# Patient Record
Sex: FEMALE | Race: WHITE | Employment: UNEMPLOYED | URBAN - METROPOLITAN AREA
[De-identification: names, ages, dates, MRNs, and addresses within clinical notes are randomized per-mention and may not be internally consistent; named-entity substitution may affect disease eponyms.]

---

## 2021-01-29 ENCOUNTER — APPOINTMENT (OUTPATIENT)
Dept: CT IMAGING | Age: 46
DRG: 241 | End: 2021-01-29
Payer: MEDICAID

## 2021-01-29 ENCOUNTER — HOSPITAL ENCOUNTER (INPATIENT)
Age: 46
LOS: 3 days | Discharge: HOME | DRG: 241 | End: 2021-02-01
Attending: EMERGENCY MEDICINE | Admitting: HOSPITALIST
Payer: MEDICAID

## 2021-01-29 DIAGNOSIS — K57.30 DIVERTICULOSIS OF COLON WITHOUT DIVERTICULITIS: ICD-10-CM

## 2021-01-29 DIAGNOSIS — D62 ACUTE BLOOD LOSS ANEMIA: ICD-10-CM

## 2021-01-29 DIAGNOSIS — K76.9 CHRONIC LIVER DISEASE: ICD-10-CM

## 2021-01-29 DIAGNOSIS — A41.9 SEPSIS, DUE TO UNSPECIFIED ORGANISM, UNSPECIFIED WHETHER ACUTE ORGAN DYSFUNCTION PRESENT (HCC): ICD-10-CM

## 2021-01-29 DIAGNOSIS — I86.4 GASTRIC VARICES: ICD-10-CM

## 2021-01-29 DIAGNOSIS — K92.0 HEMATEMESIS WITH NAUSEA: Primary | ICD-10-CM

## 2021-01-29 PROBLEM — K92.2 GI BLEED: Status: ACTIVE | Noted: 2021-01-29

## 2021-01-29 LAB
A/G RATIO: 0.9 (ref 1.1–2.2)
ABO/RH: NORMAL
ALBUMIN SERPL-MCNC: 3.5 G/DL (ref 3.4–5)
ALP BLD-CCNC: 152 U/L (ref 40–129)
ALT SERPL-CCNC: 29 U/L (ref 10–40)
ANION GAP SERPL CALCULATED.3IONS-SCNC: 18 MMOL/L (ref 3–16)
ANISOCYTOSIS: ABNORMAL
ANTIBODY SCREEN: NORMAL
APTT: 34.9 SEC (ref 24.2–36.2)
AST SERPL-CCNC: 100 U/L (ref 15–37)
BASE EXCESS VENOUS: 9 MMOL/L (ref -3–3)
BASOPHILS ABSOLUTE: 0.1 K/UL (ref 0–0.2)
BASOPHILS RELATIVE PERCENT: 0.5 %
BILIRUB SERPL-MCNC: 4.8 MG/DL (ref 0–1)
BUN BLDV-MCNC: 32 MG/DL (ref 7–20)
CALCIUM SERPL-MCNC: 9 MG/DL (ref 8.3–10.6)
CARBOXYHEMOGLOBIN: 4.7 % (ref 0–1.5)
CHLORIDE BLD-SCNC: 92 MMOL/L (ref 99–110)
CO2: 27 MMOL/L (ref 21–32)
CREAT SERPL-MCNC: 1 MG/DL (ref 0.6–1.1)
EOSINOPHILS ABSOLUTE: 0 K/UL (ref 0–0.6)
EOSINOPHILS RELATIVE PERCENT: 0 %
ETHANOL: NORMAL MG/DL (ref 0–0.08)
GFR AFRICAN AMERICAN: >60
GFR NON-AFRICAN AMERICAN: >60
GLOBULIN: 3.7 G/DL
GLUCOSE BLD-MCNC: 184 MG/DL (ref 70–99)
HCG QUALITATIVE: NEGATIVE
HCO3 VENOUS: 32.1 MMOL/L (ref 23–29)
HCT VFR BLD CALC: 18.8 % (ref 36–48)
HEMOGLOBIN, VEN, REDUCED: 5 %
HEMOGLOBIN: 5.9 G/DL (ref 12–16)
INR BLD: 2.28 (ref 0.86–1.14)
LACTIC ACID, SEPSIS: 2.8 MMOL/L (ref 0.4–1.9)
LIPASE: 11 U/L (ref 13–60)
LYMPHOCYTES ABSOLUTE: 1.5 K/UL (ref 1–5.1)
LYMPHOCYTES RELATIVE PERCENT: 8.9 %
MCH RBC QN AUTO: 32.1 PG (ref 26–34)
MCHC RBC AUTO-ENTMCNC: 31.5 G/DL (ref 31–36)
MCV RBC AUTO: 101.9 FL (ref 80–100)
METHEMOGLOBIN VENOUS: <0 %
MONOCYTES ABSOLUTE: 1.4 K/UL (ref 0–1.3)
MONOCYTES RELATIVE PERCENT: 8.5 %
NEUTROPHILS ABSOLUTE: 13.6 K/UL (ref 1.7–7.7)
NEUTROPHILS RELATIVE PERCENT: 82.1 %
O2 CONTENT, VEN: 7 VOL %
O2 SAT, VEN: 94 %
O2 THERAPY: ABNORMAL
PCO2, VEN: 35.9 MMHG (ref 40–50)
PDW BLD-RTO: 18 % (ref 12.4–15.4)
PH VENOUS: 7.56 (ref 7.35–7.45)
PLATELET # BLD: 225 K/UL (ref 135–450)
PMV BLD AUTO: 9 FL (ref 5–10.5)
PO2, VEN: 62.4 MMHG (ref 25–40)
POTASSIUM REFLEX MAGNESIUM: 3.9 MMOL/L (ref 3.5–5.1)
PROTHROMBIN TIME: 26.7 SEC (ref 10–13.2)
RBC # BLD: 1.85 M/UL (ref 4–5.2)
ROULEAUX: ABNORMAL
SODIUM BLD-SCNC: 137 MMOL/L (ref 136–145)
TCO2 CALC VENOUS: 74 MMOL/L
TOTAL PROTEIN: 7.2 G/DL (ref 6.4–8.2)
WBC # BLD: 16.6 K/UL (ref 4–11)

## 2021-01-29 PROCEDURE — 93005 ELECTROCARDIOGRAM TRACING: CPT | Performed by: PHYSICIAN ASSISTANT

## 2021-01-29 PROCEDURE — 82077 ASSAY SPEC XCP UR&BREATH IA: CPT

## 2021-01-29 PROCEDURE — 85610 PROTHROMBIN TIME: CPT

## 2021-01-29 PROCEDURE — 84703 CHORIONIC GONADOTROPIN ASSAY: CPT

## 2021-01-29 PROCEDURE — 80074 ACUTE HEPATITIS PANEL: CPT

## 2021-01-29 PROCEDURE — 96372 THER/PROPH/DIAG INJ SC/IM: CPT

## 2021-01-29 PROCEDURE — 87040 BLOOD CULTURE FOR BACTERIA: CPT

## 2021-01-29 PROCEDURE — 80053 COMPREHEN METABOLIC PANEL: CPT

## 2021-01-29 PROCEDURE — 85730 THROMBOPLASTIN TIME PARTIAL: CPT

## 2021-01-29 PROCEDURE — 86923 COMPATIBILITY TEST ELECTRIC: CPT

## 2021-01-29 PROCEDURE — 99284 EMERGENCY DEPT VISIT MOD MDM: CPT

## 2021-01-29 PROCEDURE — 85018 HEMOGLOBIN: CPT

## 2021-01-29 PROCEDURE — 96374 THER/PROPH/DIAG INJ IV PUSH: CPT

## 2021-01-29 PROCEDURE — 6370000000 HC RX 637 (ALT 250 FOR IP): Performed by: PHYSICIAN ASSISTANT

## 2021-01-29 PROCEDURE — 6360000004 HC RX CONTRAST MEDICATION: Performed by: PHYSICIAN ASSISTANT

## 2021-01-29 PROCEDURE — 2000000000 HC ICU R&B

## 2021-01-29 PROCEDURE — 36415 COLL VENOUS BLD VENIPUNCTURE: CPT

## 2021-01-29 PROCEDURE — 86850 RBC ANTIBODY SCREEN: CPT

## 2021-01-29 PROCEDURE — 96361 HYDRATE IV INFUSION ADD-ON: CPT

## 2021-01-29 PROCEDURE — 85014 HEMATOCRIT: CPT

## 2021-01-29 PROCEDURE — 86901 BLOOD TYPING SEROLOGIC RH(D): CPT

## 2021-01-29 PROCEDURE — 83605 ASSAY OF LACTIC ACID: CPT

## 2021-01-29 PROCEDURE — 74177 CT ABD & PELVIS W/CONTRAST: CPT

## 2021-01-29 PROCEDURE — P9016 RBC LEUKOCYTES REDUCED: HCPCS

## 2021-01-29 PROCEDURE — 6360000002 HC RX W HCPCS: Performed by: PHYSICIAN ASSISTANT

## 2021-01-29 PROCEDURE — 85025 COMPLETE CBC W/AUTO DIFF WBC: CPT

## 2021-01-29 PROCEDURE — 83690 ASSAY OF LIPASE: CPT

## 2021-01-29 PROCEDURE — 82803 BLOOD GASES ANY COMBINATION: CPT

## 2021-01-29 PROCEDURE — C9113 INJ PANTOPRAZOLE SODIUM, VIA: HCPCS | Performed by: PHYSICIAN ASSISTANT

## 2021-01-29 PROCEDURE — 2580000003 HC RX 258: Performed by: PHYSICIAN ASSISTANT

## 2021-01-29 PROCEDURE — 86900 BLOOD TYPING SEROLOGIC ABO: CPT

## 2021-01-29 PROCEDURE — 94761 N-INVAS EAR/PLS OXIMETRY MLT: CPT

## 2021-01-29 RX ORDER — LORAZEPAM 2 MG/ML
4 INJECTION INTRAMUSCULAR
Status: DISCONTINUED | OUTPATIENT
Start: 2021-01-29 | End: 2021-02-01 | Stop reason: HOSPADM

## 2021-01-29 RX ORDER — OCTREOTIDE ACETATE 100 UG/ML
50 INJECTION, SOLUTION INTRAVENOUS; SUBCUTANEOUS ONCE
Status: COMPLETED | OUTPATIENT
Start: 2021-01-29 | End: 2021-01-29

## 2021-01-29 RX ORDER — ACETAMINOPHEN 650 MG/1
650 SUPPOSITORY RECTAL EVERY 6 HOURS PRN
Status: DISCONTINUED | OUTPATIENT
Start: 2021-01-29 | End: 2021-02-01 | Stop reason: HOSPADM

## 2021-01-29 RX ORDER — LORAZEPAM 1 MG/1
4 TABLET ORAL
Status: DISCONTINUED | OUTPATIENT
Start: 2021-01-29 | End: 2021-02-01 | Stop reason: HOSPADM

## 2021-01-29 RX ORDER — ACETAMINOPHEN 325 MG/1
650 TABLET ORAL EVERY 6 HOURS PRN
Status: DISCONTINUED | OUTPATIENT
Start: 2021-01-29 | End: 2021-02-01 | Stop reason: HOSPADM

## 2021-01-29 RX ORDER — THIAMINE HYDROCHLORIDE 100 MG/ML
100 INJECTION, SOLUTION INTRAMUSCULAR; INTRAVENOUS DAILY
Status: DISCONTINUED | OUTPATIENT
Start: 2021-01-30 | End: 2021-02-01 | Stop reason: HOSPADM

## 2021-01-29 RX ORDER — LORAZEPAM 1 MG/1
3 TABLET ORAL
Status: DISCONTINUED | OUTPATIENT
Start: 2021-01-29 | End: 2021-02-01 | Stop reason: HOSPADM

## 2021-01-29 RX ORDER — SODIUM CHLORIDE 0.9 % (FLUSH) 0.9 %
10 SYRINGE (ML) INJECTION EVERY 12 HOURS SCHEDULED
Status: DISCONTINUED | OUTPATIENT
Start: 2021-01-30 | End: 2021-01-29 | Stop reason: SDUPTHER

## 2021-01-29 RX ORDER — 0.9 % SODIUM CHLORIDE 0.9 %
1000 INTRAVENOUS SOLUTION INTRAVENOUS ONCE
Status: COMPLETED | OUTPATIENT
Start: 2021-01-29 | End: 2021-01-29

## 2021-01-29 RX ORDER — PANTOPRAZOLE SODIUM 40 MG/10ML
40 INJECTION, POWDER, LYOPHILIZED, FOR SOLUTION INTRAVENOUS ONCE
Status: COMPLETED | OUTPATIENT
Start: 2021-01-29 | End: 2021-01-29

## 2021-01-29 RX ORDER — ONDANSETRON 2 MG/ML
4 INJECTION INTRAMUSCULAR; INTRAVENOUS ONCE
Status: COMPLETED | OUTPATIENT
Start: 2021-01-29 | End: 2021-01-29

## 2021-01-29 RX ORDER — SODIUM CHLORIDE 0.9 % (FLUSH) 0.9 %
10 SYRINGE (ML) INJECTION EVERY 12 HOURS SCHEDULED
Status: DISCONTINUED | OUTPATIENT
Start: 2021-01-30 | End: 2021-02-01 | Stop reason: HOSPADM

## 2021-01-29 RX ORDER — LORAZEPAM 2 MG/ML
1 INJECTION INTRAMUSCULAR
Status: DISCONTINUED | OUTPATIENT
Start: 2021-01-29 | End: 2021-02-01 | Stop reason: HOSPADM

## 2021-01-29 RX ORDER — LORAZEPAM 1 MG/1
2 TABLET ORAL
Status: DISCONTINUED | OUTPATIENT
Start: 2021-01-29 | End: 2021-02-01 | Stop reason: HOSPADM

## 2021-01-29 RX ORDER — ACETAMINOPHEN 325 MG/1
650 TABLET ORAL ONCE
Status: COMPLETED | OUTPATIENT
Start: 2021-01-29 | End: 2021-01-29

## 2021-01-29 RX ORDER — SODIUM CHLORIDE 0.9 % (FLUSH) 0.9 %
10 SYRINGE (ML) INJECTION PRN
Status: DISCONTINUED | OUTPATIENT
Start: 2021-01-29 | End: 2021-01-29 | Stop reason: SDUPTHER

## 2021-01-29 RX ORDER — PROMETHAZINE HYDROCHLORIDE 25 MG/1
12.5 TABLET ORAL EVERY 6 HOURS PRN
Status: DISCONTINUED | OUTPATIENT
Start: 2021-01-29 | End: 2021-02-01 | Stop reason: HOSPADM

## 2021-01-29 RX ORDER — SODIUM CHLORIDE 0.9 % (FLUSH) 0.9 %
10 SYRINGE (ML) INJECTION PRN
Status: DISCONTINUED | OUTPATIENT
Start: 2021-01-29 | End: 2021-02-01 | Stop reason: HOSPADM

## 2021-01-29 RX ORDER — SODIUM CHLORIDE 9 MG/ML
INJECTION, SOLUTION INTRAVENOUS PRN
Status: DISCONTINUED | OUTPATIENT
Start: 2021-01-29 | End: 2021-02-01 | Stop reason: HOSPADM

## 2021-01-29 RX ORDER — SODIUM CHLORIDE 9 MG/ML
INJECTION, SOLUTION INTRAVENOUS CONTINUOUS
Status: DISCONTINUED | OUTPATIENT
Start: 2021-01-30 | End: 2021-02-01 | Stop reason: HOSPADM

## 2021-01-29 RX ORDER — MULTIVITAMIN WITH IRON
1 TABLET ORAL DAILY
Status: DISCONTINUED | OUTPATIENT
Start: 2021-01-30 | End: 2021-02-01 | Stop reason: HOSPADM

## 2021-01-29 RX ORDER — ONDANSETRON 2 MG/ML
4 INJECTION INTRAMUSCULAR; INTRAVENOUS EVERY 6 HOURS PRN
Status: DISCONTINUED | OUTPATIENT
Start: 2021-01-29 | End: 2021-02-01 | Stop reason: HOSPADM

## 2021-01-29 RX ORDER — LORAZEPAM 2 MG/ML
2 INJECTION INTRAMUSCULAR
Status: DISCONTINUED | OUTPATIENT
Start: 2021-01-29 | End: 2021-02-01 | Stop reason: HOSPADM

## 2021-01-29 RX ORDER — LORAZEPAM 2 MG/ML
3 INJECTION INTRAMUSCULAR
Status: DISCONTINUED | OUTPATIENT
Start: 2021-01-29 | End: 2021-02-01 | Stop reason: HOSPADM

## 2021-01-29 RX ORDER — LORAZEPAM 1 MG/1
1 TABLET ORAL
Status: DISCONTINUED | OUTPATIENT
Start: 2021-01-29 | End: 2021-02-01 | Stop reason: HOSPADM

## 2021-01-29 RX ADMIN — PANTOPRAZOLE SODIUM 40 MG: 40 INJECTION, POWDER, FOR SOLUTION INTRAVENOUS at 18:23

## 2021-01-29 RX ADMIN — SODIUM CHLORIDE 8 MG/HR: 9 INJECTION, SOLUTION INTRAVENOUS at 18:37

## 2021-01-29 RX ADMIN — SODIUM CHLORIDE 1000 ML: 9 INJECTION, SOLUTION INTRAVENOUS at 18:22

## 2021-01-29 RX ADMIN — OCTREOTIDE ACETATE 50 MCG/HR: 500 INJECTION, SOLUTION INTRAVENOUS; SUBCUTANEOUS at 21:47

## 2021-01-29 RX ADMIN — ACETAMINOPHEN 650 MG: 325 TABLET ORAL at 18:23

## 2021-01-29 RX ADMIN — SODIUM CHLORIDE 1000 ML: 9 INJECTION, SOLUTION INTRAVENOUS at 18:44

## 2021-01-29 RX ADMIN — IOPAMIDOL 75 ML: 755 INJECTION, SOLUTION INTRAVENOUS at 18:17

## 2021-01-29 RX ADMIN — ONDANSETRON 4 MG: 2 INJECTION INTRAMUSCULAR; INTRAVENOUS at 18:25

## 2021-01-29 RX ADMIN — OCTREOTIDE ACETATE 50 MCG: 100 INJECTION, SOLUTION INTRAVENOUS; SUBCUTANEOUS at 21:44

## 2021-01-29 ASSESSMENT — PAIN SCALES - GENERAL: PAINLEVEL_OUTOF10: 2

## 2021-01-29 ASSESSMENT — ENCOUNTER SYMPTOMS
NAUSEA: 1
DIARRHEA: 0
ABDOMINAL PAIN: 1
VOMITING: 1
SHORTNESS OF BREATH: 0
BACK PAIN: 0
CHEST TIGHTNESS: 0

## 2021-01-29 NOTE — ED PROVIDER NOTES
[facility]        Pt Name: Arnaldo Downs  MRN: [id number]  [place] 1975  Date of evaluation: 1/29/2021  Provider: Moises Torres PA-C  PCP: No primary care provider on file. I have seen and evaluated this patient with my supervising physician Ernestine Minor MD.    [facility]       Chief Complaint   Patient presents with    Emesis     FF ems, pt started feeling nauseous last night around 2200. Pt states she saw blood in emesis. Pt feeling weak and light headed. HISTORY OF PRESENT ILLNESS   (Location, Timing/Onset, Context/Setting, Quality, Duration, Modifying Factors, Severity, Associated Signs and Symptoms)  Note limiting factors. Arnaldo Downs is a 41 y.o. female presents the emergency department with difficulty as a pertains to coffee-ground hematemesis. Patient states she has had difficulties dating back to 2016 with a documented gastric ulcer. She states she is had an endoscopy and has been able to do extremely well following difficulties that she had back then only relying on Pepcid to control intermittent symptoms. Patient states she had dinner last evening which consisted of a hamburger. She initially thought that some of the symptoms could be related to food poisoning. She states she is the only one that had this food in her home. She states she became significantly nauseated and was somewhat \"lethargic\". She states she was almost too tired she could not keep her eyes open. She goes on to report that later that evening she started having difficulties as a pertains to emesis. She thinks that it was around 11:00 that this started. Patient tells me that she did not want to wake her daughter so the first time that she actually vomited she did not see what the contents was but she says it tasted like blood.   Patient states shortly thereafter she started having more violent vomiting and states that she did notice that this was coffee-ground in appearance. She states it look like brown blood with the contents of food in the toilet. She states in the overnight hours she has had approximately 25 bouts of emesis. She states approximately half of those did demonstrate some blood. She states near the end where she had the last bout of hematemesis approximately 230 this afternoon the blood was definitely more red in color. Patient states unfortunate she is also experiencing significant symptoms of orthostasis. She states she is markedly lightheaded with position changes. She states she is not having difficulties as a pertains to diarrhea. She does have diffuse upper abdominal pain. Patient denies fevers and or chills. She denies sick contacts. She denies that she is experiencing chest pain or shortness of breath. She denies unilateral leg pain or swelling. Denies any history of DVT and or PE and has no additional risk factors for thromboembolic events. She is done nothing yet for the above-mentioned. She states she continues to feel markedly nauseated but has not had any additional vomiting because she states that she has nothing left to vomit up. Patient tells me she has not had any change in her stool. She is also tell me that she has had difficulties with reported menorrhagia. She states that she has had difficulties related to that for the last couple of months. Patient states that she has associated dysmenorrhea with it as well. Upon further questioning she denies alcohol use and approaching 2 years and reports that she did used to have drink excessively. .  She states she is not taking any significant NSAIDs. She states she normally just takes Tylenol for her dysmenorrhea. Patient reports that she did patient has no additional complaints and presents to the ED for evaluation and treatment by emergency medical services.     Nursing Notes were all reviewed and agreed with or any disagreements were addressed in the HPI. REVIEW OF SYSTEMS    (2-9 systems for level 4, 10 or more for level 5)     Review of Systems   Constitutional: Positive for fatigue. Negative for activity change, chills and fever. Respiratory: Negative for chest tightness and shortness of breath. Cardiovascular: Negative for chest pain. Gastrointestinal: Positive for abdominal pain, nausea and vomiting. Negative for diarrhea. Genitourinary: Negative for dysuria and flank pain. Musculoskeletal: Negative for back pain and myalgias. Skin: Negative for rash and wound. Neurological: Positive for weakness and light-headedness. Positives and Pertinent negatives as per HPI. Except as noted above in the ROS, all other systems were reviewed and negative. PAST MEDICAL HISTORY   History reviewed. No pertinent past medical history. SURGICAL HISTORY   History reviewed. No pertinent surgical history. CURRENTMEDICATIONS       Previous Medications    No medications on file         ALLERGIES     Patient has no known allergies. FAMILYHISTORY     History reviewed. No pertinent family history. SOCIAL HISTORY       Social History     Tobacco Use    Smoking status: Former Smoker    Smokeless tobacco: Never Used   Substance Use Topics    Alcohol use: Not Currently    Drug use: Never       SCREENINGS             PHYSICAL EXAM    (up to 7 for level 4, 8 or more for level 5)     ED Triage Vitals [01/29/21 1656]   BP Temp Temp Source Pulse Resp SpO2 Height Weight   (!) 121/41 100.2 °F (37.9 °C) Oral 134 20 99 % 5' 4\" (1.626 m) 125 lb (56.7 kg)       Physical Exam  Vitals signs and nursing note reviewed. Constitutional:       General: She is awake. She is not in acute distress. Appearance: Normal appearance. She is well-developed. She is not ill-appearing or diaphoretic. HENT:      Head: Normocephalic and atraumatic. No raccoon eyes, Marsh's sign, contusion or laceration.       Right Ear: Hearing and external ear normal.      Left Ear: Hearing and external ear normal.      Nose: Nose normal.      Mouth/Throat:      Lips: Pink. Mouth: Mucous membranes are moist.   Eyes:      General: Lids are normal. No scleral icterus. Right eye: No discharge. Left eye: No discharge. Conjunctiva/sclera: Conjunctivae normal.      Pupils: Pupils are equal, round, and reactive to light. Neck:      Musculoskeletal: Normal range of motion. Vascular: No JVD. Cardiovascular:      Rate and Rhythm: Regular rhythm. Tachycardia present. Heart sounds: No murmur. No friction rub. No gallop. Comments: Bilateral calves supple. Negative Homans bilaterally. Pulmonary:      Effort: Pulmonary effort is normal. No accessory muscle usage or respiratory distress. Breath sounds: Normal breath sounds. No wheezing, rhonchi or rales. Abdominal:      General: Abdomen is flat. Bowel sounds are normal. There is no distension. Palpations: Abdomen is soft. Abdomen is not rigid. There is no mass. Tenderness: There is generalized abdominal tenderness and tenderness in the right upper quadrant, epigastric area and left upper quadrant. There is no right CVA tenderness, left CVA tenderness, guarding or rebound. Musculoskeletal:      Right lower leg: No edema. Left lower leg: No edema. Skin:     General: Skin is warm and dry. Neurological:      Mental Status: She is alert and oriented to person, place, and time. GCS: GCS eye subscore is 4. GCS verbal subscore is 5. GCS motor subscore is 6. Cranial Nerves: No cranial nerve deficit. Sensory: No sensory deficit. Coordination: Coordination normal.   Psychiatric:         Behavior: Behavior normal. Behavior is cooperative.          DIAGNOSTIC RESULTS   LABS:    Labs Reviewed   CBC WITH AUTO DIFFERENTIAL - Abnormal; Notable for the following components:       Result Value    WBC 16.6 (*)     RBC 1.85 (*) Hemoglobin 5.9 (*)     Hematocrit 18.8 (*)     .9 (*)     RDW 18.0 (*)     Neutrophils Absolute 13.6 (*)     Monocytes Absolute 1.4 (*)     Anisocytosis 1+ (*)     Rouleaux Occasional (*)     All other components within normal limits    Narrative:     Lisa Dlilard  [facility] tel. [phone],  Hematology results called to and read back by RN,Ivy Marino, 01/29/2021  18:02, by [facility]  Performed at:  [facility] [address],  [place], [address]   Phone [phone]   COMPREHENSIVE METABOLIC PANEL W/ REFLEX TO MG FOR LOW K - Abnormal; Notable for the following components:    Chloride 92 (*)     Anion Gap 18 (*)     Glucose 184 (*)     BUN 32 (*)     Albumin/Globulin Ratio 0.9 (*)     Total Bilirubin 4.8 (*)     Alkaline Phosphatase 152 (*)      (*)     All other components within normal limits    Narrative:     Performed at:  [facility] [address], [place], [address]   Phone [phone]   LIPASE - Abnormal; Notable for the following components:    Lipase 11.0 (*)     All other components within normal limits    Narrative:     Performed at:  [facility] [address], [place], [address]   Phone [phone]   BLOOD GAS, VENOUS - Abnormal; Notable for the following components:    pH, Joe 7.559 (*)     pCO2, Joe 35.9 (*)     pO2, Joe 62.4 (*)     HCO3, Venous 32.1 (*)     Base Excess, Joe 9.0 (*)     Carboxyhemoglobin 4.7 (*)     All other components within normal limits    Narrative:     Performed at:  [facility] [address]   Phone [phone]   LACTATE, SEPSIS - Abnormal; Notable for the following components:    Lactic Acid, Sepsis 2.8 (*)     All other components within normal limits    Narrative:     Performed at:  [facility] [address]   Phone [phone] CULTURE, BLOOD 1   CULTURE, BLOOD 2   HCG, SERUM, QUALITATIVE    Narrative:     Performed at:  [facility]  [address],  [place], [place] [place]   Phone [phone]   ETHANOL    Narrative:     Performed at:  [facility]  [address],  [place], [address]   Phone [phone]   URINE RT REFLEX TO CULTURE   PROTIME-INR   APTT   TYPE AND SCREEN    Narrative:     Performed at:  [facility]  [address]  [place], [address]   Phone [phone]   PREPARE RBC (CROSSMATCH)       All other labs were within normal range or not returned as of this dictation. EKG: All EKG's are interpreted by the Emergency Department Physician in the absence of a cardiologist.  Please see their note for interpretation of EKG. RADIOLOGY:   Non-plain film images such as CT, Ultrasound and MRI are read by the radiologist. Plain radiographic images are visualized and preliminarily interpreted by the ED Provider with the below findings:        Interpretation per the Radiologist below, if available at the time of this note:    CT ABDOMEN PELVIS W IV CONTRAST Additional Contrast? None   Preliminary Result   1. Morphologic findings consistent with chronic liver disease with underlying   steatosis and portal hypertension, as described. Perigastric and gastric   varices near the GE junction are noted. 2.  Right-sided colonic diverticula. PROCEDURES   Unless otherwise noted below, none     Procedures    CRITICAL CARE TIME   Because of the high probability of sudden clinical deterioration of the patients condition and to prevent further deterioration, my critical care time involved my full attention to the patients condition, and included chart data review, documentation, medication ordering, viewing the patients old records, reevaluation of the patient's cardiac, pulmonary, and neurological status.   Reassessing vital signs. Consutlations with off service physician. Ordering, interpreting reviewing diagnostic testing. Therefore my critical care time was 49 minutes of direct attention to the patients condition and did not include time spent on procedures. I have discussed with the patient the rationale for blood transfusion, its benefits in treating or preventing acute blood loss and dangerously low hemoglobin levels which could lead to fatigue, organ damage or death, and its risk which include: mild transfusion reactions, rare risk of blood borne infection, or more serious but rare allergic reactions. I have discussed the alternatives to transfusion, including the risk and consequences of not receiving transfusion. The patient had an opportunity to ask questions and had agreed to proceed with transfusion of packed red blood cells.     SEP-1 CORE MEASURE DATA    Classification: sepsis    Amount of fluids ordered: at least 30mL/kg based on ideal body weight due to obesity defined as BMI >30 (patient's BMI is Body mass index is 21.46 kg/m².)    Time at which sepsis was identified: 2100    Broad-spectrum antibiotics chosen: based on sepsis order-set for a suspected source of: Abdominal    Repeat lactate level: pending    On reassessment after fluid resuscitation:   Vitals update: BP (!) 104/43   Pulse 110   Temp 99.8 °F (37.7 °C) (Oral)   Resp 18   Ht 5' 4\" (1.626 m)   Wt 125 lb (56.7 kg)   LMP 01/24/2021 (Exact Date)   SpO2 100%   BMI 21.46 kg/m² , cardiopulmonary exam: Improving tachycardia, capillary refill: Risk, peripheral pulses: Unchanged, skin examination: Unchanged      CONSULTS:  IP CONSULT TO GI      EMERGENCY DEPARTMENT COURSE and DIFFERENTIAL DIAGNOSIS/MDM:   Vitals:    Vitals:    01/29/21 2100 01/29/21 2115 01/29/21 2130 01/29/21 2145   BP: (!) 110/49 (!) 105/42 (!) 104/48 (!) 104/43   Pulse: 129 120 110 110   Resp:       Temp:       TempSrc:       SpO2: 99% 99% 100% 100%   Weight:       Height: thrombocytosis. This was discussed with the patient in detail. She was consented to blood transfusion because of active upper GI bleeding as well as low hemoglobin and hematocrit. BUN is 32 creatinine is 1.0 nonfasting glucose 184 electrolytes are as documented. CO2 at 27 with a gap of 18. She does have an elevation in her total bili at 4.8 alkaline phosphatase at 152 ALT is normal at 29 with an AST of 100. Lipase is 11. Pregnancy is negative. Lactic acid is elevated at 2.8. Venous blood gas demonstrates a pH of 7.56 she is not hypercapnic or hypoxic. CT abdomen and pelvis with IV contrast demonstrates morphologic findings consistent with chronic liver disease. There is underlying steatosis associated portal hypertension. Perigastric and gastric varices near the GE junction are noted. When these were noted and octreotide bolus as well as octreotide drip were initiated and gastroenterology was consulted. There is evidence of right-sided colonic diverticula. I did speak directly with Dr. Abdon Palm in regards the above-mentioned. He agrees with everything is been collated as above and he did request ceftriaxone as antibiotic of choice. He has of the patient be made n.p.o. after midnight he will gladly see and assume the care of this patient. He asked that the patient be admitted to the hospitalist service. Case was discussed directly with the hospitalist who accepts the patient for admission for ongoing care management the diagnoses below. FINAL IMPRESSION      1. Hematemesis with nausea    2. Chronic liver disease    3. Gastric varices    4. Acute blood loss anemia    5. Diverticulosis of colon without diverticulitis    6.  Sepsis, due to unspecified organism, unspecified whether acute organ dysfunction present [facility])          DISPOSITION/PLAN   DISPOSITION: Admit medical stable condition         (Please note that portions of this note were completed with a voice recognition program.  Efforts were made to edit the dictations but occasionally words are mis-transcribed.)    Archie Horan PA-C (electronically signed)           Gabriela Hannah PA-C  01/29/21 [id number]

## 2021-01-30 ENCOUNTER — ANESTHESIA EVENT (OUTPATIENT)
Dept: ENDOSCOPY | Age: 46
DRG: 241 | End: 2021-01-30
Payer: MEDICAID

## 2021-01-30 ENCOUNTER — ANESTHESIA (OUTPATIENT)
Dept: ENDOSCOPY | Age: 46
DRG: 241 | End: 2021-01-30
Payer: MEDICAID

## 2021-01-30 LAB
A/G RATIO: 1 (ref 1.1–2.2)
ALBUMIN SERPL-MCNC: 2.6 G/DL (ref 3.4–5)
ALP BLD-CCNC: 92 U/L (ref 40–129)
ALT SERPL-CCNC: 19 U/L (ref 10–40)
AMPHETAMINE SCREEN, URINE: NORMAL
ANION GAP SERPL CALCULATED.3IONS-SCNC: 10 MMOL/L (ref 3–16)
ANISOCYTOSIS: ABNORMAL
APTT: 34.4 SEC (ref 24.2–36.2)
AST SERPL-CCNC: 72 U/L (ref 15–37)
BARBITURATE SCREEN URINE: NORMAL
BASOPHILS ABSOLUTE: 0 K/UL (ref 0–0.2)
BASOPHILS RELATIVE PERCENT: 0.4 %
BENZODIAZEPINE SCREEN, URINE: NORMAL
BILIRUB SERPL-MCNC: 4.7 MG/DL (ref 0–1)
BLOOD BANK DISPENSE STATUS: NORMAL
BLOOD BANK DISPENSE STATUS: NORMAL
BLOOD BANK PRODUCT CODE: NORMAL
BLOOD BANK PRODUCT CODE: NORMAL
BPU ID: NORMAL
BPU ID: NORMAL
BUN BLDV-MCNC: 35 MG/DL (ref 7–20)
CALCIUM SERPL-MCNC: 7.1 MG/DL (ref 8.3–10.6)
CANNABINOID SCREEN URINE: NORMAL
CHLORIDE BLD-SCNC: 105 MMOL/L (ref 99–110)
CO2: 24 MMOL/L (ref 21–32)
COCAINE METABOLITE SCREEN URINE: NORMAL
CREAT SERPL-MCNC: 1.1 MG/DL (ref 0.6–1.1)
DESCRIPTION BLOOD BANK: NORMAL
DESCRIPTION BLOOD BANK: NORMAL
EKG ATRIAL RATE: 136 BPM
EKG DIAGNOSIS: NORMAL
EKG P AXIS: 64 DEGREES
EKG P-R INTERVAL: 114 MS
EKG Q-T INTERVAL: 320 MS
EKG QRS DURATION: 70 MS
EKG QTC CALCULATION (BAZETT): 481 MS
EKG R AXIS: 51 DEGREES
EKG T AXIS: -47 DEGREES
EKG VENTRICULAR RATE: 136 BPM
EOSINOPHILS ABSOLUTE: 0.2 K/UL (ref 0–0.6)
EOSINOPHILS RELATIVE PERCENT: 1.6 %
GFR AFRICAN AMERICAN: >60
GFR NON-AFRICAN AMERICAN: 54
GLOBULIN: 2.6 G/DL
GLUCOSE BLD-MCNC: 119 MG/DL (ref 70–99)
HAV IGM SER IA-ACNC: NORMAL
HCT VFR BLD CALC: 19.3 % (ref 36–48)
HCT VFR BLD CALC: 22 % (ref 36–48)
HCT VFR BLD CALC: 22.4 % (ref 36–48)
HCT VFR BLD CALC: 23.7 % (ref 36–48)
HEMOGLOBIN: 6.3 G/DL (ref 12–16)
HEMOGLOBIN: 7.3 G/DL (ref 12–16)
HEMOGLOBIN: 7.5 G/DL (ref 12–16)
HEMOGLOBIN: 8 G/DL (ref 12–16)
HEPATITIS B CORE IGM ANTIBODY: NORMAL
HEPATITIS B SURFACE ANTIGEN INTERPRETATION: NORMAL
HEPATITIS C ANTIBODY INTERPRETATION: NORMAL
INR BLD: 1.87 (ref 0.86–1.14)
IRON SATURATION: ABNORMAL % (ref 15–50)
IRON: 193 UG/DL (ref 37–145)
LYMPHOCYTES ABSOLUTE: 1.4 K/UL (ref 1–5.1)
LYMPHOCYTES RELATIVE PERCENT: 14.4 %
Lab: NORMAL
MCH RBC QN AUTO: 31.6 PG (ref 26–34)
MCHC RBC AUTO-ENTMCNC: 33.2 G/DL (ref 31–36)
MCV RBC AUTO: 95 FL (ref 80–100)
METHADONE SCREEN, URINE: NORMAL
MONOCYTES ABSOLUTE: 0.7 K/UL (ref 0–1.3)
MONOCYTES RELATIVE PERCENT: 7.8 %
NEUTROPHILS ABSOLUTE: 7.2 K/UL (ref 1.7–7.7)
NEUTROPHILS RELATIVE PERCENT: 75.8 %
OPIATE SCREEN URINE: NORMAL
OXYCODONE URINE: NORMAL
PDW BLD-RTO: 17.7 % (ref 12.4–15.4)
PH UA: 6
PHENCYCLIDINE SCREEN URINE: NORMAL
PLATELET # BLD: 94 K/UL (ref 135–450)
PLATELET SLIDE REVIEW: ABNORMAL
PMV BLD AUTO: 9.3 FL (ref 5–10.5)
POTASSIUM REFLEX MAGNESIUM: 4 MMOL/L (ref 3.5–5.1)
PROPOXYPHENE SCREEN: NORMAL
PROTHROMBIN TIME: 21.8 SEC (ref 10–13.2)
RBC # BLD: 2.36 M/UL (ref 4–5.2)
SLIDE REVIEW: ABNORMAL
SODIUM BLD-SCNC: 139 MMOL/L (ref 136–145)
TOTAL IRON BINDING CAPACITY: ABNORMAL UG/DL (ref 260–445)
TOTAL PROTEIN: 5.2 G/DL (ref 6.4–8.2)
WBC # BLD: 9.5 K/UL (ref 4–11)

## 2021-01-30 PROCEDURE — 2580000003 HC RX 258: Performed by: HOSPITALIST

## 2021-01-30 PROCEDURE — 80053 COMPREHEN METABOLIC PANEL: CPT

## 2021-01-30 PROCEDURE — C9113 INJ PANTOPRAZOLE SODIUM, VIA: HCPCS | Performed by: PHYSICIAN ASSISTANT

## 2021-01-30 PROCEDURE — 83550 IRON BINDING TEST: CPT

## 2021-01-30 PROCEDURE — 2500000003 HC RX 250 WO HCPCS: Performed by: HOSPITALIST

## 2021-01-30 PROCEDURE — 6360000002 HC RX W HCPCS: Performed by: INTERNAL MEDICINE

## 2021-01-30 PROCEDURE — 2709999900 HC NON-CHARGEABLE SUPPLY: Performed by: INTERNAL MEDICINE

## 2021-01-30 PROCEDURE — 85730 THROMBOPLASTIN TIME PARTIAL: CPT

## 2021-01-30 PROCEDURE — 2580000003 HC RX 258: Performed by: INTERNAL MEDICINE

## 2021-01-30 PROCEDURE — 85610 PROTHROMBIN TIME: CPT

## 2021-01-30 PROCEDURE — 2000000000 HC ICU R&B

## 2021-01-30 PROCEDURE — 80307 DRUG TEST PRSMV CHEM ANLYZR: CPT

## 2021-01-30 PROCEDURE — 3609017100 HC EGD: Performed by: INTERNAL MEDICINE

## 2021-01-30 PROCEDURE — 85025 COMPLETE CBC W/AUTO DIFF WBC: CPT

## 2021-01-30 PROCEDURE — 6360000002 HC RX W HCPCS: Performed by: FAMILY MEDICINE

## 2021-01-30 PROCEDURE — 85018 HEMOGLOBIN: CPT

## 2021-01-30 PROCEDURE — 2580000003 HC RX 258

## 2021-01-30 PROCEDURE — 36430 TRANSFUSION BLD/BLD COMPNT: CPT

## 2021-01-30 PROCEDURE — 0DJ08ZZ INSPECTION OF UPPER INTESTINAL TRACT, VIA NATURAL OR ARTIFICIAL OPENING ENDOSCOPIC: ICD-10-PCS | Performed by: INTERNAL MEDICINE

## 2021-01-30 PROCEDURE — 36415 COLL VENOUS BLD VENIPUNCTURE: CPT

## 2021-01-30 PROCEDURE — 93010 ELECTROCARDIOGRAM REPORT: CPT | Performed by: INTERNAL MEDICINE

## 2021-01-30 PROCEDURE — 6360000002 HC RX W HCPCS: Performed by: PHYSICIAN ASSISTANT

## 2021-01-30 PROCEDURE — 85014 HEMATOCRIT: CPT

## 2021-01-30 PROCEDURE — 94761 N-INVAS EAR/PLS OXIMETRY MLT: CPT

## 2021-01-30 PROCEDURE — 83540 ASSAY OF IRON: CPT

## 2021-01-30 PROCEDURE — 6370000000 HC RX 637 (ALT 250 FOR IP): Performed by: HOSPITALIST

## 2021-01-30 PROCEDURE — 2500000003 HC RX 250 WO HCPCS: Performed by: FAMILY MEDICINE

## 2021-01-30 PROCEDURE — 3700000001 HC ADD 15 MINUTES (ANESTHESIA): Performed by: INTERNAL MEDICINE

## 2021-01-30 PROCEDURE — 3700000000 HC ANESTHESIA ATTENDED CARE: Performed by: INTERNAL MEDICINE

## 2021-01-30 PROCEDURE — 2580000003 HC RX 258: Performed by: PHYSICIAN ASSISTANT

## 2021-01-30 PROCEDURE — 6360000002 HC RX W HCPCS: Performed by: HOSPITALIST

## 2021-01-30 RX ORDER — SODIUM CHLORIDE 9 MG/ML
INJECTION, SOLUTION INTRAVENOUS
Status: COMPLETED
Start: 2021-01-30 | End: 2021-01-30

## 2021-01-30 RX ORDER — SODIUM CHLORIDE 9 MG/ML
INJECTION, SOLUTION INTRAVENOUS PRN
Status: DISCONTINUED | OUTPATIENT
Start: 2021-01-30 | End: 2021-02-01 | Stop reason: HOSPADM

## 2021-01-30 RX ORDER — LIDOCAINE HYDROCHLORIDE 20 MG/ML
INJECTION, SOLUTION INFILTRATION; PERINEURAL PRN
Status: DISCONTINUED | OUTPATIENT
Start: 2021-01-30 | End: 2021-01-30 | Stop reason: SDUPTHER

## 2021-01-30 RX ORDER — SODIUM CHLORIDE 9 MG/ML
INJECTION, SOLUTION INTRAVENOUS
Status: DISPENSED
Start: 2021-01-30 | End: 2021-01-30

## 2021-01-30 RX ORDER — PROPOFOL 10 MG/ML
INJECTION, EMULSION INTRAVENOUS PRN
Status: DISCONTINUED | OUTPATIENT
Start: 2021-01-30 | End: 2021-01-30 | Stop reason: SDUPTHER

## 2021-01-30 RX ADMIN — OCTREOTIDE ACETATE 50 MCG/HR: 500 INJECTION, SOLUTION INTRAVENOUS; SUBCUTANEOUS at 21:52

## 2021-01-30 RX ADMIN — SODIUM CHLORIDE: 9 INJECTION, SOLUTION INTRAVENOUS at 23:54

## 2021-01-30 RX ADMIN — PROPOFOL 50 MG: 10 INJECTION, EMULSION INTRAVENOUS at 10:43

## 2021-01-30 RX ADMIN — PROMETHAZINE HYDROCHLORIDE 12.5 MG: 25 TABLET ORAL at 15:50

## 2021-01-30 RX ADMIN — PROMETHAZINE HYDROCHLORIDE 12.5 MG: 25 TABLET ORAL at 22:09

## 2021-01-30 RX ADMIN — THIAMINE HYDROCHLORIDE 100 MG: 100 INJECTION, SOLUTION INTRAMUSCULAR; INTRAVENOUS at 00:38

## 2021-01-30 RX ADMIN — PROPOFOL 40 MG: 10 INJECTION, EMULSION INTRAVENOUS at 10:47

## 2021-01-30 RX ADMIN — PROMETHAZINE HYDROCHLORIDE 12.5 MG: 25 TABLET ORAL at 00:08

## 2021-01-30 RX ADMIN — PHYTONADIONE 5 MG: 10 INJECTION, EMULSION INTRAMUSCULAR; INTRAVENOUS; SUBCUTANEOUS at 00:43

## 2021-01-30 RX ADMIN — SODIUM CHLORIDE: 9 INJECTION, SOLUTION INTRAVENOUS at 05:12

## 2021-01-30 RX ADMIN — Medication 10 ML: at 09:31

## 2021-01-30 RX ADMIN — SODIUM CHLORIDE 8 MG/HR: 9 INJECTION, SOLUTION INTRAVENOUS at 00:20

## 2021-01-30 RX ADMIN — Medication 10 ML: at 22:10

## 2021-01-30 RX ADMIN — THERA TABS 1 TABLET: TAB at 09:27

## 2021-01-30 RX ADMIN — SODIUM CHLORIDE 8 MG/HR: 9 INJECTION, SOLUTION INTRAVENOUS at 09:47

## 2021-01-30 RX ADMIN — PROPOFOL 50 MG: 10 INJECTION, EMULSION INTRAVENOUS at 10:45

## 2021-01-30 RX ADMIN — ONDANSETRON 4 MG: 2 INJECTION INTRAMUSCULAR; INTRAVENOUS at 01:50

## 2021-01-30 RX ADMIN — SODIUM CHLORIDE 8 MG/HR: 9 INJECTION, SOLUTION INTRAVENOUS at 21:52

## 2021-01-30 RX ADMIN — SODIUM CHLORIDE: 9 INJECTION, SOLUTION INTRAVENOUS at 11:18

## 2021-01-30 RX ADMIN — FOLIC ACID: 5 INJECTION, SOLUTION INTRAMUSCULAR; INTRAVENOUS; SUBCUTANEOUS at 00:07

## 2021-01-30 RX ADMIN — PROPOFOL 100 MG: 10 INJECTION, EMULSION INTRAVENOUS at 10:40

## 2021-01-30 RX ADMIN — SODIUM CHLORIDE 250 ML: 9 INJECTION, SOLUTION INTRAVENOUS at 00:44

## 2021-01-30 RX ADMIN — CEFTRIAXONE 2000 MG: 2 INJECTION, POWDER, FOR SOLUTION INTRAMUSCULAR; INTRAVENOUS at 22:09

## 2021-01-30 RX ADMIN — Medication 1000 MG: at 00:27

## 2021-01-30 RX ADMIN — LIDOCAINE HYDROCHLORIDE 100 MG: 20 INJECTION, SOLUTION INFILTRATION; PERINEURAL at 10:40

## 2021-01-30 RX ADMIN — PROMETHAZINE HYDROCHLORIDE 12.5 MG: 25 TABLET ORAL at 06:52

## 2021-01-30 RX ADMIN — OCTREOTIDE ACETATE 50 MCG/HR: 500 INJECTION, SOLUTION INTRAVENOUS; SUBCUTANEOUS at 09:47

## 2021-01-30 ASSESSMENT — PAIN SCALES - GENERAL
PAINLEVEL_OUTOF10: 0

## 2021-01-30 NOTE — ANESTHESIA PRE PROCEDURE
Department of Anesthesiology  Preprocedure Note       Name:  Artis Zamora   Age:  41 y.o.  :  1975                                          MRN:  [id number]         Date:  2021      Surgeon: * No surgeons listed *    Procedure: * No procedures listed *    Medications prior to admission:   Prior to Admission medications    Not on File       Current medications:    Current Facility-Administered Medications   Medication Dose Route Frequency Provider Last Rate Last Admin    0.9 % sodium chloride infusion   Intravenous PRN Amy Marie MD        cefTRIAXone (ROCEPHIN) 2000 mg IVPB in D5W 50ml minibag  2,000 mg Intravenous Q24H Lamine Troncoso MD        pantoprazole (PROTONIX) 80 mg in sodium chloride 0.9 % 100 mL infusion  8 mg/hr Intravenous Continuous Ralf Saldaña PA-C 10 mL/hr at 21 0947 8 mg/hr at 21 0947    0.9 % sodium chloride infusion   Intravenous PRN Ralf Saldaña PA-C        octreotide (SANDOSTATIN) 500 mcg in sodium chloride 0.9 % 100 mL infusion  50 mcg/hr Intravenous Continuous Ralf Saldaña PA-C 10 mL/hr at 21 0947 50 mcg/hr at 21 0947    sodium chloride flush 0.9 % injection 10 mL  10 mL Intravenous 2 times per day Amy Marie MD   10 mL at 21 0931    sodium chloride flush 0.9 % injection 10 mL  10 mL Intravenous PRN Amy Marie MD        promethazine (PHENERGAN) tablet 12.5 mg  12.5 mg Oral Q6H PRN Amy Marie MD   12.5 mg at 21 1397    Or    ondansetron (ZOFRAN) injection 4 mg  4 mg Intravenous Q6H PRN Usman Castro MD   4 mg at 21 0150    acetaminophen (TYLENOL) tablet 650 mg  650 mg Oral Q6H PRN Amy Marie MD        Or    acetaminophen (TYLENOL) suppository 650 mg  650 mg Rectal Q6H PRN Usman Castro MD        0.9 % sodium chloride infusion   Intravenous Continuous Amy Marie MD   Stopped at 21 0444  thiamine (B-1) injection 100 mg  100 mg Intravenous Daily Usman Castro MD   100 mg at 01/30/21 0038    multivitamin 1 tablet  1 tablet Oral Daily Kennedi Harris MD   1 tablet at 01/30/21 0927    LORazepam (ATIVAN) tablet 1 mg  1 mg Oral Q1H PRN Kennedi Harris MD        Or    LORazepam (ATIVAN) injection 1 mg  1 mg Intravenous Q1H PRN Usman Castro MD        Or    LORazepam (ATIVAN) tablet 2 mg  2 mg Oral Q1H PRN Kennedi Harris MD        Or    LORazepam (ATIVAN) injection 2 mg  2 mg Intravenous Q1H PRN Usman Castro MD        Or    LORazepam (ATIVAN) tablet 3 mg  3 mg Oral Q1H PRN Usman Castro MD        Or    LORazepam (ATIVAN) injection 3 mg  3 mg Intravenous Q1H PRN Usman Castro MD        Or    LORazepam (ATIVAN) tablet 4 mg  4 mg Oral Q1H PRN Usman Castro MD        Or    LORazepam (ATIVAN) injection 4 mg  4 mg Intravenous Q1H PRN Kennedi Harris MD           Allergies:  No Known Allergies    Problem List:    Patient Active Problem List   Diagnosis Code    GI bleed K92.2       Past Medical History:  History reviewed. No pertinent past medical history. Past Surgical History:  History reviewed. No pertinent surgical history.     Social History:    Social History     Tobacco Use    Smoking status: Former Smoker    Smokeless tobacco: Never Used   Substance Use Topics    Alcohol use: Not Currently                                Counseling given: Not Answered      Vital Signs (Current):   Vitals:    01/30/21 0600 01/30/21 0700 01/30/21 0800 01/30/21 0900   BP: (!) 95/51 (!) 100/52 (!) 96/51 (!) 96/34   Pulse: 95 93 94 97   Resp: 17 9 17 10   Temp:   97.6 °F (36.4 °C)    TempSrc:   Temporal    SpO2: 97% 100% 96% 99%   Weight:       Height:                                                  BP Readings from Last 3 Encounters:   01/30/21 (!) 96/34       NPO Status:                                                                                 BMI: Wt Readings from Last 3 Encounters:   01/30/21 134 lb 4.2 oz (60.9 kg)     Body mass index is 23.05 kg/m². CBC:   Lab Results   Component Value Date    WBC 16.6 01/29/2021    RBC 1.85 01/29/2021    HGB 6.3 01/29/2021    HCT 19.3 01/29/2021    .9 01/29/2021    RDW 18.0 01/29/2021     01/29/2021       CMP:   Lab Results   Component Value Date     01/29/2021    K 3.9 01/29/2021    CL 92 01/29/2021    CO2 27 01/29/2021    BUN 32 01/29/2021    CREATININE 1.0 01/29/2021    GFRAA >60 01/29/2021    AGRATIO 0.9 01/29/2021    LABGLOM >60 01/29/2021    GLUCOSE 184 01/29/2021    PROT 7.2 01/29/2021    CALCIUM 9.0 01/29/2021    BILITOT 4.8 01/29/2021    ALKPHOS 152 01/29/2021     01/29/2021    ALT 29 01/29/2021       POC Tests: No results for input(s): POCGLU, POCNA, POCK, POCCL, POCBUN, POCHEMO, POCHCT in the last 72 hours.     Coags:   Lab Results   Component Value Date    PROTIME 21.8 01/30/2021    INR 1.87 01/30/2021    APTT 34.4 01/30/2021       HCG (If Applicable): No results found for: PREGTESTUR, PREGSERUM, HCG, HCGQUANT     ABGs: No results found for: PHART, PO2ART, BMJ6SXY, BDY2IUA, BEART, X0IHDTIV     Type & Screen (If Applicable):  No results found for: LABABO, LABRH    Drug/Infectious Status (If Applicable):  No results found for: HIV, HEPCAB    COVID-19 Screening (If Applicable): No results found for: COVID19      Anesthesia Evaluation    Airway: Mallampati: II  TM distance: >3 FB   Neck ROM: full  Mouth opening: > = 3 FB Dental:          Pulmonary:                              Cardiovascular:            Rhythm: regular  Rate: normal                    Neuro/Psych:               GI/Hepatic/Renal:             Endo/Other:                     Abdominal:           Vascular:                                        Anesthesia Plan      MAC     ASA 2 - emergent

## 2021-01-30 NOTE — PROGRESS NOTES
Pt has received 2 units of Blood in the ICU that was given during downtime and was charted on paper sent from blood bank. Documentation for transfusion placed on the Pts hard chart. Blood given per policy with No complications.

## 2021-01-30 NOTE — ED NOTES
Nursing report called to Danny Vieyra RN on receiving unit. RN accepts patient and has no further questions.      Romeo Li RN  01/29/21 [id number]

## 2021-01-30 NOTE — OP NOTE
having difficulty retained insufflated air and retained clot and debris       Recommendations:  Continue octreotide and PPI  clears    MD Juan Pablo Kumari and [address]

## 2021-01-30 NOTE — PROGRESS NOTES
Pt A&Ox4. Consent signed and placed on chart after MD has been to bedside and explained plan and procedure. Anesthesia at bedside with questions. Pt denies further questions.

## 2021-01-30 NOTE — ANESTHESIA POSTPROCEDURE EVALUATION
Department of Anesthesiology  Postprocedure Note    Patient: Chris Schofield  MRN: [id number]  YOB: 1975  Date of evaluation: 1/30/2021  Time:  10:53 AM     Procedure Summary     Date: 01/30/21 Room / Location: [address]    Anesthesia Start: 1032 Anesthesia Stop:     Procedures:       EGD DIAGNOSTIC ONLY (N/A Abdomen)      Procedure Not Yet Scheduled Diagnosis: (GI Bleed)    Surgeons: Ralf Long MD Responsible Provider: Roldan Young MD    Anesthesia Type: MAC ASA Status: 2 - Emergent          Anesthesia Type: MAC    Estiven Phase I:      Estiven Phase II:      Last vitals: Reviewed and per EMR flowsheets.        Anesthesia Post Evaluation    Level of consciousness: awake  Complications: no

## 2021-01-30 NOTE — CONSULTS
Gastroenterology Consult Note      Patient: Buzz Madsen  : 1975  CSN#:      Date:  2021    Subjective:       History of Present Illness  Patient is a 41 y.o.  female admitted with GI bleed [K92.2] who is seen in consult for hematemesis. Pt developed N/V thurs evening. Pt had several episodes of non-bloody emesis including what she ate for dinner. No abd pain. No red blood in emesis. Sounded more like coffee grounds. No melena until 1 black stool last PM. No further N/V through night. Pt with a h/o ETOH abuse - cut back a lot about 2 years ago but still drinking a couple of nights a week. Pt has no knowledge of prior liver disease. CT on admission c/w a cirrhotic morphology and gastric varices. Hg 5.9 on admission with macrocytic indices. Pt also reports a h/o PUD. She takes pepcid prn. Denies NSAID use. History reviewed. No pertinent past medical history. History reviewed. No pertinent surgical history. Past Endoscopic History: prior EGD    Admission Meds  No current facility-administered medications on file prior to encounter. No current outpatient medications on file prior to encounter. Patient denies ASA, NSAID use. Allergies  No Known Allergies   Social   Social History     Tobacco Use    Smoking status: Former Smoker    Smokeless tobacco: Never Used   Substance Use Topics    Alcohol use: Not Currently        History reviewed. No pertinent family history. No family history of colon cancer, Crohn's disease, or ulcerative colitis. Review of Systems  Pertinent items are noted in HPI. Physical Exam  Blood pressure (!) 95/51, pulse 95, temperature 98.8 °F (37.1 °C), temperature source Temporal, resp. rate 17, height 5' 4\" (1.626 m), weight 134 lb 4.2 oz (60.9 kg), last menstrual period 2021, SpO2 97 %.     General appearance: alert, cooperative, no distress, appears stated age  Eyes: Anicteric  Head: Normocephalic, without obvious abnormality  Lungs: clear to auscultation bilaterally, Normal Effort  Heart: regular rate and rhythm, normal S1 and S2, no murmurs or rubs  Abdomen: soft, non-tender. Bowel sounds normal. No masses,  no organomegaly. Extremities: atraumatic, no cyanosis or edema  Skin: warm and dry, no jaundice  Neuro: Grossly intact, A&OX3      Data Review:    Recent Labs     01/29/21  1713 01/29/21  2340   WBC 16.6*  --    HGB 5.9* 6.3*   HCT 18.8* 19.3*   .9*  --      --      Recent Labs     01/29/21  1713      K 3.9   CL 92*   CO2 27   BUN 32*   CREATININE 1.0     Recent Labs     01/29/21  1713   *   ALT 29   BILITOT 4.8*   ALKPHOS 152*     Recent Labs     01/29/21  1713   LIPASE 11.0*     Recent Labs     01/29/21  2200   PROTIME 26.7*   INR 2.28*     No results for input(s): PTT in the last 72 hours. No results for input(s): OCCULTBLD in the last 72 hours. Imaging Studies:                     CT-scan of abdomen and pelvis:   Impression   1. Morphologic findings consistent with chronic liver disease with underlying   steatosis and portal hypertension, as described.  Perigastric and gastric   varices near the GE junction are noted.       2.  Right-sided colonic diverticula.                          Assessment:     Active Problems:    GI bleed  Resolved Problems:    * No resolved hospital problems. *    Upper GI bleed with Hematemesis and marked anemia in the setting of ETOH liver disease. Several non-bloody episodes of emesis first would suggest a source other than varices such as MW tear or PUD (which she has a h/o) but N/V could have also precipitated bleeding from the gastric varices near the GEJ. ETOH liver disease: bili 4.8. INR 2.28.  Pt A&O x 3     Recommendations:   Repeat stat H&H s/p 2U  Urgent EGD today  Octreotide  Panto drip   Ceftriaxone   Complete ETOH abstinance     Nano Palm MD

## 2021-01-30 NOTE — H&P
_    [facility] HOSPITALIST HISTORY AND PHYSICAL    1/29/2021 10:03 PM    Patient Information:  Chantal Person is a 41 y.o. female [id number]    PCP:  No primary care provider on file. (Tel: None )    Date of Service:   Pt seen/examined on 1/29/2021   Admitted to Inpatient with expected LOS greater than two midnights due to medical therapy. Chief complaint:    Chief Complaint   Patient presents with    Emesis     FF ems, pt started feeling nauseous last night around 2200. Pt states she saw blood in emesis. Pt feeling weak and light headed. History of Present Illness:  Fany Bob is a 41 y.o. female who presented with   · Comes w/ c/o multiple Vomiting episodes since last night   · Turning bloody at times and sometimes coffee ground emesis lately   · Almost 30 episodes since last night   · Has a h/o gastric Ulcer few years ago   · Drinks ETOH . Heavy drinker uptil few years ago and now reports drinking only 1-2/week . But per Daughter who is an RN , she has been drinking much more than that . · Also she reports heavy Menstrual periods and  She has been tylenol upto 4 g/day on the days that she has her periods for the last 3-4 months   · Denies any NSAIDS intake . · No F/C   · No BM Since yesterday       History and pertinent information obtained from   · ED provider   · Prior Chart    · Patient          Notable/Significant ED Findings/VItals :   · Fever +   · Tachy HR   · Orthostasis + in ED        While in ED  · Patient care Given. · Appropriate Monitoring done. · Pertinent Labs done. See [facility] for details   · Pertinent Acute issues identified and managed . See Epic for details  · Pertinent consults called and case d/w them by ED Provider . See Epic for details. · Imaging  CT done in ED . While in ED, Indicated/Pertinent Medications/Care given as below      · ED Chart reviewed. Medications reviewed w/c were give in ED . Imaging done in ED reviewed and results noted.  See Epic for details on medications and orders . ·       · Imaging done in ED as below. And is/are  s/o Varices       · Pertinent Abnormal Labs and their interpretation/active and acute issues, as mentioned below in Assessment and Plan. Currently, on my evaluation, patient is :   · Since arrival, post above Rx, patient is AO X 3   · Received 1 U PRBC in ED   · Is Breathing comfortably on current O2 needs and no distress noted . · No chest pain   · No SOB reported       REVIEW OF SYSTEMS:        10 complete review of symptoms performed. Pertinent positives and negatives listed above in HPI. Past Medical History:         has no past medical history on file. Past Surgical History:         has no past surgical history on file. Medications:        No current outpatient medications on file prior to encounter. Allergies:  No Known Allergies       Social History:   reports that she has quit smoking. She has never used smokeless tobacco. She reports previous alcohol use. She reports that she does not use drugs. Family History:          History reviewed. No pertinent family history. Physical Exam:  BP (!) 104/43   Pulse 110   Temp 99.8 °F (37.7 °C) (Oral)   Resp 18   Ht 5' 4\" (1.626 m)   Wt 125 lb (56.7 kg)   LMP 01/24/2021 (Exact Date)   SpO2 100%   BMI 21.46 kg/m²     General appearance:  Appears weak and anxious    Eyes:  Sclera clear, pupils equal  ENT:  Dry  mucus membranes, Trachea midline. Cardiovascular: * Regular rhythm, normal S1, S2. No edema in lower extremities   Respiratory:   Noted Clear to auscultation bilaterally,  No wheeze, good inspiratory effort   Gastrointestinal:  Abdomen soft,  non-tender,  not distended,  normal bowel sounds   Musculoskeletal:  No cyanosis in digits, neck supple  Neurology:  Cranial nerves grossly intact. Alert and oriented in time, place and person. No speech or motor deficits   Psychiatry:  Appropriate affect.  Not agitated  Skin: Warm, dry, normal turgor, no rash       Labs:     Recent Labs     01/29/21  1713   WBC 16.6*   HGB 5.9*   HCT 18.8*        Recent Labs     01/29/21  1713      K 3.9   CL 92*   CO2 27   BUN 32*   CREATININE 1.0   CALCIUM 9.0     Recent Labs     01/29/21  1713   *   ALT 29   BILITOT 4.8*   ALKPHOS 152*     No results for input(s): INR in the last 72 hours. No results for input(s): Samuel Alice in the last 72 hours. Urinalysis:    No results found for: Leighton Don, BACTERIA, RBCUA, BLOODU, [address]      Radiology:       EKG/Telemonitor :    I have reviewed the EKG  ST   No acute ST T changes s/o AMI noted. Non sp ST T changes +     IMAGING :     CT ABDOMEN PELVIS W IV CONTRAST Additional Contrast? None   Preliminary Result   1. Morphologic findings consistent with chronic liver disease with underlying   steatosis and portal hypertension, as described. Perigastric and gastric   varices near the GE junction are noted. 2.  Right-sided colonic diverticula. PROBLEM LIST      Active Hospital Problems    Diagnosis Date Noted    GI bleed [K92.2] 01/29/2021       PLAN/ORDERS FOR THIS ADMISSION/HOSPITALIZATION       · GI Bleed and Coffee ground emesis POA . ? 2/2 Esophageal vs Gastric Variceal Bleeding vs PUD . Keep NPO . GI called by ED Provider . Started on Octreotide and PPI Drips after Bolus in ED => Further recommendations/Evaluation/Mgt per GI   ·   · Acute blood loss anemia 2/2 above . Hb 5s @ admission . Will Check iron studies . =>  Planned PRBC Transfusion. Monitor H/H post Transfusion => further on, will follow a restrictive transfusion protocol with PRBC for H/H < 7 or symptomatic anemia or acute Blood loss => Will f/u trend during inpatient stay and monitor closely. ·   · ETOH abuse POA . ETOH levels noted as NEG @ admission . Counseled. Cardiac monitor. Monitor CIWAs. BDZ Prn. Will start , Thiamine / FA / MVI, per orders. Monitor lytes while IP. Seizure precautions prn. ·   · Fever POA . Send [address] X 2 . Started Empiric Broad spectrum Abx , as per orders . Rocephin IV QD . ? Concerns for SBP   ·   · Liver enzymes elevated/Abnormal 2/2 ABove and ETOH abuse ?? . => Will f/u trend during inpatient stay and monitor closely. Check hepatitis Panel       ·  Home medications for chronic medical problems  reviewed and Held / Resumed / Pertinent changes made [as mentioned above] in home medications, as clinically warranted/Indicated . See EPIC orders for details         · DVT Prophylaxis .  + SCDs   · Nutrition/Diet. Diet NPO Effective Now Exceptions are: Other (See Comments)  · Diet NPO Time Specified Exceptions are: Other (See Comments)  · Code Status . Full Code  · PT/OT and ambulatory Eval Status. Ambulate with Assist    · Probable LOS & future Disposition planned post discharge . Home in 1-2 days       Please see EPIC orders for detailed orders/recommendations of plan and medications. CONSULTS ORDERED @ ADMISSION   IP CONSULT TO GI      Medical Decision Making : HIGH     Total patient  Critical [ d/t GIB  ]   Care [ Direct and Indirect ] time spent in evaluating the patient an discussing plan with appropriate staff/patient/family members is 46 min       Adalgisa Marino MD    Hospitalist, [facility].    1/29/2021 11:44 PM

## 2021-01-30 NOTE — PROGRESS NOTES
Hospitalist Progress Note      PCP: No primary care provider on file. Date of Admission: 1/29/2021    Chief Complaint:  Nausea vomiting and vomited blood  Hospital Course:   Charlie Brush is a 41 y.o. female who presented with   Comes w/ c/o multiple Vomiting episodes since last night   Turning bloody at times and sometimes coffee ground emesis lately   Almost 30 episodes since last night   Has a h/o gastric Ulcer few years ago   Drinks ETOH . Heavy drinker uptil few years ago and now reports drinking only 1-2/week . But per Daughter who is an RN , she has been drinking much more than that . Also she reports heavy Menstrual periods and  She has been tylenol upto 4 g/day on the days that she has her periods for the last 3-4 months   Denies any NSAIDS intake . No F/C   No BM Since yesterday        Subjective:   Feels good  Today.       Medications:  Reviewed    Infusion Medications    sodium chloride      pantoprozole (PROTONIX) infusion 8 mg/hr (01/30/21 0947)    sodium chloride      octreotide (SANDOSTATIN) infusion 50 mcg/hr (01/30/21 0947)    sodium chloride 150 mL/hr at 01/30/21 1118     Scheduled Medications    cefTRIAXone (ROCEPHIN) IV  2,000 mg Intravenous Q24H    sodium chloride flush  10 mL Intravenous 2 times per day    thiamine  100 mg Intravenous Daily    multivitamin  1 tablet Oral Daily     PRN Meds: sodium chloride, sodium chloride, sodium chloride flush, promethazine **OR** ondansetron, acetaminophen **OR** acetaminophen, LORazepam **OR** LORazepam **OR** LORazepam **OR** LORazepam **OR** LORazepam **OR** LORazepam **OR** LORazepam **OR** LORazepam      Intake/Output Summary (Last 24 hours) at 1/30/2021 1750  Last data filed at 1/30/2021 1648  Gross per 24 hour   Intake 4505 ml   Output 700 ml   Net 3805 ml       Physical Exam Performed:    BP (!) 115/56   Pulse 100   Temp 97.4 °F (36.3 °C) (Temporal)   Resp 24   Ht 5' 4\" (1.626 m)   Wt 134 lb 4.2 oz (60.9 kg)   LMP 01/24/2021 (Exact Date)   SpO2 96%   BMI 23.05 kg/m²     General appearance: No apparent distress, appears stated age and cooperative. HEENT: Pupils equal, round, and reactive to light. Conjunctivae/corneas clear. Neck: Supple, with full range of motion. No jugular venous distention. Trachea midline. Respiratory:  Normal respiratory effort. Clear to auscultation, bilaterally without Rales/Wheezes/Rhonchi. Cardiovascular: Regular rate and rhythm with normal S1/S2 without murmurs, rubs or gallops. Abdomen: Soft, non-tender, non-distended with normal bowel sounds. Musculoskeletal: No clubbing, cyanosis or edema bilaterally. Full range of motion without deformity. Skin: Skin color, texture, turgor normal.  No rashes or lesions. Neurologic:  Neurovascularly intact without any focal sensory/motor deficits. Cranial nerves: II-XII intact, grossly non-focal.  Psychiatric: Alert and oriented, thought content appropriate, normal insight  Capillary Refill: Brisk,< 3 seconds   Peripheral Pulses: +2 palpable, equal bilaterally       Labs:   Recent Labs     01/29/21  1713 01/29/21  2340 01/30/21  0947 01/30/21  1609   WBC 16.6*  --  9.5  --    HGB 5.9* 6.3* 7.5* 8.0*   HCT 18.8* 19.3* 22.4* 23.7*     --  94*  --      Recent Labs     01/29/21  1713 01/30/21  0947    139   K 3.9 4.0   CL 92* 105   CO2 27 24   BUN 32* 35*   CREATININE 1.0 1.1   CALCIUM 9.0 7.1*     Recent Labs     01/29/21  1713 01/30/21  0947   * 72*   ALT 29 19   BILITOT 4.8* 4.7*   ALKPHOS 152* 92     Recent Labs     01/29/21  2200 01/30/21  0947   INR 2.28* 1.87*     No results for input(s): CKTOTAL, TROPONINI in the last 72 hours. Urinalysis:    No results found for: Scottie Angel, BACTERIA, RBCUA, BLOODU, SPECGRAV, GLUCOSEU    Radiology:  CT ABDOMEN PELVIS W IV CONTRAST Additional Contrast? None   Final Result   1.  Morphologic findings consistent with chronic liver disease with underlying   steatosis and portal hypertension, as described. Perigastric and gastric   varices near the GE junction are noted. 2.  Right-sided colonic diverticula. Assessment/Plan:    Active Hospital Problems    Diagnosis    GI bleed [K92.2]     · GI Bleed and Coffee ground emesis POA   1. Linear ulcer at the GEJ. ? MW tear. Clean based. 2. Portal hypertensive gastropathy  3. No esophageal varices       · Acute blood loss anemia 2/2 above . Hb 5s @ admission  - she was transfused and HG is up to 8.0   · ETOH abuse POA . ETOH levels noted as NEG @ admission . Counseled. Cardiac monitor. Monitor CIWAs. BDZ Prn. Will start , Thiamine / FA / MVI, per orders. Monitor lytes while IP. Seizure precautions prn. · Fever POA . Send [address] X 2 . Started Empiric Broad spectrum Abx , as per orders . Rocephin IV QD . ?  Concerns for SBP     · Liver enzymes   -mild elevation   -monitor    DVT Prophylaxis: SCDs  Diet: DIET CLEAR LIQUID;  Code Status: Full Code    PT/OT Eval Status: eval and treat    Dispo - Blaze Hinson MD

## 2021-01-30 NOTE — ED NOTES
Daughter [facility] called for info. Pt gave consent to speak with her. Ronda stated she does still drink a lot.      Kaelyn Glover RN  01/29/21 2009

## 2021-01-31 LAB
ALBUMIN SERPL-MCNC: 2.6 G/DL (ref 3.4–5)
ALP BLD-CCNC: 88 U/L (ref 40–129)
ALT SERPL-CCNC: 21 U/L (ref 10–40)
ANION GAP SERPL CALCULATED.3IONS-SCNC: 12 MMOL/L (ref 3–16)
AST SERPL-CCNC: 86 U/L (ref 15–37)
BILIRUB SERPL-MCNC: 3.1 MG/DL (ref 0–1)
BILIRUBIN DIRECT: 1.6 MG/DL (ref 0–0.3)
BILIRUBIN, INDIRECT: 1.5 MG/DL (ref 0–1)
BLOOD BANK DISPENSE STATUS: NORMAL
BLOOD BANK DISPENSE STATUS: NORMAL
BLOOD BANK PRODUCT CODE: NORMAL
BLOOD BANK PRODUCT CODE: NORMAL
BPU ID: NORMAL
BPU ID: NORMAL
BUN BLDV-MCNC: 23 MG/DL (ref 7–20)
CALCIUM SERPL-MCNC: 7.1 MG/DL (ref 8.3–10.6)
CHLORIDE BLD-SCNC: 108 MMOL/L (ref 99–110)
CO2: 22 MMOL/L (ref 21–32)
CREAT SERPL-MCNC: 0.9 MG/DL (ref 0.6–1.1)
DESCRIPTION BLOOD BANK: NORMAL
DESCRIPTION BLOOD BANK: NORMAL
GFR AFRICAN AMERICAN: >60
GFR NON-AFRICAN AMERICAN: >60
GLUCOSE BLD-MCNC: 115 MG/DL (ref 70–99)
HCT VFR BLD CALC: 20.8 % (ref 36–48)
HCT VFR BLD CALC: 20.8 % (ref 36–48)
HCT VFR BLD CALC: 24 % (ref 36–48)
HCT VFR BLD CALC: 25 % (ref 36–48)
HEMOGLOBIN: 6.9 G/DL (ref 12–16)
HEMOGLOBIN: 6.9 G/DL (ref 12–16)
HEMOGLOBIN: 8 G/DL (ref 12–16)
HEMOGLOBIN: 8.3 G/DL (ref 12–16)
INR BLD: 1.55 (ref 0.86–1.14)
MCH RBC QN AUTO: 31.6 PG (ref 26–34)
MCHC RBC AUTO-ENTMCNC: 33 G/DL (ref 31–36)
MCV RBC AUTO: 95.5 FL (ref 80–100)
PDW BLD-RTO: 17.9 % (ref 12.4–15.4)
PLATELET # BLD: 89 K/UL (ref 135–450)
PMV BLD AUTO: 9.5 FL (ref 5–10.5)
POTASSIUM SERPL-SCNC: 3.7 MMOL/L (ref 3.5–5.1)
PROTHROMBIN TIME: 18.1 SEC (ref 10–13.2)
RBC # BLD: 2.18 M/UL (ref 4–5.2)
SODIUM BLD-SCNC: 142 MMOL/L (ref 136–145)
TOTAL PROTEIN: 5.4 G/DL (ref 6.4–8.2)
WBC # BLD: 6.6 K/UL (ref 4–11)

## 2021-01-31 PROCEDURE — 6370000000 HC RX 637 (ALT 250 FOR IP): Performed by: HOSPITALIST

## 2021-01-31 PROCEDURE — 85027 COMPLETE CBC AUTOMATED: CPT

## 2021-01-31 PROCEDURE — 80076 HEPATIC FUNCTION PANEL: CPT

## 2021-01-31 PROCEDURE — 6360000002 HC RX W HCPCS: Performed by: HOSPITALIST

## 2021-01-31 PROCEDURE — 36415 COLL VENOUS BLD VENIPUNCTURE: CPT

## 2021-01-31 PROCEDURE — 2580000003 HC RX 258

## 2021-01-31 PROCEDURE — 85018 HEMOGLOBIN: CPT

## 2021-01-31 PROCEDURE — 85014 HEMATOCRIT: CPT

## 2021-01-31 PROCEDURE — 6360000002 HC RX W HCPCS: Performed by: PHYSICIAN ASSISTANT

## 2021-01-31 PROCEDURE — C9113 INJ PANTOPRAZOLE SODIUM, VIA: HCPCS | Performed by: PHYSICIAN ASSISTANT

## 2021-01-31 PROCEDURE — 94760 N-INVAS EAR/PLS OXIMETRY 1: CPT

## 2021-01-31 PROCEDURE — 2580000003 HC RX 258: Performed by: INTERNAL MEDICINE

## 2021-01-31 PROCEDURE — 80048 BASIC METABOLIC PNL TOTAL CA: CPT

## 2021-01-31 PROCEDURE — 2580000003 HC RX 258: Performed by: HOSPITALIST

## 2021-01-31 PROCEDURE — 2580000003 HC RX 258: Performed by: PHYSICIAN ASSISTANT

## 2021-01-31 PROCEDURE — 2000000000 HC ICU R&B

## 2021-01-31 PROCEDURE — 6360000002 HC RX W HCPCS: Performed by: INTERNAL MEDICINE

## 2021-01-31 PROCEDURE — 85610 PROTHROMBIN TIME: CPT

## 2021-01-31 RX ORDER — SODIUM CHLORIDE 9 MG/ML
INJECTION, SOLUTION INTRAVENOUS PRN
Status: COMPLETED | OUTPATIENT
Start: 2021-01-31 | End: 2021-01-31

## 2021-01-31 RX ORDER — SODIUM CHLORIDE 9 MG/ML
INJECTION, SOLUTION INTRAVENOUS
Status: COMPLETED
Start: 2021-01-31 | End: 2021-01-31

## 2021-01-31 RX ORDER — SODIUM CHLORIDE 9 MG/ML
INJECTION, SOLUTION INTRAVENOUS PRN
Status: DISCONTINUED | OUTPATIENT
Start: 2021-01-31 | End: 2021-01-31 | Stop reason: SDUPTHER

## 2021-01-31 RX ADMIN — PROMETHAZINE HYDROCHLORIDE 12.5 MG: 25 TABLET ORAL at 12:56

## 2021-01-31 RX ADMIN — CEFTRIAXONE 2000 MG: 2 INJECTION, POWDER, FOR SOLUTION INTRAMUSCULAR; INTRAVENOUS at 22:59

## 2021-01-31 RX ADMIN — Medication 10 ML: at 22:59

## 2021-01-31 RX ADMIN — SODIUM CHLORIDE: 9 INJECTION, SOLUTION INTRAVENOUS at 05:46

## 2021-01-31 RX ADMIN — SODIUM CHLORIDE 8 MG/HR: 9 INJECTION, SOLUTION INTRAVENOUS at 07:53

## 2021-01-31 RX ADMIN — PROMETHAZINE HYDROCHLORIDE 12.5 MG: 25 TABLET ORAL at 23:08

## 2021-01-31 RX ADMIN — SODIUM CHLORIDE 8 MG/HR: 9 INJECTION, SOLUTION INTRAVENOUS at 19:14

## 2021-01-31 RX ADMIN — Medication 10 ML: at 05:44

## 2021-01-31 RX ADMIN — THIAMINE HYDROCHLORIDE 100 MG: 100 INJECTION, SOLUTION INTRAMUSCULAR; INTRAVENOUS at 07:57

## 2021-01-31 RX ADMIN — OCTREOTIDE ACETATE 50 MCG/HR: 500 INJECTION, SOLUTION INTRAVENOUS; SUBCUTANEOUS at 07:53

## 2021-01-31 RX ADMIN — SODIUM CHLORIDE 1000 ML: 9 INJECTION, SOLUTION INTRAVENOUS at 05:46

## 2021-01-31 RX ADMIN — THERA TABS 1 TABLET: TAB at 07:57

## 2021-01-31 RX ADMIN — PROMETHAZINE HYDROCHLORIDE 12.5 MG: 25 TABLET ORAL at 05:42

## 2021-01-31 RX ADMIN — Medication 10 ML: at 07:57

## 2021-01-31 RX ADMIN — SODIUM CHLORIDE: 9 INJECTION, SOLUTION INTRAVENOUS at 23:13

## 2021-01-31 ASSESSMENT — PAIN SCALES - GENERAL
PAINLEVEL_OUTOF10: 0

## 2021-01-31 NOTE — FLOWSHEET NOTE
01/31/21 0501   Provider Notification   Reason for Communication Evaluate;Critical Value (comment); Patient request  (H/H 6.9/20. 8)   Provider Name Dr. Bijan Jefferson   Provider Notification Physician   Method of Communication Secure Message  (perfect serve message)   Response Waiting for response   Notification Time [phone]     Perfect serve message sent to Dr. Bijan Jefferson: \"Pt's H/H came back panic low, currently 6.9/20.8. Pt admitted for GI bleed, had EGD, no active bleed, on protonix drip and octreotide drip. Pt has had total of 3 units so far. Do you want to order any more blood? \"  Will continue to monitor.   Erum Cortez

## 2021-01-31 NOTE — PROGRESS NOTES
[facility] GI  Gastroenterology Progress Note    Edy Kumar is a 41 y.o. female patient. 1. Hematemesis with nausea    2. Chronic liver disease    3. Gastric varices    4. Acute blood loss anemia    5. Diverticulosis of colon without diverticulitis    6. Sepsis, due to unspecified organism, unspecified whether acute organ dysfunction present ([address]        SUBJECTIVE:  Pt without complaints. Denies N/V, abd pain or any melena/BMs.     ROS:  Cardiovascular ROS: no chest pain or dyspnea on exertion  Gastrointestinal ROS: no abdominal pain, change in bowel habits, or black or bloody stools  Respiratory ROS: no cough, shortness of breath, or wheezing    Physical    VITALS:  BP (!) 109/59   Pulse 96   Temp 97.8 °F (36.6 °C) (Temporal)   Resp (!) 32   Ht 5' 4\" (1.626 m)   Wt 143 lb 4.8 oz (65 kg)   LMP 2021 (Exact Date)   SpO2 96%   BMI 24.60 kg/m²   TEMPERATURE:  Current - Temp: 97.8 °F (36.6 °C); Max - Temp  Av.4 °F (36.3 °C)  Min: 97 °F (36.1 °C)  Max: 97.8 °F (36.6 °C)    NAD  RRR, Nl s1s2  Lungs CTA Bilaterally, normal effort  Abdomen soft, ND, NT, no HSM, Bowel sounds normal  AAOx3, No asterixis     Data    CBC:   Lab Results   Component Value Date    WBC 6.6 2021    RBC 2.18 2021    HGB 6.9 2021    HCT 20.8 2021    MCV 95.5 2021    MCH 31.6 2021    MCHC 33.0 2021    RDW 17.9 2021    PLT 89 2021    MPV 9.5 2021     Hepatic Function Panel:    Lab Results   Component Value Date    ALKPHOS 88 2021    ALT 21 2021    AST 86 2021    PROT 5.4 2021    BILITOT 3.1 2021    BILIDIR 1.6 2021    IBILI 1.5 2021         Imaging Studies:                                           CT-scan of abdomen and pelvis:   Impression   1. Morphologic findings consistent with chronic liver disease with underlying   steatosis and portal hypertension, as described.  Perigastric and gastric   varices near the GE junction are noted.     2.  Right-sided colonic diverticula.                              Assessment:     Upper GI bleed with Hematemesis and marked anemia in the setting of ETOH liver disease. Several non-bloody episodes of emesis. EGD showed Linear ulcer at the GEJ (? MW tear), Portal hypertensive gastropathy and no esophageal varices. Cardia and a portion of fundus not ideally seen due to pt having difficulty retaining insufflated air and retained clot and debris. Hg down this am and just received 1U. No melena or hematemesis.       ETOH liver disease: bili 4.8. INR 2.28 on admissoin. Pt A&O x 3.  INR and bili improved     Recommendations:   Octreotide - continue 1 more day  Panto drip   Ceftriaxone   Complete ETOH abstinance  fulls     Dottie Peck MD

## 2021-01-31 NOTE — PROGRESS NOTES
Shift assessment completed, see doc flowsheets. Pt currently c/o some nausea, currently asking for phenergan. Will medicate per pt's request.  Call light within reach, will continue to monitor.   Bre Rai

## 2021-01-31 NOTE — PROGRESS NOTES
Hospitalist Progress Note      PCP: No primary care provider on file. Date of Admission: 1/29/2021    Chief Complaint:  Nausea vomiting and vomited blood  Hospital Course:   Dedra Cornell is a 41 y.o. female who presented with   Comes w/ c/o multiple Vomiting episodes since last night   Turning bloody at times and sometimes coffee ground emesis lately   Almost 30 episodes since last night   Has a h/o gastric Ulcer few years ago   Drinks ETOH . Heavy drinker uptil few years ago and now reports drinking only 1-2/week . But per Daughter who is an RN , she has been drinking much more than that . Also she reports heavy Menstrual periods and  She has been tylenol upto 4 g/day on the days that she has her periods for the last 3-4 months   Denies any NSAIDS intake . No F/C   No BM Since yesterday        Subjective:   Feels good  Today.       Medications:  Reviewed    Infusion Medications    sodium chloride      pantoprozole (PROTONIX) infusion 8 mg/hr (01/31/21 0753)    sodium chloride      octreotide (SANDOSTATIN) infusion 50 mcg/hr (01/31/21 0753)    sodium chloride 150 mL/hr at 01/30/21 9230     Scheduled Medications    cefTRIAXone (ROCEPHIN) IV  2,000 mg Intravenous Q24H    sodium chloride flush  10 mL Intravenous 2 times per day    thiamine  100 mg Intravenous Daily    multivitamin  1 tablet Oral Daily     PRN Meds: sodium chloride, sodium chloride, sodium chloride flush, promethazine **OR** ondansetron, acetaminophen **OR** acetaminophen, LORazepam **OR** LORazepam **OR** LORazepam **OR** LORazepam **OR** LORazepam **OR** LORazepam **OR** LORazepam **OR** LORazepam      Intake/Output Summary (Last 24 hours) at 1/31/2021 1711  Last data filed at 1/31/2021 1301  Gross per 24 hour   Intake 3233 ml   Output 1500 ml   Net 1733 ml       Physical Exam Performed:    /64   Pulse 91   Temp 97.7 °F (36.5 °C) (Temporal)   Resp 15   Ht 5' 4\" (1.626 m)   Wt 143 lb 4.8 oz (65 kg)   LMP 01/24/2021 (Exact Date)   SpO2 100%   BMI 24.60 kg/m²     General appearance: No apparent distress, appears stated age and cooperative. HEENT: Pupils equal, round, and reactive to light. Conjunctivae/corneas clear. Neck: Supple, with full range of motion. No jugular venous distention. Trachea midline. Respiratory:  Normal respiratory effort. Clear to auscultation, bilaterally without Rales/Wheezes/Rhonchi. Cardiovascular: Regular rate and rhythm with normal S1/S2 without murmurs, rubs or gallops. Abdomen: Soft, non-tender, non-distended with normal bowel sounds. Musculoskeletal: No clubbing, cyanosis or edema bilaterally. Full range of motion without deformity. Skin: Skin color, texture, turgor normal.  No rashes or lesions. Neurologic:  Neurovascularly intact without any focal sensory/motor deficits. Cranial nerves: II-XII intact, grossly non-focal.  Psychiatric: Alert and oriented, thought content appropriate, normal insight  Capillary Refill: Brisk,< 3 seconds   Peripheral Pulses: +2 palpable, equal bilaterally       Labs:   Recent Labs     01/29/21 1713 01/29/21 1713 01/30/21 0947 01/30/21  0947 01/31/21  0424 01/31/21  0425 01/31/21  1155   WBC 16.6*  --  9.5  --  6.6  --   --    HGB 5.9*   < > 7.5*   < > 6.9* 6.9* 8.0*   HCT 18.8*   < > 22.4*   < > 20.8* 20.8* 24.0*     --  94*  --  89*  --   --     < > = values in this interval not displayed. Recent Labs     01/29/21 1713 01/30/21  0947 01/31/21  0424    139 142   K 3.9 4.0 3.7   CL 92* 105 108   CO2 27 24 22   BUN 32* 35* 23*   CREATININE 1.0 1.1 0.9   CALCIUM 9.0 7.1* 7.1*     Recent Labs     01/29/21 1713 01/30/21  0947 01/31/21  0424   * 72* 86*   ALT 29 19 21   BILIDIR  --   --  1.6*   BILITOT 4.8* 4.7* 3.1*   ALKPHOS 152* 92 88     Recent Labs     01/29/21  2200 01/30/21  0947 01/31/21  0424   INR 2.28* 1.87* 1.55*     No results for input(s): Kong Garibay in the last 72 hours.     Urinalysis:    No results found for: Dang Geovanna, BACTERIA, RBCUA, BLOODU, SPECGRAV, [address]    Radiology:  CT ABDOMEN PELVIS W IV CONTRAST Additional Contrast? None   Final Result   1. Morphologic findings consistent with chronic liver disease with underlying   steatosis and portal hypertension, as described. Perigastric and gastric   varices near the GE junction are noted. 2.  Right-sided colonic diverticula. Assessment/Plan:    Active Hospital Problems    Diagnosis    GI bleed [K92.2]     · GI Bleed and Coffee ground emesis POA   1. Linear ulcer at the GEJ. ? MW tear. Clean based. 2. Portal hypertensive gastropathy  3. No esophageal varices       Acute blood loss anemia 2/2 above . Hb 5s @ admission  - she was transfused and HG is up to 8.0, dropped to 6.9 and got another unit of blood  Continue to monitor. Continue on PPI gtt     ETOH abuse POA . ETOH levels noted as NEG @ admission . Counseled. Cardiac monitor. Monitor CIWAs. BDZ Prn. Will start , Thiamine / FA / MVI, per orders. Monitor lytes while IP. Seizure precautions prn  Alcohol abstinence . Fever POA . Send [address] X 2 . Started Empiric [employer] spectrum Abx , as per orders . Rocephin IV QD . ? Concerns for SBP    I don't suspect SBP    Liver enzymes   -mild elevation   -monitor    DVT Prophylaxis: SCDs  Diet: DIET FULL LIQUID; No Added Salt (3-4 GM)  Code Status: Full Code    PT/OT Eval Status: eval and treat    Dispo - if her hg remains stable she can be transitioned to oral PPI, BID for 14 days, then daily and possible go home as early as Monday or Tuesday.     Lidia Darden MD

## 2021-02-01 VITALS
TEMPERATURE: 98.2 F | WEIGHT: 134.48 LBS | RESPIRATION RATE: 16 BRPM | HEIGHT: 64 IN | DIASTOLIC BLOOD PRESSURE: 70 MMHG | OXYGEN SATURATION: 100 % | BODY MASS INDEX: 22.96 KG/M2 | SYSTOLIC BLOOD PRESSURE: 115 MMHG | HEART RATE: 78 BPM

## 2021-02-01 LAB
ALBUMIN SERPL-MCNC: 2.9 G/DL (ref 3.4–5)
ALP BLD-CCNC: 104 U/L (ref 40–129)
ALT SERPL-CCNC: 34 U/L (ref 10–40)
ANION GAP SERPL CALCULATED.3IONS-SCNC: 12 MMOL/L (ref 3–16)
AST SERPL-CCNC: 149 U/L (ref 15–37)
BILIRUB SERPL-MCNC: 3.3 MG/DL (ref 0–1)
BILIRUBIN DIRECT: 1.7 MG/DL (ref 0–0.3)
BILIRUBIN, INDIRECT: 1.6 MG/DL (ref 0–1)
BUN BLDV-MCNC: 11 MG/DL (ref 7–20)
CALCIUM SERPL-MCNC: 7.9 MG/DL (ref 8.3–10.6)
CHLORIDE BLD-SCNC: 107 MMOL/L (ref 99–110)
CO2: 22 MMOL/L (ref 21–32)
CREAT SERPL-MCNC: 0.7 MG/DL (ref 0.6–1.1)
GFR AFRICAN AMERICAN: >60
GFR NON-AFRICAN AMERICAN: >60
GLUCOSE BLD-MCNC: 100 MG/DL (ref 70–99)
HCT VFR BLD CALC: 24.9 % (ref 36–48)
HCT VFR BLD CALC: 27.1 % (ref 36–48)
HEMOGLOBIN: 8.3 G/DL (ref 12–16)
HEMOGLOBIN: 8.8 G/DL (ref 12–16)
INR BLD: 1.43 (ref 0.86–1.14)
POTASSIUM SERPL-SCNC: 3.6 MMOL/L (ref 3.5–5.1)
PROTHROMBIN TIME: 16.7 SEC (ref 10–13.2)
SODIUM BLD-SCNC: 141 MMOL/L (ref 136–145)
TOTAL PROTEIN: 5.8 G/DL (ref 6.4–8.2)

## 2021-02-01 PROCEDURE — 80048 BASIC METABOLIC PNL TOTAL CA: CPT

## 2021-02-01 PROCEDURE — 85014 HEMATOCRIT: CPT

## 2021-02-01 PROCEDURE — C9113 INJ PANTOPRAZOLE SODIUM, VIA: HCPCS | Performed by: PHYSICIAN ASSISTANT

## 2021-02-01 PROCEDURE — 2580000003 HC RX 258: Performed by: PHYSICIAN ASSISTANT

## 2021-02-01 PROCEDURE — 85018 HEMOGLOBIN: CPT

## 2021-02-01 PROCEDURE — 80076 HEPATIC FUNCTION PANEL: CPT

## 2021-02-01 PROCEDURE — 6360000002 HC RX W HCPCS: Performed by: PHYSICIAN ASSISTANT

## 2021-02-01 PROCEDURE — 6370000000 HC RX 637 (ALT 250 FOR IP): Performed by: HOSPITALIST

## 2021-02-01 PROCEDURE — 2580000003 HC RX 258: Performed by: HOSPITALIST

## 2021-02-01 PROCEDURE — 85610 PROTHROMBIN TIME: CPT

## 2021-02-01 PROCEDURE — 6360000002 HC RX W HCPCS: Performed by: HOSPITALIST

## 2021-02-01 RX ORDER — MULTIVITAMIN WITH IRON
1 TABLET ORAL DAILY
Qty: 30 TABLET | Refills: 0 | Status: ON HOLD | OUTPATIENT
Start: 2021-02-02 | End: 2022-05-03 | Stop reason: HOSPADM

## 2021-02-01 RX ORDER — PANTOPRAZOLE SODIUM 40 MG/1
40 TABLET, DELAYED RELEASE ORAL
Qty: 30 TABLET | Refills: 3 | Status: ON HOLD | OUTPATIENT
Start: 2021-02-02 | End: 2021-06-30 | Stop reason: SDUPTHER

## 2021-02-01 RX ORDER — PANTOPRAZOLE SODIUM 40 MG/1
40 TABLET, DELAYED RELEASE ORAL
Status: DISCONTINUED | OUTPATIENT
Start: 2021-02-02 | End: 2021-02-01 | Stop reason: HOSPADM

## 2021-02-01 RX ADMIN — THERA TABS 1 TABLET: TAB at 08:21

## 2021-02-01 RX ADMIN — Medication 10 ML: at 08:22

## 2021-02-01 RX ADMIN — PROMETHAZINE HYDROCHLORIDE 12.5 MG: 25 TABLET ORAL at 08:21

## 2021-02-01 RX ADMIN — SODIUM CHLORIDE 8 MG/HR: 9 INJECTION, SOLUTION INTRAVENOUS at 04:45

## 2021-02-01 RX ADMIN — THIAMINE HYDROCHLORIDE 100 MG: 100 INJECTION, SOLUTION INTRAMUSCULAR; INTRAVENOUS at 08:22

## 2021-02-01 ASSESSMENT — PAIN SCALES - GENERAL: PAINLEVEL_OUTOF10: 0

## 2021-02-01 NOTE — DISCHARGE SUMMARY
LFTs  Seen by GI  Continue monitor,  Repeat basic metabolic panel outpatient  Follow-up with PCP      Physical Exam Performed:     /76   Pulse 85   Temp 97.8 °F (36.6 °C) (Temporal)   Resp 13   Ht 5' 4\" (1.626 m)   Wt 134 lb 7.7 oz (61 kg)   LMP 01/24/2021 (Exact Date)   SpO2 98%   BMI 23.08 kg/m²       General appearance:  No apparent distress, appears stated age and cooperative. HEENT:  Normal cephalic, atraumatic without obvious deformity. Pupils equal, round, and reactive to light. Extra ocular muscles intact. Conjunctivae/corneas clear. Neck: Supple, with full range of motion. No jugular venous distention. Trachea midline. Respiratory:  Normal respiratory effort. Clear to auscultation, bilaterally without Rales/Wheezes/Rhonchi. Cardiovascular:  Regular rate and rhythm with normal S1/S2 without murmurs, rubs or gallops. Abdomen: Soft, non-tender, non-distended with normal bowel sounds. Musculoskeletal:  No clubbing, cyanosis or edema bilaterally. Full range of motion without deformity. Skin: Skin color, texture, turgor normal.  No rashes or lesions. Neurologic:  Neurovascularly intact without any focal sensory/motor deficits. Cranial nerves: II-XII intact, grossly non-focal.  Psychiatric:  Alert and oriented, thought content appropriate, normal insight  Capillary Refill: Brisk,< 3 seconds   Peripheral Pulses: +2 palpable, equal bilaterally       Labs:  For convenience and continuity at follow-up the following most recent labs are provided:      CBC:    Lab Results   Component Value Date    WBC 6.6 01/31/2021    HGB 8.3 02/01/2021    HCT 24.9 02/01/2021    PLT 89 01/31/2021       Renal:    Lab Results   Component Value Date     02/01/2021    K 3.6 02/01/2021    K 4.0 01/30/2021     02/01/2021    CO2 22 02/01/2021    BUN 11 02/01/2021    CREATININE 0.7 02/01/2021    CALCIUM 7.9 02/01/2021         Significant Diagnostic Studies    Radiology:   CT ABDOMEN PELVIS W IV CONTRAST Additional Contrast? None   Final Result   1. Morphologic findings consistent with chronic liver disease with underlying   steatosis and portal hypertension, as described. Perigastric and gastric   varices near the GE junction are noted. 2.  Right-sided colonic diverticula. Consults:     IP CONSULT TO GI  IP CONSULT TO SOCIAL WORK    Disposition: home      Condition at Discharge: Stable    Discharge Instructions/Follow-up: Follow-up with PCP, follow-up with GI within 1 week, repeat CBC and basic metabolic panel within 1 week, follow-up with rehab outpatient when she is ready. Code Status:  Full Code     Activity: activity as tolerated    Diet: regular diet      Discharge Medications:     Current Discharge Medication List           Details   Multiple Vitamin (MULTIVITAMIN) TABS tablet Take 1 tablet by mouth daily  Qty: 30 tablet, Refills: 0      pantoprazole (PROTONIX) 40 MG tablet Take 1 tablet by mouth every morning (before breakfast)  Qty: 30 tablet, Refills: 3             Time Spent on discharge is more than 30 minutes in the examination, evaluation, counseling and review of medications and discharge plan. Signed:    Brady Soliz MD   2/1/2021      Thank you No primary care provider on file. for the opportunity to be involved in this patient's care. If you have any questions or concerns please feel free to contact me at [phone].

## 2021-02-01 NOTE — PROGRESS NOTES
CLINICAL PHARMACY NOTE: MEDS TO [address] Select Patient?: No  Total # of Prescriptions Filled: 2   The following medications were delivered to the patient:  · Multivitamin adult tablets  · Pantoprazole 40mg  Total # of Interventions Completed: 0  Time Spent (min): 30    Additional Documentation:  Medications delivered- patient signed  Felipa Wahl

## 2021-02-01 NOTE — PROGRESS NOTES
[facility] GI  Gastroenterology Progress Note    Fany Bob is a 41 y.o. female patient. 1. Hematemesis with nausea    2. Chronic liver disease    3. Gastric varices    4. Acute blood loss anemia    5. Diverticulosis of colon without diverticulitis    6. Sepsis, due to unspecified organism, unspecified whether acute organ dysfunction present ([facility] [address]        SUBJECTIVE:  Pt without complaints. Denies N/V, abd pain or any melena/BMs. Tolerating solid food. ROS:  Cardiovascular ROS: no chest pain or dyspnea on exertion  Gastrointestinal ROS: no abdominal pain, change in bowel habits, or black or bloody stools  Respiratory ROS: no cough, shortness of breath, or wheezing    Physical    VITALS:  /76   Pulse 85   Temp 97.8 °F (36.6 °C) (Temporal)   Resp 13   Ht 5' 4\" (1.626 m)   Wt 134 lb 7.7 oz (61 kg)   LMP 2021 (Exact Date)   SpO2 98%   BMI 23.08 kg/m²   TEMPERATURE:  Current - Temp: 97.8 °F (36.6 °C); Max - Temp  Av.7 °F (36.5 °C)  Min: 97.6 °F (36.4 °C)  Max: 97.8 °F (36.6 °C)    NAD  RRR, Nl s1s2  Lungs CTA Bilaterally, normal effort  Abdomen soft, ND, NT, no HSM, Bowel sounds normal  AAOx3, No asterixis     Data    CBC:   Lab Results   Component Value Date    WBC 6.6 2021    RBC 2.18 2021    HGB 8.3 2021    HCT 24.9 2021    MCV 95.5 2021    MCH 31.6 2021    MCHC 33.0 2021    RDW 17.9 2021    PLT 89 2021    MPV 9.5 2021     Hepatic Function Panel:    Lab Results   Component Value Date    ALKPHOS 104 2021    ALT 34 2021     2021    PROT 5.8 2021    BILITOT 3.3 2021    BILIDIR 1.7 2021    IBILI 1.6 2021         Imaging Studies:                                           CT-scan of abdomen and pelvis:   Impression   1.  Morphologic findings consistent with chronic liver disease with underlying   steatosis and portal hypertension, as described.  Perigastric and gastric   varices near the GE junction are noted.       2.  Right-sided colonic diverticula.                              Assessment:     Upper GI bleed with Hematemesis and marked anemia in the setting of ETOH liver disease. Several non-bloody episodes of emesis. EGD showed Linear ulcer at the GEJ (? MW tear), Portal hypertensive gastropathy and no esophageal varices. Cardia and a portion of fundus not ideally seen due to pt having difficulty retaining insufflated air and retained clot and debris. No melena or hematemesis. hgb stable today.      ETOH liver disease: bili 4.8. INR 2.28 on admissoin. Pt A&O x 3. INR and bili improved. Hepatitis panel negative.      Recommendations:   D/c Octreotide drip  D/c Panto drip and start daily prototonix  Complete ETOH abstinance  Ok for d/c from GI standpoint. F/u with GI outpatient.       Discussed with Dr. Cristobal Tristan, [address]

## 2021-02-01 NOTE — CARE COORDINATION
Discharge Planning Assessment  Readmission risk score 15%  RN discharge planner met with patient/ (and family member) to discuss reason for admission, current living situation, and potential needs at the time of discharge    Demographics/Insurance verified Yes     Current type of dwelling: apartment     Patient from ECF/SW confirmed with: n/a    Living arrangements: with daughter, mother is next door    Level of function/Support:independent, drives    PCP:  none    Last Visit to PCP:    DME: none    Active with any community resources/agencies/skilled home care:none    Medication compliance issues: reports using the [facility] at [facility] issues that could impact healthcare:has not worked much since the pandemic started        Tentative discharge plan: home. CM discussed substance abuse treatment. Patient stated interest in outpatient programs. CM provided patient with resources for patient to follow up. Contact numbers for programs included with resources. *Discussed and provided facilities of choice if transition to a skilled nursing facility is required at the time of discharge      *Discussed with patient and/or family that on the day of discharge home tentative time of discharge will be between 10 AM and noon.     Transportation at the time of discharge: JEB Cesar, CCM, RN  [facility]  [phone]

## 2021-02-01 NOTE — PROGRESS NOTES
1550- patient being discharged home per order. Patient provided discharged instructions; reviewed with patient. Patient verbalized understanding. PIV's removed, site unremarkable. Patient ambulated out of ICU in stable condition.

## 2021-02-01 NOTE — PROGRESS NOTES
Shift assessment completed, see doc flowsheets. Pt currently asking for nausea medication. Will medicate per pt's request and per [facility]. Will continue to monitor.   Satnam Mae

## 2021-02-01 NOTE — DISCHARGE INSTR - COC
Continuity of Care Form    Patient Name: Danielle Larson   :  1975  MRN:  [id number]    Admit date:  2021  Discharge date:  ***    Code Status Order: Full Code   Advance Directives:      Admitting Physician:  Laya Shea MD  PCP: No primary care provider on file. Discharging Nurse: [facility] Unit/Room#: HNW-4786/2270-92  Discharging Unit Phone Number: ***    Emergency Contact:   Extended Emergency Contact Information  Primary Emergency Contact: Heath Wood  Home Phone: [phone]  Relation: Parent    Past Surgical History:  Past Surgical History:   Procedure Laterality Date    UPPER GASTROINTESTINAL ENDOSCOPY N/A 2021    EGD DIAGNOSTIC ONLY performed by Milton Smart MD at [address] [facility] ENDOSCOPY       Immunization History: There is no immunization history on file for this patient.     Active Problems:  Patient Active Problem List   Diagnosis Code    GI bleed K92.2       Isolation/Infection:   Isolation            No Isolation          Patient Infection Status       None to display            Nurse Assessment:  Last Vital Signs: /76   Pulse 85   Temp 97.8 °F (36.6 °C) (Temporal)   Resp 13   Ht 5' 4\" (1.626 m)   Wt 134 lb 7.7 oz (61 kg)   LMP 2021 (Exact Date)   SpO2 98%   BMI 23.08 kg/m²     Last documented pain score (0-10 scale): Pain Level: 0  Last Weight:   Wt Readings from Last 1 Encounters:   21 134 lb 7.7 oz (61 kg)     Mental Status:  {IP PT MENTAL STATUS::::0}    IV Access:  { TAZ IV ACCESS:418338380:::0}    Nursing Mobility/ADLs:  Walking   {CHP DME ADLs:021350568:::0}  Transfer  {CHP DME ADLs:694598042:::0}  Bathing  {CHP DME ADLs:319671601:::0}  Dressing  {CHP DME ADLs:183844002:::0}  Toileting  {CHP DME ADLs:554040070:::0}  Feeding  {CHP DME ADLs:208658431:::0}  Med Admin  {CHP DME ADLs:010287287:::0}  Med Delivery   { TAZ MED Delivery:357273681:::0}    Wound Care Documentation and Therapy: Elimination:  Continence:   · Bowel: {YES / OI:25165}  · Bladder: {YES / RV:59305}  Urinary Catheter: {Urinary Catheter:682299605:::0}   Colostomy/Ileostomy/Ileal Conduit: {YES / QL:16358}       Date of Last BM: ***    Intake/Output Summary (Last 24 hours) at 2021 1443  Last data filed at 2021 0448  Gross per 24 hour   Intake 1130 ml   Output --   Net 1130 ml     I/O last 3 completed shifts:   In:  [P.O.:480; I.V.:1193; Blood:311]  Out: 400 [Urine:400]    Safety Concerns:     [address] Safety Concerns:800654990:::0}    Impairments/Disabilities:      [address] Impairments/Disabilities:929741851:::0}    Nutrition Therapy:  Current Nutrition Therapy:   [address] Diet List:379362032:::0}    Routes of Feeding: {CHP DME Other Feedings:268624108:::0}  Liquids: {Slp liquid thickness:55398}  Daily Fluid Restriction: {CHP DME Yes amt example:221676549:::0}  Last Modified Barium Swallow with Video (Video Swallowing Test): {Done Not Done PUVI:783037382:::3}    Treatments at the Time of Hospital Discharge:   Respiratory Treatments: ***  Oxygen Therapy:  {Therapy; copd oxygen:06831:::0}  Ventilator:    {[facility] Vent List:874390603:::0}    Rehab Therapies: {THERAPEUTIC INTERVENTION:9615850537}  Weight Bearing Status/Restrictions: [address] Weight Bearin:::0}  Other Medical Equipment (for information only, NOT a DME order):  {EQUIPMENT:965886632}  Other Treatments: ***    Patient's personal belongings (please select all that are sent with patient):  {CHP DME Belongings:777230345:::0}    RN SIGNATURE:  {Esignature:895010939:::0}    CASE MANAGEMENT/SOCIAL WORK SECTION    Inpatient Status Date:2021    Readmission Risk Assessment Score:  Readmission Risk              Risk of Unplanned Readmission:        13           Discharging to Facility/ Agency   · Name:   · Address:  · Phone:  · Fax:    Dialysis Facility (if applicable)   · Name:  · Address:  · Dialysis Schedule:  · Phone:  · Fax:    /

## 2021-02-02 LAB
AMPHETAMINES SCREEN BLOOD: NEGATIVE NG/ML
BARBITURATES SCREEN BLOOD: NEGATIVE NG/ML
BENZODIAZEPINES SCREEN BLOOD: NEGATIVE NG/ML
BLOOD CULTURE, ROUTINE: NORMAL
BUPRENORPHINE: NEGATIVE NG/ML
CANNABINOID SCREEN BLOOD: NEGATIVE NG/ML
COCAINE SCREEN BLOOD: NEGATIVE NG/ML
CULTURE, BLOOD 2: NORMAL
Lab: NORMAL
METHADONE SCREEN BLOOD: NEGATIVE NG/ML
METHAMPHETAMINES SERUM/ PLASMA: NEGATIVE NG/ML
OPIATES SCREEN BLOOD: NEGATIVE NG/ML
OXYCODONE: NEGATIVE NG/ML
PHENCYCLIDINE SCREEN BLOOD: NEGATIVE NG/ML

## 2021-03-09 ENCOUNTER — HOSPITAL ENCOUNTER (INPATIENT)
Age: 46
LOS: 3 days | Discharge: HOME | DRG: 253 | End: 2021-03-12
Attending: HOSPITALIST | Admitting: HOSPITALIST
Payer: MEDICAID

## 2021-03-09 ENCOUNTER — APPOINTMENT (OUTPATIENT)
Dept: GENERAL RADIOLOGY | Age: 46
DRG: 253 | End: 2021-03-09
Payer: MEDICAID

## 2021-03-09 DIAGNOSIS — K92.2 GASTROINTESTINAL HEMORRHAGE, UNSPECIFIED GASTROINTESTINAL HEMORRHAGE TYPE: Primary | ICD-10-CM

## 2021-03-09 DIAGNOSIS — F10.11 HISTORY OF ALCOHOL ABUSE: ICD-10-CM

## 2021-03-09 DIAGNOSIS — D68.9 COAGULOPATHY (HCC): ICD-10-CM

## 2021-03-09 DIAGNOSIS — D62 ACUTE BLOOD LOSS ANEMIA: ICD-10-CM

## 2021-03-09 LAB
A/G RATIO: 0.9 (ref 1.1–2.2)
ABO/RH: NORMAL
ALBUMIN SERPL-MCNC: 3.6 G/DL (ref 3.4–5)
ALP BLD-CCNC: 154 U/L (ref 40–129)
ALT SERPL-CCNC: 22 U/L (ref 10–40)
ANION GAP SERPL CALCULATED.3IONS-SCNC: 23 MMOL/L (ref 3–16)
ANTIBODY SCREEN: NORMAL
AST SERPL-CCNC: 79 U/L (ref 15–37)
BASOPHILS ABSOLUTE: 0.1 K/UL (ref 0–0.2)
BASOPHILS RELATIVE PERCENT: 0.4 %
BILIRUB SERPL-MCNC: 4.1 MG/DL (ref 0–1)
BUN BLDV-MCNC: 18 MG/DL (ref 7–20)
CALCIUM SERPL-MCNC: 8.9 MG/DL (ref 8.3–10.6)
CHLORIDE BLD-SCNC: 100 MMOL/L (ref 99–110)
CO2: 20 MMOL/L (ref 21–32)
CREAT SERPL-MCNC: 0.8 MG/DL (ref 0.6–1.1)
EKG ATRIAL RATE: 132 BPM
EKG DIAGNOSIS: NORMAL
EKG P AXIS: 72 DEGREES
EKG P-R INTERVAL: 114 MS
EKG Q-T INTERVAL: 336 MS
EKG QRS DURATION: 72 MS
EKG QTC CALCULATION (BAZETT): 497 MS
EKG R AXIS: 58 DEGREES
EKG T AXIS: -21 DEGREES
EKG VENTRICULAR RATE: 132 BPM
EOSINOPHILS ABSOLUTE: 0 K/UL (ref 0–0.6)
EOSINOPHILS RELATIVE PERCENT: 0.1 %
GFR AFRICAN AMERICAN: >60
GFR NON-AFRICAN AMERICAN: >60
GLOBULIN: 3.8 G/DL
GLUCOSE BLD-MCNC: 160 MG/DL (ref 70–99)
HCT VFR BLD CALC: 20.7 % (ref 36–48)
HCT VFR BLD CALC: 22.2 % (ref 36–48)
HEMOGLOBIN: 6.7 G/DL (ref 12–16)
HEMOGLOBIN: 7.3 G/DL (ref 12–16)
INR BLD: 1.86 (ref 0.86–1.14)
LYMPHOCYTES ABSOLUTE: 1.8 K/UL (ref 1–5.1)
LYMPHOCYTES RELATIVE PERCENT: 12.5 %
MCH RBC QN AUTO: 32.1 PG (ref 26–34)
MCHC RBC AUTO-ENTMCNC: 32.2 G/DL (ref 31–36)
MCV RBC AUTO: 99.7 FL (ref 80–100)
MONOCYTES ABSOLUTE: 1 K/UL (ref 0–1.3)
MONOCYTES RELATIVE PERCENT: 7.1 %
NEUTROPHILS ABSOLUTE: 11.6 K/UL (ref 1.7–7.7)
NEUTROPHILS RELATIVE PERCENT: 79.9 %
OCCULT BLOOD DIAGNOSTIC: ABNORMAL
OCCULT BLOOD, OTHER: ABNORMAL
PDW BLD-RTO: 21.4 % (ref 12.4–15.4)
PH, GASTRIC: ABNORMAL
PLATELET # BLD: 244 K/UL (ref 135–450)
PMV BLD AUTO: 8.8 FL (ref 5–10.5)
POTASSIUM REFLEX MAGNESIUM: 4 MMOL/L (ref 3.5–5.1)
PRO-BNP: 132 PG/ML (ref 0–124)
PROTHROMBIN TIME: 21.7 SEC (ref 10–13.2)
RBC # BLD: 2.07 M/UL (ref 4–5.2)
SODIUM BLD-SCNC: 143 MMOL/L (ref 136–145)
TOTAL PROTEIN: 7.4 G/DL (ref 6.4–8.2)
TROPONIN: <0.01 NG/ML
WBC # BLD: 14.6 K/UL (ref 4–11)

## 2021-03-09 PROCEDURE — 93010 ELECTROCARDIOGRAM REPORT: CPT | Performed by: INTERNAL MEDICINE

## 2021-03-09 PROCEDURE — 6360000002 HC RX W HCPCS: Performed by: PHYSICIAN ASSISTANT

## 2021-03-09 PROCEDURE — 96361 HYDRATE IV INFUSION ADD-ON: CPT

## 2021-03-09 PROCEDURE — 86900 BLOOD TYPING SEROLOGIC ABO: CPT

## 2021-03-09 PROCEDURE — 36415 COLL VENOUS BLD VENIPUNCTURE: CPT

## 2021-03-09 PROCEDURE — 85610 PROTHROMBIN TIME: CPT

## 2021-03-09 PROCEDURE — 2060000000 HC ICU INTERMEDIATE R&B

## 2021-03-09 PROCEDURE — 2580000003 HC RX 258: Performed by: PHYSICIAN ASSISTANT

## 2021-03-09 PROCEDURE — 82271 OCCULT BLOOD OTHER SOURCES: CPT

## 2021-03-09 PROCEDURE — 85018 HEMOGLOBIN: CPT

## 2021-03-09 PROCEDURE — 86901 BLOOD TYPING SEROLOGIC RH(D): CPT

## 2021-03-09 PROCEDURE — 86923 COMPATIBILITY TEST ELECTRIC: CPT

## 2021-03-09 PROCEDURE — C9113 INJ PANTOPRAZOLE SODIUM, VIA: HCPCS | Performed by: HOSPITALIST

## 2021-03-09 PROCEDURE — C9113 INJ PANTOPRAZOLE SODIUM, VIA: HCPCS | Performed by: PHYSICIAN ASSISTANT

## 2021-03-09 PROCEDURE — 6370000000 HC RX 637 (ALT 250 FOR IP): Performed by: HOSPITALIST

## 2021-03-09 PROCEDURE — 71045 X-RAY EXAM CHEST 1 VIEW: CPT

## 2021-03-09 PROCEDURE — 83880 ASSAY OF NATRIURETIC PEPTIDE: CPT

## 2021-03-09 PROCEDURE — 2580000003 HC RX 258

## 2021-03-09 PROCEDURE — 2580000003 HC RX 258: Performed by: HOSPITALIST

## 2021-03-09 PROCEDURE — 86850 RBC ANTIBODY SCREEN: CPT

## 2021-03-09 PROCEDURE — 84484 ASSAY OF TROPONIN QUANT: CPT

## 2021-03-09 PROCEDURE — 93005 ELECTROCARDIOGRAM TRACING: CPT | Performed by: EMERGENCY MEDICINE

## 2021-03-09 PROCEDURE — 99284 EMERGENCY DEPT VISIT MOD MDM: CPT

## 2021-03-09 PROCEDURE — 80053 COMPREHEN METABOLIC PANEL: CPT

## 2021-03-09 PROCEDURE — 85014 HEMATOCRIT: CPT

## 2021-03-09 PROCEDURE — 6370000000 HC RX 637 (ALT 250 FOR IP): Performed by: PHYSICIAN ASSISTANT

## 2021-03-09 PROCEDURE — 6360000002 HC RX W HCPCS: Performed by: HOSPITALIST

## 2021-03-09 PROCEDURE — G0328 FECAL BLOOD SCRN IMMUNOASSAY: HCPCS

## 2021-03-09 PROCEDURE — 85025 COMPLETE CBC W/AUTO DIFF WBC: CPT

## 2021-03-09 PROCEDURE — 96365 THER/PROPH/DIAG IV INF INIT: CPT

## 2021-03-09 PROCEDURE — P9016 RBC LEUKOCYTES REDUCED: HCPCS

## 2021-03-09 RX ORDER — ACETAMINOPHEN 325 MG/1
650 TABLET ORAL EVERY 6 HOURS PRN
Status: DISCONTINUED | OUTPATIENT
Start: 2021-03-09 | End: 2021-03-12 | Stop reason: HOSPADM

## 2021-03-09 RX ORDER — ONDANSETRON 2 MG/ML
4 INJECTION INTRAMUSCULAR; INTRAVENOUS ONCE
Status: COMPLETED | OUTPATIENT
Start: 2021-03-09 | End: 2021-03-09

## 2021-03-09 RX ORDER — PHYTONADIONE 10 MG/ML
10 INJECTION, EMULSION INTRAMUSCULAR; INTRAVENOUS; SUBCUTANEOUS ONCE
Status: COMPLETED | OUTPATIENT
Start: 2021-03-09 | End: 2021-03-09

## 2021-03-09 RX ORDER — SODIUM CHLORIDE 9 MG/ML
INJECTION, SOLUTION INTRAVENOUS
Status: COMPLETED
Start: 2021-03-09 | End: 2021-03-09

## 2021-03-09 RX ORDER — LORAZEPAM 0.5 MG/1
0.5 TABLET ORAL ONCE
Status: COMPLETED | OUTPATIENT
Start: 2021-03-09 | End: 2021-03-09

## 2021-03-09 RX ORDER — ACETAMINOPHEN 650 MG/1
650 SUPPOSITORY RECTAL EVERY 6 HOURS PRN
Status: DISCONTINUED | OUTPATIENT
Start: 2021-03-09 | End: 2021-03-12 | Stop reason: HOSPADM

## 2021-03-09 RX ORDER — SODIUM CHLORIDE 0.9 % (FLUSH) 0.9 %
10 SYRINGE (ML) INJECTION EVERY 12 HOURS SCHEDULED
Status: DISCONTINUED | OUTPATIENT
Start: 2021-03-09 | End: 2021-03-12 | Stop reason: HOSPADM

## 2021-03-09 RX ORDER — SODIUM CHLORIDE 9 MG/ML
INJECTION, SOLUTION INTRAVENOUS PRN
Status: DISCONTINUED | OUTPATIENT
Start: 2021-03-09 | End: 2021-03-12 | Stop reason: HOSPADM

## 2021-03-09 RX ORDER — ONDANSETRON 2 MG/ML
4 INJECTION INTRAMUSCULAR; INTRAVENOUS EVERY 6 HOURS PRN
Status: DISCONTINUED | OUTPATIENT
Start: 2021-03-09 | End: 2021-03-12 | Stop reason: HOSPADM

## 2021-03-09 RX ORDER — SODIUM CHLORIDE 9 MG/ML
INJECTION, SOLUTION INTRAVENOUS CONTINUOUS
Status: DISCONTINUED | OUTPATIENT
Start: 2021-03-09 | End: 2021-03-12 | Stop reason: HOSPADM

## 2021-03-09 RX ORDER — SODIUM CHLORIDE 0.9 % (FLUSH) 0.9 %
10 SYRINGE (ML) INJECTION PRN
Status: DISCONTINUED | OUTPATIENT
Start: 2021-03-09 | End: 2021-03-12 | Stop reason: HOSPADM

## 2021-03-09 RX ORDER — SODIUM CHLORIDE, SODIUM LACTATE, POTASSIUM CHLORIDE, CALCIUM CHLORIDE 600; 310; 30; 20 MG/100ML; MG/100ML; MG/100ML; MG/100ML
1000 INJECTION, SOLUTION INTRAVENOUS ONCE
Status: COMPLETED | OUTPATIENT
Start: 2021-03-09 | End: 2021-03-09

## 2021-03-09 RX ORDER — ONDANSETRON 2 MG/ML
INJECTION INTRAMUSCULAR; INTRAVENOUS
Status: DISPENSED
Start: 2021-03-09 | End: 2021-03-10

## 2021-03-09 RX ORDER — OCTREOTIDE ACETATE 100 UG/ML
50 INJECTION, SOLUTION INTRAVENOUS; SUBCUTANEOUS ONCE
Status: COMPLETED | OUTPATIENT
Start: 2021-03-09 | End: 2021-03-09

## 2021-03-09 RX ORDER — PROMETHAZINE HYDROCHLORIDE 25 MG/1
12.5 TABLET ORAL EVERY 6 HOURS PRN
Status: DISCONTINUED | OUTPATIENT
Start: 2021-03-09 | End: 2021-03-12 | Stop reason: HOSPADM

## 2021-03-09 RX ADMIN — SODIUM CHLORIDE, POTASSIUM CHLORIDE, SODIUM LACTATE AND CALCIUM CHLORIDE 1000 ML: 600; 310; 30; 20 INJECTION, SOLUTION INTRAVENOUS at 11:38

## 2021-03-09 RX ADMIN — LORAZEPAM 0.5 MG: 0.5 TABLET ORAL at 23:07

## 2021-03-09 RX ADMIN — SODIUM CHLORIDE 8 MG/HR: 9 INJECTION, SOLUTION INTRAVENOUS at 14:03

## 2021-03-09 RX ADMIN — PROMETHAZINE HYDROCHLORIDE 12.5 MG: 25 TABLET ORAL at 17:52

## 2021-03-09 RX ADMIN — SODIUM CHLORIDE 8 MG/HR: 9 INJECTION, SOLUTION INTRAVENOUS at 23:07

## 2021-03-09 RX ADMIN — OCTREOTIDE ACETATE 50 MCG/HR: 500 INJECTION, SOLUTION INTRAVENOUS; SUBCUTANEOUS at 18:53

## 2021-03-09 RX ADMIN — SODIUM CHLORIDE: 9 INJECTION, SOLUTION INTRAVENOUS at 18:00

## 2021-03-09 RX ADMIN — SODIUM CHLORIDE 80 MG: 9 INJECTION, SOLUTION INTRAVENOUS at 12:24

## 2021-03-09 RX ADMIN — PHYTONADIONE 10 MG: 10 INJECTION, EMULSION INTRAMUSCULAR; INTRAVENOUS; SUBCUTANEOUS at 14:10

## 2021-03-09 RX ADMIN — ONDANSETRON 4 MG: 2 INJECTION INTRAMUSCULAR; INTRAVENOUS at 14:26

## 2021-03-09 RX ADMIN — SODIUM CHLORIDE: 9 INJECTION, SOLUTION INTRAVENOUS at 16:45

## 2021-03-09 RX ADMIN — OCTREOTIDE ACETATE 50 MCG: 100 INJECTION, SOLUTION INTRAVENOUS; SUBCUTANEOUS at 14:14

## 2021-03-09 ASSESSMENT — ENCOUNTER SYMPTOMS
ANAL BLEEDING: 0
CHEST TIGHTNESS: 0
COUGH: 0
BLOOD IN STOOL: 0
VOMITING: 0
ABDOMINAL PAIN: 0
DIARRHEA: 0
SHORTNESS OF BREATH: 0
NAUSEA: 0

## 2021-03-09 ASSESSMENT — PAIN SCALES - GENERAL: PAINLEVEL_OUTOF10: 0

## 2021-03-09 NOTE — CONSULTS
Gastroenterology Consult Note        Patient: Álvaro Zaman  : 1975  Acct#:      Date:  3/9/2021    Subjective:       History of Present Illness  Patient is a 41 y.o.  female admitted with hematemesis who is seen in consult for the same. H/o alcoholic liver disease. She was seen late 2021 for hematemesis after nonbloody emesis. EGD then with Linear ulcer at the GEJ (? MW tear), Portal hypertensive gastropathy and no esophageal varices. Cardia and a portion of fundus not ideally seen due to pt having difficulty retaining insufflated air and retained clot and debris. She improved and was d/c home. She has not drank alcohol since. Has had heavy periods and was saturating tampons every 2 hours. Felt very weak today and near syncopal so came to the ED. Here she vomited once and had dark red emesis. Was not retching prior to this. No melena or hematochezia. No abdominal pain. No nsaids. History reviewed. No pertinent past medical history. Past Surgical History:   Procedure Laterality Date    UPPER GASTROINTESTINAL ENDOSCOPY N/A 2021    EGD DIAGNOSTIC ONLY performed by Alexandra Mojica MD at [address] [place] [address]      Past Endoscopic History  EGD 21 with Dr Nikky No for hematemesis  Impression:    1. Linear ulcer at the GEJ. ? MW tear. Clean based. 2. Portal hypertensive gastropathy  3. No esophageal varices  View was somewhat limited due to pt having difficulty retained insufflated air and retained clot and debris     Admission Meds  No current facility-administered medications on file prior to encounter.       Current Outpatient Medications on File Prior to Encounter   Medication Sig Dispense Refill    Multiple Vitamin (MULTIVITAMIN) TABS tablet Take 1 tablet by mouth daily 30 tablet 0    pantoprazole (PROTONIX) 40 MG tablet Take 1 tablet by mouth every morning (before breakfast) 30 tablet 3          Allergies  No Known Allergies   Social   Social History Tobacco Use    Smoking status: Former Smoker    Smokeless tobacco: Never Used   Substance Use Topics    Alcohol use: Not Currently     Comment: Former alcohol issues        History reviewed. No pertinent family history. Review of Systems  Constitutional: negative for fevers, chills, sweats    Ears, nose, mouth, throat, and face: negative for nasal congestion and sore throat   Respiratory: negative for cough and shortness of breath   Cardiovascular: negative for chest pain and dyspnea   Gastrointestinal: see hpi   Genitourinary:negative for dysuria and frequency   Integument/breast: negative for pruritus and rash   Hematologic/lymphatic: negative for lymphadenopathy and easy bruising   Musculoskeletal:negative for arthralgias and myalgias   Neurological: negative for dizziness and weakness          Physical Exam  Blood pressure (!) 117/95, pulse 135, temperature 97.8 °F (36.6 °C), temperature source Oral, resp. rate 11, height 5' 4\" (1.626 m), weight 125 lb (56.7 kg), last menstrual period 03/02/2021, SpO2 100 %. General appearance: pale, alert, cooperative, no distress, appears stated age  Eyes: Anicteric  Head: Normocephalic, without obvious abnormality  Lungs: clear to auscultation bilaterally, Normal Effort  Heart: tachycardia, regular rhythm, normal S1 and S2, no murmurs or rubs  Abdomen: soft, non-tender. Bowel sounds normal. No masses,  no organomegaly. Extremities: atraumatic, no cyanosis or edema  Skin: warm and dry, no jaundice  Neuro: Grossly intact, A&OX3  Musculoskeletal: 5/5  strength BUE      Data Review:    Recent Labs     03/09/21  1128   WBC 14.6*   HGB 6.7*   HCT 20.7*   MCV 99.7        Recent Labs     03/09/21  1128      K 4.0      CO2 20*   BUN 18   CREATININE 0.8     Recent Labs     03/09/21  1128   AST 79*   ALT 22   BILITOT 4.1*   ALKPHOS 154*     No results for input(s): LIPASE, AMYLASE in the last 72 hours.   Recent Labs     03/09/21  1128   PROTIME 21.7*   INR 1.86*     No results for input(s): PTT in the last 72 hours. No results for input(s): OCCULTBLD in the last 72 hours. Imaging Studies:                  Assessment:     Active Problems:    * No active hospital problems. *  Resolved Problems:    * No resolved hospital problems. *    Hematemesis - sudden onset of dark red hematemesis in the ED. H/o Ashley Molina tear on EGD 21. No esophageal varices then but not able to adequately visualize the cardia then. She is tachycardic but BP normal.   Acute blood loss anemia - hgb was 8.3 at recent d/c a month ago. hgb now 6.7. Alcoholic liver disease - recent CT with nodular appearing liver concerning for cirrhosis. Not currently drinking alcohol. Elevated INR - 1.86 likely d/t liver disease. Recommendations:   - NPO  - PPI bolus and drip  - octreotide bolus and drip  - vitamin K 10 mg  - insert and maintain 3 IVs  - to get 1 u PRBC  - serial h/h  - EGD once stabilized    Discussed with Dr. Cathleen Boswell, [facility]  [facility]    I have personally performed a face to face diagnostic evaluation on this patient. I have interviewed and examined the patient and I agree with the findings and recommended plan of care. In summary, my findings and plan are the followin-year-old  female with history of alcoholic liver disease. She presented to the ER with progressive fatigue. She has heavy menses that lasted 12 days, and been changing her pads almost every 2 hours. While in the ER, she had a witness episodes of hematemesis. Hemoglobin was 6.7. It was 8.3 in 2021. Patient had an EGD in January that only showed Ashley-Molina tear with no esophageal varices. There was blood in the fundus and was not adequately visualized. She received 1 unit of packed red cells in the ER. IV PPI and octreotide started. Vital signs are stable. Abdomen is soft nontender. Impressions/Plan: Symptomatic anemia with hematemesis.   Alcoholic liver disease, stopped drinking a few months ago. Monitor H&H, continue IV PPI and octreotide, EGD tomorrow. Keep NPO for now. Needs outpatient follow-up with OB/GYN for dysfunctional uterine bleeding.     Ayanna Alvarado MD, MSc  [facility]  03/09/21

## 2021-03-09 NOTE — ED PROVIDER NOTES
[address] [facility]        Pt Name: Dedra Cornell  MRN: [id number]  [place] 1975  Date of evaluation: 3/9/2021  Provider: Wandy Zheng PA-C  PCP: No primary care provider on file. BRIAN. I have evaluated this patient. My supervising physician was available for consultation. CHIEF COMPLAINT       Chief Complaint   Patient presents with    Dizziness     From home via [place]. Dizziness since midnight last night. Heavy menstrual cycle recently. Seen about 5 weeks ago for bleeding gastric ulcers, was given transfusion. States symptoms feel very similar. States she has Hx of liver issues from alcohol but hasn't been drinking since last visit. HISTORY OF PRESENT ILLNESS   (Location, Timing/Onset, Context/Setting, Quality, Duration, Modifying Factors, Severity, Associated Signs and Symptoms)  Note limiting factors. Dedra Cornell is a 41 y.o. female presents the emergency department with difficulties as it pertains to lightheadedness and heavy menstrual bleeding. This is a patient well-known to me who had difficulties with a GI bleed and was admitted to the hospital for this. Unfortunately she did require blood transfusion and EDG. She states she has been home and has been doing well. She states that over the last couple of days she has had heavy menses. She states that she has feeling somewhat light in the head with symptoms of orthostasis despite the nursing note stating that this is dizziness. She states changing positions makes her heart race and feels as if she is going to pass out. Patient reports that she is currently on Protonix. She has not had any ongoing difficulties with hematemesis and when asked she does not believe that she is experiencing any significant difficulties with melena or dark tarry stool.   Patient states the heavy menses is concerning for worsening symptoms related to the anemia which is why she states that she contacted emergency medical services to bring her to the ED for evaluation and treatment. At the present time the patient tells me she is not having any kind of pain. She denies abdominal pain. No significant dysmenorrhea is noted either. She denies chest pain palpitations or significant shortness of breath. She denies pleuritic discomfort. She denies fevers and or chills. She has no symptoms of cough and no concerns for Covid at present. No additional GI or  complaints and with the above-mentioned she presents to the ED for evaluation and treatment. Nursing Notes were all reviewed and agreed with or any disagreements were addressed in the HPI. REVIEW OF SYSTEMS    (2-9 systems for level 4, 10 or more for level 5)     Review of Systems   Constitutional: Positive for fatigue. Negative for activity change, chills and fever. Respiratory: Negative for cough, chest tightness and shortness of breath. Cardiovascular: Negative for chest pain. Gastrointestinal: Negative for abdominal pain, anal bleeding, blood in stool, diarrhea, nausea and vomiting. Genitourinary: Negative for dysuria and flank pain. Skin: Negative for rash and wound. Neurological: Positive for light-headedness. Positives and Pertinent negatives as per HPI. Except as noted above in the ROS, all other systems were reviewed and negative. PAST MEDICAL HISTORY   History reviewed. No pertinent past medical history.       SURGICAL HISTORY     Past Surgical History:   Procedure Laterality Date    UPPER GASTROINTESTINAL ENDOSCOPY N/A 1/30/2021    EGD DIAGNOSTIC ONLY performed by Phillip Reynoso MD at [address]       Previous Medications    MULTIPLE VITAMIN (MULTIVITAMIN) TABS TABLET    Take 1 tablet by mouth daily    PANTOPRAZOLE (PROTONIX) 40 MG TABLET    Take 1 tablet by mouth every morning (before breakfast)         ALLERGIES     Patient has no known [id number]                          ORDERED BY: [employer]  SOURCE: Stool Semi-formed Without PreservatCOLLECTED:  03/09/21 11:28  ANTIBIOTICS AT SANDOR.:                      RECEIVED :  03/09/21 11:48  Performed at:  [facility] [address], [facility], [address]   Phone [phone]   PROTIME-INR - Abnormal; Notable for the following components:    Protime 21.7 (*)     INR 1.86 (*)     All other components within normal limits    Narrative:     Performed at:  [facility] [address], [facility], [facility][note 3] [address]   Phone [phone]   OCCULT BLOOD GASTRIC / DUODENUM - Abnormal; Notable for the following components:    Occult Blood, Other   (*)     Value: Result: POSITIVE  Normal range: Negative      All other components within normal limits    Narrative:     ORDER#: [id number]                          ORDERED BY: [facility] [employer]  SOURCE: Gastric                            COLLECTED:  03/09/21 11:44  ANTIBIOTICS AT SANDOR.:                      RECEIVED :  03/09/21 11:58  Performed at:  [facility] [address], [facility], [facility][note 3] [address]   Phone [phone]   TROPONIN    Narrative:     Performed at:  [facility] [address], [facility], [facility][note 3] [address]   Phone [phone]   TYPE AND SCREEN    Narrative:     Performed at:  [facility] [address], [facility], [facility][note 3] [address]   Phone [phone]   TYPE AND SCREEN   PREPARE RBC (CROSSMATCH)       All other labs were within normal range or not returned as of this dictation. EKG: All EKG's are interpreted by the Emergency Department Physician in the absence of a cardiologist.  Please see their note for interpretation of EKG.       RADIOLOGY:   Non-plain film images such as CT, Ultrasound and MRI are read by the radiologist. Plain radiographic images are visualized and preliminarily interpreted by the ED Provider with the below findings:        Interpretation per the Radiologist below, if available at the time of this note:    XR CHEST PORTABLE   Final Result   No acute findings             PROCEDURES   Unless otherwise noted below, none     Procedures    CRITICAL CARE TIME   Because of the high probability of sudden clinical deterioration of the patients condition and to prevent further deterioration, my critical care time involved my full attention to the patients condition, and included chart data review, documentation, medication ordering, viewing the patients old records, reevaluation of the patient's cardiac, pulmonary, and neurological status. Reassessing vital signs. Consutlations with off service physician. Ordering, interpreting reviewing diagnostic testing. Therefore my critical care time was 39 minutes of direct attention to the patients condition and did not include time spent on procedures. I have discussed with the patient the rationale for blood transfusion, its benefits in treating or preventing symptomatic anemia, acute blood loss and dangerously low hemoglobin levels which could lead to fatigue, organ damage or death, and its risk which include: mild transfusion reactions, rare risk of blood borne infection, or more serious but rare allergic reactions. I have discussed the alternatives to transfusion, including the risk and consequences of not receiving transfusion. The patient had an opportunity to ask questions and had agreed to proceed with transfusion of packed red blood cells.     CONSULTS:  IP CONSULT TO GI      EMERGENCY DEPARTMENT COURSE and DIFFERENTIAL DIAGNOSIS/MDM:   Vitals:    Vitals:    03/09/21 1145 03/09/21 1200 03/09/21 1230 03/09/21 1300   BP:  (!) 119/49 (!) 137/56 (!) 117/95   Pulse: 132 130 132 135   Resp: 9 25 13 11   Temp:       TempSrc:       SpO2: 100% 100% 100% 100%   Weight:       Height:           Patient was given the following medications:  Medications pantoprazole (PROTONIX) 80 mg in sodium chloride 0.9 % 100 mL infusion (has no administration in time range)   0.9 % sodium chloride infusion (has no administration in time range)   lactated ringers infusion 1,000 mL (1,000 mLs Intravenous New Bag 3/9/21 1138)   pantoprazole (PROTONIX) 80 mg in sodium chloride 0.9 % 50 mL bolus (80 mg Intravenous New Bag 3/9/21 1224)           The patient's detailed history of present illness is documented as above. Upon arrival to the emergency department the patient's vital signs are as documented. The patient is noted to be hemodynamically stable and afebrile. Physical examination findings are as above. IV access was obtained. Laboratory testing and work-up was initiated. Initially the patient was given a liter of IV fluid for her tachycardia which is confirmed on EKG at a rate of 132. No evidence of acute ST elevation. Nonspecific ST changes with just minimal upsloping and less than a millimeter depression noted laterally. Please see attending physician details for further EKG interpretation as well as comparison. When asked to the possibility of if there was a change in the color of the stool the patient was unaware therefore Hemoccult is completed. This is noted to be positive. Protonix was initiated as the cause of the patient's GI bleed is thought likely to be upper in light of her history. At approximately [phone] the nursing staff comes to me with a specimen cup that demonstrates recurrent hematemesis. That was sent for Gastroccult although it is grossly positive. Will initiate Protonix bolus and drip. I did review the patient's previous EGD and she does not have difficulties with esophageal varices although she had not been forthcoming with her extent of her alcohol use. It is reported she does continue to intermittently use alcohol since her last admission despite difficulties with this in the past.CBC demonstrates mild leukocytosis at 14.6.   She again is anemic today at 6.7 and 20.7 respectively with no evidence of thrombocytopenia or thrombocytosis. BUN is 18 creatinine is 0.8 nonfasting glucose 160. CO2 low at 20 with a gap of 23. Electrolytes and LFTs unremarkable. Type and screen is a positive. Troponin is less than 0.01 proBNP is 132. INR is 1.86 in a patient who has had difficulties in the past with alcohol abuse. Urinalysis is currently outstanding at the time of this dictation. Will not change the treatment strategy requiring her to have inpatient management. Call was placed to gastroenterology. Then following with the hospitalist.  Case was discussed with Dr. Nadja Rodriguez from gastroenterology. Patient be made n.p.o. He does not believe antibiotics are indicated as the patient does not have a history of cirrhosis. Patient will be admitted to medical surgical services for ongoing care management the diagnoses below. FINAL IMPRESSION      1. Gastrointestinal hemorrhage, unspecified gastrointestinal hemorrhage type    2. Acute blood loss anemia    3. History of alcohol abuse    4.  Coagulopathy [facility])          DISPOSITION/PLAN   DISPOSITION Decision To Admit 03/09/2021 01:07:03 PM         (Please note that portions of this note were completed with a voice recognition program.  Efforts were made to edit the dictations but occasionally words are mis-transcribed.)    Yojana Gerardo PA-C (electronically signed)           Cheli Martínez PA-C  03/09/21 3743

## 2021-03-09 NOTE — CARE COORDINATION
Discharge Planning Assessment    SW discharge planner met with patient to discuss reason for admission, current living situation, and potential needs at the time of discharge    Pt in ED d/t gastro hemorrhage     Demographics/Insurance verified:  Yes - stated our financial department is helping with her Medicaid application. Current type of dwelling:  House:    Living arrangements:  W/16year old dtr    Level of function/Support:  Pt reports she has \"Been fine\" since the last admission. Stated she was \"doing well\" but then became weak after her last mentral cycle. Reported dtr and mom are supportive. PCP:  None. CLAYTON provided pt with a brochure from [employer]. Last Visit to PCP:  n/a    DME:  None. No needs identified. Active with any community resources/agencies/skilled home care:  Pt stated she has been in contact with [address] [place] [address] for her alcohol addicition. Stated she hasn't had a drink since last admission but reported once her Medicaid insurance kicks in, she will sign up with their outpatient rehab services. Medication compliance issues:  No    Financial issues that could impact healthcare:  No    Tentative discharge plan:  Home most likely. No needs identified. Discussed with patient and/or family that on the day of discharge home tentative time of discharge will be between 10 AM and noon. Transportation at the time of discharge:  Pt drives. Mom or dtr can assist home.     Electronically signed by ABY Baer LSW on 3/9/2021 at 3:42 PM

## 2021-03-09 NOTE — ED PROVIDER NOTES
EKG is reviewed by myself. Dated today at 18. Rate 132. Sinus tachycardia with some left atrial margins and diffuse ST wave depressions. This is grossly unchanged from 29 January 2021.      Pio Briseno MD  03/09/21 1059

## 2021-03-09 NOTE — ED NOTES
Pt report given to [facility], RN, states no questions or concerns at this time. Pt transported to floor via bed by ED tech and floor RN. Pt transported with blood and Protonix still infusing and on portable telemetry.       [facility], RN  03/09/21 1777 [address]  03/09/21 3884

## 2021-03-09 NOTE — H&P
HOSPITALISTS HISTORY AND PHYSICAL    3/9/2021 1:17 PM    Patient Information:  Rafael Duong is a 41 y.o. female [id number]  PCP:  No primary care provider on file. (Tel: None )    Chief complaint:    Chief Complaint   Patient presents with    Dizziness     From home via [place]. Dizziness since midnight last night. Heavy menstrual cycle recently. Seen about 5 weeks ago for bleeding gastric ulcers, was given transfusion. States symptoms feel very similar. States she has Hx of liver issues from alcohol but hasn't been drinking since last visit. History of Present Illness:  Vinicio Sahu is a 41 y.o. female who presented with complaints of dizziness. Started last night. Patient also having menstrual cycle recently. Her 5 weeks ago was admitted for GI bleed and ulcer. First admitted in ICU. Patient with history of alcohol but has not had a drink since last visit. Patient denies any fevers chills nausea vomiting or diarrhea. No blood thinners. REVIEW OF SYSTEMS:   Constitutional: Negative for fever,chills or night sweats  ENT: Negative for rhinorrhea, epistaxis, hoarseness, sore throat. Respiratory: Negative for shortness of breath,wheezing  Cardiovascular: Negative for chest pain, palpitations   Gastrointestinal: Negative for nausea, vomiting, diarrhea  Genitourinary: Negative for polyuria, dysuria   Hematologic/Lymphatic: Negative for bleeding tendency, easy bruising  Musculoskeletal: Negative for myalgias and arthralgias  Neurologic: Negative for confusion,dysarthria. Skin: Negative for itching,rash, good capillary refill. Psychiatric: Negative for depression,anxiety, agitation. Endocrine: Negative for polydipsia,polyuria,heat /cold intolerance. Past Medical History:   has no past medical history on file. Past Surgical History:   has a past surgical history that includes Upper gastrointestinal endoscopy (N/A, 1/30/2021). Medications:  No current facility-administered medications on file prior to encounter. Current Outpatient Medications on File Prior to Encounter   Medication Sig Dispense Refill    Multiple Vitamin (MULTIVITAMIN) TABS tablet Take 1 tablet by mouth daily 30 tablet 0    pantoprazole (PROTONIX) 40 MG tablet Take 1 tablet by mouth every morning (before breakfast) 30 tablet 3       Allergies:  No Known Allergies     Social History:   reports that she has quit smoking. She has never used smokeless tobacco. She reports previous alcohol use. She reports that she does not use drugs. Family History:  Sister hypertension    Physical Exam:  BP (!) 117/95   Pulse 135   Temp 97.8 °F (36.6 °C) (Oral)   Resp 11   Ht 5' 4\" (1.626 m)   Wt 125 lb (56.7 kg)   LMP 03/02/2021   SpO2 100%   BMI 21.46 kg/m²     General appearance:  Appears comfortable. Well nourished  Eyes: Sclera clear, pupils equal  ENT: Moist mucus membranes, no thrush. Trachea midline. Cardiovascular: Regular rhythm, normal S1, S2. No murmur, gallop, rub. No edema in lower extremities  Respiratory: Clear to auscultation bilaterally, no wheeze, good inspiratory effort  Gastrointestinal: Abdomen soft, non-tender, not distended, normal bowel sounds  Musculoskeletal: No cyanosis in digits, neck supple  Neurology: Cranial nerves grossly intact. Alert and oriented in time, place and person. No speech or motor deficits  Psychiatry: Appropriate affect.  Not agitated  Skin: Warm, dry, normal turgor, no rash    Labs:  CBC:   Lab Results   Component Value Date    WBC 14.6 03/09/2021    RBC 2.07 03/09/2021    HGB 6.7 03/09/2021    HCT 20.7 03/09/2021    MCV 99.7 03/09/2021    MCH 32.1 03/09/2021    MCHC 32.2 03/09/2021    RDW 21.4 03/09/2021     03/09/2021    MPV 8.8 03/09/2021     BMP:    Lab Results   Component Value Date     03/09/2021    K 4.0 03/09/2021     03/09/2021    CO2 20 03/09/2021    BUN 18 03/09/2021    CREATININE 0.8 03/09/2021    CALCIUM 8.9 03/09/2021    GFRAA >60 03/09/2021    LABGLOM >60 03/09/2021    GLUCOSE 160 03/09/2021       Chest Xray:   EKG:    I visualized CXR images and EKG strips        Problem List  Active Problems:    Acute GI bleeding  Resolved Problems:    * No resolved hospital problems. *        Assessment/Plan:   Acute GI bleed  -Positive Hemoccult.   -Hemoglobin noted to be 6.7  -Symptomatic with tachycardia  -We will transfuse  -H&H every 6 hours transfuse if drops below 7  -Has been consulted continue Protonix drip for now  -N.p.o. for now    Acute blood loss anemia  -Check iron studies THS B12 as well    Dizziness secondary to above  History of ulcer  History of alcohol abuse has not had any drink since last admission which was last month      Anup Arzola MD    3/9/2021 1:17 PM

## 2021-03-09 NOTE — ED NOTES
Per Lauro Lawrence NP, insert a total of 3 PIV if possible     Verito Nicolas RN  03/09/21 Malika. Hubert Eddy RN  03/09/21 6777

## 2021-03-10 ENCOUNTER — ANESTHESIA EVENT (OUTPATIENT)
Dept: ENDOSCOPY | Age: 46
DRG: 253 | End: 2021-03-10
Payer: MEDICAID

## 2021-03-10 ENCOUNTER — ANESTHESIA (OUTPATIENT)
Dept: ENDOSCOPY | Age: 46
DRG: 253 | End: 2021-03-10
Payer: MEDICAID

## 2021-03-10 VITALS
RESPIRATION RATE: 2 BRPM | OXYGEN SATURATION: 98 % | SYSTOLIC BLOOD PRESSURE: 114 MMHG | DIASTOLIC BLOOD PRESSURE: 59 MMHG

## 2021-03-10 LAB
BLOOD BANK DISPENSE STATUS: NORMAL
BLOOD BANK PRODUCT CODE: NORMAL
BPU ID: NORMAL
DESCRIPTION BLOOD BANK: NORMAL
HCG QUALITATIVE: NEGATIVE
HCT VFR BLD CALC: 18.6 % (ref 36–48)
HCT VFR BLD CALC: 20.4 % (ref 36–48)
HCT VFR BLD CALC: 25 % (ref 36–48)
HCT VFR BLD CALC: 28.1 % (ref 36–48)
HEMOGLOBIN: 6.1 G/DL (ref 12–16)
HEMOGLOBIN: 6.9 G/DL (ref 12–16)
HEMOGLOBIN: 8.3 G/DL (ref 12–16)
HEMOGLOBIN: 9.3 G/DL (ref 12–16)
IRON SATURATION: 38 % (ref 15–50)
IRON: 76 UG/DL (ref 37–145)
TOTAL IRON BINDING CAPACITY: 198 UG/DL (ref 260–445)

## 2021-03-10 PROCEDURE — 2580000003 HC RX 258: Performed by: HOSPITALIST

## 2021-03-10 PROCEDURE — 0DB68ZX EXCISION OF STOMACH, VIA NATURAL OR ARTIFICIAL OPENING ENDOSCOPIC, DIAGNOSTIC: ICD-10-PCS | Performed by: INTERNAL MEDICINE

## 2021-03-10 PROCEDURE — 2500000003 HC RX 250 WO HCPCS: Performed by: INTERNAL MEDICINE

## 2021-03-10 PROCEDURE — 2709999900 HC NON-CHARGEABLE SUPPLY: Performed by: INTERNAL MEDICINE

## 2021-03-10 PROCEDURE — 85018 HEMOGLOBIN: CPT

## 2021-03-10 PROCEDURE — 84703 CHORIONIC GONADOTROPIN ASSAY: CPT

## 2021-03-10 PROCEDURE — P9016 RBC LEUKOCYTES REDUCED: HCPCS

## 2021-03-10 PROCEDURE — 85014 HEMATOCRIT: CPT

## 2021-03-10 PROCEDURE — 2580000003 HC RX 258: Performed by: PHYSICIAN ASSISTANT

## 2021-03-10 PROCEDURE — 6370000000 HC RX 637 (ALT 250 FOR IP): Performed by: HOSPITALIST

## 2021-03-10 PROCEDURE — 7100000001 HC PACU RECOVERY - ADDTL 15 MIN: Performed by: INTERNAL MEDICINE

## 2021-03-10 PROCEDURE — 83550 IRON BINDING TEST: CPT

## 2021-03-10 PROCEDURE — 36430 TRANSFUSION BLD/BLD COMPNT: CPT

## 2021-03-10 PROCEDURE — 6360000002 HC RX W HCPCS: Performed by: NURSE ANESTHETIST, CERTIFIED REGISTERED

## 2021-03-10 PROCEDURE — 83540 ASSAY OF IRON: CPT

## 2021-03-10 PROCEDURE — 3700000000 HC ANESTHESIA ATTENDED CARE: Performed by: INTERNAL MEDICINE

## 2021-03-10 PROCEDURE — 6370000000 HC RX 637 (ALT 250 FOR IP): Performed by: PHYSICIAN ASSISTANT

## 2021-03-10 PROCEDURE — 6360000002 HC RX W HCPCS: Performed by: HOSPITALIST

## 2021-03-10 PROCEDURE — 7100000000 HC PACU RECOVERY - FIRST 15 MIN: Performed by: INTERNAL MEDICINE

## 2021-03-10 PROCEDURE — 3700000001 HC ADD 15 MINUTES (ANESTHESIA): Performed by: INTERNAL MEDICINE

## 2021-03-10 PROCEDURE — 6370000000 HC RX 637 (ALT 250 FOR IP): Performed by: INTERNAL MEDICINE

## 2021-03-10 PROCEDURE — 2060000000 HC ICU INTERMEDIATE R&B

## 2021-03-10 PROCEDURE — 36415 COLL VENOUS BLD VENIPUNCTURE: CPT

## 2021-03-10 PROCEDURE — 2500000003 HC RX 250 WO HCPCS: Performed by: NURSE ANESTHETIST, CERTIFIED REGISTERED

## 2021-03-10 PROCEDURE — 88305 TISSUE EXAM BY PATHOLOGIST: CPT

## 2021-03-10 PROCEDURE — 3609012400 HC EGD TRANSORAL BIOPSY SINGLE/MULTIPLE: Performed by: INTERNAL MEDICINE

## 2021-03-10 RX ORDER — SODIUM CHLORIDE 9 MG/ML
INJECTION, SOLUTION INTRAVENOUS PRN
Status: DISCONTINUED | OUTPATIENT
Start: 2021-03-10 | End: 2021-03-12 | Stop reason: HOSPADM

## 2021-03-10 RX ORDER — PEG-3350, SODIUM SULFATE, SODIUM CHLORIDE, POTASSIUM CHLORIDE, SODIUM ASCORBATE AND ASCORBIC ACID 7.5-2.691G
100 KIT ORAL ONCE
Status: COMPLETED | OUTPATIENT
Start: 2021-03-10 | End: 2021-03-10

## 2021-03-10 RX ORDER — PROPOFOL 10 MG/ML
INJECTION, EMULSION INTRAVENOUS PRN
Status: DISCONTINUED | OUTPATIENT
Start: 2021-03-10 | End: 2021-03-10 | Stop reason: SDUPTHER

## 2021-03-10 RX ORDER — PANTOPRAZOLE SODIUM 40 MG/1
40 TABLET, DELAYED RELEASE ORAL
Status: DISCONTINUED | OUTPATIENT
Start: 2021-03-11 | End: 2021-03-12 | Stop reason: HOSPADM

## 2021-03-10 RX ORDER — LIDOCAINE HYDROCHLORIDE 20 MG/ML
INJECTION, SOLUTION EPIDURAL; INFILTRATION; INTRACAUDAL; PERINEURAL PRN
Status: DISCONTINUED | OUTPATIENT
Start: 2021-03-10 | End: 2021-03-10 | Stop reason: SDUPTHER

## 2021-03-10 RX ORDER — SODIUM CHLORIDE 9 MG/ML
INJECTION, SOLUTION INTRAVENOUS PRN
Status: COMPLETED | OUTPATIENT
Start: 2021-03-10 | End: 2021-03-10

## 2021-03-10 RX ORDER — GLYCOPYRROLATE 0.2 MG/ML
INJECTION INTRAMUSCULAR; INTRAVENOUS PRN
Status: DISCONTINUED | OUTPATIENT
Start: 2021-03-10 | End: 2021-03-10 | Stop reason: SDUPTHER

## 2021-03-10 RX ADMIN — POLYETHYLENE GLYCOL 3350, SODIUM SULFATE, SODIUM CHLORIDE, POTASSIUM CHLORIDE, ASCORBIC ACID, SODIUM ASCORBATE 100 G: KIT at 06:35

## 2021-03-10 RX ADMIN — Medication 10 ML: at 09:00

## 2021-03-10 RX ADMIN — PROPOFOL 50 MG: 10 INJECTION, EMULSION INTRAVENOUS at 09:35

## 2021-03-10 RX ADMIN — SODIUM CHLORIDE: 9 INJECTION, SOLUTION INTRAVENOUS at 09:26

## 2021-03-10 RX ADMIN — Medication 10 ML: at 22:12

## 2021-03-10 RX ADMIN — POLYETHYLENE GLYCOL 3350, SODIUM SULFATE, SODIUM CHLORIDE, POTASSIUM CHLORIDE, ASCORBIC ACID, SODIUM ASCORBATE 100 G: KIT at 22:07

## 2021-03-10 RX ADMIN — PROMETHAZINE HYDROCHLORIDE 12.5 MG: 25 TABLET ORAL at 20:38

## 2021-03-10 RX ADMIN — SODIUM CHLORIDE: 9 INJECTION, SOLUTION INTRAVENOUS at 22:12

## 2021-03-10 RX ADMIN — LIDOCAINE HYDROCHLORIDE 100 MG: 20 INJECTION, SOLUTION EPIDURAL; INFILTRATION; INTRACAUDAL; PERINEURAL at 09:31

## 2021-03-10 RX ADMIN — PROPOFOL 50 MG: 10 INJECTION, EMULSION INTRAVENOUS at 09:32

## 2021-03-10 RX ADMIN — GLYCOPYRROLATE 0.1 MG: 0.2 INJECTION, SOLUTION INTRAMUSCULAR; INTRAVENOUS at 09:26

## 2021-03-10 RX ADMIN — PROMETHAZINE HYDROCHLORIDE 12.5 MG: 25 TABLET ORAL at 00:49

## 2021-03-10 RX ADMIN — ONDANSETRON 4 MG: 2 INJECTION INTRAMUSCULAR; INTRAVENOUS at 06:58

## 2021-03-10 RX ADMIN — PROPOFOL 50 MG: 10 INJECTION, EMULSION INTRAVENOUS at 09:40

## 2021-03-10 RX ADMIN — ONDANSETRON 4 MG: 2 INJECTION INTRAMUSCULAR; INTRAVENOUS at 18:00

## 2021-03-10 ASSESSMENT — PULMONARY FUNCTION TESTS
PIF_VALUE: 0
PIF_VALUE: 1
PIF_VALUE: 1
PIF_VALUE: 0
PIF_VALUE: 0
PIF_VALUE: 1

## 2021-03-10 ASSESSMENT — PAIN SCALES - GENERAL
PAINLEVEL_OUTOF10: 0

## 2021-03-10 NOTE — ANESTHESIA PRE PROCEDURE
mg Intravenous Q6H PRN Freya Caldwell MD   4 mg at 03/10/21 1792    acetaminophen (TYLENOL) tablet 650 mg  650 mg Oral Q6H PRN Freya Caldwell MD        Or   Lucille Lopez acetaminophen (TYLENOL) suppository 650 mg  650 mg Rectal Q6H PRN Freya Caldwell MD        0.9 % sodium chloride infusion   Intravenous Continuous Freya Caldwell  mL/hr at 03/09/21 1800 New Bag at 03/09/21 1800       Allergies:  No Known Allergies    Problem List:    Patient Active Problem List   Diagnosis Code    GI bleed K92.2    Acute GI bleeding K92.2       Past Medical History:  No past medical history on file. Past Surgical History:        Procedure Laterality Date    UPPER GASTROINTESTINAL ENDOSCOPY N/A 1/30/2021    EGD DIAGNOSTIC ONLY performed by Yolande Nguyen MD at [facility] N/A 3/10/2021    EGD BIOPSY performed by Cherrie Dey MD at [address] ENDOSCOPY       Social History:    Social History     Tobacco Use    Smoking status: Former Smoker    Smokeless tobacco: Never Used   Substance Use Topics    Alcohol use: Not Currently     Comment: Former alcohol issues                                Counseling given: Not Answered      Vital Signs (Current): There were no vitals filed for this visit.                                            BP Readings from Last 3 Encounters:   03/10/21 110/66   03/10/21 (!) 114/59   02/01/21 115/70       NPO Status:                                                                                 BMI:   Wt Readings from Last 3 Encounters:   03/10/21 126 lb 14.4 oz (57.6 kg)   02/01/21 134 lb 7.7 oz (61 kg)     There is no height or weight on file to calculate BMI.    CBC:   Lab Results   Component Value Date    WBC 14.6 03/09/2021    RBC 2.07 03/09/2021    HGB 6.9 03/10/2021    HCT 20.4 03/10/2021    MCV 99.7 03/09/2021    RDW 21.4 03/09/2021     03/09/2021       CMP:   Lab Results   Component Value Date     03/09/2021    K 4.0 03/09/2021     03/09/2021 CO2 20 03/09/2021    BUN 18 03/09/2021    CREATININE 0.8 03/09/2021    GFRAA >60 03/09/2021    AGRATIO 0.9 03/09/2021    LABGLOM >60 03/09/2021    GLUCOSE 160 03/09/2021    PROT 7.4 03/09/2021    CALCIUM 8.9 03/09/2021    BILITOT 4.1 03/09/2021    ALKPHOS 154 03/09/2021    AST 79 03/09/2021    ALT 22 03/09/2021       POC Tests: No results for input(s): POCGLU, POCNA, POCK, POCCL, POCBUN, POCHEMO, POCHCT in the last 72 hours. Coags:   Lab Results   Component Value Date    PROTIME 21.7 03/09/2021    INR 1.86 03/09/2021    APTT 34.4 01/30/2021       HCG (If Applicable): No results found for: PREGTESTUR, PREGSERUM, HCG, HCGQUANT     ABGs: No results found for: PHART, PO2ART, EXR4QZF, SCP3VGE, BEART, U2ABNYVO     Type & Screen (If Applicable):  No results found for: LABABO, LABRH    Drug/Infectious Status (If Applicable):  No results found for: HIV, HEPCAB    COVID-19 Screening (If Applicable): No results found for: COVID19      Anesthesia Evaluation  Nursing notes reviewed  Airway: Mallampati: II  TM distance: >3 FB   Neck ROM: full  Mouth opening: > = 3 FB Dental:          Pulmonary:                              Cardiovascular:            Rhythm: regular  Rate: normal                    Neuro/Psych:               GI/Hepatic/Renal:             Endo/Other:                     Abdominal:           Vascular:                                          Anesthesia Plan      MAC     ASA 2       Induction: intravenous. Anesthetic plan and risks discussed with patient. Plan discussed with attending.                   DARCY Dominique - CRNA   3/10/2021

## 2021-03-10 NOTE — ANESTHESIA POSTPROCEDURE EVALUATION
Department of Anesthesiology  Postprocedure Note    Patient: Edy Kumar  MRN: [id number]  YOB: 1975  Date of evaluation: 3/10/2021  Time:  10:46 AM     Procedure Summary     Date: 03/10/21 Room / Location: [address] [place] [address]    Anesthesia Start: 8514 Anesthesia Stop: 4884    Procedure: EGD BIOPSY (N/A Abdomen) Diagnosis: (Hematemesis)    Surgeons: Saint Miyamoto, MD Responsible Provider: Celia Abdi MD    Anesthesia Type: MAC ASA Status: 2          Anesthesia Type: MAC    Estiven Phase I: Estiven Score: 10    Estiven Phase II:      Last vitals: Reviewed and per EMR flowsheets.        Anesthesia Post Evaluation    Level of consciousness: awake  Complications: no

## 2021-03-10 NOTE — PROGRESS NOTES
Pt vomited green bile this am x 2, now in afib rvr. ekg ordered and md notified.   Awaiting instruction

## 2021-03-10 NOTE — PLAN OF CARE
Problem: Falls - Risk of:  Goal: Absence of physical injury  Description: Absence of physical injury  3/10/2021 1317 by Esther Campoverde RN  Outcome: Ongoing  3/10/2021 1316 by Esther Campoverde RN  Outcome: Ongoing  [id number] [id number] by Myriam Joseph RN  Outcome: Ongoing

## 2021-03-10 NOTE — PROGRESS NOTES
[facility] PROGRESS NOTE    3/10/2021 8:49 AM        Name: Charlie Brush . Admitted: 3/9/2021  Primary Care Provider: No primary care provider on file. (Tel: None)                        Subjective:  . No acute events overnight. Resting well. Pain control. Diet ok. Labs reviewed  Denies any chest pain sob. Reviewed interval ancillary notes    Current Medications  0.9 % sodium chloride infusion, PRN  0.9 % sodium chloride infusion, PRN  0.9 % sodium chloride infusion, PRN  cefTRIAXone (ROCEPHIN) 1000 mg in sterile water 10 mL IV syringe, Q24H  pantoprazole (PROTONIX) 80 mg in sodium chloride 0.9 % 100 mL infusion, Continuous  0.9 % sodium chloride infusion, PRN  sodium chloride flush 0.9 % injection 10 mL, 2 times per day  sodium chloride flush 0.9 % injection 10 mL, PRN  promethazine (PHENERGAN) tablet 12.5 mg, Q6H PRN    Or  ondansetron (ZOFRAN) injection 4 mg, Q6H PRN  acetaminophen (TYLENOL) tablet 650 mg, Q6H PRN    Or  acetaminophen (TYLENOL) suppository 650 mg, Q6H PRN  0.9 % sodium chloride infusion, Continuous  octreotide (SANDOSTATIN) 500 mcg in sodium chloride 0.9 % 100 mL infusion, Continuous        Objective:  BP (!) 108/57   Pulse 89   Temp 98.7 °F (37.1 °C) (Oral)   Resp 16   Ht 5' 4\" (1.626 m)   Wt 126 lb 14.4 oz (57.6 kg)   LMP 03/02/2021   SpO2 99%   BMI 21.78 kg/m²     Intake/Output Summary (Last 24 hours) at 3/10/2021 0849  Last data filed at 3/10/2021 0221  Gross per 24 hour   Intake 1006.25 ml   Output --   Net 1006.25 ml      Wt Readings from Last 3 Encounters:   03/10/21 126 lb 14.4 oz (57.6 kg)   02/01/21 134 lb 7.7 oz (61 kg)       General appearance:  Appears comfortable  Eyes: Sclera clear. Pupils equal.  ENT: Moist oral mucosa. Trachea midline, no adenopathy. Cardiovascular: Regular rhythm, normal S1, S2. No murmur.  No edema in

## 2021-03-10 NOTE — ANESTHESIA PRE PROCEDURE
Department of Anesthesiology  Preprocedure Note       Name:  Vinicio Sahu   Age:  41 y.o.  :  1975                                          MRN:  [id number]         Date:  3/10/2021      Surgeon: Gm Burns):  Lyubov Aruajo MD    Procedure: Procedure(s):  EGD DIAGNOSTIC ONLY    Medications prior to admission:   Prior to Admission medications    Medication Sig Start Date End Date Taking?  Authorizing Provider   Multiple Vitamin (MULTIVITAMIN) TABS tablet Take 1 tablet by mouth daily 2/2/21 3/9/21 Yes Rajinder Gramajo MD   pantoprazole (PROTONIX) 40 MG tablet Take 1 tablet by mouth every morning (before breakfast) 21  Yes Rajinder Gramajo MD       Current medications:    Current Facility-Administered Medications   Medication Dose Route Frequency Provider Last Rate Last Admin    0.9 % sodium chloride infusion   Intravenous PRN Angelito Ferrari PA-C        0.9 % sodium chloride infusion   Intravenous PRN Juan José Breen MD        0.9 % sodium chloride infusion   Intravenous PRN Pierre Patel PA-C        cefTRIAXone (ROCEPHIN) 1000 mg in sterile water 10 mL IV syringe  1,000 mg Intravenous [id number] Carrillo Jordan PA-C        pantoprazole (PROTONIX) 80 mg in sodium chloride 0.9 % 100 mL infusion  8 mg/hr Intravenous Continuous Freya Caldwell MD 10 mL/hr at 21 2307 8 mg/hr at 21 2307    0.9 % sodium chloride infusion   Intravenous PRN Roly Truong PA-C        sodium chloride flush 0.9 % injection 10 mL  10 mL Intravenous 2 times per day Juan José Breen MD        sodium chloride flush 0.9 % injection 10 mL  10 mL Intravenous PRN Freya Caldwell MD        promethazine (PHENERGAN) tablet 12.5 mg  12.5 mg Oral Q6H PRN Juan José Breen MD   12.5 mg at 03/10/21 0049    Or    ondansetron (ZOFRAN) injection 4 mg  4 mg Intravenous Q6H PRN Freya Caldwell MD   4 mg at 03/10/21 0658    acetaminophen (TYLENOL) tablet 650 mg  650 mg Oral Q6H PRN Freya Caldwell MD        Or    acetaminophen (TYLENOL) suppository 650 mg  650 mg Rectal Q6H PRN Freya Caldwell MD        0.9 % sodium chloride infusion   Intravenous Continuous Freya Caldwell  mL/hr at 03/09/21 1800 New Bag at 03/09/21 1800    octreotide (SANDOSTATIN) 500 mcg in sodium chloride 0.9 % 100 mL infusion  50 mcg/hr Intravenous Continuous Dawit Marquis PA-C 10 mL/hr at 03/09/21 1853 50 mcg/hr at 03/09/21 1853       Allergies:  No Known Allergies    Problem List:    Patient Active Problem List   Diagnosis Code    GI bleed K92.2    Acute GI bleeding K92.2       Past Medical History:  History reviewed. No pertinent past medical history. Past Surgical History:        Procedure Laterality Date    UPPER GASTROINTESTINAL ENDOSCOPY N/A 1/30/2021    EGD DIAGNOSTIC ONLY performed by Uma Calderon MD at [address]  [facility] History:    Social History     Tobacco Use    Smoking status: Former Smoker    Smokeless tobacco: Never Used   Substance Use Topics    Alcohol use: Not Currently     Comment: Former alcohol issues                                Counseling given: Not Answered      Vital Signs (Current):   Vitals:    03/10/21 0806 03/10/21 0820 03/10/21 0839 03/10/21 0900   BP: 118/65 112/66 (!) 108/57 111/64   Pulse: 92 85 89 95   Resp: 18 16 16 19   Temp: 98.3 °F (36.8 °C) 98.6 °F (37 °C) 98.7 °F (37.1 °C) 98.1 °F (36.7 °C)   TempSrc: Oral Oral Oral Temporal   SpO2: 98% 98% 99% 100%   Weight:       Height:                                                  BP Readings from Last 3 Encounters:   03/10/21 111/64   02/01/21 115/70       NPO Status:                                                                                 BMI:   Wt Readings from Last 3 Encounters:   03/10/21 126 lb 14.4 oz (57.6 kg)   02/01/21 134 lb 7.7 oz (61 kg)     Body mass index is 21.78 kg/m².     CBC:   Lab Results   Component Value Date    WBC 14.6 03/09/2021    RBC 2.07 03/09/2021    HGB 6.9 03/10/2021    HCT 20.4 03/10/2021    MCV 99.7 03/09/2021    RDW 21.4 03/09/2021  03/09/2021       CMP:   Lab Results   Component Value Date     03/09/2021    K 4.0 03/09/2021     03/09/2021    CO2 20 03/09/2021    BUN 18 03/09/2021    CREATININE 0.8 03/09/2021    GFRAA >60 03/09/2021    AGRATIO 0.9 03/09/2021    LABGLOM >60 03/09/2021    GLUCOSE 160 03/09/2021    PROT 7.4 03/09/2021    CALCIUM 8.9 03/09/2021    BILITOT 4.1 03/09/2021    ALKPHOS 154 03/09/2021    AST 79 03/09/2021    ALT 22 03/09/2021       POC Tests: No results for input(s): POCGLU, POCNA, POCK, POCCL, POCBUN, POCHEMO, POCHCT in the last 72 hours. Coags:   Lab Results   Component Value Date    PROTIME 21.7 03/09/2021    INR 1.86 03/09/2021    APTT 34.4 01/30/2021       HCG (If Applicable): No results found for: PREGTESTUR, PREGSERUM, HCG, HCGQUANT     ABGs: No results found for: PHART, PO2ART, FTM7BEK, OZU9EZU, BEART, F8PJXTPI     Type & Screen (If Applicable):  No results found for: LABABO, LABRH    Drug/Infectious Status (If Applicable):  No results found for: HIV, HEPCAB    COVID-19 Screening (If Applicable): No results found for: COVID19      Anesthesia Evaluation    Airway: Mallampati: II  TM distance: >3 FB   Neck ROM: full  Mouth opening: > = 3 FB Dental:          Pulmonary:                              Cardiovascular:            Rhythm: regular  Rate: normal                    Neuro/Psych:               GI/Hepatic/Renal:             Endo/Other:                     Abdominal:           Vascular:                                        Anesthesia Plan      MAC     ASA 2       Induction: intravenous. Anesthetic plan and risks discussed with patient. Plan discussed with CRNA.                   Cheo Dickey MD   3/10/2021

## 2021-03-10 NOTE — BRIEF OP NOTE
Brief Postoperative Note      Patient: Cristine Mcdaniels  YOB: 1975  MRN: [id number]    Date of Procedure: 3/10/2021    Pre-Op Diagnosis: Hematemesis        Procedure(s):  EGD BIOPSY    Surgeon(s):  Symone Lantigua MD      Anesthesia: Monitor Anesthesia Care    Estimated Blood Loss (mL): Minimal    Complications: None    Specimens:   ID Type Source Tests Collected by Time Destination   A : Bx. Antrum r/o H-Pylori Tissue Tissue SURGICAL PATHOLOGY Symone Lantigua MD 3/10/2021 0800        Findings:   Mild esophagitis and gastritis. s/p gastric biopsies. Portal hypertensive gastropathy. No ulcer, esophageal or gastric varices. Plans:  Await biopsies. Oral PPI daily. D/C octreotide. Colonoscopy tomorrow.    She also has h/o heavy menstruation, may need OB work up     Electronically signed by Symone Lantigua MD on 3/10/2021 at 9:48 AM

## 2021-03-10 NOTE — PROGRESS NOTES
Pt arrived from endo, report from crna/ rn. Pt had EGD with biopsies. Pt responsive to pain upon arrival to pacu, respirations even unlabored, nasal cannula 2 liters in place. Abdomen soft flat and non distended.

## 2021-03-10 NOTE — PROGRESS NOTES
Dr Shelley Rhoades at bedside to speak with pt regarding findings. Octreotide gtt stopped per verbal order dr Shelley Rhoades.

## 2021-03-10 NOTE — PLAN OF CARE
Problem: Falls - Risk of:  Goal: Will remain free from falls  Description: Will remain free from falls  3/10/2021 1316 by Sujey Padron RN  Outcome: Ongoing  6/07/2294 7134 by Codie Santamaria RN  Outcome: Ongoing  Note: All fall precautions in place, bed in lowest position, frequent rounding to assist with toileting. Pt absent of fall this shift.

## 2021-03-10 NOTE — PROGRESS NOTES
RN referral for Advance Directives. Visited w/pt who spoke of family and health concerns. Advance Directive documents given and reviewed with patient. Patient wishes to review documents and discuss with her family. States she will contact [facility] if further follow up needed-contact information provided. Prayer and support offered.

## 2021-03-10 NOTE — PLAN OF CARE
Problem: Falls - Risk of:  Goal: Will remain free from falls  Description: Will remain free from falls  Outcome: Ongoing  Note: All fall precautions in place, bed in lowest position, frequent rounding to assist with toileting. Pt absent of fall this shift.    Goal: Absence of physical injury  Description: Absence of physical injury  Outcome: Ongoing

## 2021-03-11 ENCOUNTER — APPOINTMENT (OUTPATIENT)
Dept: ULTRASOUND IMAGING | Age: 46
DRG: 253 | End: 2021-03-11
Payer: MEDICAID

## 2021-03-11 ENCOUNTER — ANESTHESIA (OUTPATIENT)
Dept: ENDOSCOPY | Age: 46
DRG: 253 | End: 2021-03-11
Payer: MEDICAID

## 2021-03-11 VITALS — OXYGEN SATURATION: 100 % | DIASTOLIC BLOOD PRESSURE: 72 MMHG | SYSTOLIC BLOOD PRESSURE: 129 MMHG

## 2021-03-11 PROBLEM — N92.0 MENORRHAGIA WITH REGULAR CYCLE: Status: ACTIVE | Noted: 2021-03-11

## 2021-03-11 LAB
HCT VFR BLD CALC: 28 % (ref 36–48)
HCT VFR BLD CALC: 28 % (ref 36–48)
HCT VFR BLD CALC: 30 % (ref 36–48)
HEMOGLOBIN: 9.1 G/DL (ref 12–16)
HEMOGLOBIN: 9.4 G/DL (ref 12–16)
HEMOGLOBIN: 9.8 G/DL (ref 12–16)

## 2021-03-11 PROCEDURE — 6360000002 HC RX W HCPCS: Performed by: NURSE ANESTHETIST, CERTIFIED REGISTERED

## 2021-03-11 PROCEDURE — 85014 HEMATOCRIT: CPT

## 2021-03-11 PROCEDURE — 0DBK8ZX EXCISION OF ASCENDING COLON, VIA NATURAL OR ARTIFICIAL OPENING ENDOSCOPIC, DIAGNOSTIC: ICD-10-PCS | Performed by: INTERNAL MEDICINE

## 2021-03-11 PROCEDURE — 3700000001 HC ADD 15 MINUTES (ANESTHESIA): Performed by: INTERNAL MEDICINE

## 2021-03-11 PROCEDURE — 0DBN8ZX EXCISION OF SIGMOID COLON, VIA NATURAL OR ARTIFICIAL OPENING ENDOSCOPIC, DIAGNOSTIC: ICD-10-PCS | Performed by: INTERNAL MEDICINE

## 2021-03-11 PROCEDURE — 6370000000 HC RX 637 (ALT 250 FOR IP): Performed by: HOSPITALIST

## 2021-03-11 PROCEDURE — 3609010600 HC COLONOSCOPY POLYPECTOMY SNARE/COLD BIOPSY: Performed by: INTERNAL MEDICINE

## 2021-03-11 PROCEDURE — 2720000010 HC SURG SUPPLY STERILE: Performed by: INTERNAL MEDICINE

## 2021-03-11 PROCEDURE — 6370000000 HC RX 637 (ALT 250 FOR IP): Performed by: INTERNAL MEDICINE

## 2021-03-11 PROCEDURE — 87491 CHLMYD TRACH DNA AMP PROBE: CPT

## 2021-03-11 PROCEDURE — 3700000000 HC ANESTHESIA ATTENDED CARE: Performed by: INTERNAL MEDICINE

## 2021-03-11 PROCEDURE — 87591 N.GONORRHOEAE DNA AMP PROB: CPT

## 2021-03-11 PROCEDURE — 2709999900 HC NON-CHARGEABLE SUPPLY: Performed by: INTERNAL MEDICINE

## 2021-03-11 PROCEDURE — 2500000003 HC RX 250 WO HCPCS: Performed by: NURSE ANESTHETIST, CERTIFIED REGISTERED

## 2021-03-11 PROCEDURE — 88175 CYTOPATH C/V AUTO FLUID REDO: CPT

## 2021-03-11 PROCEDURE — 2580000003 HC RX 258: Performed by: HOSPITALIST

## 2021-03-11 PROCEDURE — 2060000000 HC ICU INTERMEDIATE R&B

## 2021-03-11 PROCEDURE — 6360000002 HC RX W HCPCS: Performed by: INTERNAL MEDICINE

## 2021-03-11 PROCEDURE — 36415 COLL VENOUS BLD VENIPUNCTURE: CPT

## 2021-03-11 PROCEDURE — 6360000002 HC RX W HCPCS: Performed by: HOSPITALIST

## 2021-03-11 PROCEDURE — 88305 TISSUE EXAM BY PATHOLOGIST: CPT

## 2021-03-11 PROCEDURE — 2580000003 HC RX 258: Performed by: INTERNAL MEDICINE

## 2021-03-11 PROCEDURE — 85018 HEMOGLOBIN: CPT

## 2021-03-11 PROCEDURE — 7100000001 HC PACU RECOVERY - ADDTL 15 MIN: Performed by: INTERNAL MEDICINE

## 2021-03-11 PROCEDURE — 76830 TRANSVAGINAL US NON-OB: CPT

## 2021-03-11 PROCEDURE — 7100000000 HC PACU RECOVERY - FIRST 15 MIN: Performed by: INTERNAL MEDICINE

## 2021-03-11 RX ORDER — PROPOFOL 10 MG/ML
INJECTION, EMULSION INTRAVENOUS CONTINUOUS PRN
Status: DISCONTINUED | OUTPATIENT
Start: 2021-03-11 | End: 2021-03-11 | Stop reason: SDUPTHER

## 2021-03-11 RX ORDER — PROPOFOL 10 MG/ML
INJECTION, EMULSION INTRAVENOUS PRN
Status: DISCONTINUED | OUTPATIENT
Start: 2021-03-11 | End: 2021-03-11 | Stop reason: SDUPTHER

## 2021-03-11 RX ORDER — MORPHINE SULFATE 2 MG/ML
2 INJECTION, SOLUTION INTRAMUSCULAR; INTRAVENOUS ONCE
Status: COMPLETED | OUTPATIENT
Start: 2021-03-11 | End: 2021-03-11

## 2021-03-11 RX ORDER — LIDOCAINE HYDROCHLORIDE 20 MG/ML
INJECTION, SOLUTION EPIDURAL; INFILTRATION; INTRACAUDAL; PERINEURAL PRN
Status: DISCONTINUED | OUTPATIENT
Start: 2021-03-11 | End: 2021-03-11 | Stop reason: SDUPTHER

## 2021-03-11 RX ADMIN — PROPOFOL 50 MG: 10 INJECTION, EMULSION INTRAVENOUS at 07:47

## 2021-03-11 RX ADMIN — PANTOPRAZOLE SODIUM 40 MG: 40 TABLET, DELAYED RELEASE ORAL at 08:46

## 2021-03-11 RX ADMIN — Medication 10 ML: at 20:43

## 2021-03-11 RX ADMIN — PROPOFOL 50 MG: 10 INJECTION, EMULSION INTRAVENOUS at 07:36

## 2021-03-11 RX ADMIN — Medication 10 ML: at 08:46

## 2021-03-11 RX ADMIN — PROPOFOL 50 MG: 10 INJECTION, EMULSION INTRAVENOUS at 07:30

## 2021-03-11 RX ADMIN — LIDOCAINE HYDROCHLORIDE 50 MG: 20 INJECTION, SOLUTION EPIDURAL; INFILTRATION; INTRACAUDAL; PERINEURAL at 07:29

## 2021-03-11 RX ADMIN — PROPOFOL 50 MG: 10 INJECTION, EMULSION INTRAVENOUS at 07:31

## 2021-03-11 RX ADMIN — MORPHINE SULFATE 2 MG: 2 INJECTION, SOLUTION INTRAMUSCULAR; INTRAVENOUS at 14:20

## 2021-03-11 RX ADMIN — PROPOFOL 50 MG: 10 INJECTION, EMULSION INTRAVENOUS at 07:41

## 2021-03-11 RX ADMIN — ACETAMINOPHEN 650 MG: 325 TABLET ORAL at 18:46

## 2021-03-11 RX ADMIN — PROMETHAZINE HYDROCHLORIDE 12.5 MG: 25 TABLET ORAL at 02:30

## 2021-03-11 RX ADMIN — PROPOFOL 50 MG: 10 INJECTION, EMULSION INTRAVENOUS at 07:29

## 2021-03-11 RX ADMIN — SODIUM CHLORIDE: 9 INJECTION, SOLUTION INTRAVENOUS at 04:39

## 2021-03-11 RX ADMIN — Medication 1000 MG: at 08:51

## 2021-03-11 RX ADMIN — PROPOFOL 140 MCG/KG/MIN: 10 INJECTION, EMULSION INTRAVENOUS at 07:29

## 2021-03-11 RX ADMIN — PROPOFOL 50 MG: 10 INJECTION, EMULSION INTRAVENOUS at 07:44

## 2021-03-11 RX ADMIN — PROMETHAZINE HYDROCHLORIDE 12.5 MG: 25 TABLET ORAL at 08:46

## 2021-03-11 RX ADMIN — PROMETHAZINE HYDROCHLORIDE 12.5 MG: 25 TABLET ORAL at 20:43

## 2021-03-11 RX ADMIN — PROPOFOL 50 MG: 10 INJECTION, EMULSION INTRAVENOUS at 07:32

## 2021-03-11 SDOH — HEALTH STABILITY: MENTAL HEALTH: HOW OFTEN DO YOU HAVE A DRINK CONTAINING ALCOHOL?: 4 OR MORE TIMES A WEEK

## 2021-03-11 SDOH — HEALTH STABILITY: MENTAL HEALTH: HOW MANY STANDARD DRINKS CONTAINING ALCOHOL DO YOU HAVE ON A TYPICAL DAY?: 3 OR 4

## 2021-03-11 ASSESSMENT — PULMONARY FUNCTION TESTS
PIF_VALUE: 1
PIF_VALUE: 0

## 2021-03-11 ASSESSMENT — PAIN SCALES - GENERAL
PAINLEVEL_OUTOF10: 5
PAINLEVEL_OUTOF10: 0
PAINLEVEL_OUTOF10: 5

## 2021-03-11 ASSESSMENT — PAIN DESCRIPTION - PAIN TYPE: TYPE: ACUTE PAIN

## 2021-03-11 NOTE — PROGRESS NOTES
1 metal hemoclip placed at acending colon polypectomy site. 2 metal hemoclips placed at sigmoid colon polypectomy site.

## 2021-03-11 NOTE — PROGRESS NOTES
[facility] PROGRESS NOTE    3/11/2021 7:23 AM        Name: Andree Espana . Admitted: 3/9/2021  Primary Care Provider: No primary care provider on file. (Tel: None)                        Subjective:  . No acute events overnight. Resting well. Pain control. Diet ok. Labs reviewed  Denies any chest pain sob. Reviewed interval ancillary notes    Current Medications  0.9 % sodium chloride infusion, PRN  0.9 % sodium chloride infusion, PRN  cefTRIAXone (ROCEPHIN) 1000 mg in sterile water 10 mL IV syringe, Q24H  pantoprazole (PROTONIX) tablet 40 mg, QAM AC  0.9 % sodium chloride infusion, PRN  sodium chloride flush 0.9 % injection 10 mL, 2 times per day  sodium chloride flush 0.9 % injection 10 mL, PRN  promethazine (PHENERGAN) tablet 12.5 mg, Q6H PRN    Or  ondansetron (ZOFRAN) injection 4 mg, Q6H PRN  acetaminophen (TYLENOL) tablet 650 mg, Q6H PRN    Or  acetaminophen (TYLENOL) suppository 650 mg, Q6H PRN  0.9 % sodium chloride infusion, Continuous        Objective:  /65   Pulse 80   Temp 97.9 °F (36.6 °C) (Temporal)   Resp 15   Ht 5' 4\" (1.626 m)   Wt 126 lb 14.4 oz (57.6 kg)   LMP 03/02/2021   SpO2 100%   BMI 21.78 kg/m²     Intake/Output Summary (Last 24 hours) at 3/11/2021 0723  Last data filed at 3/10/2021 1000  Gross per 24 hour   Intake 582.5 ml   Output --   Net 582.5 ml      Wt Readings from Last 3 Encounters:   03/10/21 126 lb 14.4 oz (57.6 kg)   02/01/21 134 lb 7.7 oz (61 kg)       General appearance:  Appears comfortable  Eyes: Sclera clear. Pupils equal.  ENT: Moist oral mucosa. Trachea midline, no adenopathy. Cardiovascular: Regular rhythm, normal S1, S2. No murmur. No edema in lower extremities  Respiratory: Not using accessory muscles. Good inspiratory effort. Clear to auscultation bilaterally, no wheeze or crackles.    GI: Abdomen soft, no tenderness, not distended, normal bowel sounds  Musculoskeletal: No cyanosis in digits, neck supple  Neurology: CN 2-12 grossly intact. No speech or motor deficits  Psych: Normal affect. Alert and oriented in time, place and person  Skin: Warm, dry, normal turgor    Labs and Tests:  CBC:   Recent Labs     03/09/21  1128 03/09/21  1128 03/10/21  1324 03/10/21  1953 03/11/21  0128   WBC 14.6*  --   --   --   --    HGB 6.7*   < > 8.3* 9.3* 9.4*     --   --   --   --     < > = values in this interval not displayed. BMP:    Recent Labs     03/09/21  1128      K 4.0      CO2 20*   BUN 18   CREATININE 0.8   GLUCOSE 160*     Hepatic:   Recent Labs     03/09/21  1128   AST 79*   ALT 22   BILITOT 4.1*   ALKPHOS 154*       Discussed care with family and patient             Spent 30  minutes with patient and family at bedside and on unit reviewing medical records and labs, spent greater than 50% time counseling patient and family on diagnosis and plan   Problem List  Active Problems:    GI bleed    Acute GI bleeding  Resolved Problems:    * No resolved hospital problems. *       Assessment & Plan:   1. Acute blood loss anemia  -Suspected source GI also with heavy menstrual cycle having 10 days of heavy menstrual cycle  -Underwent EGD no acute finding plan for colonoscopy today  -On PPI and nitro drip.  -Other recommendations  -Known history of ulcers about 5 weeks ago. -Continue Protonix and octreotide drip.     Symptomatic anemia  Uterine bleeding  -With known heavy menstrual cycle outpatient follow-up with OB/GYN    Diet: Diet NPO, After Midnight  Code:Full Code  DVT PPX lovenox       Addison Chowdary MD   3/11/2021 7:23 AM

## 2021-03-11 NOTE — ANESTHESIA PRE PROCEDURE
Nilo Melendez MD        0.9 % sodium chloride infusion   Intravenous Continuous Jorge Goff  mL/hr at 03/11/21 0439 New Bag at 03/11/21 0439       Allergies:  No Known Allergies    Problem List:    Patient Active Problem List   Diagnosis Code    GI bleed K92.2    Acute GI bleeding K92.2       Past Medical History:  No past medical history on file. Past Surgical History:        Procedure Laterality Date    UPPER GASTROINTESTINAL ENDOSCOPY N/A 1/30/2021    EGD DIAGNOSTIC ONLY performed by Jolie Ness MD at [place] [address] N/A 3/10/2021    EGD BIOPSY performed by Symone Lantigua MD at [address] [facility] ENDOSCOPY       Social History:    Social History     Tobacco Use    Smoking status: Former Smoker    Smokeless tobacco: Never Used   Substance Use Topics    Alcohol use: Not Currently     Comment: Former alcohol issues                                Counseling given: Not Answered      Vital Signs (Current): There were no vitals filed for this visit.                                            BP Readings from Last 3 Encounters:   03/11/21 122/65   03/10/21 (!) 114/59   02/01/21 115/70       NPO Status:                                                                                 BMI:   Wt Readings from Last 3 Encounters:   03/10/21 126 lb 14.4 oz (57.6 kg)   02/01/21 134 lb 7.7 oz (61 kg)     There is no height or weight on file to calculate BMI.    CBC:   Lab Results   Component Value Date    WBC 14.6 03/09/2021    RBC 2.07 03/09/2021    HGB 9.4 03/11/2021    HCT 28.0 03/11/2021    MCV 99.7 03/09/2021    RDW 21.4 03/09/2021     03/09/2021       CMP:   Lab Results   Component Value Date     03/09/2021    K 4.0 03/09/2021     03/09/2021    CO2 20 03/09/2021    BUN 18 03/09/2021    CREATININE 0.8 03/09/2021    GFRAA >60 03/09/2021    AGRATIO 0.9 03/09/2021    LABGLOM >60 03/09/2021    GLUCOSE 160 03/09/2021    PROT 7.4 03/09/2021    CALCIUM 8.9 03/09/2021 BILITOT 4.1 03/09/2021    ALKPHOS 154 03/09/2021    AST 79 03/09/2021    ALT 22 03/09/2021       POC Tests: No results for input(s): POCGLU, POCNA, POCK, POCCL, POCBUN, POCHEMO, POCHCT in the last 72 hours. Coags:   Lab Results   Component Value Date    PROTIME 21.7 03/09/2021    INR 1.86 03/09/2021    APTT 34.4 01/30/2021       HCG (If Applicable): No results found for: PREGTESTUR, PREGSERUM, HCG, HCGQUANT     ABGs: No results found for: PHART, PO2ART, QGP1MDV, IOR3UJI, BEART, B4MOGCRE     Type & Screen (If Applicable):  No results found for: LABABO, LABRH    Drug/Infectious Status (If Applicable):  No results found for: HIV, HEPCAB    COVID-19 Screening (If Applicable): No results found for: COVID19      Anesthesia Evaluation  Patient summary reviewed and Nursing notes reviewed  Airway: Mallampati: II  TM distance: >3 FB   Neck ROM: full  Mouth opening: > = 3 FB Dental:          Pulmonary:                              Cardiovascular:  Exercise tolerance: good (>4 METS),           Rhythm: regular  Rate: normal                    Neuro/Psych:               GI/Hepatic/Renal:             Endo/Other:                     Abdominal:           Vascular:                                          Anesthesia Plan      MAC     ASA 2       Induction: intravenous. MIPS: Prophylactic antiemetics administered. Anesthetic plan and risks discussed with patient. Plan discussed with attending and CRNA.                   Hellen Shannon MD   3/11/2021

## 2021-03-11 NOTE — PROGRESS NOTES
Received from endo - Drowsy,room air,left side semi fowlers,abdomen soft non tender,warm blanket placed,vss.

## 2021-03-11 NOTE — BRIEF OP NOTE
Brief Postoperative Note      Patient: Kelly Doran  YOB: 1975  MRN: [id number]    Date of Procedure: 3/11/2021    Pre-Op Diagnosis: Anemia      Procedure(s):  COLONOSCOPY POLYPECTOMY SNARE/COLD BIOPSY    Surgeon(s):  Carmen Deluca MD    Anesthesia: Monitor Anesthesia Care    Estimated Blood Loss (mL): Minimal    Complications: None    Specimens:   ID Type Source Tests Collected by Time Destination   A : Ascending colon polyp x1 Tissue Colon SURGICAL PATHOLOGY Carmen Deluca MD 3/11/2021 0739    B : Sigmoid colon polyp x1 Tissue Colon SURGICAL PATHOLOGY Carmen Deluca MD 3/11/2021 0745      Findings:   Two small polyps, removed with cold snare. s/p 3 clips total. Patchy diverticulosis (right and left colon). Hemorrhoids. Plans:  Await pathology. Recall colonoscopy in 5 years. OTC Metamucil daily. If Hb stable overnight, may discharge tomorrow. GI will sign off. Please call if you have questions. Consider OB consult for h/o heavy menses.      Electronically signed by Carmen Deluca MD on 3/11/2021 at 7:57 AM

## 2021-03-11 NOTE — PROGRESS NOTES
Tolerating the bowel prep fair. Zofran and Phenergan have been given. Liquid stools were maroon in color at the beginning of this shift. The last stool at this time is rust color with few flecks of stool. Will continue to monitor. Vitals are stable.

## 2021-03-11 NOTE — CONSULTS
Department of Gynecology  Consult Note      Reason for Consult:  Heavy menses, anemia  Requesting Physician:  Dr Demetrius Lee:  Heavy periods    History obtained from patient    HISTORY OF PRESENT ILLNESS:     The patient is a 41 y.o. female  LMP 21  with significant past medical history of anemia and liver problems who presents with dizziness, received blood transfusion. Underwent EGD and colonoscopy and no obvious etiology of anemia found. Menses q 3-4 weeks lasting 8-10 d with 6-7 heavy days passing clots and flooding. Denies intermenstrual bleeding. Has not been sexually active for 1 year. Had Essure for contraception. She was admitted in  for anemia and also received blood transfusion. PMH significant for liver problems secondary to alcohol abuse in the past. States has not had anything to drink for several months. Her last pap was 1.5 y ago and WNL per patient. Denies history of STD and would like STD testing today. Past Medical History:    History reviewed. No pertinent past medical history.   Past Surgical History:        Procedure Laterality Date    UPPER GASTROINTESTINAL ENDOSCOPY N/A 2021    EGD DIAGNOSTIC ONLY performed by Queta Noel MD at [address] [place] [address] N/A 3/10/2021    EGD BIOPSY performed by Jerome Whitehead MD at [address] [place] [address]:  Current Facility-Administered Medications:     0.9 % sodium chloride infusion, , Intravenous, PRN, Jerome Whitehead MD    0.9 % sodium chloride infusion, , Intravenous, PRN, Jerome Whitehead MD    cefTRIAXone (ROCEPHIN) 1000 mg in sterile water 10 mL IV syringe, 1,000 mg, Intravenous, Q24H, Jerome Whitehead MD, 1,000 mg at 21 0851    pantoprazole (PROTONIX) tablet 40 mg, 40 mg, Oral, QAM AC, Jerome Whitehead MD, 40 mg at 21 0846    0.9 % sodium chloride infusion, , Intravenous, PRN, Jerome Whitehead MD    sodium chloride flush 0.9 % injection 10 mL, 10 mL, Intravenous, 2 times per day, Sarah August MD, 10 mL at 03/11/21 0846    sodium chloride flush 0.9 % injection 10 mL, 10 mL, Intravenous, PRN, Sarah August MD    promethazine (PHENERGAN) tablet 12.5 mg, 12.5 mg, Oral, Q6H PRN, 12.5 mg at 03/11/21 0846 **OR** ondansetron (ZOFRAN) injection 4 mg, 4 mg, Intravenous, Q6H PRN, Sarah August MD, 4 mg at 03/10/21 1800    acetaminophen (TYLENOL) tablet 650 mg, 650 mg, Oral, Q6H PRN **OR** acetaminophen (TYLENOL) suppository 650 mg, 650 mg, Rectal, Q6H PRN, Sarah August MD    0.9 % sodium chloride infusion, , Intravenous, Continuous, Sarah August MD, Stopped at 03/11/21 1778       Allergies:  Patient has no known allergies. Social History:  TOBACCO:   reports that she has quit smoking. She has never used smokeless tobacco.  ETOH:   reports previous alcohol use. DRUGS:   reports no history of drug use. PHYSICAL EXAM:    Vitals:  /70   Pulse 76   Temp 97.6 °F (36.4 °C) (Temporal)   Resp 18   Ht 5' 4\" (1.626 m)   Wt 125 lb 12.8 oz (57.1 kg)   LMP 03/02/2021   SpO2 100%   BMI 21.59 kg/m²     CONSTITUTIONAL:  awake, alert, cooperative, no apparent distress, and appears stated age  ABDOMEN:  soft, non-distended and non-tender  SKIN:  no bruising or bleeding  External Genitalia: General appearance; normal, Hair distribution; normal, Lesions absent  Urethral Meatus: Size normal, Location normal, Lesions absent, Prolapse absent  Urethra: Fullness absent, Masses absent  Bladder:  Fullness absent, Masses absent, Tenderness absent, Cystocele absent  Vagina: General appearance normal, Estrogen effect normal, Discharge absent, Lesions absent, Pelvic support normal  Cervix: General appearance normal, Lesions absent, Discharge absent, Tenderness absent, Enlargement absent, Nodularity absent  Uterus:  Size normal, Contour normal, Masses absent, Consistency; normal, Tenderness absent  Adenexa:  Masses absent, Tenderness absent  Pap Smear Obtained: yes - thinprep    DATA:    Results for Tanner WELLER (MRN 1.1 - 2.2    0.9 (L)   Alk Phos Latest Ref Range: 40 - 129 U/L 88 104 154 (H)   ALT Latest Ref Range: 10 - 40 U/L 21 34 22   AST Latest Ref Range: 15 - 37 U/L 86 (H) 149 (H) 79 (H)   Bilirubin Latest Ref Range: 0.0 - 1.0 mg/dL 3.1 (H) 3.3 (H) 4.1 (H)   Bilirubin, Direct Latest Ref Range: 0.0 - 0.3 mg/dL 1.6 (H) 1.7 (H)    Bilirubin, Indirect Latest Ref Range: 0.0 - 1.0 mg/dL 1.5 (H) 1.6 (H)        Labs:      CT OF THE ABDOMEN AND PELVIS WITH CONTRAST 1/29/2021 6:13 pm       TECHNIQUE:   CT of the abdomen and pelvis was performed with the administration of   intravenous contrast. Multiplanar reformatted images are provided for review. Dose modulation, iterative reconstruction, and/or weight based adjustment of   the mA/kV was utilized to reduce the radiation dose to as low as reasonably   achievable.       COMPARISON:   None.       HISTORY:   ORDERING SYSTEM PROVIDED HISTORY: hematemesis   TECHNOLOGIST PROVIDED HISTORY:   Reason for exam:->hematemesis   Additional Contrast?->None   Reason for Exam: hematemesis   Acuity: Acute   Type of Exam: Initial       FINDINGS:   Lower Chest: No acute findings.       Organs: The liver is enlarged measuring 23 cm with findings of underlying   steatosis.  The left hepatic lobe is small and there is a nodular contour   suggestive of underlying chronic liver disease.  No focal liver lesion   identified.  Small perigastric and gastric varices are noted.  Small stones   in a contracted gallbladder.  No biliary dilatation.  The pancreas, spleen,   adrenals and kidneys reveal no acute findings.  4 mm stone in the lower pole   of the right kidney with parenchymal scarring.       GI/Bowel: There is no bowel dilatation or wall thickening identified.    Right-sided colonic diverticula noted.  Normal appendix.       Pelvis: No acute findings.  Bilateral tubal occlusion devices in place.       Peritoneum/Retroperitoneum: No free air or free fluid.  The aorta is normal   in caliber.  The visceral branches are patent. Calcified atheromatous plaque   is present.  No lymphadenopathy.       Bones/Soft Tissues: No acute abnormality identified.  Degenerative disc   disease in the lower lumbar spine.           Impression   1. Morphologic findings consistent with chronic liver disease with underlying   steatosis and portal hypertension, as described.  Perigastric and gastric   varices near the GE junction are noted.       2.  Right-sided colonic diverticula. Gastric antrum biopsy:   - Mild reactive gastropathy.   - No Helicobacter-like microorganisms identified by H&E sections. - Negative for intestinal metaplasia, dysplasia or malignancy    Results for Claudia Schumacher (MRN [id number]) as of 3/11/2021 12:16   Ref. Range 1/29/2021 23:39   Hep A IgM Latest Ref Range: Non-reactive  Non-reactive   Hep B S Ag Interp Latest Ref Range: Non-reactive  Non-reactive   Hep C Ab Interp Latest Ref Range: Non-reactive  Non-reactive   Hep B Core Ab, IgM Latest Ref Range: Non-reactive  Non-reactive       IMPRESSION/RECOMMENDATIONS:      1. Menorrhagia: will have her follow up as outpt in our office -number given to schedule. In light of liver disease she is not a candidate for OCPs. Declined depo provera. Would consider Mirena IUD or ablation and briefly discussed with her. Both are invasive and carry risk of bleeding/uterine perforation. Concerns remain with elevated PT. Thinprep, GC/chlamydia and endometrial biopsy collected today. Pelvic ultrasound. 2. Anemia: PO iron as outpt    3. Chronic liver disease secondary to alcohol abuse  with elevated PT: suspect this is a contributing factor. Defer management to medicine/ GI. Thank you for this consult. Will sign off and see her as outpt. Please recall if needed.

## 2021-03-11 NOTE — ANESTHESIA POSTPROCEDURE EVALUATION
Department of Anesthesiology  Postprocedure Note    Patient: Buzz Madsen  MRN: [id number]  YOB: 1975  Date of evaluation: 3/11/2021  Time:  9:43 AM     Procedure Summary     Date: 03/11/21 Room / Location: [address] [place] [address]    Anesthesia Start: 1078 Anesthesia Stop: 1265    Procedure: COLONOSCOPY POLYPECTOMY SNARE/COLD BIOPSY (N/A ) Diagnosis: (Anemia)    Surgeons: Cherrie Dey MD Responsible Provider: Kishor Dent MD    Anesthesia Type: MAC ASA Status: 2          Anesthesia Type: MAC    Estiven Phase I: Estiven Score: 10    Estiven Phase II:      Last vitals: Reviewed and per EMR flowsheets.        Anesthesia Post Evaluation    Patient location during evaluation: PACU  Patient participation: complete - patient participated  Level of consciousness: awake and awake and alert  Pain score: 2  Airway patency: patent  Nausea & Vomiting: no vomiting  Complications: no  Cardiovascular status: blood pressure returned to baseline  Respiratory status: acceptable  Hydration status: euvolemic

## 2021-03-12 VITALS
TEMPERATURE: 98.6 F | OXYGEN SATURATION: 98 % | HEART RATE: 84 BPM | DIASTOLIC BLOOD PRESSURE: 61 MMHG | HEIGHT: 64 IN | WEIGHT: 127.2 LBS | BODY MASS INDEX: 21.72 KG/M2 | SYSTOLIC BLOOD PRESSURE: 107 MMHG | RESPIRATION RATE: 16 BRPM

## 2021-03-12 LAB
C TRACH DNA GENITAL QL NAA+PROBE: NEGATIVE
HCT VFR BLD CALC: 24.2 % (ref 36–48)
HCT VFR BLD CALC: 24.5 % (ref 36–48)
HEMOGLOBIN: 8 G/DL (ref 12–16)
HEMOGLOBIN: 8.2 G/DL (ref 12–16)
N. GONORRHOEAE DNA: NEGATIVE

## 2021-03-12 PROCEDURE — 85014 HEMATOCRIT: CPT

## 2021-03-12 PROCEDURE — 2580000003 HC RX 258: Performed by: INTERNAL MEDICINE

## 2021-03-12 PROCEDURE — 6370000000 HC RX 637 (ALT 250 FOR IP): Performed by: INTERNAL MEDICINE

## 2021-03-12 PROCEDURE — 36415 COLL VENOUS BLD VENIPUNCTURE: CPT

## 2021-03-12 PROCEDURE — 6360000002 HC RX W HCPCS: Performed by: INTERNAL MEDICINE

## 2021-03-12 PROCEDURE — 85018 HEMOGLOBIN: CPT

## 2021-03-12 RX ADMIN — Medication 10 ML: at 08:10

## 2021-03-12 RX ADMIN — PANTOPRAZOLE SODIUM 40 MG: 40 TABLET, DELAYED RELEASE ORAL at 06:45

## 2021-03-12 RX ADMIN — ACETAMINOPHEN 650 MG: 325 TABLET ORAL at 08:10

## 2021-03-12 RX ADMIN — Medication 1000 MG: at 08:10

## 2021-03-12 RX ADMIN — PROMETHAZINE HYDROCHLORIDE 12.5 MG: 25 TABLET ORAL at 08:08

## 2021-03-12 ASSESSMENT — PAIN SCALES - GENERAL
PAINLEVEL_OUTOF10: 5
PAINLEVEL_OUTOF10: 0

## 2021-03-12 NOTE — PLAN OF CARE
Problem: Falls - Risk of:  Goal: Will remain free from falls  Description: Will remain free from falls  3/12/2021 0817 by Leonard Hubbard RN  Outcome: Ongoing  Note: Patient remains absent from falls at this time. Remains alert and oriented, in bed with call light and belongings in reach. Bed remains in lowest/locked position at all times. Fall precautions in place. Patient encouraged to use call light to request assistance, v/u.  Will continue to monitor. Problem: Pain:  Goal: Pain level will decrease  Description: Pain level will decrease  3/12/2021 0817 by Leonard Hubbard RN  Outcome: Ongoing  Note: Patient with c/o pain 5/10. PRN Tylenol administered per orders - see MAR. Will continue to monitor.

## 2021-03-12 NOTE — PROGRESS NOTES
Data- discharge order received, pt verbalized agreement to discharge, disposition to previous residence, no needs for HHC/DME. Action- discharge instructions prepared and given to patient, pt verbalized understanding. Medication information packet given r/t NEW and/or CHANGED prescriptions emphasizing name/purpose/side effects, pt verbalized understanding. Discharge instruction summary: Diet- General, Activity- Resume as tolerated, Primary Care Physician as follows: f/u appointment 1-2 weeks with someone from the list provided, immunizations reviewed and up-to-date, prescription medications filled at pharmacy of choice. Inpatient surgical procedure precautions reviewed: N/A     Response- Pt belongings gathered, IV removed. Disposition is home (no HHC/DME needs), transported with patient, taken to lobby via w/c w/ family, no complications.

## 2021-03-12 NOTE — PLAN OF CARE
Resting well, no complaints  Problem: Falls - Risk of:  Goal: Will remain free from falls  Description: Will remain free from falls  Outcome: Ongoing     Problem: Pain:  Goal: Pain level will decrease  Description: Pain level will decrease  Outcome: Ongoing  Goal: Control of acute pain  Description: Control of acute pain  Outcome: Ongoing

## 2021-03-15 NOTE — DISCHARGE SUMMARY
[facility] DISCHARGE SUMMARY    Patient Demographics    Patient. Magy Page  Date of Birth. 1975  MRN. [id number]     Primary care provider. No primary care provider on file. (Tel: None)    Admit date: 3/9/2021    Discharge date (blank if same as Note Date): 3/12/2021  Note Date: 3/15/2021     Reason for Hospitalization. Chief Complaint   Patient presents with    Dizziness     From home via [place]. Dizziness since midnight last night. Heavy menstrual cycle recently. Seen about 5 weeks ago for bleeding gastric ulcers, was given transfusion. States symptoms feel very similar. States she has Hx of liver issues from alcohol but hasn't been drinking since last visit. [facility]. Acute blood loss anemia  -With symptoms. Hemoccult was positive underwent GI EGD and colonoscopy with no acute finding with history of ulcer. Patient also has had menorrhagia OB/GYN was consulted. Patient underwent exam.  Outpatient follow-up with OB/GYN and GI. Patient did receive blood because her hemoglobin is very low. Discharge hemoglobin is stable at 8. Consults. IP CONSULT TO GI  IP CONSULT TO GI  IP CONSULT TO SPIRITUAL SERVICES  IP CONSULT TO OB GYN    Physical examination on discharge day. /61   Pulse 84   Temp 98.6 °F (37 °C) (Oral)   Resp 16   Ht 5' 4\" (1.626 m)   Wt 127 lb 3.2 oz (57.7 kg)   LMP 03/02/2021   SpO2 98%   BMI 21.83 kg/m²   General appearance. Alert. Looks comfortable. HEENT. Sclera clear. Moist mucus membranes. Cardiovascular. Regular rate and rhythm, normal S1, S2. No murmur. Respiratory. Not using accessory muscles. Clear to auscultation bilaterally, no wheeze. Gastrointestinal. Abdomen soft, non-tender, not distended, normal bowel sounds  Neurology. Facial symmetry. No speech deficits. Moving all extremities equally. Extremities.  No edema in lower extremities. Skin. Warm, dry, normal turgor    Condition at time of discharge stable     Medication instructions provided to patient at discharge. Medication List      CONTINUE taking these medications    multivitamin Tabs tablet  Take 1 tablet by mouth daily  Notes to patient: Use: multivitamin  Side effects: nausea     pantoprazole 40 MG tablet  Commonly known as: PROTONIX  Take 1 tablet by mouth every morning (before breakfast)  Notes to patient: Use: Prevention and treatment of gastric ulcers  Side Effects: Headache, fatigue, constipation, dry  mouth            Discharge recommendations given to patient. Follow Up. pcp in 1 week   Disposition. home  Activity. activity as tolerated  Diet: No diet orders on file      Spent 45  minutes in discharge process.     Signed:  Addison Chowdary MD     3/15/2021 5:21 PM

## 2021-04-01 ENCOUNTER — IMMUNIZATION (OUTPATIENT)
Dept: FAMILY MEDICINE CLINIC | Age: 46
End: 2021-04-01
Payer: COMMERCIAL

## 2021-04-01 PROCEDURE — 91300 COVID-19, PFIZER VACCINE 30MCG/0.3ML DOSE: CPT | Performed by: FAMILY MEDICINE

## 2021-04-01 PROCEDURE — 0001A COVID-19, PFIZER VACCINE 30MCG/0.3ML DOSE: CPT | Performed by: FAMILY MEDICINE

## 2021-04-22 ENCOUNTER — IMMUNIZATION (OUTPATIENT)
Dept: FAMILY MEDICINE CLINIC | Age: 46
End: 2021-04-22
Payer: COMMERCIAL

## 2021-04-22 PROCEDURE — 91300 COVID-19, PFIZER VACCINE 30MCG/0.3ML DOSE: CPT | Performed by: FAMILY MEDICINE

## 2021-04-22 PROCEDURE — 0002A COVID-19, PFIZER VACCINE 30MCG/0.3ML DOSE: CPT | Performed by: FAMILY MEDICINE

## 2021-05-16 ENCOUNTER — HOSPITAL ENCOUNTER (INPATIENT)
Age: 46
LOS: 2 days | Discharge: HOME | DRG: 284 | End: 2021-05-18
Attending: EMERGENCY MEDICINE | Admitting: EMERGENCY MEDICINE
Payer: COMMERCIAL

## 2021-05-16 ENCOUNTER — APPOINTMENT (OUTPATIENT)
Dept: CT IMAGING | Age: 46
DRG: 284 | End: 2021-05-16
Payer: COMMERCIAL

## 2021-05-16 ENCOUNTER — APPOINTMENT (OUTPATIENT)
Dept: GENERAL RADIOLOGY | Age: 46
DRG: 284 | End: 2021-05-16
Payer: COMMERCIAL

## 2021-05-16 DIAGNOSIS — R11.2 NAUSEA AND VOMITING, INTRACTABILITY OF VOMITING NOT SPECIFIED, UNSPECIFIED VOMITING TYPE: Primary | ICD-10-CM

## 2021-05-16 DIAGNOSIS — R17 ELEVATED BILIRUBIN: ICD-10-CM

## 2021-05-16 DIAGNOSIS — R00.0 TACHYCARDIA: ICD-10-CM

## 2021-05-16 DIAGNOSIS — K80.11 CALCULUS OF GALLBLADDER WITH CHOLECYSTITIS WITH BILIARY OBSTRUCTION, UNSPECIFIED CHOLECYSTITIS ACUITY: ICD-10-CM

## 2021-05-16 PROBLEM — K81.0 ACUTE CHOLECYSTITIS: Status: ACTIVE | Noted: 2021-05-16

## 2021-05-16 LAB
A/G RATIO: 0.8 (ref 1.1–2.2)
ABO/RH: NORMAL
ABO/RH: NORMAL
ALBUMIN SERPL-MCNC: 3.5 G/DL (ref 3.4–5)
ALP BLD-CCNC: 206 U/L (ref 40–129)
ALT SERPL-CCNC: 32 U/L (ref 10–40)
ANION GAP SERPL CALCULATED.3IONS-SCNC: 16 MMOL/L (ref 3–16)
ANTIBODY SCREEN: NORMAL
APTT: 38 SEC (ref 24.2–36.2)
AST SERPL-CCNC: 124 U/L (ref 15–37)
BACTERIA: ABNORMAL /HPF
BASOPHILS ABSOLUTE: 0 K/UL (ref 0–0.2)
BASOPHILS RELATIVE PERCENT: 0.3 %
BILIRUB SERPL-MCNC: 7.1 MG/DL (ref 0–1)
BILIRUBIN URINE: NEGATIVE
BLOOD, URINE: ABNORMAL
BUN BLDV-MCNC: 12 MG/DL (ref 7–20)
CALCIUM SERPL-MCNC: 9.1 MG/DL (ref 8.3–10.6)
CHLORIDE BLD-SCNC: 94 MMOL/L (ref 99–110)
CLARITY: ABNORMAL
CO2: 31 MMOL/L (ref 21–32)
COLOR: ABNORMAL
CREAT SERPL-MCNC: 0.5 MG/DL (ref 0.6–1.1)
D DIMER: 864 NG/ML DDU (ref 0–229)
EOSINOPHILS ABSOLUTE: 0 K/UL (ref 0–0.6)
EOSINOPHILS RELATIVE PERCENT: 0.2 %
EPITHELIAL CELLS, UA: 3 /HPF (ref 0–5)
ETHANOL: NORMAL MG/DL (ref 0–0.08)
GFR AFRICAN AMERICAN: >60
GFR NON-AFRICAN AMERICAN: >60
GLOBULIN: 4.6 G/DL
GLUCOSE BLD-MCNC: 168 MG/DL (ref 70–99)
GLUCOSE URINE: NEGATIVE MG/DL
HCG QUALITATIVE: NEGATIVE
HCT VFR BLD CALC: 25.2 % (ref 36–48)
HEMOGLOBIN: 8.2 G/DL (ref 12–16)
HYALINE CASTS: 2 /LPF (ref 0–8)
INR BLD: 2.06 (ref 0.86–1.14)
KETONES, URINE: NEGATIVE MG/DL
LACTIC ACID, SEPSIS: 2.7 MMOL/L (ref 0.4–1.9)
LACTIC ACID, SEPSIS: 3.7 MMOL/L (ref 0.4–1.9)
LEUKOCYTE ESTERASE, URINE: ABNORMAL
LIPASE: 15 U/L (ref 13–60)
LYMPHOCYTES ABSOLUTE: 0.8 K/UL (ref 1–5.1)
LYMPHOCYTES RELATIVE PERCENT: 12.1 %
MCH RBC QN AUTO: 32.9 PG (ref 26–34)
MCHC RBC AUTO-ENTMCNC: 32.8 G/DL (ref 31–36)
MCV RBC AUTO: 100.5 FL (ref 80–100)
MICROSCOPIC EXAMINATION: YES
MONOCYTES ABSOLUTE: 0.6 K/UL (ref 0–1.3)
MONOCYTES RELATIVE PERCENT: 9 %
NEUTROPHILS ABSOLUTE: 5.3 K/UL (ref 1.7–7.7)
NEUTROPHILS RELATIVE PERCENT: 78.4 %
NITRITE, URINE: NEGATIVE
PDW BLD-RTO: 20.8 % (ref 12.4–15.4)
PH UA: 8 (ref 5–8)
PLATELET # BLD: 119 K/UL (ref 135–450)
PMV BLD AUTO: 8.8 FL (ref 5–10.5)
POTASSIUM REFLEX MAGNESIUM: 3.9 MMOL/L (ref 3.5–5.1)
PROTEIN UA: NEGATIVE MG/DL
PROTHROMBIN TIME: 24.1 SEC (ref 10–13.2)
RBC # BLD: 2.5 M/UL (ref 4–5.2)
RBC UA: 3 /HPF (ref 0–4)
SODIUM BLD-SCNC: 141 MMOL/L (ref 136–145)
SPECIFIC GRAVITY UA: >1.03 (ref 1–1.03)
TOTAL PROTEIN: 8.1 G/DL (ref 6.4–8.2)
TROPONIN: <0.01 NG/ML
TSH REFLEX: 1.65 UIU/ML (ref 0.27–4.2)
URINE REFLEX TO CULTURE: ABNORMAL
URINE TYPE: ABNORMAL
UROBILINOGEN, URINE: 1 E.U./DL
WBC # BLD: 6.8 K/UL (ref 4–11)
WBC UA: 8 /HPF (ref 0–5)

## 2021-05-16 PROCEDURE — 6360000002 HC RX W HCPCS: Performed by: INTERNAL MEDICINE

## 2021-05-16 PROCEDURE — 84703 CHORIONIC GONADOTROPIN ASSAY: CPT

## 2021-05-16 PROCEDURE — 80053 COMPREHEN METABOLIC PANEL: CPT

## 2021-05-16 PROCEDURE — 2580000003 HC RX 258: Performed by: INTERNAL MEDICINE

## 2021-05-16 PROCEDURE — 81001 URINALYSIS AUTO W/SCOPE: CPT

## 2021-05-16 PROCEDURE — 86900 BLOOD TYPING SEROLOGIC ABO: CPT

## 2021-05-16 PROCEDURE — 2580000003 HC RX 258: Performed by: PHYSICIAN ASSISTANT

## 2021-05-16 PROCEDURE — 6360000004 HC RX CONTRAST MEDICATION: Performed by: PHYSICIAN ASSISTANT

## 2021-05-16 PROCEDURE — 86901 BLOOD TYPING SEROLOGIC RH(D): CPT

## 2021-05-16 PROCEDURE — 96361 HYDRATE IV INFUSION ADD-ON: CPT

## 2021-05-16 PROCEDURE — 84443 ASSAY THYROID STIM HORMONE: CPT

## 2021-05-16 PROCEDURE — 84484 ASSAY OF TROPONIN QUANT: CPT

## 2021-05-16 PROCEDURE — 85610 PROTHROMBIN TIME: CPT

## 2021-05-16 PROCEDURE — 2500000003 HC RX 250 WO HCPCS: Performed by: INTERNAL MEDICINE

## 2021-05-16 PROCEDURE — 83605 ASSAY OF LACTIC ACID: CPT

## 2021-05-16 PROCEDURE — C9113 INJ PANTOPRAZOLE SODIUM, VIA: HCPCS | Performed by: PHYSICIAN ASSISTANT

## 2021-05-16 PROCEDURE — 2060000000 HC ICU INTERMEDIATE R&B

## 2021-05-16 PROCEDURE — 74177 CT ABD & PELVIS W/CONTRAST: CPT

## 2021-05-16 PROCEDURE — P9016 RBC LEUKOCYTES REDUCED: HCPCS

## 2021-05-16 PROCEDURE — 6360000002 HC RX W HCPCS: Performed by: EMERGENCY MEDICINE

## 2021-05-16 PROCEDURE — 85025 COMPLETE CBC W/AUTO DIFF WBC: CPT

## 2021-05-16 PROCEDURE — 96375 TX/PRO/DX INJ NEW DRUG ADDON: CPT

## 2021-05-16 PROCEDURE — 83690 ASSAY OF LIPASE: CPT

## 2021-05-16 PROCEDURE — 96374 THER/PROPH/DIAG INJ IV PUSH: CPT

## 2021-05-16 PROCEDURE — 71045 X-RAY EXAM CHEST 1 VIEW: CPT

## 2021-05-16 PROCEDURE — 6360000002 HC RX W HCPCS: Performed by: PHYSICIAN ASSISTANT

## 2021-05-16 PROCEDURE — 85730 THROMBOPLASTIN TIME PARTIAL: CPT

## 2021-05-16 PROCEDURE — 86850 RBC ANTIBODY SCREEN: CPT

## 2021-05-16 PROCEDURE — 87040 BLOOD CULTURE FOR BACTERIA: CPT

## 2021-05-16 PROCEDURE — 99283 EMERGENCY DEPT VISIT LOW MDM: CPT

## 2021-05-16 PROCEDURE — 85379 FIBRIN DEGRADATION QUANT: CPT

## 2021-05-16 PROCEDURE — 36415 COLL VENOUS BLD VENIPUNCTURE: CPT

## 2021-05-16 PROCEDURE — 93005 ELECTROCARDIOGRAM TRACING: CPT | Performed by: EMERGENCY MEDICINE

## 2021-05-16 PROCEDURE — 86923 COMPATIBILITY TEST ELECTRIC: CPT

## 2021-05-16 PROCEDURE — 82077 ASSAY SPEC XCP UR&BREATH IA: CPT

## 2021-05-16 PROCEDURE — 2580000003 HC RX 258: Performed by: EMERGENCY MEDICINE

## 2021-05-16 PROCEDURE — 6370000000 HC RX 637 (ALT 250 FOR IP): Performed by: INTERNAL MEDICINE

## 2021-05-16 RX ORDER — PANTOPRAZOLE SODIUM 40 MG/1
40 TABLET, DELAYED RELEASE ORAL
Status: DISCONTINUED | OUTPATIENT
Start: 2021-05-17 | End: 2021-05-18 | Stop reason: HOSPADM

## 2021-05-16 RX ORDER — 0.9 % SODIUM CHLORIDE 0.9 %
1000 INTRAVENOUS SOLUTION INTRAVENOUS ONCE
Status: COMPLETED | OUTPATIENT
Start: 2021-05-16 | End: 2021-05-16

## 2021-05-16 RX ORDER — ONDANSETRON 2 MG/ML
4 INJECTION INTRAMUSCULAR; INTRAVENOUS ONCE
Status: COMPLETED | OUTPATIENT
Start: 2021-05-16 | End: 2021-05-16

## 2021-05-16 RX ORDER — PANTOPRAZOLE SODIUM 40 MG/10ML
40 INJECTION, POWDER, LYOPHILIZED, FOR SOLUTION INTRAVENOUS ONCE
Status: COMPLETED | OUTPATIENT
Start: 2021-05-16 | End: 2021-05-16

## 2021-05-16 RX ORDER — ONDANSETRON 2 MG/ML
4 INJECTION INTRAMUSCULAR; INTRAVENOUS EVERY 6 HOURS PRN
Status: DISCONTINUED | OUTPATIENT
Start: 2021-05-16 | End: 2021-05-18 | Stop reason: HOSPADM

## 2021-05-16 RX ORDER — PROMETHAZINE HYDROCHLORIDE 25 MG/1
12.5 TABLET ORAL EVERY 6 HOURS PRN
Status: DISCONTINUED | OUTPATIENT
Start: 2021-05-16 | End: 2021-05-18 | Stop reason: HOSPADM

## 2021-05-16 RX ORDER — POLYETHYLENE GLYCOL 3350 17 G/17G
17 POWDER, FOR SOLUTION ORAL DAILY PRN
Status: DISCONTINUED | OUTPATIENT
Start: 2021-05-16 | End: 2021-05-18 | Stop reason: HOSPADM

## 2021-05-16 RX ORDER — MORPHINE SULFATE 2 MG/ML
2 INJECTION, SOLUTION INTRAMUSCULAR; INTRAVENOUS EVERY 4 HOURS PRN
Status: DISCONTINUED | OUTPATIENT
Start: 2021-05-16 | End: 2021-05-18 | Stop reason: HOSPADM

## 2021-05-16 RX ORDER — ACETAMINOPHEN 650 MG/1
650 SUPPOSITORY RECTAL EVERY 6 HOURS PRN
Status: DISCONTINUED | OUTPATIENT
Start: 2021-05-16 | End: 2021-05-18 | Stop reason: HOSPADM

## 2021-05-16 RX ORDER — SODIUM CHLORIDE 9 MG/ML
25 INJECTION, SOLUTION INTRAVENOUS PRN
Status: DISCONTINUED | OUTPATIENT
Start: 2021-05-16 | End: 2021-05-18 | Stop reason: HOSPADM

## 2021-05-16 RX ORDER — METOPROLOL TARTRATE 5 MG/5ML
2.5 INJECTION INTRAVENOUS EVERY 6 HOURS PRN
Status: DISCONTINUED | OUTPATIENT
Start: 2021-05-16 | End: 2021-05-18 | Stop reason: HOSPADM

## 2021-05-16 RX ORDER — SODIUM CHLORIDE 0.9 % (FLUSH) 0.9 %
5-40 SYRINGE (ML) INJECTION PRN
Status: DISCONTINUED | OUTPATIENT
Start: 2021-05-16 | End: 2021-05-18 | Stop reason: HOSPADM

## 2021-05-16 RX ORDER — SODIUM CHLORIDE 0.9 % (FLUSH) 0.9 %
5-40 SYRINGE (ML) INJECTION EVERY 12 HOURS SCHEDULED
Status: DISCONTINUED | OUTPATIENT
Start: 2021-05-16 | End: 2021-05-18 | Stop reason: HOSPADM

## 2021-05-16 RX ORDER — SODIUM CHLORIDE, SODIUM LACTATE, POTASSIUM CHLORIDE, CALCIUM CHLORIDE 600; 310; 30; 20 MG/100ML; MG/100ML; MG/100ML; MG/100ML
INJECTION, SOLUTION INTRAVENOUS CONTINUOUS
Status: DISCONTINUED | OUTPATIENT
Start: 2021-05-16 | End: 2021-05-18 | Stop reason: HOSPADM

## 2021-05-16 RX ORDER — ACETAMINOPHEN 325 MG/1
650 TABLET ORAL EVERY 6 HOURS PRN
Status: DISCONTINUED | OUTPATIENT
Start: 2021-05-16 | End: 2021-05-18 | Stop reason: HOSPADM

## 2021-05-16 RX ADMIN — ONDANSETRON 4 MG: 2 INJECTION INTRAMUSCULAR; INTRAVENOUS at 14:40

## 2021-05-16 RX ADMIN — SODIUM CHLORIDE 25 ML: 9 INJECTION, SOLUTION INTRAVENOUS at 21:44

## 2021-05-16 RX ADMIN — PIPERACILLIN SODIUM,TAZOBACTAM SODIUM 3375 MG: 3; .375 INJECTION, POWDER, FOR SOLUTION INTRAVENOUS at 21:46

## 2021-05-16 RX ADMIN — SODIUM CHLORIDE 1000 ML: 9 INJECTION, SOLUTION INTRAVENOUS at 13:22

## 2021-05-16 RX ADMIN — IOPAMIDOL 75 ML: 755 INJECTION, SOLUTION INTRAVENOUS at 10:28

## 2021-05-16 RX ADMIN — SODIUM CHLORIDE 1000 ML: 9 INJECTION, SOLUTION INTRAVENOUS at 09:57

## 2021-05-16 RX ADMIN — ONDANSETRON 4 MG: 2 INJECTION INTRAMUSCULAR; INTRAVENOUS at 09:57

## 2021-05-16 RX ADMIN — PROMETHAZINE HYDROCHLORIDE 12.5 MG: 25 TABLET ORAL at 16:30

## 2021-05-16 RX ADMIN — METOPROLOL TARTRATE 2.5 MG: 5 INJECTION INTRAVENOUS at 14:40

## 2021-05-16 RX ADMIN — PANTOPRAZOLE SODIUM 40 MG: 40 INJECTION, POWDER, FOR SOLUTION INTRAVENOUS at 09:57

## 2021-05-16 RX ADMIN — ACETAMINOPHEN 650 MG: 325 TABLET ORAL at 21:36

## 2021-05-16 RX ADMIN — SODIUM CHLORIDE 1000 ML: 9 INJECTION, SOLUTION INTRAVENOUS at 11:36

## 2021-05-16 RX ADMIN — PIPERACILLIN AND TAZOBACTAM 3375 MG: 3; .375 INJECTION, POWDER, LYOPHILIZED, FOR SOLUTION INTRAVENOUS at 13:25

## 2021-05-16 RX ADMIN — Medication 10 ML: at 21:37

## 2021-05-16 RX ADMIN — SODIUM CHLORIDE, POTASSIUM CHLORIDE, SODIUM LACTATE AND CALCIUM CHLORIDE: 600; 310; 30; 20 INJECTION, SOLUTION INTRAVENOUS at 14:12

## 2021-05-16 RX ADMIN — ONDANSETRON 4 MG: 2 INJECTION INTRAMUSCULAR; INTRAVENOUS at 21:36

## 2021-05-16 ASSESSMENT — PAIN SCALES - GENERAL
PAINLEVEL_OUTOF10: 0
PAINLEVEL_OUTOF10: 7

## 2021-05-16 ASSESSMENT — ENCOUNTER SYMPTOMS
NAUSEA: 1
CONSTIPATION: 0
BACK PAIN: 0
DIARRHEA: 0
VOMITING: 1
CHEST TIGHTNESS: 0
COUGH: 0
PHOTOPHOBIA: 0
RESPIRATORY NEGATIVE: 1
ABDOMINAL PAIN: 0
SHORTNESS OF BREATH: 0
COLOR CHANGE: 0

## 2021-05-16 ASSESSMENT — PAIN DESCRIPTION - DESCRIPTORS
DESCRIPTORS: CRAMPING
DESCRIPTORS: ACHING
DESCRIPTORS: CRAMPING

## 2021-05-16 ASSESSMENT — PAIN DESCRIPTION - PAIN TYPE
TYPE: ACUTE PAIN
TYPE: ACUTE PAIN

## 2021-05-16 ASSESSMENT — PAIN DESCRIPTION - ORIENTATION: ORIENTATION: ANTERIOR;MID

## 2021-05-16 NOTE — PLAN OF CARE
Problem: SAFETY  Goal: Free from accidental physical injury  Outcome: Ongoing  Indep in room, no physical limitations     Problem: PAIN  Goal: Patient's pain/discomfort is manageable  Outcome: Ongoing  Intermittent, denies need for pain meds. Problem: SKIN INTEGRITY  Goal: Skin integrity is maintained or improved  Outcome: Ongoing  Skin is intact,     Problem: KNOWLEDGE DEFICIT  Goal: Patient/S.O. demonstrates understanding of disease process, treatment plan, medications, and discharge instructions. Outcome: Ongoing  Pt knowledgeable of current tx plan.

## 2021-05-16 NOTE — H&P
history of Alcoholic liver disease (HCC) and Anemia. Past Surgical History:   has a past surgical history that includes Upper gastrointestinal endoscopy (N/A, 01/30/2021); Upper gastrointestinal endoscopy (N/A, 03/10/2021); Tubal ligation; and Colonoscopy (N/A, 3/11/2021). Medications:  No current facility-administered medications on file prior to encounter. Current Outpatient Medications on File Prior to Encounter   Medication Sig Dispense Refill    Multiple Vitamin (MULTIVITAMIN) TABS tablet Take 1 tablet by mouth daily 30 tablet 0    pantoprazole (PROTONIX) 40 MG tablet Take 1 tablet by mouth every morning (before breakfast) 30 tablet 3       Allergies:  No Known Allergies     Social History:  Patient Lives at home   reports that she has quit smoking. She has never used smokeless tobacco. She reports previous alcohol use. She reports that she does not use drugs. Family History:  Heart disease    Physical Exam:  /64   Pulse 141   Temp 98.3 °F (36.8 °C) (Oral)   Resp 22   Ht 5' 4\" (1.626 m)   Wt 125 lb (56.7 kg)   LMP 05/02/2021   SpO2 97%   BMI 21.46 kg/m²     General appearance:  Appears uncomfortable. AAOx3  HEENT: atraumatic, Pupils equal, muscous membranes moist, no masses appreciated  Cardiovascular:trachycardia regular rhyhtm  Respiratory: CTAB no wheezing  Gastrointestinal: Abdomen soft, non-tender, BS+  EXT: no edema  Neurology: no gross focal deficts  Psychiatry: Appropriate affect.  Not agitated  Skin: + jaundice    Labs:  CBC:   Lab Results   Component Value Date    WBC 6.8 05/16/2021    RBC 2.50 05/16/2021    HGB 8.2 05/16/2021    HCT 25.2 05/16/2021    .5 05/16/2021    MCH 32.9 05/16/2021    MCHC 32.8 05/16/2021    RDW 20.8 05/16/2021     05/16/2021    MPV 8.8 05/16/2021     BMP:    Lab Results   Component Value Date     05/16/2021    K 3.9 05/16/2021    CL 94 05/16/2021    CO2 31 05/16/2021    BUN 12 05/16/2021    CREATININE 0.5 05/16/2021    CALCIUM

## 2021-05-16 NOTE — PROGRESS NOTES
4 Eyes Skin Assessment     NAME:  Wilfred Luna  YOB: 1975  MEDICAL RECORD NUMBER:  [id number]    The patient is being assess for  Admission    I agree that 2 RN's have performed a thorough Head to Toe Skin Assessment on the patient. ALL assessment sites listed below have been assessed. Areas assessed by both nurses:    Head, Face, Ears, Shoulders, Back, Chest, Arms, Elbows, Hands, Sacrum. Buttock, Coccyx, Ischium and Legs. Feet and Heels        Does the Patient have a Wound?  No noted wound(s)       Maurisio Prevention initiated:  NA   Wound Care Orders initiated:  NA    Pressure Injury (Stage 3,4, Unstageable, DTI, NWPT, and Complex wounds) if present place consult order under [de-identified] No    New and Established Ostomies if present place consult order under : No      Nurse 1 eSignature: Electronically signed by Kemar Byrne RN on 5/16/21 at 4:26 PM EDT    **SHARE this note so that the co-signing nurse is able to place an eSignature**    Nurse 2 eSignature: Electronically signed by Abiola Kaiser RN on 5/16/21 at 4:34 PM EDT

## 2021-05-16 NOTE — CONSULTS
[facility] Laparoscopic [facility]     Consult Note      Reason for Consult: possible cholecystitis    History of Present Illness:   Laurie Dickson is a 41 y.o. female with Hx of EtOH abuse, PUD, portal HTN who presents with x1 day history of nausea + emesis. Pt states she does not currently have abdominal pain. She does get intermittent epigastric pain that lasts for a short period of time. No postprandial pain. Pt was recently admitted for GI bleed and found to have portal gastropathy on EGD. Past Medical History:        Diagnosis Date    Alcoholic liver disease ([address]     Anemia        Past Surgical History:        Procedure Laterality Date    COLONOSCOPY N/A 3/11/2021    COLONOSCOPY POLYPECTOMY SNARE/COLD BIOPSY performed by Simon Manriquez MD at [address]    UPPER GASTROINTESTINAL ENDOSCOPY N/A 01/30/2021    EGD DIAGNOSTIC ONLY performed by Luanne Kelly MD at [facility] N/A 03/10/2021    EGD BIOPSY performed by Simon Manriquez MD at [address] [facility] ENDOSCOPY       Allergies:  Patient has no known allergies. Medications:   Home Meds  No current facility-administered medications on file prior to encounter. Current Outpatient Medications on File Prior to Encounter   Medication Sig Dispense Refill    Multiple Vitamin (MULTIVITAMIN) TABS tablet Take 1 tablet by mouth daily 30 tablet 0    pantoprazole (PROTONIX) 40 MG tablet Take 1 tablet by mouth every morning (before breakfast) 30 tablet 3       Current Meds  piperacillin-tazobactam (ZOSYN) 3,375 mg in dextrose 5 % 50 mL IVPB (mini-bag), Once  0.9 % sodium chloride bolus, Once  lactated ringers infusion, Continuous  metoprolol (LOPRESSOR) injection 2.5 mg, Q6H PRN        Family History:   History reviewed. No pertinent family history. Social History:   TOBACCO:   reports that she has quit smoking.  She has never used smokeless tobacco.  ETOH:   reports previous alcohol LEUKOCYTESUR SMALL 05/16/2021    UROBILINOGEN 1.0 05/16/2021    BILIRUBINUR Negative 05/16/2021    BLOODU LARGE 05/16/2021    GLUCOSEU Negative 05/16/2021       Imaging:   CT ABDOMEN PELVIS W IV CONTRAST Additional Contrast? None   Final Result   Mild chronic hepatosplenomegaly. Evidence of portal hypertension with   perigastric and perisplenic varices. Cholelithiasis and pericholecystic   fluid. Findings may indicate mild acute cholecystitis. Small nonobstructing   calculus inferiorly in right kidney. Additional tiny nonobstructing calculi   may be present. Prominent cecal diverticulosis. RECOMMENDATIONS:   If a strong clinical suspicion of gallbladder disease exists, a gallbladder   ultrasound would be recommended. XR CHEST PORTABLE   Final Result   No acute process. US GALLBLADDER RUQ    (Results Pending)         Assessment/Plan: This is a 41 y.o. female with cholelithiasis    - clinical picture does not seem to fit with acute cholecystitis due to lack of abd tenderness and constellation of symptoms. Possibly secondary to liver disease.  Will check with further imaging  - follow up [address] [place] [address]  - follow up GI consult  - repeat LFTs    Radha Melvin MD  05/16/21  1:49 PM

## 2021-05-16 NOTE — ED PROVIDER NOTES
[address] [facility]        Pt Name: Anthony Aragon  MRN: [id number]  [place] 1975  Date of evaluation: 5/16/2021  Provider: SCOTTIE Landeros  PCP: No primary care provider on file. Note Started: 9:26 AM EDT        I have seen and evaluated this patient with my supervising physician Ashley Baird MD.    [address] [place] [address]       Chief Complaint   Patient presents with    Emesis     pt c/o nausea  vomiting and heavy menstral cycle for the last couple of weeks       HISTORY OF PRESENT ILLNESS   (Location, Timing/Onset, Context/Setting, Quality, Duration, Modifying Factors, Severity, Associated Signs and Symptoms)  Note limiting factors. Anthony Aragon is a 41 y.o. female with past medical she of alcoholic liver disease who presents to the ED with complaint of nausea and vomiting. Patient dates she is are developing nausea and vomiting last night with decreased oral intake. States she feels dehydrated. Came to the ED for further evaluation and treatment. Patient states she does have a history of alcoholic liver disease but states she has not had any alcoholic beverage and a few months. Patient denies any drug use and specifically marijuana. Patient states she was just recently admitted for hematemesis and states she had EGD and colonoscopy by gastroenterologist, Dr. Silvia Arzola, in the past couple months. Patient has history of previous gastric ulcers. Patient denies any history of esophageal varices. Denies any blood thinners. Denies any hematemesis or coffee-ground emesis at this time. Denies any diarrhea. Denies any blood or dark black tarry stool. Denies constipation, urinary symptoms or vaginal discharge. States she does have some vaginal bleeding. Patient states she has been having heavy vaginal bleeding for the past 14 days. Patient states this is not abnormal for her. States is been going on for numerous months.   States on previous admission she was evaluated by OB/GYN, Dr. Elias Wang, and was told if this continues she may need ablation. Patient states she does have history of previous ESSURE. Denies any injury or trauma. States she has a cramping abdominal pain that she rates as a 1/10. Denies any significant abdominal pain. No chest pain or shortness of breath. Denies any lightheadedness or dizziness. Denies any weakness or fatigue. Nursing Notes were all reviewed and agreed with or any disagreements were addressed in the HPI. REVIEW OF SYSTEMS    (2-9 systems for level 4, 10 or more for level 5)     Review of Systems   Constitutional: Positive for activity change. Negative for appetite change, chills, diaphoresis, fatigue, fever and unexpected weight change. Eyes: Negative for photophobia and visual disturbance. Respiratory: Negative. Negative for cough, chest tightness and shortness of breath. Cardiovascular: Negative. Negative for chest pain, palpitations and leg swelling. Gastrointestinal: Positive for nausea and vomiting. Negative for abdominal pain, constipation and diarrhea. Genitourinary: Positive for menstrual problem and vaginal bleeding. Negative for decreased urine volume, difficulty urinating, dysuria, flank pain, frequency, genital sores, hematuria, pelvic pain, urgency, vaginal discharge and vaginal pain. Musculoskeletal: Negative for arthralgias, back pain, myalgias, neck pain and neck stiffness. Skin: Negative for color change, pallor, rash and wound. Neurological: Negative for dizziness, tremors, seizures, syncope, facial asymmetry, speech difficulty, weakness, light-headedness, numbness and headaches. Positives and Pertinent negatives as per HPI. Except as noted above in the ROS, all other systems were reviewed and negative.        PAST MEDICAL HISTORY     Past Medical History:   Diagnosis Date    Alcoholic liver disease ([facility] [address]     Anemia          SURGICAL HISTORY     Past Surgical History:   Procedure Laterality Date    COLONOSCOPY N/A 3/11/2021    COLONOSCOPY POLYPECTOMY SNARE/COLD BIOPSY performed by Erika Carmona MD at [facility] GASTROINTESTINAL ENDOSCOPY N/A 01/30/2021    EGD DIAGNOSTIC ONLY performed by Tianna Wylie MD at [address] 03/10/2021    EGD BIOPSY performed by Erika Carmona MD at [facility]       Previous Medications    MULTIPLE VITAMIN (MULTIVITAMIN) TABS TABLET    Take 1 tablet by mouth daily    PANTOPRAZOLE (PROTONIX) 40 MG TABLET    Take 1 tablet by mouth every morning (before breakfast)         ALLERGIES     Patient has no known allergies. FAMILYHISTORY     History reviewed. No pertinent family history. SOCIAL HISTORY       Social History     Tobacco Use    Smoking status: Former Smoker    Smokeless tobacco: Never Used   Vaping Use    Vaping Use: Never used   Substance Use Topics    Alcohol use: Not Currently     Comment: Former alcohol issues    Drug use: Never       SCREENINGS             PHYSICAL EXAM    (up to 7 for level 4, 8 or more for level 5)     ED Triage Vitals [05/16/21 0915]   BP Temp Temp Source Pulse Resp SpO2 Height Weight   133/71 98.3 °F (36.8 °C) Oral 145 18 99 % 5' 4\" (1.626 m) 125 lb (56.7 kg)       Physical Exam  Constitutional:       General: She is not in acute distress. Appearance: Normal appearance. She is well-developed. She is not ill-appearing, toxic-appearing or diaphoretic. HENT:      Head: Normocephalic and atraumatic. Right Ear: External ear normal.      Left Ear: External ear normal.   Eyes:      General:         Right eye: No discharge. Left eye: No discharge. Conjunctiva/sclera: Conjunctivae normal.   Cardiovascular:      Rate and Rhythm: Regular rhythm. Tachycardia present. Pulses: Normal pulses. Heart sounds: Normal heart sounds. No murmur heard.    No friction rub. No gallop. Comments: Tachycardia noted. 2+ radial pulses bilaterally. No pedal edema. No calf tenderness. No JVD. Pulmonary:      Effort: Pulmonary effort is normal. No respiratory distress. Breath sounds: Normal breath sounds. No stridor. No wheezing, rhonchi or rales. Chest:      Chest wall: No tenderness. Abdominal:      General: Abdomen is flat. Bowel sounds are normal. There is no distension. Palpations: Abdomen is soft. There is no mass. Tenderness: There is no abdominal tenderness. There is no right CVA tenderness, left CVA tenderness, guarding or rebound. Negative signs include Armenta's sign, Rovsing's sign and McBurney's sign. Hernia: No hernia is present. Musculoskeletal:         General: Normal range of motion. Cervical back: Normal range of motion and neck supple. Skin:     General: Skin is warm and dry. Coloration: Skin is not pale. Findings: No erythema or rash. Neurological:      Mental Status: She is alert and oriented to person, place, and time.    Psychiatric:         Behavior: Behavior normal.         DIAGNOSTIC RESULTS   LABS:    Labs Reviewed   CBC WITH AUTO DIFFERENTIAL - Abnormal; Notable for the following components:       Result Value    RBC 2.50 (*)     Hemoglobin 8.2 (*)     Hematocrit 25.2 (*)     .5 (*)     RDW 20.8 (*)     Platelets 046 (*)     Lymphocytes Absolute 0.8 (*)     All other components within normal limits    Narrative:     Performed at:  [facility] [address], [facility], [address]   Phone [phone]   COMPREHENSIVE METABOLIC PANEL W/ REFLEX TO MG FOR LOW K - Abnormal; Notable for the following components:    Chloride 94 (*)     Glucose 168 (*)     CREATININE 0.5 (*)     Albumin/Globulin Ratio 0.8 (*)     Total Bilirubin 7.1 (*)     Alkaline Phosphatase 206 (*)      (*)     All other components within normal limits    Narrative:     Performed at:  [facility] [address], [facility], [facility] [employer]   Phone [phone]   URINE RT REFLEX TO CULTURE - Abnormal; Notable for the following components:    Clarity, UA CLOUDY (*)     Blood, Urine LARGE (*)     Leukocyte Esterase, Urine SMALL (*)     All other components within normal limits    Narrative:     Performed at:  [facility] [address], [facility], [address] [place] [address] [employer]   Phone [phone]   PROTIME-INR - Abnormal; Notable for the following components:    Protime 24.1 (*)     INR 2.06 (*)     All other components within normal limits    Narrative:     Performed at:  [facility] [address], [facility], [address] [place] [address]   Phone [phone]   APTT - Abnormal; Notable for the following components:    aPTT 38.0 (*)     All other components within normal limits    Narrative:     Performed at:  [facility] [address], [facility], [address] [place] [address]   Phone [phone]   LACTATE, SEPSIS - Abnormal; Notable for the following components:    Lactic Acid, Sepsis 3.7 (*)     All other components within normal limits    Narrative:     Performed at:  [facility] [address], [facility], [facility] [employer]   Phone [phone]   LACTATE, SEPSIS - Abnormal; Notable for the following components:    Lactic Acid, Sepsis 2.7 (*)     All other components within normal limits    Narrative:     Performed at:  [facility] [address], [facility], [facility] [employer]   Phone [phone]   MICROSCOPIC URINALYSIS - Abnormal; Notable for the following components:    Bacteria, UA 4+ (*)     WBC, UA 8 (*)     All other components within normal limits    Narrative:     Performed at:  [facility] [address], [facility], [facility] [place] [employer]   Phone [phone]   CULTURE, BLOOD 1   CULTURE, BLOOD 2   TROPONIN    Narrative: exists, a gallbladder   ultrasound would be recommended. XR CHEST PORTABLE   Final Result   No acute process. No results found. PROCEDURES   Unless otherwise noted below, none     Procedures    CRITICAL CARE TIME   N/A    CONSULTS:  IP CONSULT TO GENERAL SURGERY      EMERGENCY DEPARTMENT COURSE and DIFFERENTIAL DIAGNOSIS/MDM:   Vitals:    Vitals:    05/16/21 1015 05/16/21 1045 05/16/21 1100 05/16/21 1200   BP: 131/61 128/66 132/64 (!) 140/62   Pulse: 129 136 130 133   Resp: 9 10 25 11   Temp:       TempSrc:       SpO2: 98% 91% 95% 99%   Weight:       Height:           Patient was given the following medications:  Medications   piperacillin-tazobactam (ZOSYN) 3,375 mg in dextrose 5 % 50 mL IVPB (mini-bag) (has no administration in time range)   pantoprazole (PROTONIX) injection 40 mg (40 mg Intravenous Given 5/16/21 0957)   ondansetron (ZOFRAN) injection 4 mg (4 mg Intravenous Given 5/16/21 0957)   0.9 % sodium chloride bolus (0 mLs Intravenous Stopped 5/16/21 1136)   iopamidol (ISOVUE-370) 76 % injection 75 mL (75 mLs Intravenous Given 5/16/21 1028)   0.9 % sodium chloride IV bolus 1,000 mL (1,000 mLs Intravenous New Bag 5/16/21 1136)           Patient is a 78-year-old female who presents to the ED with complaint of nausea and vomiting. Patient presents to the ED with complaint of nausea and vomiting and decreased oral intake. States developed symptoms last night. Upon arrival patient tachycardic with heart rate in the 140s. Remaining vital signs unremarkable. Patient denies any chest pain or shortness of breath. Denies any abdominal pain. Patient upon exam does have some appears to be scleral icterus. Patient does admit to history of alcohol abuse. States he has not had alcoholic drink in 3 months. Patient that he does have a history of alcoholic liver disease. Urinalysis showed 4+ bacteria with 8 white blood cells.   No urinary symptoms and will send for culture prior to diverticulitis, colitis, mesenteric ischemia, AAA, dissection, PE, ACS, pneumonia, pneumothorax, respiratory distress or other emergent etiology at this time. FINAL IMPRESSION      1. Nausea and vomiting, intractability of vomiting not specified, unspecified vomiting type    2. Tachycardia    3. Elevated bilirubin    4. Calculus of gallbladder with cholecystitis with biliary obstruction, unspecified cholecystitis acuity          DISPOSITION/PLAN   DISPOSITION Decision To Admit 05/16/2021 12:38:25 PM      PATIENT REFERREDTO:  No follow-up provider specified.     DISCHARGE MEDICATIONS:  New Prescriptions    No medications on file       DISCONTINUED MEDICATIONS:  Discontinued Medications    No medications on file              (Please note that portions of this note were completed with a voice recognition program.  Efforts were made to edit the dictations but occasionally words are mis-transcribed.)    SCOTTIE Ghosh (electronically signed)          SCOTTIE Pugh  05/16/21 2682

## 2021-05-16 NOTE — ACP (ADVANCE CARE PLANNING)
Advanced Care Planning Note.     Purpose of Encounter: Advanced care planning in light of hospitalization  Parties In Attendance: Patient,    Decisional Capacity: Yes  Subjective: Patient  understand that this conversation is to address long term care goal  Objective: Mid to hospital with possible cholecystitis history of alcoholic liver disease  Goals of Care Determination: Patient would pursue all aggressive measures including CPR intubation PEG tube dialysis tracheostomy long-term ventilation  Code Status: full code  Time spent on Advanced care Plannin minutes  Advanced Care Planning Documents: documented patient's wishes, would like Mother Natalia Morejon to make medical decisions if unable to make decisions    Ghislaine Tai MD  2021 1:34 PM

## 2021-05-16 NOTE — PROGRESS NOTES
Pt arrived to unit at 1320. VSS, assessment complete. A/O x4. Pt oriented to room and call light. Pt indep in the room, demonstrated indep with walking with IV pole without difficulty. No s/s of distress noted at this time. Call light within reach.

## 2021-05-16 NOTE — ED PROVIDER NOTES
I independently performed a history and physical on Abraham Baker. All diagnostic, treatment, and disposition decisions were made by myself in conjunction with the advanced practice provider. I have participated in the medical decision making and directed the treatment plan and disposition of the patient. For further details of [facility] emergency department encounter, please see the advanced practice provider's documentation. CHIEF COMPLAINT  Chief Complaint   Patient presents with    Emesis     pt c/o nausea  vomiting and heavy menstral cycle for the last couple of weeks     Briefly, Abraham Baker is a 41 y.o. female  who presents to the ED complaining of nausea vomiting and some pelvic cramping type of discomfort. Of note she has not drank for several months but has a history of liver disease as a result of alcohol in the past.  No fevers. FOCUSED PHYSICAL EXAMINATION  /64   Pulse 141   Temp 98.3 °F (36.8 °C) (Oral)   Resp 22   Ht 5' 4\" (1.626 m)   Wt 125 lb (56.7 kg)   LMP 05/02/2021   SpO2 97%   BMI 21.46 kg/m²    Focused physical examination notable for no acute distress, well-appearing, well-nourished, normal speech and mentation without obvious facial droop, no obvious rash. No obvious cranial nerve deficits on my initial exam.  Tachycardic, regular rhythm, clear to auscultation bilateral, abdomen is soft, suprapubic mild tenderness, no general abdominal tenderness and specifically no right upper quadrant abdominal tenderness with mild distention and normal bowel sounds. No fluid wave. Skin jaundice and scleral icterus noted.     The 12 lead EKG was interpreted by me as follows:  Rate: tachycardia with a rate of 134  Rhythm: sinus  Axis: normal  Intervals: normal NV, narrow QRS, normal QTc  ST segments: no ST elevations or depressions   Repol abnormality / LVH  T waves: no abnormal inversions  Non-specific T wave changes: present  Prior EKG comparison: EKG dated 3/9/21 is not significantly different    MDM:  ED course was notable for jaundice with hyperbilirubinemia and tachycardia. Lactate is elevated as well. There is no definitive source of infection noted but we will cover empirically with Zosyn to cover intra-abdominal potential pathogens. However she has no significant right upper quadrant abdominal tenderness. Gallstones are noted with pericholecystic fluid however again she has no focal tenderness to this area. Surgery consulted and no emergent cholecystectomy currently indicated. This may be secondary to general liver inflammation even though she is no longer a drinker. She was given Protonix and fluid resuscitation as well. SEP-1 CORE MEASURE DATA    Classification: exclude from core measure    Exclusion criteria: the patient is NOT to be included for sepsis due to: Alternative explanation for abnormal labs, specifically lactate appears to be related to dehydration/liver failure and not sepsis. No definitive infection found or suspected, abx are empiric pending blood cultures and VS reassessments and not based on high clinical suspicion. During the patient's ED course, the patient was given:  Medications   piperacillin-tazobactam (ZOSYN) 3,375 mg in dextrose 5 % 50 mL IVPB (mini-bag) (has no administration in time range)   0.9 % sodium chloride bolus (has no administration in time range)   lactated ringers infusion (has no administration in time range)   pantoprazole (PROTONIX) injection 40 mg (40 mg Intravenous Given 5/16/21 0957)   ondansetron (ZOFRAN) injection 4 mg (4 mg Intravenous Given 5/16/21 0957)   0.9 % sodium chloride bolus (0 mLs Intravenous Stopped 5/16/21 1136)   iopamidol (ISOVUE-370) 76 % injection 75 mL (75 mLs Intravenous Given 5/16/21 1028)   0.9 % sodium chloride IV bolus 1,000 mL (1,000 mLs Intravenous New Bag 5/16/21 1136)        CLINICAL IMPRESSION  1.  Nausea and vomiting, intractability of vomiting not specified, unspecified vomiting type    2. Tachycardia    3. Elevated bilirubin    4. Calculus of gallbladder with cholecystitis with biliary obstruction, unspecified cholecystitis acuity        [address] [facility] was admitted in fair condition. The plan is to admit to the hospital at this time under the hospitalist service. Dr. Archibald Schlatter accepted the patient and will take over the patient's care. The total critical care time spent while evaluating and treating this patient was 45 minutes. This excludes time spent doing separately billable procedures. This includes time at the bedside, data interpretation, medication management, obtaining critical history from collateral sources if the patient is unable to provide it directly, and physician consultation. Specifics of interventions taken and potentially life-threatening diagnostic considerations are listed above in the medical decision making. This chart was created using Dragon dictation software. Efforts were made by me to ensure accuracy, however some errors may be present due to limitations of this technology.             Kylee Gonzalez MD  05/16/21 9533

## 2021-05-16 NOTE — PROGRESS NOTES
Pt c/o nausea, denies vomit since arrival to unit. PRN Phenergan given with sip of water. Bisi well.

## 2021-05-17 ENCOUNTER — APPOINTMENT (OUTPATIENT)
Dept: CT IMAGING | Age: 46
DRG: 284 | End: 2021-05-17
Payer: COMMERCIAL

## 2021-05-17 ENCOUNTER — APPOINTMENT (OUTPATIENT)
Dept: ULTRASOUND IMAGING | Age: 46
DRG: 284 | End: 2021-05-17
Payer: COMMERCIAL

## 2021-05-17 LAB
A/G RATIO: 0.9 (ref 1.1–2.2)
ALBUMIN SERPL-MCNC: 2.8 G/DL (ref 3.4–5)
ALBUMIN SERPL-MCNC: 3.4 G/DL (ref 3.4–5)
ALP BLD-CCNC: 134 U/L (ref 40–129)
ALT SERPL-CCNC: 21 U/L (ref 10–40)
ANION GAP SERPL CALCULATED.3IONS-SCNC: 8 MMOL/L (ref 3–16)
ANION GAP SERPL CALCULATED.3IONS-SCNC: 9 MMOL/L (ref 3–16)
ANISOCYTOSIS: ABNORMAL
AST SERPL-CCNC: 82 U/L (ref 15–37)
BASOPHILIC STIPPLING: ABNORMAL
BASOPHILS ABSOLUTE: 0.1 K/UL (ref 0–0.2)
BASOPHILS RELATIVE PERCENT: 1.7 %
BILIRUB SERPL-MCNC: 5 MG/DL (ref 0–1)
BUN BLDV-MCNC: 14 MG/DL (ref 7–20)
BUN BLDV-MCNC: 15 MG/DL (ref 7–20)
CALCIUM SERPL-MCNC: 7.5 MG/DL (ref 8.3–10.6)
CALCIUM SERPL-MCNC: 7.9 MG/DL (ref 8.3–10.6)
CHLORIDE BLD-SCNC: 106 MMOL/L (ref 99–110)
CHLORIDE BLD-SCNC: 99 MMOL/L (ref 99–110)
CO2: 26 MMOL/L (ref 21–32)
CO2: 26 MMOL/L (ref 21–32)
CREAT SERPL-MCNC: 0.6 MG/DL (ref 0.6–1.1)
CREAT SERPL-MCNC: 0.6 MG/DL (ref 0.6–1.1)
EKG ATRIAL RATE: 134 BPM
EKG DIAGNOSIS: NORMAL
EKG P AXIS: 67 DEGREES
EKG P-R INTERVAL: 116 MS
EKG Q-T INTERVAL: 326 MS
EKG QRS DURATION: 74 MS
EKG QTC CALCULATION (BAZETT): 486 MS
EKG R AXIS: 51 DEGREES
EKG T AXIS: 43 DEGREES
EKG VENTRICULAR RATE: 134 BPM
EOSINOPHILS ABSOLUTE: 0.1 K/UL (ref 0–0.6)
EOSINOPHILS RELATIVE PERCENT: 1.3 %
FOLATE: 11.8 NG/ML (ref 4.78–24.2)
GFR AFRICAN AMERICAN: >60
GFR AFRICAN AMERICAN: >60
GFR NON-AFRICAN AMERICAN: >60
GFR NON-AFRICAN AMERICAN: >60
GLOBULIN: 3.2 G/DL
GLUCOSE BLD-MCNC: 78 MG/DL (ref 70–99)
GLUCOSE BLD-MCNC: 97 MG/DL (ref 70–99)
HBV SURFACE AB TITR SER: 4.96 MIU/ML
HCT VFR BLD CALC: 18.3 % (ref 36–48)
HCT VFR BLD CALC: 23.3 % (ref 36–48)
HCT VFR BLD CALC: 23.7 % (ref 36–48)
HCT VFR BLD CALC: 24.2 % (ref 36–48)
HEMOGLOBIN: 6 G/DL (ref 12–16)
HEMOGLOBIN: 7.5 G/DL (ref 12–16)
HEMOGLOBIN: 7.7 G/DL (ref 12–16)
HYPOCHROMIA: ABNORMAL
IMMATURE RETIC FRACT: 0.56 (ref 0.21–0.37)
INR BLD: 1.95 (ref 0.86–1.14)
IRON SATURATION: ABNORMAL % (ref 15–50)
IRON: 177 UG/DL (ref 37–145)
LYMPHOCYTES ABSOLUTE: 1.9 K/UL (ref 1–5.1)
LYMPHOCYTES RELATIVE PERCENT: 28 %
MAGNESIUM: 1.1 MG/DL (ref 1.8–2.4)
MAGNESIUM: 2.1 MG/DL (ref 1.8–2.4)
MCH RBC QN AUTO: 33.2 PG (ref 26–34)
MCHC RBC AUTO-ENTMCNC: 32.8 G/DL (ref 31–36)
MCV RBC AUTO: 101.4 FL (ref 80–100)
MONOCYTES ABSOLUTE: 0.6 K/UL (ref 0–1.3)
MONOCYTES RELATIVE PERCENT: 8.7 %
NEUTROPHILS ABSOLUTE: 4.1 K/UL (ref 1.7–7.7)
NEUTROPHILS RELATIVE PERCENT: 60.3 %
OVALOCYTES: ABNORMAL
PDW BLD-RTO: 21.2 % (ref 12.4–15.4)
PHOSPHORUS: 2.5 MG/DL (ref 2.5–4.9)
PLATELET # BLD: 85 K/UL (ref 135–450)
PLATELET SLIDE REVIEW: ABNORMAL
PMV BLD AUTO: 8.6 FL (ref 5–10.5)
POLYCHROMASIA: ABNORMAL
POTASSIUM REFLEX MAGNESIUM: 3 MMOL/L (ref 3.5–5.1)
POTASSIUM SERPL-SCNC: 3.9 MMOL/L (ref 3.5–5.1)
PROTHROMBIN TIME: 22.8 SEC (ref 10–13.2)
RBC # BLD: 1.81 M/UL (ref 4–5.2)
RETICULOCYTE ABSOLUTE COUNT: 0.07 M/UL (ref 0.02–0.1)
RETICULOCYTE COUNT PCT: 2.9 % (ref 0.5–2.18)
SLIDE REVIEW: ABNORMAL
SODIUM BLD-SCNC: 134 MMOL/L (ref 136–145)
SODIUM BLD-SCNC: 140 MMOL/L (ref 136–145)
TOTAL IRON BINDING CAPACITY: ABNORMAL UG/DL (ref 260–445)
TOTAL PROTEIN: 6 G/DL (ref 6.4–8.2)
VITAMIN B-12: >2000 PG/ML (ref 211–911)
WBC # BLD: 6.8 K/UL (ref 4–11)

## 2021-05-17 PROCEDURE — 36430 TRANSFUSION BLD/BLD COMPNT: CPT

## 2021-05-17 PROCEDURE — 83516 IMMUNOASSAY NONANTIBODY: CPT

## 2021-05-17 PROCEDURE — 85025 COMPLETE CBC W/AUTO DIFF WBC: CPT

## 2021-05-17 PROCEDURE — 82746 ASSAY OF FOLIC ACID SERUM: CPT

## 2021-05-17 PROCEDURE — 86706 HEP B SURFACE ANTIBODY: CPT

## 2021-05-17 PROCEDURE — 86038 ANTINUCLEAR ANTIBODIES: CPT

## 2021-05-17 PROCEDURE — 2580000003 HC RX 258: Performed by: INTERNAL MEDICINE

## 2021-05-17 PROCEDURE — 82103 ALPHA-1-ANTITRYPSIN TOTAL: CPT

## 2021-05-17 PROCEDURE — 2580000003 HC RX 258: Performed by: PHYSICIAN ASSISTANT

## 2021-05-17 PROCEDURE — 93010 ELECTROCARDIOGRAM REPORT: CPT | Performed by: INTERNAL MEDICINE

## 2021-05-17 PROCEDURE — 71260 CT THORAX DX C+: CPT

## 2021-05-17 PROCEDURE — 82104 ALPHA-1-ANTITRYPSIN PHENO: CPT

## 2021-05-17 PROCEDURE — 6360000004 HC RX CONTRAST MEDICATION: Performed by: INTERNAL MEDICINE

## 2021-05-17 PROCEDURE — APPNB30 APP NON BILLABLE TIME 0-30 MINS: Performed by: NURSE PRACTITIONER

## 2021-05-17 PROCEDURE — 82607 VITAMIN B-12: CPT

## 2021-05-17 PROCEDURE — 93970 EXTREMITY STUDY: CPT

## 2021-05-17 PROCEDURE — 85610 PROTHROMBIN TIME: CPT

## 2021-05-17 PROCEDURE — 83550 IRON BINDING TEST: CPT

## 2021-05-17 PROCEDURE — 82390 ASSAY OF CERULOPLASMIN: CPT

## 2021-05-17 PROCEDURE — 83540 ASSAY OF IRON: CPT

## 2021-05-17 PROCEDURE — 6360000002 HC RX W HCPCS: Performed by: INTERNAL MEDICINE

## 2021-05-17 PROCEDURE — P9016 RBC LEUKOCYTES REDUCED: HCPCS

## 2021-05-17 PROCEDURE — 36415 COLL VENOUS BLD VENIPUNCTURE: CPT

## 2021-05-17 PROCEDURE — 85045 AUTOMATED RETICULOCYTE COUNT: CPT

## 2021-05-17 PROCEDURE — 99232 SBSQ HOSP IP/OBS MODERATE 35: CPT | Performed by: SURGERY

## 2021-05-17 PROCEDURE — 6370000000 HC RX 637 (ALT 250 FOR IP): Performed by: INTERNAL MEDICINE

## 2021-05-17 PROCEDURE — 83735 ASSAY OF MAGNESIUM: CPT

## 2021-05-17 PROCEDURE — 80053 COMPREHEN METABOLIC PANEL: CPT

## 2021-05-17 PROCEDURE — 2060000000 HC ICU INTERMEDIATE R&B

## 2021-05-17 PROCEDURE — 85014 HEMATOCRIT: CPT

## 2021-05-17 PROCEDURE — APPSS15 APP SPLIT SHARED TIME 0-15 MINUTES: Performed by: NURSE PRACTITIONER

## 2021-05-17 PROCEDURE — 76705 ECHO EXAM OF ABDOMEN: CPT

## 2021-05-17 PROCEDURE — 6360000002 HC RX W HCPCS: Performed by: PHYSICIAN ASSISTANT

## 2021-05-17 PROCEDURE — 86039 ANTINUCLEAR ANTIBODIES (ANA): CPT

## 2021-05-17 PROCEDURE — 86708 HEPATITIS A ANTIBODY: CPT

## 2021-05-17 PROCEDURE — 85018 HEMOGLOBIN: CPT

## 2021-05-17 RX ORDER — SODIUM CHLORIDE 9 MG/ML
INJECTION, SOLUTION INTRAVENOUS PRN
Status: COMPLETED | OUTPATIENT
Start: 2021-05-17 | End: 2021-05-17

## 2021-05-17 RX ORDER — POTASSIUM CHLORIDE 7.45 MG/ML
10 INJECTION INTRAVENOUS
Status: DISPENSED | OUTPATIENT
Start: 2021-05-17 | End: 2021-05-17

## 2021-05-17 RX ORDER — MAGNESIUM SULFATE IN WATER 40 MG/ML
4000 INJECTION, SOLUTION INTRAVENOUS ONCE
Status: COMPLETED | OUTPATIENT
Start: 2021-05-17 | End: 2021-05-17

## 2021-05-17 RX ORDER — POTASSIUM CHLORIDE 7.45 MG/ML
10 INJECTION INTRAVENOUS ONCE
Status: COMPLETED | OUTPATIENT
Start: 2021-05-17 | End: 2021-05-17

## 2021-05-17 RX ORDER — POTASSIUM CHLORIDE 7.45 MG/ML
20 INJECTION INTRAVENOUS
Status: DISCONTINUED | OUTPATIENT
Start: 2021-05-17 | End: 2021-05-17 | Stop reason: SDUPTHER

## 2021-05-17 RX ADMIN — PIPERACILLIN SODIUM,TAZOBACTAM SODIUM 3375 MG: 3; .375 INJECTION, POWDER, FOR SOLUTION INTRAVENOUS at 23:16

## 2021-05-17 RX ADMIN — PIPERACILLIN SODIUM,TAZOBACTAM SODIUM 3375 MG: 3; .375 INJECTION, POWDER, FOR SOLUTION INTRAVENOUS at 14:26

## 2021-05-17 RX ADMIN — Medication 10 ML: at 05:18

## 2021-05-17 RX ADMIN — SODIUM CHLORIDE 25 ML: 9 INJECTION, SOLUTION INTRAVENOUS at 06:02

## 2021-05-17 RX ADMIN — PANTOPRAZOLE SODIUM 40 MG: 40 TABLET, DELAYED RELEASE ORAL at 05:18

## 2021-05-17 RX ADMIN — PROMETHAZINE HYDROCHLORIDE 12.5 MG: 25 TABLET ORAL at 14:28

## 2021-05-17 RX ADMIN — POTASSIUM CHLORIDE 10 MEQ: 7.46 INJECTION, SOLUTION INTRAVENOUS at 11:30

## 2021-05-17 RX ADMIN — POTASSIUM CHLORIDE 10 MEQ: 7.46 INJECTION, SOLUTION INTRAVENOUS at 08:33

## 2021-05-17 RX ADMIN — ONDANSETRON 4 MG: 2 INJECTION INTRAMUSCULAR; INTRAVENOUS at 07:49

## 2021-05-17 RX ADMIN — SODIUM CHLORIDE, POTASSIUM CHLORIDE, SODIUM LACTATE AND CALCIUM CHLORIDE: 600; 310; 30; 20 INJECTION, SOLUTION INTRAVENOUS at 05:19

## 2021-05-17 RX ADMIN — SODIUM CHLORIDE 25 ML: 9 INJECTION, SOLUTION INTRAVENOUS at 23:13

## 2021-05-17 RX ADMIN — MORPHINE SULFATE 2 MG: 2 INJECTION, SOLUTION INTRAMUSCULAR; INTRAVENOUS at 20:46

## 2021-05-17 RX ADMIN — MORPHINE SULFATE 2 MG: 2 INJECTION, SOLUTION INTRAMUSCULAR; INTRAVENOUS at 01:34

## 2021-05-17 RX ADMIN — SODIUM CHLORIDE 25 ML: 9 INJECTION, SOLUTION INTRAVENOUS at 20:50

## 2021-05-17 RX ADMIN — PHYTONADIONE 10 MG: 10 INJECTION, EMULSION INTRAMUSCULAR; INTRAVENOUS; SUBCUTANEOUS at 16:44

## 2021-05-17 RX ADMIN — Medication 10 ML: at 20:46

## 2021-05-17 RX ADMIN — PIPERACILLIN SODIUM,TAZOBACTAM SODIUM 3375 MG: 3; .375 INJECTION, POWDER, FOR SOLUTION INTRAVENOUS at 06:03

## 2021-05-17 RX ADMIN — MORPHINE SULFATE 2 MG: 2 INJECTION, SOLUTION INTRAMUSCULAR; INTRAVENOUS at 05:18

## 2021-05-17 RX ADMIN — POTASSIUM CHLORIDE 10 MEQ: 7.46 INJECTION, SOLUTION INTRAVENOUS at 20:54

## 2021-05-17 RX ADMIN — SODIUM CHLORIDE: 9 INJECTION, SOLUTION INTRAVENOUS at 06:31

## 2021-05-17 RX ADMIN — POTASSIUM CHLORIDE 10 MEQ: 7.46 INJECTION, SOLUTION INTRAVENOUS at 06:49

## 2021-05-17 RX ADMIN — MORPHINE SULFATE 2 MG: 2 INJECTION, SOLUTION INTRAMUSCULAR; INTRAVENOUS at 11:37

## 2021-05-17 RX ADMIN — Medication 10 ML: at 01:38

## 2021-05-17 RX ADMIN — POTASSIUM CHLORIDE 10 MEQ: 7.46 INJECTION, SOLUTION INTRAVENOUS at 14:03

## 2021-05-17 RX ADMIN — SODIUM CHLORIDE, POTASSIUM CHLORIDE, SODIUM LACTATE AND CALCIUM CHLORIDE: 600; 310; 30; 20 INJECTION, SOLUTION INTRAVENOUS at 20:51

## 2021-05-17 RX ADMIN — POTASSIUM CHLORIDE 10 MEQ: 7.46 INJECTION, SOLUTION INTRAVENOUS at 14:23

## 2021-05-17 RX ADMIN — Medication 10 ML: at 07:49

## 2021-05-17 RX ADMIN — IOPAMIDOL 75 ML: 755 INJECTION, SOLUTION INTRAVENOUS at 13:03

## 2021-05-17 RX ADMIN — MAGNESIUM SULFATE HEPTAHYDRATE 4000 MG: 40 INJECTION, SOLUTION INTRAVENOUS at 06:51

## 2021-05-17 ASSESSMENT — PAIN DESCRIPTION - ORIENTATION
ORIENTATION: ANTERIOR;MID;UPPER

## 2021-05-17 ASSESSMENT — PAIN DESCRIPTION - FREQUENCY
FREQUENCY: INTERMITTENT

## 2021-05-17 ASSESSMENT — PAIN SCALES - GENERAL
PAINLEVEL_OUTOF10: 3
PAINLEVEL_OUTOF10: 5
PAINLEVEL_OUTOF10: 8

## 2021-05-17 ASSESSMENT — PAIN DESCRIPTION - LOCATION
LOCATION: ABDOMEN

## 2021-05-17 ASSESSMENT — PAIN DESCRIPTION - PAIN TYPE
TYPE: ACUTE PAIN
TYPE: ACUTE PAIN

## 2021-05-17 ASSESSMENT — PAIN DESCRIPTION - DESCRIPTORS
DESCRIPTORS: ACHING

## 2021-05-17 NOTE — PROGRESS NOTES
Hemoglobin 6.0, potassium 3.0, magnesium 1.1. Ordered transfusion of 1 unit of PRBC, followed by repeat H&H. Also ordered serial H&H. Ordered 60 mEq of IV potassium, 3 g of IV magnesium sulfate. Maintain on telemetry.   Recheck renal function panel and magnesium at 2 PM.

## 2021-05-17 NOTE — CARE COORDINATION
Discharge planning note:    Chart reviewed and it appears that patient has minimal needs for discharge at this time. Currently being treated for acute cholecystitis. Met with patient at bedside. Patient recently got her insurance in place - [facility] and plans to make an appt with Dr. Júnior Roberson to establish primary care. Patient denies any dc needs at this time. Case management will continue to follow progress and update discharge plan as needed.     Juvenal Warner RN BSN  Case Management  [phone]

## 2021-05-17 NOTE — PROGRESS NOTES
Call to pharmacy, spoke with Roge Thompson, who confirmed that it  Potassium, magnesium & zosyn are compatible to run together

## 2021-05-17 NOTE — PLAN OF CARE
Pt remains independent in room and demonstrates no gait impairment with IV pole; pt uses call light appropriately & remains free from falls/injury/impaired skin integrity. Pt is aware of treatment plan. Pt has intermittent abdominal pain rated 7 on scale of 0-10, after PRN morphine pain rated 3 on scale 0-10. Pt denies smoking & alcohol use. Pt stated she has support system in place. VSS; A&O x4; Call light within reach; will continue to assess. Problem: Pain:  Goal: Pain level will decrease  Description: Pain level will decrease  Outcome: Ongoing  Goal: Control of acute pain  Description: Control of acute pain  Outcome: Ongoing  Goal: Control of chronic pain  Description: Control of chronic pain  Outcome: Ongoing     Problem: SAFETY  Goal: Free from accidental physical injury  5/17/2021 0253 by Penelope Nj RN  Outcome: Ongoing  Goal: Free from intentional harm  Outcome: Ongoing     Problem: DAILY CARE  Goal: Daily care needs are met  Outcome: Ongoing     Problem: PAIN  Goal: Patient's pain/discomfort is manageable  5/17/2021 0253 by Penelope Nj RN  Outcome: Ongoing     Problem: SKIN INTEGRITY  Goal: Skin integrity is maintained or improved  5/17/2021 0253 by Penelope Nj RN  Outcome: Ongoing     Problem: KNOWLEDGE DEFICIT  Goal: Patient/S.O. demonstrates understanding of disease process, treatment plan, medications, and discharge instructions.   5/17/2021 0253 by Penelope Nj RN  Outcome: Ongoing     Problem: DISCHARGE BARRIERS  Goal: Patient's continuum of care needs are met  Outcome: Ongoing     Problem: Falls - Risk of:  Goal: Will remain free from falls  Description: Will remain free from falls  Outcome: Ongoing  Goal: Absence of physical injury  Description: Absence of physical injury  Outcome: Ongoing     Problem: Pain Control  Goal: Maintain pain level at or below patient's acceptable level (or 5 if patient is unable to determine acceptable level)  Outcome: Ongoing  Goal: Improvement in pain related behaviors BP/HR WNL  Outcome: Ongoing     Problem: Neurological  Goal: Maximum potential motor/sensory/cognitive function  Outcome: Ongoing     Problem: Cardiovascular  Goal: No DVT, peripheral vascular complications  Outcome: Ongoing  Goal: Hemodynamic stability  Outcome: Ongoing  Goal: Anticoagulate/Hct stable  Outcome: Ongoing  Goal: Agreement to quit smoking  Outcome: Ongoing  Goal: Weight maintained or lost  Outcome: Ongoing  Goal: Understanding of dietary restrictions  Outcome: Ongoing     Problem: Respiratory  Goal: No pulmonary complications  Outcome: Ongoing  Goal: O2 Sat > 90%  Outcome: Ongoing  Goal: Supplemental O2 requirements decreased  Outcome: Ongoing  Goal: Agreement to quit smoking  Outcome: Ongoing  Goal: Pneumonia vaccine at discharge as needed  Outcome: Ongoing     Problem: GI  Goal: No bowel complications  Outcome: Ongoing  Goal: Bowel movement at least every other day  Outcome: Ongoing     Problem:   Goal: Adequate urinary output  Outcome: Ongoing  Goal: No urinary complication  Outcome: Ongoing     Problem: Nutrition  Goal: Optimal nutrition therapy  Outcome: Ongoing  Goal: Understanding of nutritional guidelines  Outcome: Ongoing     Problem: Skin Integrity/Risk  Goal: No skin breakdown during hospitalization  Outcome: Ongoing  Goal: Wound healing  Outcome: Ongoing     Problem: Musculor/Skeletal Functional Status  Goal: Highest potential functional level  Outcome: Ongoing  Goal: Absence of falls  Outcome: Ongoing     Problem: Intellectual/Education/Knowledge Deficit  Goal: Teaching initiated upon admission  Outcome: Ongoing  Goal: Written Disposition Instruction form completed  Outcome: Ongoing     Problem: Emotional/Psychosocial  Goal: Understanding of community resources  Outcome: Ongoing     Problem: Spiritual  Goal: Coping methods/spiritual issues explored  Outcome: Ongoing  Goal: Acknowledge understanding of advance directives  Outcome:

## 2021-05-17 NOTE — H&P
PA-C    pantoprazole (PROTONIX) tablet 40 mg, 40 mg, Oral, QAM AC, Bernard De La Cruz MD, 40 mg at 05/17/21 0518    sodium chloride flush 0.9 % injection 5-40 mL, 5-40 mL, Intravenous, 2 times per day, Bernard De La Cruz MD    sodium chloride flush 0.9 % injection 5-40 mL, 5-40 mL, Intravenous, PRN, Bernard De La Cruz MD, 10 mL at 05/17/21 0749    0.9 % sodium chloride infusion, 25 mL, Intravenous, PRN, Bernard De La Cruz MD, Stopped at 05/17/21 1024    promethazine (PHENERGAN) tablet 12.5 mg, 12.5 mg, Oral, Q6H PRN, 12.5 mg at 05/17/21 1428 **OR** ondansetron (ZOFRAN) injection 4 mg, 4 mg, Intravenous, Q6H PRN, Bernard De La Cruz MD, 4 mg at 05/17/21 0749    polyethylene glycol (GLYCOLAX) packet 17 g, 17 g, Oral, Daily PRN, Bernard De La Cruz MD    acetaminophen (TYLENOL) tablet 650 mg, 650 mg, Oral, Q6H PRN, 650 mg at 05/16/21 2136 **OR** acetaminophen (TYLENOL) suppository 650 mg, 650 mg, Rectal, Q6H PRN, Bernard De La Cruz MD    piperacillin-tazobactam (ZOSYN) 3,375 mg in dextrose 5 % 50 mL IVPB extended infusion (mini-bag), 3,375 mg, Intravenous, Q8H, Bernard De La Cruz MD, Last Rate: 12.5 mL/hr at 05/17/21 1427, Rate Verify at 05/17/21 1427    morphine (PF) injection 2 mg, 2 mg, Intravenous, Q4H PRN, Bernard De La Cruz MD, 2 mg at 05/17/21 1137    lactated ringers infusion, , Intravenous, Continuous, Bernard De La Cruz MD, Stopped at 05/17/21 7636    metoprolol (LOPRESSOR) injection 2.5 mg, 2.5 mg, Intravenous, Q6H PRN, Bernard De La Cruz MD, 2.5 mg at 05/16/21 1440       Allergies:  Patient has no known allergies. Social History:  No tobacco or drug use, no ETOH for months    Family History:   History reviewed. No pertinent family history.      PHYSICAL EXAM:    Vitals:  /73   Pulse 96   Temp 96.8 °F (36 °C) (Temporal)   Resp 18   Ht 5' 4\" (1.626 m)   Wt 128 lb 14.4 oz (58.5 kg)   LMP 05/02/2021   SpO2 100%   BMI 22.13 kg/m²     CONSTITUTIONAL:  awake, alert, cooperative, no apparent distress, and appears stated age  [de-identified] - soft, non tender  Ext - no edema, calves non tender    DATA:  Labs:    CBC:   Lab Results   Component Value Date    WBC 6.8 05/17/2021    RBC 1.81 05/17/2021    HGB 7.5 05/17/2021    HCT 24.2 05/17/2021    .4 05/17/2021    RDW 21.2 05/17/2021    PLT 85 05/17/2021     CMP:    Lab Results   Component Value Date     05/17/2021    K 3.9 05/17/2021    K 3.0 05/17/2021    CL 99 05/17/2021    CO2 26 05/17/2021    BUN 14 05/17/2021    PROT 6.0 05/17/2021       IMPRESSION/RECOMMENDATIONS:      Active Problems:  1. Acute cholecystitis  Plan: plan per general surgery  2. Nausea and vomiting  Plan: likely due to cholecystitis  3. Menorrhagia  Plan: Previous w/u with pelvic sonogram, endometrial biopsy, pap smear all negative. Pt declines progesterone. States will f/u with Dr. Mignon Hutchins as an outpatient for an endometrial ablation.

## 2021-05-17 NOTE — CONSULTS
Gastroenterology Consult Note        Patient: Maria Wood  : 1975  Acct#:      Date:  2021    Subjective:       History of Present Illness  Patient is a 41 y.o.  female admitted with Acute cholecystitis [K81.0] who is seen in consult for cirrhosis, N/V.  H/o alcoholic liver disease. She was seen late 2021 for hematemesis after nonbloody emesis. EGD then with Linear ulcer at the GEJ (? MW tear), Portal hypertensive gastropathy and no esophageal varices. She improved and was d/c home. She was seen again 2021 for hematemesis and anemia. EGD showed portal hypertensive gastropathy and mild esophagitis and gastritis. Colonoscopy with 2 polyps. Source of anemia was felt to be due to heavy menses. Patient began having nausea and vomiting Saturday night. So has been having her period for over a week so has been having cramping pain in the lower abdomen with this. Did have some mild discomfort in the periumbilical region which she thinks may be from all of the vomiting. Came to the ER. Noted to be anemic again. Still some nausea today. As far as alcohol history goes, she reports a 5 or 6-year history of drinking more than 2 drinks every other day. Prior to that she drink alcohol but not as much and is unable to say how much.   She has not drank alcohol since prior to her 2021 admission      Past Medical History:   Diagnosis Date    Alcoholic liver disease ([facility] [address]     Anemia       Past Surgical History:   Procedure Laterality Date    COLONOSCOPY N/A 3/11/2021    COLONOSCOPY POLYPECTOMY SNARE/COLD BIOPSY performed by Aylin Rico MD at [facility]    UPPER GASTROINTESTINAL ENDOSCOPY N/A 2021    EGD DIAGNOSTIC ONLY performed by Nael Duckworth MD at [place] [address] N/A 03/10/2021    EGD BIOPSY performed by Aylin Rico MD at [address] [place] [address]      Past Endoscopic History  EGD and to auscultation bilaterally, Normal Effort  Heart: regular rate and rhythm, normal S1 and S2, no murmurs or rubs  Abdomen: soft, non-tender. Bowel sounds normal. No masses,  no organomegaly. Extremities: atraumatic, no cyanosis or edema  Skin: warm and dry, no jaundice  Neuro: Grossly intact, A&OX3, no asterixis   Musculoskeletal: 5/5  strength BUE      Data Review:    Recent Labs     05/16/21 0944 05/17/21  0450   WBC 6.8 6.8   HGB 8.2* 6.0*   HCT 25.2* 18.3*   .5* 101.4*   * 85*     Recent Labs     05/16/21 0944 05/17/21  0450    140   K 3.9 3.0*   CL 94* 106   CO2 31 26   BUN 12 15   CREATININE 0.5* 0.6     Recent Labs     05/16/21 0944 05/17/21  0450   * 82*   ALT 32 21   BILITOT 7.1* 5.0*   ALKPHOS 206* 134*     Recent Labs     05/16/21  0944   LIPASE 15.0     Recent Labs     05/16/21 0944   PROTIME 24.1*   INR 2.06*     No results for input(s): PTT in the last 72 hours. No results for input(s): OCCULTBLD in the last 72 hours. Imaging Studies:                            Ultrasound:  5/17/21  Impression:     Cholelithiasis/gallbladder sludge.  Mild pericholecystic fluid is seen which   can reflect developing cholecystitis in the appropriate clinical setting. This could also be secondary to ascites, as ascites is also seen within the   right upper quadrant.  Further evaluation could include HIDA scan as   clinically warranted. Fatty liver. CT-scan of abdomen and pelvis w iv contrast 5/16/21  Impression   Mild chronic hepatosplenomegaly.  Evidence of portal hypertension with   perigastric and perisplenic varices.  Cholelithiasis and pericholecystic   fluid.  Findings may indicate mild acute cholecystitis.  Small nonobstructing   calculus inferiorly in right kidney.  Additional tiny nonobstructing calculi   may be present.  Prominent cecal diverticulosis.                         Assessment:     Active Problems:    Acute cholecystitis  Resolved Problems:    * No resolved hospital problems. *    Cirrhosis -CT in January 2021 with a nodular contour of the liver consistent with chronic liver disease. Ultrasound this admission with fatty liver. MELD 20. Prior labs negative for hepatitis B and C. Anemia -no GI bleeding. Negative EGD and colonoscopy March 2021. Anemia likely due to heavy menstrual cycles. MCV is 101 so we will check B12 and folate. Although this can be elevated in chronic liver disease. Recurrent nausea and vomiting -per patient, seems to occur during her periods. Also may occur in the evening CT and ultrasound do show gallstones. There was some gallbladder wall thickening on ultrasound but she also had ascites. Surgery following. Recommendations:    -Check B12 and folate  -Liver labs: POLO, f-actin, AMA, ceruloplasmin, A1AT  - check Hepatitis A total AB and Hep B surface AB for vaccination purposes  - Vitamin K for elevated INR    Discussed with Dr. Bronson Calvert, PA-C  [facility]    I have personally performed a face to face diagnostic evaluation on this patient. I have interviewed and examined the patient and I agree with the findings and recommended plan of care. In summary, my findings and plan are the following: admit n/v/anemia in setting etoh cirrhosis and known heavy menses, pt told me last etoh was march (not January), abd soft/no signif tenderness on exam, ast and bili remain elevated similar past few months/ would expect some improvement if off etoh, ? Continued etoh vs other etiologies. rec stop all etoh, will check other liver eval labs as above, ct no mass but check afp with cirrhosis, check vaccine status, no ascities, no encephalopathy, no varices on egd x2. Anemia/heavy menses per gyn, and agree vit k for elevated inr. Per pt has cirrhosis f/u already scheduled as outpt with dr walsh.        Júnior Lane MD  [address]

## 2021-05-17 NOTE — PROGRESS NOTES
[facility]    5/17/2021 10:31 AM        Name: Kelli Melendez . Admitted: 5/16/2021  Primary Care Provider: No primary care provider on file. (Tel: None)        Subjective:    Feeling slightly better still having significant vaginal bleeding denies any chest pain or shortness of breath no fevers or chills    Reviewed interval ancillary notes    Current Medications  magnesium sulfate 4000 mg in 100 mL IVPB premix, Once  potassium chloride 10 mEq/100 mL IVPB (Peripheral Line), Q1H  pantoprazole (PROTONIX) tablet 40 mg, QAM AC  sodium chloride flush 0.9 % injection 5-40 mL, 2 times per day  sodium chloride flush 0.9 % injection 5-40 mL, PRN  0.9 % sodium chloride infusion, PRN  promethazine (PHENERGAN) tablet 12.5 mg, Q6H PRN   Or  ondansetron (ZOFRAN) injection 4 mg, Q6H PRN  polyethylene glycol (GLYCOLAX) packet 17 g, Daily PRN  acetaminophen (TYLENOL) tablet 650 mg, Q6H PRN   Or  acetaminophen (TYLENOL) suppository 650 mg, Q6H PRN  piperacillin-tazobactam (ZOSYN) 3,375 mg in dextrose 5 % 50 mL IVPB extended infusion (mini-bag), Q8H  morphine (PF) injection 2 mg, Q4H PRN  lactated ringers infusion, Continuous  metoprolol (LOPRESSOR) injection 2.5 mg, Q6H PRN        Objective:  /70   Pulse 89   Temp 96.8 °F (36 °C)   Resp 18   Ht 5' 4\" (1.626 m)   Wt 128 lb 14.4 oz (58.5 kg)   LMP 05/02/2021   SpO2 96%   BMI 22.13 kg/m²     Intake/Output Summary (Last 24 hours) at 5/17/2021 1031  Last data filed at 5/17/2021 0752  Gross per 24 hour   Intake 2381.48 ml   Output --   Net 2381.48 ml      Wt Readings from Last 3 Encounters:   05/17/21 128 lb 14.4 oz (58.5 kg)   03/12/21 127 lb 3.2 oz (57.7 kg)   02/01/21 134 lb 7.7 oz (61 kg)       General appearance:  Appears uncomfortable.  AAOx3  HEENT: atraumatic, Pupils equal, muscous membranes moist, no masses appreciated  Cardiovascular:trachycardia regular rhyhtm  Respiratory: CTAB no wheezing  Gastrointestinal: Abdomen soft, non-tender, BS+  EXT: no edema  Neurology: no gross focal deficts  Psychiatry: Appropriate affect. Not agitated  Skin: + jaundice    Labs and Tests:  CBC:   Recent Labs     05/16/21  0944 05/17/21  0450   WBC 6.8 6.8   HGB 8.2* 6.0*   * 85*     BMP:    Recent Labs     05/16/21 0944 05/17/21  0450    140   K 3.9 3.0*   CL 94* 106   CO2 31 26   BUN 12 15   CREATININE 0.5* 0.6   GLUCOSE 168* 78     Hepatic:   Recent Labs     05/16/21  0944 05/17/21  0450   * 82*   ALT 32 21   BILITOT 7.1* 5.0*   ALKPHOS 206* 134*     US GALLBLADDER RUQ   Final Result   Cholelithiasis/gallbladder sludge. Mild pericholecystic fluid is seen which   can reflect developing cholecystitis in the appropriate clinical setting. This could also be secondary to ascites, as ascites is also seen within the   right upper quadrant. Further evaluation could include HIDA scan as   clinically warranted. Fatty liver. CT ABDOMEN PELVIS W IV CONTRAST Additional Contrast? None   Final Result   Mild chronic hepatosplenomegaly. Evidence of portal hypertension with   perigastric and perisplenic varices. Cholelithiasis and pericholecystic   fluid. Findings may indicate mild acute cholecystitis. Small nonobstructing   calculus inferiorly in right kidney. Additional tiny nonobstructing calculi   may be present. Prominent cecal diverticulosis. RECOMMENDATIONS:   If a strong clinical suspicion of gallbladder disease exists, a gallbladder   ultrasound would be recommended. XR CHEST PORTABLE   Final Result   No acute process. Recent imaging reviewed    Problem List  Active Problems:    Acute cholecystitis  Resolved Problems:    * No resolved hospital problems.  *     Assessment/Plan:   ?cholecystitis: surgery consult, RUQ us pending  - ivf  - zosyn     Alcoholic cirrhosis: elevated lfts from baseline bili 7.1 could be from above, INR now 2.0 MELD: 22  - gi consult  - lfts improving check inr     Sinus tachycardia ?secondary to above: no chest pain sob or leg swelling  - tsh normal elevated d dimer will get CT PE and us dvt      Acute On chronic blood loss anemia secondary to menorrhagia  - given 1 unnit prbcs, monitor q6hrs transfuse to keep >7, gyn consulted     Thrombocytopenia: secondary to above likely, monitor tending down    Hypokalemia hypomagnesemia replace and recheck     DVT prophylaxis scds  Code status full code      Keyona Sheehan MD   5/17/2021 10:31 AM

## 2021-05-17 NOTE — PROGRESS NOTES
RBC one unit running at 125 mL/hr; pt tolerating well. Potassium, Magnesium & Zosyn running; Potassium rate reduced to 50 mL/hr due to patient's complaint of burning; pt tolerating well.

## 2021-05-17 NOTE — PROGRESS NOTES
[facility]        Progress Note    Patient Name: Anthony Aragon  MRN: [id number]  YOB: 1975  Date of Evaluation: 2021    Chief Complaint: Abdominal pain    Subjective:  No acute events overnight  Pain improving--difficult to distinguish from menstrual cramping  No further nausea or vomiting  Passing flatus, last BM yesterday  Resting in bed at this time      Vital Signs:  Patient Vitals for the past 24 hrs:   BP Temp Temp src Pulse Resp SpO2 Height Weight   21 1134 130/73 96.8 °F (36 °C) Temporal 96 18 100 % -- --   21 0907 -- -- -- -- -- -- -- 128 lb 14.4 oz (58.5 kg)   21 0846 118/70 96.8 °F (36 °C) -- 89 18 -- -- --   21 0826 112/66 96.4 °F (35.8 °C) Temporal 87 18 96 % -- --   21 0650 119/66 96.7 °F (35.9 °C) Temporal 90 16 97 % -- --   21 0637 111/62 97 °F (36.1 °C) Temporal 93 16 97 % -- --   21 0615 124/71 96.8 °F (36 °C) -- 93 16 97 % -- --   21 0415 120/65 97 °F (36.1 °C) Temporal 89 14 95 % -- --   21 0038 -- -- -- 112 -- -- -- --   21 0015 110/60 97 °F (36.1 °C) Temporal 100 14 97 % -- --   21 -- -- -- 112 -- -- -- --   21 -- -- -- -- -- 98 % -- --   21 116/60 97.5 °F (36.4 °C) Temporal 118 14 -- -- --   21 1530 115/72 97.6 °F (36.4 °C) Temporal 120 14 98 % 5' 4\" (1.626 m) 127 lb 11.2 oz (57.9 kg)   21 1500 131/64 98.9 °F (37.2 °C) Infrared 116 16 98 % -- --   21 1447 -- -- -- 118 12 97 % -- --   21 1430 130/64 -- -- 137 9 96 % -- --      TEMPERATURE HISTORY 24H: Temp (24hrs), Av.1 °F (36.2 °C), Min:96.4 °F (35.8 °C), Max:98.9 °F (37.2 °C)    BLOOD PRESSURE HISTORY: Systolic (45HWQ), CAR:544 , Min:110 , NQT:787    Diastolic (98CJO), IHW:39, Min:57, Max:82      Intake/Output:  I/O last 3 completed shifts: In: 1874.5 [I.V.:775.7; IV Piggyback:1098.8]  Out: -   I/O this shift:   In: 771.1 [I.V.:442.6; IV Piggyback:328.6]  Out: -   Drain/tube Output:       Physical Exam:  General: awake, alert, oriented to  person, place, time  Cardiovascular:  regular rate and rhythm and no murmur noted  Lungs: clear to auscultation  Abdomen: soft, nontender, nondistended, bowel sounds normal   Skin: jaundice    Labs:  CBC:    Recent Labs     05/16/21  0944 05/17/21  0450 05/17/21  1103 05/17/21  1409   WBC 6.8 6.8  --   --    HGB 8.2* 6.0* 7.5*  --    HCT 25.2* 18.3* 23.3* 24.2*   * 85*  --   --      BMP:    Recent Labs     05/16/21  0944 05/17/21  0450    140   K 3.9 3.0*   CL 94* 106   CO2 31 26   BUN 12 15   CREATININE 0.5* 0.6   GLUCOSE 168* 78     Hepatic:    Recent Labs     05/16/21  0944 05/17/21  0450   * 82*   ALT 32 21   BILITOT 7.1* 5.0*   ALKPHOS 206* 134*     Amylase:  No results found for: AMYLASE  Lipase:    Lab Results   Component Value Date    LIPASE 15.0 05/16/2021    LIPASE 11.0 01/29/2021      Mag:    Lab Results   Component Value Date    MG 1.10 05/17/2021     Phos:   No results found for: PHOS   Coags:   Lab Results   Component Value Date    PROTIME 22.8 05/17/2021    INR 1.95 05/17/2021    APTT 38.0 05/16/2021       Cultures:  Anaerobic culture  No results found for: NICOLASAANAE  Fungus stain  No results found for requested labs within last 30 days. Gram stain  No results found for requested labs within last 30 days. Organism  No results found for: [facility]  Surgical culture  No results found for: CXSURG  Blood culture 1  Results in Past 30 Days  Result Component Current Result Ref Range Previous Result Ref Range   Blood Culture, Routine No Growth to date. Any change in status will be called. (5/16/2021)  Not in Time Range      Blood culture 2  Results in Past 30 Days  Result Component Current Result Ref Range Previous Result Ref Range   Culture, Blood 2 No Growth to date. Any change in status will be called.  (5/16/2021)  Not in Time Range      Fecal occult  No results found for requested labs within last 30 days.     GI bacterial pathogens by PCR  No results found for requested labs within last 30 days. C. difficile  No results found for requested labs within last 30 days. Urine culture  No results found for: LABURIN    Pathology:  No relevant pathology     Imaging:  I have personally reviewed the following films:    CT ABDOMEN PELVIS W IV CONTRAST Additional Contrast? None    Result Date: 5/16/2021  EXAMINATION: CT OF THE ABDOMEN AND PELVIS WITH CONTRAST 5/16/2021 10:27 am TECHNIQUE: CT of the abdomen and pelvis was performed with the administration of intravenous contrast. Multiplanar reformatted images are provided for review. Dose modulation, iterative reconstruction, and/or weight based adjustment of the mA/kV was utilized to reduce the radiation dose to as low as reasonably achievable. COMPARISON: 01/29/2021 HISTORY: ORDERING SYSTEM PROVIDED HISTORY: n/v - tachy TECHNOLOGIST PROVIDED HISTORY: Reason for exam:->n/v - tachy Additional Contrast?->None Decision Support Exception - unselect if not a suspected or confirmed emergency medical condition->Emergency Medical Condition (MA) Reason for Exam: n/v - tachy Acuity: Unknown Type of Exam: Unknown FINDINGS: Lower Chest: Inferior lung bases are clear. Heart size is normal.  Pulmonary vessels appear mildly prominent. Organs: Mild diffuse low-density of the liver without worrisome focal lesion. Mild hepato splenomegaly present similar to the prior study. Some cortical scarring present anteriorly in the mid right kidney. Small cyst laterally in the lower right kidney. Small renal calculus appears present inferiorly in the right kidney. Additional tiny nonobstructing calculi could be present. Gallbladder is well distended and some pericholecystic fluid appears present. Tiny calcified gallstones appear present. Other abdominal organs appear normal. GI/Bowel: Normal appendix and terminal ileum.   Intraluminal metallic clip in the cecum presumably at the site of prior GI bleeding. Several cecal diverticula. Moderate rectal stool. No other diverticula. Pelvis: Uterus appears normal.  Bilateral Essure wires present. Prominent follicles in both ovaries. Urinary bladder appears normal.  No mass, adenopathy, or free fluid. Peritoneum/Retroperitoneum: Several small retroperitoneal lymph nodes unchanged. No evident ascites. No mass. Prominent perigastric and perisplenic varices again noted. Bones/Soft Tissues: No acute abnormality. Mild chronic hepatosplenomegaly. Evidence of portal hypertension with perigastric and perisplenic varices. Cholelithiasis and pericholecystic fluid. Findings may indicate mild acute cholecystitis. Small nonobstructing calculus inferiorly in right kidney. Additional tiny nonobstructing calculi may be present. Prominent cecal diverticulosis. RECOMMENDATIONS: If a strong clinical suspicion of gallbladder disease exists, a gallbladder ultrasound would be recommended. US GALLBLADDER RUQ    Result Date: 5/17/2021  EXAMINATION: RIGHT UPPER QUADRANT ULTRASOUND 5/17/2021 9:21 am COMPARISON: None. HISTORY: ORDERING SYSTEM PROVIDED HISTORY: cholecystitis TECHNOLOGIST PROVIDED HISTORY: Reason for exam:->cholecystitis FINDINGS: LIVER:  Liver demonstrates diffuse increased echogenicity. Liver is approximately 16.1 cm in length. BILIARY SYSTEM:  Cholelithiasis as well as gallbladder sludge. Small amount of pericholecystic fluid. Gallbladder wall is approximately 3 mm in thickness. No sonographic Armenta sign. Common bile duct is within normal limits measuring 6 mm. RIGHT KIDNEY: Right kidney is approximately 10.1 cm in length, without hydronephrosis. PANCREAS:  Not well visualized in its entirety. OTHER: Small volume right upper quadrant ascites. Cholelithiasis/gallbladder sludge. Mild pericholecystic fluid is seen which can reflect developing cholecystitis in the appropriate clinical setting.  This could also be secondary to ascites, as ascites is also seen within the right upper quadrant. Further evaluation could include HIDA scan as clinically warranted. Fatty liver. XR CHEST PORTABLE    Result Date: 5/16/2021  EXAMINATION: ONE XRAY VIEW OF THE CHEST 5/16/2021 9:31 am COMPARISON: 03/09/2021 HISTORY: ORDERING SYSTEM PROVIDED HISTORY: tachy TECHNOLOGIST PROVIDED HISTORY: Reason for exam:->tachy Acuity: Unknown FINDINGS: The lungs are without acute focal process. There is no effusion or pneumothorax. The cardiomediastinal silhouette is stable. The osseous structures are stable. No acute process. CT CHEST PULMONARY EMBOLISM W CONTRAST    Result Date: 5/17/2021  EXAMINATION: CTA OF THE CHEST 5/17/2021 1:03 pm TECHNIQUE: CTA of the chest was performed after the administration of intravenous contrast.  Multiplanar reformatted images are provided for review. MIP images are provided for review. Dose modulation, iterative reconstruction, and/or weight based adjustment of the mA/kV was utilized to reduce the radiation dose to as low as reasonably achievable. COMPARISON: None. HISTORY: ORDERING SYSTEM PROVIDED HISTORY: r/o pe TECHNOLOGIST PROVIDED HISTORY: Reason for exam:->r/o pe Reason for Exam: R/O PE Acuity: Unknown Type of Exam: Unknown Chest pain. Shortness of breath. FINDINGS: Pulmonary Arteries: Pulmonary arteries are adequately opacified for evaluation. No evidence of intraluminal filling defect to suggest pulmonary embolism. Main pulmonary artery is normal in caliber. Mediastinum: No evidence of mediastinal lymphadenopathy. The heart and pericardium demonstrate no acute abnormality. There is no acute abnormality of the thoracic aorta. Lungs/pleura: The lungs are without acute process. No focal consolidation or pulmonary edema. No evidence of pleural effusion or pneumothorax. Upper Abdomen: The liver exhibits a micronodular surface contour consistent with cirrhosis. Recanalization of the umbilical vein is noted.   A trace amount of ascites overlies the liver and spleen. Soft Tissues/Bones: No acute bone or soft tissue abnormality. No evidence of pulmonary embolism or acute pulmonary abnormality. Cirrhosis with mild ascites. VL Extremity Venous Bilateral    Result Date: 5/17/2021  Vascular Lower Extremities DVT Study Procedure -- PRELIMINARY SONOGRAPHER REPORT --   Demographics   Patient Name       Erinn Good   Date of Study      05/17/2021         Gender              Female   Patient Number     [id number]         Date of Birth       1975   Visit Number       [id number]          Age                 41 year(s)   Accession Number   [id number]         Room Number         4483   Corporate ID       [id number]           Sonographer         Rosalia Torres                                                            [facility]   Ordering Physician Cleve Salas, Interpreting        I Vascular                     MD                 Physician  Procedure Type of Study:   Veins:Lower Extremities DVT Study, VASC EXTREMITY VENOUS DUPLEX BILATERAL. Tech Comments Right There is no evidence of deep or superficial venous thrombosis involving the right lower extremity. Left There is no evidence of deep or superficial venous thrombosis involving the left lower extremity. Scheduled Meds:   potassium chloride  10 mEq Intravenous Q1H    phytonadione (VITAMIN K)  IVPB  10 mg Intravenous Once    pantoprazole  40 mg Oral QAM AC    sodium chloride flush  5-40 mL Intravenous 2 times per day    piperacillin-tazobactam  3,375 mg Intravenous Q8H     Continuous Infusions:   sodium chloride Stopped (05/17/21 1024)    lactated ringers Stopped (05/17/21 0627)     PRN Meds:.sodium chloride flush, sodium chloride, promethazine **OR** ondansetron, polyethylene glycol, acetaminophen **OR** acetaminophen, morphine, metoprolol      Assessment:  41 y.o. female admitted with   1.  Nausea and vomiting, intractability of vomiting not specified, unspecified vomiting type    2. Tachycardia    3. Elevated bilirubin    4. Calculus of gallbladder with cholecystitis with biliary obstruction, unspecified cholecystitis acuity        Abdominal pain  Cholelithiasis  Cirrhosis  Acute blood loss anemia, secondary to menorrhagia      Plan:  1. Pain improving--difficult to distinguish from menstrual cramping, LFT trending down, no plans for surgical intervention at this time, continue with supportive care  2. Diet as tolerated  3. Antibiotics--on Zosyn  4. Activity as tolerated  5. Monitor hemoglobin, transfusion per primary team  6. PRN analgesics and antiemetics--minimizing narcotics as tolerated  7. DVT prophylaxis with SCD's  8. Management of medical comorbid etiologies per primary team and consulting services    EDUCATION:  Educated patient on plan of care and disease process--all questions answered. Plans discussed with patient and nursing. Reviewed and discussed with Dr. Isaac Rhodes.       Signed:  DARCY Matthew - CNP  5/17/2021 2:27 PM     Surg Staff:   Pt seen and examined with NP  See full note above  Pt without sig tenderness on exam, has gallstones, but benign exam overall  LFT down a little  Follow up liver work up - hold off on GB surgery for now    MD Xochilt

## 2021-05-18 VITALS
RESPIRATION RATE: 18 BRPM | SYSTOLIC BLOOD PRESSURE: 120 MMHG | HEIGHT: 64 IN | DIASTOLIC BLOOD PRESSURE: 69 MMHG | WEIGHT: 128.9 LBS | OXYGEN SATURATION: 97 % | HEART RATE: 95 BPM | BODY MASS INDEX: 22.01 KG/M2 | TEMPERATURE: 96.8 F

## 2021-05-18 LAB
A/G RATIO: 0.9 (ref 1.1–2.2)
ALBUMIN SERPL-MCNC: 2.9 G/DL (ref 3.4–5)
ALP BLD-CCNC: 145 U/L (ref 40–129)
ALT SERPL-CCNC: 25 U/L (ref 10–40)
ANION GAP SERPL CALCULATED.3IONS-SCNC: 8 MMOL/L (ref 3–16)
ANTI-NUCLEAR ANTIBODY (ANA): POSITIVE
AST SERPL-CCNC: 100 U/L (ref 15–37)
BASOPHILS ABSOLUTE: 0.1 K/UL (ref 0–0.2)
BASOPHILS RELATIVE PERCENT: 1.1 %
BILIRUB SERPL-MCNC: 4.8 MG/DL (ref 0–1)
BLOOD BANK DISPENSE STATUS: NORMAL
BLOOD BANK DISPENSE STATUS: NORMAL
BLOOD BANK PRODUCT CODE: NORMAL
BLOOD BANK PRODUCT CODE: NORMAL
BPU ID: NORMAL
BPU ID: NORMAL
BUN BLDV-MCNC: 10 MG/DL (ref 7–20)
CALCIUM SERPL-MCNC: 8.4 MG/DL (ref 8.3–10.6)
CHLORIDE BLD-SCNC: 104 MMOL/L (ref 99–110)
CO2: 25 MMOL/L (ref 21–32)
CREAT SERPL-MCNC: 0.7 MG/DL (ref 0.6–1.1)
DESCRIPTION BLOOD BANK: NORMAL
DESCRIPTION BLOOD BANK: NORMAL
EOSINOPHILS ABSOLUTE: 0.1 K/UL (ref 0–0.6)
EOSINOPHILS RELATIVE PERCENT: 2.2 %
GFR AFRICAN AMERICAN: >60
GFR NON-AFRICAN AMERICAN: >60
GLOBULIN: 3.4 G/DL
GLUCOSE BLD-MCNC: 86 MG/DL (ref 70–99)
HCT VFR BLD CALC: 20.3 % (ref 36–48)
HCT VFR BLD CALC: 22.5 % (ref 36–48)
HCT VFR BLD CALC: 25.5 % (ref 36–48)
HEMOGLOBIN: 6.8 G/DL (ref 12–16)
HEMOGLOBIN: 7.4 G/DL (ref 12–16)
HEMOGLOBIN: 8.5 G/DL (ref 12–16)
INR BLD: 1.82 (ref 0.86–1.14)
LYMPHOCYTES ABSOLUTE: 1.7 K/UL (ref 1–5.1)
LYMPHOCYTES RELATIVE PERCENT: 25.5 %
MAGNESIUM: 1.6 MG/DL (ref 1.8–2.4)
MCH RBC QN AUTO: 31.5 PG (ref 26–34)
MCH RBC QN AUTO: 32.2 PG (ref 26–34)
MCHC RBC AUTO-ENTMCNC: 33.2 G/DL (ref 31–36)
MCHC RBC AUTO-ENTMCNC: 33.3 G/DL (ref 31–36)
MCV RBC AUTO: 94.7 FL (ref 80–100)
MCV RBC AUTO: 96.7 FL (ref 80–100)
MONOCYTES ABSOLUTE: 0.5 K/UL (ref 0–1.3)
MONOCYTES RELATIVE PERCENT: 8.4 %
NEUTROPHILS ABSOLUTE: 4.1 K/UL (ref 1.7–7.7)
NEUTROPHILS RELATIVE PERCENT: 62.8 %
PDW BLD-RTO: 24 % (ref 12.4–15.4)
PDW BLD-RTO: 24.8 % (ref 12.4–15.4)
PLATELET # BLD: 88 K/UL (ref 135–450)
PLATELET # BLD: 89 K/UL (ref 135–450)
PMV BLD AUTO: 8.5 FL (ref 5–10.5)
PMV BLD AUTO: 9 FL (ref 5–10.5)
POTASSIUM REFLEX MAGNESIUM: 3.9 MMOL/L (ref 3.5–5.1)
PROTHROMBIN TIME: 21.2 SEC (ref 10–13.2)
RBC # BLD: 2.1 M/UL (ref 4–5.2)
RBC # BLD: 2.69 M/UL (ref 4–5.2)
SODIUM BLD-SCNC: 137 MMOL/L (ref 136–145)
TOTAL PROTEIN: 6.3 G/DL (ref 6.4–8.2)
WBC # BLD: 6.5 K/UL (ref 4–11)
WBC # BLD: 6.9 K/UL (ref 4–11)

## 2021-05-18 PROCEDURE — 6360000002 HC RX W HCPCS: Performed by: NURSE PRACTITIONER

## 2021-05-18 PROCEDURE — 99232 SBSQ HOSP IP/OBS MODERATE 35: CPT | Performed by: SURGERY

## 2021-05-18 PROCEDURE — APPNB30 APP NON BILLABLE TIME 0-30 MINS: Performed by: NURSE PRACTITIONER

## 2021-05-18 PROCEDURE — 85027 COMPLETE CBC AUTOMATED: CPT

## 2021-05-18 PROCEDURE — 36415 COLL VENOUS BLD VENIPUNCTURE: CPT

## 2021-05-18 PROCEDURE — 2580000003 HC RX 258: Performed by: INTERNAL MEDICINE

## 2021-05-18 PROCEDURE — 6370000000 HC RX 637 (ALT 250 FOR IP): Performed by: INTERNAL MEDICINE

## 2021-05-18 PROCEDURE — 80053 COMPREHEN METABOLIC PANEL: CPT

## 2021-05-18 PROCEDURE — 83735 ASSAY OF MAGNESIUM: CPT

## 2021-05-18 PROCEDURE — 85025 COMPLETE CBC W/AUTO DIFF WBC: CPT

## 2021-05-18 PROCEDURE — 82105 ALPHA-FETOPROTEIN SERUM: CPT

## 2021-05-18 PROCEDURE — P9016 RBC LEUKOCYTES REDUCED: HCPCS

## 2021-05-18 PROCEDURE — APPSS15 APP SPLIT SHARED TIME 0-15 MINUTES: Performed by: NURSE PRACTITIONER

## 2021-05-18 PROCEDURE — 36430 TRANSFUSION BLD/BLD COMPNT: CPT

## 2021-05-18 PROCEDURE — 6360000002 HC RX W HCPCS: Performed by: INTERNAL MEDICINE

## 2021-05-18 PROCEDURE — 85610 PROTHROMBIN TIME: CPT

## 2021-05-18 RX ORDER — SODIUM CHLORIDE 9 MG/ML
INJECTION, SOLUTION INTRAVENOUS PRN
Status: COMPLETED | OUTPATIENT
Start: 2021-05-18 | End: 2021-05-18

## 2021-05-18 RX ORDER — MAGNESIUM SULFATE 1 G/100ML
1000 INJECTION INTRAVENOUS PRN
Status: DISCONTINUED | OUTPATIENT
Start: 2021-05-18 | End: 2021-05-18 | Stop reason: HOSPADM

## 2021-05-18 RX ORDER — MAGNESIUM SULFATE IN WATER 40 MG/ML
2000 INJECTION, SOLUTION INTRAVENOUS ONCE
Status: COMPLETED | OUTPATIENT
Start: 2021-05-18 | End: 2021-05-18

## 2021-05-18 RX ORDER — MAGNESIUM SULFATE IN WATER 40 MG/ML
2000 INJECTION, SOLUTION INTRAVENOUS ONCE
Status: DISCONTINUED | OUTPATIENT
Start: 2021-05-18 | End: 2021-05-18

## 2021-05-18 RX ADMIN — Medication 10 ML: at 10:04

## 2021-05-18 RX ADMIN — SODIUM CHLORIDE: 9 INJECTION, SOLUTION INTRAVENOUS at 06:45

## 2021-05-18 RX ADMIN — PROMETHAZINE HYDROCHLORIDE 12.5 MG: 25 TABLET ORAL at 07:19

## 2021-05-18 RX ADMIN — PIPERACILLIN SODIUM,TAZOBACTAM SODIUM 3375 MG: 3; .375 INJECTION, POWDER, FOR SOLUTION INTRAVENOUS at 06:24

## 2021-05-18 RX ADMIN — PROMETHAZINE HYDROCHLORIDE 12.5 MG: 25 TABLET ORAL at 00:38

## 2021-05-18 RX ADMIN — Medication 10 ML: at 00:38

## 2021-05-18 RX ADMIN — MAGNESIUM SULFATE HEPTAHYDRATE 2000 MG: 40 INJECTION, SOLUTION INTRAVENOUS at 10:40

## 2021-05-18 RX ADMIN — MORPHINE SULFATE 2 MG: 2 INJECTION, SOLUTION INTRAMUSCULAR; INTRAVENOUS at 07:19

## 2021-05-18 RX ADMIN — MORPHINE SULFATE 2 MG: 2 INJECTION, SOLUTION INTRAMUSCULAR; INTRAVENOUS at 00:37

## 2021-05-18 RX ADMIN — SODIUM CHLORIDE 1 ML: 9 INJECTION, SOLUTION INTRAVENOUS at 06:22

## 2021-05-18 RX ADMIN — PANTOPRAZOLE SODIUM 40 MG: 40 TABLET, DELAYED RELEASE ORAL at 06:20

## 2021-05-18 ASSESSMENT — PAIN SCALES - GENERAL
PAINLEVEL_OUTOF10: 7
PAINLEVEL_OUTOF10: 8
PAINLEVEL_OUTOF10: 8

## 2021-05-18 ASSESSMENT — PAIN DESCRIPTION - ORIENTATION: ORIENTATION: ANTERIOR;MID;UPPER

## 2021-05-18 NOTE — PLAN OF CARE
Pt's pain & nausea is managed with PRN medications; pt remains independent in room & uses call light appropriately; VSS; pt morning Hgb 6.8; one unit RBC ordered; pt is able to express needs; will continue to assess. Pt's mother was admitted to ICU here at [facility]; pt is anxious to go home to take care of her child in her mother's absence; pt is aware that she must be medical stable prior to discharge. Problem: Pain:  Goal: Pain level will decrease  Description: Pain level will decrease  Outcome: Ongoing  Goal: Control of acute pain  Description: Control of acute pain  Outcome: Ongoing  Goal: Control of chronic pain  Description: Control of chronic pain  Outcome: Ongoing     Problem: SAFETY  Goal: Free from accidental physical injury  Outcome: Ongoing  Goal: Free from intentional harm  Outcome: Ongoing     Problem: DAILY CARE  Goal: Daily care needs are met  Outcome: Ongoing     Problem: PAIN  Goal: Patient's pain/discomfort is manageable  Outcome: Ongoing     Problem: SKIN INTEGRITY  Goal: Skin integrity is maintained or improved  Outcome: Ongoing     Problem: KNOWLEDGE DEFICIT  Goal: Patient/S.O. demonstrates understanding of disease process, treatment plan, medications, and discharge instructions.   Outcome: Ongoing     Problem: DISCHARGE BARRIERS  Goal: Patient's continuum of care needs are met  Outcome: Ongoing     Problem: Falls - Risk of:  Goal: Will remain free from falls  Description: Will remain free from falls  Outcome: Ongoing  Goal: Absence of physical injury  Description: Absence of physical injury  Outcome: Ongoing     Problem: Pain Control  Goal: Maintain pain level at or below patient's acceptable level (or 5 if patient is unable to determine acceptable level)  Outcome: Ongoing  Goal: Improvement in pain related behaviors BP/HR WNL  Outcome: Ongoing     Problem: Neurological  Goal: Maximum potential motor/sensory/cognitive function  Outcome: Ongoing     Problem: Cardiovascular  Goal: No DVT, peripheral vascular complications  Outcome: Ongoing  Goal: Hemodynamic stability  Outcome: Ongoing  Goal: Anticoagulate/Hct stable  Outcome: Ongoing  Goal: Agreement to quit smoking  Outcome: Ongoing  Goal: Weight maintained or lost  Outcome: Ongoing  Goal: Understanding of dietary restrictions  Outcome: Ongoing     Problem: Respiratory  Goal: No pulmonary complications  Outcome: Ongoing  Goal: O2 Sat > 90%  Outcome: Ongoing  Goal: Supplemental O2 requirements decreased  Outcome: Ongoing  Goal: Agreement to quit smoking  Outcome: Ongoing  Goal: Pneumonia vaccine at discharge as needed  Outcome: Ongoing     Problem: GI  Goal: No bowel complications  Outcome: Ongoing  Goal: Bowel movement at least every other day  Outcome: Ongoing     Problem:   Goal: Adequate urinary output  Outcome: Ongoing  Goal: No urinary complication  Outcome: Ongoing     Problem: Nutrition  Goal: Optimal nutrition therapy  Outcome: Ongoing  Goal: Understanding of nutritional guidelines  Outcome: Ongoing     Problem: Skin Integrity/Risk  Goal: No skin breakdown during hospitalization  Outcome: Ongoing  Goal: Wound healing  Outcome: Ongoing     Problem: Musculor/Skeletal Functional Status  Goal: Highest potential functional level  Outcome: Ongoing  Goal: Absence of falls  Outcome: Ongoing     Problem: Intellectual/Education/Knowledge Deficit  Goal: Teaching initiated upon admission  Outcome: Ongoing  Goal: Written Disposition Instruction form completed  Outcome: Ongoing     Problem: Emotional/Psychosocial  Goal: Understanding of community resources  Outcome: Ongoing     Problem: Spiritual  Goal: Coping methods/spiritual issues explored  Outcome: Ongoing  Goal: Acknowledge understanding of advance directives  Outcome: Ongoing  Goal: Supportive care provided  Outcome: Ongoing

## 2021-05-18 NOTE — PROGRESS NOTES
[facility] GI  Gastroenterology Progress Note    Octaviano Perez is a 41 y.o. female patient. Active Problems:    Acute cholecystitis    Nausea and vomiting  Resolved Problems:    * No resolved hospital problems. *      SUBJECTIVE:  I saw pt prior to dc home today. She denied menstraul blood but hbg slight decrease overnight, was being transfused when I saw pt this am. Her mother admit hostpiatl so pt wants go home care for her daughter. ROS:    Gastrointestinal ROS: see above    Physical    VITALS:  /69   Pulse 95   Temp 96.8 °F (36 °C) (Temporal)   Resp 18   Ht 5' 4\" (1.626 m)   Wt 128 lb 14.4 oz (58.5 kg)   LMP 2021   SpO2 97%   BMI 22.13 kg/m²   TEMPERATURE:  Current - Temp: 96.8 °F (36 °C); Max - Temp  Av.3 °F (36.3 °C)  Min: 96.8 °F (36 °C)  Max: 98.4 °F (36.9 °C)    NAD  Regular rate   Lungs CTA Bilaterally  Abdomen soft, ND, NT,  Bowel sounds normal.  Skin jaundiced    Data    Data Review:    Recent Labs     21  0450 21  2359 21  0531 21  1051   WBC 6.8  --  6.5 6.9   HGB 6.0* 7.4* 6.8* 8.5*   HCT 18.3* 22.5* 20.3* 25.5*   .4*  --  96.7 94.7   PLT 85*  --  88* 89*     Recent Labs     21  0450 21  1409 21  0531    134* 137   K 3.0* 3.9 3.9    99 104   CO2 26 26 25   PHOS  --  2.5  --    BUN 15 14 10   CREATININE 0.6 0.6 0.7     Recent Labs     21  0944 21  0450 21  0531   * 82* 100*   ALT 32 21 25   BILITOT 7.1* 5.0* 4.8*   ALKPHOS 206* 134* 145*     Recent Labs     21  0944   LIPASE 15.0     Recent Labs     21  0944 21  1103 21  0531   PROTIME 24.1* 22.8* 21.2*   INR 2.06* 1.95* 1.82*     No results for input(s): PTT in the last 72 hours. ASSESSMENT / PLAN :    Cirrhosis -CT in 2021 with a nodular contour of the liver consistent with chronic liver disease. Ultrasound this admission with fatty liver. MELD 20.   Prior labs negative for hepatitis B and C. concern that lft remain elevated prior to past levels, ? Continued etoh although pt denies, vs other etiology-- liver eval labs sent. No ascities, no enceph, no gi bleeding. Anemia -no GI bleeding. Negative EGD and colonoscopy March 2021. Anemia likely due to heavy menstrual cycles. MCV is 101 so we will check B12 and folate. Although this can be elevated in chronic liver disease. Received 1 u prbc today then hbg incremented more than suspected so this am hbg likely false low as pt states no menstrual bleeding overnight. inr improved s/p vit k. b12 and folate nl. Recurrent nausea and vomiting -per patient, seems to occur during her periods. Also may occur in the evening CT and ultrasound do show gallstones. There was some gallbladder wall thickening on ultrasound but she also had ascites. Surgery evaluated, hold off surgery acutely. Sx resolved, pt tolerated po with no n/v/no abd pain.      Recommendations:   - follow up Liver labs: POLO, f-actin, AMA, ceruloplasmin, A1AT  - check Hepatitis A total AB and Hep B surface AB for vaccination purposes  - f/u afp, no mass on ct scan. - complete etoh abstinence emphasized, pt understands  - since am hbg was likely falsely low and incremented more than expected s/p 1 u prbc and no menstrual bleeding, pt dc home to care for daughter.  - f/u dr walsh as already scheduled to f/u above labs.        Kendell Rivera MD , MD  [facility] Gastroenterology and [address] [facility] [address]

## 2021-05-18 NOTE — PROGRESS NOTES
Patient is very anxious about getting home today. Her mother was admitted to the hospital yesterday and she has a young daughter who is with her neighbors for the time being, but she thinks they have plans to go out of town and she just wants home with her daughter. Patient has gotten 1 unit of blood this morning and is getting magnesium replacement as we speak.

## 2021-05-18 NOTE — PROGRESS NOTES
WBC 6.8 6.8  --   --  6.5   HGB 8.2* 6.0* 7.7* 7.4* 6.8*   HCT 25.2* 18.3* 23.7* 22.5* 20.3*   * 85*  --   --  88*     BMP:    Recent Labs     05/17/21  0450 05/17/21  1409 05/18/21  0531    134* 137   K 3.0* 3.9 3.9    99 104   CO2 26 26 25   BUN 15 14 10   CREATININE 0.6 0.6 0.7   GLUCOSE 78 97 86     Hepatic:    Recent Labs     05/16/21  0944 05/17/21  0450 05/18/21  0531   * 82* 100*   ALT 32 21 25   BILITOT 7.1* 5.0* 4.8*   ALKPHOS 206* 134* 145*     Amylase:  No results found for: AMYLASE  Lipase:    Lab Results   Component Value Date    LIPASE 15.0 05/16/2021    LIPASE 11.0 01/29/2021      Mag:    Lab Results   Component Value Date    MG 1.60 05/18/2021    MG 2.10 05/17/2021     Phos:     Lab Results   Component Value Date    PHOS 2.5 05/17/2021      Coags:   Lab Results   Component Value Date    PROTIME 21.2 05/18/2021    INR 1.82 05/18/2021    APTT 38.0 05/16/2021       Cultures:  Anaerobic culture  No results found for: NICOLASAANAE  Fungus stain  No results found for requested labs within last 30 days. Gram stain  No results found for requested labs within last 30 days. Organism  No results found for: [facility]  Surgical culture  No results found for: CXSURG  Blood culture 1  Results in Past 30 Days  Result Component Current Result Ref Range Previous Result Ref Range   Blood Culture, Routine No Growth to date. Any change in status will be called. (5/16/2021)  Not in Time Range      Blood culture 2  Results in Past 30 Days  Result Component Current Result Ref Range Previous Result Ref Range   Culture, Blood 2 No Growth to date. Any change in status will be called. (5/16/2021)  Not in Time Range      Fecal occult  No results found for requested labs within last 30 days. GI bacterial pathogens by PCR  No results found for requested labs within last 30 days. C. difficile  No results found for requested labs within last 30 days.      Urine culture  No results found for: [facility]    Pathology:  No relevant pathology     Imaging:  I have personally reviewed the following films:    CT ABDOMEN PELVIS W IV CONTRAST Additional Contrast? None    Result Date: 5/16/2021  EXAMINATION: CT OF THE ABDOMEN AND PELVIS WITH CONTRAST 5/16/2021 10:27 am TECHNIQUE: CT of the abdomen and pelvis was performed with the administration of intravenous contrast. Multiplanar reformatted images are provided for review. Dose modulation, iterative reconstruction, and/or weight based adjustment of the mA/kV was utilized to reduce the radiation dose to as low as reasonably achievable. COMPARISON: 01/29/2021 HISTORY: ORDERING SYSTEM PROVIDED HISTORY: n/v - tachy TECHNOLOGIST PROVIDED HISTORY: Reason for exam:->n/v - tachy Additional Contrast?->None Decision Support Exception - unselect if not a suspected or confirmed emergency medical condition->Emergency Medical Condition (MA) Reason for Exam: n/v - tachy Acuity: Unknown Type of Exam: Unknown FINDINGS: Lower Chest: Inferior lung bases are clear. Heart size is normal.  Pulmonary vessels appear mildly prominent. Organs: Mild diffuse low-density of the liver without worrisome focal lesion. Mild hepato splenomegaly present similar to the prior study. Some cortical scarring present anteriorly in the mid right kidney. Small cyst laterally in the lower right kidney. Small renal calculus appears present inferiorly in the right kidney. Additional tiny nonobstructing calculi could be present. Gallbladder is well distended and some pericholecystic fluid appears present. Tiny calcified gallstones appear present. Other abdominal organs appear normal. GI/Bowel: Normal appendix and terminal ileum. Intraluminal metallic clip in the cecum presumably at the site of prior GI bleeding. Several cecal diverticula. Moderate rectal stool. No other diverticula. Pelvis: Uterus appears normal.  Bilateral Essure wires present. Prominent follicles in both ovaries.   Urinary bladder 5/17/2021  EXAMINATION: CTA OF THE CHEST 5/17/2021 1:03 pm TECHNIQUE: CTA of the chest was performed after the administration of intravenous contrast.  Multiplanar reformatted images are provided for review. MIP images are provided for review. Dose modulation, iterative reconstruction, and/or weight based adjustment of the mA/kV was utilized to reduce the radiation dose to as low as reasonably achievable. COMPARISON: None. HISTORY: ORDERING SYSTEM PROVIDED HISTORY: r/o pe TECHNOLOGIST PROVIDED HISTORY: Reason for exam:->r/o pe Reason for Exam: R/O PE Acuity: Unknown Type of Exam: Unknown Chest pain. Shortness of breath. FINDINGS: Pulmonary Arteries: Pulmonary arteries are adequately opacified for evaluation. No evidence of intraluminal filling defect to suggest pulmonary embolism. Main pulmonary artery is normal in caliber. Mediastinum: No evidence of mediastinal lymphadenopathy. The heart and pericardium demonstrate no acute abnormality. There is no acute abnormality of the thoracic aorta. Lungs/pleura: The lungs are without acute process. No focal consolidation or pulmonary edema. No evidence of pleural effusion or pneumothorax. Upper Abdomen: The liver exhibits a micronodular surface contour consistent with cirrhosis. Recanalization of the umbilical vein is noted. A trace amount of ascites overlies the liver and spleen. Soft Tissues/Bones: No acute bone or soft tissue abnormality. No evidence of pulmonary embolism or acute pulmonary abnormality. Cirrhosis with mild ascites.      VL Extremity Venous Bilateral    Result Date: 5/17/2021  Vascular Lower Extremities DVT Study Procedure -- PRELIMINARY SONOGRAPHER REPORT --   Demographics   Patient Name       Delories Situ   Date of Study      05/17/2021         Gender              Female   Patient Number     [id number]         Date of Birth       1975   Visit Number       [id number]          Age                 41 year(s)   Accession Number [id number]         Room Number         5494   Corporate ID       [id number]           Sonographer         Nagi Bryant                                                            T   Ordering Physician Leeanna Domínguez, Interpreting        [facility]                     MD                 Physician  Procedure Type of Study:   Veins:Lower Extremities DVT Study, VASC EXTREMITY VENOUS DUPLEX BILATERAL. Tech Comments Right There is no evidence of deep or superficial venous thrombosis involving the right lower extremity. Left There is no evidence of deep or superficial venous thrombosis involving the left lower extremity. Scheduled Meds:   magnesium sulfate  2,000 mg Intravenous Once    magnesium sulfate  2,000 mg Intravenous Once    pantoprazole  40 mg Oral QAM AC    sodium chloride flush  5-40 mL Intravenous 2 times per day    piperacillin-tazobactam  3,375 mg Intravenous Q8H     Continuous Infusions:   sodium chloride 1 mL (05/18/21 0622)    lactated ringers Stopped (05/18/21 0642)     PRN Meds:.magnesium sulfate, sodium chloride flush, sodium chloride, promethazine **OR** ondansetron, polyethylene glycol, acetaminophen **OR** acetaminophen, morphine, metoprolol      Assessment:  41 y.o. female admitted with   1. Nausea and vomiting, intractability of vomiting not specified, unspecified vomiting type    2. Tachycardia    3. Elevated bilirubin    4. Calculus of gallbladder with cholecystitis with biliary obstruction, unspecified cholecystitis acuity        Abdominal pain  Cholelithiasis  Cirrhosis  Acute blood loss anemia, secondary to menorrhagia      Plan:  1. Pain resolved, LFT trending down, no plans for surgical intervention at this time, continue with supportive care  2. Diet as tolerated  3. Antibiotics--on Zosyn  4. Activity as tolerated  5.  Monitor hemoglobin, transfusion per primary team--no evidence of GI blood loss, hemoglobin dropped again despite no further menstrual bleeding per patient

## 2021-05-18 NOTE — PROGRESS NOTES
Patient received a discharge order, her two IV's were removed from her right antecubital and right hand without a problem. Patient went over her paper work with the nurse and verbalized understanding. Patient called her brother and discharge lounge came up to get her for discharge. Patient thanked me for her care.

## 2021-05-19 LAB
CERULOPLASMIN: 19 MG/DL (ref 16–45)
F-ACTIN AB IGG: 14 UNITS (ref 0–19)
HAV AB SERPL IA-ACNC: POSITIVE

## 2021-05-20 LAB
AFP: 4.4 UG/L
ALPHA-1 ANTITRYPSIN PHENOTYPE: NORMAL
ALPHA-1 ANTITRYPSIN: 191 MG/DL (ref 90–200)
ANA PATTERN: ABNORMAL
ANA TITER: ABNORMAL
ANTINUCLEAR AB INTERPRETIVE COMMENT: ABNORMAL
ANTINUCLEAR ANTIBODY, HEP-2, IGG: DETECTED
BLOOD CULTURE, ROUTINE: NORMAL
CULTURE, BLOOD 2: NORMAL
MITOCHONDRIAL M2 AB, IGG: 1 U/ML (ref 0–4)

## 2021-05-22 NOTE — DISCHARGE SUMMARY
[facility] DISCHARGE SUMMARY    Patient Demographics    Patient. Wilfred Luna  Date of Birth. 1975  MRN. [id number]     Primary care provider. [facility],   (Tel: [phone])    Admit date: 5/16/2021    Discharge date (blank if same as Note Date): 5/18/2021  Note Date: 5/22/2021     Reason for Hospitalization. Chief Complaint   Patient presents with    Emesis     pt c/o nausea  vomiting and heavy menstral cycle for the last couple of weeks           [address] [place] [address] Dr Duarte. Alcoholic cirrhosis  -With elevated total bilirubin. Work-up was in process.  -Patient with acute hepatitis which was negative. Patient likely related to alcohol. Patient also claimed did not drink anything  -Patient needed some blood for anemia.  -Patient was eval by GI. Need to continue further work-up outpatient. Anemia  -Likely secondary to menorrhagia.  -Patient was given unit of blood hemoglobin was 7  -Was evaluated by OB/GYN offered some treatment but patient refused at the time  Patient was discharged home with outpatient follow-up hemodynamically stable at the time of discharge    Consults. IP CONSULT TO GENERAL SURGERY  IP CONSULT TO GI  IP CONSULT TO OB GYN    Physical examination on discharge day. /69   Pulse 95   Temp 96.8 °F (36 °C) (Temporal)   Resp 18   Ht 5' 4\" (1.626 m)   Wt 128 lb 14.4 oz (58.5 kg)   LMP 05/02/2021   SpO2 97%   BMI 22.13 kg/m²   General appearance. Alert. Looks comfortable. HEENT. Sclera clear. Moist mucus membranes. Cardiovascular. Regular rate and rhythm, normal S1, S2. No murmur. Respiratory. Not using accessory muscles. Clear to auscultation bilaterally, no wheeze. Gastrointestinal. Abdomen soft, non-tender, not distended, normal bowel sounds  Neurology. Facial symmetry. No speech deficits. Moving all extremities equally. Extremities.  No edema in lower extremities. Skin. Warm, dry, normal turgor    Condition at time of discharge stable     Medication instructions provided to patient at discharge. Medication List      CONTINUE taking these medications    multivitamin Tabs tablet  Take 1 tablet by mouth daily  Notes to patient: Use: multivitamin  Side effects: nausea     pantoprazole 40 MG tablet  Commonly known as: PROTONIX  Take 1 tablet by mouth every morning (before breakfast)  Notes to patient: Use: Prevention and treatment of gastric ulcers  Side Effects: Headache, fatigue, constipation, dry  mouth            Discharge recommendations given to patient. Follow Up. pcp in 1 week   Disposition. home  Activity. activity as tolerated  Diet: No diet orders on file      Spent 45  minutes in discharge process.     Signed:  Amberly Burns MD     5/22/2021 1:27 PM

## 2021-06-26 ENCOUNTER — HOSPITAL ENCOUNTER (INPATIENT)
Age: 46
LOS: 3 days | Discharge: HOME | DRG: 280 | End: 2021-06-30
Attending: EMERGENCY MEDICINE | Admitting: INTERNAL MEDICINE
Payer: COMMERCIAL

## 2021-06-26 DIAGNOSIS — K92.0 GASTROINTESTINAL HEMORRHAGE WITH HEMATEMESIS: ICD-10-CM

## 2021-06-26 DIAGNOSIS — D50.0 POSTHEMORRHAGIC ANEMIA: Primary | ICD-10-CM

## 2021-06-26 LAB
A/G RATIO: 0.8 (ref 1.1–2.2)
ABO/RH: NORMAL
ALBUMIN SERPL-MCNC: 2.8 G/DL (ref 3.4–5)
ALP BLD-CCNC: 127 U/L (ref 40–129)
ALT SERPL-CCNC: 23 U/L (ref 10–40)
ANION GAP SERPL CALCULATED.3IONS-SCNC: 13 MMOL/L (ref 3–16)
ANTIBODY SCREEN: NORMAL
APTT: 37.5 SEC (ref 24.2–36.2)
AST SERPL-CCNC: 67 U/L (ref 15–37)
BASOPHILS ABSOLUTE: 0.1 K/UL (ref 0–0.2)
BASOPHILS RELATIVE PERCENT: 0.5 %
BILIRUB SERPL-MCNC: 3.8 MG/DL (ref 0–1)
BUN BLDV-MCNC: 32 MG/DL (ref 7–20)
CALCIUM SERPL-MCNC: 9 MG/DL (ref 8.3–10.6)
CHLORIDE BLD-SCNC: 96 MMOL/L (ref 99–110)
CO2: 25 MMOL/L (ref 21–32)
CREAT SERPL-MCNC: 1.6 MG/DL (ref 0.6–1.1)
EOSINOPHILS ABSOLUTE: 0.1 K/UL (ref 0–0.6)
EOSINOPHILS RELATIVE PERCENT: 1.1 %
GFR AFRICAN AMERICAN: 42
GFR NON-AFRICAN AMERICAN: 35
GLOBULIN: 3.5 G/DL
GLUCOSE BLD-MCNC: 148 MG/DL (ref 70–99)
HCT VFR BLD CALC: 13.4 % (ref 36–48)
HEMATOLOGY PATH CONSULT: YES
HEMOGLOBIN: 4.1 G/DL (ref 12–16)
HYPOCHROMIA: ABNORMAL
INR BLD: 2.13 (ref 0.86–1.14)
LACTIC ACID, SEPSIS: 2.5 MMOL/L (ref 0.4–1.9)
LIPASE: 15 U/L (ref 13–60)
LYMPHOCYTES ABSOLUTE: 2.7 K/UL (ref 1–5.1)
LYMPHOCYTES RELATIVE PERCENT: 22.8 %
MAGNESIUM: 1.2 MG/DL (ref 1.8–2.4)
MCH RBC QN AUTO: 29.9 PG (ref 26–34)
MCHC RBC AUTO-ENTMCNC: 30.7 G/DL (ref 31–36)
MCV RBC AUTO: 97.6 FL (ref 80–100)
MONOCYTES ABSOLUTE: 1 K/UL (ref 0–1.3)
MONOCYTES RELATIVE PERCENT: 8.4 %
NEUTROPHILS ABSOLUTE: 7.9 K/UL (ref 1.7–7.7)
NEUTROPHILS RELATIVE PERCENT: 67.2 %
PDW BLD-RTO: 24.1 % (ref 12.4–15.4)
PLATELET # BLD: 127 K/UL (ref 135–450)
PLATELET SLIDE REVIEW: ADEQUATE
PMV BLD AUTO: 9.4 FL (ref 5–10.5)
POLYCHROMASIA: ABNORMAL
POTASSIUM REFLEX MAGNESIUM: 3.4 MMOL/L (ref 3.5–5.1)
PROTHROMBIN TIME: 24.9 SEC (ref 10–13.2)
RBC # BLD: 1.37 M/UL (ref 4–5.2)
REASON FOR REJECTION: NORMAL
REJECTED TEST: NORMAL
SLIDE REVIEW: ABNORMAL
SODIUM BLD-SCNC: 134 MMOL/L (ref 136–145)
TOTAL PROTEIN: 6.3 G/DL (ref 6.4–8.2)
WBC # BLD: 11.8 K/UL (ref 4–11)

## 2021-06-26 PROCEDURE — 36415 COLL VENOUS BLD VENIPUNCTURE: CPT

## 2021-06-26 PROCEDURE — 83690 ASSAY OF LIPASE: CPT

## 2021-06-26 PROCEDURE — 83605 ASSAY OF LACTIC ACID: CPT

## 2021-06-26 PROCEDURE — P9016 RBC LEUKOCYTES REDUCED: HCPCS

## 2021-06-26 PROCEDURE — 85730 THROMBOPLASTIN TIME PARTIAL: CPT

## 2021-06-26 PROCEDURE — 86900 BLOOD TYPING SEROLOGIC ABO: CPT

## 2021-06-26 PROCEDURE — P9012 CRYOPRECIPITATE EACH UNIT: HCPCS

## 2021-06-26 PROCEDURE — 86901 BLOOD TYPING SEROLOGIC RH(D): CPT

## 2021-06-26 PROCEDURE — 96374 THER/PROPH/DIAG INJ IV PUSH: CPT

## 2021-06-26 PROCEDURE — 83735 ASSAY OF MAGNESIUM: CPT

## 2021-06-26 PROCEDURE — 36430 TRANSFUSION BLD/BLD COMPNT: CPT

## 2021-06-26 PROCEDURE — 96361 HYDRATE IV INFUSION ADD-ON: CPT

## 2021-06-26 PROCEDURE — 86850 RBC ANTIBODY SCREEN: CPT

## 2021-06-26 PROCEDURE — 99284 EMERGENCY DEPT VISIT MOD MDM: CPT

## 2021-06-26 PROCEDURE — 85025 COMPLETE CBC W/AUTO DIFF WBC: CPT

## 2021-06-26 PROCEDURE — 2580000003 HC RX 258: Performed by: PHYSICIAN ASSISTANT

## 2021-06-26 PROCEDURE — 80053 COMPREHEN METABOLIC PANEL: CPT

## 2021-06-26 PROCEDURE — 86923 COMPATIBILITY TEST ELECTRIC: CPT

## 2021-06-26 PROCEDURE — 6360000002 HC RX W HCPCS: Performed by: PHYSICIAN ASSISTANT

## 2021-06-26 PROCEDURE — 85610 PROTHROMBIN TIME: CPT

## 2021-06-26 PROCEDURE — 96375 TX/PRO/DX INJ NEW DRUG ADDON: CPT

## 2021-06-26 RX ORDER — SODIUM CHLORIDE 9 MG/ML
INJECTION, SOLUTION INTRAVENOUS PRN
Status: DISCONTINUED | OUTPATIENT
Start: 2021-06-26 | End: 2021-06-30 | Stop reason: HOSPADM

## 2021-06-26 RX ORDER — ONDANSETRON 2 MG/ML
4 INJECTION INTRAMUSCULAR; INTRAVENOUS ONCE
Status: COMPLETED | OUTPATIENT
Start: 2021-06-26 | End: 2021-06-26

## 2021-06-26 RX ORDER — 0.9 % SODIUM CHLORIDE 0.9 %
30 INTRAVENOUS SOLUTION INTRAVENOUS ONCE
Status: COMPLETED | OUTPATIENT
Start: 2021-06-26 | End: 2021-06-26

## 2021-06-26 RX ORDER — FENTANYL CITRATE 50 UG/ML
25 INJECTION, SOLUTION INTRAMUSCULAR; INTRAVENOUS ONCE
Status: COMPLETED | OUTPATIENT
Start: 2021-06-26 | End: 2021-06-26

## 2021-06-26 RX ORDER — OCTREOTIDE ACETATE 100 UG/ML
50 INJECTION, SOLUTION INTRAVENOUS; SUBCUTANEOUS ONCE
Status: COMPLETED | OUTPATIENT
Start: 2021-06-27 | End: 2021-06-27

## 2021-06-26 RX ADMIN — ONDANSETRON 4 MG: 2 INJECTION INTRAMUSCULAR; INTRAVENOUS at 23:50

## 2021-06-26 RX ADMIN — ONDANSETRON 4 MG: 2 INJECTION INTRAMUSCULAR; INTRAVENOUS at 21:53

## 2021-06-26 RX ADMIN — FENTANYL CITRATE 25 MCG: 50 INJECTION, SOLUTION INTRAMUSCULAR; INTRAVENOUS at 23:50

## 2021-06-26 RX ADMIN — SODIUM CHLORIDE 1701 ML: 9 INJECTION, SOLUTION INTRAVENOUS at 21:53

## 2021-06-26 ASSESSMENT — ENCOUNTER SYMPTOMS
SHORTNESS OF BREATH: 0
VOMITING: 1
NAUSEA: 1
ABDOMINAL PAIN: 1

## 2021-06-26 ASSESSMENT — PAIN SCALES - GENERAL
PAINLEVEL_OUTOF10: 8
PAINLEVEL_OUTOF10: 8

## 2021-06-27 ENCOUNTER — ANESTHESIA EVENT (OUTPATIENT)
Dept: ENDOSCOPY | Age: 46
DRG: 280 | End: 2021-06-27
Payer: COMMERCIAL

## 2021-06-27 ENCOUNTER — ANESTHESIA (OUTPATIENT)
Dept: ENDOSCOPY | Age: 46
DRG: 280 | End: 2021-06-27
Payer: COMMERCIAL

## 2021-06-27 VITALS — DIASTOLIC BLOOD PRESSURE: 56 MMHG | OXYGEN SATURATION: 100 % | SYSTOLIC BLOOD PRESSURE: 105 MMHG

## 2021-06-27 PROBLEM — D62 ACUTE POST-HEMORRHAGIC ANEMIA: Status: ACTIVE | Noted: 2021-06-27

## 2021-06-27 LAB
AMMONIA: 27 UMOL/L (ref 11–51)
BLOOD BANK DISPENSE STATUS: NORMAL
BLOOD BANK PRODUCT CODE: NORMAL
BPU ID: NORMAL
DESCRIPTION BLOOD BANK: NORMAL
HCT VFR BLD CALC: 18.1 % (ref 36–48)
HCT VFR BLD CALC: 24.6 % (ref 36–48)
HCT VFR BLD CALC: 26 % (ref 36–48)
HEMOGLOBIN: 5.9 G/DL (ref 12–16)
HEMOGLOBIN: 8.1 G/DL (ref 12–16)
HEMOGLOBIN: 8.6 G/DL (ref 12–16)

## 2021-06-27 PROCEDURE — 6360000002 HC RX W HCPCS: Performed by: FAMILY MEDICINE

## 2021-06-27 PROCEDURE — 06L38CZ OCCLUSION OF ESOPHAGEAL VEIN WITH EXTRALUMINAL DEVICE, VIA NATURAL OR ARTIFICIAL OPENING ENDOSCOPIC: ICD-10-PCS | Performed by: INTERNAL MEDICINE

## 2021-06-27 PROCEDURE — 6360000002 HC RX W HCPCS: Performed by: PHYSICIAN ASSISTANT

## 2021-06-27 PROCEDURE — 2580000003 HC RX 258: Performed by: PHYSICIAN ASSISTANT

## 2021-06-27 PROCEDURE — 6360000002 HC RX W HCPCS: Performed by: INTERNAL MEDICINE

## 2021-06-27 PROCEDURE — 2709999900 HC NON-CHARGEABLE SUPPLY: Performed by: INTERNAL MEDICINE

## 2021-06-27 PROCEDURE — 7100000000 HC PACU RECOVERY - FIRST 15 MIN: Performed by: INTERNAL MEDICINE

## 2021-06-27 PROCEDURE — 82140 ASSAY OF AMMONIA: CPT

## 2021-06-27 PROCEDURE — C9113 INJ PANTOPRAZOLE SODIUM, VIA: HCPCS | Performed by: INTERNAL MEDICINE

## 2021-06-27 PROCEDURE — 36430 TRANSFUSION BLD/BLD COMPNT: CPT

## 2021-06-27 PROCEDURE — 3700000000 HC ANESTHESIA ATTENDED CARE: Performed by: INTERNAL MEDICINE

## 2021-06-27 PROCEDURE — 7100000001 HC PACU RECOVERY - ADDTL 15 MIN: Performed by: INTERNAL MEDICINE

## 2021-06-27 PROCEDURE — 85014 HEMATOCRIT: CPT

## 2021-06-27 PROCEDURE — P9016 RBC LEUKOCYTES REDUCED: HCPCS

## 2021-06-27 PROCEDURE — 2580000003 HC RX 258: Performed by: INTERNAL MEDICINE

## 2021-06-27 PROCEDURE — 85018 HEMOGLOBIN: CPT

## 2021-06-27 PROCEDURE — 6370000000 HC RX 637 (ALT 250 FOR IP): Performed by: INTERNAL MEDICINE

## 2021-06-27 PROCEDURE — 3700000001 HC ADD 15 MINUTES (ANESTHESIA): Performed by: INTERNAL MEDICINE

## 2021-06-27 PROCEDURE — 2000000000 HC ICU R&B

## 2021-06-27 PROCEDURE — C9113 INJ PANTOPRAZOLE SODIUM, VIA: HCPCS | Performed by: PHYSICIAN ASSISTANT

## 2021-06-27 PROCEDURE — 3609012300 HC EGD BAND LIGATION ESOPHGEAL/GASTRIC VARICES: Performed by: INTERNAL MEDICINE

## 2021-06-27 PROCEDURE — P9012 CRYOPRECIPITATE EACH UNIT: HCPCS

## 2021-06-27 PROCEDURE — 2720000010 HC SURG SUPPLY STERILE: Performed by: INTERNAL MEDICINE

## 2021-06-27 PROCEDURE — 99291 CRITICAL CARE FIRST HOUR: CPT | Performed by: INTERNAL MEDICINE

## 2021-06-27 PROCEDURE — 2500000003 HC RX 250 WO HCPCS: Performed by: FAMILY MEDICINE

## 2021-06-27 PROCEDURE — 36415 COLL VENOUS BLD VENIPUNCTURE: CPT

## 2021-06-27 RX ORDER — ACETAMINOPHEN 650 MG/1
650 SUPPOSITORY RECTAL EVERY 6 HOURS PRN
Status: CANCELLED | OUTPATIENT
Start: 2021-06-27

## 2021-06-27 RX ORDER — HYDROMORPHONE HCL 110MG/55ML
0.5 PATIENT CONTROLLED ANALGESIA SYRINGE INTRAVENOUS EVERY 5 MIN PRN
Status: DISCONTINUED | OUTPATIENT
Start: 2021-06-27 | End: 2021-06-27 | Stop reason: HOSPADM

## 2021-06-27 RX ORDER — MEPERIDINE HYDROCHLORIDE 25 MG/ML
12.5 INJECTION INTRAMUSCULAR; INTRAVENOUS; SUBCUTANEOUS EVERY 5 MIN PRN
Status: DISCONTINUED | OUTPATIENT
Start: 2021-06-27 | End: 2021-06-27 | Stop reason: HOSPADM

## 2021-06-27 RX ORDER — ETOMIDATE 2 MG/ML
INJECTION INTRAVENOUS PRN
Status: DISCONTINUED | OUTPATIENT
Start: 2021-06-27 | End: 2021-06-27 | Stop reason: SDUPTHER

## 2021-06-27 RX ORDER — DIPHENHYDRAMINE HYDROCHLORIDE 50 MG/ML
12.5 INJECTION INTRAMUSCULAR; INTRAVENOUS
Status: DISCONTINUED | OUTPATIENT
Start: 2021-06-27 | End: 2021-06-27 | Stop reason: HOSPADM

## 2021-06-27 RX ORDER — MORPHINE SULFATE 2 MG/ML
1 INJECTION, SOLUTION INTRAMUSCULAR; INTRAVENOUS EVERY 4 HOURS PRN
Status: DISCONTINUED | OUTPATIENT
Start: 2021-06-27 | End: 2021-06-30 | Stop reason: HOSPADM

## 2021-06-27 RX ORDER — SODIUM CHLORIDE 9 MG/ML
INJECTION, SOLUTION INTRAVENOUS PRN
Status: DISCONTINUED | OUTPATIENT
Start: 2021-06-27 | End: 2021-06-30 | Stop reason: HOSPADM

## 2021-06-27 RX ORDER — ONDANSETRON 2 MG/ML
4 INJECTION INTRAMUSCULAR; INTRAVENOUS EVERY 6 HOURS PRN
Status: DISCONTINUED | OUTPATIENT
Start: 2021-06-27 | End: 2021-06-30 | Stop reason: HOSPADM

## 2021-06-27 RX ORDER — DEXAMETHASONE SODIUM PHOSPHATE 4 MG/ML
INJECTION, SOLUTION INTRA-ARTICULAR; INTRALESIONAL; INTRAMUSCULAR; INTRAVENOUS; SOFT TISSUE PRN
Status: DISCONTINUED | OUTPATIENT
Start: 2021-06-27 | End: 2021-06-27 | Stop reason: SDUPTHER

## 2021-06-27 RX ORDER — MIDAZOLAM HYDROCHLORIDE 1 MG/ML
INJECTION INTRAMUSCULAR; INTRAVENOUS PRN
Status: DISCONTINUED | OUTPATIENT
Start: 2021-06-27 | End: 2021-06-27 | Stop reason: SDUPTHER

## 2021-06-27 RX ORDER — PANTOPRAZOLE SODIUM 40 MG/10ML
40 INJECTION, POWDER, LYOPHILIZED, FOR SOLUTION INTRAVENOUS 2 TIMES DAILY
Status: DISCONTINUED | OUTPATIENT
Start: 2021-06-27 | End: 2021-06-30 | Stop reason: HOSPADM

## 2021-06-27 RX ORDER — ACETAMINOPHEN 325 MG/1
650 TABLET ORAL EVERY 6 HOURS PRN
Status: CANCELLED | OUTPATIENT
Start: 2021-06-27

## 2021-06-27 RX ORDER — PROMETHAZINE HYDROCHLORIDE 25 MG/ML
6.25 INJECTION, SOLUTION INTRAMUSCULAR; INTRAVENOUS PRN
Status: DISCONTINUED | OUTPATIENT
Start: 2021-06-27 | End: 2021-06-27 | Stop reason: HOSPADM

## 2021-06-27 RX ORDER — LIDOCAINE HYDROCHLORIDE 20 MG/ML
INJECTION, SOLUTION INFILTRATION; PERINEURAL PRN
Status: DISCONTINUED | OUTPATIENT
Start: 2021-06-27 | End: 2021-06-27 | Stop reason: SDUPTHER

## 2021-06-27 RX ORDER — LABETALOL HYDROCHLORIDE 5 MG/ML
5 INJECTION, SOLUTION INTRAVENOUS EVERY 10 MIN PRN
Status: DISCONTINUED | OUTPATIENT
Start: 2021-06-27 | End: 2021-06-27 | Stop reason: HOSPADM

## 2021-06-27 RX ORDER — FENTANYL CITRATE 50 UG/ML
50 INJECTION, SOLUTION INTRAMUSCULAR; INTRAVENOUS EVERY 5 MIN PRN
Status: DISCONTINUED | OUTPATIENT
Start: 2021-06-27 | End: 2021-06-27 | Stop reason: HOSPADM

## 2021-06-27 RX ORDER — SODIUM CHLORIDE 0.9 % (FLUSH) 0.9 %
5-40 SYRINGE (ML) INJECTION EVERY 12 HOURS SCHEDULED
Status: DISCONTINUED | OUTPATIENT
Start: 2021-06-27 | End: 2021-06-30 | Stop reason: HOSPADM

## 2021-06-27 RX ORDER — HYDROMORPHONE HCL 110MG/55ML
0.25 PATIENT CONTROLLED ANALGESIA SYRINGE INTRAVENOUS EVERY 5 MIN PRN
Status: DISCONTINUED | OUTPATIENT
Start: 2021-06-27 | End: 2021-06-27 | Stop reason: HOSPADM

## 2021-06-27 RX ORDER — SODIUM CHLORIDE 9 MG/ML
25 INJECTION, SOLUTION INTRAVENOUS PRN
Status: DISCONTINUED | OUTPATIENT
Start: 2021-06-27 | End: 2021-06-30 | Stop reason: HOSPADM

## 2021-06-27 RX ORDER — OXYCODONE HYDROCHLORIDE 5 MG/1
5 TABLET ORAL PRN
Status: DISCONTINUED | OUTPATIENT
Start: 2021-06-27 | End: 2021-06-27 | Stop reason: HOSPADM

## 2021-06-27 RX ORDER — ONDANSETRON 4 MG/1
4 TABLET, ORALLY DISINTEGRATING ORAL EVERY 8 HOURS PRN
Status: DISCONTINUED | OUTPATIENT
Start: 2021-06-27 | End: 2021-06-30 | Stop reason: HOSPADM

## 2021-06-27 RX ORDER — PREDNISOLONE SODIUM PHOSPHATE 15 MG/5ML
40 SOLUTION ORAL DAILY
Status: DISCONTINUED | OUTPATIENT
Start: 2021-06-27 | End: 2021-06-28

## 2021-06-27 RX ORDER — OXYCODONE HYDROCHLORIDE 5 MG/1
10 TABLET ORAL PRN
Status: DISCONTINUED | OUTPATIENT
Start: 2021-06-27 | End: 2021-06-27 | Stop reason: HOSPADM

## 2021-06-27 RX ORDER — FENTANYL CITRATE 50 UG/ML
INJECTION, SOLUTION INTRAMUSCULAR; INTRAVENOUS PRN
Status: DISCONTINUED | OUTPATIENT
Start: 2021-06-27 | End: 2021-06-27 | Stop reason: SDUPTHER

## 2021-06-27 RX ORDER — ONDANSETRON 2 MG/ML
INJECTION INTRAMUSCULAR; INTRAVENOUS PRN
Status: DISCONTINUED | OUTPATIENT
Start: 2021-06-27 | End: 2021-06-27 | Stop reason: SDUPTHER

## 2021-06-27 RX ORDER — CEFTRIAXONE 1 G/1
INJECTION, POWDER, FOR SOLUTION INTRAMUSCULAR; INTRAVENOUS
Status: DISPENSED
Start: 2021-06-27 | End: 2021-06-27

## 2021-06-27 RX ORDER — SODIUM CHLORIDE 0.9 % (FLUSH) 0.9 %
5-40 SYRINGE (ML) INJECTION PRN
Status: DISCONTINUED | OUTPATIENT
Start: 2021-06-27 | End: 2021-06-30 | Stop reason: HOSPADM

## 2021-06-27 RX ORDER — SUCCINYLCHOLINE/SOD CL,ISO/PF 100 MG/5ML
SYRINGE (ML) INTRAVENOUS PRN
Status: DISCONTINUED | OUTPATIENT
Start: 2021-06-27 | End: 2021-06-27 | Stop reason: SDUPTHER

## 2021-06-27 RX ADMIN — OCTREOTIDE ACETATE 50 MCG/HR: 500 INJECTION, SOLUTION INTRAVENOUS; SUBCUTANEOUS at 11:50

## 2021-06-27 RX ADMIN — MORPHINE SULFATE 1 MG: 2 INJECTION, SOLUTION INTRAMUSCULAR; INTRAVENOUS at 19:47

## 2021-06-27 RX ADMIN — MORPHINE SULFATE 1 MG: 2 INJECTION, SOLUTION INTRAMUSCULAR; INTRAVENOUS at 12:18

## 2021-06-27 RX ADMIN — Medication 1000 MG: at 00:51

## 2021-06-27 RX ADMIN — PHENYLEPHRINE HYDROCHLORIDE 50 MCG: 10 INJECTION INTRAVENOUS at 01:06

## 2021-06-27 RX ADMIN — FENTANYL CITRATE 50 MCG: 50 INJECTION, SOLUTION INTRAMUSCULAR; INTRAVENOUS at 00:59

## 2021-06-27 RX ADMIN — PREDNISOLONE SODIUM PHOSPHATE 40 MG: 15 SOLUTION ORAL at 15:18

## 2021-06-27 RX ADMIN — OCTREOTIDE ACETATE 50 MCG: 100 INJECTION, SOLUTION INTRAVENOUS; SUBCUTANEOUS at 00:25

## 2021-06-27 RX ADMIN — OCTREOTIDE ACETATE 50 MCG/HR: 500 INJECTION, SOLUTION INTRAVENOUS; SUBCUTANEOUS at 21:53

## 2021-06-27 RX ADMIN — Medication 20 ML: at 21:53

## 2021-06-27 RX ADMIN — PHENYLEPHRINE HYDROCHLORIDE 50 MCG: 10 INJECTION INTRAVENOUS at 01:08

## 2021-06-27 RX ADMIN — Medication 5 ML: at 12:19

## 2021-06-27 RX ADMIN — PANTOPRAZOLE SODIUM 40 MG: 40 INJECTION, POWDER, FOR SOLUTION INTRAVENOUS at 19:47

## 2021-06-27 RX ADMIN — Medication 120 MG: at 00:56

## 2021-06-27 RX ADMIN — SODIUM CHLORIDE 80 MG: 9 INJECTION, SOLUTION INTRAVENOUS at 00:29

## 2021-06-27 RX ADMIN — OCTREOTIDE ACETATE 50 MCG/HR: 500 INJECTION, SOLUTION INTRAVENOUS; SUBCUTANEOUS at 01:49

## 2021-06-27 RX ADMIN — PANTOPRAZOLE SODIUM 40 MG: 40 INJECTION, POWDER, FOR SOLUTION INTRAVENOUS at 12:17

## 2021-06-27 RX ADMIN — ONDANSETRON 4 MG: 2 INJECTION INTRAMUSCULAR; INTRAVENOUS at 01:03

## 2021-06-27 RX ADMIN — LIDOCAINE HYDROCHLORIDE 100 MG: 20 INJECTION, SOLUTION INFILTRATION; PERINEURAL at 00:56

## 2021-06-27 RX ADMIN — PROMETHAZINE HYDROCHLORIDE 6.25 MG: 25 INJECTION INTRAMUSCULAR; INTRAVENOUS at 01:48

## 2021-06-27 RX ADMIN — ETOMIDATE 14 MG: 20 INJECTION, SOLUTION INTRAVENOUS at 00:56

## 2021-06-27 RX ADMIN — PANTOPRAZOLE SODIUM 40 MG: 40 INJECTION, POWDER, FOR SOLUTION INTRAVENOUS at 03:01

## 2021-06-27 RX ADMIN — DEXAMETHASONE SODIUM PHOSPHATE 4 MG: 4 INJECTION, SOLUTION INTRAMUSCULAR; INTRAVENOUS at 00:56

## 2021-06-27 RX ADMIN — MIDAZOLAM 2 MG: 1 INJECTION INTRAMUSCULAR; INTRAVENOUS at 00:59

## 2021-06-27 RX ADMIN — ONDANSETRON 4 MG: 2 INJECTION INTRAMUSCULAR; INTRAVENOUS at 22:40

## 2021-06-27 RX ADMIN — PHYTONADIONE 10 MG: 10 INJECTION, EMULSION INTRAMUSCULAR; INTRAVENOUS; SUBCUTANEOUS at 15:17

## 2021-06-27 RX ADMIN — FENTANYL CITRATE 50 MCG: 50 INJECTION, SOLUTION INTRAMUSCULAR; INTRAVENOUS at 00:56

## 2021-06-27 ASSESSMENT — PAIN DESCRIPTION - DIRECTION: RADIATING_TOWARDS: LOWER BACK

## 2021-06-27 ASSESSMENT — PAIN - FUNCTIONAL ASSESSMENT
PAIN_FUNCTIONAL_ASSESSMENT: ACTIVITIES ARE NOT PREVENTED

## 2021-06-27 ASSESSMENT — PAIN SCALES - GENERAL
PAINLEVEL_OUTOF10: 0
PAINLEVEL_OUTOF10: 8
PAINLEVEL_OUTOF10: 8
PAINLEVEL_OUTOF10: 0
PAINLEVEL_OUTOF10: 0
PAINLEVEL_OUTOF10: 5
PAINLEVEL_OUTOF10: 0
PAINLEVEL_OUTOF10: 0
PAINLEVEL_OUTOF10: 8
PAINLEVEL_OUTOF10: 0

## 2021-06-27 ASSESSMENT — PAIN DESCRIPTION - ONSET
ONSET: OTHER (COMMENT)
ONSET: OTHER (COMMENT)
ONSET: ON-GOING

## 2021-06-27 ASSESSMENT — PAIN DESCRIPTION - DESCRIPTORS
DESCRIPTORS: ACHING
DESCRIPTORS: CONSTANT
DESCRIPTORS: CONSTANT

## 2021-06-27 ASSESSMENT — PAIN DESCRIPTION - PROGRESSION
CLINICAL_PROGRESSION: NOT CHANGED

## 2021-06-27 ASSESSMENT — PAIN DESCRIPTION - FREQUENCY
FREQUENCY: CONTINUOUS
FREQUENCY: INTERMITTENT
FREQUENCY: INTERMITTENT

## 2021-06-27 ASSESSMENT — PAIN DESCRIPTION - ORIENTATION
ORIENTATION: INNER

## 2021-06-27 ASSESSMENT — PAIN DESCRIPTION - PAIN TYPE
TYPE: CHRONIC PAIN

## 2021-06-27 ASSESSMENT — PAIN DESCRIPTION - LOCATION
LOCATION: ABDOMEN

## 2021-06-27 NOTE — CONSULTS
PULMONARY AND CRITICAL CARE MEDICINE CONSULTATION NOTE    CONSULTING PHYSICIAN:  Jon Victoria MD    ADMISSION DATE: 6/26/2021  ADMISSION DIAGNOSIS: Acute post-hemorrhagic anemia [D62]    REASON FOR CONSULT:   Chief Complaint   Patient presents with    Emesis     Pt reports that she has been throwing up blood since last night. Pt also with c/o LLQ abdominal pain. DATE OF CONSULT: 6/26/2021    HISTORY OF PRESENT ILLNESS: 41y.o. year old female with a past medical history significant for chronic alcohol abuse, alcoholic liver disease who presented to the hospital with nausea and hematemesis. As per the patient this has been going on for the last 2 days. Along with this she has been feeling generalized weakness and fatigue. Patient underwent an urgent EGD which showed multiple esophageal varices requiring banding. Also received blood transfusion and has been on octreotide and IV Protonix. At the time of interview patient lying in bed reporting feeling slightly better. Still having lower abdominal pain. Feels nauseated. Denies any shortness of breath, cough, discolored expectoration, fevers. REVIEW OF SYSTEMS:     CONSTITUTIONAL SYMPTOMS: The patient denies fever, fatigue, night sweats, weight loss or weight gain. HEENT: No vision changes. No tinnitus, Denies sinus pain. No hoarseness, or dysphagia. NECK: Patient denies swelling in the neck. CARDIOVASCULAR: Denies chest pain, palpitation, syncope. RESPIRATORY: As per HPI. GASTROINTESTINAL: Denies nausea, abdominal pain or change in bowel function. GENITOURINARY: Denies obstructive symptoms. No history of incontinence. BREASTS: No masses or lumps in the breasts. SKIN: No rashes or itching. MUSCULOSKELETAL: Denies weakness or bone pain. NEUROLOGICAL: No headaches or seizures. PSYCHIATRIC: Denies mood swings or depression.    ENDOCRINE: Denies heat or cold intolerance or excessive thirst.  HEMATOLOGIC/LYMPHATIC: Denies easy bruising or lymph node swelling. ALLERGIC/IMMUNOLOGIC: No environmental allergies. PAST MEDICAL HISTORY:   Past Medical History:   Diagnosis Date    Alcoholic liver disease ([address]     Anemia        PAST SURGICAL HISTORY:   Past Surgical History:   Procedure Laterality Date    COLONOSCOPY N/A 3/11/2021    COLONOSCOPY POLYPECTOMY SNARE/COLD BIOPSY performed [facility]man [facility] at [facility]    UPPER GASTROINTESTINAL ENDOSCOPY N/A 01/30/2021    EGD DIAGNOSTIC ONLY performed by Dangelo Ricks MD at [address] [place] [address] N/A 03/10/2021    EGD BIOPSY [facility] at [facility]:   Social History     Tobacco Use    Smoking status: Former Smoker    Smokeless tobacco: Never Used   Vaping Use    Vaping Use: Never used   Substance Use Topics    Alcohol use: Yes    Drug use: Never       FAMILY HISTORY: History reviewed. No pertinent family history. MEDICATIONS:     No current facility-administered medications on file prior to encounter.      Current Outpatient Medications on File Prior to Encounter   Medication Sig Dispense Refill    Multiple Vitamin (MULTIVITAMIN) TABS tablet Take 1 tablet by mouth daily 30 tablet 0    pantoprazole (PROTONIX) 40 MG tablet Take 1 tablet by mouth every morning (before breakfast) 30 tablet 3        sodium chloride flush  5-40 mL Intravenous 2 times per day    pantoprazole  40 mg Intravenous BID    cefTRIAXone        dextrose        prednisoLONE  40 mg Oral Daily    phytonadione (VITAMIN K)  IVPB  10 mg Intravenous Once    [START ON 6/28/2021] cefTRIAXone (ROCEPHIN) IV  2,000 mg Intravenous Q24H      sodium chloride      sodium chloride      sodium chloride      sodium chloride      sodium chloride      octreotide (SANDOSTATIN) infusion 50 mcg/hr (06/27/21 1150)     sodium chloride flush, sodium chloride, ondansetron **OR** ondansetron, sodium chloride, sodium chloride, morphine, sodium chloride, sodium chloride      ALLERGIES:   Allergies as of 06/26/2021    (No Known Allergies)      OBJECTIVE:   height is 5' 4\" (1.626 m) and weight is 123 lb 3.8 oz (55.9 kg). Her temporal temperature is 98 °F (36.7 °C). Her blood pressure is 109/56 (abnormal) and her pulse is 86. Her respiration is 13 and oxygen saturation is 98%. I/O this shift: In: 513.3 [Blood:513.3]  Out: -      PHYSICAL EXAM:    CONSTITUTIONAL: She is a 41y.o.-year-old who appears her stated age. She is alert and oriented x 3 and in mild distress. HEENT: PERRLA, EOMI. No scleral icterus. No thrush, atraumatic, normocephalic. NECK: Supple, without cervical or supraclavicular lymphadenopathy:  CARDIOVASCULAR: S1 S2 RRR. Without murmer  RESPIRATORY & CHEST: Lungs are clear to auscultation and percussion. No wheezing, no crackles. Good air movement  GASTROINTESTINAL & ABDOMEN: Soft, nontender, positive bowel sounds in all quadrants, non-distended, without hepatosplenomegaly. GENITOURINARY: Deferred. MUSCULOSKELETAL: No tenderness to palpation of the axial skeleton. There is no clubbing. No cyanosis. No edema of the lower extremities. SKIN OF BODY: No rash or jaundice. PSYCHIATRIC EVALUATION: Normal affect. Patient answers questions appropriately. HEMATOLOGIC/LYMPHATIC/ IMMUNOLOGIC: No palpable lymphadenopathy. NEUROLOGIC: Alert and oriented x 3. Groslly non-focal. Motor strength is 5+/5 in all muscle groups. The patient has a normal sensorium globally.       LABS:  Recent Labs     06/26/21 2135 06/26/21 2203 06/27/21  0637   WBC  --  11.8*  --    HGB  --  4.1* 5.9*   HCT  --  13.4* 18.1*   PLT  --  127*  --    ALT 23  --   --    AST 67*  --   --    *  --   --    K 3.4*  --   --    CL 96*  --   --    CREATININE 1.6*  --   --    BUN 32*  --   --    CO2 25  --   --    INR 2.13*  --   --        Recent Labs     06/26/21 2135   GLUCOSE 148*   CALCIUM 9.0   *   K 3.4*   CO2 25   CL 96* BUN 32*   CREATININE 1.6*       No results for input(s): PHART, DDD1COE, PO2ART, FGR8RXY, G3CUGGLT, BEART, M6EDFLJI in the last 72 hours. Lab Results   Component Value Date    INR 2.13 (H) 06/26/2021    INR 1.82 (H) 05/18/2021    INR 1.95 (H) 05/17/2021    PROTIME 24.9 (H) 06/26/2021    PROTIME 21.2 (H) 05/18/2021    PROTIME 22.8 (H) 05/17/2021     No results found for: AMYLASE   No results found for: LABA1C  No results found for: EAG  No results found for: TSH, E5SDCEZ, K3KJMCJ, THYROIDAB, FT3, T4FREE  Lab Results   Component Value Date    TROPONINI <0.01 05/16/2021      No results found for: CRP   No results found for: BNP   Lab Results   Component Value Date    DDIMER 864 (H) 05/16/2021      No results found for: FERRITIN   No results found for: LACTA        IMAGING:        IMPRESSION:     1. Acute variceal bleed  2. Alcoholic liver disease  3. Hypovolemia  4. Symptomatic anemia  5. Coagulopathy    RECOMMENDATION:     1. Patient has a longstanding history of alcoholic liver disease which is led to esophageal varices. 2. Patient ended up getting variceal bleed and required urgent EGD and variceal banding. 3. We will continue with octreotide gtt. for a total of 72 hours. 4. Also continue on IV Protonix twice a day. 5. Empirically started on IV Zosyn due to variceal bleed. 6. Receiving 2 unit of PRBC. 7. Can provide her with as needed morphine 1 mg every 4 hours as needed for pain control. 8. Monitor for any alcohol withdrawal.  Can provide Ativan as needed. Total critical care time caring for this patient with life threatening illness, including direct patient contact, management of life support systems, review of data including imaging and labs, discussions with other team members and physicians is at least 32 minutes so far today, excluding procedures.         Amina Ferris MD   Pulmonary Critical Care and Sleep Medicine  [facility]   [address], Nicho Goodman, [address]  6/26/2021, 1:01 PM      This note was completed using dragon medical speech recognition software. Grammatical errors, random word insertions, pronoun errors and incomplete sentences are occasional consequences of this technology due to software limitations. If there are questions or concerns about the content of this note of information contained within the body of this dictation they should be addressed with the provider for clarification.

## 2021-06-27 NOTE — PROGRESS NOTES
Patient admitted into ICU 3917. Placed on monitor. Head to Toe nursing assessment completed. PACU nurses are at present recovering patient. She is vomiting thick mucoid red tinged. PACU Nurse will check re: nausea medication. Neuro A&Ox4. Patient reports her mother passed away yesterday. See nursing flowsheet for specific data. Afebrile. VSS. Receiving 2nd unit of PRBC. Octreotide drip 50 mcg/hr or 10 cc/hr infusing through #22 ga right hand. Blood infusing through #22 ga right ac. Left hand #20 PIV flushes well and unremarkable/capped. ABD soft slight distended +flatus. SR up x3. Call light in reach.

## 2021-06-27 NOTE — ANESTHESIA PRE PROCEDURE
History:        Diagnosis Date    Alcoholic [address] disease ([facility]     Anemia        Past Surgical History:        Procedure Laterality Date    COLONOSCOPY N/A 3/11/2021    COLONOSCOPY POLYPECTOMY SNARE/COLD BIOPSY performed by Daja Johnston MD at [address] [facility] [place]    UPPER GASTROINTESTINAL ENDOSCOPY N/A 01/30/2021    EGD DIAGNOSTIC ONLY performed by Iván Nugent MD at [place] [address] N/A 03/10/2021    EGD BIOPSY performed by Daja Johnston MD at [facility] History:    Social History     Tobacco Use    Smoking status: Former Smoker    Smokeless tobacco: Never Used   Substance Use Topics    Alcohol use: Yes                                Counseling given: Not Answered      Vital Signs (Current): There were no vitals filed for this visit.                                            BP Readings from Last 3 Encounters:   06/26/21 (!) 102/55   05/18/21 120/69   03/12/21 107/61       NPO Status:                                                                                 BMI:   Wt Readings from Last 3 Encounters:   06/26/21 125 lb (56.7 kg)   05/17/21 128 lb 14.4 oz (58.5 kg)   03/12/21 127 lb 3.2 oz (57.7 kg)     There is no height or weight on file to calculate BMI.    CBC:   Lab Results   Component Value Date    WBC 11.8 06/26/2021    RBC 1.37 06/26/2021    HGB 4.1 06/26/2021    HCT 13.4 06/26/2021    MCV 97.6 06/26/2021    RDW 24.1 06/26/2021     06/26/2021       CMP:   Lab Results   Component Value Date     06/26/2021    K 3.4 06/26/2021    CL 96 06/26/2021    CO2 25 06/26/2021    BUN 32 06/26/2021    CREATININE 1.6 06/26/2021    GFRAA 42 06/26/2021    AGRATIO 0.8 06/26/2021    LABGLOM 35 06/26/2021    GLUCOSE 148 06/26/2021    PROT 6.3 06/26/2021    CALCIUM 9.0 06/26/2021    BILITOT 3.8 06/26/2021    ALKPHOS 127 06/26/2021    AST 67 06/26/2021    ALT 23 06/26/2021       POC Tests: No results for input(s): POCGLU, Shreyas Shone, POCCL, POCBUN, POCHEMO, POCHCT in the last 72 hours. Coags:   Lab Results   Component Value Date    PROTIME 24.9 06/26/2021    INR 2.13 06/26/2021    APTT 37.5 06/26/2021       HCG (If Applicable): No results found for: PREGTESTUR, PREGSERUM, HCG, HCGQUANT     ABGs: No results found for: PHART, PO2ART, CKL8TSY, VKJ7SBM, BEART, E9SENWUE     Type & Screen (If Applicable):  No results found for: LABABO, LABRH    Drug/Infectious Status (If Applicable):  No results found for: HIV, HEPCAB    COVID-19 Screening (If Applicable): No results found for: COVID19      Anesthesia Evaluation  Patient summary reviewed and Nursing notes reviewed  Airway: Mallampati: II  TM distance: >3 FB   Neck ROM: full  Mouth opening: > = 3 FB Dental:          Pulmonary:                              Cardiovascular:  Exercise tolerance: good (>4 METS),           Rhythm: regular  Rate: normal                    Neuro/Psych:               GI/Hepatic/Renal:   (+) liver disease:,           Endo/Other:                     Abdominal:             Vascular: Other Findings:               Anesthesia Plan      MAC     ASA 4 - emergent       Induction: intravenous. MIPS: Prophylactic antiemetics administered. Anesthetic plan and risks discussed with patient. Plan discussed with attending and CRNA.                   Katie Baker MD   6/27/2021

## 2021-06-27 NOTE — ED PROVIDER NOTES
I personally evaluated and examined the patient in conjunction with the APC and agree with the assessment, treatment plan and disposition of the patient has recorded by the APC. I reviewed pertinent nurse's notes, triage notes, vital signs, past medical history, family and social history, medications, and allergies. Complete review of systems was conducted by the mid-level provider and/or myself. Review of systems is negative except as documented in the history of present illness. This patient came in with hematemesis and history of varices. She complains of abdominal pain. On exam, abdomen is soft and nontender. She is ill-appearing. She is remarkably anemic and we started blood here in the department. I consulted gastroenterology and the hospitalist is in the department to see the patient. Patient is admitted. There was significant chance for life-threatening deterioration in this patient condition if not for our urgent intervention. Total critical care time is 30 minutes    FINAL IMPRESSION     1. Posthemorrhagic anemia    2. Gastrointestinal hemorrhage with hematemesis            Electronically signed by: NISHA Emerson MD  06/27/21 7056

## 2021-06-27 NOTE — CONSULTS
Gastroenterology Consult Note                          Patient: Ghislaine Sarkar  : 1975  CSN#:      Date:  2021    Subjective:       History of Present Illness  Patient is a 41 y.o.  female admitted with Acute post-hemorrhagic anemia [D62] who is seen in consult for hematemesis. H/o alcoholic liver disease. She was seen late 2021 for hematemesis after nonbloody emesis. EGD then with Linear ulcer at the GEJ (? MW tear), Portal hypertensive gastropathy and no esophageal varices. She improved and was d/c home. She was seen again 2021 for hematemesis and anemia. EGD showed portal hypertensive gastropathy and mild esophagitis and gastritis. Colonoscopy with 2 polyps. Source of anemia was felt to be due to heavy menses. She presents to ED tonight with h/o hematemesis earlier today. Labs significant for Hgb 4.1. PLTs 127K.  TB3.8 AST 67 ALT 23  INR 2.1    She has prior U/S May which showed some ascites. Octreotide has been started. BPs initially 86/49, recently 102/55 after fluids    Pt does report drinking alcohol again, last a few days ago          Past Medical History:   Diagnosis Date    Alcoholic liver disease ([facility] [address]     Anemia       Past Surgical History:   Procedure Laterality Date    COLONOSCOPY N/A 3/11/2021    COLONOSCOPY POLYPECTOMY SNARE/COLD BIOPSY performed by Yaima Harding MD at [address] [facility] ENDOSCOPY N/A 2021    EGD DIAGNOSTIC ONLY performed by Ivan Galeas MD at [facility] N/A 03/10/2021    EGD BIOPSY performed by Yaima Harding MD at [address]      Past Endoscopic History above    Admission Meds  No current facility-administered medications on file prior to encounter.      Current Outpatient Medications on File Prior to Encounter   Medication Sig Dispense Refill    Multiple Vitamin (MULTIVITAMIN) TABS tablet Take 1 tablet by mouth daily 30 tablet 0    pantoprazole (PROTONIX) 40 MG tablet Take 1 tablet by mouth every morning (before breakfast) 30 tablet 3         Allergies  No Known Allergies   Social   Social History     Tobacco Use    Smoking status: Former Smoker    Smokeless tobacco: Never Used   Substance Use Topics    Alcohol use: Yes        History reviewed. No pertinent family history. Review of Systems  Pertinent items are noted in HPI. Physical Exam  Blood pressure (!) 102/55, pulse 98, temperature 98.2 °F (36.8 °C), temperature source Oral, resp. rate 16, height 5' 4\" (1.626 m), weight 125 lb (56.7 kg), last menstrual period 05/17/2021, SpO2 97 %. General appearance: alert, cooperative, no distress, appears stated age, jaundiced  Eyes: icteric  Head: Normocephalic, without obvious abnormality  Lungs: clear to auscultation bilaterally, Normal Effort  Heart: regular rate and rhythm, normal S1 and S2, no murmurs or rubs  Abdomen: soft, non-tender. Bowel sounds normal. No masses,  no organomegaly. Extremities: atraumatic, no cyanosis or edema  Skin: warm and dry, no jaundice  Neuro: Grossly intact, A&OX3      Data Review:    Recent Labs     06/26/21 2203   WBC 11.8*   HGB 4.1*   HCT 13.4*   MCV 97.6   *     Recent Labs     06/26/21 2135   *   K 3.4*   CL 96*   CO2 25   BUN 32*   CREATININE 1.6*     Recent Labs     06/26/21 2135   AST 67*   ALT 23   BILITOT 3.8*   ALKPHOS 127     Recent Labs     06/26/21 2135   LIPASE 15.0     Recent Labs     06/26/21 2135   PROTIME 24.9*   INR 2.13*     No results for input(s): PTT in the last 72 hours. No results for input(s): OCCULTBLD in the last 72 hours. Imaging Studies:                           None this admit               Assessment:     Active Problems:    Acute post-hemorrhagic anemia  Resolved Problems:    * No resolved hospital problems.  *    H/o ETOH liver dz/cirrhosis  Presentation suggestive of variceal bleed even though past endo has not shown

## 2021-06-27 NOTE — PLAN OF CARE
4 Eyes Skin Assessment     NAME:  Homero Freeman  YOB: 1975  MEDICAL RECORD NUMBER:  [id number]    The patient is being assess for  Admission    I agree that 2 RN's have performed a thorough Head to Toe Skin Assessment on the patient. ALL assessment sites listed below have been assessed. Areas assessed by both nurses:    Head, Face, Ears, Shoulders, Back, Chest, Arms, Elbows, Hands, Sacrum. Buttock, Coccyx, Ischium and Legs. Feet and Heels        Does the Patient have a Wound? Yes wound(s) were present on assessment.  LDA wound assessment was Initiated and completed        Maurisio Prevention initiated:  No   Wound Care Orders initiated:  No    Pressure Injury (Stage 3,4, Unstageable, DTI, NWPT, and Complex wounds) if present place consult order under [de-identified] No    New and Established Ostomies if present place consult order under : No      Nurse 1 eSignature: Electronically signed by Ron Carlson RN on 6/27/21 at 5:31 AM EDT    **SHARE this note so that the co-signing nurse is able to place an eSignature**    Nurse 2 eSignature: Electronically signed by Deb Lopez RN on 6/27/21 at 5:56 AM EDT

## 2021-06-27 NOTE — PROGRESS NOTES
Reviewed patient's medical and surgical history in electronic record and with patient at the bedside. All questions regarding procedure answered. Scope number and equipment verified using a two person system. Patient refused pregnancy test prior to procedure, waiver filled out and signed.      Electronically signed by Myra Lynch RN on 6/27/2021 at 1:04 AM

## 2021-06-27 NOTE — PROGRESS NOTES
H/p dictation id [id number]. Date of service 06/26/2021. Acute post hemorrhagic anemia. Hemorrhagic shock. Ugib. Alcoholic liver disease. Keyur.

## 2021-06-27 NOTE — ANESTHESIA POSTPROCEDURE EVALUATION
Department of Anesthesiology  Postprocedure Note    Patient: Octaviano Perez  MRN: [id number]  YOB: 1975  Date of evaluation: 6/27/2021  Time:  1:40 AM     Procedure Summary     Date: 06/27/21 Room / Location: [address] [place] [address]    Anesthesia Start: 3591 Anesthesia Stop: 0140    Procedure: EGD BAND LIGATION (N/A Abdomen) Diagnosis: (GI bleed)    Surgeons: Hansel Galindo MD Responsible Provider: Cyndi Barry MD    Anesthesia Type: MAC ASA Status: 4 - Emergent          Anesthesia Type: MAC    Estiven Phase I:      Estiven Phase II:      Last vitals: Reviewed and per EMR flowsheets.        Anesthesia Post Evaluation    Level of consciousness: awake  Complications: no

## 2021-06-27 NOTE — ED PROVIDER NOTES
[facility]        Pt Name: Yanick Mccoy  MRN: [id number]  [place] 1975  Date of evaluation: 2021  Provider: Titus Martinez PA-C  PCP: Sandeep Lee DO  Note Started: 9:54 PM EDT        I have seen and evaluated this patient with my supervising physician Rafal Conde MD.    [address] [place] [address]       Chief Complaint   Patient presents with    Emesis     Pt reports that she has been throwing up blood since last night. Pt also with c/o LLQ abdominal pain. HISTORY OF PRESENT ILLNESS   (Location, Timing/Onset, Context/Setting, Quality, Duration, Modifying Factors, Severity, Associated Signs and Symptoms)  Note limiting factors. Chief Complaint: vomiting     Yanick Mccoy is a 41 y.o. female who presents patient presents emergency department for evaluation of vomiting since yesterday evening. Patient states her mom  yesterday of a chronic illness. Patient states that she has a history of alcoholism and known liver disease. Patient has thrown up blood in the past.  She has required blood transfusions in the past.  Patient states she has not had any alcohol today. Patient states she has left-sided abdominal pain as well as nausea. Patient states she has been drinking heavily recently due to her mom's illness. Patient states she did have a syncopal episode last night but did not have any syncopal episodes today. Nursing Notes were all reviewed and agreed with or any disagreements were addressed in the HPI. REVIEW OF SYSTEMS    (2-9 systems for level 4, 10 or more for level 5)     Review of Systems   Constitutional: Negative for fatigue and fever. HENT: Negative. Eyes: Negative for visual disturbance. Respiratory: Negative for shortness of breath. Cardiovascular: Negative for chest pain. Gastrointestinal: Positive for abdominal pain, nausea and vomiting. Genitourinary: Negative. Musculoskeletal: Negative.     Skin: Negative. Neurological: Negative. Positives and Pertinent negatives as per HPI. Except as noted above in the ROS, all other systems were reviewed and negative. PAST MEDICAL HISTORY     Past Medical History:   Diagnosis Date    Alcoholic liver disease ([address]     Anemia          SURGICAL HISTORY     Past Surgical History:   Procedure Laterality Date    COLONOSCOPY N/A 3/11/2021    COLONOSCOPY POLYPECTOMY SNARE/COLD BIOPSY performed by Phillip Osman MD at [address] [facility] GASTROINTESTINAL ENDOSCOPY N/A 01/30/2021    EGD DIAGNOSTIC ONLY performed by Elie Livingston MD at [address] [facility] 03/10/2021    EGD BIOPSY performed by Phillip Osman MD at [address]       Current Discharge Medication List      CONTINUE these medications which have NOT CHANGED    Details   Multiple Vitamin (MULTIVITAMIN) TABS tablet Take 1 tablet by mouth daily  Qty: 30 tablet, Refills: 0      pantoprazole (PROTONIX) 40 MG tablet Take 1 tablet by mouth every morning (before breakfast)  Qty: 30 tablet, Refills: 3               ALLERGIES     Patient has no known allergies. FAMILYHISTORY     History reviewed. No pertinent family history. SOCIAL HISTORY       Social History     Tobacco Use    Smoking status: Former Smoker    Smokeless tobacco: Never Used   Vaping Use    Vaping Use: Never used   Substance Use Topics    Alcohol use: Yes    Drug use: Never       SCREENINGS    Terry Coma Scale  Eye Opening: Spontaneous  Best Verbal Response: Oriented  Best Motor Response: Obeys commands  Terry Coma Scale Score: 15        PHYSICAL EXAM    (up to 7 for level 4, 8 or more for level 5)     ED Triage Vitals [06/26/21 2106]   BP Temp Temp Source Pulse Resp SpO2 Height Weight   (!) 86/49 98.4 °F (36.9 °C) Oral 125 16 100 % 5' 4\" (1.626 m) 125 lb (56.7 kg)       Physical Exam  Vitals and nursing note reviewed. Constitutional:       General: She is not in acute distress. Appearance: Normal appearance. She is well-developed. She is ill-appearing. She is not toxic-appearing or diaphoretic. HENT:      Head: Normocephalic and atraumatic. Nose: Nose normal.      Mouth/Throat:      Mouth: Mucous membranes are dry. Eyes:      General:         Right eye: No discharge. Left eye: No discharge. Conjunctiva/sclera: Conjunctivae normal.      Pupils: Pupils are equal, round, and reactive to light. Cardiovascular:      Rate and Rhythm: Normal rate and regular rhythm. Heart sounds: Normal heart sounds. No murmur heard. No gallop. Pulmonary:      Effort: Pulmonary effort is normal. No respiratory distress. Breath sounds: Normal breath sounds. No wheezing, rhonchi or rales. Abdominal:      General: Abdomen is flat. Bowel sounds are normal.      Palpations: Abdomen is soft. Tenderness: There is abdominal tenderness in the epigastric area, left upper quadrant and left lower quadrant. There is no right CVA tenderness, left CVA tenderness, guarding or rebound. Musculoskeletal:         General: Normal range of motion. Cervical back: Normal range of motion and neck supple. Skin:     General: Skin is warm and dry. Capillary Refill: Capillary refill takes less than 2 seconds. Coloration: Skin is jaundiced and pale. Neurological:      General: No focal deficit present. Mental Status: She is alert and oriented to person, place, and time.    Psychiatric:         Behavior: Behavior normal.         DIAGNOSTIC RESULTS   LABS:    Labs Reviewed   COMPREHENSIVE METABOLIC PANEL W/ REFLEX TO MG FOR LOW K - Abnormal; Notable for the following components:       Result Value    Sodium 134 (*)     Potassium reflex Magnesium 3.4 (*)     Chloride 96 (*)     Glucose 148 (*)     BUN 32 (*)     CREATININE 1.6 (*)     GFR Non- 35 (*)     GFR  42 (*)     Total Protein 6.3 (*)     Albumin 2.8 (*)     Albumin/Globulin Ratio 0.8 (*)     Total Bilirubin 3.8 (*)     AST 67 (*)     All other components within normal limits    Narrative:     CALL  [facility] tel. [phone],  Rejected Test Name/Called to:CBC/ GARETH Carballo, 06/26/2021 21:51, by [facility]  Performed at:  [facility] [address] [employer]   Phone [phone]   PROTIME-INR - Abnormal; Notable for the following components:    Protime 24.9 (*)     INR 2.13 (*)     All other components within normal limits    Narrative:     [facility] [phone],  Rejected Test Name/Called to:CBC/ GARETH Carballo, 06/26/2021 21:51, by [facility]  Performed at:  [facility] [address], [place], [facility] [employer]   Phone [phone]   APTT - Abnormal; Notable for the following components:    aPTT 37.5 (*)     All other components within normal limits    Narrative:     [facility] [phone],  Rejected Test Name/Called to:CBC/ GARETH Carballo, 06/26/2021 21:51, by [facility]  Performed at:  [facility] [address], [place], [facility] [employer]   Phone [phone]   LACTATE, SEPSIS - Abnormal; Notable for the following components:    Lactic Acid, Sepsis 2.5 (*)     All other components within normal limits    Narrative:     [facility] tel. [phone],  Rejected Test Name/Called to:CBC/ GARETH Carballo, 06/26/2021 21:51, by [facility]  Performed at:  [facility] [address] [facility], [facility] [employer]   Phone [phone]   CBC WITH AUTO DIFFERENTIAL - Abnormal; Notable for the following components:    WBC 11.8 (*)     RBC 1.37 (*)     Hemoglobin 4.1 (*)     Hematocrit 13.4 (*)     MCHC 30.7 (*)     RDW 24.1 (*)     Platelets 464 (*)     Neutrophils Absolute 7.9 (*)     Polychromasia 1+ (*)     Hypochromia 3+ (*)     All other components within normal limits    Narrative:     CALL [place]hpande  [facility] Alida Honor [phone],  Hematology results called to and read back by GARETH Bravo, 06/26/2021  22:29, by [facility]  Performed at:  [facility] [address], [place], [facility] [employer]   Phone [phone]   MAGNESIUM - Abnormal; Notable for the following components:    Magnesium 1.20 (*)     All other components within normal limits    Narrative:     Melissa Mckeon  [facility] tel. [phone],  Rejected Test Name/Called to:CBC/ GARETH Garcia, 06/26/2021 21:51, by [facility]  Performed at:  [facility] [address], [place], [address] [employer]   Phone [phone] lipase    Narrative:     Melissa Collazo [phone],  Rejected Test Name/Called to:CBC/ GARETH Garcia, 06/26/2021 21:51, by [facility]  Performed at:  [facility] [address], [place], [address] [employer]   Phone [phone]    Narrative:     Melissa Collazo [phone],  Rejected Test Name/Called to:CBC/ GARETH Garcia, 06/26/2021 21:51, by [facility]  Performed at:  [facility] [address], [place], [address] [employer]   Phone [phone]   LACTATE, SEPSIS   AMMONIA   HEMOGLOBIN AND HEMATOCRIT, BLOOD   HEMOGLOBIN AND HEMATOCRIT, BLOOD   TYPE AND SCREEN    Narrative:     Performed at:  [facility] [address], [place], [address] [employer]   Phone [phone]   PREPARE RBC (CROSSMATCH)   PREPARE RBC (CROSSMATCH)       All other labs were within normal range or not returned as of this dictation. EKG: All EKG's are interpreted by the Emergency Department Physician in the absence of a cardiologist.  Please see their note for interpretation of EKG.     RADIOLOGY:   Non-plain film images such as CT, Ultrasound and MRI are read by the radiologist. Plain radiographic images are visualized and preliminarily interpreted by the ED Provider with the below findings:        Interpretation per the Radiologist below, if available at the time of this note:    No orders to display     No results found. PROCEDURES   Unless otherwise noted below, none     Procedures    CRITICAL CARE TIME   There was a high probability of life-threatening clinical deterioration in the patient's condition requiring my urgent intervention. The total critical care time spent while evaluating and treating this patient was at least 40 minutes. This excludes time spent doing separately billable procedures. This includes time at the bedside, data interpretation, medication management, obtaining critical history from collateral sources if the patient is unable to provide it directly, and physician consultation. Specifics of interventions taken and potentially life-threatening diagnostic considerations are listed in the medical decision making.      CONSULTS:  IP CONSULT TO GI      EMERGENCY DEPARTMENT COURSE and DIFFERENTIAL DIAGNOSIS/MDM:   Vitals:    Vitals:    06/27/21 0145 06/27/21 0150 06/27/21 0153 06/27/21 0200   BP: (!) 113/46 (!) 112/45 (!) 115/59 112/62   Pulse: 114 107 107 105   Resp: 20 20 20 16   Temp:   97.7 °F (36.5 °C)    TempSrc:   Temporal    SpO2: 100% 100% 100% 96%   Weight:   123 lb 3.8 oz (55.9 kg)    Height:           Patient was given the following medications:  Medications   0.9 % sodium chloride infusion (has no administration in time range)   0.9 % sodium chloride infusion (has no administration in time range)   octreotide (SANDOSTATIN) 500 mcg in sodium chloride 0.9 % 100 mL infusion (50 mcg/hr Intravenous New Bag 6/27/21 0149)   sodium chloride flush 0.9 % injection 5-40 mL (has no administration in time range)   sodium chloride flush 0.9 % injection 5-40 mL (has no administration in time range)   0.9 % sodium chloride infusion (has no administration in time range)   ondansetron (ZOFRAN-ODT) disintegrating tablet 4 mg (has no administration in time range)     Or   ondansetron (ZOFRAN) injection 4 mg (has no administration in time range)   pantoprazole (PROTONIX) injection 40 mg (has no administration in time range)   cefTRIAXone (ROCEPHIN) 1 g injection (has no administration in time range)   dextrose 5 % infusion (has no administration in time range)   prednisoLONE (ORAPRED) 15 MG/5ML solution 40 mg (has no administration in time range)   0.9 % sodium chloride IV bolus 1,701 mL (0 mL/kg × 56.7 kg Intravenous Stopped 6/26/21 2323)   ondansetron (ZOFRAN) injection 4 mg (4 mg Intravenous Given 6/26/21 2153)   ondansetron (ZOFRAN) injection 4 mg (4 mg Intravenous Given 6/26/21 2350)   fentaNYL (SUBLIMAZE) injection 25 mcg (25 mcg Intravenous Given 6/26/21 2350)   pantoprazole (PROTONIX) 80 mg in sodium chloride 0.9 % 50 mL bolus (80 mg Intravenous New Bag 6/27/21 0029)   octreotide (SANDOSTATIN) injection 50 mcg (50 mcg Intravenous Given 6/27/21 0025)   cefTRIAXone (ROCEPHIN) 1000 mg in sterile water 10 mL IV syringe (1,000 mg Intravenous Given 6/27/21 0051)           Patient presents emergency department for evaluation of vomiting blood and lightheadedness with syncope yesterday. Patient appears pale and jaundiced. Patient's lips are dry and cracked. Mucous membranes are dry. Patient is tachycardic. Blood pressure is soft and is initially 80s over 60s. Patient is urgently started on IV fluids. Patient is given Zofran for nausea. Patient heart rate is tachycardic without murmurs rubs or gallops. Lungs clear auscultation bilaterally. Patient has some discomfort to palpation of left-sided abdomen. No rebound or guarding. Laboratory evaluation is significant for hemoglobin of 4.1. This was rechecked on a second sample and does match. Patient has an elevated bilirubin and elevated PT/INR. Lactic acid is 2.5.  2 units of packed red blood cells are transfused here in the emergency department. Patient tolerates this well. As the first unit is infusing patient states she is already feeling improved. Patient's blood pressure also rebounded positively and is currently 112/62. She is less tachycardic. Patient is started on octreotide bolus and drip as well as Protonix bolus and drip. Patient has had varices noted on previous endoscopies. GI was consulted and states the patient will be taken for urgent endoscopy this evening. A third unit of packed cells is available for transfusion as needed. Patient will be admitted to the hospitalist for further management after endoscopy via GI physician. Patient did not have any active hematemesis here in the emergency department. I have discussed with the patient the rationale for blood transfusion, its benefits in treating or preventing acute blood loss which could lead to fatigue, organ damage or death, and its risk which include: mild transfusion reactions, rare risk of blood borne infection, or more serious but rare allergic reactions. I have discussed the alternatives to transfusion, including the risk and consequences of not receiving transfusion. The patient had an opportunity to ask questions and had agreed to proceed with transfusion of packed red blood cells. FINAL IMPRESSION      1. Posthemorrhagic anemia    2. Gastrointestinal hemorrhage with hematemesis          DISPOSITION/PLAN   DISPOSITION Admitted 06/27/2021 12:23:36 AM      PATIENT REFERRED TO:  No follow-up provider specified.     DISCHARGE MEDICATIONS:  Current Discharge Medication List          DISCONTINUED MEDICATIONS:  Current Discharge Medication List                 (Please note that portions of this note were completed with a voice recognition program.  Efforts were made to edit the dictations but occasionally words are mis-transcribed.)    Sky Delvalle PA-C (electronically signed)            Sky Delvalle PA-C  06/27/21 4699 [facility]  06/27/21 2689

## 2021-06-27 NOTE — PROGRESS NOTES
[facility]    Noted Hgb up to 5.9 after transfusion  Ammonia NL  On octreotide drip  Intermittent IV PPI ok    Continue octreotide drip today  Recheck INR -- trial of vitamin K to see if reverses at all; if rebleeds, needs FFP  Clears only today

## 2021-06-27 NOTE — OP NOTE
Operative Note      Patient: James Lebron  YOB: 1975  MRN: [id number]    Date of Procedure: 6/27/2021    Pre-Op Diagnosis: GI bleed    Post-Op Diagnosis: Same       Procedure(s):  EGD BAND LIGATION    Surgeon(s):  Dario Solis MD    Assistant:   * No surgical staff found *    Anesthesia: General    Estimated Blood Loss (mL): Minimal    Complications: None    Specimens:   * No specimens in log *    Implants:  * No implants in log *      Drains: * No LDAs found *    Findings:   Pt received Rocephin 1g IV preprocedure for prophylaxis. She has h/o ascites seen on U/S May 2021. Pt was placed in the left lateral position and sedated by virtue of General Anesthesia/anethesia service. Upper endoscope was introduced via oropharynx and traversed to second portion duodenum. Esophagus showed normal mucosa proximally. In the distal esophagus at the ge jxn, there was a red spot overlying a varix at the 7 o'clock position. There were 2 additional trunks noted, all about 2-3+. In the stomach, there was mosiac texture to mucosa suggestive of portal gastropathy. A few coffee grounds were seen. Retroflexed view of the cardia, fundus, ge jxn was negative for varices. Duodenum was normal, without bleeding source. Scope was withdrawn and band ligator placed and scope reintroduced with total of 3 bands successfully placed distal esophagus with good hemostasis. Pt was sent to recovery.     Post Op Dx:  1)Esophageal varices with red spot suggestive of bleeding source, banded X3  2)Portal gastropathy mild  3) No gastric varices, no ulcers    Plan  Continue octreotide drip 48-72hrs  PPI drip  Follow H/H, transfuse  Check ammonia  Start prednisolone for ETOH hepatitis    Electronically signed by Gianna Boyer MD on 6/27/2021 at 1:25 AM

## 2021-06-27 NOTE — H&P
tenderness to  palpation. MUSCULOSKELETAL:  No acute deformities. SKIN:  Appears very pale without obvious rashes or lesions. DIAGNOSTIC DATA:  INR is 2.13. Lactic acid 2.5. BUN 32, creatinine  1.6, sodium 134, potassium 3.4. Hemoglobin 4.1, hematocrit 13.4, white  count of 11.8. ASSESSMENT:  1. Acute posthemorrhagic anemia with hemorrhagic shock secondary to  large volume upper gastrointestinal hemorrhage likely from esophageal  varices in a patient with alcoholic liver disease with cirrhosis. 2.  Acute kidney injury. PLAN OF CARE:  The patient is currently critically ill and hence  admitted to the Intensive Care Unit. Octreotide and Protonix initiated. GI consult requested. Cryoprecipitate will be administered given the  likelihood of hypofibrinogenemia and coagulopathy to decrease the  further risk of rebleeding. TOTAL CRITICAL CARE TIME:  35 minutes. EXPECTED LENGTH OF STAY:  More than two midnights based on plan of care  above. DISPOSITION:  Admitted to Intensive Care Unit.         Kendell Alfonso MD    D: 06/27/2021 7:03:03       T: 06/27/2021 7:07:09     [email or web address]  Job#: [id number]     Doc#: [id number]    CC:

## 2021-06-27 NOTE — ED NOTES
1G Rocephin administered per verbal order of Endo. Report given to endoscopy. Pt taken to procedure in stable condition by endoscopy.       Marquis Heller RN  06/27/21 4465

## 2021-06-27 NOTE — ED NOTES
Pt hemoglobin 4.1 Dr. Brea Marrufo made aware of critical result.       Brody Gallagher, RN  06/26/21 3912

## 2021-06-28 PROBLEM — K70.10 ALCOHOLIC HEPATITIS: Status: ACTIVE | Noted: 2021-06-28

## 2021-06-28 PROBLEM — N17.9 AKI (ACUTE KIDNEY INJURY) (HCC): Status: ACTIVE | Noted: 2021-06-28

## 2021-06-28 PROBLEM — I85.00 ESOPHAGEAL VARICES (HCC): Status: ACTIVE | Noted: 2021-06-28

## 2021-06-28 LAB
ANION GAP SERPL CALCULATED.3IONS-SCNC: 12 MMOL/L (ref 3–16)
ANISOCYTOSIS: ABNORMAL
BASOPHILS ABSOLUTE: 0 K/UL (ref 0–0.2)
BASOPHILS RELATIVE PERCENT: 0 %
BUN BLDV-MCNC: 31 MG/DL (ref 7–20)
CALCIUM SERPL-MCNC: 8.2 MG/DL (ref 8.3–10.6)
CHLORIDE BLD-SCNC: 101 MMOL/L (ref 99–110)
CO2: 22 MMOL/L (ref 21–32)
CREAT SERPL-MCNC: 0.9 MG/DL (ref 0.6–1.1)
EOSINOPHILS ABSOLUTE: 0 K/UL (ref 0–0.6)
EOSINOPHILS RELATIVE PERCENT: 0 %
GFR AFRICAN AMERICAN: >60
GFR NON-AFRICAN AMERICAN: >60
GLUCOSE BLD-MCNC: 145 MG/DL (ref 70–99)
HCT VFR BLD CALC: 24.6 % (ref 36–48)
HCT VFR BLD CALC: 25.1 % (ref 36–48)
HCT VFR BLD CALC: 25.8 % (ref 36–48)
HEMATOLOGY PATH CONSULT: NORMAL
HEMOGLOBIN: 8.2 G/DL (ref 12–16)
HEMOGLOBIN: 8.2 G/DL (ref 12–16)
HEMOGLOBIN: 8.5 G/DL (ref 12–16)
HYPOCHROMIA: ABNORMAL
INR BLD: 1.75 (ref 0.86–1.14)
LYMPHOCYTES ABSOLUTE: 0.6 K/UL (ref 1–5.1)
LYMPHOCYTES RELATIVE PERCENT: 11 %
MAGNESIUM: 1.5 MG/DL (ref 1.8–2.4)
MAGNESIUM: 1.7 MG/DL (ref 1.8–2.4)
MCH RBC QN AUTO: 28.9 PG (ref 26–34)
MCHC RBC AUTO-ENTMCNC: 32.9 G/DL (ref 31–36)
MCV RBC AUTO: 87.8 FL (ref 80–100)
MONOCYTES ABSOLUTE: 0.4 K/UL (ref 0–1.3)
MONOCYTES RELATIVE PERCENT: 7 %
NEUTROPHILS ABSOLUTE: 4.2 K/UL (ref 1.7–7.7)
NEUTROPHILS RELATIVE PERCENT: 82 %
PDW BLD-RTO: 19.7 % (ref 12.4–15.4)
PHOSPHORUS: 4.5 MG/DL (ref 2.5–4.9)
PLATELET # BLD: 74 K/UL (ref 135–450)
PLATELET SLIDE REVIEW: ABNORMAL
PMV BLD AUTO: 8.8 FL (ref 5–10.5)
POLYCHROMASIA: ABNORMAL
POTASSIUM SERPL-SCNC: 4 MMOL/L (ref 3.5–5.1)
PROTHROMBIN TIME: 20.4 SEC (ref 10–13.2)
RBC # BLD: 2.85 M/UL (ref 4–5.2)
SLIDE REVIEW: ABNORMAL
SODIUM BLD-SCNC: 135 MMOL/L (ref 136–145)
WBC # BLD: 5.1 K/UL (ref 4–11)

## 2021-06-28 PROCEDURE — C9113 INJ PANTOPRAZOLE SODIUM, VIA: HCPCS | Performed by: INTERNAL MEDICINE

## 2021-06-28 PROCEDURE — 6370000000 HC RX 637 (ALT 250 FOR IP): Performed by: INTERNAL MEDICINE

## 2021-06-28 PROCEDURE — 85014 HEMATOCRIT: CPT

## 2021-06-28 PROCEDURE — 6360000002 HC RX W HCPCS: Performed by: INTERNAL MEDICINE

## 2021-06-28 PROCEDURE — 36415 COLL VENOUS BLD VENIPUNCTURE: CPT

## 2021-06-28 PROCEDURE — 2580000003 HC RX 258: Performed by: INTERNAL MEDICINE

## 2021-06-28 PROCEDURE — 85018 HEMOGLOBIN: CPT

## 2021-06-28 PROCEDURE — 99233 SBSQ HOSP IP/OBS HIGH 50: CPT | Performed by: INTERNAL MEDICINE

## 2021-06-28 PROCEDURE — 2580000003 HC RX 258: Performed by: PHYSICIAN ASSISTANT

## 2021-06-28 PROCEDURE — 84100 ASSAY OF PHOSPHORUS: CPT

## 2021-06-28 PROCEDURE — 1200000000 HC SEMI PRIVATE

## 2021-06-28 PROCEDURE — 83735 ASSAY OF MAGNESIUM: CPT

## 2021-06-28 PROCEDURE — 85610 PROTHROMBIN TIME: CPT

## 2021-06-28 PROCEDURE — 80048 BASIC METABOLIC PNL TOTAL CA: CPT

## 2021-06-28 PROCEDURE — 6360000002 HC RX W HCPCS: Performed by: PHYSICIAN ASSISTANT

## 2021-06-28 PROCEDURE — 85025 COMPLETE CBC W/AUTO DIFF WBC: CPT

## 2021-06-28 RX ORDER — HALOPERIDOL 5 MG/ML
5 INJECTION INTRAMUSCULAR EVERY 4 HOURS PRN
Status: DISCONTINUED | OUTPATIENT
Start: 2021-06-28 | End: 2021-06-30 | Stop reason: HOSPADM

## 2021-06-28 RX ORDER — LORAZEPAM 2 MG/ML
0.5 INJECTION INTRAMUSCULAR EVERY 6 HOURS PRN
Status: DISCONTINUED | OUTPATIENT
Start: 2021-06-28 | End: 2021-06-30 | Stop reason: HOSPADM

## 2021-06-28 RX ORDER — MAGNESIUM SULFATE IN WATER 40 MG/ML
2000 INJECTION, SOLUTION INTRAVENOUS PRN
Status: DISCONTINUED | OUTPATIENT
Start: 2021-06-28 | End: 2021-06-30 | Stop reason: HOSPADM

## 2021-06-28 RX ADMIN — MORPHINE SULFATE 1 MG: 2 INJECTION, SOLUTION INTRAMUSCULAR; INTRAVENOUS at 04:23

## 2021-06-28 RX ADMIN — Medication 10 ML: at 08:03

## 2021-06-28 RX ADMIN — LORAZEPAM 0.5 MG: 2 INJECTION INTRAMUSCULAR; INTRAVENOUS at 20:52

## 2021-06-28 RX ADMIN — ONDANSETRON 4 MG: 2 INJECTION INTRAMUSCULAR; INTRAVENOUS at 08:03

## 2021-06-28 RX ADMIN — PANTOPRAZOLE SODIUM 40 MG: 40 INJECTION, POWDER, FOR SOLUTION INTRAVENOUS at 08:03

## 2021-06-28 RX ADMIN — PREDNISOLONE SODIUM PHOSPHATE 40 MG: 15 SOLUTION ORAL at 08:14

## 2021-06-28 RX ADMIN — MORPHINE SULFATE 1 MG: 2 INJECTION, SOLUTION INTRAMUSCULAR; INTRAVENOUS at 16:58

## 2021-06-28 RX ADMIN — PANTOPRAZOLE SODIUM 40 MG: 40 INJECTION, POWDER, FOR SOLUTION INTRAVENOUS at 20:52

## 2021-06-28 RX ADMIN — CEFTRIAXONE 2000 MG: 2 INJECTION, POWDER, FOR SOLUTION INTRAMUSCULAR; INTRAVENOUS at 23:40

## 2021-06-28 RX ADMIN — MORPHINE SULFATE 1 MG: 2 INJECTION, SOLUTION INTRAMUSCULAR; INTRAVENOUS at 21:00

## 2021-06-28 RX ADMIN — Medication 10 ML: at 20:52

## 2021-06-28 RX ADMIN — OCTREOTIDE ACETATE 50 MCG/HR: 500 INJECTION, SOLUTION INTRAVENOUS; SUBCUTANEOUS at 06:43

## 2021-06-28 RX ADMIN — ONDANSETRON 4 MG: 2 INJECTION INTRAMUSCULAR; INTRAVENOUS at 16:58

## 2021-06-28 RX ADMIN — MORPHINE SULFATE 1 MG: 2 INJECTION, SOLUTION INTRAMUSCULAR; INTRAVENOUS at 10:10

## 2021-06-28 RX ADMIN — CEFTRIAXONE 2000 MG: 2 INJECTION, POWDER, FOR SOLUTION INTRAMUSCULAR; INTRAVENOUS at 00:24

## 2021-06-28 RX ADMIN — OCTREOTIDE ACETATE 50 MCG/HR: 500 INJECTION, SOLUTION INTRAVENOUS; SUBCUTANEOUS at 16:58

## 2021-06-28 ASSESSMENT — PAIN SCALES - GENERAL
PAINLEVEL_OUTOF10: 0
PAINLEVEL_OUTOF10: 6
PAINLEVEL_OUTOF10: 8
PAINLEVEL_OUTOF10: 0
PAINLEVEL_OUTOF10: 8
PAINLEVEL_OUTOF10: 8
PAINLEVEL_OUTOF10: 0
PAINLEVEL_OUTOF10: 6
PAINLEVEL_OUTOF10: 9
PAINLEVEL_OUTOF10: 7
PAINLEVEL_OUTOF10: 0
PAINLEVEL_OUTOF10: 6
PAINLEVEL_OUTOF10: 0

## 2021-06-28 ASSESSMENT — PAIN DESCRIPTION - PROGRESSION
CLINICAL_PROGRESSION: GRADUALLY WORSENING
CLINICAL_PROGRESSION: NOT CHANGED
CLINICAL_PROGRESSION: GRADUALLY WORSENING

## 2021-06-28 ASSESSMENT — PAIN DESCRIPTION - DESCRIPTORS
DESCRIPTORS: CONSTANT
DESCRIPTORS: OTHER (COMMENT)

## 2021-06-28 ASSESSMENT — PAIN DESCRIPTION - ORIENTATION
ORIENTATION: INNER
ORIENTATION: LOWER;MID

## 2021-06-28 ASSESSMENT — PAIN DESCRIPTION - FREQUENCY
FREQUENCY: INTERMITTENT
FREQUENCY: INTERMITTENT

## 2021-06-28 ASSESSMENT — PAIN DESCRIPTION - LOCATION
LOCATION: ABDOMEN
LOCATION: ABDOMEN

## 2021-06-28 ASSESSMENT — PAIN DESCRIPTION - PAIN TYPE
TYPE: CHRONIC PAIN
TYPE: CHRONIC PAIN

## 2021-06-28 ASSESSMENT — PAIN DESCRIPTION - ONSET
ONSET: GRADUAL
ONSET: OTHER (COMMENT)

## 2021-06-28 ASSESSMENT — PAIN - FUNCTIONAL ASSESSMENT
PAIN_FUNCTIONAL_ASSESSMENT: ACTIVITIES ARE NOT PREVENTED
PAIN_FUNCTIONAL_ASSESSMENT: ACTIVITIES ARE NOT PREVENTED

## 2021-06-28 NOTE — PROGRESS NOTES
Hospitalist Progress Note      PCP: Vernon Galaviz DO    Date of Admission: 6/26/2021    Chief Complaint:   Hematemesis    Hospital Course: 42 yo F with continuous alcohol abuse, alcoholic cirrhosis came to ER with complaints of hematemesis with acute blood loss anemia. Admitted as inpatient for acute blood loss anemia and hematemesis. Transfused 3 U PRBC and 2 U FFP. Seen by GI. Underwent emergent EGD on  6/27/21:  Post Op Dx:  1)Esophageal varices with red spot suggestive of bleeding source, banded X3  2)Portal gastropathy mild  3) No gastric varices, no ulcers     Plan  Continue octreotide drip 48-72hrs  PPI drip  Follow H/H, transfuse  Check ammonia  Start prednisolone for ETOH hepatitis     Started on PPI IV and Octreotide gtt. Steroids apparently not tolerated and stopped per GI. Started on Rocephin IV per GI X 5 days. Subjective:  Patient denies further hematemesis or melena. No CP, SOB, HA or abdominal pain.        Medications:  Reviewed    Infusion Medications    sodium chloride      sodium chloride      sodium chloride      sodium chloride      sodium chloride      octreotide (SANDOSTATIN) infusion 50 mcg/hr (06/28/21 1658)     Scheduled Medications    sodium chloride flush  5-40 mL Intravenous 2 times per day    pantoprazole  40 mg Intravenous BID    cefTRIAXone (ROCEPHIN) IV  2,000 mg Intravenous Q24H     PRN Meds: magnesium sulfate, LORazepam, haloperidol lactate, sodium chloride flush, sodium chloride, ondansetron **OR** ondansetron, sodium chloride, sodium chloride, morphine, sodium chloride, sodium chloride      Intake/Output Summary (Last 24 hours) at 6/28/2021 1830  Last data filed at 6/28/2021 0400  Gross per 24 hour   Intake 509 ml   Output 400 ml   Net 109 ml       Physical Exam Performed:    /60   Pulse 70   Temp 98.2 °F (36.8 °C) (Temporal)   Resp 11   Ht 5' 4\" (1.626 m)   Wt 121 lb 11.1 oz (55.2 kg)   LMP 05/17/2021   SpO2 99%   BMI 20.89 kg/m² General appearance: No apparent distress, appears stated age and cooperative. HEENT: Pupils equal, round, and reactive to light. Conjunctivae/corneas clear. Neck: Supple, with full range of motion. No jugular venous distention. Trachea midline. Respiratory:  Normal respiratory effort. Clear to auscultation, bilaterally without Rales/Wheezes/Rhonchi. Cardiovascular: Regular rate and rhythm with normal S1/S2 without murmurs, rubs or gallops. Abdomen: Soft, non-tender, non-distended with normal bowel sounds. Musculoskeletal: No clubbing, cyanosis or edema bilaterally. Full range of motion without deformity. Skin: Skin color, texture, turgor normal.  No rashes or lesions. Neurologic:  Neurovascularly intact without any focal sensory/motor deficits. Cranial nerves: II-XII intact, grossly non-focal.  Psychiatric: Alert and oriented, thought content appropriate, normal insight  Capillary Refill: Brisk,3 seconds, normal   Peripheral Pulses: +2 palpable, equal bilaterally       Labs:   Recent Labs     06/26/21 2203 06/27/21 0637 06/27/21 2047 06/28/21  0325 06/28/21  0832   WBC 11.8*  --   --  5.1  --    HGB 4.1*   < > 8.6* 8.2*  8.2* 8.5*   HCT 13.4*   < > 26.0* 25.1*  24.6* 25.8*   *  --   --  74*  --     < > = values in this interval not displayed. Recent Labs     06/26/21 2135 06/28/21 0325   * 135*   K 3.4* 4.0   CL 96* 101   CO2 25 22   BUN 32* 31*   CREATININE 1.6* 0.9   CALCIUM 9.0 8.2*   PHOS  --  4.5     Recent Labs     06/26/21 2135   AST 67*   ALT 23   BILITOT 3.8*   ALKPHOS 127     Recent Labs     06/26/21 2135 06/28/21  0344   INR 2.13* 1.75*     No results for input(s): Char Rosario in the last 72 hours.     Urinalysis:      Lab Results   Component Value Date    NITRU Negative 05/16/2021    WBCUA 8 05/16/2021    BACTERIA 4+ 05/16/2021    RBCUA 3 05/16/2021    BLOODU LARGE 05/16/2021    SPECGRAV >1.030 05/16/2021    GLUCOSEU Negative 05/16/2021       Radiology:  No orders to display           Assessment/Plan:    Active Hospital Problems    Diagnosis     Esophageal varices ([facility] [H56.62]     Alcoholic hepatitis [J95.97]     EZIO (acute kidney injury) ([facility] [N17.9]     Acute post-hemorrhagic anemia [E20]     Alcoholic liver disease ([facility] [K70.9]      1. Cont Octreotide Day 2/3  2. Protonix IV bid  3. Diet per GI  4. Off steroids per GI  5. Stop serial H/H  6. Rocephin IV Day 1/5  7. Ativan IV PRN anxiety/insomnia/WD  8. Counseled to stop drinking    DVT Prophylaxis: SCD  Diet: ADULT DIET; Dysphagia - Soft and Bite Sized  Code Status: Full Code    PT/OT Eval Status: Requested    Dispo - Home    Discussed with patient, nursing and CM. She is an alcoholic. She will die an early death if she maintains her patterns of behavior. She has been warned about consequences of ongoing alcohol use.     Aleks Childress MD

## 2021-06-28 NOTE — PROGRESS NOTES
Nursing reassessment. Remains on Sandostatin IVF. Afebrile. VSS.  NSR. Only c/o a little ache in her ABD. Warm blanket applied. Gait steady. Up to bathroom. Voided soraya clear urine 400 cc. Taking ice chips only. Scheduled Abx Rocephin to be started. Turns ad jessica. Warm blankets. SR up x3. Call light in reach. No acute distress at this time. Bed in lowest position and wheels locked. Bed alarm engaged.

## 2021-06-28 NOTE — PLAN OF CARE
Problem: Falls - Risk of:  Goal: Will remain free from falls  Description: Will remain free from falls  Outcome: Ongoing     Problem: Discharge Planning:  Goal: Discharged to appropriate level of care  Description: Discharged to appropriate level of care  Outcome: Ongoing     Problem:  Bowel Function - Altered:  Goal: Bowel elimination is within specified parameters  Description: Bowel elimination is within specified parameters  Outcome: Ongoing     Problem: Fluid Volume - Imbalance:  Goal: Absence of imbalanced fluid volume signs and symptoms  Description: Absence of imbalanced fluid volume signs and symptoms  Outcome: Ongoing

## 2021-06-28 NOTE — PLAN OF CARE
Problem: Falls - Risk of:  Goal: Will remain free from falls  Description: Will remain free from falls  6/28/2021 0825 by Umang Pardo RN  Outcome: Ongoing  6/27/2021 2329 by Carolina Nicole RN  Outcome: Ongoing     Problem: Nausea/Vomiting:  Goal: Absence of nausea/vomiting  Description: Absence of nausea/vomiting  Outcome: Ongoing     Problem: Nausea/Vomiting:  Goal: Able to eat  Description: Able to eat  Outcome: Ongoing     Problem: Physical Regulation:  Goal: Will show no signs and symptoms of excessive bleeding  Description: Will show no signs and symptoms of excessive bleeding  Outcome: Ongoing

## 2021-06-28 NOTE — PROGRESS NOTES
P Pulmonary and Critical Care    Follow Up Note    Subjective:   CHIEF COMPLAINT / HPI:   Chief Complaint   Patient presents with    Emesis     Pt reports that she has been throwing up blood since last night. Pt also with c/o LLQ abdominal pain. Interval history: Patient is alert. Generally feeling pretty well but complains of some vague periumbilical pain. Past Medical History:    Reviewed; no changes    Social History:    Reviewed; no changes    REVIEW OF SYSTEMS:    CONSTITUTIONAL:  negative for fevers and chills  RESPIRATORY:  See HPI  CARDIOVASCULAR:  negative for chest pain, palpitations, edema  GASTROINTESTINAL:  negative for nausea, vomiting, diarrhea, constipation and abdominal pain    Objective:   PHYSICAL EXAM:        VITALS:  /60   Pulse 70   Temp 98.2 °F (36.8 °C) (Temporal)   Resp 11   Ht 5' 4\" (1.626 m)   Wt 121 lb 11.1 oz (55.2 kg)   LMP 05/17/2021   SpO2 99%   BMI 20.89 kg/m²  on room air     24HR INTAKE/OUTPUT:      Intake/Output Summary (Last 24 hours) at 6/28/2021 1318  Last data filed at 6/28/2021 0400  Gross per 24 hour   Intake 792.33 ml   Output 400 ml   Net 392.33 ml       CONSTITUTIONAL:  awake, alert,  no apparent distress, and appears stated age  LUNGS:  No increased work of breathing and clear to auscultation, no crackles or wheezes  CARDIOVASCULAR: S1 and S2 and no JVD  ABDOMEN:  normal bowel sounds, non-distended and non-tender to palpation  EXT: No edema, no calf tenderness. Pulses are present bilaterally. NEUROLOGIC:  Mental Status Exam:  Level of Alertness:   awake  Orientation:   person, place, time.  Non focal  SKIN:  normal skin color, texture, turgor, no redness, warmth, or swelling at IV sites    DATA:    CBC:  Recent Labs     06/26/21  2203 06/27/21  0637 06/27/21  2047 06/28/21  0325 06/28/21  0832   WBC 11.8*  --   --  5.1  --    RBC 1.37*  --   --  2.85*  --    HGB 4.1*   < > 8.6* 8.2*  8.2* 8.5*   HCT 13.4*   < > 26.0* 25.1*  24.6* 25.8*   PLT 127*  --   --  74*  --    MCV 97.6  --   --  87.8  --    MCH 29.9  --   --  28.9  --    MCHC 30.7*  --   --  32.9  --    RDW 24.1*  --   --  19.7*  --     < > = values in this interval not displayed. BMP:  Recent Labs     06/26/21  2135 06/28/21  0325   * 135*   K 3.4* 4.0   CL 96* 101   CO2 25 22   BUN 32* 31*   CREATININE 1.6* 0.9   CALCIUM 9.0 8.2*   GLUCOSE 148* 145*      ABG:  No results for input(s): PHART, LBW3EQJ, PO2ART, QTM9UTR, L7JKMYDH, BEART in the last 72 hours. Procalcitonin  No results for input(s): PROCAL in the last 72 hours. Radiology Review:  Pertinent images / reports were reviewed as a part of this visit. Assessment:     1. Acute variceal bleed  2. Alcoholic erosive/hepatitis    Plan:     1. I have reviewed laboratories, medical records and images for this visit  2. Potassium and sodium are improving  3. Renal function has improved  4. Did not tolerate steroids well. These been stopped by GI  5. Starting on a soft diet  6. No obvious etiology for vague abdominal pain  7.  Continues on prophylactic Rocephin

## 2021-06-28 NOTE — PROGRESS NOTES
Gastroenterology Progress Note    Liam Ludwig is a 41 y.o. female patient. 1. Posthemorrhagic anemia    2. Gastrointestinal hemorrhage with hematemesis        Admission Date: 2021  Hospital Day: Hospital Day: 3  Attending: Loretta Zarco MD  Date of service: 21    SUBJECTIVE:   She cannot tolerate prednisolone, makes it heart beat faster  No overt GI bleed  Vague periumbilical pain   Patient denies alcohol use since 2021      ROS:  Cardiovascular ROS: no chest pain or dyspnea on exertion  Gastrointestinal ROS: see above  Respiratory ROS: no cough, shortness of breath, or wheezing    Physical    VITALS:  /65   Pulse 71   Temp 98.2 °F (36.8 °C) (Temporal)   Resp 12   Ht 5' 4\" (1.626 m)   Wt 121 lb 11.1 oz (55.2 kg)   LMP 2021   SpO2 99%   BMI 20.89 kg/m²   TEMPERATURE:  Current - Temp: 98.2 °F (36.8 °C); Max - Temp  Av.4 °F (36.9 °C)  Min: 98 °F (36.7 °C)  Max: 98.7 °F (37.1 °C)    NAD  RRR, Nl s1s2  Lungs CTA Bilaterally, normal effort  Abdomen soft, ND, NT, no HSM, Bowel sounds normal  AAOx3, No asterixis     Data      CBC:   Recent Labs     21  0325 21  0832   WBC 11.8*  --   --  5.1  --    RBC 1.37*  --   --  2.85*  --    HGB 4.1*   < > 8.6* 8.2*  8.2* 8.5*   HCT 13.4*   < > 26.0* 25.1*  24.6* 25.8*   *  --   --  74*  --    MCV 97.6  --   --  87.8  --    MCH 29.9  --   --  28.9  --    MCHC 30.7*  --   --  32.9  --    RDW 24.1*  --   --  19.7*  --     < > = values in this interval not displayed.         BMP:  Recent Labs     06/26/21  2135 06/28/21  0325   * 135*   K 3.4* 4.0   CL 96* 101   CO2 25 22   BUN 32* 31*   CREATININE 1.6* 0.9   CALCIUM 9.0 8.2*   GLUCOSE 148* 145*        Hepatic Function Panel:   Recent Labs     21   AST 67*   ALT 23   BILITOT 3.8*   ALKPHOS 127       Recent Labs     21   LIPASE 15.0     Recent Labs     21  0344   PROTIME 24.9* 20.4*   INR 2.13* 1.75*     No results for input(s): PTT in the last 72 hours. No results for input(s): OCCULTBLD in the last 72 hours. Radiology Review:    No orders to display         ASSESSMENT   Acute blood loss anemia from EV. S/p EGD 6/27 s/p banding X 3. S/p 3 U pRBC. Hb now stable at 8.5  Alcoholic cirrhosis with alcoholic hepatitis. DF>32  Alcohol use.  Patient reportedly stopped using alcohol in 3/2021    PLAN    Absolutely no alcohol  Advance diet to soft   Monitor H/H, transfuse to keep Hb>7  IV PPI and octreotide gtt X 72 hours and then switched to oral PPI daily   OP EGD in 3-6 weeks for EV banding   D/C prednisolone  IV Rocephin X 5 days     Nyra Saint, MD, MSc  [facility]  06/28/21

## 2021-06-29 LAB
ANION GAP SERPL CALCULATED.3IONS-SCNC: 10 MMOL/L (ref 3–16)
BASOPHILS ABSOLUTE: 0 K/UL (ref 0–0.2)
BASOPHILS RELATIVE PERCENT: 0.6 %
BUN BLDV-MCNC: 28 MG/DL (ref 7–20)
CALCIUM SERPL-MCNC: 8.4 MG/DL (ref 8.3–10.6)
CHLORIDE BLD-SCNC: 105 MMOL/L (ref 99–110)
CO2: 23 MMOL/L (ref 21–32)
CREAT SERPL-MCNC: 1 MG/DL (ref 0.6–1.1)
EOSINOPHILS ABSOLUTE: 0 K/UL (ref 0–0.6)
EOSINOPHILS RELATIVE PERCENT: 0.1 %
GFR AFRICAN AMERICAN: >60
GFR NON-AFRICAN AMERICAN: >60
GLUCOSE BLD-MCNC: 143 MG/DL (ref 70–99)
HCT VFR BLD CALC: 24.6 % (ref 36–48)
HEMOGLOBIN: 8.1 G/DL (ref 12–16)
INR BLD: 1.59 (ref 0.86–1.14)
LYMPHOCYTES ABSOLUTE: 1.1 K/UL (ref 1–5.1)
LYMPHOCYTES RELATIVE PERCENT: 15.2 %
MAGNESIUM: 1.7 MG/DL (ref 1.8–2.4)
MCH RBC QN AUTO: 29 PG (ref 26–34)
MCHC RBC AUTO-ENTMCNC: 33.1 G/DL (ref 31–36)
MCV RBC AUTO: 87.7 FL (ref 80–100)
MONOCYTES ABSOLUTE: 0.7 K/UL (ref 0–1.3)
MONOCYTES RELATIVE PERCENT: 9.5 %
NEUTROPHILS ABSOLUTE: 5.3 K/UL (ref 1.7–7.7)
NEUTROPHILS RELATIVE PERCENT: 74.6 %
PDW BLD-RTO: 19.9 % (ref 12.4–15.4)
PHOSPHORUS: 2.9 MG/DL (ref 2.5–4.9)
PLATELET # BLD: 84 K/UL (ref 135–450)
PMV BLD AUTO: 8.3 FL (ref 5–10.5)
POTASSIUM SERPL-SCNC: 3.7 MMOL/L (ref 3.5–5.1)
PROTHROMBIN TIME: 18.5 SEC (ref 10–13.2)
RBC # BLD: 2.81 M/UL (ref 4–5.2)
SODIUM BLD-SCNC: 138 MMOL/L (ref 136–145)
WBC # BLD: 7.1 K/UL (ref 4–11)

## 2021-06-29 PROCEDURE — 6360000002 HC RX W HCPCS: Performed by: INTERNAL MEDICINE

## 2021-06-29 PROCEDURE — 85610 PROTHROMBIN TIME: CPT

## 2021-06-29 PROCEDURE — 85025 COMPLETE CBC W/AUTO DIFF WBC: CPT

## 2021-06-29 PROCEDURE — 2580000003 HC RX 258: Performed by: PHYSICIAN ASSISTANT

## 2021-06-29 PROCEDURE — 6360000002 HC RX W HCPCS: Performed by: PHYSICIAN ASSISTANT

## 2021-06-29 PROCEDURE — 6370000000 HC RX 637 (ALT 250 FOR IP): Performed by: INTERNAL MEDICINE

## 2021-06-29 PROCEDURE — 80048 BASIC METABOLIC PNL TOTAL CA: CPT

## 2021-06-29 PROCEDURE — C9113 INJ PANTOPRAZOLE SODIUM, VIA: HCPCS | Performed by: INTERNAL MEDICINE

## 2021-06-29 PROCEDURE — 2580000003 HC RX 258: Performed by: INTERNAL MEDICINE

## 2021-06-29 PROCEDURE — 1200000000 HC SEMI PRIVATE

## 2021-06-29 PROCEDURE — 84100 ASSAY OF PHOSPHORUS: CPT

## 2021-06-29 PROCEDURE — 83735 ASSAY OF MAGNESIUM: CPT

## 2021-06-29 RX ORDER — NADOLOL 40 MG/1
40 TABLET ORAL DAILY
Status: DISCONTINUED | OUTPATIENT
Start: 2021-06-29 | End: 2021-06-30 | Stop reason: HOSPADM

## 2021-06-29 RX ADMIN — LORAZEPAM 0.5 MG: 2 INJECTION INTRAMUSCULAR; INTRAVENOUS at 22:49

## 2021-06-29 RX ADMIN — ONDANSETRON 4 MG: 2 INJECTION INTRAMUSCULAR; INTRAVENOUS at 22:49

## 2021-06-29 RX ADMIN — PANTOPRAZOLE SODIUM 40 MG: 40 INJECTION, POWDER, FOR SOLUTION INTRAVENOUS at 21:44

## 2021-06-29 RX ADMIN — Medication 10 ML: at 08:50

## 2021-06-29 RX ADMIN — Medication 10 ML: at 21:44

## 2021-06-29 RX ADMIN — ONDANSETRON 4 MG: 2 INJECTION INTRAMUSCULAR; INTRAVENOUS at 03:42

## 2021-06-29 RX ADMIN — NADOLOL 40 MG: 40 TABLET ORAL at 13:00

## 2021-06-29 RX ADMIN — OCTREOTIDE ACETATE 50 MCG/HR: 500 INJECTION, SOLUTION INTRAVENOUS; SUBCUTANEOUS at 04:04

## 2021-06-29 RX ADMIN — MORPHINE SULFATE 1 MG: 2 INJECTION, SOLUTION INTRAMUSCULAR; INTRAVENOUS at 03:42

## 2021-06-29 RX ADMIN — ONDANSETRON 4 MG: 2 INJECTION INTRAMUSCULAR; INTRAVENOUS at 08:49

## 2021-06-29 RX ADMIN — PANTOPRAZOLE SODIUM 40 MG: 40 INJECTION, POWDER, FOR SOLUTION INTRAVENOUS at 08:49

## 2021-06-29 RX ADMIN — LORAZEPAM 0.5 MG: 2 INJECTION INTRAMUSCULAR; INTRAVENOUS at 17:00

## 2021-06-29 RX ADMIN — LORAZEPAM 0.5 MG: 2 INJECTION INTRAMUSCULAR; INTRAVENOUS at 09:41

## 2021-06-29 ASSESSMENT — PAIN DESCRIPTION - LOCATION: LOCATION: FLANK

## 2021-06-29 ASSESSMENT — PAIN DESCRIPTION - ORIENTATION: ORIENTATION: LEFT

## 2021-06-29 ASSESSMENT — PAIN DESCRIPTION - PROGRESSION
CLINICAL_PROGRESSION: GRADUALLY WORSENING

## 2021-06-29 ASSESSMENT — PAIN SCALES - GENERAL
PAINLEVEL_OUTOF10: 0
PAINLEVEL_OUTOF10: 5
PAINLEVEL_OUTOF10: 0
PAINLEVEL_OUTOF10: 0
PAINLEVEL_OUTOF10: 10
PAINLEVEL_OUTOF10: 0

## 2021-06-29 NOTE — PROGRESS NOTES
Patient AM Mag 1.5. Patient not given 2gm of Mag on dayshift. Patient recheck this evening regarding need for replacement resulted at 1.7. Will monitor again in morning with AM labs. Will continue to monitor.

## 2021-06-29 NOTE — PROGRESS NOTES
Patient alert and oriented x4 and follows commands appropriately. Patient NSR on telemetry. Patient assessment completed, see flowsheets. Medications given without difficulty, see MAR. Plan of care reviewed. Patient complaining of pain 6/10 but wishes to wait \"a little while longer so that I can take it and try to sleep. \" Patient denies any other needs at this time. Will continue to monitor.

## 2021-06-29 NOTE — PROGRESS NOTES
[facility] for transport to [facility]. Patient with all personal belongings.   Norma Monique RN

## 2021-06-29 NOTE — FLOWSHEET NOTE
06/29/21 1600   Vitals   Temp 97.7 °F (36.5 °C)   Temp Source Temporal   Pulse 73   Heart Rate Source Monitor   Resp 16   /66   MAP (mmHg) 82   BP Location Left upper arm   BP Upper/Lower Upper   BP Method Automatic   Patient Position Semi fowlers   Level of Consciousness Alert (0)   MEWS Score 1   Patient Currently in Pain Denies   Cardiac Rhythm NSR   Ectopy PVC   Ectopy Frequency Occasional   Pain Assessment   Pain Assessment 0-10   Pain Level 0   Oxygen Therapy   SpO2 99 %   Pulse Oximeter Device Mode Intermittent   Pulse Oximeter Device Location Finger   O2 Device None (Room air)   Reassessment completed and charted, see flow. No acute changes noted.

## 2021-06-29 NOTE — PROGRESS NOTES
Gastroenterology Progress Note    Dee Groves is a 41 y.o. female patient. 1. Posthemorrhagic anemia    2. Gastrointestinal hemorrhage with hematemesis        Admission Date: 2021  Hospital Day: Hospital Day: 4  Attending: Zain Willett MD  Date of service: 21    SUBJECTIVE:    Patient is doing well   Left sided abdominal pain is better   No recurrent GI bleed    ROS:  Cardiovascular ROS: no chest pain or dyspnea on exertion  Gastrointestinal ROS: see above  Respiratory ROS: no cough, shortness of breath, or wheezing    Physical    VITALS:  /70   Pulse 70   Temp 98 °F (36.7 °C) (Temporal)   Resp 14   Ht 5' 4\" (1.626 m)   Wt 119 lb 14.9 oz (54.4 kg)   LMP 2021   SpO2 97%   BMI 20.59 kg/m²   TEMPERATURE:  Current - Temp: 98 °F (36.7 °C); Max - Temp  Av.1 °F (36.7 °C)  Min: 98 °F (36.7 °C)  Max: 98.1 °F (36.7 °C)    NAD  RRR, Nl s1s2  Lungs CTA Bilaterally, normal effort  Abdomen soft, ND, NT, no HSM, Bowel sounds normal  AAOx3, No asterixis     Data      CBC:   Recent Labs     2137 21  0325 21  0832 21  0507   WBC 11.8*  --  5.1  --  7.1   RBC 1.37*  --  2.85*  --  2.81*   HGB 4.1*   < > 8.2*  8.2* 8.5* 8.1*   HCT 13.4*   < > 25.1*  24.6* 25.8* 24.6*   *  --  74*  --  84*   MCV 97.6  --  87.8  --  87.7   MCH 29.9  --  28.9  --  29.0   MCHC 30.7*  --  32.9  --  33.1   RDW 24.1*  --  19.7*  --  19.9*    < > = values in this interval not displayed.         BMP:  Recent Labs     21  0325 21  0507   * 135* 138   K 3.4* 4.0 3.7   CL 96* 101 105   CO2 25 22 23   BUN 32* 31* 28*   CREATININE 1.6* 0.9 1.0   CALCIUM 9.0 8.2* 8.4   GLUCOSE 148* 145* 143*        Hepatic Function Panel:   Recent Labs     21   AST 67*   ALT 23   BILITOT 3.8*   ALKPHOS 127       Recent Labs     21   LIPASE 15.0     Recent Labs     21  2135 21  0344 21  0507   PROTIME 24.9* 20.4* 18.5* INR 2.13* 1.75* 1.59*     No results for input(s): PTT in the last 72 hours. No results for input(s): OCCULTBLD in the last 72 hours. Radiology Review:    No orders to display          ASSESSMENT   Acute blood loss anemia from EV. S/p EGD 6/27 s/p banding X 3. S/p 3 U pRBC. Hb now stable (8+ range)  Alcoholic cirrhosis with alcoholic hepatitis. DF>32. She did not tolerate prednisolone. This medication was discontinued 6/28  Alcohol use.  Patient reportedly stopped using alcohol in 3/2021     PLAN    Absolutely no alcohol  Advance diet to regular  Monitor H/H, transfuse to keep Hb>7  IV PPI and octreotide gtt X 72 hours (will end 6/30 AM) and then switched to oral PPI daily   OP EGD in 3-6 weeks for EV banding - we will schedule  IV Rocephin for now, may switch to oral levofloxacin 500 mg daily to complete 5 days of antibiotics  Iron sulfate 325 mg PO BID  Nadolol 40 mg daily   Anticipate discharge tomorrow     Marely Dong MD, MSc  [facility]  06/29/21

## 2021-06-29 NOTE — PLAN OF CARE
Problem: Falls - Risk of:  Goal: Will remain free from falls  Description: Will remain free from falls  Outcome: Ongoing  Goal: Absence of physical injury  Description: Absence of physical injury  Outcome: Ongoing     Problem: Discharge Planning:  Goal: Discharged to appropriate level of care  Description: Discharged to appropriate level of care  Outcome: Ongoing     Problem: Bowel Function - Altered:  Goal: Bowel elimination is within specified parameters  Description: Bowel elimination is within specified parameters  Outcome: Ongoing     Problem: Fluid Volume - Imbalance:  Goal: Absence of imbalanced fluid volume signs and symptoms  Description: Absence of imbalanced fluid volume signs and symptoms  Outcome: Ongoing  Goal: Will show no signs and symptoms of excessive bleeding  Description: Will show no signs and symptoms of excessive bleeding  Outcome: Ongoing     Problem: Nausea/Vomiting:  Goal: Absence of nausea/vomiting  Description: Absence of nausea/vomiting  Outcome: Ongoing  Goal: Able to drink  Description: Able to drink  Outcome: Ongoing  Goal: Able to eat  Description: Able to eat  Outcome: Ongoing  Goal: Ability to achieve adequate nutritional intake will improve  Description: Ability to achieve adequate nutritional intake will improve  Outcome: Ongoing     Problem: Activity:  Goal: Fatigue will decrease  Description: Fatigue will decrease  Outcome: Ongoing  Goal: Ability to tolerate increased activity will improve  Description: Ability to tolerate increased activity will improve  Outcome: Ongoing  Goal: Ability to maintain optimal joint mobility will improve  Description: Ability to maintain optimal joint mobility will improve  Outcome: Ongoing     Problem:  Bowel/Gastric:  Goal: Ability to achieve a regular elimination pattern will improve  Description: Ability to achieve a regular elimination pattern will improve  Outcome: Ongoing     Problem: Cardiac:  Goal: Ability to maintain an adequate cardiac output will improve  Description: Ability to maintain an adequate cardiac output will improve  Outcome: Ongoing  Goal: Ability to achieve and maintain adequate cardiopulmonary perfusion will improve  Description: Ability to achieve and maintain adequate cardiopulmonary perfusion will improve  Outcome: Ongoing     Problem: Coping:  Goal: Ability to adjust to condition or change in health will improve  Description: Ability to adjust to condition or change in health will improve  Outcome: Ongoing  Goal: Communication of feelings regarding changes in body function or appearance will improve  Description: Communication of feelings regarding changes in body function or appearance will improve  Outcome: Ongoing     Problem: Health Behavior:  Goal: Identification of resources available to assist in meeting health care needs will improve  Description: Identification of resources available to assist in meeting health care needs will improve  Outcome: Ongoing     Problem: Nutritional:  Goal: Maintenance of adequate nutrition will improve  Description: Maintenance of adequate nutrition will improve  Outcome: Ongoing     Problem: Physical Regulation:  Goal: Will show no signs and symptoms of excessive bleeding  Description: Will show no signs and symptoms of excessive bleeding  Outcome: Ongoing  Goal: Signs and symptoms of infection will decrease  Description: Signs and symptoms of infection will decrease  Outcome: Ongoing  Goal: Complications related to the disease process, condition or treatment will be avoided or minimized  Description: Complications related to the disease process, condition or treatment will be avoided or minimized  Outcome: Ongoing     Problem: Safety:  Goal: Ability to remain free from injury will improve  Description: Ability to remain free from injury will improve  Outcome: Ongoing     Problem: Sensory:  Goal: Pain level will decrease  Description: Pain level will decrease  Outcome: Ongoing  Goal: General experience of comfort will improve  Description: General experience of comfort will improve  Outcome: Ongoing     Problem: Tissue Perfusion:  Goal: Ability to maintain adequate tissue perfusion will improve  Description: Ability to maintain adequate tissue perfusion will improve  Outcome: Ongoing     Problem: Pain:  Goal: Pain level will decrease  Description: Pain level will decrease  Outcome: Ongoing  Goal: Control of chronic pain  Description: Control of chronic pain  Outcome: Ongoing

## 2021-06-29 NOTE — PROGRESS NOTES
Hospitalist Progress Note      PCP: Dorian Gerber DO    Date of Admission: 6/26/2021    Chief Complaint:   Hematemesis    Hospital Course: 40 yo F with continuous alcohol abuse, alcoholic cirrhosis came to ER with complaints of hematemesis with acute blood loss anemia. Admitted as inpatient for acute blood loss anemia and hematemesis. Transfused 3 U PRBC and 2 U FFP. Seen by GI. Underwent emergent EGD on  6/27/21:  Post Op Dx:  1)Esophageal varices with red spot suggestive of bleeding source, banded X3  2)Portal gastropathy mild  3) No gastric varices, no ulcers     Plan  Continue octreotide drip 48-72hrs  PPI drip  Follow H/H, transfuse  Check ammonia  Start prednisolone for ETOH hepatitis     Started on PPI IV and Octreotide gtt. Steroids apparently not tolerated and stopped per GI. Started on Rocephin IV per GI X 5 days. Subjective:  Patient denies hematemesis or melena. No CP, SOB, HA or abdominal pain. Very anxious.       Medications:  Reviewed    Infusion Medications    sodium chloride      sodium chloride      sodium chloride      sodium chloride      sodium chloride      octreotide (SANDOSTATIN) infusion 50 mcg/hr (06/29/21 0404)     Scheduled Medications    nadolol  40 mg Oral Daily    sodium chloride flush  5-40 mL Intravenous 2 times per day    pantoprazole  40 mg Intravenous BID    cefTRIAXone (ROCEPHIN) IV  2,000 mg Intravenous Q24H     PRN Meds: magnesium sulfate, LORazepam, haloperidol lactate, sodium chloride flush, sodium chloride, ondansetron **OR** ondansetron, sodium chloride, sodium chloride, morphine, sodium chloride, sodium chloride      Intake/Output Summary (Last 24 hours) at 6/29/2021 1151  Last data filed at 6/28/2021 2241  Gross per 24 hour   Intake 181.7 ml   Output --   Net 181.7 ml       Physical Exam Performed:    /70   Pulse 70   Temp 98 °F (36.7 °C) (Temporal)   Resp 14   Ht 5' 4\" (1.626 m)   Wt 119 lb 14.9 oz (54.4 kg)   LMP 05/17/2021 SpO2 97%   BMI 20.59 kg/m²     General appearance: No apparent distress, appears stated age and cooperative. HEENT: Pupils equal, round, and reactive to light. Conjunctivae/corneas clear. Neck: Supple, with full range of motion. No jugular venous distention. Trachea midline. Respiratory:  Normal respiratory effort. Clear to auscultation, bilaterally without Rales/Wheezes/Rhonchi. Cardiovascular: Regular rate and rhythm with normal S1/S2 without murmurs, rubs or gallops. Abdomen: Soft, non-tender, non-distended with normal bowel sounds. Musculoskeletal: No clubbing, cyanosis or edema bilaterally. Full range of motion without deformity. Skin: Skin color, texture, turgor normal.  No rashes or lesions. Neurologic:  Neurovascularly intact without any focal sensory/motor deficits. Cranial nerves: II-XII intact, grossly non-focal.  Psychiatric: Alert and oriented, thought content appropriate, normal insight  Capillary Refill: Brisk,3 seconds, normal   Peripheral Pulses: +2 palpable, equal bilaterally       Labs:   Recent Labs     06/26/21 2203 06/27/21 0637 06/28/21 0325 06/28/21  0832 06/29/21  0507   WBC 11.8*  --  5.1  --  7.1   HGB 4.1*   < > 8.2*  8.2* 8.5* 8.1*   HCT 13.4*   < > 25.1*  24.6* 25.8* 24.6*   *  --  74*  --  84*    < > = values in this interval not displayed. Recent Labs     06/26/21 2135 06/28/21 0325 06/29/21  0507   * 135* 138   K 3.4* 4.0 3.7   CL 96* 101 105   CO2 25 22 23   BUN 32* 31* 28*   CREATININE 1.6* 0.9 1.0   CALCIUM 9.0 8.2* 8.4   PHOS  --  4.5 2.9     Recent Labs     06/26/21 2135   AST 67*   ALT 23   BILITOT 3.8*   ALKPHOS 127     Recent Labs     06/26/21 2135 06/28/21  0344 06/29/21  0507   INR 2.13* 1.75* 1.59*     No results for input(s): Juanita Faith in the last 72 hours.     Urinalysis:      Lab Results   Component Value Date    NITRU Negative 05/16/2021    WBCUA 8 05/16/2021    BACTERIA 4+ 05/16/2021    RBCUA 3 05/16/2021    BLOODU LARGE 05/16/2021    SPECGRAV >1.030 05/16/2021    GLUCOSEU Negative 05/16/2021       Radiology:  No orders to display           Assessment/Plan:    Active Hospital Problems    Diagnosis     Esophageal varices (HCC) [P11.06]     Alcoholic hepatitis [W17.39]     EZIO (acute kidney injury) ([facility] [address] [N17.9]     Acute post-hemorrhagic anemia [D95]     Alcoholic liver disease ([facility] [address] [K70.9]      1. Cont Octreotide gtt until 6/30 AM  2. Protonix IV bid, then PO  3. Diet per GI  4. Off steroids per GI  5. Ativan IV PRN anxiety/insomnia/WD  6. Rocephin IV Day 2/5; PO Levaquin upon DC  7. Counseled to stop drinking     DVT Prophylaxis: SCD  Diet: ADULT DIET; Dysphagia - Soft and Bite Sized  Code Status: Full Code    PT/OT Eval Status: Requested    Dispo - Home    Discussed with patient, nursing and CM. PT/OT eval today. Home tomorrow.     Adrienne Dong MD

## 2021-06-29 NOTE — PLAN OF CARE
Problem: Falls - Risk of:  Goal: Will remain free from falls  Description: Will remain free from falls  6/29/2021 1215 by Rodri Persaud RN  Outcome: Ongoing  6/28/2021 2302 by Shelly Heredia RN  Outcome: Ongoing  Goal: Absence of physical injury  Description: Absence of physical injury  6/29/2021 1215 by Rodri Persaud RN  Outcome: Ongoing  6/28/2021 2302 by Shelly Heredia RN  Outcome: Ongoing     Problem: Discharge Planning:  Goal: Discharged to appropriate level of care  Description: Discharged to appropriate level of care  6/29/2021 1215 by Rodri Persaud RN  Outcome: Ongoing  6/28/2021 2302 by Shelly Heredia RN  Outcome: Ongoing     Problem: Bowel Function - Altered:  Goal: Bowel elimination is within specified parameters  Description: Bowel elimination is within specified parameters  6/29/2021 1215 by Rodri Persaud RN  Outcome: Ongoing  6/28/2021 2302 by Shelly Heredia RN  Outcome: Ongoing     Problem: Fluid Volume - Imbalance:  Description: Avoid the routine use of orogastric or nasogastric lavage.   Goal: Absence of imbalanced fluid volume signs and symptoms  Description: Absence of imbalanced fluid volume signs and symptoms  6/29/2021 1215 by Rodri Persaud RN  Outcome: Ongoing  6/28/2021 2302 by Shelly Heredia RN  Outcome: Ongoing  Goal: Will show no signs and symptoms of excessive bleeding  Description: Will show no signs and symptoms of excessive bleeding  6/29/2021 1215 by Rodri Persaud RN  Outcome: Ongoing  6/28/2021 2302 by Shelly Heredia RN  Outcome: Ongoing     Problem: Nausea/Vomiting:  Goal: Absence of nausea/vomiting  Description: Absence of nausea/vomiting  6/29/2021 1215 by Rodri Persaud RN  Outcome: Ongoing  6/28/2021 2302 by Shelly Heredia RN  Outcome: Ongoing  Goal: Able to drink  Description: Able to drink  6/29/2021 1215 by Rodri Persaud RN  Outcome: Ongoing  6/28/2021 2302 by Shelly Heredia RN  Outcome: Ongoing  Goal: Able to eat  Description: Able to eat  6/29/2021 1215 by Marjorie Robbins RN  Outcome: Ongoing  6/28/2021 2302 by Theron Rasmussen RN  Outcome: Ongoing  Goal: Ability to achieve adequate nutritional intake will improve  Description: Ability to achieve adequate nutritional intake will improve  6/29/2021 1215 by Marjorie Robbins RN  Outcome: Ongoing  6/28/2021 2302 by Theron Rasmussen RN  Outcome: Ongoing     Problem: Activity:  Goal: Fatigue will decrease  Description: Fatigue will decrease  6/29/2021 1215 by Marjorie Robbins RN  Outcome: Ongoing  6/28/2021 2302 by Theron Rasmussen RN  Outcome: Ongoing  Goal: Ability to tolerate increased activity will improve  Description: Ability to tolerate increased activity will improve  6/29/2021 1215 by Marjorie Robbins RN  Outcome: Ongoing  6/28/2021 2302 by Theron Rasmussen RN  Outcome: Ongoing  Goal: Ability to maintain optimal joint mobility will improve  Description: Ability to maintain optimal joint mobility will improve  6/29/2021 1215 by Marjorie Robbins RN  Outcome: Ongoing  6/28/2021 2302 by Theron Rasmussen RN  Outcome: Ongoing     Problem:  Bowel/Gastric:  Goal: Ability to achieve a regular elimination pattern will improve  Description: Ability to achieve a regular elimination pattern will improve  6/29/2021 1215 by Marjorie Robbins RN  Outcome: Ongoing  6/28/2021 2302 by Theron Rasmussen RN  Outcome: Ongoing     Problem: Cardiac:  Goal: Ability to maintain an adequate cardiac output will improve  Description: Ability to maintain an adequate cardiac output will improve  6/29/2021 1215 by Marjorie Robbins RN  Outcome: Ongoing  6/28/2021 2302 by Theron Rasmussen RN  Outcome: Ongoing  Goal: Ability to achieve and maintain adequate cardiopulmonary perfusion will improve  Description: Ability to achieve and maintain adequate cardiopulmonary perfusion will improve  6/29/2021 1215 by Marjorie Robbins RN  Outcome: Ongoing  6/28/2021 2302 by Theron Rasmussen RN  Outcome: Ongoing     Problem: Coping:  Goal: Ability to adjust to condition or change in health will improve  Description: Ability to adjust to condition or change in health will improve  6/29/2021 1215 by Esther Simpson RN  Outcome: Ongoing  6/28/2021 2302 by Johnny Mccauley RN  Outcome: Ongoing  Goal: Communication of feelings regarding changes in body function or appearance will improve  Description: Communication of feelings regarding changes in body function or appearance will improve  6/29/2021 1215 by Esther Simpson RN  Outcome: Ongoing  6/28/2021 2302 by Johnny Mccauley RN  Outcome: Ongoing     Problem: Health Behavior:  Goal: Identification of resources available to assist in meeting health care needs will improve  Description: Identification of resources available to assist in meeting health care needs will improve  6/29/2021 1215 by Esther Simpson RN  Outcome: Ongoing  6/28/2021 2302 by Johnny Mccauley RN  Outcome: Ongoing     Problem: Nutritional:  Goal: Maintenance of adequate nutrition will improve  Description: Maintenance of adequate nutrition will improve  6/29/2021 1215 by Esther Simpson RN  Outcome: Ongoing  6/28/2021 2302 by Johnny Mccauley RN  Outcome: Ongoing     Problem: Physical Regulation:  Goal: Will show no signs and symptoms of excessive bleeding  Description: Will show no signs and symptoms of excessive bleeding  6/29/2021 1215 by Esther Simpson RN  Outcome: Ongoing  6/28/2021 2302 by Johnny Mccauley RN  Outcome: Ongoing  Goal: Signs and symptoms of infection will decrease  Description: Signs and symptoms of infection will decrease  6/29/2021 1215 by Esther Simpson RN  Outcome: Ongoing  6/28/2021 2302 by Johnny Mccauley RN  Outcome: Ongoing  Goal: Complications related to the disease process, condition or treatment will be avoided or minimized  Description: Complications related to the disease process, condition or treatment will be avoided or minimized  6/29/2021 1215 by Bindu Mortensen RN  Outcome: Ongoing  6/28/2021 2302 by Liliana Stahl RN  Outcome: Ongoing     Problem: Safety:  Goal: Ability to remain free from injury will improve  Description: Ability to remain free from injury will improve  6/29/2021 1215 by Bindu Mortensen RN  Outcome: Ongoing  6/28/2021 2302 by Liliana Stahl RN  Outcome: Ongoing     Problem: Sensory:  Goal: Pain level will decrease  Description: Pain level will decrease  6/29/2021 1215 by Bindu Mortensen RN  Outcome: Ongoing  6/28/2021 2302 by Liliana Stahl RN  Outcome: Ongoing  Goal: General experience of comfort will improve  Description: General experience of comfort will improve  6/29/2021 1215 by Bindu Mortensen RN  Outcome: Ongoing  6/28/2021 2302 by Liliana Stahl RN  Outcome: Ongoing     Problem: Tissue Perfusion:  Goal: Ability to maintain adequate tissue perfusion will improve  Description: Ability to maintain adequate tissue perfusion will improve  6/29/2021 1215 by Bindu Mortensen RN  Outcome: Ongoing  6/28/2021 2302 by Liliana Stahl RN  Outcome: Ongoing     Problem: Pain:  Description: Pain management should include both nonpharmacologic and pharmacologic interventions.   Goal: Pain level will decrease  Description: Pain level will decrease  6/29/2021 1215 by Bindu Mortensen RN  Outcome: Ongoing  6/28/2021 2302 by Liliana Stahl RN  Outcome: Ongoing  Goal: Control of chronic pain  Description: Control of chronic pain  6/29/2021 1215 by Bindu Mortensen RN  Outcome: Ongoing  6/28/2021 2302 by Liliana Stahl RN  Outcome: Ongoing

## 2021-06-30 VITALS
WEIGHT: 114.86 LBS | TEMPERATURE: 97.3 F | DIASTOLIC BLOOD PRESSURE: 56 MMHG | OXYGEN SATURATION: 97 % | SYSTOLIC BLOOD PRESSURE: 115 MMHG | HEART RATE: 101 BPM | HEIGHT: 64 IN | BODY MASS INDEX: 19.61 KG/M2 | RESPIRATION RATE: 16 BRPM

## 2021-06-30 LAB
ANION GAP SERPL CALCULATED.3IONS-SCNC: 11 MMOL/L (ref 3–16)
BASOPHILS ABSOLUTE: 0.1 K/UL (ref 0–0.2)
BASOPHILS RELATIVE PERCENT: 1.2 %
BUN BLDV-MCNC: 17 MG/DL (ref 7–20)
CALCIUM SERPL-MCNC: 8.3 MG/DL (ref 8.3–10.6)
CHLORIDE BLD-SCNC: 101 MMOL/L (ref 99–110)
CO2: 24 MMOL/L (ref 21–32)
CREAT SERPL-MCNC: 0.9 MG/DL (ref 0.6–1.1)
EOSINOPHILS ABSOLUTE: 0.1 K/UL (ref 0–0.6)
EOSINOPHILS RELATIVE PERCENT: 1.2 %
GFR AFRICAN AMERICAN: >60
GFR NON-AFRICAN AMERICAN: >60
GLUCOSE BLD-MCNC: 131 MG/DL (ref 70–99)
HCT VFR BLD CALC: 25.9 % (ref 36–48)
HEMOGLOBIN: 8.6 G/DL (ref 12–16)
INR BLD: 1.69 (ref 0.86–1.14)
LYMPHOCYTES ABSOLUTE: 1.1 K/UL (ref 1–5.1)
LYMPHOCYTES RELATIVE PERCENT: 20.6 %
MAGNESIUM: 1.3 MG/DL (ref 1.8–2.4)
MCH RBC QN AUTO: 29.2 PG (ref 26–34)
MCHC RBC AUTO-ENTMCNC: 33.2 G/DL (ref 31–36)
MCV RBC AUTO: 87.9 FL (ref 80–100)
MONOCYTES ABSOLUTE: 0.6 K/UL (ref 0–1.3)
MONOCYTES RELATIVE PERCENT: 11.6 %
NEUTROPHILS ABSOLUTE: 3.6 K/UL (ref 1.7–7.7)
NEUTROPHILS RELATIVE PERCENT: 65.4 %
PDW BLD-RTO: 20.1 % (ref 12.4–15.4)
PHOSPHORUS: 2.4 MG/DL (ref 2.5–4.9)
PLATELET # BLD: 83 K/UL (ref 135–450)
PMV BLD AUTO: 8.5 FL (ref 5–10.5)
POTASSIUM SERPL-SCNC: 3.3 MMOL/L (ref 3.5–5.1)
PROTHROMBIN TIME: 19.7 SEC (ref 10–13.2)
RBC # BLD: 2.95 M/UL (ref 4–5.2)
SODIUM BLD-SCNC: 136 MMOL/L (ref 136–145)
WBC # BLD: 5.5 K/UL (ref 4–11)

## 2021-06-30 PROCEDURE — 36415 COLL VENOUS BLD VENIPUNCTURE: CPT

## 2021-06-30 PROCEDURE — 80048 BASIC METABOLIC PNL TOTAL CA: CPT

## 2021-06-30 PROCEDURE — 6360000002 HC RX W HCPCS: Performed by: INTERNAL MEDICINE

## 2021-06-30 PROCEDURE — 97116 GAIT TRAINING THERAPY: CPT

## 2021-06-30 PROCEDURE — 2580000003 HC RX 258: Performed by: INTERNAL MEDICINE

## 2021-06-30 PROCEDURE — 85610 PROTHROMBIN TIME: CPT

## 2021-06-30 PROCEDURE — 83735 ASSAY OF MAGNESIUM: CPT

## 2021-06-30 PROCEDURE — C9113 INJ PANTOPRAZOLE SODIUM, VIA: HCPCS | Performed by: INTERNAL MEDICINE

## 2021-06-30 PROCEDURE — 6370000000 HC RX 637 (ALT 250 FOR IP): Performed by: INTERNAL MEDICINE

## 2021-06-30 PROCEDURE — 85025 COMPLETE CBC W/AUTO DIFF WBC: CPT

## 2021-06-30 PROCEDURE — 97535 SELF CARE MNGMENT TRAINING: CPT

## 2021-06-30 PROCEDURE — 97165 OT EVAL LOW COMPLEX 30 MIN: CPT

## 2021-06-30 PROCEDURE — 97161 PT EVAL LOW COMPLEX 20 MIN: CPT

## 2021-06-30 PROCEDURE — 84100 ASSAY OF PHOSPHORUS: CPT

## 2021-06-30 RX ORDER — POTASSIUM BICARBONATE 25 MEQ/1
50 TABLET, EFFERVESCENT ORAL ONCE
Status: COMPLETED | OUTPATIENT
Start: 2021-06-30 | End: 2021-06-30

## 2021-06-30 RX ORDER — ESCITALOPRAM OXALATE 5 MG/1
5 TABLET ORAL DAILY
Qty: 30 TABLET | Refills: 0 | Status: ON HOLD | OUTPATIENT
Start: 2021-06-30 | End: 2022-04-26

## 2021-06-30 RX ORDER — PANTOPRAZOLE SODIUM 40 MG/1
40 TABLET, DELAYED RELEASE ORAL 2 TIMES DAILY
Qty: 60 TABLET | Refills: 0 | Status: ON HOLD | OUTPATIENT
Start: 2021-06-30 | End: 2022-04-26

## 2021-06-30 RX ORDER — FERROUS SULFATE 325(65) MG
325 TABLET ORAL 2 TIMES DAILY
Qty: 60 TABLET | Refills: 0 | Status: ON HOLD | OUTPATIENT
Start: 2021-06-30 | End: 2022-04-26

## 2021-06-30 RX ORDER — POTASSIUM CHLORIDE 20 MEQ/1
40 TABLET, EXTENDED RELEASE ORAL ONCE
Status: DISCONTINUED | OUTPATIENT
Start: 2021-06-30 | End: 2021-06-30

## 2021-06-30 RX ORDER — LEVOFLOXACIN 250 MG/1
500 TABLET ORAL DAILY
Qty: 6 TABLET | Refills: 0 | Status: SHIPPED | OUTPATIENT
Start: 2021-06-30 | End: 2021-07-03

## 2021-06-30 RX ORDER — NADOLOL 40 MG/1
40 TABLET ORAL DAILY
Qty: 30 TABLET | Refills: 0 | Status: ON HOLD | OUTPATIENT
Start: 2021-06-30 | End: 2022-05-03 | Stop reason: SDUPTHER

## 2021-06-30 RX ADMIN — MORPHINE SULFATE 1 MG: 2 INJECTION, SOLUTION INTRAMUSCULAR; INTRAVENOUS at 05:12

## 2021-06-30 RX ADMIN — PANTOPRAZOLE SODIUM 40 MG: 40 INJECTION, POWDER, FOR SOLUTION INTRAVENOUS at 09:23

## 2021-06-30 RX ADMIN — CEFTRIAXONE 2000 MG: 2 INJECTION, POWDER, FOR SOLUTION INTRAMUSCULAR; INTRAVENOUS at 00:24

## 2021-06-30 RX ADMIN — NADOLOL 40 MG: 40 TABLET ORAL at 09:22

## 2021-06-30 RX ADMIN — LORAZEPAM 0.5 MG: 2 INJECTION INTRAMUSCULAR; INTRAVENOUS at 05:12

## 2021-06-30 RX ADMIN — LORAZEPAM 0.5 MG: 2 INJECTION INTRAMUSCULAR; INTRAVENOUS at 10:49

## 2021-06-30 RX ADMIN — MAGNESIUM SULFATE HEPTAHYDRATE 2000 MG: 40 INJECTION, SOLUTION INTRAVENOUS at 06:52

## 2021-06-30 RX ADMIN — MAGNESIUM SULFATE HEPTAHYDRATE 2000 MG: 40 INJECTION, SOLUTION INTRAVENOUS at 07:52

## 2021-06-30 RX ADMIN — POTASSIUM BICARBONATE 50 MEQ: 978 TABLET, EFFERVESCENT ORAL at 10:49

## 2021-06-30 RX ADMIN — Medication 10 ML: at 09:23

## 2021-06-30 ASSESSMENT — PAIN DESCRIPTION - PROGRESSION
CLINICAL_PROGRESSION: GRADUALLY WORSENING

## 2021-06-30 ASSESSMENT — PAIN SCALES - GENERAL
PAINLEVEL_OUTOF10: 6
PAINLEVEL_OUTOF10: 0

## 2021-06-30 NOTE — PROGRESS NOTES
CLINICAL PHARMACY NOTE: MEDS TO BEDS    Total # of Prescriptions Filled: 5   The following medications were delivered to the patient:  · Ferrous sulfate 325mg  · nadalol 20mg  · escitalopram 5mg  · Pantoprazole 40mg  · Levofloxacin 500mg    Additional Documentation:  Informed patient we did not have Nadalol 40mg- to take two 20mg tablets. Instructed patient to not start Escitalopram until finished with 3 day course of Levaquin- can cause heart rhythm problems. No strenuous exercise while taking levofloxacin ie running, weight lifting.    Medications picked up by patient in outpatient pharmacy  [address]

## 2021-06-30 NOTE — PROGRESS NOTES
Pt. Up to chair with PT/OT. VSS, afebrile. Plan today to go home. Potassium and Magnesium replacement this am. Excellent PO breakfast. Up to bathroom per self. Denies pain at this time. Denies further needs.

## 2021-06-30 NOTE — PROGRESS NOTES
Pt. Discharged to home. Pt. Kristi Esperanza to get an earlier ride home. Meds being filled per pharmacy. Ativan prn dose given. Pt. instructed not to drive today. She verbalized understanding. PIV d/c'd. Pt. Up around room without problems. Potassium replacement at bedside. Reviewed discharge instructions. Pt. Refuses that I make her Follow-Up appointments for her. Pt. Getting dressed now. Belongings gathered.

## 2021-06-30 NOTE — PROGRESS NOTES
Occupational Therapy   Occupational Therapy Initial Assessment/Discharge  Date: 2021   Patient Name: Cathy Rousseau  MRN: [id number]     : 1975    Date of Service: 2021    Discharge Recommendations: Cathy Rousseau scored a 24/24 on the AM-PAC ADL Inpatient form. At this time, no further OT is recommended upon discharge due to patient is at baseline level of functioning. Recommend patient returns to prior setting with prior services. OT Equipment Recommendations  Equipment Needed: No      Assessment   Assessment: At this time pt is functioning at baseline level and skilled OT services are not recommended. Treatment Diagnosis: Pt does not have any performance deficits because admittance with esophageal varices has been resolved  Prognosis: Good  Decision Making: Low Complexity  OT Education: OT Role;Plan of Care  Patient Education: Educated pt on OT role, plan of care and discharge. Pt is an independent learner. REQUIRES OT FOLLOW UP: No  Activity Tolerance  Activity Tolerance: Patient Tolerated treatment well  Safety Devices  Safety Devices in place: Yes  Type of devices: All fall risk precautions in place;Gait belt;Nurse notified;Call light within reach; Left in chair (pt is a low fall risk)           Patient Diagnosis(es): The primary encounter diagnosis was Posthemorrhagic anemia. A diagnosis of Gastrointestinal hemorrhage with hematemesis was also pertinent to this visit. has a past medical history of Alcoholic liver disease ([address] Anemia, and History of blood transfusion. has a past surgical history that includes Upper gastrointestinal endoscopy (N/A, 2021); Upper gastrointestinal endoscopy (N/A, 03/10/2021); Tubal ligation; Colonoscopy (N/A, 3/11/2021); and Upper gastrointestinal endoscopy (N/A, 2021).     Treatment Diagnosis: Pt does not have any performance deficits because admittance with esophageal varices has been resolved      Restrictions  Restrictions/Precautions  Restrictions/Precautions: Fall Risk (low)  Required Braces or Orthoses?: No  Position Activity Restriction  Other position/activity restrictions: 42 yo F with continuous alcohol abuse, alcoholic cirrhosis came to ER with complaints of hematemesis with acute blood loss anemia. Admitted as inpatient for acute blood loss anemia and hematemesis. Transfused 3 U PRBC and 2 U FFP. Seen by GI. Underwent emergent EGD on  6/27/21:Post Op Dx:1)Esophageal varices with red spot suggestive of bleeding source, banded X3 2)Portal gastropathy mild 3) No gastric varices, no ulcers    Subjective   General  Chart Reviewed: Yes  Patient assessed for rehabilitation services?: Yes  Additional Pertinent Hx: alcoholic liver disease, anemia, alcoholism  Family / Caregiver Present: No  Diagnosis: esophageal varices  Subjective  Subjective: Pt lying in bed agreeable to therapy and denies pain.   Social/Functional History  Social/Functional History  Lives With: Daughter  Type of Home: House  Home Layout: One level, Performs ADL's on one level, Able to Live on Main level with bedroom/bathroom  Home Access: Level entry  Bathroom Shower/Tub: Tub/Shower unit  Bathroom Toilet: Standard  ADL Assistance: Independent  Homemaking Assistance: Independent  Homemaking Responsibilities: Yes  Ambulation Assistance: Independent  Transfer Assistance: Independent  Active : Yes  Mode of Transportation: Car  Occupation: Self employed  Leisure & Hobbies: Caring for mother, children and grandchildren  Additional Comments: Pt reports 1 fall, dog running quickly inside and tripping pt       Objective   Vision: Impaired  Vision Exceptions:  (contacts)  Hearing: Within functional limits    Orientation  Overall Orientation Status: Within Functional Limits  Observation/Palpation  Posture: Good  Balance  Sitting Balance: Independent  Standing Balance: Independent  Standing Balance  Time: ~10 min  Activity: pt sit<>stand transfers from EOB, toilet and chair; pt ambulated 300 ft in hallway  Functional Mobility  Functional - Mobility Device: No device  Activity: Other  Assist Level: Independent  Functional Mobility Comments: pt was steady in gait and balance and did not need assistance to stand or ambulate  Toilet Transfers  Toilet - Technique: Stand step  Equipment Used: Standard toilet  Toilet Transfer: Independent  ADL  Grooming: Independent (pt brushed teeth in stance)  Toileting: Independent (pt donned briefs and performed domingo care by self)  Additional Comments: pt denied need to perform UB and LB dressing and bathing  Tone RUE  RUE Tone: Normotonic  Tone LUE  LUE Tone: Normotonic  Coordination  Movements Are Fluid And Coordinated: Yes     Bed mobility  Supine to Sit: Independent  Sit to Supine: Independent  Transfers  Stand Step Transfers: Independent  Sit to stand: Independent  Stand to sit:  Independent     Cognition  Overall Cognitive Status: WNL  Perception  Overall Perceptual Status: WFL     Sensation  Overall Sensation Status: WNL        LUE AROM (degrees)  LUE AROM : WNL  Left Hand AROM (degrees)  Left Hand AROM: WNL  RUE AROM (degrees)  RUE AROM : WNL  Right Hand AROM (degrees)  Right Hand AROM: WNL  LUE Strength  Gross LUE Strength: WFL  RUE Strength  Gross RUE Strength: WFL                   Plan   Plan  Times per week: eval and discharge             AM-PAC Score        AM-Located within [facility] Inpatient Daily Activity Raw Score: 24 (06/30/21 0951)  AM-PAC Inpatient ADL T-Scale Score : 57.54 (06/30/21 0951)  ADL Inpatient CMS 0-100% Score: 0 (06/30/21 0951)  ADL Inpatient CMS G-Code Modifier : [facility] (06/30/21 5960)    Goals  Short term goals  Time Frame for Short term goals: discharge       Therapy Time   Individual Concurrent Group Co-treatment   Time In 8555         Time Out 0858         Minutes 26         Timed Code Treatment Minutes: 11 Minutes        Total Treatment Minutes:  Richard [address] [place], [place] Therapist directly observed and directed this treatment.   Herrera Baca, OTR/L [id number]

## 2021-06-30 NOTE — DISCHARGE SUMMARY
Pt. Discharged at [address] [place] [address]. Meds picked up from pharmacy. Pharmacist reviewed meds with pt. Wait two hours after taking iron to take antibiotic and don't start Lexapro until after she has completed her antibiotic. Pt. Denies needs. Pt. Joyce James her own belongings and dressed herself. Discharge instructions with pt. Pt. Wheeled out through main entrance an placed in a waiting vehicle safely.

## 2021-06-30 NOTE — DISCHARGE SUMMARY
Hospital Medicine Discharge Summary    Patient: Williams Desir     Gender: female  : 1975   Age: 41 y.o. MRN: [id number]    Admitting Physician: Lara August MD  Discharge Physician: Urmila Licea MD     Code Status: Full Code     Admit Date: 2021   Discharge Date: 2021      Disposition:  Home    Discharge Diagnoses: Active Hospital Problems    Diagnosis Date Noted    Esophageal varices ([facility] [address] [I85.00]     Alcoholic hepatitis [M31.57] 2021    EZIO (acute kidney injury) ([facility] [address] [N17.9] 2021    Acute post-hemorrhagic anemia [D62]     Alcoholic liver disease ([facility] [address] [K70.9]        Follow-up appointments:  one week    Outpatient to do list: F/U with PCP and GI.  STOP DRINKING    Condition at Discharge:  Stable    Hospital Course:   42 yo F with continuous alcohol abuse, alcoholic cirrhosis came to ER with complaints of hematemesis with acute blood loss anemia. Admitted as inpatient for acute blood loss anemia and hematemesis. Transfused 3 U PRBC and 2 U FFP. Seen by GI. Underwent emergent EGD on  21:  Post Op Dx:  1)Esophageal varices with red spot suggestive of bleeding source, banded X3  2)Portal gastropathy mild  3) No gastric varices, no ulcers     Plan  Continue octreotide drip 48-72hrs  PPI drip  Follow H/H, transfuse  Check ammonia  Start prednisolone for ETOH hepatitis     Started on PPI IV and Octreotide gtt. Steroids apparently not tolerated and stopped per GI. Started on Rocephin IV per GI. Will finish 3 more days of PO Levaquin at home for UGI bleeding. Discharged on Nadolol, Fe SO4 and Protonix bid. Started on Lexapro for anxiety/depression. F/U with PCP and GI. Counseled to stop drinking.       Discharge Medications:   Discharge Medication List as of 2021 10:55 AM      START taking these medications    Details   nadolol (CORGARD) 40 MG tablet Take 1 tablet by mouth daily, Disp-30 tablet, R-0Normal escitalopram (LEXAPRO) 5 MG tablet Take 1 tablet by mouth daily, Disp-30 tablet, R-0Normal      ferrous sulfate (IRON 325) 325 (65 Fe) MG tablet Take 1 tablet by mouth 2 times daily, Disp-60 tablet, R-0Normal      levoFLOXacin (LEVAQUIN) 250 MG tablet Take 2 tablets by mouth daily for 3 days, Disp-6 tablet, R-0Normal           Discharge Medication List as of 6/30/2021 10:55 AM      CONTINUE these medications which have CHANGED    Details   pantoprazole (PROTONIX) 40 MG tablet Take 1 tablet by mouth 2 times daily, Disp-60 tablet, R-0Normal           Discharge Medication List as of 6/30/2021 10:55 AM      CONTINUE these medications which have NOT CHANGED    Details   Multiple Vitamin (MULTIVITAMIN) TABS tablet Take 1 tablet by mouth daily, Disp-30 tablet, R-0Normal           Discharge Medication List as of 6/30/2021 10:55 AM          Discharge Exam:    BP (!) 115/56   Pulse 101   Temp 97.3 °F (36.3 °C) (Temporal)   Resp 16   Ht 5' 4\" (1.626 m)   Wt 114 lb 13.8 oz (52.1 kg)   LMP 05/17/2021   SpO2 97%   BMI 19.72 kg/m²   General appearance: No apparent distress, appears stated age and cooperative. HEENT: Pupils equal, round, and reactive to light. Conjunctivae/corneas clear. Neck: Supple, with full range of motion. No jugular venous distention. Trachea midline. Respiratory:  Normal respiratory effort. Clear to auscultation, bilaterally without Rales/Wheezes/Rhonchi. Cardiovascular: Regular rate and rhythm with normal S1/S2 without murmurs, rubs or gallops. Abdomen: Soft, non-tender, non-distended with normal bowel sounds. Musculoskeletal: No clubbing, cyanosis or edema bilaterally. Full range of motion without deformity. Skin: Skin color, texture, turgor normal.  No rashes or lesions. Neurologic:  Neurovascularly intact without any focal sensory/motor deficits.  Cranial nerves: II-XII intact, grossly non-focal.  Psychiatric: Alert and oriented, thought content appropriate, normal insight  Capillary Refill: Brisk,3 seconds, normal   Peripheral Pulses: +2 palpable, equal bilaterally     Labs: For convenience and continuity at follow-up the following most recent labs are provided:    Lab Results   Component Value Date    WBC 5.5 06/30/2021    HGB 8.6 06/30/2021    HCT 25.9 06/30/2021    MCV 87.9 06/30/2021    PLT 83 06/30/2021     06/30/2021    K 3.3 06/30/2021    K 3.4 06/26/2021     06/30/2021    CO2 24 06/30/2021    BUN 17 06/30/2021    CREATININE 0.9 06/30/2021    CALCIUM 8.3 06/30/2021    PHOS 2.4 06/30/2021    ALKPHOS 127 06/26/2021    ALT 23 06/26/2021    AST 67 06/26/2021    BILITOT 3.8 06/26/2021    BILIDIR 1.7 02/01/2021    LABALBU 2.8 06/26/2021     Lab Results   Component Value Date    INR 1.69 (H) 06/30/2021    INR 1.59 (H) 06/29/2021    INR 1.75 (H) 06/28/2021       Radiology:  EGD    Result Date: 6/27/2021  No dictation       The patient was seen and examined on day of discharge and this discharge summary is in conjunction with any daily progress note from day of discharge. Time Spent on discharge is 45 minutes  in the examination, evaluation, counseling and review of medications and discharge plan. Note that more than 30 minutes was spent in preparing discharge papers, discussing discharge with patient, medication review, etc.       Signed:    Nilton Woodward MD   6/30/2021      Thank you [facility] for the opportunity to be involved in this patient's care.  If you have any questions or concerns please feel free to contact me at [address] [place] [address]

## 2021-06-30 NOTE — PROGRESS NOTES
Physical Therapy    Facility/Department: [facility] ICU  Initial Assessment    NAME: Mary Ba  : 1975  MRN: [id number]    Date of Service: 2021    Discharge Recommendations:Marce QUESADA [facility] scored a 24/24 on the AM-PAC short mobility form. At this time, no further PT is recommended upon discharge due to independence with functional mobility. Recommend patient returns to prior setting with prior services. PT Equipment Recommendations  Equipment Needed: No    Assessment   Assessment: Pt presents as at or near her baseline function for mobility tasks. Pt does not require skilled PT services at this time and is safe to discharge from acute setting to previous environment once deemed medically appropriate. Prognosis: Excellent  Decision Making: Low Complexity  PT Education: Goals;PT Role;Plan of Care  Patient Education: D/C recommendations--pt verbalized understanding  No Skilled PT: Safe to return home  REQUIRES PT FOLLOW UP: No  Activity Tolerance  Activity Tolerance: Patient Tolerated treatment well       Patient Diagnosis(es): The primary encounter diagnosis was Posthemorrhagic anemia. A diagnosis of Gastrointestinal hemorrhage with hematemesis was also pertinent to this visit. has a past medical history of Alcoholic liver disease ([facility] [address] Anemia, and History of blood transfusion. has a past surgical history that includes Upper gastrointestinal endoscopy (N/A, 2021); Upper gastrointestinal endoscopy (N/A, 03/10/2021); Tubal ligation; Colonoscopy (N/A, 3/11/2021); and Upper gastrointestinal endoscopy (N/A, 2021). Restrictions  Restrictions/Precautions  Restrictions/Precautions: Fall Risk (low)  Required Braces or Orthoses?: No  Position Activity Restriction  Other position/activity restrictions: 42 yo F with continuous alcohol abuse, alcoholic cirrhosis came to ER with complaints of hematemesis with acute blood loss anemia.   Admitted as inpatient for acute blood loss anemia and hematemesis. Transfused 3 U PRBC and 2 U FFP. Seen by GI. Underwent emergent EGD on  6/27/21:Post Op Dx:1)Esophageal varices with red spot suggestive of bleeding source, banded X3 2)Portal gastropathy mild 3) No gastric varices, no ulcers  Vision/Hearing  Vision: Impaired  Vision Exceptions:  (contacts)  Hearing: Within functional limits     Subjective  General  Chart Reviewed: Yes  Patient assessed for rehabilitation services?: Yes  Family / Caregiver Present: No  Diagnosis: Esophagela varices  Follows Commands: Within Functional Limits  General Comment  Comments: Pt supine in bed upon arrival, sleeping.   Subjective  Subjective: Pt wakes with verbal introductions, agreeable to PT/OT eval.  Pain Screening  Patient Currently in Pain: Denies  Vital Signs  Patient Currently in Pain: Denies       Orientation  Orientation  Overall Orientation Status: Within Functional Limits  Social/Functional History  Social/Functional History  Lives With: Daughter  Type of Home: House  Home Layout: One level, Performs ADL's on one level, Able to Live on Main level with bedroom/bathroom  Home Access: Level entry  Bathroom Shower/Tub: Tub/Shower unit  Bathroom Toilet: Standard  ADL Assistance: Independent  Homemaking Assistance: Independent  Homemaking Responsibilities: Yes  Ambulation Assistance: Independent  Transfer Assistance: Independent  Active : Yes  Mode of Transportation: Car  Occupation: Self employed  Leisure & Hobbies: Caring for mother, children and grandchildren  Additional Comments: Pt reports 1 fall, dog running quickly inside and tripping pt  Cognition        Objective     Observation/Palpation  Posture: Good    AROM RLE (degrees)  RLE AROM: WNL  AROM LLE (degrees)  LLE AROM : WNL  Strength RLE  Strength RLE: WNL  Strength LLE  Strength LLE: WNL  Tone RLE  RLE Tone: Normotonic  Tone LLE  LLE Tone: Normotonic  Motor Control  Gross Motor?: WFL  Sensation  Overall Sensation Status: WNL  Bed mobility  Supine to

## 2021-06-30 NOTE — PROGRESS NOTES
Gastroenterology Progress Note    Mary Ba is a 41 y.o. female patient. 1. Posthemorrhagic anemia    2. Gastrointestinal hemorrhage with hematemesis        Admission Date: 2021  Hospital Day: Hospital Day: 5  Attending: Sandra Esquivel MD  Date of service: 21    SUBJECTIVE:    Feels well   Completed 72 hours of IV PPI and octreotide   No recurrent GI bleed    ROS:  Cardiovascular ROS: no chest pain or dyspnea on exertion  Gastrointestinal ROS: see above  Respiratory ROS: no cough, shortness of breath, or wheezing    Physical    VITALS:  BP (!) 115/56   Pulse 101   Temp 97.3 °F (36.3 °C) (Temporal)   Resp 16   Ht 5' 4\" (1.626 m)   Wt 114 lb 13.8 oz (52.1 kg)   LMP 2021   SpO2 97%   BMI 19.72 kg/m²   TEMPERATURE:  Current - Temp: 97.3 °F (36.3 °C); Max - Temp  Av.9 °F (36.6 °C)  Min: 97.3 °F (36.3 °C)  Max: 98.3 °F (36.8 °C)    NAD  RRR, Nl s1s2  Lungs CTA Bilaterally, normal effort  Abdomen soft, ND, NT, no HSM, Bowel sounds normal  AAOx3, No asterixis     Data      CBC:   Recent Labs     21  0832 21  0507 21  0511   WBC 5.1  --   --  7.1 5.5   RBC 2.85*  --   --  2.81* 2.95*   HGB 8.2*  8.2*   < > 8.5* 8.1* 8.6*   HCT 25.1*  24.6*   < > 25.8* 24.6* 25.9*   PLT 74*  --   --  84* 83*   MCV 87.8  --   --  87.7 87.9   MCH 28.9  --   --  29.0 29.2   MCHC 32.9  --   --  33.1 33.2   RDW 19.7*  --   --  19.9* 20.1*    < > = values in this interval not displayed. BMP:  Recent Labs     21  0507 21  0511   * 138 136   K 4.0 3.7 3.3*    105 101   CO2 22 23 24   BUN 31* 28* 17   CREATININE 0.9 1.0 0.9   CALCIUM 8.2* 8.4 8.3   GLUCOSE 145* 143* 131*        Hepatic Function Panel:   No results for input(s): AST, ALT, BILIDIR, BILITOT, ALKPHOS in the last 72 hours. No results for input(s): LIPASE, AMYLASE in the last 72 hours.   Recent Labs     21  0344 21  0507 21  0511   PROTIME 20.4* 18.5* 19.7*   INR 1.75* 1.59* 1.69*     No results for input(s): PTT in the last 72 hours. No results for input(s): OCCULTBLD in the last 72 hours. Radiology Review:    No orders to display        ASSESSMENT   Acute blood loss anemia from EV. S/p EGD 6/27 s/p banding X 3. S/p 3 U pRBC. Hb now stable (8+ range)  Alcoholic cirrhosis with alcoholic hepatitis. DF>32. She did not tolerate prednisolone. This medication was discontinued 6/28  Alcohol use.  Patient reportedly stopped using alcohol in 3/2021     PLAN    Absolutely no alcohol  D/C octreotide, may switch IV to oral PPI daily   OP EGD in 3-6 weeks for EV banding - we will schedule  IV Rocephin in the hospital but may switch to oral levofloxacin 500 mg daily to complete 5 days of antibiotics  Iron sulfate 325 mg PO BID  Nadolol 40 mg daily   May discharge home today      Yaima Harding MD, MSc  [facility]  06/30/21

## 2021-06-30 NOTE — PLAN OF CARE
Problem: Falls - Risk of:  Goal: Will remain free from falls  Description: Will remain free from falls  6/29/2021 2357 by Sindy Montaño RN  Outcome: Ongoing  6/29/2021 1215 by Amina Avilez RN  Outcome: Ongoing  Goal: Absence of physical injury  Description: Absence of physical injury  6/29/2021 2357 by Sindy Montaño RN  Outcome: Ongoing  6/29/2021 1215 by Amina Avilez RN  Outcome: Ongoing     Problem: Discharge Planning:  Goal: Discharged to appropriate level of care  Description: Discharged to appropriate level of care  6/29/2021 2357 by Sindy Montaño RN  Outcome: Ongoing  6/29/2021 1215 by Amina Avilez RN  Outcome: Ongoing     Problem:  Bowel Function - Altered:  Goal: Bowel elimination is within specified parameters  Description: Bowel elimination is within specified parameters  6/29/2021 2357 by Sindy Montaño RN  Outcome: Ongoing  6/29/2021 1215 by Amina Avilez RN  Outcome: Ongoing     Problem: Fluid Volume - Imbalance:  Goal: Absence of imbalanced fluid volume signs and symptoms  Description: Absence of imbalanced fluid volume signs and symptoms  6/29/2021 2357 by Sindy Montaño RN  Outcome: Ongoing  6/29/2021 1215 by Amina Avilez RN  Outcome: Ongoing  Goal: Will show no signs and symptoms of excessive bleeding  Description: Will show no signs and symptoms of excessive bleeding  6/29/2021 2357 by Sindy Montaño RN  Outcome: Ongoing  6/29/2021 1215 by Amina Avilez RN  Outcome: Ongoing     Problem: Nausea/Vomiting:  Goal: Absence of nausea/vomiting  Description: Absence of nausea/vomiting  6/29/2021 2357 by Sindy Montaño RN  Outcome: Ongoing  6/29/2021 1215 by Amina Avilez RN  Outcome: Ongoing  Goal: Able to drink  Description: Able to drink  6/29/2021 2357 by Sindy Montaño RN  Outcome: Ongoing  6/29/2021 1215 by Amina Avilez RN  Outcome: Ongoing  Goal: Able to eat  Description: Able to eat  6/29/2021 2357 by Sindy Montaño RN  Outcome: Ongoing  6/29/2021 1215 by Luis Joseph RN  Outcome: Ongoing  Goal: Ability to achieve adequate nutritional intake will improve  Description: Ability to achieve adequate nutritional intake will improve  6/29/2021 2357 by David Yoder RN  Outcome: Ongoing  6/29/2021 1215 by Luis Joseph RN  Outcome: Ongoing     Problem: Activity:  Goal: Fatigue will decrease  Description: Fatigue will decrease  6/29/2021 2357 by David Yoder RN  Outcome: Ongoing  6/29/2021 1215 by Luis Joseph RN  Outcome: Ongoing  Goal: Ability to tolerate increased activity will improve  Description: Ability to tolerate increased activity will improve  6/29/2021 2357 by David Yoder RN  Outcome: Ongoing  6/29/2021 1215 by Luis Joseph RN  Outcome: Ongoing  Goal: Ability to maintain optimal joint mobility will improve  Description: Ability to maintain optimal joint mobility will improve  6/29/2021 2357 by David Yoder RN  Outcome: Ongoing  6/29/2021 1215 by Luis Joseph RN  Outcome: Ongoing     Problem:  Bowel/Gastric:  Goal: Ability to achieve a regular elimination pattern will improve  Description: Ability to achieve a regular elimination pattern will improve  6/29/2021 2357 by David Yoder RN  Outcome: Ongoing  6/29/2021 1215 by Luis Joseph RN  Outcome: Ongoing     Problem: Cardiac:  Goal: Ability to maintain an adequate cardiac output will improve  Description: Ability to maintain an adequate cardiac output will improve  6/29/2021 2357 by David Yoder RN  Outcome: Ongoing  6/29/2021 1215 by Luis Joseph RN  Outcome: Ongoing  Goal: Ability to achieve and maintain adequate cardiopulmonary perfusion will improve  Description: Ability to achieve and maintain adequate cardiopulmonary perfusion will improve  6/29/2021 2357 by David Yoder RN  Outcome: Ongoing  6/29/2021 1215 by Luis Joseph RN  Outcome: Ongoing     Problem: Coping:  Goal: Ability to adjust to condition or

## 2021-07-01 ENCOUNTER — TELEPHONE (OUTPATIENT)
Dept: INTERNAL MEDICINE CLINIC | Age: 46
End: 2021-07-01

## 2021-07-01 NOTE — TELEPHONE ENCOUNTER
[facility] Trans[facility] Initial Follow Up Call    Outreach made within 2 business days of discharge: Yes    Patient: Abram Carias Patient : 1975   MRN: [id number]  Reason for Admission: There are no discharge diagnoses documented for the most recent discharge. Discharge Date: 21       Spoke with: Ashu Butler    Discharge department/facility: [facility] Interactive Patient Contact:  Was patient able to fill all prescriptions:   Was patient instructed to bring all medications to the follow-up visit:   Is patient taking all medications as directed in the discharge summary? Yes  Does patient understand their discharge instructions: Yes  Does patient have questions or concerns that need addressed prior to 7-14 day follow up office visit: no    Scheduled appointment with PCP within 7-14 days    Follow Up  No future appointments.     [place], [place]

## 2022-04-25 ENCOUNTER — APPOINTMENT (OUTPATIENT)
Dept: GENERAL RADIOLOGY | Age: 47
DRG: 280 | End: 2022-04-25
Payer: COMMERCIAL

## 2022-04-25 ENCOUNTER — HOSPITAL ENCOUNTER (INPATIENT)
Age: 47
LOS: 7 days | Discharge: NURSING, REHAB OR LONG-TERM CARE | DRG: 280 | End: 2022-05-03
Attending: EMERGENCY MEDICINE | Admitting: INTERNAL MEDICINE
Payer: COMMERCIAL

## 2022-04-25 DIAGNOSIS — I50.21 ACUTE SYSTOLIC CONGESTIVE HEART FAILURE (HCC): ICD-10-CM

## 2022-04-25 DIAGNOSIS — K70.31 ALCOHOLIC CIRRHOSIS OF LIVER WITH ASCITES (HCC): ICD-10-CM

## 2022-04-25 DIAGNOSIS — J96.01 ACUTE RESPIRATORY FAILURE WITH HYPOXEMIA (HCC): Primary | ICD-10-CM

## 2022-04-25 DIAGNOSIS — K92.2 UPPER GI BLEED: ICD-10-CM

## 2022-04-25 DIAGNOSIS — D64.9 ANEMIA, UNSPECIFIED TYPE: ICD-10-CM

## 2022-04-25 LAB
APTT: 27.6 SEC (ref 26.2–38.6)
BASE EXCESS VENOUS: -7.8 MMOL/L (ref -3–3)
CARBOXYHEMOGLOBIN: 9.9 % (ref 0–1.5)
HCO3 VENOUS: 15.4 MMOL/L (ref 23–29)
INR BLD: 2.25 (ref 0.88–1.12)
LACTIC ACID, SEPSIS: 3.5 MMOL/L (ref 0.4–1.9)
METHEMOGLOBIN VENOUS: 0 %
O2 CONTENT, VEN: 5 VOL %
O2 SAT, VEN: 100 %
O2 THERAPY: ABNORMAL
PCO2, VEN: 20.1 MMHG (ref 40–50)
PH VENOUS: 7.49 (ref 7.35–7.45)
PO2, VEN: 114 MMHG (ref 25–40)
PROTHROMBIN TIME: 26.3 SEC (ref 9.9–12.7)
TCO2 CALC VENOUS: 36 MMOL/L

## 2022-04-25 PROCEDURE — 96374 THER/PROPH/DIAG INJ IV PUSH: CPT

## 2022-04-25 PROCEDURE — 83690 ASSAY OF LIPASE: CPT

## 2022-04-25 PROCEDURE — 71045 X-RAY EXAM CHEST 1 VIEW: CPT

## 2022-04-25 PROCEDURE — 83605 ASSAY OF LACTIC ACID: CPT

## 2022-04-25 PROCEDURE — 93005 ELECTROCARDIOGRAM TRACING: CPT | Performed by: EMERGENCY MEDICINE

## 2022-04-25 PROCEDURE — 82803 BLOOD GASES ANY COMBINATION: CPT

## 2022-04-25 PROCEDURE — 85730 THROMBOPLASTIN TIME PARTIAL: CPT

## 2022-04-25 PROCEDURE — 85610 PROTHROMBIN TIME: CPT

## 2022-04-25 PROCEDURE — 84145 PROCALCITONIN (PCT): CPT

## 2022-04-25 PROCEDURE — 36415 COLL VENOUS BLD VENIPUNCTURE: CPT

## 2022-04-25 PROCEDURE — 80076 HEPATIC FUNCTION PANEL: CPT

## 2022-04-25 PROCEDURE — 96375 TX/PRO/DX INJ NEW DRUG ADDON: CPT

## 2022-04-25 PROCEDURE — 84484 ASSAY OF TROPONIN QUANT: CPT

## 2022-04-25 PROCEDURE — 80048 BASIC METABOLIC PNL TOTAL CA: CPT

## 2022-04-25 PROCEDURE — 94660 CPAP INITIATION&MGMT: CPT

## 2022-04-25 PROCEDURE — 99285 EMERGENCY DEPT VISIT HI MDM: CPT

## 2022-04-25 PROCEDURE — 83880 ASSAY OF NATRIURETIC PEPTIDE: CPT

## 2022-04-25 NOTE — LETTER
[facility] [address] [place] [address]  [address] [address] [address]  Phone: [phone]             May 3, 2022    Patient: Jayna Regalado   YOB: 1975   Date of Visit: 4/25/2022       To Whom It May Concern:    Celsa Ghosh was seen and treated in our facility  beginning 4/25/2022 until 5/3/2022. She may return to work on Monday, May 9, 2022 without restrictions per Dr. Ad Mancilla .       Sincerely,       Miles Richter, RN, MSN, CNL    Clinical Coordinator, [address]: [facility]         Signature:__________________________________

## 2022-04-26 ENCOUNTER — APPOINTMENT (OUTPATIENT)
Dept: CT IMAGING | Age: 47
DRG: 280 | End: 2022-04-26
Payer: COMMERCIAL

## 2022-04-26 ENCOUNTER — APPOINTMENT (OUTPATIENT)
Dept: INTERVENTIONAL RADIOLOGY/VASCULAR | Age: 47
DRG: 280 | End: 2022-04-26
Payer: COMMERCIAL

## 2022-04-26 ENCOUNTER — APPOINTMENT (OUTPATIENT)
Dept: GENERAL RADIOLOGY | Age: 47
DRG: 280 | End: 2022-04-26
Payer: COMMERCIAL

## 2022-04-26 PROBLEM — E87.29 HIGH ANION GAP METABOLIC ACIDOSIS: Status: ACTIVE | Noted: 2022-04-26

## 2022-04-26 PROBLEM — J96.01 ACUTE RESPIRATORY FAILURE WITH HYPOXIA (HCC): Status: ACTIVE | Noted: 2022-04-26

## 2022-04-26 PROBLEM — R06.02 SOB (SHORTNESS OF BREATH): Status: ACTIVE | Noted: 2022-04-26

## 2022-04-26 PROBLEM — K70.31 ASCITES DUE TO ALCOHOLIC CIRRHOSIS (HCC): Status: ACTIVE | Noted: 2022-04-26

## 2022-04-26 PROBLEM — R00.0 SINUS TACHYCARDIA: Status: ACTIVE | Noted: 2022-04-26

## 2022-04-26 PROBLEM — R79.89 ELEVATED LFTS: Status: ACTIVE | Noted: 2022-04-26

## 2022-04-26 LAB
ABO/RH: NORMAL
ACANTHOCYTES: ABNORMAL
ALBUMIN SERPL-MCNC: 2.9 G/DL (ref 3.4–5)
ALBUMIN SERPL-MCNC: 3.2 G/DL (ref 3.4–5)
ALP BLD-CCNC: 74 U/L (ref 40–129)
ALP BLD-CCNC: 85 U/L (ref 40–129)
ALT SERPL-CCNC: 24 U/L (ref 10–40)
ALT SERPL-CCNC: 27 U/L (ref 10–40)
AMMONIA: 64 UMOL/L (ref 11–51)
ANION GAP SERPL CALCULATED.3IONS-SCNC: 14 MMOL/L (ref 3–16)
ANION GAP SERPL CALCULATED.3IONS-SCNC: 15 MMOL/L (ref 3–16)
ANION GAP SERPL CALCULATED.3IONS-SCNC: 18 MMOL/L (ref 3–16)
ANISOCYTOSIS: ABNORMAL
ANTIBODY SCREEN: NORMAL
AST SERPL-CCNC: 63 U/L (ref 15–37)
AST SERPL-CCNC: 69 U/L (ref 15–37)
BACTERIA: ABNORMAL /HPF
BANDED NEUTROPHILS RELATIVE PERCENT: 1 % (ref 0–7)
BANDED NEUTROPHILS RELATIVE PERCENT: 1 % (ref 0–7)
BASE EXCESS ARTERIAL: -3.7 MMOL/L (ref -3–3)
BASOPHILS ABSOLUTE: 0 K/UL (ref 0–0.2)
BASOPHILS ABSOLUTE: 0 K/UL (ref 0–0.2)
BASOPHILS ABSOLUTE: 0.1 K/UL (ref 0–0.2)
BASOPHILS RELATIVE PERCENT: 0 %
BASOPHILS RELATIVE PERCENT: 0 %
BASOPHILS RELATIVE PERCENT: 1 %
BILIRUB SERPL-MCNC: 10.3 MG/DL (ref 0–1)
BILIRUB SERPL-MCNC: 12.4 MG/DL (ref 0–1)
BILIRUBIN DIRECT: 3.3 MG/DL (ref 0–0.3)
BILIRUBIN DIRECT: 3.5 MG/DL (ref 0–0.3)
BILIRUBIN URINE: NEGATIVE
BILIRUBIN, INDIRECT: 7 MG/DL (ref 0–1)
BILIRUBIN, INDIRECT: 8.9 MG/DL (ref 0–1)
BLOOD BANK DISPENSE STATUS: NORMAL
BLOOD BANK PRODUCT CODE: NORMAL
BLOOD, URINE: ABNORMAL
BPU ID: NORMAL
BUN BLDV-MCNC: 34 MG/DL (ref 7–20)
BUN BLDV-MCNC: 35 MG/DL (ref 7–20)
BUN BLDV-MCNC: 35 MG/DL (ref 7–20)
CALCIUM SERPL-MCNC: 8.6 MG/DL (ref 8.3–10.6)
CALCIUM SERPL-MCNC: 8.8 MG/DL (ref 8.3–10.6)
CALCIUM SERPL-MCNC: 9 MG/DL (ref 8.3–10.6)
CARBOXYHEMOGLOBIN ARTERIAL: 2.3 % (ref 0–1.5)
CHLORIDE BLD-SCNC: 103 MMOL/L (ref 99–110)
CHLORIDE BLD-SCNC: 107 MMOL/L (ref 99–110)
CHLORIDE BLD-SCNC: 109 MMOL/L (ref 99–110)
CLARITY: CLEAR
CO2: 14 MMOL/L (ref 21–32)
CO2: 15 MMOL/L (ref 21–32)
CO2: 16 MMOL/L (ref 21–32)
COLOR: YELLOW
CREAT SERPL-MCNC: 0.9 MG/DL (ref 0.6–1.1)
CREAT SERPL-MCNC: 1 MG/DL (ref 0.6–1.1)
CREAT SERPL-MCNC: 1.2 MG/DL (ref 0.6–1.1)
DESCRIPTION BLOOD BANK: NORMAL
EOSINOPHILS ABSOLUTE: 0 K/UL (ref 0–0.6)
EOSINOPHILS ABSOLUTE: 0 K/UL (ref 0–0.6)
EOSINOPHILS ABSOLUTE: 0.1 K/UL (ref 0–0.6)
EOSINOPHILS RELATIVE PERCENT: 0 %
EOSINOPHILS RELATIVE PERCENT: 0 %
EOSINOPHILS RELATIVE PERCENT: 1 %
ETHANOL: NORMAL MG/DL (ref 0–0.08)
FERRITIN: 334 NG/ML (ref 15–150)
FOLATE: 10.58 NG/ML (ref 4.78–24.2)
GFR AFRICAN AMERICAN: 59
GFR AFRICAN AMERICAN: >60
GFR AFRICAN AMERICAN: >60
GFR NON-AFRICAN AMERICAN: 49
GFR NON-AFRICAN AMERICAN: 60
GFR NON-AFRICAN AMERICAN: >60
GLUCOSE BLD-MCNC: 153 MG/DL (ref 70–99)
GLUCOSE BLD-MCNC: 163 MG/DL (ref 70–99)
GLUCOSE BLD-MCNC: 163 MG/DL (ref 70–99)
GLUCOSE URINE: NEGATIVE MG/DL
HCG QUALITATIVE: NEGATIVE
HCO3 ARTERIAL: 19.7 MMOL/L (ref 21–29)
HCT VFR BLD CALC: 12.7 % (ref 36–48)
HCT VFR BLD CALC: 12.8 % (ref 36–48)
HCT VFR BLD CALC: 17.2 % (ref 36–48)
HCT VFR BLD CALC: 25.3 % (ref 36–48)
HEMATOLOGY PATH CONSULT: NO
HEMATOLOGY PATH CONSULT: YES
HEMOGLOBIN, ART, EXTENDED: 5.3 G/DL (ref 12–16)
HEMOGLOBIN: 3.6 G/DL (ref 12–16)
HEMOGLOBIN: 3.8 G/DL (ref 12–16)
HEMOGLOBIN: 5.4 G/DL (ref 12–16)
HEMOGLOBIN: 8.3 G/DL (ref 12–16)
HYPOCHROMIA: ABNORMAL
HYPOCHROMIA: ABNORMAL
IRON SATURATION: 43 % (ref 15–50)
IRON: 102 UG/DL (ref 37–145)
KETONES, URINE: NEGATIVE MG/DL
LACTIC ACID, SEPSIS: 2.2 MMOL/L (ref 0.4–1.9)
LEUKOCYTE ESTERASE, URINE: ABNORMAL
LIPASE: 36 U/L (ref 13–60)
LYMPHOCYTES ABSOLUTE: 0.7 K/UL (ref 1–5.1)
LYMPHOCYTES ABSOLUTE: 0.9 K/UL (ref 1–5.1)
LYMPHOCYTES ABSOLUTE: 1 K/UL (ref 1–5.1)
LYMPHOCYTES RELATIVE PERCENT: 7 %
LYMPHOCYTES RELATIVE PERCENT: 7 %
LYMPHOCYTES RELATIVE PERCENT: 9 %
MACROCYTES: ABNORMAL
MAGNESIUM: 1.9 MG/DL (ref 1.8–2.4)
MCH RBC QN AUTO: 37 PG (ref 26–34)
MCH RBC QN AUTO: 43.3 PG (ref 26–34)
MCH RBC QN AUTO: 43.5 PG (ref 26–34)
MCHC RBC AUTO-ENTMCNC: 28.5 G/DL (ref 31–36)
MCHC RBC AUTO-ENTMCNC: 29.7 G/DL (ref 31–36)
MCHC RBC AUTO-ENTMCNC: 31.3 G/DL (ref 31–36)
MCV RBC AUTO: 118.3 FL (ref 80–100)
MCV RBC AUTO: 146.4 FL (ref 80–100)
MCV RBC AUTO: 152.2 FL (ref 80–100)
METHEMOGLOBIN ARTERIAL: 0 %
MICROCYTES: ABNORMAL
MICROCYTES: ABNORMAL
MICROSCOPIC EXAMINATION: YES
MONOCYTES ABSOLUTE: 0.4 K/UL (ref 0–1.3)
MONOCYTES ABSOLUTE: 0.5 K/UL (ref 0–1.3)
MONOCYTES ABSOLUTE: 0.9 K/UL (ref 0–1.3)
MONOCYTES RELATIVE PERCENT: 4 %
MONOCYTES RELATIVE PERCENT: 4 %
MONOCYTES RELATIVE PERCENT: 7 %
MYELOCYTE PERCENT: 2 %
NEUTROPHILS ABSOLUTE: 11.2 K/UL (ref 1.7–7.7)
NEUTROPHILS ABSOLUTE: 8.9 K/UL (ref 1.7–7.7)
NEUTROPHILS ABSOLUTE: 9.7 K/UL (ref 1.7–7.7)
NEUTROPHILS RELATIVE PERCENT: 85 %
NEUTROPHILS RELATIVE PERCENT: 85 %
NEUTROPHILS RELATIVE PERCENT: 86 %
NITRITE, URINE: POSITIVE
NUCLEATED RED BLOOD CELLS: 1 /100 WBC
NUCLEATED RED BLOOD CELLS: 3 /100 WBC
NUCLEATED RED BLOOD CELLS: 4 /100 WBC
O2 SAT, ARTERIAL: >100 %
O2 THERAPY: ABNORMAL
OCCULT BLOOD DIAGNOSTIC: ABNORMAL
PCO2 ARTERIAL: 26.9 MMHG (ref 35–45)
PDW BLD-RTO: 24.6 % (ref 12.4–15.4)
PDW BLD-RTO: 36.7 % (ref 12.4–15.4)
PDW BLD-RTO: 37.2 % (ref 12.4–15.4)
PH ARTERIAL: 7.47 (ref 7.35–7.45)
PH UA: 5.5 (ref 5–8)
PHOSPHORUS: 5.2 MG/DL (ref 2.5–4.9)
PLATELET # BLD: 102 K/UL (ref 135–450)
PLATELET # BLD: 55 K/UL (ref 135–450)
PLATELET # BLD: 93 K/UL (ref 135–450)
PLATELET SLIDE REVIEW: ABNORMAL
PMV BLD AUTO: 8.7 FL (ref 5–10.5)
PMV BLD AUTO: 8.9 FL (ref 5–10.5)
PMV BLD AUTO: 9.1 FL (ref 5–10.5)
PO2 ARTERIAL: 273 MMHG (ref 75–108)
POLYCHROMASIA: ABNORMAL
POTASSIUM SERPL-SCNC: 4.4 MMOL/L (ref 3.5–5.1)
POTASSIUM SERPL-SCNC: 4.5 MMOL/L (ref 3.5–5.1)
POTASSIUM SERPL-SCNC: 4.6 MMOL/L (ref 3.5–5.1)
PRO-BNP: 9523 PG/ML (ref 0–124)
PROCALCITONIN: 1.98 NG/ML (ref 0–0.15)
PROTEIN UA: NEGATIVE MG/DL
RBC # BLD: 0.83 M/UL (ref 4–5.2)
RBC # BLD: 0.88 M/UL (ref 4–5.2)
RBC # BLD: 1.45 M/UL (ref 4–5.2)
RBC UA: ABNORMAL /HPF (ref 0–4)
REASON FOR REJECTION: NORMAL
REJECTED TEST: NORMAL
SCHISTOCYTES: ABNORMAL
SCHISTOCYTES: ABNORMAL
SLIDE REVIEW: ABNORMAL
SMUDGE CELLS: PRESENT
SMUDGE CELLS: PRESENT
SODIUM BLD-SCNC: 135 MMOL/L (ref 136–145)
SODIUM BLD-SCNC: 138 MMOL/L (ref 136–145)
SODIUM BLD-SCNC: 138 MMOL/L (ref 136–145)
SPECIFIC GRAVITY UA: 1.02 (ref 1–1.03)
TARGET CELLS: ABNORMAL
TARGET CELLS: ABNORMAL
TCO2 ARTERIAL: 46 MMOL/L
TOTAL IRON BINDING CAPACITY: 239 UG/DL (ref 260–445)
TOTAL PROTEIN: 5.7 G/DL (ref 6.4–8.2)
TOTAL PROTEIN: 6.2 G/DL (ref 6.4–8.2)
TROPONIN: 0.01 NG/ML
URINE REFLEX TO CULTURE: YES
URINE TYPE: ABNORMAL
UROBILINOGEN, URINE: 0.2 E.U./DL
VITAMIN B-12: >2000 PG/ML (ref 211–911)
VITAMIN D 25-HYDROXY: 8.5 NG/ML
WBC # BLD: 10.1 K/UL (ref 4–11)
WBC # BLD: 11.3 K/UL (ref 4–11)
WBC # BLD: 13 K/UL (ref 4–11)
WBC UA: ABNORMAL /HPF (ref 0–5)

## 2022-04-26 PROCEDURE — 82607 VITAMIN B-12: CPT

## 2022-04-26 PROCEDURE — 6360000002 HC RX W HCPCS: Performed by: PHYSICIAN ASSISTANT

## 2022-04-26 PROCEDURE — 82746 ASSAY OF FOLIC ACID SERUM: CPT

## 2022-04-26 PROCEDURE — 82140 ASSAY OF AMMONIA: CPT

## 2022-04-26 PROCEDURE — 2580000003 HC RX 258: Performed by: PHYSICIAN ASSISTANT

## 2022-04-26 PROCEDURE — 71045 X-RAY EXAM CHEST 1 VIEW: CPT

## 2022-04-26 PROCEDURE — 89051 BODY FLUID CELL COUNT: CPT

## 2022-04-26 PROCEDURE — 83605 ASSAY OF LACTIC ACID: CPT

## 2022-04-26 PROCEDURE — 36415 COLL VENOUS BLD VENIPUNCTURE: CPT

## 2022-04-26 PROCEDURE — 82728 ASSAY OF FERRITIN: CPT

## 2022-04-26 PROCEDURE — 81001 URINALYSIS AUTO W/SCOPE: CPT

## 2022-04-26 PROCEDURE — 6360000002 HC RX W HCPCS: Performed by: INTERNAL MEDICINE

## 2022-04-26 PROCEDURE — 80076 HEPATIC FUNCTION PANEL: CPT

## 2022-04-26 PROCEDURE — 2580000003 HC RX 258: Performed by: INTERNAL MEDICINE

## 2022-04-26 PROCEDURE — 82803 BLOOD GASES ANY COMBINATION: CPT

## 2022-04-26 PROCEDURE — 94660 CPAP INITIATION&MGMT: CPT

## 2022-04-26 PROCEDURE — 86901 BLOOD TYPING SEROLOGIC RH(D): CPT

## 2022-04-26 PROCEDURE — 83550 IRON BINDING TEST: CPT

## 2022-04-26 PROCEDURE — 85014 HEMATOCRIT: CPT

## 2022-04-26 PROCEDURE — 86923 COMPATIBILITY TEST ELECTRIC: CPT

## 2022-04-26 PROCEDURE — 83540 ASSAY OF IRON: CPT

## 2022-04-26 PROCEDURE — 36430 TRANSFUSION BLD/BLD COMPNT: CPT

## 2022-04-26 PROCEDURE — 80048 BASIC METABOLIC PNL TOTAL CA: CPT

## 2022-04-26 PROCEDURE — G0328 FECAL BLOOD SCRN IMMUNOASSAY: HCPCS

## 2022-04-26 PROCEDURE — 6360000002 HC RX W HCPCS: Performed by: EMERGENCY MEDICINE

## 2022-04-26 PROCEDURE — 84703 CHORIONIC GONADOTROPIN ASSAY: CPT

## 2022-04-26 PROCEDURE — C9113 INJ PANTOPRAZOLE SODIUM, VIA: HCPCS | Performed by: PHYSICIAN ASSISTANT

## 2022-04-26 PROCEDURE — 71250 CT THORAX DX C-: CPT

## 2022-04-26 PROCEDURE — 2700000000 HC OXYGEN THERAPY PER DAY

## 2022-04-26 PROCEDURE — 94761 N-INVAS EAR/PLS OXIMETRY MLT: CPT

## 2022-04-26 PROCEDURE — 84100 ASSAY OF PHOSPHORUS: CPT

## 2022-04-26 PROCEDURE — 6370000000 HC RX 637 (ALT 250 FOR IP): Performed by: INTERNAL MEDICINE

## 2022-04-26 PROCEDURE — 86850 RBC ANTIBODY SCREEN: CPT

## 2022-04-26 PROCEDURE — P9016 RBC LEUKOCYTES REDUCED: HCPCS

## 2022-04-26 PROCEDURE — 87086 URINE CULTURE/COLONY COUNT: CPT

## 2022-04-26 PROCEDURE — 6360000002 HC RX W HCPCS: Performed by: STUDENT IN AN ORGANIZED HEALTH CARE EDUCATION/TRAINING PROGRAM

## 2022-04-26 PROCEDURE — 85025 COMPLETE CBC W/AUTO DIFF WBC: CPT

## 2022-04-26 PROCEDURE — 2000000000 HC ICU R&B

## 2022-04-26 PROCEDURE — 86900 BLOOD TYPING SEROLOGIC ABO: CPT

## 2022-04-26 PROCEDURE — 83735 ASSAY OF MAGNESIUM: CPT

## 2022-04-26 PROCEDURE — 82077 ASSAY SPEC XCP UR&BREATH IA: CPT

## 2022-04-26 PROCEDURE — 51702 INSERT TEMP BLADDER CATH: CPT

## 2022-04-26 PROCEDURE — 85018 HEMOGLOBIN: CPT

## 2022-04-26 PROCEDURE — 82306 VITAMIN D 25 HYDROXY: CPT

## 2022-04-26 RX ORDER — FUROSEMIDE 10 MG/ML
20 INJECTION INTRAMUSCULAR; INTRAVENOUS ONCE
Status: DISCONTINUED | OUTPATIENT
Start: 2022-04-26 | End: 2022-04-26

## 2022-04-26 RX ORDER — FUROSEMIDE 10 MG/ML
40 INJECTION INTRAMUSCULAR; INTRAVENOUS ONCE
Status: COMPLETED | OUTPATIENT
Start: 2022-04-26 | End: 2022-04-26

## 2022-04-26 RX ORDER — SODIUM CHLORIDE 0.9 % (FLUSH) 0.9 %
5-40 SYRINGE (ML) INJECTION EVERY 12 HOURS SCHEDULED
Status: DISCONTINUED | OUTPATIENT
Start: 2022-04-26 | End: 2022-05-02

## 2022-04-26 RX ORDER — SODIUM CHLORIDE 0.9 % (FLUSH) 0.9 %
5-40 SYRINGE (ML) INJECTION PRN
Status: DISCONTINUED | OUTPATIENT
Start: 2022-04-26 | End: 2022-05-03 | Stop reason: HOSPADM

## 2022-04-26 RX ORDER — ONDANSETRON 2 MG/ML
4 INJECTION INTRAMUSCULAR; INTRAVENOUS EVERY 6 HOURS PRN
Status: DISCONTINUED | OUTPATIENT
Start: 2022-04-26 | End: 2022-04-26 | Stop reason: SDUPTHER

## 2022-04-26 RX ORDER — FUROSEMIDE 10 MG/ML
20 INJECTION INTRAMUSCULAR; INTRAVENOUS ONCE
Status: COMPLETED | OUTPATIENT
Start: 2022-04-26 | End: 2022-04-26

## 2022-04-26 RX ORDER — ENOXAPARIN SODIUM 100 MG/ML
40 INJECTION SUBCUTANEOUS DAILY
Status: DISCONTINUED | OUTPATIENT
Start: 2022-04-26 | End: 2022-04-26

## 2022-04-26 RX ORDER — SODIUM CHLORIDE 9 MG/ML
INJECTION, SOLUTION INTRAVENOUS PRN
Status: DISCONTINUED | OUTPATIENT
Start: 2022-04-26 | End: 2022-05-03 | Stop reason: HOSPADM

## 2022-04-26 RX ORDER — FERROUS SULFATE 325(65) MG
325 TABLET ORAL 2 TIMES DAILY
Status: DISCONTINUED | OUTPATIENT
Start: 2022-04-26 | End: 2022-04-26 | Stop reason: ALTCHOICE

## 2022-04-26 RX ORDER — ACETAMINOPHEN 650 MG/1
650 SUPPOSITORY RECTAL EVERY 6 HOURS PRN
Status: DISCONTINUED | OUTPATIENT
Start: 2022-04-26 | End: 2022-05-03 | Stop reason: HOSPADM

## 2022-04-26 RX ORDER — MORPHINE SULFATE 4 MG/ML
4 INJECTION, SOLUTION INTRAMUSCULAR; INTRAVENOUS ONCE
Status: DISCONTINUED | OUTPATIENT
Start: 2022-04-26 | End: 2022-04-26

## 2022-04-26 RX ORDER — MORPHINE SULFATE 2 MG/ML
2 INJECTION, SOLUTION INTRAMUSCULAR; INTRAVENOUS EVERY 4 HOURS PRN
Status: COMPLETED | OUTPATIENT
Start: 2022-04-26 | End: 2022-04-26

## 2022-04-26 RX ORDER — ESCITALOPRAM OXALATE 10 MG/1
5 TABLET ORAL DAILY
Status: DISCONTINUED | OUTPATIENT
Start: 2022-04-26 | End: 2022-04-26 | Stop reason: ALTCHOICE

## 2022-04-26 RX ORDER — POLYETHYLENE GLYCOL 3350 17 G/17G
17 POWDER, FOR SOLUTION ORAL DAILY PRN
Status: DISCONTINUED | OUTPATIENT
Start: 2022-04-26 | End: 2022-05-03 | Stop reason: HOSPADM

## 2022-04-26 RX ORDER — NADOLOL 40 MG/1
40 TABLET ORAL DAILY
Status: DISCONTINUED | OUTPATIENT
Start: 2022-04-26 | End: 2022-05-03 | Stop reason: HOSPADM

## 2022-04-26 RX ORDER — ACETAMINOPHEN 500 MG
500 TABLET ORAL EVERY 6 HOURS PRN
Status: ON HOLD | COMMUNITY
End: 2022-05-03 | Stop reason: HOSPADM

## 2022-04-26 RX ORDER — SODIUM CHLORIDE 9 MG/ML
25 INJECTION, SOLUTION INTRAVENOUS PRN
Status: DISCONTINUED | OUTPATIENT
Start: 2022-04-26 | End: 2022-05-03 | Stop reason: HOSPADM

## 2022-04-26 RX ORDER — LORAZEPAM 2 MG/ML
1 INJECTION INTRAMUSCULAR EVERY 6 HOURS PRN
Status: DISCONTINUED | OUTPATIENT
Start: 2022-04-26 | End: 2022-04-27 | Stop reason: ALTCHOICE

## 2022-04-26 RX ORDER — MORPHINE SULFATE 4 MG/ML
4 INJECTION, SOLUTION INTRAMUSCULAR; INTRAVENOUS ONCE
Status: COMPLETED | OUTPATIENT
Start: 2022-04-26 | End: 2022-04-26

## 2022-04-26 RX ORDER — ONDANSETRON 2 MG/ML
4 INJECTION INTRAMUSCULAR; INTRAVENOUS EVERY 6 HOURS PRN
Status: DISCONTINUED | OUTPATIENT
Start: 2022-04-26 | End: 2022-05-03 | Stop reason: HOSPADM

## 2022-04-26 RX ORDER — SODIUM CHLORIDE 0.9 % (FLUSH) 0.9 %
5-40 SYRINGE (ML) INJECTION EVERY 12 HOURS SCHEDULED
Status: DISCONTINUED | OUTPATIENT
Start: 2022-04-26 | End: 2022-05-03 | Stop reason: HOSPADM

## 2022-04-26 RX ORDER — ACETAMINOPHEN 325 MG/1
650 TABLET ORAL EVERY 6 HOURS PRN
Status: DISCONTINUED | OUTPATIENT
Start: 2022-04-26 | End: 2022-05-03 | Stop reason: HOSPADM

## 2022-04-26 RX ORDER — ONDANSETRON 4 MG/1
4 TABLET, ORALLY DISINTEGRATING ORAL EVERY 8 HOURS PRN
Status: DISCONTINUED | OUTPATIENT
Start: 2022-04-26 | End: 2022-05-03 | Stop reason: HOSPADM

## 2022-04-26 RX ADMIN — MORPHINE SULFATE 2 MG: 2 INJECTION, SOLUTION INTRAMUSCULAR; INTRAVENOUS at 14:15

## 2022-04-26 RX ADMIN — SODIUM CHLORIDE 8 MG/HR: 9 INJECTION, SOLUTION INTRAVENOUS at 11:12

## 2022-04-26 RX ADMIN — OCTREOTIDE ACETATE 25 MCG/HR: 500 INJECTION, SOLUTION INTRAVENOUS; SUBCUTANEOUS at 02:45

## 2022-04-26 RX ADMIN — ONDANSETRON 4 MG: 2 INJECTION INTRAMUSCULAR; INTRAVENOUS at 00:53

## 2022-04-26 RX ADMIN — ONDANSETRON 4 MG: 2 INJECTION INTRAMUSCULAR; INTRAVENOUS at 02:30

## 2022-04-26 RX ADMIN — ONDANSETRON 4 MG: 2 INJECTION INTRAMUSCULAR; INTRAVENOUS at 18:07

## 2022-04-26 RX ADMIN — SODIUM CHLORIDE 8 MG/HR: 9 INJECTION, SOLUTION INTRAVENOUS at 20:16

## 2022-04-26 RX ADMIN — FUROSEMIDE 20 MG: 10 INJECTION, SOLUTION INTRAMUSCULAR; INTRAVENOUS at 00:42

## 2022-04-26 RX ADMIN — Medication 1000 MG: at 04:26

## 2022-04-26 RX ADMIN — ONDANSETRON 4 MG: 2 INJECTION INTRAMUSCULAR; INTRAVENOUS at 10:14

## 2022-04-26 RX ADMIN — OCTREOTIDE ACETATE 25 MCG/HR: 500 INJECTION, SOLUTION INTRAVENOUS; SUBCUTANEOUS at 20:17

## 2022-04-26 RX ADMIN — MORPHINE SULFATE 4 MG: 4 INJECTION INTRAVENOUS at 00:53

## 2022-04-26 RX ADMIN — PHYTONADIONE 10 MG: 10 INJECTION, EMULSION INTRAMUSCULAR; INTRAVENOUS; SUBCUTANEOUS at 11:21

## 2022-04-26 RX ADMIN — ACETAMINOPHEN 650 MG: 325 TABLET ORAL at 18:07

## 2022-04-26 RX ADMIN — MORPHINE SULFATE 2 MG: 2 INJECTION, SOLUTION INTRAMUSCULAR; INTRAVENOUS at 04:20

## 2022-04-26 RX ADMIN — FUROSEMIDE 20 MG: 10 INJECTION, SOLUTION INTRAMUSCULAR; INTRAVENOUS at 18:05

## 2022-04-26 RX ADMIN — SODIUM CHLORIDE 80 MG: 9 INJECTION, SOLUTION INTRAVENOUS at 02:43

## 2022-04-26 RX ADMIN — AZITHROMYCIN MONOHYDRATE 500 MG: 500 INJECTION, POWDER, LYOPHILIZED, FOR SOLUTION INTRAVENOUS at 04:31

## 2022-04-26 RX ADMIN — LORAZEPAM 1 MG: 2 INJECTION INTRAMUSCULAR; INTRAVENOUS at 09:40

## 2022-04-26 RX ADMIN — LORAZEPAM 1 MG: 2 INJECTION INTRAMUSCULAR; INTRAVENOUS at 19:59

## 2022-04-26 RX ADMIN — Medication 10 ML: at 09:41

## 2022-04-26 RX ADMIN — FUROSEMIDE 40 MG: 10 INJECTION, SOLUTION INTRAMUSCULAR; INTRAVENOUS at 11:13

## 2022-04-26 RX ADMIN — SODIUM CHLORIDE 8 MG/HR: 9 INJECTION, SOLUTION INTRAVENOUS at 03:14

## 2022-04-26 ASSESSMENT — PAIN DESCRIPTION - LOCATION
LOCATION: ABDOMEN

## 2022-04-26 ASSESSMENT — PAIN SCALES - GENERAL
PAINLEVEL_OUTOF10: 5
PAINLEVEL_OUTOF10: 0
PAINLEVEL_OUTOF10: 8
PAINLEVEL_OUTOF10: 0
PAINLEVEL_OUTOF10: 0
PAINLEVEL_OUTOF10: 6
PAINLEVEL_OUTOF10: 0
PAINLEVEL_OUTOF10: 7
PAINLEVEL_OUTOF10: 0
PAINLEVEL_OUTOF10: 4
PAINLEVEL_OUTOF10: 0

## 2022-04-26 ASSESSMENT — PAIN - FUNCTIONAL ASSESSMENT: PAIN_FUNCTIONAL_ASSESSMENT: PREVENTS OR INTERFERES SOME ACTIVE ACTIVITIES AND ADLS

## 2022-04-26 ASSESSMENT — ENCOUNTER SYMPTOMS
NAUSEA: 0
ABDOMINAL PAIN: 0
RHINORRHEA: 0
COUGH: 0
VOMITING: 0
SHORTNESS OF BREATH: 1
DIARRHEA: 0

## 2022-04-26 ASSESSMENT — PAIN DESCRIPTION - DESCRIPTORS: DESCRIPTORS: ACHING

## 2022-04-26 ASSESSMENT — PAIN DESCRIPTION - ONSET: ONSET: ON-GOING

## 2022-04-26 ASSESSMENT — PAIN DESCRIPTION - ORIENTATION: ORIENTATION: UPPER

## 2022-04-26 ASSESSMENT — PAIN DESCRIPTION - FREQUENCY: FREQUENCY: CONTINUOUS

## 2022-04-26 ASSESSMENT — PAIN DESCRIPTION - PAIN TYPE: TYPE: ACUTE PAIN

## 2022-04-26 NOTE — ED PROVIDER NOTES
[facility]        Pt Name: Gregorio Denny  MRN: [id number]  [place] 1975  Date of evaluation: 4/25/2022  Provider: Rand Salazar PA-C  PCP: No primary care provider on file. Note Started: 2:36 AM EDT        I have seen and evaluated this patient with my supervising physician Bear Yip MD.    [facility]       Chief Complaint   Patient presents with    Shortness of Breath     Patient in by [facility] from home, states SOB that started yesterday, was 75% on RA at EMS arrival, on NRB now with o2 sat 100%. Patient has liver dz and is jaundice. HISTORY OF PRESENT ILLNESS   (Location, Timing/Onset, Context/Setting, Quality, Duration, Modifying Factors, Severity, Associated Signs and Symptoms)  Note limiting factors. Chief Complaint: Shortness of breath    Gregorio Denny is a 40 y.o. female who presents to the emergency department today for evaluation for shortness of breath which began yesterday. The patient states that she began with shortness of breath yesterday, which seemed to worsen today. When EMS arrived patient was 75% on room air, the patient does not normally wear oxygen. The patient does have a history of cirrhosis, from alcoholic liver disease. She states that she has not currently using any alcohol. She states that she has had a cough, and she feels that there is a \"rattling\" in her chest.  She states that she has had some lower leg swelling, and she feels that she has gained weight although she is unsure how much. The patient denies any abdominal pain. She denies any nausea, vomiting or diarrhea. The patient denies any fevers. No hemoptysis. She denies any urinary symptoms. Non-smoker. Patient states that she has not had any black tarry appearance of the stool. Denies any hematemesis. She states that she recently had a heavy period, but is not currently on her menstrual cycle.   Patient otherwise has no other complaints at this time. Nursing Notes were all reviewed and agreed with or any disagreements were addressed in the HPI. REVIEW OF SYSTEMS    (2-9 systems for level 4, 10 or more for level 5)     Review of Systems   Constitutional: Negative for activity change, appetite change, chills and fever. HENT: Negative for congestion and rhinorrhea. Respiratory: Positive for shortness of breath. Negative for cough. Cardiovascular: Negative for chest pain. Gastrointestinal: Negative for abdominal pain, diarrhea, nausea and vomiting. Genitourinary: Negative for difficulty urinating, dysuria and hematuria. Positives and Pertinent negatives as per HPI. Except as noted above in the ROS, all other systems were reviewed and negative.        PAST MEDICAL HISTORY     Past Medical History:   Diagnosis Date    Alcoholic liver disease ([facility] [address]     Anemia     History of blood transfusion          SURGICAL HISTORY     Past Surgical History:   Procedure Laterality Date    COLONOSCOPY N/A 3/11/2021    COLONOSCOPY POLYPECTOMY SNARE/COLD BIOPSY performed by Darcy Medellin MD at [facility] GASTROINTESTINAL ENDOSCOPY N/A 01/30/2021    EGD DIAGNOSTIC ONLY performed by Alex Jacobs MD at [address] 03/10/2021    EGD BIOPSY performed by Darcy Medellin MD at [address] 6/27/2021    EGD BAND LIGATION performed by Roger Hylton MD at [address]       Previous Medications    ESCITALOPRAM (LEXAPRO) 5 MG TABLET    Take 1 tablet by mouth daily    FERROUS SULFATE (IRON 325) 325 (65 FE) MG TABLET    Take 1 tablet by mouth 2 times daily    MULTIPLE VITAMIN (MULTIVITAMIN) TABS TABLET    Take 1 tablet by mouth daily    NADOLOL (CORGARD) 40 MG TABLET    Take 1 tablet by mouth daily    PANTOPRAZOLE (PROTONIX) 40 MG TABLET    Take 1 tablet by mouth 2 times daily         ALLERGIES     Patient has no known allergies. FAMILYHISTORY       Family History   Problem Relation Age of Onset    Alcohol Abuse Mother           SOCIAL HISTORY       Social History     Tobacco Use    Smoking status: Former Smoker    Smokeless tobacco: Never Used   Vaping Use    Vaping Use: Never used   Substance Use Topics    Alcohol use: Yes    Drug use: Never       SCREENINGS    [place] Coma Scale  Eye Opening: Spontaneous  Best Verbal Response: Oriented  Best Motor Response: Obeys commands  [place] Coma Scale Score: 15        PHYSICAL EXAM    (up to 7 for level 4, 8 or more for level 5)     ED Triage Vitals   BP Temp Temp Source Pulse Resp SpO2 Height Weight   04/25/22 2321 04/25/22 2323 04/25/22 2323 04/25/22 2324 04/25/22 2325 04/25/22 2320 -- --   135/65 98.6 °F (37 °C) Oral 123 17 99 %         Physical Exam  Vitals and nursing note reviewed. Constitutional:       Appearance: She is well-developed. She is ill-appearing. She is not diaphoretic. HENT:      Head: Normocephalic and atraumatic. Right Ear: External ear normal.      Left Ear: External ear normal.      Nose: Nose normal.   Eyes:      General: Scleral icterus present. Right eye: No discharge. Left eye: No discharge. Neck:      Trachea: No tracheal deviation. Cardiovascular:      Rate and Rhythm: Regular rhythm. Tachycardia present. Comments: 2+ pitting edema to bilateral lower legs  Pulmonary:      Effort: Pulmonary effort is normal. Tachypnea present. No respiratory distress. Breath sounds: Rales present. No wheezing. Comments: Diminished aeration throughout all lung fields with rales throughout  Abdominal:      General: Bowel sounds are normal. There is distension. Palpations: Abdomen is soft. Tenderness: There is no abdominal tenderness. There is no guarding.       Comments: Abdominal distention, ascites noted   Genitourinary:     Comments: Good rectal tone stool is light brown in color  Musculoskeletal:         General: Normal range of motion. Cervical back: Normal range of motion and neck supple. Skin:     General: Skin is warm and dry. Coloration: Skin is jaundiced. Neurological:      Mental Status: She is alert and oriented to person, place, and time. Psychiatric:         Behavior: Behavior normal.         DIAGNOSTIC RESULTS   LABS:    Labs Reviewed   BASIC METABOLIC PANEL - Abnormal; Notable for the following components:       Result Value    Sodium 135 (*)     CO2 14 (*)     Anion Gap 18 (*)     Glucose 163 (*)     BUN 34 (*)     GFR Non- 60 (*)     All other components within normal limits    Narrative:     CALL  Jeramy  [facility] tel. [phone],  Rejected cbcwd/Called to: kal Coe, 04/26/2022 00:03, by Merribeth Sham  [facility] tel. [phone],  Chemistry results called to and read back by Jose Ware, 04/26/2022 00:08,  by [facility] - Abnormal; Notable for the following components:     Total Protein 6.2 (*)     Albumin 3.2 (*)     AST 69 (*)     Total Bilirubin 12.4 (*)     Bilirubin, Direct 3.5 (*)     Bilirubin, Indirect 8.9 (*)     All other components within normal limits    Narrative:     Pavan Adrian  [facility] tel. [phone],  Rejected cbcwd/Called to: kal Coe, 04/26/2022 00:03, by [employer] tel. [phone],  Chemistry results called to and read back by oJse Ware, 04/26/2022 00:08,  by [employer] - Abnormal; Notable for the following components:    Protime 26.3 (*)     INR 2.25 (*)     All other components within normal limits   BRAIN NATRIURETIC PEPTIDE - Abnormal; Notable for the following components:    Pro-BNP 9,523 (*)     All other components within normal limits    Narrative:     CALL  Jeramy  [facility] tel. [phone],  Rejected cbcwd/Called to: kal Coe, 04/26/2022 00:03, by [facility] tel. [phone],  Chemistry results called to and read back by Heddy Sacks, 04/26/2022 00:08,  by HARJINDER   PROCALCITONIN - Abnormal; Notable for the following components:    Procalcitonin 1.98 (*)     All other components within normal limits    Narrative:     CALL  [facility] tel. [phone],  Rejected cbcwd/Called to: gareth Erickson, 04/26/2022 00:03, by Margot Esparza  [facility] tel. [phone],  Chemistry results called to and read back by Heddy Sacks, 04/26/2022 00:08,  by HARJINDER   LACTATE, SEPSIS - Abnormal; Notable for the following components:    Lactic Acid, Sepsis 3.5 (*)     All other components within normal limits   LACTATE, SEPSIS - Abnormal; Notable for the following components:    Lactic Acid, Sepsis 2.2 (*)     All other components within normal limits   BLOOD GAS, VENOUS - Abnormal; Notable for the following components:    pH, Joe 7.491 (*)     pCO2, Joe 20.1 (*)     pO2, Joe 114.0 (*)     HCO3, Venous 15.4 (*)     Base Excess, Joe -7.8 (*)     Carboxyhemoglobin 9.9 (*)     All other components within normal limits   CBC WITH AUTO DIFFERENTIAL - Abnormal; Notable for the following components:    WBC 13.0 (*)     RBC 0.88 (*)     Hemoglobin 3.8 (*)     Hematocrit 12.8 (*)     .4 (*)     MCH 43.5 (*)     MCHC 29.7 (*)     RDW 36.7 (*)     Platelets 618 (*)     Neutrophils Absolute 11.2 (*)     Lymphocytes Absolute 0.9 (*)     nRBC 3 (*)     Smudge Cells Present (*)     Anisocytosis 2+ (*)     Macrocytes Occasional (*)     Microcytes Occasional (*)     Polychromasia 1+ (*)     Hypochromia 2+ (*)     Schistocytes Occasional (*)     Acanthocytes 1+ (*)     Target Cells Occasional (*)     All other components within normal limits    Narrative:     CALL  [facility] tel. [phone],  Hematology results called to and read back by RN Heddy Sacks, 04/26/2022  01:04, by [facility]   BASIC METABOLIC PANEL - Abnormal; Notable for the following components:    CO2 16 (*)     Glucose 153 (*)     BUN 35 (*)     All other components within normal limits   CBC WITH AUTO DIFFERENTIAL - Abnormal; Notable for the following components:    WBC 11.3 (*)     RBC 0.83 (*)     Hemoglobin 3.6 (*)     Hematocrit 12.7 (*)     .2 (*)     MCH 43.3 (*)     MCHC 28.5 (*)     RDW 37.2 (*)     Platelets 93 (*)     Neutrophils Absolute 9.7 (*)     nRBC 4 (*)     Smudge Cells Present (*)     Anisocytosis 2+ (*)     Macrocytes Occasional (*)     Microcytes Occasional (*)     Polychromasia 1+ (*)     Hypochromia 2+ (*)     Schistocytes Occasional (*)     Acanthocytes 1+ (*)     Target Cells Occasional (*)     All other components within normal limits    Narrative:     John Baig  [facility] tel. [phone],  Hematology results called to and read back by GARETH Prince, 04/26/2022  01:28, by [address] [place] [address] - Abnormal; Notable for the following components:    Occult Blood Diagnostic   (*)     Value: Result: POSITIVE  Normal range: Negative      All other components within normal limits    Narrative:     ORDER#: [id number]                          ORDERED BY: Barb Guillaume  SOURCE: Stool                              COLLECTED:  04/26/22 01:36  ANTIBIOTICS AT SANDOR.:                      RECEIVED :  04/26/22 01:41   LIPASE    Narrative:     John Baig  [facility] tel. [phone],  Rejected cbcwd/Called to: gareth Escobar, 04/26/2022 00:03, by Geeta Jackson  [facility] tel. [phone],  Chemistry results called to and read back by Hillary Prince, 04/26/2022 00:08,  by Giulia Barahona   APTT   TROPONIN    Narrative:     CHAVO  [facility] tel. [phone],  Rejected cbcwd/Called to: gareth Escobar, 04/26/2022 00:03, by Geeta Jackson  [facility] tel. [phone],  Chemistry results called to and read back by Hillary Prince, 04/26/2022 00:08,  by Florentino Boateng    Narrative:     John Baig  [facility] tel. [phone],  Rejected cbcwd/Called to: gareth Escobar, 04/26/2022 00:03, by [facility]   HCG, SERUM, QUALITATIVE   URINALYSIS WITH REFLEX TO CULTURE   BASIC METABOLIC PANEL   HEPATIC FUNCTION PANEL CBC WITH AUTO DIFFERENTIAL   FERRITIN   IRON AND TIBC   VITAMIN B12   FOLATE   VITAMIN D 25 HYDROXY   AMMONIA   MAGNESIUM   PHOSPHORUS   ETHANOL   TYPE AND SCREEN   PREPARE RBC (CROSSMATCH)       When ordered only abnormal lab results are displayed. All other labs were within normal range or not returned as of this dictation. EKG: When ordered, EKG's are interpreted by the Emergency Department Physician in the absence of a cardiologist.  Please see their note for interpretation of EKG. RADIOLOGY:   Non-plain film images such as CT, Ultrasound and MRI are read by the radiologist. Plain radiographic images are visualized and preliminarily interpreted by the ED Provider with the below findings:        Interpretation per the Radiologist below, if available at the time of this note:    XR CHEST PORTABLE   Final Result   Moderate to severe asymmetric pulmonary edema versus left-sided pneumonia   with mild background pulmonary edema. CT CHEST ABDOMEN PELVIS WO CONTRAST    (Results Pending)     XR CHEST PORTABLE    Result Date: 4/26/2022  EXAMINATION: ONE XRAY VIEW OF THE CHEST 4/26/2022 12:10 am COMPARISON: 05/16/2021 HISTORY: ORDERING SYSTEM PROVIDED HISTORY: sob TECHNOLOGIST PROVIDED HISTORY: Reason for exam:->sob Reason for Exam: Shortness of Breath FINDINGS: Cardiomediastinal silhouette is stable. Bilateral pulmonary vascular congestion with small right pleural effusion and multifocal consolidation at the left lung. No pneumothorax. No gross bony abnormality. Moderate to severe asymmetric pulmonary edema versus left-sided pneumonia with mild background pulmonary edema. PROCEDURES   Unless otherwise noted below, none     Procedures    CRITICAL CARE TIME   Critical Care  There was a high probability of life-threatening clinical deterioration in the patient's condition requiring my urgent intervention.   Total critical care time with the patient was 75 minutes excluding separately reportable procedures.   Critical care required due to patients acute respiratory failure with hypoxemia requiring BiPAP, new onset CHF, requiring Lasix, anemia with occult blood positive stool requiring Protonix bolus and infusion, 2 units of blood transfusion, admission to ICU      CONSULTS:  [address] [place], [place] [address]vd and DIFFERENTIAL DIAGNOSIS/MDM:   Vitals:    Vitals:    04/26/22 0210 04/26/22 0211 04/26/22 0212 04/26/22 0213   BP:       Pulse: 122 122 119 119   Resp: 18 24 21 22   Temp:       TempSrc:       SpO2:  100% 100% 100%       Patient was given the following medications:  Medications   sodium chloride flush 0.9 % injection 5-40 mL (has no administration in time range)   sodium chloride flush 0.9 % injection 5-40 mL (has no administration in time range)   0.9 % sodium chloride infusion (has no administration in time range)   ondansetron (ZOFRAN-ODT) disintegrating tablet 4 mg ( Oral See Alternative 4/26/22 0230)     Or   ondansetron (ZOFRAN) injection 4 mg (4 mg IntraVENous Given 4/26/22 0230)   polyethylene glycol (GLYCOLAX) packet 17 g (has no administration in time range)   acetaminophen (TYLENOL) tablet 650 mg (has no administration in time range)     Or   acetaminophen (TYLENOL) suppository 650 mg (has no administration in time range)   octreotide (SANDOSTATIN) 500 mcg in sodium chloride 0.9 % 100 mL infusion (has no administration in time range)   0.9 % sodium chloride infusion (has no administration in time range)   pantoprazole (PROTONIX) 80 mg in sodium chloride 0.9 % 50 mL bolus (has no administration in time range)   pantoprazole (PROTONIX) 80 mg in sodium chloride 0.9 % 100 mL infusion (has no administration in time range)   escitalopram (LEXAPRO) tablet 5 mg (has no administration in time range)   nadolol (CORGARD) tablet 40 mg (has no administration in time range)   ferrous sulfate (IRON 325) tablet 325 mg (has no administration in time range)   morphine (PF) injection 2 mg (has no administration in time range)   cefTRIAXone (ROCEPHIN) 1000 mg in sterile water 10 mL IV syringe (has no administration in time range)   LORazepam (ATIVAN) injection 1 mg (has no administration in time range)   furosemide (LASIX) injection 20 mg (20 mg IntraVENous Given 4/26/22 0042)   morphine injection 4 mg (4 mg IntraVENous Given 4/26/22 0053)           Briefly, this is a 52-year-old female with a history of alcoholic cirrhosis who presents to the emergency department today for shortness of breath which has been ongoing for the past 48 hours. Patient feels that there is a \"rattling\" in her chest, and has been coughing from this but otherwise denies any cough. Patient denies any alcohol use. She is no complaints of pain. On examination, the patient is jaundiced, she is ill-appearing although nontoxic. She is tachycardic. She has diminished aeration throughout all lung fields with rales throughout all lung fields. She does have 2+ pitting edema to bilateral lower legs. Abdomen is nontender although distention with ascites noted    EKG will be documented by my attending, please see his note for further details. Patient was 75% on room air when EMS arrived, initially was tachycardic, and tachypneic, will place on BiPAP    CBC initially showed a leukocytosis of 13, hemoglobin of 3.8. Difficult to assess for any pallor secondary to the patient's significant jaundice on exam.  This was repeated, and was found to have a hemoglobin of 3.6, patient tells me that she recently had a heavy menstrual cycle. Occult blood will be obtained, stool is light brown in color although occult blood is positive. 2 units of blood have been ordered, Protonix bolus and infusion has been ordered. BMP initially shows a CO2 of 14, likely secondary to her fluid overload, and her tachypnea, possibly hyperventilation, after being on BiPAP this was repeated and is trending upward.   Hepatic function panel shows an AST of 69, ALT is normalized. Bilirubin of 12.4. Lipase is normal.  INR is 2.25. BNP is 9523 and x-ray does show pulmonary edema. Lasix have been ordered. pH shows no evidence of acidosis. CT of chest abdomen pelvis multifocal pneumonia with small bilateral pleural effusions right greater than left. Cholelithiasis. Patient will be given Rocephin and Zithromax here. Ethanol level is negative    After BiPAP, Lasix and intervention in the ED, the patient states that she is feeling better, appears to be breathing more comfortably. Patient is in fluid overload, complicated with her cirrhosis, and with her anemia, concerning for upper GI bleed she will need to be admitted to the ICU for further care and evaluation. Hospitalist has agreed for admission patient is updated agreeable plan. FINAL IMPRESSION      1. Acute respiratory failure with hypoxemia (HCC)    2. Acute systolic congestive heart failure ([facility] [address]    3. Alcoholic cirrhosis of liver with ascites ([facility] [address]    4. Anemia, unspecified type    5. Upper GI bleed          DISPOSITION/PLAN   DISPOSITION Admitted 04/26/2022 01:03:54 AM      PATIENT REFERRED TO:  No follow-up provider specified.     DISCHARGE MEDICATIONS:  New Prescriptions    No medications on file       DISCONTINUED MEDICATIONS:  Discontinued Medications    No medications on file              (Please note that portions of this note were completed with a voice recognition program.  Efforts were made to edit the dictations but occasionally words are mis-transcribed.)    Rita Truong PA-C (electronically signed)            Rita Truong PA-C  04/26/22 0259

## 2022-04-26 NOTE — H&P
Hospital Medicine History & Physical      PCP: No primary care provider on file. Date of Admission: 4/25/2022    Date of Service: Pt seen/examined on 04/26/22 and Admitted to Inpatient with expected LOS greater than two midnights due to medical therapy. Chief Complaint:  Shortness of breath       History Of Present Illness:  Cathy Rousseau is 40 y.o. female who presented with complaint of shortness of breath. Symptom onset was acute for a time period of 2 days. The severity is described as severe. The course of his symptoms over time is worsening. The symptoms improved with none and worsened with none. The patient's symptom is associated with abdominal distention and fluid overload.     Cathy Rousseau is 40 y.o. female with history of alcoholic cirrhosis  Patient admits she continues to drink actively at this time  He now presents to the ER with complaint of shortness of breath  She says she has been feeling poorly for the past 2 days  She has abdominal distention due to ascites and fluid overloaded state  She had some nausea and vomiting but denies any hematemesis  In the ED, chest x-ray shows pulmonary edema  In addition CBC shows profound anemia likely due to variceal bleed  She requires BiPAP to keep her SPO2 greater than 90%  On exam, she is jaundiced and ill-appearing but not in acute distress         Past Medical History:          Diagnosis Date    Alcoholic liver disease ([address]     Anemia     History of blood transfusion        Past Surgical History:          Procedure Laterality Date    COLONOSCOPY N/A 3/11/2021    COLONOSCOPY POLYPECTOMY SNARE/COLD BIOPSY performed by Li Smiley MD at [facility] 01/30/2021    EGD DIAGNOSTIC ONLY performed by Jr Pimentel MD at [place] [address] 03/10/2021    EGD BIOPSY performed by Li Smiley MD at [address] [place] [address] GASTROINTESTINAL ENDOSCOPY N/A 6/27/2021    EGD BAND LIGATION performed by Jorge Deal MD at [facility]       Medications Prior to Admission:      Prior to Admission medications    Medication Sig Start Date End Date Taking? Authorizing Provider   nadolol (CORGARD) 40 MG tablet Take 1 tablet by mouth daily 6/30/21   Maryellen Valdivia MD   pantoprazole (PROTONIX) 40 MG tablet Take 1 tablet by mouth 2 times daily 6/30/21   Maryellen Valdivia MD   escitalopram (LEXAPRO) 5 MG tablet Take 1 tablet by mouth daily 6/30/21   Maryellen Valdivia MD   ferrous sulfate (IRON 325) 325 (65 Fe) MG tablet Take 1 tablet by mouth 2 times daily 6/30/21   Maryellen Valdivia MD   Multiple Vitamin (MULTIVITAMIN) TABS tablet Take 1 tablet by mouth daily 2/2/21 6/26/21  Avelino Damon MD       Allergies:  Patient has no known allergies. Social History:      The patient currently lives at home    TOBACCO:   reports that she has quit smoking. She has never used smokeless tobacco.  ETOH:   reports current alcohol use. E-Cigarettes/Vaping Use     Questions Responses    E-Cigarette/Vaping Use Never User    Start Date     Passive Exposure     Quit Date     Counseling Given     Comments             Family History:      Reviewed in detail and negative for DM, CAD, Cancer, CVA. Positive as follows:        Problem Relation Age of Onset    Alcohol Abuse Mother        REVIEW OF SYSTEMS COMPLETED:   Pertinent positives as noted in the HPI. All other systems reviewed and negative. PHYSICAL EXAM PERFORMED:    /70   Pulse 114   Temp 98.6 °F (37 °C) (Oral)   Resp 24   SpO2 100%     General appearance:  No apparent distress, appears stated age and cooperative. HEENT:  Normal cephalic, atraumatic without obvious deformity. Pupils equal, round, and reactive to light. Extra ocular muscles intact. Conjunctivae/corneas clear. Neck: Supple, with full range of motion. No jugular venous distention. Trachea midline. Respiratory:  Normal respiratory effort. Clear to auscultation, bilaterally without Rales/Wheezes/Rhonchi. Cardiovascular:  Regular rate and rhythm with normal S1/S2 without murmurs, rubs or gallops. Abdomen: Soft, non-tender, non-distended with normal bowel sounds. Musculoskeletal:  No clubbing, cyanosis or edema bilaterally. Full range of motion without deformity. Skin: Skin color, texture, turgor normal.  No rashes or lesions. Neurologic:  Neurovascularly intact without any focal sensory/motor deficits. Cranial nerves: II-XII intact, grossly non-focal.  Psychiatric:  Alert and oriented, thought content appropriate, normal insight  Capillary Refill: Brisk,3 seconds, normal  Peripheral Pulses: +2 palpable, equal bilaterally       Labs:     Recent Labs     04/26/22  0006 04/26/22 0111   WBC 13.0* 11.3*   HGB 3.8* 3.6*   HCT 12.8* 12.7*   * 93*     Recent Labs     04/25/22 2330 04/26/22 0111   * 138   K 4.5 4.6    107   CO2 14* 16*   BUN 34* 35*   CREATININE 1.0 0.9   CALCIUM 9.0 8.8     Recent Labs     04/25/22 2330   AST 69*   ALT 27   BILIDIR 3.5*   BILITOT 12.4*   ALKPHOS 85     Recent Labs     04/25/22 2330   INR 2.25*     Recent Labs     04/25/22 2330   TROPONINI 0.01       Urinalysis:      Lab Results   Component Value Date    NITRU Negative 05/16/2021    WBCUA 8 05/16/2021    BACTERIA 4+ 05/16/2021    RBCUA 3 05/16/2021    BLOODU LARGE 05/16/2021    SPECGRAV >1.030 05/16/2021    GLUCOSEU Negative 05/16/2021       Radiology:     CXR: I have reviewed the CXR with the following interpretation: Pulmonary edema  EKG:  I have reviewed the EKG with the following interpretation: Sinus tachycardia    XR CHEST PORTABLE   Final Result   Moderate to severe asymmetric pulmonary edema versus left-sided pneumonia   with mild background pulmonary edema. CT CHEST ABDOMEN PELVIS WO CONTRAST    (Results Pending)       ASSESSMENT:    1. Acute respiratory failure with hypoxia  2. Acute blood loss anemia  3.  Esophageal varices  4. Alcoholic liver disease  5. Ascites due to alcoholic cirrhosis  6. High anion gap metabolic acidosis  7. Elevated LFTs  8. Sinus tachycardia      PLAN:    1. Admit to the ICU  2. Continue BiPAP for respiratory support  3. Will obtain CT chest abdomen and pelvis without contrast   4. Plan to diurese as tolerated if CT chest demonstrate significant pulmonary edema  5. Transfuse 2 units of PRBC  6. Consult GI, she has history of esophageal varices, banded x3 in June 2021  7. Continue octreotide drip for 72 hours for suspected esophageal variceal bleed and IV pantoprazole  8. Check ammonia  9. Considered IV prednisone for possible alcoholic hepatitis but steroid apparently not tolerated in the past and stopped  10. We will give IV ceftriaxone x 5 days  11. Keep n.p.o. for now, advance diet per GI  12. Ativan IV as needed for anxiety, insomnia and alcohol withdrawal  13. Patient is counseled to stop drinking  14. Check iron studies, B12, folate, vitamin D, mag and Phos      DVT Prophylaxis: Heparin not ordered due to concern for GI bleed  Diet: Diet NPO  Code Status: Full Code    PT/OT Eval Status: PT/OT consult is not ordered    Dispo - admit as inpatient       Negro Block MD    Thank you No primary care provider on file. for the opportunity to be involved in this patient's care. If you have any questions or concerns please feel free to contact me at [phone].

## 2022-04-26 NOTE — ED NOTES
Patient transported to ICU by this RN in stable condition alert and oriented x4 on NRB with Protonix, octreotide and blood infusing at this time on LifePak.       Caitlyn Lyle RN  04/26/22 110 Awilda Luciano RN  04/26/22 8101

## 2022-04-26 NOTE — ED NOTES
Patient had an episode of incontinence. Patient cleaned and changed. Millicent Allred removed at this time.       Leeann Brush RN  04/26/22 4344

## 2022-04-26 NOTE — ED PROVIDER NOTES
[address] [facility]    Physician Attestation    Pt Name: Homero Freeman  MRN: [id number]  Armsfelisagffelicitas 1975  Date of evaluation: 4/25/22        Physician Note:    I havepersonally performed and/or participated in the history, exam and medical decision making and agree with all pertinent clinical information. I have also reviewed and agree with the past medical, family and social historyunless otherwise noted. I have personally performed a face to face diagnostic evaluation onthis patient. I have reviewed the mid-levels findings and agree. History: Shortness of breath with cough since yesterday he has a history of cirrhosis due to alcoholism denies any chest pain patient had low sats improved with nonrebreather      REVIEW OF SYSTEMS    Constitutional:  Denies fever, chills, or weakness   Eyes:  Denies vision changes  HENT:  Denies sore throat or neck pain   Respiratory:  cough  shortness of breath   Cardiovascular:  Denies chest pain  GI:  Denies abdominal pain, nausea, vomiting, or diarrhea   Musculoskeletal:  Denies back pain   Skin: no rash or vesicles   Neurologic:  no headache weakness focal    Lymphatic:  no swollen  nodes   Psychiatric: no si or hs thoughts     All systems negative except as marked. General Appearance:  Alert, cooperative, moderate distress, appears stated age. Head:  Normocephalic, without obvious abnormality, atraumatic. Eyes:  conjunctiva/corneas clear, EOM's intact. Sclera anicteric. ENT: Mucous membranes moist.   Neck: Supple, symmetrical, trachea midline, no adenopathy. No jugular venous distention. Lungs:    Respiratory Distress. rales  rhonchi  No rub   Chest Wall:  Nontender  no deformity   Heart:   Sinus tach no murmer gallop    Abdomen:   Soft nontender no organomegally ascites is present   Extremities:  Full range of motion. no deformity   Pulses: Equal  upper and lower    Skin:  No rashes or lesions to exposed skin. Neurologic: Alert and oriented X 3. Motor grossly normal.  Speech clear. Cr n 2-12 intact   EKG Interpretation    Interpreted by emergency department physician    Rhythm: sinus tachycardia  Rate: 120-130  Axis: normal  Ectopy: none  Conduction: normal  ST Segments: no acute change  T Waves: no acute change  Q Waves: none    Clinical Impression: Sinus tachycardia  she received BiPAP and Lasix with improvement still on BiPAP at this time labs are reassuring BNP is elevated chest x-ray shows asymmetric pulmonary edema critical care time was 30 minutes exclusive of procedures and evaluating consulting and treating  Lamine Edwards MD  The hospitalist was also notified of the low hemoglobin it was repeated to make sure it was not an error patient did not require Protonix and blood with a positive stool for  blood    1. Acute respiratory failure with hypoxemia (HCC)    2. Acute systolic congestive heart failure ([facility]    3. Alcoholic cirrhosis of liver with ascites ([facility]    4. Anemia, unspecified type    5. Upper GI bleed          DISPOSITION/PLAN  PATIENT REFERRED TO:  No follow-up provider specified.   DISCHARGE MEDICATIONS:  New Prescriptions    No medications on file         MD Lamine Dawson MD  04/26/22 0036       Lamine Edwards MD  04/26/22 0133       Lamine Edwards MD  04/26/22 MD Robert  04/26/22 4561

## 2022-04-26 NOTE — CONSULTS
Gastroenterology Consult Note        Patient: Jayna Regalado  : 1975  Acct#:      Date:  2022    Subjective:       History of Present Illness  Patient is a 40 y.o.  female admitted with SOB (shortness of breath) [R06.02]  Upper GI bleed [H28.0]  Acute systolic congestive heart failure (HCC) [D50.68]  Alcoholic cirrhosis of liver with ascites (HCC) [K70.31]  Acute respiratory failure with hypoxemia (HCC) [J96.01]  Anemia, unspecified type [D64.9] who is seen in consult for cirrhosis and anemia. Patient has been seen inpatient for alcohol induced cirrhosis as well as anemia. Her cirrhosis is decompensated by bleeding esophageal varices treated with banding in 2021. She has not followed up since. Does not take nadolol. No prior history of ascites or hepatic encephalopathy. Patient states she is drinking intermittently. She presented to the ER yesterday for shortness of breath. She was diagnosed with pulmonary edema and pneumonia. Was found to be anemic with a hemoglobin of 3.6. Has received 2 units PRBC and currently is on BiPAP. She denies any melena or hematochezia. Stool was light brown on TYRELL in the ER. She does report some mild diffuse abdominal discomfort and distention. No nausea or vomiting. Reports some pain in the lower sternum. She does have history of heavy menses. Denies ibuprofen use.   Will take Tylenol if needed (not daily) but no more than 2 Tylenol pills in a day      Past Medical History:   Diagnosis Date    Alcoholic liver disease ([facility]     Anemia     History of blood transfusion       Past Surgical History:   Procedure Laterality Date    COLONOSCOPY N/A 3/11/2021    COLONOSCOPY POLYPECTOMY SNARE/COLD BIOPSY performed by Agustin Arcos MD at [facility] ENDOSCOPY N/A 2021    EGD DIAGNOSTIC ONLY performed by Meena Argueta MD at [facility] ENDOSCOPY N/A 03/10/2021    EGD BIOPSY performed by Li Smiley MD at [facility] N/A 6/27/2021    EGD BAND LIGATION performed by Asia Giraldo MD at [address] [place] [address]      Past Endoscopic History  EGD with Dr Itzel Sky 6/2021 for GI bleed  Pt was placed in the left lateral position and sedated by virtue of General Anesthesia/anethesia service. Upper endoscope was introduced via oropharynx and traversed to second portion duodenum. Esophagus showed normal mucosa proximally. In the distal esophagus at the ge jxn, there was a red spot overlying a varix at the 7 o'clock position. There were 2 additional trunks noted, all about 2-3+. In the stomach, there was mosiac texture to mucosa suggestive of portal gastropathy. A few coffee grounds were seen. Retroflexed view of the cardia, fundus, ge jxn was negative for varices. Duodenum was normal, without bleeding source. Scope was withdrawn and band ligator placed and scope reintroduced with total of 3 bands successfully placed distal esophagus with good hemostasis. Pt was sent to recovery. Post Op Dx:  1)Esophageal varices with red spot suggestive of bleeding source, banded X3  2)Portal gastropathy mild  3) No gastric varices, no ulcers     Colonoscopy 3/2021 with Dr Kei Mauro for anemia    Findings:   Two small polyps, removed with cold snare. s/p 3 clips total. Patchy diverticulosis (right and left colon). Hemorrhoids.      Plans:  Await pathology. Recall colonoscopy in 5 years. Admission Meds  No current facility-administered medications on file prior to encounter.      Current Outpatient Medications on File Prior to Encounter   Medication Sig Dispense Refill    acetaminophen (TYLENOL) 500 MG tablet Take 500 mg by mouth every 6 hours as needed for Pain      nadolol (CORGARD) 40 MG tablet Take 1 tablet by mouth daily 30 tablet 0    pantoprazole (PROTONIX) 40 MG tablet Take 1 tablet by mouth 2 times daily (Patient not taking: Reported on 4/26/2022) 60 tablet 0    escitalopram (LEXAPRO) 5 MG tablet Take 1 tablet by mouth daily (Patient not taking: Reported on 4/26/2022) 30 tablet 0    ferrous sulfate (IRON 325) 325 (65 Fe) MG tablet Take 1 tablet by mouth 2 times daily (Patient not taking: Reported on 4/26/2022) 60 tablet 0    Multiple Vitamin (MULTIVITAMIN) TABS tablet Take 1 tablet by mouth daily 30 tablet 0    Pt tells me she doesn't take any of these meds other than PRN tylenol. Allergies  No Known Allergies   Social   Social History     Tobacco Use    Smoking status: Former Smoker    Smokeless tobacco: Never Used   Substance Use Topics    Alcohol use: Yes        Family History   Problem Relation Age of Onset    Alcohol Abuse Mother          Review of Systems  Constitutional: negative for fevers, chills, sweats    Ears, nose, mouth, throat, and face: negative for nasal congestion and sore throat   Respiratory: negative for cough and shortness of breath   Cardiovascular: negative for chest pain and dyspnea   Gastrointestinal: see hpi   Genitourinary:negative for dysuria and frequency   Integument/breast: negative for pruritus and rash   Hematologic/lymphatic: negative for bleeding and easy bruising   Musculoskeletal:negative for arthralgias and myalgias   Neurological: negative for dizziness and weakness           Physical Exam  Blood pressure 133/63, pulse 104, temperature 98 °F (36.7 °C), temperature source Temporal, resp. rate 17, SpO2 100 %. General appearance: alert, cooperative, no distress, appears stated age  Eyes: scleral icterus  Head: Normocephalic, without obvious abnormality  Lungs: Crackles, rhonchi, Normal Effort  Heart: regular rate and rhythm, normal S1 and S2, no murmurs or rubs  Abdomen: soft, mild distention, non-tender. Bowel sounds normal. No masses,  no organomegaly.    Extremities: atraumatic, no cyanosis or edema  Skin: warm and dry, jaundice  Neuro: Grossly intact, A&OX3  Musculoskeletal: 5/5  strength [facility]      Data Review:    Recent Labs     04/26/22  0006 04/26/22  0111   WBC 13.0* 11.3*   HGB 3.8* 3.6*   HCT 12.8* 12.7*   .4* 152.2*   * 93*     Recent Labs     04/25/22  2330 04/26/22  0111   * 138   K 4.5 4.6    107   CO2 14* 16*   BUN 34* 35*   CREATININE 1.0 0.9     Recent Labs     04/25/22  2330   AST 69*   ALT 27   BILIDIR 3.5*   BILITOT 12.4*   ALKPHOS 85     Recent Labs     04/25/22 2330   LIPASE 36.0     Recent Labs     04/25/22 2330   PROTIME 26.3*   INR 2.25*     No results for input(s): PTT in the last 72 hours. No results for input(s): OCCULTBLD in the last 72 hours. Imaging Studies:                 CT-scan of chest abdomen and pelvis wo contrast 4/26/22:     1. Multifocal pneumonia. 2. Small bilateral pleural effusions, right greater than left. 3. Cholelithiasis. 4. Right nonobstructive nephrolithiasis. 5. Mild compression deformities of T7, T8 of uncertain chronicity, new since   05/17/2021.   6. Cirrhotic liver morphology with sequela of portal hypertension.                        Assessment:     Principal Problem:    Acute respiratory failure with hypoxia (HCC)  Active Problems:    High anion gap metabolic acidosis    Elevated LFTs    Ascites due to alcoholic cirrhosis (HCC)    Sinus tachycardia    Alcoholic liver disease (HCC)    Acute blood loss anemia    Esophageal varices (HCC)  Resolved Problems:    * No resolved hospital problems. *    Anemia -hemoglobin 3.6 at admission with . Likely multifactorial from bone marrow suppression from alcohol abuse, cirrhosis, blood loss from menses +/- GI source. No overt GI bleeding currently but stools guaiac positive. B12, folate, and other anemia labs pending. Received 2 units PRBC so far. Cirrhosis  -due to alcohol abuse. Patient states she is still intermittently drinking. She is anemic but no gross GI bleeding.   Did have bleeding varices treated with banding in June 2021 and did not follow-up for repeat banding. No clinical hepatic encephalopathy. Also appears to be a component of alcohol hepatitis vs decompensated cirrhosis. Her discriminant function is 75. Per prior GI notes, she did not tolerate steroids in the past.    Ascites - Moderates ascites per CT. Pneumonia -on antibiotics. On BiPAP. Recommendations:   -ok for clear liquids today from GI standpoint  - NPO midnight  -Continue Protonix and octreotide drips  -Transfusions to get hemoglobin above 7  -Monitor hemoglobin  -Agree with prophylactic Rocephin  - vitamin k 10 mg x3  -Plan EGD after PRBC resuscitation and once respiratory status is stable. Possibly tomorrow.  -Discussed with patient will importance of complete alcohol cessation.  -Recommend paracentesis when stable. Would send fluid for cell count, culture, albumin, protein, cytology. Discussed with Dr. Mony Harris PA-C  [address] [place] [address]  I have personally performed a face to face diagnostic evaluation on this patient. I have interviewed and examined the patient and I agree with the findings and recommended plan of care. In summary, my findings and plan are the following: As above, 41 yo woman with known cirrhosis due to alcohol complicated by esoph varices in past, ascites, but no HE in past, poorly compliant, continues to use alcohol, now admitted with severe anemia and thrombocytopenia. She has no clinical bleeding, but Hb 3.6 on admission. No melena, hematochezia nor hematemesis here and none at home per daughter, who is an R.N..  On exam there is moderate ascites and some mild confusion with asterixis. Agree with PRBC resuscitation to get Hb>7 and elective EGD tomorrow to re-evaluate varices. She should have diagnostic paracentesis to confirm portal HTN cause and to r/o infection/SBP.  -- see above.     Iban Prasad, [address] [place] [address]  4/26/2022

## 2022-04-26 NOTE — PROGRESS NOTES
4 Eyes Skin Assessment     NAME:  Isela Macdonald  YOB: 1975  MEDICAL RECORD NUMBER:  [id number]    The patient is being assess for  Admission    I agree that 2 RN's have performed a thorough Head to Toe Skin Assessment on the patient. ALL assessment sites listed below have been assessed. Areas assessed by both nurses:    Head, Face, Ears, Shoulders, Back, Chest, Arms, Elbows, Hands, Sacrum. Buttock, Coccyx, Ischium and Legs. Feet and Heels        Does the Patient have a Wound? Yes wound(s) were present on assessment.  LDA wound assessment was Initiated and completed        Maurisio Prevention initiated:  Yes   Wound Care Orders initiated:  Yes    Pressure Injury (Stage 3,4, Unstageable, DTI, NWPT, and Complex wounds) if present place consult order under [de-identified] No    New and Established Ostomies if present place consult order under : No      Nurse 1 eSignature: Electronically signed by Chelle Stafford RN on 4/26/22 at 7:03 PM EDT    **SHARE this note so that the co-signing nurse is able to place an eSignature**    Nurse 2 eSignature: Electronically signed by Efrain Javier RN on 4/26/22 at 10:43 PM EDT

## 2022-04-26 NOTE — ED NOTES
RT at [facility] to place patient on BiPAP.       Aston Roy RN  04/25/22 168 [facility], RN  04/25/22 2207

## 2022-04-26 NOTE — PROGRESS NOTES
Patient admitted to ICU 3907. Placed on monitor. Placed on BiPAP. Nursing assessment completed. Positioned in bed for comfort. Scattered crackles throughout. Patient denies pain. Afebrile. VSS.  NSR. Protonix and Sandostatin drip infusing. 1st unit of PRBC's infusing. 2nd unit ordered to follow. Patient's O2 sats 100%. No c/o SOB. SR up x3. Call light in reach. Bed at lowest position. Wheels locked. Bed alarm engaged. Will continue to monitor.

## 2022-04-27 ENCOUNTER — APPOINTMENT (OUTPATIENT)
Dept: INTERVENTIONAL RADIOLOGY/VASCULAR | Age: 47
DRG: 280 | End: 2022-04-27
Payer: COMMERCIAL

## 2022-04-27 ENCOUNTER — ANESTHESIA (OUTPATIENT)
Dept: ENDOSCOPY | Age: 47
DRG: 280 | End: 2022-04-27
Payer: COMMERCIAL

## 2022-04-27 ENCOUNTER — ANESTHESIA EVENT (OUTPATIENT)
Dept: ENDOSCOPY | Age: 47
DRG: 280 | End: 2022-04-27
Payer: COMMERCIAL

## 2022-04-27 VITALS — SYSTOLIC BLOOD PRESSURE: 112 MMHG | DIASTOLIC BLOOD PRESSURE: 56 MMHG | OXYGEN SATURATION: 100 %

## 2022-04-27 LAB
ALBUMIN FLUID: 0.3 G/DL
ALBUMIN SERPL-MCNC: 2.8 G/DL (ref 3.4–5)
ALP BLD-CCNC: 62 U/L (ref 40–129)
ALT SERPL-CCNC: 22 U/L (ref 10–40)
AMYLASE FLUID: 8 U/L
ANION GAP SERPL CALCULATED.3IONS-SCNC: 12 MMOL/L (ref 3–16)
ANISOCYTOSIS: ABNORMAL
APPEARANCE FLUID: CLEAR
AST SERPL-CCNC: 54 U/L (ref 15–37)
BASOPHILS ABSOLUTE: 0 K/UL (ref 0–0.2)
BASOPHILS RELATIVE PERCENT: 0 %
BILIRUB SERPL-MCNC: 11.5 MG/DL (ref 0–1)
BILIRUBIN DIRECT: 4 MG/DL (ref 0–0.3)
BILIRUBIN, INDIRECT: 7.5 MG/DL (ref 0–1)
BUN BLDV-MCNC: 36 MG/DL (ref 7–20)
CALCIUM SERPL-MCNC: 8.5 MG/DL (ref 8.3–10.6)
CELL COUNT FLUID TYPE: NORMAL
CHLORIDE BLD-SCNC: 105 MMOL/L (ref 99–110)
CLOT EVALUATION: NORMAL
CO2: 20 MMOL/L (ref 21–32)
COLOR FLUID: YELLOW
CREAT SERPL-MCNC: 1.2 MG/DL (ref 0.6–1.1)
EKG ATRIAL RATE: 123 BPM
EKG DIAGNOSIS: NORMAL
EKG P AXIS: 62 DEGREES
EKG P-R INTERVAL: 118 MS
EKG Q-T INTERVAL: 328 MS
EKG QRS DURATION: 84 MS
EKG QTC CALCULATION (BAZETT): 469 MS
EKG R AXIS: 35 DEGREES
EKG T AXIS: 81 DEGREES
EKG VENTRICULAR RATE: 123 BPM
EOSINOPHILS ABSOLUTE: 0.1 K/UL (ref 0–0.6)
EOSINOPHILS RELATIVE PERCENT: 1 %
FERRITIN: 274.6 NG/ML (ref 15–150)
FLUID TYPE: NORMAL
FOLATE: 6.9 NG/ML (ref 4.78–24.2)
GFR AFRICAN AMERICAN: 59
GFR NON-AFRICAN AMERICAN: 49
GLUCOSE BLD-MCNC: 145 MG/DL (ref 70–99)
HCT VFR BLD CALC: 23.9 % (ref 36–48)
HEMATOLOGY PATH CONSULT: NO
HEMATOLOGY PATH CONSULT: NORMAL
HEMOGLOBIN: 8.1 G/DL (ref 12–16)
HYPOCHROMIA: ABNORMAL
INR BLD: 2.01 (ref 0.88–1.12)
IRON SATURATION: ABNORMAL % (ref 15–50)
IRON: 211 UG/DL (ref 37–145)
LYMPHOCYTES ABSOLUTE: 1.3 K/UL (ref 1–5.1)
LYMPHOCYTES RELATIVE PERCENT: 14 %
LYMPHOCYTES, BODY FLUID: 29 %
MACROCYTES: ABNORMAL
MACROPHAGE FLUID: 61 %
MAGNESIUM: 1.6 MG/DL (ref 1.8–2.4)
MCH RBC QN AUTO: 33.9 PG (ref 26–34)
MCHC RBC AUTO-ENTMCNC: 33.7 G/DL (ref 31–36)
MCV RBC AUTO: 100.5 FL (ref 80–100)
MESOTHELIAL FLUID: 3 %
MONOCYTES ABSOLUTE: 0.7 K/UL (ref 0–1.3)
MONOCYTES RELATIVE PERCENT: 8 %
NEUTROPHIL, FLUID: 7 %
NEUTROPHILS ABSOLUTE: 7.1 K/UL (ref 1.7–7.7)
NEUTROPHILS RELATIVE PERCENT: 77 %
NUCLEATED CELLS FLUID: 61 /CUMM
NUMBER OF CELLS COUNTED FLUID: 100
PDW BLD-RTO: 17.1 % (ref 12.4–15.4)
PHOSPHORUS: 4.4 MG/DL (ref 2.5–4.9)
PLATELET # BLD: 45 K/UL (ref 135–450)
PLATELET SLIDE REVIEW: ABNORMAL
PMV BLD AUTO: 7.6 FL (ref 5–10.5)
POLYCHROMASIA: ABNORMAL
POTASSIUM SERPL-SCNC: 3.2 MMOL/L (ref 3.5–5.1)
PROTEIN FLUID: 0.6 G/DL
PROTHROMBIN TIME: 23.4 SEC (ref 9.9–12.7)
RBC # BLD: 2.38 M/UL (ref 4–5.2)
RBC FLUID: <1000 /CUMM
SLIDE REVIEW: ABNORMAL
SODIUM BLD-SCNC: 137 MMOL/L (ref 136–145)
TOTAL IRON BINDING CAPACITY: ABNORMAL UG/DL (ref 260–445)
TOTAL PROTEIN: 5.3 G/DL (ref 6.4–8.2)
TRIGLYCERIDES FLUID: 28 MG/DL
URINE CULTURE, ROUTINE: NORMAL
VITAMIN B-12: 1806 PG/ML (ref 211–911)
WBC # BLD: 9.2 K/UL (ref 4–11)

## 2022-04-27 PROCEDURE — 84157 ASSAY OF PROTEIN OTHER: CPT

## 2022-04-27 PROCEDURE — 0W9G3ZZ DRAINAGE OF PERITONEAL CAVITY, PERCUTANEOUS APPROACH: ICD-10-PCS | Performed by: RADIOLOGY

## 2022-04-27 PROCEDURE — 2580000003 HC RX 258: Performed by: INTERNAL MEDICINE

## 2022-04-27 PROCEDURE — 87205 SMEAR GRAM STAIN: CPT

## 2022-04-27 PROCEDURE — 80076 HEPATIC FUNCTION PANEL: CPT

## 2022-04-27 PROCEDURE — 2580000003 HC RX 258: Performed by: PHYSICIAN ASSISTANT

## 2022-04-27 PROCEDURE — 82728 ASSAY OF FERRITIN: CPT

## 2022-04-27 PROCEDURE — 6370000000 HC RX 637 (ALT 250 FOR IP): Performed by: INTERNAL MEDICINE

## 2022-04-27 PROCEDURE — 2580000003 HC RX 258: Performed by: STUDENT IN AN ORGANIZED HEALTH CARE EDUCATION/TRAINING PROGRAM

## 2022-04-27 PROCEDURE — 6360000002 HC RX W HCPCS: Performed by: PHYSICIAN ASSISTANT

## 2022-04-27 PROCEDURE — 94760 N-INVAS EAR/PLS OXIMETRY 1: CPT

## 2022-04-27 PROCEDURE — 2500000003 HC RX 250 WO HCPCS: Performed by: STUDENT IN AN ORGANIZED HEALTH CARE EDUCATION/TRAINING PROGRAM

## 2022-04-27 PROCEDURE — 80048 BASIC METABOLIC PNL TOTAL CA: CPT

## 2022-04-27 PROCEDURE — 83550 IRON BINDING TEST: CPT

## 2022-04-27 PROCEDURE — 83540 ASSAY OF IRON: CPT

## 2022-04-27 PROCEDURE — 93010 ELECTROCARDIOGRAM REPORT: CPT | Performed by: INTERNAL MEDICINE

## 2022-04-27 PROCEDURE — 87070 CULTURE OTHR SPECIMN AEROBIC: CPT

## 2022-04-27 PROCEDURE — 82150 ASSAY OF AMYLASE: CPT

## 2022-04-27 PROCEDURE — 49083 ABD PARACENTESIS W/IMAGING: CPT

## 2022-04-27 PROCEDURE — 83735 ASSAY OF MAGNESIUM: CPT

## 2022-04-27 PROCEDURE — 7100000000 HC PACU RECOVERY - FIRST 15 MIN: Performed by: INTERNAL MEDICINE

## 2022-04-27 PROCEDURE — 6360000002 HC RX W HCPCS: Performed by: INTERNAL MEDICINE

## 2022-04-27 PROCEDURE — 2709999900 HC NON-CHARGEABLE SUPPLY: Performed by: INTERNAL MEDICINE

## 2022-04-27 PROCEDURE — 0DJ08ZZ INSPECTION OF UPPER INTESTINAL TRACT, VIA NATURAL OR ARTIFICIAL OPENING ENDOSCOPIC: ICD-10-PCS | Performed by: INTERNAL MEDICINE

## 2022-04-27 PROCEDURE — 87015 SPECIMEN INFECT AGNT CONCNTJ: CPT

## 2022-04-27 PROCEDURE — 6360000002 HC RX W HCPCS: Performed by: STUDENT IN AN ORGANIZED HEALTH CARE EDUCATION/TRAINING PROGRAM

## 2022-04-27 PROCEDURE — 7100000001 HC PACU RECOVERY - ADDTL 15 MIN: Performed by: INTERNAL MEDICINE

## 2022-04-27 PROCEDURE — 3700000000 HC ANESTHESIA ATTENDED CARE: Performed by: INTERNAL MEDICINE

## 2022-04-27 PROCEDURE — 82042 OTHER SOURCE ALBUMIN QUAN EA: CPT

## 2022-04-27 PROCEDURE — C1729 CATH, DRAINAGE: HCPCS

## 2022-04-27 PROCEDURE — C9113 INJ PANTOPRAZOLE SODIUM, VIA: HCPCS | Performed by: PHYSICIAN ASSISTANT

## 2022-04-27 PROCEDURE — 1200000000 HC SEMI PRIVATE

## 2022-04-27 PROCEDURE — 82607 VITAMIN B-12: CPT

## 2022-04-27 PROCEDURE — 2500000003 HC RX 250 WO HCPCS: Performed by: NURSE ANESTHETIST, CERTIFIED REGISTERED

## 2022-04-27 PROCEDURE — 88112 CYTOPATH CELL ENHANCE TECH: CPT

## 2022-04-27 PROCEDURE — 3609017100 HC EGD: Performed by: INTERNAL MEDICINE

## 2022-04-27 PROCEDURE — 6360000002 HC RX W HCPCS: Performed by: NURSE ANESTHETIST, CERTIFIED REGISTERED

## 2022-04-27 PROCEDURE — 84100 ASSAY OF PHOSPHORUS: CPT

## 2022-04-27 PROCEDURE — 82746 ASSAY OF FOLIC ACID SERUM: CPT

## 2022-04-27 PROCEDURE — 88305 TISSUE EXAM BY PATHOLOGIST: CPT

## 2022-04-27 PROCEDURE — 36415 COLL VENOUS BLD VENIPUNCTURE: CPT

## 2022-04-27 PROCEDURE — 2580000003 HC RX 258: Performed by: NURSE ANESTHETIST, CERTIFIED REGISTERED

## 2022-04-27 PROCEDURE — 85610 PROTHROMBIN TIME: CPT

## 2022-04-27 PROCEDURE — 84478 ASSAY OF TRIGLYCERIDES: CPT

## 2022-04-27 PROCEDURE — 85025 COMPLETE CBC W/AUTO DIFF WBC: CPT

## 2022-04-27 PROCEDURE — 2700000000 HC OXYGEN THERAPY PER DAY

## 2022-04-27 PROCEDURE — 3700000001 HC ADD 15 MINUTES (ANESTHESIA): Performed by: INTERNAL MEDICINE

## 2022-04-27 RX ORDER — LORAZEPAM 2 MG/ML
3 INJECTION INTRAMUSCULAR
Status: DISCONTINUED | OUTPATIENT
Start: 2022-04-27 | End: 2022-05-03 | Stop reason: HOSPADM

## 2022-04-27 RX ORDER — PROPOFOL 10 MG/ML
INJECTION, EMULSION INTRAVENOUS PRN
Status: DISCONTINUED | OUTPATIENT
Start: 2022-04-27 | End: 2022-04-27 | Stop reason: SDUPTHER

## 2022-04-27 RX ORDER — GAUZE BANDAGE 2" X 2"
100 BANDAGE TOPICAL DAILY
Status: DISCONTINUED | OUTPATIENT
Start: 2022-04-27 | End: 2022-04-27

## 2022-04-27 RX ORDER — PANTOPRAZOLE SODIUM 40 MG/1
40 TABLET, DELAYED RELEASE ORAL
Status: DISCONTINUED | OUTPATIENT
Start: 2022-04-27 | End: 2022-05-03 | Stop reason: HOSPADM

## 2022-04-27 RX ORDER — LIDOCAINE HYDROCHLORIDE 20 MG/ML
INJECTION, SOLUTION EPIDURAL; INFILTRATION; INTRACAUDAL; PERINEURAL PRN
Status: DISCONTINUED | OUTPATIENT
Start: 2022-04-27 | End: 2022-04-27 | Stop reason: SDUPTHER

## 2022-04-27 RX ORDER — LORAZEPAM 1 MG/1
2 TABLET ORAL
Status: DISCONTINUED | OUTPATIENT
Start: 2022-04-27 | End: 2022-05-03 | Stop reason: HOSPADM

## 2022-04-27 RX ORDER — LORAZEPAM 2 MG/ML
2 INJECTION INTRAMUSCULAR
Status: DISCONTINUED | OUTPATIENT
Start: 2022-04-27 | End: 2022-05-03 | Stop reason: HOSPADM

## 2022-04-27 RX ORDER — SODIUM CHLORIDE 9 MG/ML
INJECTION, SOLUTION INTRAVENOUS CONTINUOUS PRN
Status: DISCONTINUED | OUTPATIENT
Start: 2022-04-27 | End: 2022-04-27 | Stop reason: SDUPTHER

## 2022-04-27 RX ORDER — LORAZEPAM 2 MG/ML
4 INJECTION INTRAMUSCULAR
Status: DISCONTINUED | OUTPATIENT
Start: 2022-04-27 | End: 2022-05-03 | Stop reason: HOSPADM

## 2022-04-27 RX ORDER — SODIUM CHLORIDE 9 MG/ML
INJECTION, SOLUTION INTRAVENOUS PRN
Status: DISCONTINUED | OUTPATIENT
Start: 2022-04-27 | End: 2022-05-03 | Stop reason: HOSPADM

## 2022-04-27 RX ORDER — LORAZEPAM 2 MG/ML
1 INJECTION INTRAMUSCULAR
Status: DISCONTINUED | OUTPATIENT
Start: 2022-04-27 | End: 2022-05-03 | Stop reason: HOSPADM

## 2022-04-27 RX ORDER — POTASSIUM CHLORIDE 7.45 MG/ML
10 INJECTION INTRAVENOUS PRN
Status: DISCONTINUED | OUTPATIENT
Start: 2022-04-27 | End: 2022-05-03 | Stop reason: HOSPADM

## 2022-04-27 RX ORDER — GLYCOPYRROLATE 1 MG/5 ML
SYRINGE (ML) INTRAVENOUS PRN
Status: DISCONTINUED | OUTPATIENT
Start: 2022-04-27 | End: 2022-04-27 | Stop reason: SDUPTHER

## 2022-04-27 RX ORDER — POTASSIUM CHLORIDE 20 MEQ/1
40 TABLET, EXTENDED RELEASE ORAL PRN
Status: DISCONTINUED | OUTPATIENT
Start: 2022-04-27 | End: 2022-05-03 | Stop reason: HOSPADM

## 2022-04-27 RX ORDER — MULTIVITAMIN WITH IRON
1 TABLET ORAL DAILY
Status: DISCONTINUED | OUTPATIENT
Start: 2022-04-27 | End: 2022-04-27

## 2022-04-27 RX ORDER — SODIUM CHLORIDE 0.9 % (FLUSH) 0.9 %
5-40 SYRINGE (ML) INJECTION EVERY 12 HOURS SCHEDULED
Status: DISCONTINUED | OUTPATIENT
Start: 2022-04-27 | End: 2022-04-27 | Stop reason: SDUPTHER

## 2022-04-27 RX ORDER — LORAZEPAM 1 MG/1
3 TABLET ORAL
Status: DISCONTINUED | OUTPATIENT
Start: 2022-04-27 | End: 2022-05-03 | Stop reason: HOSPADM

## 2022-04-27 RX ORDER — MAGNESIUM SULFATE IN WATER 40 MG/ML
2000 INJECTION, SOLUTION INTRAVENOUS ONCE
Status: COMPLETED | OUTPATIENT
Start: 2022-04-27 | End: 2022-04-27

## 2022-04-27 RX ORDER — LIDOCAINE HYDROCHLORIDE 10 MG/ML
10 INJECTION, SOLUTION EPIDURAL; INFILTRATION; INTRACAUDAL; PERINEURAL ONCE
Status: COMPLETED | OUTPATIENT
Start: 2022-04-27 | End: 2022-04-27

## 2022-04-27 RX ORDER — LORAZEPAM 1 MG/1
4 TABLET ORAL
Status: DISCONTINUED | OUTPATIENT
Start: 2022-04-27 | End: 2022-05-03 | Stop reason: HOSPADM

## 2022-04-27 RX ORDER — MORPHINE SULFATE 2 MG/ML
2 INJECTION, SOLUTION INTRAMUSCULAR; INTRAVENOUS EVERY 6 HOURS PRN
Status: DISCONTINUED | OUTPATIENT
Start: 2022-04-27 | End: 2022-05-03 | Stop reason: HOSPADM

## 2022-04-27 RX ORDER — LORAZEPAM 1 MG/1
1 TABLET ORAL
Status: DISCONTINUED | OUTPATIENT
Start: 2022-04-27 | End: 2022-05-03 | Stop reason: HOSPADM

## 2022-04-27 RX ORDER — SODIUM CHLORIDE 0.9 % (FLUSH) 0.9 %
5-40 SYRINGE (ML) INJECTION PRN
Status: DISCONTINUED | OUTPATIENT
Start: 2022-04-27 | End: 2022-04-27 | Stop reason: SDUPTHER

## 2022-04-27 RX ADMIN — MORPHINE SULFATE 2 MG: 2 INJECTION, SOLUTION INTRAMUSCULAR; INTRAVENOUS at 16:56

## 2022-04-27 RX ADMIN — ONDANSETRON 4 MG: 4 TABLET, ORALLY DISINTEGRATING ORAL at 20:10

## 2022-04-27 RX ADMIN — AZITHROMYCIN MONOHYDRATE 500 MG: 500 INJECTION, POWDER, LYOPHILIZED, FOR SOLUTION INTRAVENOUS at 05:44

## 2022-04-27 RX ADMIN — SODIUM CHLORIDE: 9 INJECTION, SOLUTION INTRAVENOUS at 10:19

## 2022-04-27 RX ADMIN — ACETAMINOPHEN 650 MG: 325 TABLET ORAL at 03:33

## 2022-04-27 RX ADMIN — POTASSIUM CHLORIDE 40 MEQ: 20 TABLET, EXTENDED RELEASE ORAL at 18:11

## 2022-04-27 RX ADMIN — Medication 10 ML: at 20:04

## 2022-04-27 RX ADMIN — Medication 0.3 MG: at 10:22

## 2022-04-27 RX ADMIN — Medication 1000 MG: at 03:33

## 2022-04-27 RX ADMIN — LIDOCAINE HYDROCHLORIDE 2 MG: 20 INJECTION, SOLUTION EPIDURAL; INFILTRATION; INTRACAUDAL; PERINEURAL at 10:33

## 2022-04-27 RX ADMIN — PHYTONADIONE 10 MG: 10 INJECTION, EMULSION INTRAMUSCULAR; INTRAVENOUS; SUBCUTANEOUS at 07:46

## 2022-04-27 RX ADMIN — THIAMINE HYDROCHLORIDE: 100 INJECTION, SOLUTION INTRAMUSCULAR; INTRAVENOUS at 11:28

## 2022-04-27 RX ADMIN — PROPOFOL 120 MG: 10 INJECTION, EMULSION INTRAVENOUS at 10:28

## 2022-04-27 RX ADMIN — Medication 10 ML: at 20:03

## 2022-04-27 RX ADMIN — LIDOCAINE HYDROCHLORIDE 60 MG: 20 INJECTION, SOLUTION EPIDURAL; INFILTRATION; INTRACAUDAL; PERINEURAL at 10:28

## 2022-04-27 RX ADMIN — ONDANSETRON 4 MG: 2 INJECTION INTRAMUSCULAR; INTRAVENOUS at 17:27

## 2022-04-27 RX ADMIN — MAGNESIUM SULFATE HEPTAHYDRATE 2000 MG: 40 INJECTION, SOLUTION INTRAVENOUS at 14:01

## 2022-04-27 RX ADMIN — PANTOPRAZOLE SODIUM 40 MG: 40 TABLET, DELAYED RELEASE ORAL at 16:10

## 2022-04-27 RX ADMIN — NADOLOL 40 MG: 40 TABLET ORAL at 11:32

## 2022-04-27 RX ADMIN — POTASSIUM BICARBONATE 40 MEQ: 782 TABLET, EFFERVESCENT ORAL at 17:27

## 2022-04-27 RX ADMIN — LIDOCAINE HYDROCHLORIDE 10 ML: 10 INJECTION, SOLUTION EPIDURAL; INFILTRATION; INTRACAUDAL; PERINEURAL at 14:58

## 2022-04-27 RX ADMIN — LORAZEPAM 2 MG: 2 INJECTION INTRAMUSCULAR; INTRAVENOUS at 20:03

## 2022-04-27 RX ADMIN — PROPOFOL 40 MG: 10 INJECTION, EMULSION INTRAVENOUS at 10:33

## 2022-04-27 RX ADMIN — SODIUM CHLORIDE 8 MG/HR: 9 INJECTION, SOLUTION INTRAVENOUS at 06:58

## 2022-04-27 ASSESSMENT — PULMONARY FUNCTION TESTS
PIF_VALUE: 1
PIF_VALUE: 0
PIF_VALUE: 0
PIF_VALUE: 1

## 2022-04-27 ASSESSMENT — PAIN DESCRIPTION - ORIENTATION
ORIENTATION: MID
ORIENTATION: LOWER;MID
ORIENTATION: MID

## 2022-04-27 ASSESSMENT — PAIN DESCRIPTION - LOCATION
LOCATION: OTHER (COMMENT)
LOCATION: OTHER (COMMENT)
LOCATION: BACK;ABDOMEN

## 2022-04-27 ASSESSMENT — ENCOUNTER SYMPTOMS: SHORTNESS OF BREATH: 0

## 2022-04-27 ASSESSMENT — PAIN - FUNCTIONAL ASSESSMENT
PAIN_FUNCTIONAL_ASSESSMENT: NONE - DENIES PAIN
PAIN_FUNCTIONAL_ASSESSMENT: PREVENTS OR INTERFERES SOME ACTIVE ACTIVITIES AND ADLS
PAIN_FUNCTIONAL_ASSESSMENT: PREVENTS OR INTERFERES SOME ACTIVE ACTIVITIES AND ADLS

## 2022-04-27 ASSESSMENT — PAIN SCALES - GENERAL
PAINLEVEL_OUTOF10: 0
PAINLEVEL_OUTOF10: 9
PAINLEVEL_OUTOF10: 0
PAINLEVEL_OUTOF10: 8
PAINLEVEL_OUTOF10: 0
PAINLEVEL_OUTOF10: 8
PAINLEVEL_OUTOF10: 0

## 2022-04-27 ASSESSMENT — PAIN DESCRIPTION - ONSET: ONSET: ON-GOING

## 2022-04-27 ASSESSMENT — PAIN DESCRIPTION - DESCRIPTORS
DESCRIPTORS: ACHING

## 2022-04-27 ASSESSMENT — PAIN DESCRIPTION - FREQUENCY: FREQUENCY: CONTINUOUS

## 2022-04-27 ASSESSMENT — PAIN DESCRIPTION - PAIN TYPE: TYPE: ACUTE PAIN

## 2022-04-27 NOTE — PROGRESS NOTES
Nursing assessment completed. 99 temp. Sinus Tachy 114. RR 24. /64 (81). 94% on 2LO2NC. Scattered crackles throughout and slightly audible crackles. Will place back on BiPAP tonight. Offered Ativan 1 mg IVP to assist with rest.  ABD less distended hypoactive X4. Slightly less edema BLE, ABD and facial edema. Saleem patent draining and 900 cc yellow clear urine. Turned and repositioned and patient turns ad jessica. SR up x3. Call light in reach. Bed at lowest position and wheels locked. Bed alarm engaged. Will continue to monitor.

## 2022-04-27 NOTE — ANESTHESIA POSTPROCEDURE EVALUATION
Department of Anesthesiology  Postprocedure Note    Patient: Cari Oakley  MRN: [id number]  YOB: 1975  Date of evaluation: 4/27/2022  Time:  11:57 AM     Procedure Summary     Date: 04/27/22 Room / Location: [address] [place] [address] / [facility]    Anesthesia Start: 5917 Anesthesia Stop: 5061    Procedure: EGD DIAGNOSTIC ONLY (N/A Abdomen) Diagnosis: (Anemia)    Surgeons: Karlene Holder MD Responsible Provider: Adriana Lino MD    Anesthesia Type: MAC ASA Status: 4          Anesthesia Type: MAC    Estiven Phase I: Estiven Score: 9    Estiven Phase II:      Last vitals: Reviewed and per EMR flowsheets.        Anesthesia Post Evaluation    Patient location during evaluation: PACU  Patient participation: complete - patient participated  Level of consciousness: awake  Airway patency: patent  Nausea & Vomiting: no vomiting and no nausea  Complications: no  Cardiovascular status: hemodynamically stable  Respiratory status: acceptable  Hydration status: stable  Multimodal analgesia pain management approach

## 2022-04-27 NOTE — PROGRESS NOTES
Nursing reassessment completed. 97.8 temp. NSR 92.  RR 16. /55. Resting in bed with eyes closed. BiPAP on. Scattered diffuse crackles throughout. BiPAP 11/5 60%. Oral care and turning ad jessica. ABD and facial edema reduced. LS scattered crackles. O2 sats WNL. See complex flowsheet. No acute distress. Still weak. Sips water vs ice very occasionally. SR up x3. Call light in reach. Bed at lowest position. Wheels locked. Bed alarm engaged. Saleem output quantity sufficient.

## 2022-04-27 NOTE — ANESTHESIA PRE PROCEDURE
Department of Anesthesiology  Preprocedure Note       Name:  Noemí Gordon   Age:  40 y.o.  :  1975                                          MRN:  [id number]         Date:  2022      Surgeon: Pauline Arellano):  Casey Beavers MD    Procedure: Procedure(s):  EGD DIAGNOSTIC ONLY    Medications prior to admission:   Prior to Admission medications    Medication Sig Start Date End Date Taking?  Authorizing Provider   acetaminophen (TYLENOL) 500 MG tablet Take 500 mg by mouth every 6 hours as needed for Pain   Yes Historical Provider, MD   nadolol (CORGARD) 40 MG tablet Take 1 tablet by mouth daily 21   Adrienne Dong MD   Multiple Vitamin (MULTIVITAMIN) TABS tablet Take 1 tablet by mouth daily 21  Kaia Dailey MD       Current medications:    Current Facility-Administered Medications   Medication Dose Route Frequency Provider Last Rate Last Admin    lip balm petroleum free (PHYTOPLEX) stick   Topical PRN Suma Lockhart MD        potassium chloride (KLOR-CON M) extended release tablet 40 mEq  40 mEq Oral PRN Kaia Dailey MD        Or    potassium bicarb-citric acid (EFFER-K) effervescent tablet 40 mEq  40 mEq Oral PRN Kaia Dailey MD        Or    potassium chloride 10 mEq/100 mL IVPB (Peripheral Line)  10 mEq IntraVENous PRN Kaia Dailey MD        magnesium sulfate 2000 mg in 50 mL IVPB premix  2,000 mg IntraVENous Once Kaia Dailey MD        0.9 % sodium chloride infusion   IntraVENous PRN Kaia Dailey MD        LORazepam (ATIVAN) tablet 1 mg  1 mg Oral Q1H PRN Kaia Dailey MD        Or    LORazepam (ATIVAN) injection 1 mg  1 mg IntraVENous Q1H PRN Kaia Dailey MD        Or    LORazepam (ATIVAN) tablet 2 mg  2 mg Oral Q1H PRN Kaia Dailey MD        Or    LORazepam (ATIVAN) injection 2 mg  2 mg IntraVENous Q1H PRN Kaia Dailey MD        Or    LORazepam (ATIVAN) tablet 3 mg  3 mg Oral Q1H PRN Kaia Dailey MD        Or    LORazepam (ATIVAN) injection 3 mg  3 mg IntraVENous Q1H MICHEAL Dailey MD Or    LORazepam (ATIVAN) tablet 4 mg  4 mg Oral Q1H PRN Roel Archuleta MD        Or    LORazepam (ATIVAN) injection 4 mg  4 mg IntraVENous Q1H PRN Roel Archuleta MD        sodium chloride 0.9 % 465 mL with folic acid 1 mg, adult multi-vitamin with vitamin k 10 mL, thiamine 300 mg   IntraVENous Daily Roel Archuleta MD        sodium chloride flush 0.9 % injection 5-40 mL  5-40 mL IntraVENous 2 times per day Diana Corrigan MD   10 mL at 04/26/22 0941    sodium chloride flush 0.9 % injection 5-40 mL  5-40 mL IntraVENous PRN Diana Corrigan MD        0.9 % sodium chloride infusion   IntraVENous PRN Diana Corrigan MD        ondansetron (ZOFRAN-ODT) disintegrating tablet 4 mg  4 mg Oral Q8H PRN Diana Corrigan MD        Or    ondansetron (ZOFRAN) injection 4 mg  4 mg IntraVENous Q6H PRN Diana Corrigan MD   4 mg at 04/26/22 1807    polyethylene glycol (GLYCOLAX) packet 17 g  17 g Oral Daily PRN Diana Corrigan MD        acetaminophen (TYLENOL) tablet 650 mg  650 mg Oral Q6H PRN Diana Corrigan MD   650 mg at 04/27/22 2905    Or    acetaminophen (TYLENOL) suppository 650 mg  650 mg Rectal Q6H PRN Diana Corrigan MD        octreotide (SANDOSTATIN) 500 mcg in sodium chloride 0.9 % 100 mL infusion  25 mcg/hr IntraVENous Continuous Diana Corrigan MD 5 mL/hr at 04/27/22 0334 25 mcg/hr at 04/27/22 0334    0.9 % sodium chloride infusion   IntraVENous PRN Diana Corrigan MD        pantoprazole (PROTONIX) 80 mg in sodium chloride 0.9 % 100 mL infusion  8 mg/hr IntraVENous Continuous Wilcox Sin, PA-C 10 mL/hr at 04/27/22 0658 8 mg/hr at 04/27/22 0658    nadolol (CORGARD) tablet 40 mg  40 mg Oral Daily Diana Corrigan MD        cefTRIAXone (ROCEPHIN) 1000 mg in sterile water 10 mL IV syringe  1,000 mg IntraVENous Q24H Diana Corrigan MD   1,000 mg at 04/27/22 0333    azithromycin (ZITHROMAX) 500 mg in D5W 250ml Vial Mate  500 mg IntraVENous Q24H Roel Archuleta MD   Stopped at 04/27/22 8765    phytonadione (ADULT) (VITAMIN K) 10 mg in dextrose 5 % 100 mL IVPB  10 mg IntraVENous Daily Melva Bolaños PA-C 100 mL/hr at 04/27/22 0746 10 mg at 04/27/22 0746    0.9 % sodium chloride infusion   IntraVENous PRN Nickie Ibanez MD        sodium chloride flush 0.9 % injection 5-40 mL  5-40 mL IntraVENous 2 times per day Rogelio Meneses MD        sodium chloride flush 0.9 % injection 5-40 mL  5-40 mL IntraVENous PRN Rogelio Meneses MD        0.9 % sodium chloride infusion  25 mL IntraVENous PRN Rogelio Meneses MD           Allergies:  No Known Allergies    Problem List:    Patient Active Problem List   Diagnosis Code    GI bleed K92.2    Acute GI bleeding K92.2    Menorrhagia with regular cycle U61.6    Alcoholic liver disease ([facility] K70.9    Acute cholecystitis K81.0    Nausea and vomiting R11.2    Acute blood loss anemia D62    Esophageal varices (HCC) C48.21    Alcoholic hepatitis N27.61    EZIO (acute kidney injury) ([facility] N17.9    High anion gap metabolic acidosis P40.6    Elevated LFTs R79.89    Ascites due to alcoholic cirrhosis ([facility] V13.54    Acute respiratory failure with hypoxia (HCC) J96.01    Sinus tachycardia R00.0       Past Medical History:        Diagnosis Date    Alcoholic liver disease ([facility]     Anemia     History of blood transfusion        Past Surgical History:        Procedure Laterality Date    COLONOSCOPY N/A 3/11/2021    COLONOSCOPY POLYPECTOMY SNARE/COLD BIOPSY performed by Sadi Saxena MD at [address] [facility] ENDOSCOPY N/A 01/30/2021    EGD DIAGNOSTIC ONLY performed by Cristopher Parra MD at [address] [facility] N/A 03/10/2021    EGD BIOPSY performed by Sadi Saxena MD at [address] [facility] N/A 6/27/2021    EGD BAND LIGATION performed by Joey Carreon MD at [address] [facility], [facility] History:    Social History     Tobacco Use    Smoking status: Former Smoker    Smokeless tobacco: Never Used   Substance Use Topics    Alcohol use: Yes                                Counseling given: Not Answered      Vital Signs (Current):   Vitals:    04/27/22 0400 04/27/22 0433 04/27/22 0500 04/27/22 0600   BP: 137/72 117/67 127/62 122/66   Pulse: 104 99 99 94   Resp: 22 16 15 16   Temp:       TempSrc:       SpO2: 93% 91% 92% 92%   Weight:       Height:                                                  BP Readings from Last 3 Encounters:   04/27/22 122/66   06/30/21 (!) 115/56   06/27/21 (!) 105/56       NPO Status:                                                                                 BMI:   Wt Readings from Last 3 Encounters:   04/27/22 135 lb 9.3 oz (61.5 kg)   06/30/21 114 lb 13.8 oz (52.1 kg)   05/17/21 128 lb 14.4 oz (58.5 kg)     Body mass index is 23.27 kg/m². CBC:   Lab Results   Component Value Date    WBC 9.2 04/27/2022    RBC 2.38 04/27/2022    HGB 8.1 04/27/2022    HCT 23.9 04/27/2022    .5 04/27/2022    RDW 17.1 04/27/2022    PLT 45 04/27/2022       CMP:   Lab Results   Component Value Date     04/27/2022    K 3.2 04/27/2022    K 3.4 06/26/2021     04/27/2022    CO2 20 04/27/2022    BUN 36 04/27/2022    CREATININE 1.2 04/27/2022    GFRAA 59 04/27/2022    AGRATIO 0.8 06/26/2021    LABGLOM 49 04/27/2022    GLUCOSE 145 04/27/2022    PROT 5.3 04/27/2022    CALCIUM 8.5 04/27/2022    BILITOT 11.5 04/27/2022    ALKPHOS 62 04/27/2022    AST 54 04/27/2022    ALT 22 04/27/2022       POC Tests: No results for input(s): POCGLU, POCNA, POCK, POCCL, POCBUN, POCHEMO, POCHCT in the last 72 hours.     Coags:   Lab Results   Component Value Date    PROTIME 23.4 04/27/2022    INR 2.01 04/27/2022    APTT 27.6 04/25/2022       HCG (If Applicable): No results found for: PREGTESTUR, PREGSERUM, HCG, HCGQUANT     ABGs:   Lab Results   Component Value Date    PHART 7.473 04/26/2022    PO2ART 273.0 04/26/2022    JNM3TXW 26.9 04/26/2022    QKF3VLD 19.7 04/26/2022    BEART -3.7 04/26/2022    B7OFHUGG >100.0 04/26/2022        Type & Screen (If Applicable):  No results found for: LABABO, LABRH    Drug/Infectious Status (If Applicable):  No results found for: HIV, HEPCAB    COVID-19 Screening (If Applicable): No results found for: COVID19        Anesthesia Evaluation  Patient summary reviewed and Nursing notes reviewed no history of anesthetic complications:   Airway: Mallampati: II  TM distance: >3 FB   Neck ROM: full  Mouth opening: > = 3 FB Dental:          Pulmonary:       (-) asthma and shortness of breath                           Cardiovascular:        (-) hypertension and  angina                Neuro/Psych:      (-) CVA           GI/Hepatic/Renal:   (+) liver disease (alcoholic hepatitis, cirrhosis):,      (-) GERD       Endo/Other:        (-) diabetes mellitus, hypothyroidism               Abdominal:             Vascular:     - PVD. Other Findings:           Anesthesia Plan      MAC     ASA 4     (Pulm edema on admission, improved SOB per pt, on bipap, hx varices, anemia, planned for paracentesis)  Induction: intravenous. Anesthetic plan and risks discussed with patient. Plan discussed with CRNA.                   Yareli No MD   4/27/2022

## 2022-04-27 NOTE — PROGRESS NOTES
Pt stable and able to be transferred from PACU to room 3907. ICU called and notified that pt is being transferred out of PACU and to room.

## 2022-04-27 NOTE — BRIEF OP NOTE
Brief Postoperative Note - Full Note in Chart Review/Procedures tab       Patient: Paulino Cotto  YOB: 1975  MRN: [id number]    Date of Procedure: 4/27/2022    Pre-Op Diagnosis: Anemia    Post-Op Diagnosis: Same       Procedure(s):  EGD DIAGNOSTIC ONLY    Surgeon(s):  Shruthi Persaud MD    Assistant:  * No surgical staff found *    Anesthesia: Monitor Anesthesia Care    Estimated Blood Loss (mL): Minimal    Complications: None    Specimens:   * No specimens in log *    Implants:  * No implants in log *      Drains:   Urinary Catheter Saleem (Active)   $ Urethral catheter insertion $ Not inserted for procedure 04/26/22 1200   Catheter Indications Need for fluid volume management of the critically ill patient in a critical care setting 04/27/22 0933   Site Assessment No urethral drainage 04/27/22 0933   Urine Color Yellow 04/27/22 0933   Urine Appearance Clear 04/27/22 0933   Urine Odor Malodorous 04/27/22 0933   Collection Container Standard 04/27/22 0933   Securement Method Securing device (Describe) 04/27/22 0933   Catheter Care Completed Yes 04/27/22 0334   Catheter Best Practices  Drainage tube clipped to bed 04/27/22 0933   Status Draining;Patent 04/27/22 0933   Output (mL) 450 mL 04/27/22 0334       Findings:    Small esophageal varices  Mild portal hypertensive gastropathy. No blood nor bleeding seen. Rec:  Low salt diet  Continue present medications  D/C Octreotide and Pantoprazole IV  Pantoprazole 40 mg po daily. Continue nadolol daily  Diagnostic paracentesis  Follow up as inpatient.     Electronically signed by Shruthi Persaud MD on 4/27/2022 at 10:43 AM

## 2022-04-27 NOTE — PROGRESS NOTES
Post procedure report called to [employer], v/u. Pt being transferred to ICU room 7 by David Boudreaux and transport team member; all pt belongings with pt.

## 2022-04-27 NOTE — PROGRESS NOTES
Pt arrived from endo to PACU bay 4. Report received from endo staff. Pt awake, able to follow commands, appears drowsy upon arrival to recovery. Pt arrives with nasal cannula in place infusing 4L oxygen, vitals as charted.

## 2022-04-27 NOTE — PROGRESS NOTES
1600ml of fluid removed  Spoke to patients RN milly and advised her the amount of fluid removed and that patient was returning to the floor.

## 2022-04-27 NOTE — PROGRESS NOTES
Hospitalist Progress Note      PCP: No primary care provider on file. Date of Admission: 4/25/2022      Subjective: Alert and oriented, hemoglobin is better today 8.1, hypokalemia, hypomagnesemia, plan for EGD and paracentesis with GI      Medications:  Reviewed    Infusion Medications    sodium chloride      sodium chloride      octreotide (SandoSTATIN) infusion 25 mcg/hr (04/27/22 0334)    sodium chloride      pantoprazole 8 mg/hr (04/27/22 0658)    sodium chloride      sodium chloride       Scheduled Medications    magnesium sulfate  2,000 mg IntraVENous Once    folic acid, thiamine, multi-vitamin with vitamin K infusion   IntraVENous Daily    sodium chloride flush  5-40 mL IntraVENous 2 times per day    nadolol  40 mg Oral Daily    cefTRIAXone (ROCEPHIN) IV  1,000 mg IntraVENous Q24H    azithromycin  500 mg IntraVENous Q24H    phytonadione (VITAMIN K)  IVPB  10 mg IntraVENous Daily    sodium chloride flush  5-40 mL IntraVENous 2 times per day     PRN Meds: lip balm petroleum free, potassium chloride **OR** potassium alternative oral replacement **OR** potassium chloride, sodium chloride, LORazepam **OR** LORazepam **OR** LORazepam **OR** LORazepam **OR** LORazepam **OR** LORazepam **OR** LORazepam **OR** LORazepam, sodium chloride flush, sodium chloride, ondansetron **OR** ondansetron, polyethylene glycol, acetaminophen **OR** acetaminophen, sodium chloride, sodium chloride, sodium chloride flush, sodium chloride      Intake/Output Summary (Last 24 hours) at 4/27/2022 1243  Last data filed at 4/27/2022 1042  Gross per 24 hour   Intake 1725.87 ml   Output 2250 ml   Net -524.13 ml       Physical Exam Performed:    /69   Pulse 112   Temp 97.2 °F (36.2 °C) (Tympanic)   Resp 21   Ht 5' 4\" (1.626 m)   Wt 135 lb (61.2 kg)   SpO2 96%   BMI 23.17 kg/m²     General appearance: No apparent distress, appears stated age and cooperative. HEENT: Pupils equal, round, and reactive to light. Conjunctivae/corneas clear. Neck: Supple, with full range of motion. No jugular venous distention. Trachea midline. Respiratory:  Normal respiratory effort. Clear to auscultation, bilaterally without Rales/Wheezes/Rhonchi. Cardiovascular: Regular rate and rhythm with normal S1/S2 without murmurs, rubs or gallops. Abdomen: Soft, non-tender, non-distended with normal bowel sounds. Musculoskeletal: No clubbing, cyanosis or edema bilaterally. Full range of motion without deformity. Skin: Skin color, texture, turgor normal.  No rashes or lesions. Neurologic:  Neurovascularly intact without any focal sensory/motor deficits. Cranial nerves: II-XII intact, grossly non-focal.  Psychiatric: Alert and oriented, thought content appropriate, normal insight  Capillary Refill: Brisk,3 seconds, normal   Peripheral Pulses: +2 palpable, equal bilaterally       Labs:   Recent Labs     04/26/22  0111 04/26/22  0111 04/26/22  0804 04/26/22  1829 04/27/22  0441   WBC 11.3*  --  10.1  --  9.2   HGB 3.6*   < > 5.4* 8.3* 8.1*   HCT 12.7*   < > 17.2* 25.3* 23.9*   PLT 93*  --  55*  --  45*    < > = values in this interval not displayed.      Recent Labs     04/26/22  0111 04/26/22  0805 04/27/22  0441    138 137   K 4.6 4.4 3.2*    109 105   CO2 16* 15* 20*   BUN 35* 35* 36*   CREATININE 0.9 1.2* 1.2*   CALCIUM 8.8 8.6 8.5   PHOS  --  5.2* 4.4     Recent Labs     04/25/22  2330 04/26/22  0805 04/27/22  0441   AST 69* 63* 54*   ALT 27 24 22   BILIDIR 3.5* 3.3* 4.0*   BILITOT 12.4* 10.3* 11.5*   ALKPHOS 85 74 62     Recent Labs     04/25/22  2330 04/27/22  0441   INR 2.25* 2.01*     Recent Labs     04/25/22 2330   TROPONINI 0.01       Urinalysis:      Lab Results   Component Value Date    NITRU POSITIVE 04/26/2022    WBCUA 21-50 04/26/2022    BACTERIA 3+ 04/26/2022    RBCUA 0-2 04/26/2022    BLOODU SMALL 04/26/2022    SPECGRAV 1.020 04/26/2022    GLUCOSEU Negative 04/26/2022       Radiology:  XR CHEST PORTABLE   Final Result   Worsening CHF/pulmonary edema. CT CHEST ABDOMEN PELVIS WO CONTRAST   Final Result   1. Multifocal pneumonia. 2. Small bilateral pleural effusions, right greater than left. 3. Cholelithiasis. 4. Right nonobstructive nephrolithiasis. 5. Mild compression deformities of T7, T8 of uncertain chronicity, new since   05/17/2021.   6. Cirrhotic liver morphology with sequela of portal hypertension. XR CHEST PORTABLE   Final Result   Moderate to severe asymmetric pulmonary edema versus left-sided pneumonia   with mild background pulmonary edema. IR US GUIDED PARACENTESIS    (Results Pending)           Assessment/Plan:    Active Hospital Problems    Diagnosis     High anion gap metabolic acidosis [Y65.8]      Priority: Medium    Elevated LFTs [R79.89]      Priority: Medium    Ascites due to alcoholic cirrhosis (HCC) [Z82.65]      Priority: Medium    Acute respiratory failure with hypoxia (HCC) [J96.01]      Priority: Medium    Sinus tachycardia [R00.0]      Priority: Medium    Esophageal varices (HCC) [I85.00]     Acute blood loss anemia [K01]     Alcoholic liver disease (HCC) [K70.9]      Acute blood loss anemia likely secondary to upper GI bleed: Alcohol use disorder, on admission hemoglobin was 3.6, patient received total of 4 units of packed red blood cells, hemoglobin 8.1, GI following, patient started on PPI gtt., octreotide gtt. Alcoholic liver disease: With ascites, transaminitis, current alcohol use, started on CIWA protocol, thiamine high-dose for 3 days, continue thiamine, multivitamins    Acute respiratory failure: On admission patient was placed on BiPAP, CT chest demonstrated significant pulmonary edema. Patient received Lasix, now off BiPAP, on nasal cannula, with acceptable saturation. Concern for pneumonia: Patient was started on azithromycin and Rocephin, will continue for 5 days.     Electrolyte derangement, hypomagnesemia, hypokalemia, repleted, will continue monitor. DVT Prophylaxis: SCD/INR is 2  Diet: Diet NPO Exceptions are: Sips of Water with Meds  Code Status: Full Code    PT/OT Eval Status: For evaluation    Dispo -continue inpatient care, remain in ICU    Due to the immediate potential for life-threatening deterioration due to acute blood loss anemia, I spent 34 minutes providing critical care. This time is excluding time spent performing procedures.       Katherine Perera MD

## 2022-04-28 LAB
ACANTHOCYTES: ABNORMAL
ALBUMIN SERPL-MCNC: 2.6 G/DL (ref 3.4–5)
ALP BLD-CCNC: 66 U/L (ref 40–129)
ALT SERPL-CCNC: 21 U/L (ref 10–40)
ANION GAP SERPL CALCULATED.3IONS-SCNC: 10 MMOL/L (ref 3–16)
AST SERPL-CCNC: 48 U/L (ref 15–37)
BANDED NEUTROPHILS RELATIVE PERCENT: 10 % (ref 0–7)
BASOPHILS ABSOLUTE: 0 K/UL (ref 0–0.2)
BASOPHILS RELATIVE PERCENT: 0 %
BILIRUB SERPL-MCNC: 10.6 MG/DL (ref 0–1)
BILIRUBIN DIRECT: 4.3 MG/DL (ref 0–0.3)
BILIRUBIN, INDIRECT: 6.3 MG/DL (ref 0–1)
BUN BLDV-MCNC: 31 MG/DL (ref 7–20)
BURR CELLS: ABNORMAL
CALCIUM SERPL-MCNC: 8.5 MG/DL (ref 8.3–10.6)
CHLORIDE BLD-SCNC: 107 MMOL/L (ref 99–110)
CO2: 22 MMOL/L (ref 21–32)
CREAT SERPL-MCNC: 1.2 MG/DL (ref 0.6–1.1)
EOSINOPHILS ABSOLUTE: 0 K/UL (ref 0–0.6)
EOSINOPHILS RELATIVE PERCENT: 0 %
FERRITIN: 311 NG/ML (ref 15–150)
FOLATE: 11.56 NG/ML (ref 4.78–24.2)
GFR AFRICAN AMERICAN: 59
GFR NON-AFRICAN AMERICAN: 49
GLUCOSE BLD-MCNC: 119 MG/DL (ref 70–99)
HCT VFR BLD CALC: 27.2 % (ref 36–48)
HEMOGLOBIN: 9.2 G/DL (ref 12–16)
HYPOCHROMIA: ABNORMAL
INR BLD: 1.83 (ref 0.88–1.12)
IRON SATURATION: 53 % (ref 15–50)
IRON: 102 UG/DL (ref 37–145)
LYMPHOCYTES ABSOLUTE: 1 K/UL (ref 1–5.1)
LYMPHOCYTES RELATIVE PERCENT: 11 %
MACROCYTES: ABNORMAL
MAGNESIUM: 1.9 MG/DL (ref 1.8–2.4)
MCH RBC QN AUTO: 34.7 PG (ref 26–34)
MCHC RBC AUTO-ENTMCNC: 33.7 G/DL (ref 31–36)
MCV RBC AUTO: 102.9 FL (ref 80–100)
MICROCYTES: ABNORMAL
MONOCYTES ABSOLUTE: 0.6 K/UL (ref 0–1.3)
MONOCYTES RELATIVE PERCENT: 7 %
NEUTROPHILS ABSOLUTE: 7.1 K/UL (ref 1.7–7.7)
NEUTROPHILS RELATIVE PERCENT: 72 %
NUCLEATED RED BLOOD CELLS: 1 /100 WBC
PDW BLD-RTO: 17.4 % (ref 12.4–15.4)
PHOSPHORUS: 3.2 MG/DL (ref 2.5–4.9)
PLATELET # BLD: 50 K/UL (ref 135–450)
PLATELET SLIDE REVIEW: ABNORMAL
PMV BLD AUTO: 8.3 FL (ref 5–10.5)
POLYCHROMASIA: ABNORMAL
POTASSIUM SERPL-SCNC: 3.8 MMOL/L (ref 3.5–5.1)
PROTHROMBIN TIME: 21.2 SEC (ref 9.9–12.7)
RBC # BLD: 2.64 M/UL (ref 4–5.2)
SLIDE REVIEW: ABNORMAL
SODIUM BLD-SCNC: 139 MMOL/L (ref 136–145)
TEAR DROP CELLS: ABNORMAL
TOTAL IRON BINDING CAPACITY: 191 UG/DL (ref 260–445)
TOTAL PROTEIN: 5.4 G/DL (ref 6.4–8.2)
VITAMIN B-12: 1956 PG/ML (ref 211–911)
WBC # BLD: 8.7 K/UL (ref 4–11)

## 2022-04-28 PROCEDURE — 80076 HEPATIC FUNCTION PANEL: CPT

## 2022-04-28 PROCEDURE — 2580000003 HC RX 258: Performed by: INTERNAL MEDICINE

## 2022-04-28 PROCEDURE — 6360000002 HC RX W HCPCS: Performed by: INTERNAL MEDICINE

## 2022-04-28 PROCEDURE — 2000000000 HC ICU R&B

## 2022-04-28 PROCEDURE — 83550 IRON BINDING TEST: CPT

## 2022-04-28 PROCEDURE — 83540 ASSAY OF IRON: CPT

## 2022-04-28 PROCEDURE — 94640 AIRWAY INHALATION TREATMENT: CPT

## 2022-04-28 PROCEDURE — 84100 ASSAY OF PHOSPHORUS: CPT

## 2022-04-28 PROCEDURE — 83735 ASSAY OF MAGNESIUM: CPT

## 2022-04-28 PROCEDURE — 82607 VITAMIN B-12: CPT

## 2022-04-28 PROCEDURE — 82746 ASSAY OF FOLIC ACID SERUM: CPT

## 2022-04-28 PROCEDURE — 6370000000 HC RX 637 (ALT 250 FOR IP): Performed by: STUDENT IN AN ORGANIZED HEALTH CARE EDUCATION/TRAINING PROGRAM

## 2022-04-28 PROCEDURE — 6360000002 HC RX W HCPCS: Performed by: STUDENT IN AN ORGANIZED HEALTH CARE EDUCATION/TRAINING PROGRAM

## 2022-04-28 PROCEDURE — 2700000000 HC OXYGEN THERAPY PER DAY

## 2022-04-28 PROCEDURE — 2500000003 HC RX 250 WO HCPCS: Performed by: INTERNAL MEDICINE

## 2022-04-28 PROCEDURE — 85025 COMPLETE CBC W/AUTO DIFF WBC: CPT

## 2022-04-28 PROCEDURE — 6370000000 HC RX 637 (ALT 250 FOR IP): Performed by: INTERNAL MEDICINE

## 2022-04-28 PROCEDURE — 85610 PROTHROMBIN TIME: CPT

## 2022-04-28 PROCEDURE — 82728 ASSAY OF FERRITIN: CPT

## 2022-04-28 PROCEDURE — 80048 BASIC METABOLIC PNL TOTAL CA: CPT

## 2022-04-28 PROCEDURE — 94761 N-INVAS EAR/PLS OXIMETRY MLT: CPT

## 2022-04-28 PROCEDURE — 36415 COLL VENOUS BLD VENIPUNCTURE: CPT

## 2022-04-28 RX ORDER — FUROSEMIDE 20 MG/1
20 TABLET ORAL 2 TIMES DAILY
Status: DISCONTINUED | OUTPATIENT
Start: 2022-04-28 | End: 2022-05-03

## 2022-04-28 RX ORDER — IPRATROPIUM BROMIDE AND ALBUTEROL SULFATE 2.5; .5 MG/3ML; MG/3ML
1 SOLUTION RESPIRATORY (INHALATION) EVERY 4 HOURS PRN
Status: DISCONTINUED | OUTPATIENT
Start: 2022-04-28 | End: 2022-05-03 | Stop reason: HOSPADM

## 2022-04-28 RX ORDER — FUROSEMIDE 20 MG/1
20 TABLET ORAL DAILY
Status: DISCONTINUED | OUTPATIENT
Start: 2022-04-28 | End: 2022-04-28

## 2022-04-28 RX ORDER — IPRATROPIUM BROMIDE AND ALBUTEROL SULFATE 2.5; .5 MG/3ML; MG/3ML
1 SOLUTION RESPIRATORY (INHALATION)
Status: DISCONTINUED | OUTPATIENT
Start: 2022-04-28 | End: 2022-04-28

## 2022-04-28 RX ORDER — SPIRONOLACTONE 25 MG/1
25 TABLET ORAL DAILY
Status: DISCONTINUED | OUTPATIENT
Start: 2022-04-28 | End: 2022-05-03

## 2022-04-28 RX ADMIN — LORAZEPAM 1 MG: 2 INJECTION INTRAMUSCULAR; INTRAVENOUS at 20:21

## 2022-04-28 RX ADMIN — PANTOPRAZOLE SODIUM 40 MG: 40 TABLET, DELAYED RELEASE ORAL at 06:25

## 2022-04-28 RX ADMIN — FUROSEMIDE 20 MG: 20 TABLET ORAL at 17:34

## 2022-04-28 RX ADMIN — LORAZEPAM 1 MG: 1 TABLET ORAL at 13:28

## 2022-04-28 RX ADMIN — FUROSEMIDE 20 MG: 20 TABLET ORAL at 11:50

## 2022-04-28 RX ADMIN — NADOLOL 40 MG: 40 TABLET ORAL at 08:35

## 2022-04-28 RX ADMIN — THIAMINE HYDROCHLORIDE: 100 INJECTION, SOLUTION INTRAMUSCULAR; INTRAVENOUS at 11:51

## 2022-04-28 RX ADMIN — MORPHINE SULFATE 2 MG: 2 INJECTION, SOLUTION INTRAMUSCULAR; INTRAVENOUS at 20:22

## 2022-04-28 RX ADMIN — PHYTONADIONE 10 MG: 10 INJECTION, EMULSION INTRAMUSCULAR; INTRAVENOUS; SUBCUTANEOUS at 08:36

## 2022-04-28 RX ADMIN — Medication 10 ML: at 20:22

## 2022-04-28 RX ADMIN — AZITHROMYCIN MONOHYDRATE 500 MG: 500 INJECTION, POWDER, LYOPHILIZED, FOR SOLUTION INTRAVENOUS at 06:25

## 2022-04-28 RX ADMIN — Medication 1000 MG: at 00:07

## 2022-04-28 RX ADMIN — IPRATROPIUM BROMIDE AND ALBUTEROL SULFATE 1 AMPULE: .5; 3 SOLUTION RESPIRATORY (INHALATION) at 16:47

## 2022-04-28 RX ADMIN — SPIRONOLACTONE 25 MG: 25 TABLET ORAL at 11:50

## 2022-04-28 RX ADMIN — MORPHINE SULFATE 2 MG: 2 INJECTION, SOLUTION INTRAMUSCULAR; INTRAVENOUS at 00:07

## 2022-04-28 ASSESSMENT — PAIN DESCRIPTION - DESCRIPTORS: DESCRIPTORS: PRESSURE

## 2022-04-28 ASSESSMENT — PAIN SCALES - GENERAL
PAINLEVEL_OUTOF10: 8
PAINLEVEL_OUTOF10: 6
PAINLEVEL_OUTOF10: 0
PAINLEVEL_OUTOF10: 0

## 2022-04-28 ASSESSMENT — PAIN DESCRIPTION - ORIENTATION
ORIENTATION: MID;LOWER
ORIENTATION: LOWER;MID

## 2022-04-28 ASSESSMENT — PAIN DESCRIPTION - LOCATION
LOCATION: ABDOMEN
LOCATION: ABDOMEN

## 2022-04-28 NOTE — CARE COORDINATION
Social Work (SW) Consult:    SW received a consult for the patient for consideration for rehab. Patient's Daughter, Darshan Soto and Ling Rose were present in the room. SW allowed patient to choose whether she wanted to speak privately or with family present and patient chose for her family to remain in the room. Patient reported that she is not interested in inpatient substance abuse treatment and is unsure is she is interested in outpatient substance abuse treatment either. Patient accepted substance abuse resource list that SW provided. Daughter reported that the patient was engaged with [facility] months ago. SW spoke with patient about how she is going to managing her alcohol cravings upon discharge and patient reported that she will handle it. Patient reported that, if Physical Therapy and Occupational Therapy suggest home healthcare [facility]) then she will think about allowing CM Team to refer to [address] [place] as they partner with [facility] for in-home substance abuse services. SW provided patient with a Primary Care Physician (PCP) list, as patient confirmed that she does not currently have a PCP. Patient reported that she lives alone and that her other Daughter lives with her intermittently. Patient reported that she currently works at [employer] and feels that her strength has been fine. Patient and family denied any additional questions, at this time. SW encouraged patient and family to ask Nurse for SW if any decisions are made for resources provided. Patient and family agreed to this plan of action.       Candy BADILLO, SLIM, [facility] Work -   [phone]    Electronically signed by ABY Le on 4/28/2022 at 1:12 PM

## 2022-04-28 NOTE — PROGRESS NOTES
Hospitalist Progress Note      PCP: No primary care provider on file. Date of Admission: 4/25/2022      Subjective: No acute event overnight, hemoglobin is better at 9.2, remains on 4 L nasal cannula, had pulmonary edema on imaging, will start Lasix and spironolactone. Had paracentesis yesterday with 1600 cc fluid removed.   EGD with nonbleeding esophageal varices      Medications:  Reviewed    Infusion Medications    sodium chloride      sodium chloride      sodium chloride      sodium chloride      sodium chloride       Scheduled Medications    ipratropium-albuterol  1 ampule Inhalation Q4H WA    spironolactone  25 mg Oral Daily    furosemide  20 mg Oral BID    folic acid, thiamine, multi-vitamin with vitamin K infusion   IntraVENous Daily    pantoprazole  40 mg Oral QAM AC    sodium chloride flush  5-40 mL IntraVENous 2 times per day    nadolol  40 mg Oral Daily    cefTRIAXone (ROCEPHIN) IV  1,000 mg IntraVENous Q24H    azithromycin  500 mg IntraVENous Q24H    sodium chloride flush  5-40 mL IntraVENous 2 times per day     PRN Meds: lip balm petroleum free, potassium chloride **OR** potassium alternative oral replacement **OR** potassium chloride, sodium chloride, LORazepam **OR** LORazepam **OR** LORazepam **OR** LORazepam **OR** LORazepam **OR** LORazepam **OR** LORazepam **OR** LORazepam, morphine, sodium chloride flush, sodium chloride, ondansetron **OR** ondansetron, polyethylene glycol, acetaminophen **OR** acetaminophen, sodium chloride, sodium chloride, sodium chloride flush, sodium chloride      Intake/Output Summary (Last 24 hours) at 4/28/2022 1429  Last data filed at 4/28/2022 6997  Gross per 24 hour   Intake 629.43 ml   Output 1000 ml   Net -370.57 ml       Physical Exam Performed:    BP (!) 142/72   Pulse 75   Temp 97.3 °F (36.3 °C) (Temporal)   Resp 24   Ht 5' 4\" (1.626 m)   Wt 145 lb 15.1 oz (66.2 kg)   SpO2 97%   BMI 25.05 kg/m²     General appearance: No apparent distress, appears stated age and cooperative. HEENT: Pupils equal, round, and reactive to light. Conjunctivae/corneas clear. Neck: Supple, with full range of motion. No jugular venous distention. Trachea midline. Respiratory:  Normal respiratory effort. Clear to auscultation, bilaterally without Rales/Wheezes/Rhonchi. Cardiovascular: Regular rate and rhythm with normal S1/S2 without murmurs, rubs or gallops. Abdomen: Soft, non-tender, non-distended with normal bowel sounds. Musculoskeletal: No clubbing, cyanosis or edema bilaterally. Full range of motion without deformity. Skin: Skin color, texture, turgor normal.  No rashes or lesions. Neurologic:  Neurovascularly intact without any focal sensory/motor deficits. Cranial nerves: II-XII intact, grossly non-focal.  Psychiatric: Alert and oriented, thought content appropriate, normal insight  Capillary Refill: Brisk,3 seconds, normal   Peripheral Pulses: +2 palpable, equal bilaterally       Labs:   Recent Labs     04/26/22  0804 04/26/22  0804 04/26/22  1829 04/27/22 0441 04/28/22 0431   WBC 10.1  --   --  9.2 8.7   HGB 5.4*   < > 8.3* 8.1* 9.2*   HCT 17.2*   < > 25.3* 23.9* 27.2*   PLT 55*  --   --  45* 50*    < > = values in this interval not displayed.      Recent Labs     04/26/22  0805 04/27/22 0441 04/28/22 0430    137 139   K 4.4 3.2* 3.8    105 107   CO2 15* 20* 22   BUN 35* 36* 31*   CREATININE 1.2* 1.2* 1.2*   CALCIUM 8.6 8.5 8.5   PHOS 5.2* 4.4 3.2     Recent Labs     04/26/22  0805 04/27/22  0441 04/28/22  0430   AST 63* 54* 48*   ALT 24 22 21   BILIDIR 3.3* 4.0* 4.3*   BILITOT 10.3* 11.5* 10.6*   ALKPHOS 74 62 66     Recent Labs     04/25/22  2330 04/27/22  0441 04/28/22  0431   INR 2.25* 2.01* 1.83*     Recent Labs     04/25/22  2330   TROPONINI 0.01       Urinalysis:      Lab Results   Component Value Date    NITRU POSITIVE 04/26/2022    WBCUA 21-50 04/26/2022    BACTERIA 3+ 04/26/2022    RBCUA 0-2 04/26/2022    BLOODU SMALL 04/26/2022    SPECGRAV 1.020 04/26/2022    GLUCOSEU Negative 04/26/2022       Radiology:  IR US GUIDED PARACENTESIS   Final Result   Successful paracentesis. XR CHEST PORTABLE   Final Result   Worsening CHF/pulmonary edema. CT CHEST ABDOMEN PELVIS WO CONTRAST   Final Result   1. Multifocal pneumonia. 2. Small bilateral pleural effusions, right greater than left. 3. Cholelithiasis. 4. Right nonobstructive nephrolithiasis. 5. Mild compression deformities of T7, T8 of uncertain chronicity, new since   05/17/2021.   6. Cirrhotic liver morphology with sequela of portal hypertension. XR CHEST PORTABLE   Final Result   Moderate to severe asymmetric pulmonary edema versus left-sided pneumonia   with mild background pulmonary edema. Assessment/Plan:    Active Hospital Problems    Diagnosis     High anion gap metabolic acidosis [Y35.7]      Priority: Medium    Elevated LFTs [R79.89]      Priority: Medium    Ascites due to alcoholic cirrhosis (HCC) [H91.55]      Priority: Medium    Acute respiratory failure with hypoxia (HCC) [J96.01]      Priority: Medium    Sinus tachycardia [R00.0]      Priority: Medium    Esophageal varices (HCC) [I85.00]     Acute blood loss anemia [C72]     Alcoholic liver disease (HCC) [K70.9]      Acute blood loss anemia likely secondary to upper GI bleed: Alcohol use disorder, on admission hemoglobin was 3.6, patient received total of 4 units of packed red blood cells, hemoglobin 8.1, GI following, status post EGD with nonbleeding esophageal varices, start octreotide, stop IV PPI, switch to p.o. Protonix. Alcoholic liver disease/liver cirrhosis: With ascites, transaminitis, current alcohol use, started on CIWA protocol, thiamine high-dose for 3 days, continue thiamine, multivitamins, start Lasix and spironolactone    Acute respiratory failure: On admission patient was placed on BiPAP, CT chest demonstrated significant pulmonary edema.   Patient received Lasix, now off BiPAP, on nasal cannula, with acceptable saturation. Remains on 4 L/min nasal cannula, chest imaging with pulmonary edema, will start Lasix and spironolactone, monitor creatinine in a.m. Concern for pneumonia: Patient was started on azithromycin and Rocephin, will continue for 5 days. Electrolyte derangement, hypomagnesemia, hypokalemia, repleted, will continue monitor. I discussed with patient the importance of alcohol cessation, CM to follow for alcohol rehab resources as an outpatient    DVT Prophylaxis: SCD/INR is 2  Diet: ADULT DIET; Regular; No Added Salt (3-4 gm)  Code Status: Full Code    PT/OT Eval Status: For evaluation    Dispo -continue inpatient care, remain in ICU     Due to the immediate potential for life-threatening deterioration due to acute blood loss anemia, I spent 34 minutes providing critical care. This time is excluding time spent performing procedures.       Krishna Haskins MD

## 2022-04-28 NOTE — PROGRESS NOTES
Gastroenterology Progress Note    Noemí Gordon is a 40 y.o. female patient. Principal Problem:    Acute respiratory failure with hypoxia (HCC)  Active Problems:    High anion gap metabolic acidosis    Elevated LFTs    Ascites due to alcoholic cirrhosis (HCC)    Sinus tachycardia    Alcoholic liver disease (HCC)    Acute blood loss anemia    Esophageal varices (HCC)  Resolved Problems:    * No resolved hospital problems. *      SUBJECTIVE:  No abdominal pain, N/V.     ROS:  No fever, chills  No chest pain, palpitations  No SOB, cough  Gastrointestinal ROS: see above    Physical    VITALS:  BP (!) 140/68   Pulse 80   Temp 97.3 °F (36.3 °C) (Temporal)   Resp 22   Ht 5' 4\" (1.626 m)   Wt 145 lb 15.1 oz (66.2 kg)   SpO2 96%   BMI 25.05 kg/m²   TEMPERATURE:  Current - Temp: 97.3 °F (36.3 °C); Max - Temp  Av.4 °F (36.3 °C)  Min: 97.2 °F (36.2 °C)  Max: 97.6 °F (36.4 °C)    NAD  jaundice  Regular rate   Lungs: rhonchi   Abdomen soft, ND, NT,  Bowel sounds normal.  A&Ox3, no asterixis    Data    Data Review:    Recent Labs     22  0804 22  0822   WBC 10.1  --   --  9.2 8.7   HGB 5.4*   < > 8.3* 8.1* 9.2*   HCT 17.2*   < > 25.3* 23.9* 27.2*   .3*  --   --  100.5* 102.9*   PLT 55*  --   --  45* 50*    < > = values in this interval not displayed.      Recent Labs     22  0805 22    137 139   K 4.4 3.2* 3.8    105 107   CO2 15* 20* 22   PHOS 5.2* 4.4 3.2   BUN 35* 36* 31*   CREATININE 1.2* 1.2* 1.2*     Recent Labs     22  0805 04/27/22  0441 04/28/22  0430   AST 63* 54* 48*   ALT 24 22 21   BILIDIR 3.3* 4.0* 4.3*   BILITOT 10.3* 11.5* 10.6*   ALKPHOS 74 62 66     Recent Labs     22   LIPASE 36.0     Recent Labs     22  2330 221 221   PROTIME 26.3* 23.4* 21.2*   INR 2.25* 2.01* 1.83*     No results for input(s): PTT in the last 72 hours.        ASSESSMENT :    Anemia -hemoglobin 3.6 at admission with . Likely multifactorial from bone marrow suppression from alcohol abuse and cirrhosis. No overt GI bleeding. EGD 4/29 with small esophageal varices and portal hypertensive gastropathy. colonoscopy 3/2021 for anemia with polyps. B12, folate normal. No iron deficiency.       Cirrhosis  -due to alcohol abuse. Patient states she is still intermittently drinking. small varices on EGD 4/27. No clinical hepatic encephalopathy. Also has alcohol hepatitis. Her discriminant function was 75 at admission. Per prior GI notes, she did not tolerate steroids in the past so steroids were not started here due to this and current infection (pneumonia). Bili and INR improved today.     Ascites - Moderates ascites per CT. s/p para with 1.6 L fluid removed. Cell count neg for neutrocytic ascites. SAAG high at 2.5 and low protein (0.6) c/w cirrhosis/portal hypertension.      Pneumonia -on antibiotics. On BiPAP. PLAN :  - f/u ascitic fluid culture and cytology  - continue nadolol  - ppi daily  -Discussed with patient will importance of complete alcohol cessation. Discussed with Dr. Ean Horne, PA-C  [facility]  I have personally performed a face to face diagnostic evaluation on this patient. I have interviewed and examined the patient and I agree with the findings and recommended plan of care. In summary, my findings and plan are the following: As above, no bleeding and HB rising to 9.2 this AM.  Ascites fluid chemistries c/w non neutrocytic, high SAAG and low protein c/w portal HTN. Now dealing with ETOH w/d issues. Can advance diet from GI standpoint. Continue nadolol and furosemide. Consider trial of spironolactone as well.     Anna Adkins, [address] [place] [address]  4/28/2022

## 2022-04-28 NOTE — RT PROTOCOL NOTE
RT Nebulizer Bronchodilator Protocol Note    There is a bronchodilator order in the chart from a provider indicating to follow the RT Bronchodilator Protocol and there is an Initiate RT Bronchodilator Protocol order as well (see protocol at bottom of note). CXR Findings:  No results found. The findings from the last RT Protocol Assessment were as follows:  Smoking: None or smoker <15 pack years  Respiratory Pattern: Regular pattern and RR 12-20 bpm  Breath Sounds: Slightly diminished and/or crackles  Cough: Strong, spontaneous, non-productive  Indication for Bronchodilator Therapy:    Bronchodilator Assessment Score: 2    Aerosolized bronchodilator medication orders have been revised according to the RT Nebulizer Bronchodilator Protocol below. Respiratory Therapist to perform RT Therapy Protocol Assessment initially then follow the protocol. Repeat RT Therapy Protocol Assessment PRN for score 0-3 or on second treatment, BID, and PRN for scores above 3. No Indications - adjust the frequency to every 6 hours PRN wheezing or bronchospasm, if no treatments needed after 48 hours then discontinue using Per Protocol order mode. If indication present, adjust the RT bronchodilator orders based on the Bronchodilator Assessment Score as indicated below. If a patient is on this medication at home then do not decrease Frequency below that used at home. 0-3 - enter or revise RT bronchodilator order(s) to equivalent RT Bronchodilator order with Frequency of every 4 hours PRN for wheezing or increased work of breathing using Per Protocol order mode. 4-6 - enter or revise RT Bronchodilator order(s) to two equivalent RT bronchodilator orders with one order with BID Frequency and one order with Frequency of every 4 hours PRN wheezing or increased work of breathing using Per Protocol order mode.          7-10 - enter or revise RT Bronchodilator order(s) to two equivalent RT bronchodilator orders with one order with TID Frequency and one order with Frequency of every 4 hours PRN wheezing or increased work of breathing using Per Protocol order mode. 11-13 - enter or revise RT Bronchodilator order(s) to one equivalent RT bronchodilator order with QID Frequency and an Albuterol order with Frequency of every 4 hours PRN wheezing or increased work of breathing using Per Protocol order mode. Greater than 13 - enter or revise RT Bronchodilator order(s) to one equivalent RT bronchodilator order with every 4 hours Frequency and an Albuterol order with Frequency of every 2 hours PRN wheezing or increased work of breathing using Per Protocol order mode. RT to enter RT Home Evaluation for COPD & MDI Assessment order using Per Protocol order mode.     Electronically signed by Guillermina Briggs RCP on 4/28/2022 at 4:51 PM

## 2022-04-28 NOTE — PROGRESS NOTES
04/28/22 1651   RT Protocol   History Pulmonary Disease 0   Respiratory pattern 0   Breath sounds 2   Cough 0   Bronchodilator Assessment Score 2

## 2022-04-28 NOTE — PROGRESS NOTES
Chart reviewed post paracentesis yesterday. Spoke with patients RN, Diego Juares. Call with questions.

## 2022-04-29 LAB
ALBUMIN SERPL-MCNC: 2.6 G/DL (ref 3.4–5)
ALP BLD-CCNC: 102 U/L (ref 40–129)
ALT SERPL-CCNC: 21 U/L (ref 10–40)
ANION GAP SERPL CALCULATED.3IONS-SCNC: 10 MMOL/L (ref 3–16)
ANISOCYTOSIS: ABNORMAL
AST SERPL-CCNC: 50 U/L (ref 15–37)
BASOPHILS ABSOLUTE: 0 K/UL (ref 0–0.2)
BASOPHILS RELATIVE PERCENT: 0 %
BILIRUB SERPL-MCNC: 8.7 MG/DL (ref 0–1)
BILIRUBIN DIRECT: 3.1 MG/DL (ref 0–0.3)
BILIRUBIN, INDIRECT: 5.6 MG/DL (ref 0–1)
BUN BLDV-MCNC: 26 MG/DL (ref 7–20)
BURR CELLS: ABNORMAL
CALCIUM SERPL-MCNC: 8.3 MG/DL (ref 8.3–10.6)
CHLORIDE BLD-SCNC: 101 MMOL/L (ref 99–110)
CO2: 23 MMOL/L (ref 21–32)
CREAT SERPL-MCNC: 1 MG/DL (ref 0.6–1.1)
EOSINOPHILS ABSOLUTE: 0.5 K/UL (ref 0–0.6)
EOSINOPHILS RELATIVE PERCENT: 5 %
FERRITIN: 282.9 NG/ML (ref 15–150)
FOLATE: 15.76 NG/ML (ref 4.78–24.2)
GFR AFRICAN AMERICAN: >60
GFR NON-AFRICAN AMERICAN: 60
GLUCOSE BLD-MCNC: 137 MG/DL (ref 70–99)
HCT VFR BLD CALC: 27.4 % (ref 36–48)
HEMOGLOBIN: 9 G/DL (ref 12–16)
HYPOCHROMIA: ABNORMAL
INR BLD: 1.85 (ref 0.88–1.12)
IRON SATURATION: 45 % (ref 15–50)
IRON: 77 UG/DL (ref 37–145)
LYMPHOCYTES ABSOLUTE: 1.1 K/UL (ref 1–5.1)
LYMPHOCYTES RELATIVE PERCENT: 11 %
MACROCYTES: ABNORMAL
MAGNESIUM: 1.3 MG/DL (ref 1.8–2.4)
MCH RBC QN AUTO: 33.7 PG (ref 26–34)
MCHC RBC AUTO-ENTMCNC: 32.9 G/DL (ref 31–36)
MCV RBC AUTO: 102.4 FL (ref 80–100)
MICROCYTES: ABNORMAL
MONOCYTES ABSOLUTE: 1 K/UL (ref 0–1.3)
MONOCYTES RELATIVE PERCENT: 10 %
NEUTROPHILS ABSOLUTE: 7.5 K/UL (ref 1.7–7.7)
NEUTROPHILS RELATIVE PERCENT: 74 %
PDW BLD-RTO: 24.6 % (ref 12.4–15.4)
PHOSPHORUS: 2.3 MG/DL (ref 2.5–4.9)
PLATELET # BLD: 64 K/UL (ref 135–450)
PLATELET SLIDE REVIEW: ABNORMAL
PMV BLD AUTO: 7.8 FL (ref 5–10.5)
POLYCHROMASIA: ABNORMAL
POTASSIUM SERPL-SCNC: 3.1 MMOL/L (ref 3.5–5.1)
POTASSIUM SERPL-SCNC: 4.4 MMOL/L (ref 3.5–5.1)
PROTHROMBIN TIME: 21.5 SEC (ref 9.9–12.7)
RBC # BLD: 2.67 M/UL (ref 4–5.2)
SLIDE REVIEW: ABNORMAL
SMUDGE CELLS: PRESENT
SODIUM BLD-SCNC: 134 MMOL/L (ref 136–145)
TOTAL IRON BINDING CAPACITY: 172 UG/DL (ref 260–445)
TOTAL PROTEIN: 5.4 G/DL (ref 6.4–8.2)
VITAMIN B-12: >2000 PG/ML (ref 211–911)
WBC # BLD: 10.1 K/UL (ref 4–11)

## 2022-04-29 PROCEDURE — 6370000000 HC RX 637 (ALT 250 FOR IP): Performed by: STUDENT IN AN ORGANIZED HEALTH CARE EDUCATION/TRAINING PROGRAM

## 2022-04-29 PROCEDURE — 2580000003 HC RX 258: Performed by: INTERNAL MEDICINE

## 2022-04-29 PROCEDURE — 6370000000 HC RX 637 (ALT 250 FOR IP): Performed by: INTERNAL MEDICINE

## 2022-04-29 PROCEDURE — 97530 THERAPEUTIC ACTIVITIES: CPT

## 2022-04-29 PROCEDURE — 2060000000 HC ICU INTERMEDIATE R&B

## 2022-04-29 PROCEDURE — 83735 ASSAY OF MAGNESIUM: CPT

## 2022-04-29 PROCEDURE — 94761 N-INVAS EAR/PLS OXIMETRY MLT: CPT

## 2022-04-29 PROCEDURE — 85610 PROTHROMBIN TIME: CPT

## 2022-04-29 PROCEDURE — 6360000002 HC RX W HCPCS: Performed by: INTERNAL MEDICINE

## 2022-04-29 PROCEDURE — 6360000002 HC RX W HCPCS: Performed by: STUDENT IN AN ORGANIZED HEALTH CARE EDUCATION/TRAINING PROGRAM

## 2022-04-29 PROCEDURE — 2500000003 HC RX 250 WO HCPCS: Performed by: INTERNAL MEDICINE

## 2022-04-29 PROCEDURE — 85025 COMPLETE CBC W/AUTO DIFF WBC: CPT

## 2022-04-29 PROCEDURE — 82607 VITAMIN B-12: CPT

## 2022-04-29 PROCEDURE — 83550 IRON BINDING TEST: CPT

## 2022-04-29 PROCEDURE — 97162 PT EVAL MOD COMPLEX 30 MIN: CPT

## 2022-04-29 PROCEDURE — 82746 ASSAY OF FOLIC ACID SERUM: CPT

## 2022-04-29 PROCEDURE — 84132 ASSAY OF SERUM POTASSIUM: CPT

## 2022-04-29 PROCEDURE — 2700000000 HC OXYGEN THERAPY PER DAY

## 2022-04-29 PROCEDURE — 80048 BASIC METABOLIC PNL TOTAL CA: CPT

## 2022-04-29 PROCEDURE — 36415 COLL VENOUS BLD VENIPUNCTURE: CPT

## 2022-04-29 PROCEDURE — 82728 ASSAY OF FERRITIN: CPT

## 2022-04-29 PROCEDURE — 84100 ASSAY OF PHOSPHORUS: CPT

## 2022-04-29 PROCEDURE — 83540 ASSAY OF IRON: CPT

## 2022-04-29 PROCEDURE — 80076 HEPATIC FUNCTION PANEL: CPT

## 2022-04-29 RX ORDER — MAGNESIUM SULFATE IN WATER 40 MG/ML
4000 INJECTION, SOLUTION INTRAVENOUS ONCE
Status: COMPLETED | OUTPATIENT
Start: 2022-04-29 | End: 2022-04-29

## 2022-04-29 RX ORDER — MAGNESIUM SULFATE IN WATER 40 MG/ML
2000 INJECTION, SOLUTION INTRAVENOUS ONCE
Status: DISCONTINUED | OUTPATIENT
Start: 2022-04-29 | End: 2022-04-29

## 2022-04-29 RX ORDER — POTASSIUM CHLORIDE 7.45 MG/ML
10 INJECTION INTRAVENOUS PRN
Status: DISCONTINUED | OUTPATIENT
Start: 2022-04-29 | End: 2022-04-29 | Stop reason: SDUPTHER

## 2022-04-29 RX ORDER — POTASSIUM CHLORIDE 20 MEQ/1
40 TABLET, EXTENDED RELEASE ORAL PRN
Status: DISCONTINUED | OUTPATIENT
Start: 2022-04-29 | End: 2022-04-29 | Stop reason: SDUPTHER

## 2022-04-29 RX ADMIN — FUROSEMIDE 20 MG: 20 TABLET ORAL at 17:06

## 2022-04-29 RX ADMIN — MORPHINE SULFATE 2 MG: 2 INJECTION, SOLUTION INTRAMUSCULAR; INTRAVENOUS at 01:48

## 2022-04-29 RX ADMIN — Medication 10 ML: at 08:54

## 2022-04-29 RX ADMIN — NADOLOL 40 MG: 40 TABLET ORAL at 09:22

## 2022-04-29 RX ADMIN — MORPHINE SULFATE 2 MG: 2 INJECTION, SOLUTION INTRAMUSCULAR; INTRAVENOUS at 17:05

## 2022-04-29 RX ADMIN — ONDANSETRON 4 MG: 4 TABLET, ORALLY DISINTEGRATING ORAL at 03:37

## 2022-04-29 RX ADMIN — MORPHINE SULFATE 2 MG: 2 INJECTION, SOLUTION INTRAMUSCULAR; INTRAVENOUS at 23:05

## 2022-04-29 RX ADMIN — POTASSIUM CHLORIDE 10 MEQ: 7.46 INJECTION, SOLUTION INTRAVENOUS at 06:08

## 2022-04-29 RX ADMIN — POTASSIUM CHLORIDE 10 MEQ: 7.46 INJECTION, SOLUTION INTRAVENOUS at 07:44

## 2022-04-29 RX ADMIN — THIAMINE HYDROCHLORIDE: 100 INJECTION, SOLUTION INTRAMUSCULAR; INTRAVENOUS at 13:34

## 2022-04-29 RX ADMIN — POTASSIUM CHLORIDE 10 MEQ: 7.46 INJECTION, SOLUTION INTRAVENOUS at 08:52

## 2022-04-29 RX ADMIN — Medication 10 ML: at 20:28

## 2022-04-29 RX ADMIN — POTASSIUM PHOSPHATE, MONOBASIC POTASSIUM PHOSPHATE, DIBASIC 30 MMOL: 224; 236 INJECTION, SOLUTION, CONCENTRATE INTRAVENOUS at 07:47

## 2022-04-29 RX ADMIN — POTASSIUM CHLORIDE 10 MEQ: 7.46 INJECTION, SOLUTION INTRAVENOUS at 10:08

## 2022-04-29 RX ADMIN — SPIRONOLACTONE 25 MG: 25 TABLET ORAL at 08:54

## 2022-04-29 RX ADMIN — PANTOPRAZOLE SODIUM 40 MG: 40 TABLET, DELAYED RELEASE ORAL at 06:23

## 2022-04-29 RX ADMIN — MAGNESIUM SULFATE HEPTAHYDRATE 4000 MG: 40 INJECTION, SOLUTION INTRAVENOUS at 06:20

## 2022-04-29 RX ADMIN — FUROSEMIDE 20 MG: 20 TABLET ORAL at 08:54

## 2022-04-29 RX ADMIN — Medication 1000 MG: at 01:35

## 2022-04-29 RX ADMIN — POTASSIUM CHLORIDE 10 MEQ: 7.46 INJECTION, SOLUTION INTRAVENOUS at 12:21

## 2022-04-29 RX ADMIN — POTASSIUM CHLORIDE 10 MEQ: 7.46 INJECTION, SOLUTION INTRAVENOUS at 11:20

## 2022-04-29 RX ADMIN — AZITHROMYCIN MONOHYDRATE 500 MG: 500 INJECTION, POWDER, LYOPHILIZED, FOR SOLUTION INTRAVENOUS at 06:02

## 2022-04-29 RX ADMIN — MORPHINE SULFATE 2 MG: 2 INJECTION, SOLUTION INTRAMUSCULAR; INTRAVENOUS at 09:50

## 2022-04-29 ASSESSMENT — PAIN SCALES - GENERAL
PAINLEVEL_OUTOF10: 8
PAINLEVEL_OUTOF10: 0
PAINLEVEL_OUTOF10: 6
PAINLEVEL_OUTOF10: 0
PAINLEVEL_OUTOF10: 8
PAINLEVEL_OUTOF10: 0
PAINLEVEL_OUTOF10: 8

## 2022-04-29 ASSESSMENT — PAIN DESCRIPTION - ONSET
ONSET: ON-GOING
ONSET: ON-GOING

## 2022-04-29 ASSESSMENT — PAIN - FUNCTIONAL ASSESSMENT
PAIN_FUNCTIONAL_ASSESSMENT: PREVENTS OR INTERFERES SOME ACTIVE ACTIVITIES AND ADLS
PAIN_FUNCTIONAL_ASSESSMENT: PREVENTS OR INTERFERES SOME ACTIVE ACTIVITIES AND ADLS

## 2022-04-29 ASSESSMENT — PAIN DESCRIPTION - LOCATION
LOCATION: ABDOMEN

## 2022-04-29 ASSESSMENT — PAIN DESCRIPTION - ORIENTATION
ORIENTATION: LOWER;MID
ORIENTATION: LOWER;MID

## 2022-04-29 ASSESSMENT — PAIN DESCRIPTION - DESCRIPTORS
DESCRIPTORS: PRESSURE
DESCRIPTORS: PRESSURE

## 2022-04-29 ASSESSMENT — PAIN DESCRIPTION - FREQUENCY
FREQUENCY: CONTINUOUS
FREQUENCY: CONTINUOUS

## 2022-04-29 ASSESSMENT — PAIN DESCRIPTION - PAIN TYPE
TYPE: ACUTE PAIN
TYPE: ACUTE PAIN

## 2022-04-29 NOTE — PROGRESS NOTES
Pt. Transferred to  now and placed in bed 3357. RN called to the room. Bed alarm intact and call light in reach. Belongings erine, cell phone and Gilberto Carrasco went with pt.

## 2022-04-29 NOTE — PROGRESS NOTES
Gastroenterology Progress Note    Williams Desir is a 40 y.o. female patient. Principal Problem:    Acute respiratory failure with hypoxia (HCC)  Active Problems:    High anion gap metabolic acidosis    Elevated LFTs    Ascites due to alcoholic cirrhosis (HCC)    Sinus tachycardia    Alcoholic liver disease (HCC)    Acute blood loss anemia    Esophageal varices (HCC)  Resolved Problems:    * No resolved hospital problems. *      SUBJECTIVE:  Off bipap, sitting up in chair. ROS:  No fever, chills  No chest pain, palpitations  No SOB, cough  Gastrointestinal ROS: see above    Physical    VITALS:  BP (!) 107/57   Pulse 78   Temp 97.8 °F (36.6 °C) (Temporal)   Resp 13   Ht 5' 4\" (1.626 m)   Wt 136 lb 11 oz (62 kg)   SpO2 94%   BMI 23.46 kg/m²   TEMPERATURE:  Current - Temp: 97.8 °F (36.6 °C); Max - Temp  Av.7 °F (36.5 °C)  Min: 97.2 °F (36.2 °C)  Max: 98.1 °F (36.7 °C)    NAD  jaundice  Regular rate   Lungs: rhonchi   Abdomen soft, ND, NT,  Bowel sounds normal.  A&Ox3, no asterixis    Data    Data Review:    Recent Labs     22   WBC 9.2 8.7 10.1   HGB 8.1* 9.2* 9.0*   HCT 23.9* 27.2* 27.4*   .5* 102.9* 102.4*   PLT 45* 50* 64*     Recent Labs     22  1416     --  139 134*  --    K 3.2*   < > 3.8 3.1* 4.4     --  107 101  --    CO2 20*  --    --    PHOS 4.4  --  3.2 2.3*  --    BUN 36*  --  31* 26*  --    CREATININE 1.2*  --  1.2* 1.0  --     < > = values in this interval not displayed. Recent Labs     22   AST 54* 48* 50*   ALT    BILIDIR 4.0* 4.3* 3.1*   BILITOT 11.5* 10.6* 8.7*   ALKPHOS 62 66 102     No results for input(s): LIPASE, AMYLASE in the last 72 hours.   Recent Labs     22  0441 22  0431 22   PROTIME 23.4* 21.2* 21.5*   INR 2.01* 1.83* 1.85*     No results for input(s): PTT in the last 72 hours. ASSESSMENT :    Anemia -hemoglobin 3.6 at admission with . Likely multifactorial from bone marrow suppression from alcohol abuse and cirrhosis. No overt GI bleeding. EGD 4/29 with small esophageal varices and portal hypertensive gastropathy. colonoscopy 3/2021 for anemia with polyps. B12, folate normal. No iron deficiency.       Cirrhosis  -due to alcohol abuse. Patient states she is still intermittently drinking. small varices on EGD 4/27. No clinical hepatic encephalopathy. Also has alcohol hepatitis. Her discriminant function was 75 at admission. Per prior GI notes, she did not tolerate steroids in the past so steroids were not started here due to this and current infection (pneumonia). Bili and INR improved.     Ascites - Moderates ascites per CT. s/p para with 1.6 L fluid removed. Cell count neg for neutrocytic ascites. SAAG high at 2.5 and low protein (0.6) c/w cirrhosis/portal hypertension. Culture no growth to date     Pneumonia -on antibiotics. off BiPAP now. PLAN :  - f/u ascitic fluid culture and cytology  - continue nadolol and furosemide  - Consider trial of spironolactone as well  - ppi daily  -Discussed with patient importance of complete alcohol cessation.     Discussed with Dr. Blanquita Mitchell, [address] [place] [address]

## 2022-04-29 NOTE — PROGRESS NOTES
Pt. Up to the chair per PT. Pt. Very weak. She turned over to side and is sleeping in the chair. Refuses lunch at this time. Did not eat much but a few bites for breakfast. Niece called for an update. Niece would like to bring pt in her [employer] tomorrow. Pt. With transfer orders for 3T - Telemetry. Banana to be administered late due to multiple infusion ordered this am for electrolyte replacement. Chair alarm intact and call light in reach.

## 2022-04-29 NOTE — PROGRESS NOTES
Assessment completed, see doc flowsheets. Pt is A&Ox4. Lung sounds are crackles, see chart. VSS. Tele on. Medication given per [facility]. Patient has no needs at this time. Call light within in reach, will continue to monitor.

## 2022-04-29 NOTE — PROGRESS NOTES
[facility] [address]   Phone: [phone]    Physical Therapy    [x] Initial Evaluation            [] Daily Treatment Note         [] Discharge Summary      Patient: Sujata Almazan   : 1975   MRN: [id number]   Date of Service:  2022  Admitting Diagnosis: Acute respiratory failure with hypoxia [facility])  Current Admission Summary: Sujata Almazan is 40 y.o. female who presented with complaint of shortness of breath. Symptom onset was acute for a time period of 2 days. The severity is described as severe. The course of his symptoms over time is worsening. The symptoms improved with none and worsened with none. The patient's symptom is associated with abdominal distention and fluid overload.  Sujata Almazan is 40 y.o. female with history of alcoholic cirrhosis  Patient admits she continues to drink actively at this time  He now presents to the ER with complaint of shortness of breath  She says she has been feeling poorly for the past 2 days  She has abdominal distention due to ascites and fluid overloaded state  She had some nausea and vomiting but denies any hematemesis  In the ED, chest x-ray shows pulmonary edema  In addition CBC shows profound anemia likely due to variceal bleed  She requires BiPAP to keep her SPO2 greater than 90%  On exam, she is jaundiced and ill-appearing but not in acute distress  EGD and paracentesis, 1600 cc; pt has received transfusion of 2units PRBC     Past Medical History:  has a past medical history of Alcoholic liver disease ([address] Anemia, and History of blood transfusion. Past Surgical History:  has a past surgical history that includes Upper gastrointestinal endoscopy (N/A, 2021); Upper gastrointestinal endoscopy (N/A, 03/10/2021); Tubal ligation; Colonoscopy (N/A, 3/11/2021); Upper gastrointestinal endoscopy (N/A, 2021); and Upper gastrointestinal endoscopy (N/A, 2022).      Discharge Recommendations: Sujata Almazan scored a 14/24 on the AM-PAC short mobility form. Current research shows that an AM-PAC score of 17 or less is typically not associated with a discharge to the patient's home setting. Based on the patient's AM-PAC score and their current functional mobility deficits, it is recommended that the patient have 5-7 sessions per week of Physical Therapy at d/c to increase the patient's independence. At this time, this patient demonstrates the endurance, and/or tolerance for 3 hours of therapy each day, with a treatment frequency of 5-7x/wk. Please see assessment section for further patient specific details. If patient discharges prior to next session this note will serve as a discharge summary. Please see below for the latest assessment towards goals. DME Required For Discharge: DME to be determined pending patient progress  Precautions/Restrictions: high fall risk, seizure; MD stating okay for pt to bed out of bed and in chair  Positional Restrictions:no positional restrictions    Pre-Admission Information   Lives With: daughter, . Comment: daughter lives with her \"most of the time'; daughter works full time    Type of Home: apartment, . Comment: Duplex  Home Layout: one level  Home Access: level entry  Bathroom Layout: walker accessible  Toilet Height: standard height  Bathroom Equipment: . Comment: none  Home Equipment: no prior equipment  Transfer Assistance: Independent without use of device  Ambulation Assistance:Independent without use of device  ADL Assistance: independent with all ADL's  IADL Assistance: independent with homemaking tasks  Active : [x] yes  []no  Current Employment: full time employment. 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   Pt also has significantly abdominal bloating, ascites  Posture:     Sensation:   WFL  Proprioception:    WFL  Tone:   Normotonic  Coordination Testing:   WFL    ROM:   (B) LE ROM WFL  Strength:   (B) LE strength grossly 3  Decision Making: medium complexity  Clinical Presentation: evolving      Subjective  General: Pt supine in bed upon arrival; agreeable to PT  Pain: 0/10  Pain Interventions: not applicable        Functional Mobility  Bed Mobility  Supine to Sit: stand by assistance  Sit to Supine: stand by assistance  Comments: 2x; HOB slightly elevated, pt sat up to transfer to [facility], then needed to lie down again and rest due to fatigue after use of BSC; transferred to sit and then to chair  Transfers  Sit to stand transfer: contact guard assistance  Stand to sit transfer: contact guard assistance  Stand pivot transfer: contact guard assistance   Comments: from bed, BSC  Ambulation  Assistive Device: no device  Distance:  Gait Mechanics:   Comments:  Pt unable to amb today due to weakness and fatigue  Stair Mobility  Stair mobility not completed on this date. Comments:  Wheelchair Mobility:  No w/c mobility completed on this date.   Comments:  Balance  Static Sitting Balance: fair (+): maintains balance at SBA/supervision without use of UE support  Dynamic Sitting Balance: poor (+): requires min (A) to maintain balance  Static Standing Balance: fair: maintains balance at CGA without use of UE support  Dynamic Standing Balance: poor (+): requires min (A) to maintain balance  Comments: Pt tolerated very limited standing and sit today, < minutes    Other Therapeutic Interventions    Functional Outcomes  AM-PAC Inpatient Mobility Raw Score : 14              Cognition  Overall Cognitive Status: WFL  Orientation:    oriented to person, oriented to place, oriented to time and oriented to situation  Command Following:   [facility]    Education  Barriers To Learning: emotional  Patient Education: patient educated on goals, PT role and benefits, plan of care  Learning Assessment:  patient verbalizes and demonstrates understanding    Assessment  Activity Tolerance: dizzy and fatigue while on BSC <5 min; pt transferred back to bed to lie down; BP supine =120/70, HR 78; post transfer to chair: DG=628/68, HR 78, SpO2=96% on 3L  Impairments Requiring Therapeutic Intervention: decreased functional mobility, decreased strength, decreased endurance, decreased balance  Prognosis: good  Clinical Assessment: Pt is limited by the above deficits s/p admission with SOB and complicated medical condition. Pt presents with very limited activity tolerance today, CGA for transfers, and unable to ambulate due to fatigue and weakness. Pt's vital signs did remain stable throughout evaluation, and pt is motivated to return home. Pt is currently a high fall risk and far below her baseline, therefore unsafe to discharge home. Recommend further therapy at discharge, and will continue to see pt while in acute care and further assess pt's progress.    Safety Interventions: patient left in chair, chair alarm in place, call light within reach, gait belt, patient at risk for falls and nurse notified    Plan  Frequency: 3-5 x/per week  Current Treatment Recommendations: strengthening, balance training, functional mobility training, transfer training, gait training, endurance training and safety education    Goals  Patient Goals: return home   Short Term Goals:  Time Frame: discharge  Patient will complete bed mobility at modified independent   Patient will complete transfers at modified independent   Patient will ambulate 50 ft with use of no device at supervision    Therapy Session Time      Individual Group Co-treatment   Time In 1045       Time Out 1200       Minutes 75         Timed Code Treatment Minutes:  60 Minutes  Total Treatment Minutes:  75    Electronically Signed By: Arnaldo Soto, PT

## 2022-04-29 NOTE — PROGRESS NOTES
Hospitalist Progress Note      PCP: No primary care provider on file. Date of Admission: 4/25/2022      Subjective: Hypokalemia, hypomagnesemia, hypophosphatemia, breathing better today. Medications:  Reviewed    Infusion Medications    sodium chloride      sodium chloride      sodium chloride      sodium chloride      sodium chloride       Scheduled Medications    spironolactone  25 mg Oral Daily    furosemide  20 mg Oral BID    pantoprazole  40 mg Oral QAM AC    sodium chloride flush  5-40 mL IntraVENous 2 times per day    nadolol  40 mg Oral Daily    cefTRIAXone (ROCEPHIN) IV  1,000 mg IntraVENous Q24H    azithromycin  500 mg IntraVENous Q24H    sodium chloride flush  5-40 mL IntraVENous 2 times per day     PRN Meds: ipratropium-albuterol, lip balm petroleum free, potassium chloride **OR** potassium alternative oral replacement **OR** potassium chloride, sodium chloride, LORazepam **OR** LORazepam **OR** LORazepam **OR** LORazepam **OR** LORazepam **OR** LORazepam **OR** LORazepam **OR** LORazepam, morphine, sodium chloride flush, sodium chloride, ondansetron **OR** ondansetron, polyethylene glycol, acetaminophen **OR** acetaminophen, sodium chloride, sodium chloride, sodium chloride flush, sodium chloride      Intake/Output Summary (Last 24 hours) at 4/29/2022 1428  Last data filed at 4/29/2022 1328  Gross per 24 hour   Intake 2250.36 ml   Output 2675 ml   Net -424.64 ml       Physical Exam Performed:    BP (!) 111/59   Pulse 76   Temp 98.1 °F (36.7 °C) (Temporal)   Resp 16   Ht 5' 4\" (1.626 m)   Wt 136 lb 11 oz (62 kg)   SpO2 97%   BMI 23.46 kg/m²     General appearance: No apparent distress, appears stated age and cooperative. HEENT: Pupils equal, round, and reactive to light. Conjunctivae/corneas clear. Neck: Supple, with full range of motion. No jugular venous distention. Trachea midline. Respiratory:  Normal respiratory effort.  Clear to auscultation, bilaterally without Rales/Wheezes/Rhonchi. Cardiovascular: Regular rate and rhythm with normal S1/S2 without murmurs, rubs or gallops. Abdomen: Soft, non-tender, non-distended with normal bowel sounds. Musculoskeletal: No clubbing, cyanosis or edema bilaterally. Full range of motion without deformity. Skin: Skin color, texture, turgor normal.  No rashes or lesions. Neurologic:  Neurovascularly intact without any focal sensory/motor deficits. Cranial nerves: II-XII intact, grossly non-focal.  Psychiatric: Alert and oriented, thought content appropriate, normal insight  Capillary Refill: Brisk,3 seconds, normal   Peripheral Pulses: +2 palpable, equal bilaterally       Labs:   Recent Labs     04/27/22 0441 04/28/22 0431 04/29/22 0442   WBC 9.2 8.7 10.1   HGB 8.1* 9.2* 9.0*   HCT 23.9* 27.2* 27.4*   PLT 45* 50* 64*     Recent Labs     04/27/22 0441 04/28/22 0430 04/29/22 0442    139 134*   K 3.2* 3.8 3.1*    107 101   CO2 20* 22 23   BUN 36* 31* 26*   CREATININE 1.2* 1.2* 1.0   CALCIUM 8.5 8.5 8.3   PHOS 4.4 3.2 2.3*     Recent Labs     04/27/22 0441 04/28/22 0430 04/29/22 0442   AST 54* 48* 50*   ALT 22 21 21   BILIDIR 4.0* 4.3* 3.1*   BILITOT 11.5* 10.6* 8.7*   ALKPHOS 62 66 102     Recent Labs     04/27/22 0441 04/28/22 0431 04/29/22 0443   INR 2.01* 1.83* 1.85*     No results for input(s): CKTOTAL, TROPONINI in the last 72 hours. Urinalysis:      Lab Results   Component Value Date    NITRU POSITIVE 04/26/2022    WBCUA 21-50 04/26/2022    BACTERIA 3+ 04/26/2022    RBCUA 0-2 04/26/2022    BLOODU SMALL 04/26/2022    SPECGRAV 1.020 04/26/2022    GLUCOSEU Negative 04/26/2022       Radiology:  IR US GUIDED PARACENTESIS   Final Result   Successful paracentesis. XR CHEST PORTABLE   Final Result   Worsening CHF/pulmonary edema. CT CHEST ABDOMEN PELVIS WO CONTRAST   Final Result   1. Multifocal pneumonia. 2. Small bilateral pleural effusions, right greater than left. 3. Cholelithiasis.    4. Right nonobstructive nephrolithiasis. 5. Mild compression deformities of T7, T8 of uncertain chronicity, new since   05/17/2021.   6. Cirrhotic liver morphology with sequela of portal hypertension. XR CHEST PORTABLE   Final Result   Moderate to severe asymmetric pulmonary edema versus left-sided pneumonia   with mild background pulmonary edema. Assessment/Plan:    Active Hospital Problems    Diagnosis     High anion gap metabolic acidosis [K65.5]      Priority: Medium    Elevated LFTs [R79.89]      Priority: Medium    Ascites due to alcoholic cirrhosis (HCC) [K93.92]      Priority: Medium    Acute respiratory failure with hypoxia (HCC) [J96.01]      Priority: Medium    Sinus tachycardia [R00.0]      Priority: Medium    Esophageal varices (HCC) [I85.00]     Acute blood loss anemia [D80]     Alcoholic liver disease (HCC) [K70.9]      Acute blood loss anemia likely secondary to upper GI bleed: Alcohol use disorder, on admission hemoglobin was 3.6, patient received total of 4 units of packed red blood cells, hemoglobin 8.1, GI following, status post EGD with nonbleeding esophageal varices, start octreotide, stop IV PPI, switch to p.o. Protonix. Alcoholic liver disease/liver cirrhosis: With ascites, transaminitis, current alcohol use, started on CIWA protocol, thiamine high-dose for 3 days, continue thiamine, multivitamins, start Lasix and spironolactone    Acute respiratory failure: On admission patient was placed on BiPAP, CT chest demonstrated significant pulmonary edema. Patient received Lasix, now off BiPAP, on nasal cannula, with acceptable saturation. Improving on 3 L/min nasal cannula, chest imaging with pulmonary edema, continue Lasix and spironolactone, monitor creatinine in a.m. Concern for pneumonia: Patient was started on azithromycin and Rocephin, will continue for 5 days. Electrolyte derangement, hypomagnesemia, hypokalemia, repleted, will continue monitor.     I discussed with patient the importance of alcohol cessation, CM to follow for alcohol rehab resources as an outpatient    DVT Prophylaxis: SCD/INR is 2  Diet: ADULT DIET; Regular; No Added Salt (3-4 gm)  Code Status: Full Code    PT/OT Eval Status: For evaluation    Dispo -continue inpatient care. Downgrade patient out of ICU to progressive care unit, discharge planning in 1 to 2 days    Due to the immediate potential for life-threatening deterioration due to acute blood loss anemia, I spent 34 minutes providing critical care. This time is excluding time spent performing procedures.       Stacy Geiger MD

## 2022-04-30 LAB
ALBUMIN SERPL-MCNC: 2.3 G/DL (ref 3.4–5)
ALP BLD-CCNC: 90 U/L (ref 40–129)
ALT SERPL-CCNC: 18 U/L (ref 10–40)
ANION GAP SERPL CALCULATED.3IONS-SCNC: 10 MMOL/L (ref 3–16)
ANISOCYTOSIS: ABNORMAL
AST SERPL-CCNC: 49 U/L (ref 15–37)
BANDED NEUTROPHILS RELATIVE PERCENT: 4 % (ref 0–7)
BASOPHILS ABSOLUTE: 0 K/UL (ref 0–0.2)
BASOPHILS RELATIVE PERCENT: 0 %
BILIRUB SERPL-MCNC: 7.1 MG/DL (ref 0–1)
BILIRUBIN DIRECT: 2.7 MG/DL (ref 0–0.3)
BILIRUBIN, INDIRECT: 4.4 MG/DL (ref 0–1)
BODY FLUID CULTURE, STERILE: NORMAL
BUN BLDV-MCNC: 21 MG/DL (ref 7–20)
BURR CELLS: ABNORMAL
CALCIUM SERPL-MCNC: 7.9 MG/DL (ref 8.3–10.6)
CHLORIDE BLD-SCNC: 104 MMOL/L (ref 99–110)
CO2: 22 MMOL/L (ref 21–32)
CREAT SERPL-MCNC: 0.9 MG/DL (ref 0.6–1.1)
EOSINOPHILS ABSOLUTE: 0.2 K/UL (ref 0–0.6)
EOSINOPHILS RELATIVE PERCENT: 2 %
FERRITIN: 263.2 NG/ML (ref 15–150)
FOLATE: 18.03 NG/ML (ref 4.78–24.2)
GFR AFRICAN AMERICAN: >60
GFR NON-AFRICAN AMERICAN: >60
GLUCOSE BLD-MCNC: 128 MG/DL (ref 70–99)
GRAM STAIN RESULT: NORMAL
HCT VFR BLD CALC: 25.1 % (ref 36–48)
HEMOGLOBIN: 8.6 G/DL (ref 12–16)
HYPOCHROMIA: ABNORMAL
INR BLD: 1.96 (ref 0.88–1.12)
IRON SATURATION: 41 % (ref 15–50)
IRON: 66 UG/DL (ref 37–145)
LYMPHOCYTES ABSOLUTE: 1.3 K/UL (ref 1–5.1)
LYMPHOCYTES RELATIVE PERCENT: 16 %
MAGNESIUM: 1.6 MG/DL (ref 1.8–2.4)
MCH RBC QN AUTO: 34.3 PG (ref 26–34)
MCHC RBC AUTO-ENTMCNC: 34.2 G/DL (ref 31–36)
MCV RBC AUTO: 100.2 FL (ref 80–100)
MONOCYTES ABSOLUTE: 0.6 K/UL (ref 0–1.3)
MONOCYTES RELATIVE PERCENT: 8 %
NEUTROPHILS ABSOLUTE: 5.9 K/UL (ref 1.7–7.7)
NEUTROPHILS RELATIVE PERCENT: 70 %
OVALOCYTES: ABNORMAL
PDW BLD-RTO: 33.7 % (ref 12.4–15.4)
PHOSPHORUS: 2.7 MG/DL (ref 2.5–4.9)
PLATELET # BLD: 62 K/UL (ref 135–450)
PLATELET SLIDE REVIEW: ABNORMAL
PMV BLD AUTO: 8.2 FL (ref 5–10.5)
POLYCHROMASIA: ABNORMAL
POTASSIUM SERPL-SCNC: 3.4 MMOL/L (ref 3.5–5.1)
PROTHROMBIN TIME: 22.8 SEC (ref 9.9–12.7)
RBC # BLD: 2.5 M/UL (ref 4–5.2)
SLIDE REVIEW: ABNORMAL
SODIUM BLD-SCNC: 136 MMOL/L (ref 136–145)
TEAR DROP CELLS: ABNORMAL
TOTAL IRON BINDING CAPACITY: 162 UG/DL (ref 260–445)
TOTAL PROTEIN: 5.3 G/DL (ref 6.4–8.2)
VITAMIN B-12: 1971 PG/ML (ref 211–911)
WBC # BLD: 8 K/UL (ref 4–11)

## 2022-04-30 PROCEDURE — 6360000002 HC RX W HCPCS: Performed by: INTERNAL MEDICINE

## 2022-04-30 PROCEDURE — 80076 HEPATIC FUNCTION PANEL: CPT

## 2022-04-30 PROCEDURE — 6370000000 HC RX 637 (ALT 250 FOR IP): Performed by: INTERNAL MEDICINE

## 2022-04-30 PROCEDURE — 80048 BASIC METABOLIC PNL TOTAL CA: CPT

## 2022-04-30 PROCEDURE — 2580000003 HC RX 258: Performed by: INTERNAL MEDICINE

## 2022-04-30 PROCEDURE — 85025 COMPLETE CBC W/AUTO DIFF WBC: CPT

## 2022-04-30 PROCEDURE — 85610 PROTHROMBIN TIME: CPT

## 2022-04-30 PROCEDURE — 6370000000 HC RX 637 (ALT 250 FOR IP): Performed by: STUDENT IN AN ORGANIZED HEALTH CARE EDUCATION/TRAINING PROGRAM

## 2022-04-30 PROCEDURE — 82728 ASSAY OF FERRITIN: CPT

## 2022-04-30 PROCEDURE — 51702 INSERT TEMP BLADDER CATH: CPT

## 2022-04-30 PROCEDURE — 83550 IRON BINDING TEST: CPT

## 2022-04-30 PROCEDURE — 6360000002 HC RX W HCPCS: Performed by: STUDENT IN AN ORGANIZED HEALTH CARE EDUCATION/TRAINING PROGRAM

## 2022-04-30 PROCEDURE — 83540 ASSAY OF IRON: CPT

## 2022-04-30 PROCEDURE — 36415 COLL VENOUS BLD VENIPUNCTURE: CPT

## 2022-04-30 PROCEDURE — 82607 VITAMIN B-12: CPT

## 2022-04-30 PROCEDURE — 83735 ASSAY OF MAGNESIUM: CPT

## 2022-04-30 PROCEDURE — 84100 ASSAY OF PHOSPHORUS: CPT

## 2022-04-30 PROCEDURE — 2060000000 HC ICU INTERMEDIATE R&B

## 2022-04-30 PROCEDURE — 82746 ASSAY OF FOLIC ACID SERUM: CPT

## 2022-04-30 RX ORDER — POTASSIUM CHLORIDE 20 MEQ/1
40 TABLET, EXTENDED RELEASE ORAL ONCE
Status: COMPLETED | OUTPATIENT
Start: 2022-04-30 | End: 2022-04-30

## 2022-04-30 RX ORDER — LANOLIN ALCOHOL/MO/W.PET/CERES
3 CREAM (GRAM) TOPICAL NIGHTLY PRN
Status: DISCONTINUED | OUTPATIENT
Start: 2022-04-30 | End: 2022-05-03 | Stop reason: HOSPADM

## 2022-04-30 RX ORDER — MAGNESIUM SULFATE IN WATER 40 MG/ML
2000 INJECTION, SOLUTION INTRAVENOUS ONCE
Status: COMPLETED | OUTPATIENT
Start: 2022-04-30 | End: 2022-04-30

## 2022-04-30 RX ADMIN — MORPHINE SULFATE 2 MG: 2 INJECTION, SOLUTION INTRAMUSCULAR; INTRAVENOUS at 04:35

## 2022-04-30 RX ADMIN — Medication 1000 MG: at 02:21

## 2022-04-30 RX ADMIN — SODIUM CHLORIDE, PRESERVATIVE FREE 10 ML: 5 INJECTION INTRAVENOUS at 20:28

## 2022-04-30 RX ADMIN — Medication 10 ML: at 09:20

## 2022-04-30 RX ADMIN — PANTOPRAZOLE SODIUM 40 MG: 40 TABLET, DELAYED RELEASE ORAL at 09:13

## 2022-04-30 RX ADMIN — NADOLOL 40 MG: 40 TABLET ORAL at 09:14

## 2022-04-30 RX ADMIN — MORPHINE SULFATE 2 MG: 2 INJECTION, SOLUTION INTRAMUSCULAR; INTRAVENOUS at 11:26

## 2022-04-30 RX ADMIN — Medication 10 ML: at 20:27

## 2022-04-30 RX ADMIN — MORPHINE SULFATE 2 MG: 2 INJECTION, SOLUTION INTRAMUSCULAR; INTRAVENOUS at 20:18

## 2022-04-30 RX ADMIN — AZITHROMYCIN MONOHYDRATE 500 MG: 500 INJECTION, POWDER, LYOPHILIZED, FOR SOLUTION INTRAVENOUS at 04:40

## 2022-04-30 RX ADMIN — FUROSEMIDE 20 MG: 20 TABLET ORAL at 17:36

## 2022-04-30 RX ADMIN — SPIRONOLACTONE 25 MG: 25 TABLET ORAL at 09:13

## 2022-04-30 RX ADMIN — ONDANSETRON 4 MG: 2 INJECTION INTRAMUSCULAR; INTRAVENOUS at 20:30

## 2022-04-30 RX ADMIN — ONDANSETRON 4 MG: 2 INJECTION INTRAMUSCULAR; INTRAVENOUS at 11:33

## 2022-04-30 RX ADMIN — ONDANSETRON 4 MG: 2 INJECTION INTRAMUSCULAR; INTRAVENOUS at 02:23

## 2022-04-30 RX ADMIN — POTASSIUM CHLORIDE 40 MEQ: 20 TABLET, EXTENDED RELEASE ORAL at 09:13

## 2022-04-30 RX ADMIN — MAGNESIUM SULFATE HEPTAHYDRATE 2000 MG: 40 INJECTION, SOLUTION INTRAVENOUS at 09:19

## 2022-04-30 RX ADMIN — FUROSEMIDE 20 MG: 20 TABLET ORAL at 09:13

## 2022-04-30 ASSESSMENT — PAIN DESCRIPTION - LOCATION
LOCATION: ABDOMEN

## 2022-04-30 ASSESSMENT — PAIN SCALES - GENERAL
PAINLEVEL_OUTOF10: 9
PAINLEVEL_OUTOF10: 8
PAINLEVEL_OUTOF10: 0
PAINLEVEL_OUTOF10: 7
PAINLEVEL_OUTOF10: 9

## 2022-04-30 ASSESSMENT — PAIN DESCRIPTION - ORIENTATION
ORIENTATION: LOWER;MID
ORIENTATION: LOWER;MID

## 2022-04-30 ASSESSMENT — PAIN DESCRIPTION - DESCRIPTORS: DESCRIPTORS: ACHING

## 2022-04-30 NOTE — PLAN OF CARE
Problem: Safety - Adult  Goal: Free from fall injury  4/30/2022 0527 by Claudette Arteaga RN  Outcome: Progressing

## 2022-04-30 NOTE — PLAN OF CARE
Problem: Discharge Planning  Goal: Discharge to home or other facility with appropriate resources  4/30/2022 1218 by Patience Le RN  Outcome: Progressing  Flowsheets (Taken 4/30/2022 1218)  Discharge to home or other facility with appropriate resources: Identify discharge learning needs (meds, wound care, etc)  Note: Possible d/c tomorrow per MD  4/30/2022 0527 by Elle Diop RN  Outcome: Progressing     Problem: Pain  Goal: Verbalizes/displays adequate comfort level or baseline comfort level  4/30/2022 1218 by Patience Le RN  Outcome: Progressing  Note: Pain medication being administered per orders in [facility]  4/30/2022 0527 by Elle Diop RN  Outcome: Progressing     Problem: Safety - Adult  Goal: Free from fall injury  4/30/2022 1218 by Patience Le RN  Outcome: Progressing  4/30/2022 0527 by Elle Diop RN  Outcome: Progressing     Problem: ABCDS Injury Assessment  Goal: Absence of physical injury  Outcome: Progressing

## 2022-04-30 NOTE — PROGRESS NOTES
Gastroenterology Progress Note            Latoya Shah is a 40 y.o. female patient. 1. Acute respiratory failure with hypoxemia (HCC)    2. Acute systolic congestive heart failure ([address]    3. Alcoholic cirrhosis of liver with ascites ([facility] [address]    4. Anemia, unspecified type    5. Upper GI bleed        SUBJECTIVE:  No new c/o except feels tired. Denies abd pain or bleeding. Physical    VITALS:  BP (!) 115/57   Pulse 88   Temp 99.2 °F (37.3 °C) (Oral)   Resp 18   Ht 5' 4\" (1.626 m)   Wt 136 lb 11 oz (62 kg)   SpO2 95%   BMI 23.46 kg/m²   TEMPERATURE:  Current - Temp: 99.2 °F (37.3 °C); Max - Temp  Av °F (37.2 °C)  Min: 97.8 °F (36.6 °C)  Max: 100.2 °F (37.9 °C)    Abdomen soft, much less distended, NT, no HSM, Bowel sounds normal     Data      Recent Labs     22   WBC 8.7 10.1 8.0   HGB 9.2* 9.0* 8.6*   HCT 27.2* 27.4* 25.1*   .9* 102.4* 100.2*   PLT 50* 64* 62*     Recent Labs     22  0430 22  0430 22  1416 22     --  134*  --  136   K 3.8   < > 3.1* 4.4 3.4*     --  101  --  104   CO2 22  --  23  --  22   PHOS 3.2  --  2.3*  --  2.7   BUN 31*  --  26*  --  21*   CREATININE 1.2*  --  1.0  --  0.9    < > = values in this interval not displayed. Recent Labs     22   AST 48* 50* 49*   ALT 21 21 18   BILIDIR 4.3* 3.1* 2.7*   BILITOT 10.6* 8.7* 7.1*   ALKPHOS 66 102 90     No results for input(s): LIPASE, AMYLASE in the last 72 hours. ASSESSMENT :     Anemia -hemoglobin 3.6 at admission with .  Likely multifactorial from bone marrow suppression from alcohol abuse and cirrhosis. No overt GI bleeding. EGD  with small esophageal varices and portal hypertensive gastropathy.  Colonoscopy 3/2021 for anemia with polyps.  B12, folate normal. No iron deficiency.       Cirrhosis  -due to alcohol abuse.  Patient states she is still intermittently drinking.  small varices on EGD 4/27.  No clinical hepatic encephalopathy.  Also has alcohol hepatitis. Her discriminant function was 75 at admission.  Per prior GI notes, she did not tolerate steroids in the past so steroids were not started here due to this and current infection (pneumonia). Bili and INR improved.     Ascites - Moderates ascites per CT. s/p para with 1.6 L fluid removed. Cell count neg for neutrocytic ascites. SAAG high at 2.5 and low protein (0.6) c/w cirrhosis/portal hypertension. Culture no growth to date     Pneumonia -on antibiotics.   off BiPAP now.      PLAN :  - continue nadolol and furosemide  - ppi daily  -Discussed with patient importance of complete alcohol cessation.  -She needs to f/u with Dr. Vipin Ruiz as outpatient in 1 - 2 months.     Will sign off. Please call with questions.     Laurie Jenkins, [address] [place] [address]  4/30/2022

## 2022-04-30 NOTE — PROGRESS NOTES
Hospitalist Progress Note      PCP: No primary care provider on file. Date of Admission: 4/25/2022      Subjective: Hypokalemia, hypomagnesemia. Did not sleep well last night      Medications:  Reviewed    Infusion Medications    sodium chloride Stopped (04/29/22 1659)    sodium chloride      sodium chloride      sodium chloride      sodium chloride Stopped (04/29/22 1659)     Scheduled Medications    spironolactone  25 mg Oral Daily    furosemide  20 mg Oral BID    pantoprazole  40 mg Oral QAM AC    sodium chloride flush  5-40 mL IntraVENous 2 times per day    nadolol  40 mg Oral Daily    azithromycin  500 mg IntraVENous Q24H    sodium chloride flush  5-40 mL IntraVENous 2 times per day     PRN Meds: melatonin, ipratropium-albuterol, lip balm petroleum free, potassium chloride **OR** potassium alternative oral replacement **OR** potassium chloride, sodium chloride, LORazepam **OR** LORazepam **OR** LORazepam **OR** LORazepam **OR** LORazepam **OR** LORazepam **OR** LORazepam **OR** LORazepam, morphine, sodium chloride flush, sodium chloride, ondansetron **OR** ondansetron, polyethylene glycol, acetaminophen **OR** acetaminophen, sodium chloride, sodium chloride, sodium chloride flush, sodium chloride      Intake/Output Summary (Last 24 hours) at 4/30/2022 1222  Last data filed at 4/30/2022 0444  Gross per 24 hour   Intake 1298.2 ml   Output 1600 ml   Net -301.8 ml       Physical Exam Performed:    /67   Pulse 71   Temp 99.5 °F (37.5 °C) (Oral)   Resp 18   Ht 5' 4\" (1.626 m)   Wt 136 lb 11 oz (62 kg)   SpO2 97%   BMI 23.46 kg/m²     General appearance: No apparent distress, appears stated age and cooperative. HEENT: Pupils equal, round, and reactive to light. Conjunctivae/corneas clear. Neck: Supple, with full range of motion. No jugular venous distention. Trachea midline. Respiratory:  Normal respiratory effort.  Clear to auscultation, bilaterally without Rales/Wheezes/Rhonchi. Cardiovascular: Regular rate and rhythm with normal S1/S2 without murmurs, rubs or gallops. Abdomen: Soft, non-tender, non-distended with normal bowel sounds. Musculoskeletal: No clubbing, cyanosis or edema bilaterally. Full range of motion without deformity. Skin: Skin color, texture, turgor normal.  No rashes or lesions. Neurologic:  Neurovascularly intact without any focal sensory/motor deficits. Cranial nerves: II-XII intact, grossly non-focal.  Psychiatric: Alert and oriented, thought content appropriate, normal insight  Capillary Refill: Brisk,3 seconds, normal   Peripheral Pulses: +2 palpable, equal bilaterally       Labs:   Recent Labs     04/28/22 0431 04/29/22 0442 04/30/22 0421   WBC 8.7 10.1 8.0   HGB 9.2* 9.0* 8.6*   HCT 27.2* 27.4* 25.1*   PLT 50* 64* 62*     Recent Labs     04/28/22 0430 04/28/22 0430 04/29/22 0442 04/29/22  1416 04/30/22 0421     --  134*  --  136   K 3.8   < > 3.1* 4.4 3.4*     --  101  --  104   CO2 22  --  23  --  22   BUN 31*  --  26*  --  21*   CREATININE 1.2*  --  1.0  --  0.9   CALCIUM 8.5  --  8.3  --  7.9*   PHOS 3.2  --  2.3*  --  2.7    < > = values in this interval not displayed. Recent Labs     04/28/22 0430 04/29/22 0442 04/30/22 0421   AST 48* 50* 49*   ALT 21 21 18   BILIDIR 4.3* 3.1* 2.7*   BILITOT 10.6* 8.7* 7.1*   ALKPHOS 66 102 90     Recent Labs     04/28/22 0431 04/29/22 0443 04/30/22 0421   INR 1.83* 1.85* 1.96*     No results for input(s): María Elena Beat in the last 72 hours. Urinalysis:      Lab Results   Component Value Date    NITRU POSITIVE 04/26/2022    WBCUA 21-50 04/26/2022    BACTERIA 3+ 04/26/2022    RBCUA 0-2 04/26/2022    BLOODU SMALL 04/26/2022    SPECGRAV 1.020 04/26/2022    GLUCOSEU Negative 04/26/2022       Radiology:  IR US GUIDED PARACENTESIS   Final Result   Successful paracentesis. XR CHEST PORTABLE   Final Result   Worsening CHF/pulmonary edema.          CT CHEST ABDOMEN PELVIS WO CONTRAST   Final Result   1. Multifocal pneumonia. 2. Small bilateral pleural effusions, right greater than left. 3. Cholelithiasis. 4. Right nonobstructive nephrolithiasis. 5. Mild compression deformities of T7, T8 of uncertain chronicity, new since   05/17/2021.   6. Cirrhotic liver morphology with sequela of portal hypertension. XR CHEST PORTABLE   Final Result   Moderate to severe asymmetric pulmonary edema versus left-sided pneumonia   with mild background pulmonary edema. Assessment/Plan:    Active Hospital Problems    Diagnosis     High anion gap metabolic acidosis [I09.1]      Priority: Medium    Elevated LFTs [R79.89]      Priority: Medium    Ascites due to alcoholic cirrhosis (HCC) [J80.70]      Priority: Medium    Acute respiratory failure with hypoxia (HCC) [J96.01]      Priority: Medium    Sinus tachycardia [R00.0]      Priority: Medium    Esophageal varices (HCC) [I85.00]     Acute blood loss anemia [N88]     Alcoholic liver disease (HCC) [K70.9]      Acute blood loss anemia likely secondary to upper GI bleed: Alcohol use disorder, on admission hemoglobin was 3.6, patient received total of 4 units of packed red blood cells, hemoglobin 8.1, GI following, status post EGD with nonbleeding esophageal varices, off octreotide, stop IV PPI, switch to p.o. Protonix. Alcoholic liver disease/liver cirrhosis: With ascites, transaminitis, current alcohol use, started on CIWA protocol, thiamine high-dose for 3 days, continue thiamine, multivitamins, cont. Lasix and spironolactone    Acute respiratory failure: On admission patient was placed on BiPAP, CT chest demonstrated significant pulmonary edema. Patient received Lasix, now off BiPAP, on nasal cannula, with acceptable saturation. Improving on 3 L/min nasal cannula, chest imaging with pulmonary edema, continue Lasix and spironolactone, monitor creatinine in a.m.     Concern for pneumonia: Patient was started on azithromycin and Rocephin, will continue for 5 days. Electrolyte derangement, hypomagnesemia, hypokalemia, repleted, will continue monitor. I discussed with patient the importance of alcohol cessation, CM to follow for alcohol rehab resources as an outpatient     DVT Prophylaxis: SCD/INR is 2  Diet: ADULT DIET; Regular; No Added Salt (3-4 gm)  Code Status: Full Code    PT/OT Eval Status: For evaluation    Dispo -continue inpatient care.         Joey Summers MD

## 2022-04-30 NOTE — FLOWSHEET NOTE
04/30/22 1117   Encounter Summary   Encounter Overview/Reason  Attempted Encounter   Service Provided For: Patient not available  (Pt deferred visit. )   Referral/Consult From: Nurse   Last Encounter  04/30/22  (3rd attempt, pt deferred visit, consult if needs ariseGB4/30)   Begin Time 1100   End Time  1105   Total Time Calculated 5 min     Third attempted visit. Pt deferred visit at this time. Please re consult should needs arise.      Electronically signed by Jillian Quijano on 4/30/2022 at 11:19 AM

## 2022-04-30 NOTE — PROGRESS NOTES
Patient resting on Room air. Patient refused Adult NIV/ Positive Airway Pressure at this time. Patient states she will call if she feels she needs the BIPAP machine.

## 2022-05-01 LAB
ALBUMIN SERPL-MCNC: 2.4 G/DL (ref 3.4–5)
ALP BLD-CCNC: 68 U/L (ref 40–129)
ALT SERPL-CCNC: 22 U/L (ref 10–40)
ANION GAP SERPL CALCULATED.3IONS-SCNC: 10 MMOL/L (ref 3–16)
AST SERPL-CCNC: 65 U/L (ref 15–37)
BASOPHILS ABSOLUTE: 0 K/UL (ref 0–0.2)
BASOPHILS RELATIVE PERCENT: 0.5 %
BILIRUB SERPL-MCNC: 7.8 MG/DL (ref 0–1)
BILIRUBIN DIRECT: 2.6 MG/DL (ref 0–0.3)
BILIRUBIN, INDIRECT: 5.2 MG/DL (ref 0–1)
BUN BLDV-MCNC: 17 MG/DL (ref 7–20)
CALCIUM SERPL-MCNC: 7.9 MG/DL (ref 8.3–10.6)
CHLORIDE BLD-SCNC: 104 MMOL/L (ref 99–110)
CO2: 24 MMOL/L (ref 21–32)
CREAT SERPL-MCNC: 0.7 MG/DL (ref 0.6–1.1)
EOSINOPHILS ABSOLUTE: 0.3 K/UL (ref 0–0.6)
EOSINOPHILS RELATIVE PERCENT: 3.9 %
FERRITIN: 306.8 NG/ML (ref 15–150)
FOLATE: 17.13 NG/ML (ref 4.78–24.2)
GFR AFRICAN AMERICAN: >60
GFR NON-AFRICAN AMERICAN: >60
GLUCOSE BLD-MCNC: 151 MG/DL (ref 70–99)
HCT VFR BLD CALC: 24.7 % (ref 36–48)
HEMOGLOBIN: 8.4 G/DL (ref 12–16)
INR BLD: 1.9 (ref 0.88–1.12)
IRON SATURATION: ABNORMAL % (ref 15–50)
IRON: 167 UG/DL (ref 37–145)
LYMPHOCYTES ABSOLUTE: 1.4 K/UL (ref 1–5.1)
LYMPHOCYTES RELATIVE PERCENT: 20.6 %
MAGNESIUM: 1.6 MG/DL (ref 1.8–2.4)
MCH RBC QN AUTO: 34.3 PG (ref 26–34)
MCHC RBC AUTO-ENTMCNC: 34 G/DL (ref 31–36)
MCV RBC AUTO: 100.9 FL (ref 80–100)
MONOCYTES ABSOLUTE: 1.2 K/UL (ref 0–1.3)
MONOCYTES RELATIVE PERCENT: 16.9 %
NEUTROPHILS ABSOLUTE: 4.1 K/UL (ref 1.7–7.7)
NEUTROPHILS RELATIVE PERCENT: 58.1 %
PDW BLD-RTO: 32.3 % (ref 12.4–15.4)
PHOSPHORUS: 2.7 MG/DL (ref 2.5–4.9)
PLATELET # BLD: 74 K/UL (ref 135–450)
PMV BLD AUTO: 8 FL (ref 5–10.5)
POTASSIUM SERPL-SCNC: 3.8 MMOL/L (ref 3.5–5.1)
PROTHROMBIN TIME: 22.1 SEC (ref 9.9–12.7)
RBC # BLD: 2.45 M/UL (ref 4–5.2)
SODIUM BLD-SCNC: 138 MMOL/L (ref 136–145)
TOTAL IRON BINDING CAPACITY: ABNORMAL UG/DL (ref 260–445)
TOTAL PROTEIN: 4.9 G/DL (ref 6.4–8.2)
VITAMIN B-12: 1945 PG/ML (ref 211–911)
WBC # BLD: 7 K/UL (ref 4–11)

## 2022-05-01 PROCEDURE — 80076 HEPATIC FUNCTION PANEL: CPT

## 2022-05-01 PROCEDURE — 82746 ASSAY OF FOLIC ACID SERUM: CPT

## 2022-05-01 PROCEDURE — 83540 ASSAY OF IRON: CPT

## 2022-05-01 PROCEDURE — 6360000002 HC RX W HCPCS: Performed by: INTERNAL MEDICINE

## 2022-05-01 PROCEDURE — 85025 COMPLETE CBC W/AUTO DIFF WBC: CPT

## 2022-05-01 PROCEDURE — 89051 BODY FLUID CELL COUNT: CPT

## 2022-05-01 PROCEDURE — 6370000000 HC RX 637 (ALT 250 FOR IP): Performed by: STUDENT IN AN ORGANIZED HEALTH CARE EDUCATION/TRAINING PROGRAM

## 2022-05-01 PROCEDURE — 36415 COLL VENOUS BLD VENIPUNCTURE: CPT

## 2022-05-01 PROCEDURE — 84100 ASSAY OF PHOSPHORUS: CPT

## 2022-05-01 PROCEDURE — 6370000000 HC RX 637 (ALT 250 FOR IP): Performed by: INTERNAL MEDICINE

## 2022-05-01 PROCEDURE — 82728 ASSAY OF FERRITIN: CPT

## 2022-05-01 PROCEDURE — 2580000003 HC RX 258: Performed by: INTERNAL MEDICINE

## 2022-05-01 PROCEDURE — 83735 ASSAY OF MAGNESIUM: CPT

## 2022-05-01 PROCEDURE — 82607 VITAMIN B-12: CPT

## 2022-05-01 PROCEDURE — 6360000002 HC RX W HCPCS: Performed by: STUDENT IN AN ORGANIZED HEALTH CARE EDUCATION/TRAINING PROGRAM

## 2022-05-01 PROCEDURE — 2060000000 HC ICU INTERMEDIATE R&B

## 2022-05-01 PROCEDURE — 85610 PROTHROMBIN TIME: CPT

## 2022-05-01 PROCEDURE — 83550 IRON BINDING TEST: CPT

## 2022-05-01 PROCEDURE — 80048 BASIC METABOLIC PNL TOTAL CA: CPT

## 2022-05-01 RX ORDER — MAGNESIUM SULFATE IN WATER 40 MG/ML
2000 INJECTION, SOLUTION INTRAVENOUS ONCE
Status: COMPLETED | OUTPATIENT
Start: 2022-05-01 | End: 2022-05-01

## 2022-05-01 RX ADMIN — MORPHINE SULFATE 2 MG: 2 INJECTION, SOLUTION INTRAMUSCULAR; INTRAVENOUS at 08:50

## 2022-05-01 RX ADMIN — MAGNESIUM SULFATE HEPTAHYDRATE 2000 MG: 40 INJECTION, SOLUTION INTRAVENOUS at 12:53

## 2022-05-01 RX ADMIN — FUROSEMIDE 20 MG: 20 TABLET ORAL at 08:50

## 2022-05-01 RX ADMIN — MORPHINE SULFATE 2 MG: 2 INJECTION, SOLUTION INTRAMUSCULAR; INTRAVENOUS at 02:37

## 2022-05-01 RX ADMIN — SPIRONOLACTONE 25 MG: 25 TABLET ORAL at 08:50

## 2022-05-01 RX ADMIN — Medication 10 ML: at 08:51

## 2022-05-01 RX ADMIN — ONDANSETRON 4 MG: 2 INJECTION INTRAMUSCULAR; INTRAVENOUS at 02:37

## 2022-05-01 RX ADMIN — Medication 10 ML: at 23:21

## 2022-05-01 RX ADMIN — PANTOPRAZOLE SODIUM 40 MG: 40 TABLET, DELAYED RELEASE ORAL at 06:15

## 2022-05-01 RX ADMIN — ONDANSETRON 4 MG: 2 INJECTION INTRAMUSCULAR; INTRAVENOUS at 08:50

## 2022-05-01 RX ADMIN — MORPHINE SULFATE 2 MG: 2 INJECTION, SOLUTION INTRAMUSCULAR; INTRAVENOUS at 23:20

## 2022-05-01 RX ADMIN — Medication 10 ML: at 23:20

## 2022-05-01 RX ADMIN — NADOLOL 40 MG: 40 TABLET ORAL at 08:50

## 2022-05-01 RX ADMIN — ONDANSETRON 4 MG: 2 INJECTION INTRAMUSCULAR; INTRAVENOUS at 23:34

## 2022-05-01 RX ADMIN — FUROSEMIDE 20 MG: 20 TABLET ORAL at 15:59

## 2022-05-01 RX ADMIN — MORPHINE SULFATE 2 MG: 2 INJECTION, SOLUTION INTRAMUSCULAR; INTRAVENOUS at 15:59

## 2022-05-01 RX ADMIN — AZITHROMYCIN MONOHYDRATE 500 MG: 500 INJECTION, POWDER, LYOPHILIZED, FOR SOLUTION INTRAVENOUS at 06:17

## 2022-05-01 ASSESSMENT — PAIN DESCRIPTION - FREQUENCY: FREQUENCY: CONTINUOUS

## 2022-05-01 ASSESSMENT — PAIN DESCRIPTION - ONSET: ONSET: ON-GOING

## 2022-05-01 ASSESSMENT — PAIN DESCRIPTION - LOCATION
LOCATION: ABDOMEN

## 2022-05-01 ASSESSMENT — PAIN DESCRIPTION - PAIN TYPE: TYPE: ACUTE PAIN

## 2022-05-01 ASSESSMENT — PAIN SCALES - GENERAL
PAINLEVEL_OUTOF10: 7
PAINLEVEL_OUTOF10: 10
PAINLEVEL_OUTOF10: 9
PAINLEVEL_OUTOF10: 9

## 2022-05-01 ASSESSMENT — PAIN DESCRIPTION - DESCRIPTORS
DESCRIPTORS: ACHING
DESCRIPTORS: ACHING;DISCOMFORT;DULL

## 2022-05-01 ASSESSMENT — PAIN - FUNCTIONAL ASSESSMENT
PAIN_FUNCTIONAL_ASSESSMENT: ACTIVITIES ARE NOT PREVENTED
PAIN_FUNCTIONAL_ASSESSMENT: PREVENTS OR INTERFERES SOME ACTIVE ACTIVITIES AND ADLS

## 2022-05-01 ASSESSMENT — PAIN DESCRIPTION - ORIENTATION
ORIENTATION: MID
ORIENTATION: UPPER

## 2022-05-01 NOTE — PROGRESS NOTES
Hospitalist Progress Note      PCP: No primary care provider on file. Date of Admission: 4/25/2022    Chief Complaint:  Shortness of breath     History Of Present Illness:  Garrett Hernandez is 40 y.o. female with history of alcoholic cirrhosis, continues to drink actively at this time, presented to the ER with complaint of shortness of breath and worsening abdominal distention x 2 days. She had some nausea and vomiting but denied any hematemesis. In the ED, chest x-ray showed pulmonary edema and CBC showed profound anemia likely due to variceal bleed. She required BiPAP to keep her SPO2 greater than 90%. Subjective: Patient seen and examined. No interval events. Denies any complaints. No fever or chills. c/o abdominal distention with pain.       Medications:  Reviewed    Infusion Medications    sodium chloride Stopped (04/29/22 1659)    sodium chloride      sodium chloride      sodium chloride      sodium chloride Stopped (04/29/22 1659)     Scheduled Medications    spironolactone  25 mg Oral Daily    furosemide  20 mg Oral BID    pantoprazole  40 mg Oral QAM AC    sodium chloride flush  5-40 mL IntraVENous 2 times per day    nadolol  40 mg Oral Daily    azithromycin  500 mg IntraVENous Q24H    sodium chloride flush  5-40 mL IntraVENous 2 times per day     PRN Meds: melatonin, ipratropium-albuterol, lip balm petroleum free, potassium chloride **OR** potassium alternative oral replacement **OR** potassium chloride, sodium chloride, LORazepam **OR** LORazepam **OR** LORazepam **OR** LORazepam **OR** LORazepam **OR** LORazepam **OR** LORazepam **OR** LORazepam, morphine, sodium chloride flush, sodium chloride, ondansetron **OR** ondansetron, polyethylene glycol, acetaminophen **OR** acetaminophen, sodium chloride, sodium chloride, sodium chloride flush, sodium chloride    No intake or output data in the 24 hours ending 05/01/22 1106    Physical Exam Performed:    /66   Pulse 74 Temp 98.8 °F (37.1 °C) (Temporal)   Resp 16   Ht 5' 4\" (1.626 m)   Wt 144 lb 10 oz (65.6 kg)   SpO2 96%   BMI 24.82 kg/m²     General appearance: No apparent distress, appears stated age and cooperative. HEENT: Pupils equal, round, and reactive to light. Conjunctivae clear. Icteric sclerae. Neck: Supple, with full range of motion. No jugular venous distention. Trachea midline. Respiratory:  Normal respiratory effort. Clear to auscultation, bilaterally without Rales/Wheezes/Rhonchi. Cardiovascular: Regular rate and rhythm with normal S1/S2 without murmurs, rubs or gallops. Abdomen: Soft, non-tender, distended with normal bowel sounds. +ascites. Musculoskeletal: No clubbing, cyanosis, bipedal edema bilaterally. Full range of motion without deformity. Skin: Skin color, texture, turgor normal.  No rashes or lesions. Neurologic:  Neurovascularly intact without any focal sensory/motor deficits. Cranial nerves: II-XII intact, grossly non-focal.  Psychiatric: Alert and oriented, thought content appropriate, normal insight  Capillary Refill: Brisk,3 seconds, normal   Peripheral Pulses: +2 palpable, equal bilaterally       Labs:   Recent Labs     04/29/22 0442 04/30/22 0421 05/01/22 0448   WBC 10.1 8.0 7.0   HGB 9.0* 8.6* 8.4*   HCT 27.4* 25.1* 24.7*   PLT 64* 62* 74*     Recent Labs     04/29/22 0442 04/29/22 0442 04/29/22  1416 04/30/22 0421 05/01/22 0448   *  --   --  136 138   K 3.1*   < > 4.4 3.4* 3.8     --   --  104 104   CO2 23  --   --  22 24   BUN 26*  --   --  21* 17   CREATININE 1.0  --   --  0.9 0.7   CALCIUM 8.3  --   --  7.9* 7.9*   PHOS 2.3*  --   --  2.7 2.7    < > = values in this interval not displayed.      Recent Labs     04/29/22 0442 04/30/22 0421 05/01/22 0448   AST 50* 49* 65*   ALT 21 18 22   BILIDIR 3.1* 2.7* 2.6*   BILITOT 8.7* 7.1* 7.8*   ALKPHOS 102 90 68     Recent Labs     04/29/22  0443 04/30/22  0421 05/01/22 0448   INR 1.85* 1.96* 1.90*     No results for input(s): Charlie Gordon in the last 72 hours. Urinalysis:      Lab Results   Component Value Date    NITRU POSITIVE 04/26/2022    WBCUA 21-50 04/26/2022    BACTERIA 3+ 04/26/2022    RBCUA 0-2 04/26/2022    BLOODU SMALL 04/26/2022    SPECGRAV 1.020 04/26/2022    GLUCOSEU Negative 04/26/2022       Radiology:  IR US GUIDED PARACENTESIS   Final Result   Successful paracentesis. XR CHEST PORTABLE   Final Result   Worsening CHF/pulmonary edema. CT CHEST ABDOMEN PELVIS WO CONTRAST   Final Result   1. Multifocal pneumonia. 2. Small bilateral pleural effusions, right greater than left. 3. Cholelithiasis. 4. Right nonobstructive nephrolithiasis. 5. Mild compression deformities of T7, T8 of uncertain chronicity, new since   05/17/2021.   6. Cirrhotic liver morphology with sequela of portal hypertension. XR CHEST PORTABLE   Final Result   Moderate to severe asymmetric pulmonary edema versus left-sided pneumonia   with mild background pulmonary edema. Assessment/Plan:    Active Hospital Problems    Diagnosis     High anion gap metabolic acidosis [V91.4]      Priority: Medium    Elevated LFTs [R79.89]      Priority: Medium    Ascites due to alcoholic cirrhosis (HCC) [F28.68]      Priority: Medium    Acute respiratory failure with hypoxia (HCC) [J96.01]      Priority: Medium    Sinus tachycardia [R00.0]      Priority: Medium    Esophageal varices (HCC) [I85.00]     Acute blood loss anemia [N95]     Alcoholic liver disease (HCC) [K70.9]      Acute blood loss anemia likely secondary to upper GI bleed:   Alcohol use disorder with liver cirrhosis. On admission hemoglobin was 3.6, patient received total of 4 units of packed red blood cells. Goal hemoglobin greater than 7 g/dL. Hemoglobin stable at  ~ 8.1. GI consulted: s/p EGD 4/27/2022 with small nonbleeding esophageal varices and mild portal hypertensive gastropathy. Colonoscopy 3/2021 showed polyps.    No iron deficiency on anemia work-up. Off octreotide and IV PPI. Continue Nadolol & PO Protonix. Decompensated alcoholic liver cirrhosis with ascites:  IR consulted: s/p US guided paracentesis 4/27/2022 with removal of 600 cc of ascitic fluid. High SAAG stent with cirrhosis with portal hypertension. Fluid cultures and cell count negative for SBP. Continue Lasix and Aldactone. Patient still with significant ascites. Will reconsult for therapeutic paracentesis. Follow-up with Dr. Yarely Niño as outpatient in 1 - 2 months. Alcohol use disorder:  Started on CIWA protocol with as needed Ativan. Completed thiamine high-dose for 3 days. Alcohol cessation counseling provided. CM to follow for alcohol rehab resources as an outpatient      Coagulopathy due to liver disease:  INR 1.9. No active bleeding noted. No need for vitamin K at this time. Alcoholic hepatitis:  Discriminant function of 75 on admission. Steroids given due to adverse reaction in the past.  Total bilirubin down trending. LFTs stable. Acute hypoxic respiratory failure due to pulmonary edema: On admission patient was placed on BiPAP. CT chest demonstrated significant pulmonary edema. Improved with Lasix. Currently off supplemental oxygen and BiPAP. Continue Lasix and spironolactone. Magnesium deficiency: Replaced. Monitor mag and K level. Macrocytosis:  Due to alcohol abuse and liver disease. B12 and folate within normal limits. Suspected pneumonia:  Completed 5 days of azithromycin and Rocephin. DVT Prophylaxis: SCD/INR is 2  Diet: ADULT DIET; Regular;  No Added Salt (3-4 gm)  Code Status: Full Code    PT/OT Eval Status: Pending    Dispo -once medically stable        Kosta Hernandez MD

## 2022-05-02 ENCOUNTER — APPOINTMENT (OUTPATIENT)
Dept: INTERVENTIONAL RADIOLOGY/VASCULAR | Age: 47
DRG: 280 | End: 2022-05-02
Payer: COMMERCIAL

## 2022-05-02 LAB
ALBUMIN SERPL-MCNC: 2.4 G/DL (ref 3.4–5)
ALP BLD-CCNC: 87 U/L (ref 40–129)
ALT SERPL-CCNC: 25 U/L (ref 10–40)
ANION GAP SERPL CALCULATED.3IONS-SCNC: 11 MMOL/L (ref 3–16)
ANISOCYTOSIS: ABNORMAL
APPEARANCE FLUID: CLEAR
AST SERPL-CCNC: 69 U/L (ref 15–37)
BANDED NEUTROPHILS RELATIVE PERCENT: 5 % (ref 0–7)
BASOPHILS ABSOLUTE: 0 K/UL (ref 0–0.2)
BASOPHILS RELATIVE PERCENT: 0 %
BILIRUB SERPL-MCNC: 8 MG/DL (ref 0–1)
BILIRUBIN DIRECT: 2.6 MG/DL (ref 0–0.3)
BILIRUBIN, INDIRECT: 5.4 MG/DL (ref 0–1)
BUN BLDV-MCNC: 15 MG/DL (ref 7–20)
BURR CELLS: ABNORMAL
CALCIUM SERPL-MCNC: 8.4 MG/DL (ref 8.3–10.6)
CELL COUNT FLUID TYPE: NORMAL
CHLORIDE BLD-SCNC: 104 MMOL/L (ref 99–110)
CLOT EVALUATION: NORMAL
CO2: 24 MMOL/L (ref 21–32)
COLOR FLUID: YELLOW
CREAT SERPL-MCNC: 0.8 MG/DL (ref 0.6–1.1)
EOSINOPHILS ABSOLUTE: 0.4 K/UL (ref 0–0.6)
EOSINOPHILS RELATIVE PERCENT: 6 %
FERRITIN: 351.7 NG/ML (ref 15–150)
FOLATE: 11.01 NG/ML (ref 4.78–24.2)
GFR AFRICAN AMERICAN: >60
GFR NON-AFRICAN AMERICAN: >60
GLUCOSE BLD-MCNC: 105 MG/DL (ref 70–99)
HCT VFR BLD CALC: 25.5 % (ref 36–48)
HEMOGLOBIN: 8.5 G/DL (ref 12–16)
INR BLD: 1.81 (ref 0.88–1.12)
IRON SATURATION: ABNORMAL % (ref 15–50)
IRON: 189 UG/DL (ref 37–145)
LYMPHOCYTES ABSOLUTE: 1.1 K/UL (ref 1–5.1)
LYMPHOCYTES RELATIVE PERCENT: 17 %
LYMPHOCYTES, BODY FLUID: 65 %
MACROCYTES: ABNORMAL
MACROPHAGE FLUID: 23 %
MAGNESIUM: 1.7 MG/DL (ref 1.8–2.4)
MCH RBC QN AUTO: 33.8 PG (ref 26–34)
MCHC RBC AUTO-ENTMCNC: 33.4 G/DL (ref 31–36)
MCV RBC AUTO: 101.3 FL (ref 80–100)
MESOTHELIAL FLUID: 2 %
MONOCYTE, FLUID: 4 %
MONOCYTES ABSOLUTE: 0.6 K/UL (ref 0–1.3)
MONOCYTES RELATIVE PERCENT: 9 %
NEUTROPHIL, FLUID: 6 %
NEUTROPHILS ABSOLUTE: 4.3 K/UL (ref 1.7–7.7)
NEUTROPHILS RELATIVE PERCENT: 63 %
NUCLEATED CELLS FLUID: 45 /CUMM
NUMBER OF CELLS COUNTED FLUID: 100
PDW BLD-RTO: 30.5 % (ref 12.4–15.4)
PHOSPHORUS: 2.9 MG/DL (ref 2.5–4.9)
PLATELET # BLD: 97 K/UL (ref 135–450)
PMV BLD AUTO: 8.1 FL (ref 5–10.5)
POTASSIUM SERPL-SCNC: 3.6 MMOL/L (ref 3.5–5.1)
PROTHROMBIN TIME: 21 SEC (ref 9.9–12.7)
RBC # BLD: 2.51 M/UL (ref 4–5.2)
RBC FLUID: <1000 /CUMM
SODIUM BLD-SCNC: 139 MMOL/L (ref 136–145)
TOTAL IRON BINDING CAPACITY: ABNORMAL UG/DL (ref 260–445)
TOTAL PROTEIN: 5.5 G/DL (ref 6.4–8.2)
VITAMIN B-12: 1995 PG/ML (ref 211–911)
WBC # BLD: 6.3 K/UL (ref 4–11)

## 2022-05-02 PROCEDURE — 82728 ASSAY OF FERRITIN: CPT

## 2022-05-02 PROCEDURE — 6360000002 HC RX W HCPCS: Performed by: INTERNAL MEDICINE

## 2022-05-02 PROCEDURE — 2580000003 HC RX 258: Performed by: INTERNAL MEDICINE

## 2022-05-02 PROCEDURE — 85025 COMPLETE CBC W/AUTO DIFF WBC: CPT

## 2022-05-02 PROCEDURE — 6370000000 HC RX 637 (ALT 250 FOR IP): Performed by: INTERNAL MEDICINE

## 2022-05-02 PROCEDURE — 83550 IRON BINDING TEST: CPT

## 2022-05-02 PROCEDURE — 2060000000 HC ICU INTERMEDIATE R&B

## 2022-05-02 PROCEDURE — 6370000000 HC RX 637 (ALT 250 FOR IP): Performed by: STUDENT IN AN ORGANIZED HEALTH CARE EDUCATION/TRAINING PROGRAM

## 2022-05-02 PROCEDURE — 80048 BASIC METABOLIC PNL TOTAL CA: CPT

## 2022-05-02 PROCEDURE — 87205 SMEAR GRAM STAIN: CPT

## 2022-05-02 PROCEDURE — 83540 ASSAY OF IRON: CPT

## 2022-05-02 PROCEDURE — 2500000003 HC RX 250 WO HCPCS: Performed by: INTERNAL MEDICINE

## 2022-05-02 PROCEDURE — 84100 ASSAY OF PHOSPHORUS: CPT

## 2022-05-02 PROCEDURE — 80076 HEPATIC FUNCTION PANEL: CPT

## 2022-05-02 PROCEDURE — 87070 CULTURE OTHR SPECIMN AEROBIC: CPT

## 2022-05-02 PROCEDURE — 97165 OT EVAL LOW COMPLEX 30 MIN: CPT

## 2022-05-02 PROCEDURE — 49083 ABD PARACENTESIS W/IMAGING: CPT

## 2022-05-02 PROCEDURE — 97530 THERAPEUTIC ACTIVITIES: CPT

## 2022-05-02 PROCEDURE — 0W9G3ZZ DRAINAGE OF PERITONEAL CAVITY, PERCUTANEOUS APPROACH: ICD-10-PCS | Performed by: RADIOLOGY

## 2022-05-02 PROCEDURE — 83735 ASSAY OF MAGNESIUM: CPT

## 2022-05-02 PROCEDURE — 6360000002 HC RX W HCPCS: Performed by: STUDENT IN AN ORGANIZED HEALTH CARE EDUCATION/TRAINING PROGRAM

## 2022-05-02 PROCEDURE — 36415 COLL VENOUS BLD VENIPUNCTURE: CPT

## 2022-05-02 PROCEDURE — 82607 VITAMIN B-12: CPT

## 2022-05-02 PROCEDURE — 87015 SPECIMEN INFECT AGNT CONCNTJ: CPT

## 2022-05-02 PROCEDURE — 85610 PROTHROMBIN TIME: CPT

## 2022-05-02 PROCEDURE — C1729 CATH, DRAINAGE: HCPCS

## 2022-05-02 PROCEDURE — 82746 ASSAY OF FOLIC ACID SERUM: CPT

## 2022-05-02 RX ORDER — MAGNESIUM SULFATE IN WATER 40 MG/ML
2000 INJECTION, SOLUTION INTRAVENOUS ONCE
Status: COMPLETED | OUTPATIENT
Start: 2022-05-02 | End: 2022-05-02

## 2022-05-02 RX ORDER — LIDOCAINE HYDROCHLORIDE 10 MG/ML
5 INJECTION, SOLUTION EPIDURAL; INFILTRATION; INTRACAUDAL; PERINEURAL ONCE
Status: COMPLETED | OUTPATIENT
Start: 2022-05-02 | End: 2022-05-02

## 2022-05-02 RX ADMIN — AZITHROMYCIN MONOHYDRATE 500 MG: 500 INJECTION, POWDER, LYOPHILIZED, FOR SOLUTION INTRAVENOUS at 06:29

## 2022-05-02 RX ADMIN — Medication 10 ML: at 08:55

## 2022-05-02 RX ADMIN — MORPHINE SULFATE 2 MG: 2 INJECTION, SOLUTION INTRAMUSCULAR; INTRAVENOUS at 06:48

## 2022-05-02 RX ADMIN — NADOLOL 40 MG: 40 TABLET ORAL at 08:54

## 2022-05-02 RX ADMIN — Medication 10 ML: at 21:16

## 2022-05-02 RX ADMIN — SPIRONOLACTONE 25 MG: 25 TABLET ORAL at 08:54

## 2022-05-02 RX ADMIN — LIDOCAINE HYDROCHLORIDE 5 ML: 10 INJECTION, SOLUTION EPIDURAL; INFILTRATION; INTRACAUDAL; PERINEURAL at 11:33

## 2022-05-02 RX ADMIN — MORPHINE SULFATE 2 MG: 2 INJECTION, SOLUTION INTRAMUSCULAR; INTRAVENOUS at 21:16

## 2022-05-02 RX ADMIN — FUROSEMIDE 20 MG: 20 TABLET ORAL at 18:56

## 2022-05-02 RX ADMIN — MELATONIN TAB 3 MG 3 MG: 3 TAB at 22:34

## 2022-05-02 RX ADMIN — MAGNESIUM SULFATE IN WATER 2000 MG: 40 INJECTION, SOLUTION INTRAVENOUS at 09:05

## 2022-05-02 RX ADMIN — PANTOPRAZOLE SODIUM 40 MG: 40 TABLET, DELAYED RELEASE ORAL at 06:27

## 2022-05-02 RX ADMIN — MORPHINE SULFATE 2 MG: 2 INJECTION, SOLUTION INTRAMUSCULAR; INTRAVENOUS at 14:34

## 2022-05-02 RX ADMIN — FUROSEMIDE 20 MG: 20 TABLET ORAL at 08:55

## 2022-05-02 RX ADMIN — Medication 10 ML: at 08:56

## 2022-05-02 ASSESSMENT — PAIN DESCRIPTION - DESCRIPTORS
DESCRIPTORS: ACHING
DESCRIPTORS: ACHING
DESCRIPTORS: ACHING;DISCOMFORT
DESCRIPTORS: ACHING

## 2022-05-02 ASSESSMENT — PAIN SCALES - GENERAL
PAINLEVEL_OUTOF10: 9
PAINLEVEL_OUTOF10: 8
PAINLEVEL_OUTOF10: 6
PAINLEVEL_OUTOF10: 9
PAINLEVEL_OUTOF10: 6
PAINLEVEL_OUTOF10: 7
PAINLEVEL_OUTOF10: 0
PAINLEVEL_OUTOF10: 0
PAINLEVEL_OUTOF10: 7
PAINLEVEL_OUTOF10: 9
PAINLEVEL_OUTOF10: 0

## 2022-05-02 ASSESSMENT — PAIN DESCRIPTION - LOCATION
LOCATION: ABDOMEN
LOCATION: ABDOMEN
LOCATION: ABDOMEN;BACK

## 2022-05-02 ASSESSMENT — PAIN DESCRIPTION - ORIENTATION
ORIENTATION: MID
ORIENTATION: MID

## 2022-05-02 ASSESSMENT — PAIN - FUNCTIONAL ASSESSMENT: PAIN_FUNCTIONAL_ASSESSMENT: ACTIVITIES ARE NOT PREVENTED

## 2022-05-02 NOTE — PROGRESS NOTES
05/02/22 0911   RT Protocol   History Pulmonary Disease 0   Respiratory pattern 2   Breath sounds 0   Cough 0   Bronchodilator Assessment Score 2     Electronically signed by Aaron Bedoya RCP on 5/2/2022 at 9:12 AM

## 2022-05-02 NOTE — PROGRESS NOTES
4800ml of fluid removed  Spoke to patients RN blossum and advised her the amount of fluid removed and that patient was returning to the floor.

## 2022-05-02 NOTE — RT PROTOCOL NOTE
RT Inhaler-Nebulizer Bronchodilator Protocol Note    There is a bronchodilator order in the chart from a provider indicating to follow the RT Bronchodilator Protocol and there is an Initiate RT Inhaler-Nebulizer Bronchodilator Protocol order as well (see protocol at bottom of note). CXR Findings:  No results found. The findings from the last RT Protocol Assessment were as follows:   History Pulmonary Disease: None or smoker <15 pack years  Respiratory Pattern: Dyspnea on exertion or RR 21-25 bpm  Breath Sounds: Clear breath sounds  Cough: Strong, spontaneous, non-productive  Indication for Bronchodilator Therapy:    Bronchodilator Assessment Score: 2    Aerosolized bronchodilator medication orders have been revised according to the RT Inhaler-Nebulizer Bronchodilator Protocol below. Respiratory Therapist to perform RT Therapy Protocol Assessment initially then follow the protocol. Repeat RT Therapy Protocol Assessment PRN for score 0-3 or on second treatment, BID, and PRN for scores above 3. No Indications - adjust the frequency to every 6 hours PRN wheezing or bronchospasm, if no treatments needed after 48 hours then discontinue using Per Protocol order mode. If indication present, adjust the RT bronchodilator orders based on the Bronchodilator Assessment Score as indicated below. Use Inhaler orders unless patient has one or more of the following: on home nebulizer, not able to hold breath for 10 seconds, is not alert and oriented, cannot activate and use MDI correctly, or respiratory rate 25 breaths per minute or more, then use the equivalent nebulizer order(s) with same Frequency and PRN reasons based on the score. If a patient is on this medication at home then do not decrease Frequency below that used at home.     0-3 - enter or revise RT bronchodilator order(s) to equivalent RT Bronchodilator order with Frequency of every 4 hours PRN for wheezing or increased work of breathing using Per Protocol order mode. 4-6 - enter or revise RT Bronchodilator order(s) to two equivalent RT bronchodilator orders with one order with BID Frequency and one order with Frequency of every 4 hours PRN wheezing or increased work of breathing using Per Protocol order mode. 7-10 - enter or revise RT Bronchodilator order(s) to two equivalent RT bronchodilator orders with one order with TID Frequency and one order with Frequency of every 4 hours PRN wheezing or increased work of breathing using Per Protocol order mode. 11-13 - enter or revise RT Bronchodilator order(s) to one equivalent RT bronchodilator order with QID Frequency and an Albuterol order with Frequency of every 4 hours PRN wheezing or increased work of breathing using Per Protocol order mode. Greater than 13 - enter or revise RT Bronchodilator order(s) to one equivalent RT bronchodilator order with every 4 hours Frequency and an Albuterol order with Frequency of every 2 hours PRN wheezing or increased work of breathing using Per Protocol order mode. RT to enter RT Home Evaluation for COPD & MDI Assessment order using Per Protocol order mode.     Electronically signed by Emanuel Burrell RCP on 5/2/2022 at 9:12 AM

## 2022-05-02 NOTE — PROGRESS NOTES
Pt rates thoracic/abd pain 9/10. Puncture site has scant drainage. Pt reports feels like abdomen is quickly swelling with fluid again. Abdomen is noticeably larger than at 12:30 when returned from Paracentesis.

## 2022-05-02 NOTE — PROGRESS NOTES
[facility] [facility]   Phone: [phone]    Occupational Therapy    [x] Initial Evaluation            [] Daily Treatment Note         [x] Discharge Summary      Patient: Pankaj Meng   : 1975   MRN: [id number]   Date of Service:  2022    Admitting Diagnosis:  Acute respiratory failure with hypoxia [facility])  Current Admission Summary: 40 y. o. female with history of alcoholic cirrhosis, continues to drink actively at this time, presented to the ER with complaint of shortness of breath and worsening abdominal distention x 2 days. She had some nausea and vomiting but denied any hematemesis. Past Medical History:  has a past medical history of Alcoholic liver disease ([address] Anemia, and History of blood transfusion. Past Surgical History:  has a past surgical history that includes Upper gastrointestinal endoscopy (N/A, 2021); Upper gastrointestinal endoscopy (N/A, 03/10/2021); Tubal ligation; Colonoscopy (N/A, 3/11/2021); Upper gastrointestinal endoscopy (N/A, 2021); and Upper gastrointestinal endoscopy (N/A, 2022). Discharge Recommendations: Pankaj Meng scored a 19/21 on the AM-PAC ADL Inpatient form. At this time, no further OT is recommended upon discharge due to patient near baseline level of function. Recommend patient returns to prior setting with prior services. DME Required For Discharge: no DME required at discharge    Precautions/Restrictions: medium fall risk  Weight Bearing Restrictions: no restrictions  [] Right Upper Extremity  [] Left Upper Extremity [] Right Lower Extremity  [] Left Lower Extremity     Required Braces/Orthotics: no braces required   [] Right  [] Left  Positional Restrictions:no positional restrictions    Pre-Admission Information     Lives With: daughter, . Comment: daughter lives with her \"most of the time'; daughter works full time                          Type of Home: apartment, .   Comment: Duplex  Home Layout: one level  Home Access: level entry  Bathroom Layout: walker accessible  Toilet Height: standard height  Bathroom Equipment: . Comment: none  Home Equipment: no prior equipment  Transfer Assistance: Independent without use of device  Ambulation Assistance:Independent without use of device  ADL Assistance: independent with all ADL's  IADL Assistance: independent with homemaking tasks  Active : [x]? yes             []?no  Current Employment: full time employment. 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  Hobbies: spend time with family  Recent Falls: none      Examination   Vision:   Vision Gross Assessment: Impaired and Vision Corrective Device: wears contacts  Hearing:   [employer] [facility]  Perception:   Overall Perceptual Status: WFL  Sensation:   WFL  Proprioception:    WFL  Tone:   Normotonic  Coordination Testing:   WFL    ROM:   (B) UE ROM WFL  Strength:   (B) UE strength grossly +4    Decision Making: low complexity  Clinical Presentation: stable      Subjective  General: Patient supine in bed upon arrival, pleasant and agreeable to evaluation, denies pain at rest.  Pain: 0/10  Pain Interventions: not applicable           Activities of Daily Living  Basic Activities of Daily Living  Grooming: Independent  Grooming Comments: stance at sink for hand hygiene  Lower Extremity Dressing: Independent. Equipment: none  Dressing Comments: single leg stance to remove dirty gown that was tied  Toileting: Independent. Equipment: none    Comment: Assisted to change gown    Instrumental Activities of Daily Living  No IADL completed on this date. Functional Mobility  Bed Mobility  Supine to Sit: Independent  Sit to Supine: Independent  Scooting: Independent  Comments:  Transfers  Sit to stand transfer:Independent  Stand to sit transfer: Independent  Toilet transfer: Independent. Mobility technique: ambulating. Functional Mobility:  Sitting Balance: Independent. Duration: 2-3 min. Activity: Sitting EOB, gown change. Standing Balance: Independent. Duration: 2 min. Activity: stance at sink for hand hygiene, ambulation. Functional Mobility: no device. Independent. Activity: to/from bathroom    Other Therapeutic Interventions    Functional Outcomes  AM-PAC Inpatient Daily Activity Raw Score: 24    Cognition  Overall Cognitive Status: WFL  Orientation:    alert and oriented x 4  Command Following:   [facility]     Education  Barriers To Learning: none  Patient Education: patient educated on OT role and benefits, discharge recommendations  Learning Assessment:  patient verbalizes and demonstrates understanding    Assessment  Activity Tolerance: Tolerated session well  Impairments Requiring Therapeutic Intervention: None identified  Prognosis: excellent  Clinical Assessment: Patient presents near baseline level of function. Able to ambulate and manage ADL independently in room--pt reports she has assist from family at home as needed upon discharge. No further OT indicated at this time. Safety Interventions: patient left in bed    Plan  Frequency: Eval with same day discharge. No follow up required. Current Treatment Recommendations: not applicable, evaluation completed with same day discharge. Goals    Patient eval with same day discharge. No goals set as patient is at baseline status.     Therapy Session Time     Individual Group Co-treatment   Time In    1339   Time Out    1350   Minutes    11        Timed Code Treatment Minutes:   0 minutes  Total Treatment Minutes:  11 minutes    Electronically Signed By: BREANNA Justin MOT OTR/L [id number]

## 2022-05-02 NOTE — PROGRESS NOTES
Nutrition Note    RECOMMENDATIONS  1. PO Diet: regular, consider LEE      NUTRITION ASSESSMENT   LOS nutrition assessment. Pt on a regular diet and states appetite and po intake have been good. Pt has no nutrition concerns or questions. May want to consider LEE diet.  Nutrition Related Findings: 5/2 paracentesis: 4800mL of fluid removed; 4/29 LBM; trace BLE edema   Wounds: None   Nutrition Education:  No recommendation at this time    Nutrition Goals: PO intake 50% or greater     MALNUTRITION ASSESSMENT   Malnutrition Status: No malnutrition    NUTRITION DIAGNOSIS   · No nutrition diagnosis at this time related to   as evidenced by        [facility] DIET; Regular     PO Intake: 51-75%   PO Supplement Intake:None Ordered      ANTHROPOMETRICS   Current Height: 5' 4\" (162.6 cm)   Current Weight: 148 lb 5.9 oz (67.3 kg)     Admission weight: 141 lb (64 kg)   Ideal Body Weight (IBW): 120 lbs  (55 kg)        BMI: 25.4      The patient will be monitored per nutrition standards of care. Consult dietitian if additional nutrition interventions are needed prior to RD reassessment.      Anton Peter RD, LD    Contact: [phone]

## 2022-05-02 NOTE — PROGRESS NOTES
Pt back in room after paracentesis, VSS. Pt reports feeling better with fluid off. Still rates pain 8/10.

## 2022-05-02 NOTE — CARE COORDINATION
PM&R referral received. Chart reviewed and evaluating. Will discuss and update Dr. Sita Marie in am.  Patient will not require a pre certification with payor. ARU will continue to follow progress and update discharge plan as needed.     Taylor Mackay, YASMINN, .[phone]

## 2022-05-02 NOTE — PROGRESS NOTES
[facility] Department   Phone: [phone]    Physical Therapy    [] Initial Evaluation            [x] Daily Treatment Note         [x] Discharge Summary      Patient: Adriana Pino   : 1975   MRN: [id number]   Date of Service:  2022  Admitting Diagnosis: Acute respiratory failure with hypoxia [facility])  Current Admission Summary: Adriana Pino is 40 y.o. female who presented with complaint of shortness of breath. Symptom onset was acute for a time period of 2 days. The severity is described as severe. The course of his symptoms over time is worsening. The symptoms improved with none and worsened with none. The patient's symptom is associated with abdominal distention and fluid overload.  Adriana Pino is 40 y.o. female with history of alcoholic cirrhosis  Patient admits she continues to drink actively at this time  He now presents to the ER with complaint of shortness of breath  She says she has been feeling poorly for the past 2 days  She has abdominal distention due to ascites and fluid overloaded state  She had some nausea and vomiting but denies any hematemesis  In the ED, chest x-ray shows pulmonary edema  In addition CBC shows profound anemia likely due to variceal bleed  She requires BiPAP to keep her SPO2 greater than 90%  On exam, she is jaundiced and ill-appearing but not in acute distress  EGD and paracentesis, 1600 cc; pt has received transfusion of 2units PRBC     Past Medical History:  has a past medical history of Alcoholic liver disease ([address] Anemia, and History of blood transfusion. Past Surgical History:  has a past surgical history that includes Upper gastrointestinal endoscopy (N/A, 2021); Upper gastrointestinal endoscopy (N/A, 03/10/2021); Tubal ligation; Colonoscopy (N/A, 3/11/2021); Upper gastrointestinal endoscopy (N/A, 2021); and Upper gastrointestinal endoscopy (N/A, 2022).      Discharge Recommendations: Adriana Pino scored a 24/24 on the AM-PAC short mobility form. At this time, no further PT is recommended upon discharge due to pt being at baseline independent functional mobility. Recommend patient returns to prior setting with prior services. DME Required For Discharge: no DME required at discharge  Precautions/Restrictions: high fall risk, seizure; MD stating okay for pt to bed out of bed and in chair  Positional Restrictions:no positional restrictions    Pre-Admission Information   Lives With: daughter, . Comment: daughter lives with her \"most of the time'; daughter works full time    Type of Home: apartment, . Comment: Duplex  Home Layout: one level  Home Access: level entry  Bathroom Layout: walker accessible  Toilet Height: standard height  Bathroom Equipment: . Comment: none  Home Equipment: no prior equipment  Transfer Assistance: Independent without use of device  Ambulation Assistance:Independent without use of device  ADL Assistance: independent with all ADL's  IADL Assistance: independent with homemaking tasks  Active : [x] yes  []no  Current Employment: full time employment. 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   Comment pt intermittently using IV pole  Distance: 36' in room  Gait Mechanics: appropriate adrian, slight medial-lateral sway noted  Comments: Pt able to negotiate obstacles without assist, no LOB    Stair Mobility  Stair mobility not completed on this date. Comments:  Wheelchair Mobility:  No w/c mobility completed on this date. Comments:  Balance  Static Sitting Balance: good: independent with functional balance in unsupported position  Dynamic Sitting Balance: good: independent with functional balance in unsupported position  Static Standing Balance: good: independent with functional balance in unsupported position  Dynamic Standing Balance: good: independent with functional balance in unsupported position  Comments: Pt sat independently at toilet for toileting ~1-2 minutes total and stood at sink for hand hygeine independently ~30-45 seconds total. Pt seated EOB independently while PT assisting with gown change. No LOB with any tasks. Other Therapeutic Interventions    Functional Outcomes  AM-PAC Inpatient Mobility Raw Score : 24              Cognition  Overall Cognitive Status: WFL  Orientation:    oriented to person, oriented to place, oriented to time and oriented to situation  Command Following:   [facility]  Barriers To Learning: none  Patient Education: patient educated on goals, PT role and benefits, plan of care, discharge recommendations  Learning Assessment:  patient verbalizes and demonstrates understanding    Assessment  Activity Tolerance: pt with improved tolerance this date, able to perform all tasks on RA   Impairments Requiring Therapeutic Intervention: n/a  Prognosis: good  Clinical Assessment: Pt with improved mobility and activity tolerance this session. Pt able to perform all mobility at MOD I/independent level this date. Pt able to ambulate in room independently without LOB and able to negotiate obstacles without assist. Pt has met 2/3 goals set forth in acute PT. As such, pt is being discharged from acute PT caseload.     Safety Interventions: patient left in bed, bed alarm in place, call light within reach and nurse notified    Plan  Frequency: d/c  Current Treatment Recommendations: n/a    Goals  Patient Goals: return home   Short Term Goals:  Time Frame: discharge  Patient will complete bed mobility at modified independent--MET 5/2/22   Patient will complete transfers at modified independent--MET 5/2/22   Patient will ambulate 50 ft with use of no device at supervision--partially met 5/2/22, pt able to ambulate 40' independently in room   2 GOALS MET THIS DATE      Therapy Session Time      Individual Group Co-treatment   Time In     1339   Time Out     1350   Minutes     11     Timed Code Treatment Minutes:   11  Total Treatment Minutes:  11    Electronically Signed By: Neal Hanna, [id number] [facility],[address], [address] [place] [address]

## 2022-05-02 NOTE — PROGRESS NOTES
Hospitalist Progress Note      PCP: No primary care provider on file. Date of Admission: 4/25/2022    Chief Complaint:  Shortness of breath     History Of Present Illness:  Elis Dumont is 40 y.o. female with history of alcoholic cirrhosis, continues to drink actively at this time, presented to the ER with complaint of shortness of breath and worsening abdominal distention x 2 days. She had some nausea and vomiting but denied any hematemesis. In the ED, chest x-ray showed pulmonary edema and CBC showed profound anemia likely due to variceal bleed. She required BiPAP to keep her SPO2 greater than 90%. Subjective: Patient seen and examined. No fever or chills. c/o abdominal distention with pain.       Medications:  Reviewed    Infusion Medications    sodium chloride Stopped (04/29/22 1659)    sodium chloride      sodium chloride      sodium chloride      sodium chloride Stopped (04/29/22 1659)     Scheduled Medications    magnesium sulfate  2,000 mg IntraVENous Once    spironolactone  25 mg Oral Daily    furosemide  20 mg Oral BID    pantoprazole  40 mg Oral QAM AC    sodium chloride flush  5-40 mL IntraVENous 2 times per day    nadolol  40 mg Oral Daily    sodium chloride flush  5-40 mL IntraVENous 2 times per day     PRN Meds: melatonin, ipratropium-albuterol, lip balm petroleum free, potassium chloride **OR** potassium alternative oral replacement **OR** potassium chloride, sodium chloride, LORazepam **OR** LORazepam **OR** LORazepam **OR** LORazepam **OR** LORazepam **OR** LORazepam **OR** LORazepam **OR** LORazepam, morphine, sodium chloride flush, sodium chloride, ondansetron **OR** ondansetron, polyethylene glycol, acetaminophen **OR** acetaminophen, sodium chloride, sodium chloride, sodium chloride flush, sodium chloride      Intake/Output Summary (Last 24 hours) at 5/2/2022 1041  Last data filed at 5/1/2022 2320  Gross per 24 hour   Intake 480 ml   Output --   Net 480 ml Physical Exam Performed:    /69   Pulse 70   Temp 98.8 °F (37.1 °C) (Temporal)   Resp 18   Ht 5' 4\" (1.626 m)   Wt 148 lb 5.9 oz (67.3 kg)   SpO2 97%   BMI 25.47 kg/m²     General appearance: No apparent distress, appears stated age and cooperative. HEENT: Pupils equal, round, and reactive to light. Conjunctivae clear. Icteric sclerae. Neck: Supple, with full range of motion. No jugular venous distention. Trachea midline. Respiratory:  Normal respiratory effort. Clear to auscultation, bilaterally without Rales/Wheezes/Rhonchi. Cardiovascular: Regular rate and rhythm with normal S1/S2 without murmurs, rubs or gallops. Abdomen: Soft, non-tender, distended with normal bowel sounds. +ascites. Musculoskeletal: No clubbing, cyanosis, bipedal edema bilaterally. Full range of motion without deformity. Skin: Skin color, texture, turgor normal.  No rashes or lesions. Neurologic:  Neurovascularly intact without any focal sensory/motor deficits. Cranial nerves: II-XII intact, grossly non-focal.  Psychiatric: Alert and oriented, thought content appropriate, normal insight  Capillary Refill: Brisk,3 seconds, normal   Peripheral Pulses: +2 palpable, equal bilaterally       Labs:   Recent Labs     04/30/22 0421 05/01/22 0448 05/02/22 0421   WBC 8.0 7.0 6.3   HGB 8.6* 8.4* 8.5*   HCT 25.1* 24.7* 25.5*   PLT 62* 74* 97*     Recent Labs     04/30/22 0421 05/01/22 0448 05/02/22 0421    138 139   K 3.4* 3.8 3.6    104 104   CO2 22 24 24   BUN 21* 17 15   CREATININE 0.9 0.7 0.8   CALCIUM 7.9* 7.9* 8.4   PHOS 2.7 2.7 2.9     Recent Labs     04/30/22 0421 05/01/22 0448 05/02/22 0421   AST 49* 65* 69*   ALT 18 22 25   BILIDIR 2.7* 2.6* 2.6*   BILITOT 7.1* 7.8* 8.0*   ALKPHOS 90 68 87     Recent Labs     04/30/22  0421 05/01/22  0448 05/02/22  0421   INR 1.96* 1.90* 1.81*     No results for input(s): CKTOTAL, TROPONINI in the last 72 hours.     Urinalysis:      Lab Results   Component Value Date    NITRU POSITIVE 04/26/2022    WBCUA 21-50 04/26/2022    BACTERIA 3+ 04/26/2022    RBCUA 0-2 04/26/2022    BLOODU SMALL 04/26/2022    SPECGRAV 1.020 04/26/2022    GLUCOSEU Negative 04/26/2022       Radiology:  IR US GUIDED PARACENTESIS   Final Result   Successful paracentesis. XR CHEST PORTABLE   Final Result   Worsening CHF/pulmonary edema. CT CHEST ABDOMEN PELVIS WO CONTRAST   Final Result   1. Multifocal pneumonia. 2. Small bilateral pleural effusions, right greater than left. 3. Cholelithiasis. 4. Right nonobstructive nephrolithiasis. 5. Mild compression deformities of T7, T8 of uncertain chronicity, new since   05/17/2021.   6. Cirrhotic liver morphology with sequela of portal hypertension. XR CHEST PORTABLE   Final Result   Moderate to severe asymmetric pulmonary edema versus left-sided pneumonia   with mild background pulmonary edema. IR US GUIDED PARACENTESIS    (Results Pending)           Assessment/Plan:    Active Hospital Problems    Diagnosis     High anion gap metabolic acidosis [B49.7]      Priority: Medium    Elevated LFTs [R79.89]      Priority: Medium    Ascites due to alcoholic cirrhosis (HCC) [U94.50]      Priority: Medium    Acute respiratory failure with hypoxia (HCC) [J96.01]      Priority: Medium    Sinus tachycardia [R00.0]      Priority: Medium    Esophageal varices (HCC) [I85.00]     Acute blood loss anemia [K91]     Alcoholic liver disease (HCC) [K70.9]      Acute blood loss anemia likely secondary to upper GI bleed:   Alcohol use disorder with liver cirrhosis. On admission hemoglobin was 3.6, patient received total of 4 units of packed red blood cells. Goal hemoglobin greater than 7 g/dL. Hemoglobin stable at  ~ 8.  GI consulted: s/p EGD 4/27/2022 with small nonbleeding esophageal varices and mild portal hypertensive gastropathy. Colonoscopy 3/2021 showed polyps. No iron deficiency on anemia work-up.   Off octreotide and IV PPI.  Continue Nadolol & PO Protonix. Decompensated alcoholic liver cirrhosis with ascites:  IR consulted: s/p US guided paracentesis 4/27/2022 with removal of 600 cc of ascitic fluid. High SAAG stent with cirrhosis with portal hypertension. Fluid cultures and cell count negative for SBP. Continue Lasix and Aldactone. Patient still with significant ascites. IR consulted for therapeutic paracentesis today. Follow-up with Dr. Eri Simental as outpatient in 1 - 2 months. Alcohol use disorder:  Monitored on CIWA protocol with as needed Ativan. Completed thiamine high-dose for 3 days. Alcohol cessation counseling provided. CM to follow for alcohol rehab resources as an outpatient      Coagulopathy due to liver disease:  INR 1.9. No active bleeding noted. No need for vitamin K at this time. Alcoholic hepatitis:  Discriminant function of 75 on admission. Steroids given due to adverse reaction in the past.  Total bilirubin stable ~8. LFTs stable. Acute hypoxic respiratory failure due to pulmonary edema: On admission patient was placed on BiPAP. CT chest demonstrated significant pulmonary edema. Improved with Lasix. Currently off supplemental oxygen and BiPAP. Continue Lasix and spironolactone. Magnesium deficiency: Replaced. Monitor mag and K level. Macrocytosis:  Due to alcohol abuse and liver disease. B12 and folate within normal limits. Suspected pneumonia:  Completed 5 days of azithromycin and Rocephin. DVT Prophylaxis: SCD/INR is 2  Diet: Diet NPO Exceptions are: Sips of Water with Meds  Code Status: Full Code    PT/OT Eval Status: ARU recommended.     Dispo -once medically stable        Hipolito Bell MD

## 2022-05-03 VITALS
BODY MASS INDEX: 22.88 KG/M2 | WEIGHT: 134 LBS | OXYGEN SATURATION: 99 % | TEMPERATURE: 99.1 F | SYSTOLIC BLOOD PRESSURE: 115 MMHG | RESPIRATION RATE: 18 BRPM | HEIGHT: 64 IN | DIASTOLIC BLOOD PRESSURE: 63 MMHG | HEART RATE: 69 BPM

## 2022-05-03 LAB
ALBUMIN SERPL-MCNC: 2.2 G/DL (ref 3.4–5)
ALP BLD-CCNC: 91 U/L (ref 40–129)
ALT SERPL-CCNC: 24 U/L (ref 10–40)
ANION GAP SERPL CALCULATED.3IONS-SCNC: 9 MMOL/L (ref 3–16)
ANISOCYTOSIS: ABNORMAL
AST SERPL-CCNC: 65 U/L (ref 15–37)
BASOPHILS ABSOLUTE: 0 K/UL (ref 0–0.2)
BASOPHILS RELATIVE PERCENT: 0.8 %
BILIRUB SERPL-MCNC: 6.6 MG/DL (ref 0–1)
BILIRUBIN DIRECT: 2.1 MG/DL (ref 0–0.3)
BILIRUBIN, INDIRECT: 4.5 MG/DL (ref 0–1)
BUN BLDV-MCNC: 16 MG/DL (ref 7–20)
BURR CELLS: ABNORMAL
CALCIUM SERPL-MCNC: 8.1 MG/DL (ref 8.3–10.6)
CHLORIDE BLD-SCNC: 103 MMOL/L (ref 99–110)
CO2: 26 MMOL/L (ref 21–32)
CREAT SERPL-MCNC: 0.8 MG/DL (ref 0.6–1.1)
EOSINOPHILS ABSOLUTE: 0.1 K/UL (ref 0–0.6)
EOSINOPHILS RELATIVE PERCENT: 2.3 %
FERRITIN: 325 NG/ML (ref 15–150)
FOLATE: 14.71 NG/ML (ref 4.78–24.2)
GFR AFRICAN AMERICAN: >60
GFR NON-AFRICAN AMERICAN: >60
GLUCOSE BLD-MCNC: 112 MG/DL (ref 70–99)
HCT VFR BLD CALC: 22.8 % (ref 36–48)
HEMATOLOGY PATH CONSULT: NO
HEMOGLOBIN: 7.7 G/DL (ref 12–16)
HYPOCHROMIA: ABNORMAL
INR BLD: 1.78 (ref 0.88–1.12)
IRON SATURATION: ABNORMAL % (ref 15–50)
IRON: 191 UG/DL (ref 37–145)
LYMPHOCYTES ABSOLUTE: 1.3 K/UL (ref 1–5.1)
LYMPHOCYTES RELATIVE PERCENT: 26.5 %
MAGNESIUM: 1.4 MG/DL (ref 1.8–2.4)
MCH RBC QN AUTO: 34.4 PG (ref 26–34)
MCHC RBC AUTO-ENTMCNC: 33.7 G/DL (ref 31–36)
MCV RBC AUTO: 101.9 FL (ref 80–100)
MONOCYTES ABSOLUTE: 0.8 K/UL (ref 0–1.3)
MONOCYTES RELATIVE PERCENT: 16.7 %
NEUTROPHILS ABSOLUTE: 2.6 K/UL (ref 1.7–7.7)
NEUTROPHILS RELATIVE PERCENT: 53.7 %
OVALOCYTES: ABNORMAL
PDW BLD-RTO: 29.3 % (ref 12.4–15.4)
PHOSPHORUS: 3.4 MG/DL (ref 2.5–4.9)
PLATELET # BLD: 97 K/UL (ref 135–450)
PLATELET SLIDE REVIEW: ABNORMAL
PMV BLD AUTO: 8.5 FL (ref 5–10.5)
POLYCHROMASIA: ABNORMAL
POTASSIUM SERPL-SCNC: 3.7 MMOL/L (ref 3.5–5.1)
PROTHROMBIN TIME: 20.6 SEC (ref 9.9–12.7)
RBC # BLD: 2.23 M/UL (ref 4–5.2)
SCHISTOCYTES: ABNORMAL
SLIDE REVIEW: ABNORMAL
SODIUM BLD-SCNC: 138 MMOL/L (ref 136–145)
TEAR DROP CELLS: ABNORMAL
TOTAL IRON BINDING CAPACITY: ABNORMAL UG/DL (ref 260–445)
TOTAL PROTEIN: 5 G/DL (ref 6.4–8.2)
VITAMIN B-12: 1866 PG/ML (ref 211–911)
WBC # BLD: 4.9 K/UL (ref 4–11)

## 2022-05-03 PROCEDURE — 36415 COLL VENOUS BLD VENIPUNCTURE: CPT

## 2022-05-03 PROCEDURE — 83540 ASSAY OF IRON: CPT

## 2022-05-03 PROCEDURE — 83550 IRON BINDING TEST: CPT

## 2022-05-03 PROCEDURE — 80076 HEPATIC FUNCTION PANEL: CPT

## 2022-05-03 PROCEDURE — 84100 ASSAY OF PHOSPHORUS: CPT

## 2022-05-03 PROCEDURE — 6360000002 HC RX W HCPCS: Performed by: STUDENT IN AN ORGANIZED HEALTH CARE EDUCATION/TRAINING PROGRAM

## 2022-05-03 PROCEDURE — 82746 ASSAY OF FOLIC ACID SERUM: CPT

## 2022-05-03 PROCEDURE — 6370000000 HC RX 637 (ALT 250 FOR IP): Performed by: INTERNAL MEDICINE

## 2022-05-03 PROCEDURE — 94761 N-INVAS EAR/PLS OXIMETRY MLT: CPT

## 2022-05-03 PROCEDURE — P9047 ALBUMIN (HUMAN), 25%, 50ML: HCPCS | Performed by: FAMILY MEDICINE

## 2022-05-03 PROCEDURE — 82728 ASSAY OF FERRITIN: CPT

## 2022-05-03 PROCEDURE — 82607 VITAMIN B-12: CPT

## 2022-05-03 PROCEDURE — 83735 ASSAY OF MAGNESIUM: CPT

## 2022-05-03 PROCEDURE — 80048 BASIC METABOLIC PNL TOTAL CA: CPT

## 2022-05-03 PROCEDURE — 6370000000 HC RX 637 (ALT 250 FOR IP): Performed by: FAMILY MEDICINE

## 2022-05-03 PROCEDURE — 85610 PROTHROMBIN TIME: CPT

## 2022-05-03 PROCEDURE — 2580000003 HC RX 258: Performed by: INTERNAL MEDICINE

## 2022-05-03 PROCEDURE — 85025 COMPLETE CBC W/AUTO DIFF WBC: CPT

## 2022-05-03 PROCEDURE — 6360000002 HC RX W HCPCS: Performed by: FAMILY MEDICINE

## 2022-05-03 RX ORDER — FUROSEMIDE 10 MG/ML
40 INJECTION INTRAMUSCULAR; INTRAVENOUS ONCE
Status: COMPLETED | OUTPATIENT
Start: 2022-05-03 | End: 2022-05-03

## 2022-05-03 RX ORDER — MAGNESIUM SULFATE IN WATER 40 MG/ML
2000 INJECTION, SOLUTION INTRAVENOUS ONCE
Status: COMPLETED | OUTPATIENT
Start: 2022-05-03 | End: 2022-05-03

## 2022-05-03 RX ORDER — SPIRONOLACTONE 100 MG/1
100 TABLET, FILM COATED ORAL DAILY
Qty: 30 TABLET | Refills: 1 | Status: SHIPPED | OUTPATIENT
Start: 2022-05-03

## 2022-05-03 RX ORDER — FUROSEMIDE 40 MG/1
40 TABLET ORAL 2 TIMES DAILY
Status: DISCONTINUED | OUTPATIENT
Start: 2022-05-03 | End: 2022-05-03

## 2022-05-03 RX ORDER — FUROSEMIDE 40 MG/1
40 TABLET ORAL DAILY
Qty: 60 TABLET | Refills: 0 | Status: ON HOLD | OUTPATIENT
Start: 2022-05-04 | End: 2022-07-29 | Stop reason: HOSPADM

## 2022-05-03 RX ORDER — LANOLIN ALCOHOL/MO/W.PET/CERES
400 CREAM (GRAM) TOPICAL 2 TIMES DAILY
Status: DISCONTINUED | OUTPATIENT
Start: 2022-05-03 | End: 2022-05-03 | Stop reason: HOSPADM

## 2022-05-03 RX ORDER — LANOLIN ALCOHOL/MO/W.PET/CERES
400 CREAM (GRAM) TOPICAL 2 TIMES DAILY
Qty: 60 TABLET | Refills: 0 | Status: ON HOLD | OUTPATIENT
Start: 2022-05-03 | End: 2022-10-18 | Stop reason: HOSPADM

## 2022-05-03 RX ORDER — FOLIC ACID 1 MG/1
1 TABLET ORAL DAILY
Status: DISCONTINUED | OUTPATIENT
Start: 2022-05-03 | End: 2022-05-03 | Stop reason: HOSPADM

## 2022-05-03 RX ORDER — PANTOPRAZOLE SODIUM 40 MG/1
40 TABLET, DELAYED RELEASE ORAL
Qty: 30 TABLET | Refills: 1 | Status: SHIPPED | OUTPATIENT
Start: 2022-05-04

## 2022-05-03 RX ORDER — FUROSEMIDE 40 MG/1
40 TABLET ORAL DAILY
Status: DISCONTINUED | OUTPATIENT
Start: 2022-05-04 | End: 2022-05-03 | Stop reason: HOSPADM

## 2022-05-03 RX ORDER — SPIRONOLACTONE 25 MG/1
100 TABLET ORAL DAILY
Status: DISCONTINUED | OUTPATIENT
Start: 2022-05-03 | End: 2022-05-03 | Stop reason: HOSPADM

## 2022-05-03 RX ORDER — NADOLOL 40 MG/1
40 TABLET ORAL DAILY
Qty: 30 TABLET | Refills: 0 | Status: SHIPPED | OUTPATIENT
Start: 2022-05-03

## 2022-05-03 RX ORDER — GAUZE BANDAGE 2" X 2"
100 BANDAGE TOPICAL DAILY
Status: DISCONTINUED | OUTPATIENT
Start: 2022-05-03 | End: 2022-05-03 | Stop reason: HOSPADM

## 2022-05-03 RX ORDER — ALBUMIN (HUMAN) 12.5 G/50ML
25 SOLUTION INTRAVENOUS ONCE
Status: COMPLETED | OUTPATIENT
Start: 2022-05-03 | End: 2022-05-03

## 2022-05-03 RX ORDER — THIAMINE MONONITRATE (VIT B1) 100 MG
100 TABLET ORAL DAILY
Qty: 30 TABLET | Refills: 0 | Status: SHIPPED | OUTPATIENT
Start: 2022-05-03 | End: 2022-06-02

## 2022-05-03 RX ORDER — SPIRONOLACTONE 25 MG/1
100 TABLET ORAL DAILY
Status: DISCONTINUED | OUTPATIENT
Start: 2022-05-04 | End: 2022-05-03

## 2022-05-03 RX ORDER — SPIRONOLACTONE 25 MG/1
50 TABLET ORAL DAILY
Status: DISCONTINUED | OUTPATIENT
Start: 2022-05-04 | End: 2022-05-03

## 2022-05-03 RX ORDER — FOLIC ACID 1 MG/1
1 TABLET ORAL DAILY
Qty: 30 TABLET | Refills: 0 | Status: ON HOLD | OUTPATIENT
Start: 2022-05-03 | End: 2022-06-30

## 2022-05-03 RX ADMIN — ALBUMIN (HUMAN) 25 G: 0.25 INJECTION, SOLUTION INTRAVENOUS at 13:53

## 2022-05-03 RX ADMIN — Medication 10 ML: at 10:25

## 2022-05-03 RX ADMIN — FOLIC ACID 1 MG: 1 TABLET ORAL at 10:24

## 2022-05-03 RX ADMIN — NADOLOL 40 MG: 40 TABLET ORAL at 13:46

## 2022-05-03 RX ADMIN — FUROSEMIDE 40 MG: 10 INJECTION, SOLUTION INTRAMUSCULAR; INTRAVENOUS at 10:24

## 2022-05-03 RX ADMIN — Medication 400 MG: at 10:23

## 2022-05-03 RX ADMIN — MAGNESIUM SULFATE IN WATER 2000 MG: 40 INJECTION, SOLUTION INTRAVENOUS at 11:37

## 2022-05-03 RX ADMIN — MORPHINE SULFATE 2 MG: 2 INJECTION, SOLUTION INTRAMUSCULAR; INTRAVENOUS at 04:45

## 2022-05-03 RX ADMIN — THIAMINE HCL TAB 100 MG 100 MG: 100 TAB at 10:24

## 2022-05-03 RX ADMIN — PANTOPRAZOLE SODIUM 40 MG: 40 TABLET, DELAYED RELEASE ORAL at 07:24

## 2022-05-03 RX ADMIN — MORPHINE SULFATE 2 MG: 2 INJECTION, SOLUTION INTRAMUSCULAR; INTRAVENOUS at 11:30

## 2022-05-03 RX ADMIN — SPIRONOLACTONE 100 MG: 25 TABLET ORAL at 10:23

## 2022-05-03 ASSESSMENT — PAIN DESCRIPTION - LOCATION
LOCATION: HIP;CHEST
LOCATION: CHEST;HIP
LOCATION: ABDOMEN;BACK

## 2022-05-03 ASSESSMENT — PAIN SCALES - GENERAL
PAINLEVEL_OUTOF10: 8
PAINLEVEL_OUTOF10: 8
PAINLEVEL_OUTOF10: 7
PAINLEVEL_OUTOF10: 9

## 2022-05-03 ASSESSMENT — PAIN DESCRIPTION - DESCRIPTORS
DESCRIPTORS: ACHING
DESCRIPTORS: ACHING
DESCRIPTORS: ACHING;DISCOMFORT;DULL
DESCRIPTORS: ACHING;DISCOMFORT
DESCRIPTORS: ACHING
DESCRIPTORS: ACHING;DISCOMFORT;DULL;STABBING

## 2022-05-03 ASSESSMENT — PAIN DESCRIPTION - PAIN TYPE: TYPE: ACUTE PAIN

## 2022-05-03 NOTE — CONSULTS
[address] [facility]  [phone]    Rehab Brief Consult:    Patient is too functional for ARU admission. At this time not needing home care services either per therapy evaluations yesterday. If her functional status were to change while admitted, feel free to notify ARU Liason. We will sign off. Thank you for the consultation.     Norma Nova MD 5/3/2022 9:57 AM

## 2022-05-03 NOTE — PROGRESS NOTES
C/o abdominal and back pain with chest pain during deep inspiration. Medicated with Morphine IV at 2120. Melatonin given for sleep. Ate about 50% of meal and family brought in snacks. No c/o nausea.

## 2022-05-03 NOTE — DISCHARGE SUMMARY
Hospital Discharge Summary    Patient's PCP: No primary care provider on file. Admit Date: 4/25/2022   Discharge Date: 5/3/2022    Admitting Physician: Dr. Jun Costa MD  Discharge Physician: Dr. Dottie Morrow MD     Consults:   IP CONSULT TO GI  IP CONSULT TO SOCIAL WORK  IP CONSULT TO SPIRITUAL SERVICES  IP CONSULT TO PHYSICAL MEDICINE REHAB    Brief HPI:   Abebe Lane is 40 y.o. female who presented with complaint of shortness of breath. Symptom onset was acute for a time period of 2 days. The severity is described as severe. The course of his symptoms over time is worsening. The symptoms improved with none and worsened with none. The patient's symptom is associated with abdominal distention and fluid overload.     Abebe Lane is 40 y.o. female with history of alcoholic cirrhosis  Patient admits she continues to drink actively at this time  He now presents to the ER with complaint of shortness of breath  She says she has been feeling poorly for the past 2 days  She has abdominal distention due to ascites and fluid overloaded state  She had some nausea and vomiting but denies any hematemesis  In the ED, chest x-ray shows pulmonary edema  In addition CBC shows profound anemia likely due to variceal bleed  She requires BiPAP to keep her SPO2 greater than 90%  On exam, she is jaundiced and ill-appearing but not in acute distress    Brief hospital course:   Brenda Corral is 40 y. o. female with history of alcoholic cirrhosis, continues to drink actively at this time, presented to the ER with complaint of shortness of breath and worsening abdominal distention x 2 days. She had some nausea and vomiting but denied any hematemesis. In the ED, chest x-ray showed pulmonary edema and CBC showed profound anemia likely due to variceal bleed. She required BiPAP to keep her SPO2 greater than 90%.     Acute blood loss anemia likely secondary to upper GI bleed:   Alcohol use disorder with liver cirrhosis. On admission hemoglobin was 3.6, patient received total of 4 units of packed red blood cells. Goal hemoglobin greater than 7 g/dL. Hemoglobin stable at  ~ 8.  GI consulted: s/p EGD 4/27/2022 with small nonbleeding esophageal varices and mild portal hypertensive gastropathy. Colonoscopy 3/2021 showed polyps. No iron deficiency on anemia work-up. Off octreotide and IV PPI. Continue Nadolol & PO Protonix.        Decompensated alcoholic liver cirrhosis with ascites:  IR consulted: s/p US guided paracentesis 4/27/2022 with removal of 600 cc of ascitic fluid. High SAAG stent with cirrhosis with portal hypertension. Fluid cultures and cell count negative for SBP. Continue Lasix and Aldactone. Patient still with significant ascites. IR consulted for therapeutic paracentesis today. Follow-up with Dr. Rusty Wood as outpatient in 1 - 2 months.        Alcohol use disorder:  Monitored on CIWA protocol with as needed Ativan. Completed thiamine high-dose for 3 days. Alcohol cessation counseling provided. CM to follow for alcohol rehab resources as an outpatient        Coagulopathy due to liver disease:  INR 1.9. No active bleeding noted. No need for vitamin K at this time.        Alcoholic hepatitis:  Discriminant function of 75 on admission. Steroids given due to adverse reaction in the past.  Total bilirubin stable ~8. LFTs stable.        Acute hypoxic respiratory failure due to pulmonary edema: On admission patient was placed on BiPAP. CT chest demonstrated significant pulmonary edema. Improved with Lasix. Currently off supplemental oxygen and BiPAP. Continue Lasix and spironolactone.     Magnesium deficiency: Replaced. Monitor mag and K level.        Macrocytosis:  Due to alcohol abuse and liver disease. B12 and folate within normal limits.        Suspected pneumonia:  Completed 5 days of azithromycin and Rocephin.       Pt seen only on day of discharge, improved and medically stable for DC, IPR was consulted but pt too functional for IPR, she will f/u w/ her PCP in 1 week. Discharge Diagnoses:   Principal Problem:    Acute respiratory failure with hypoxia (HCC)  Active Problems:    High anion gap metabolic acidosis    Elevated LFTs    Ascites due to alcoholic cirrhosis (HCC)    Sinus tachycardia    Alcoholic liver disease (HCC)    Acute blood loss anemia    Esophageal varices (HCC)  Resolved Problems:    * No resolved hospital problems. *      Physical Exam: /64   Pulse 69   Temp 97.9 °F (36.6 °C) (Temporal)   Resp 16   Ht 5' 4\" (1.626 m)   Wt 134 lb (60.8 kg)   SpO2 97%   BMI 23.00 kg/m²   Gen/overall appearance: Not in acute distress. Alert. Head: Normocephalic, atraumatic  Eyes: scleral icterus  ENT:- Oral mucosa moist  Neck: No JVD, thyromegaly  CVS: Nml S1S2, no MRG, RRR  Pulm: Clear bilaterally. No crackles/wheezes  Gastrointestinal: Soft, NT/D, +BS, + ascites  Musculoskeletal: No edema. Warm  Neuro: No focal deficit. Moves extremity spontaneously. Psychiatry: Appropriate affect. Not agitated. Skin: Warm, dry with normal turgor. No rash        Significant diagnostic studies that may require follow up:  XR CHEST PORTABLE    Result Date: 4/26/2022  EXAMINATION: ONE XRAY VIEW OF THE CHEST 4/26/2022 11:08 am COMPARISON: 04/25/2022 HISTORY: ORDERING SYSTEM PROVIDED HISTORY: crackles;distress TECHNOLOGIST PROVIDED HISTORY: Reason for exam:->crackles;distress Reason for Exam: crackles;distress FINDINGS: The heart is enlarged with interval increase in central pulmonary venous congestion. Multifocal bilateral perihilar and lower lobe airspace opacification has worsened. Small right pleural effusion has resolved. There is no pneumothorax. Mediastinal contours are stable. Worsening CHF/pulmonary edema.      XR CHEST PORTABLE    Result Date: 4/26/2022  EXAMINATION: ONE XRAY VIEW OF THE CHEST 4/26/2022 12:10 am COMPARISON: 05/16/2021 HISTORY: ORDERING SYSTEM PROVIDED HISTORY: sob TECHNOLOGIST PROVIDED HISTORY: Reason for exam:->sob Reason for Exam: Shortness of Breath FINDINGS: Cardiomediastinal silhouette is stable. Bilateral pulmonary vascular congestion with small right pleural effusion and multifocal consolidation at the left lung. No pneumothorax. No gross bony abnormality. Moderate to severe asymmetric pulmonary edema versus left-sided pneumonia with mild background pulmonary edema. IR US GUIDED PARACENTESIS    Result Date: 5/2/2022  PROCEDURE: PARACENTESIS WITHOUT IMAGE GUIDANCE US ABDOMEN LIMITED 5/2/2022 HISTORY: ORDERING SYSTEM PROVIDED HISTORY: Ascites with abdominal pain TECHNOLOGIST PROVIDED HISTORY: Reason for exam:->Ascites with abdominal pain Is the patient pregnant?->No TECHNIQUE: Informed consent was obtained after a detailed explanation of the procedure including risks, benefits, and alternatives. Universal protocol was followed. A limited ultrasound of the abdomen was performed. The right abdomen was prepped and draped in sterile fashion and local anesthesia was achieved with lidocaine. An 5 Syrian needle sheath was advanced into ascites and paracentesis was performed. The patient tolerated the procedure well. FINDINGS: Limited ultrasound of the abdomen demonstrates ascites. A total of 4800 mL of clear yellow fluid was removed. Successful paracentesis. IR US GUIDED PARACENTESIS    Result Date: 4/27/2022  PROCEDURE: PARACENTESIS WITHOUT IMAGE GUIDANCE US ABDOMEN LIMITED 4/27/2022 HISTORY: ORDERING SYSTEM PROVIDED HISTORY: cirrhosis, ascites, please send for cell count, cx, albumin, protein, cytology TECHNOLOGIST PROVIDED HISTORY: Reason for exam:->cirrhosis, ascites, please send for cell count, cx, albumin, protein, cytology Is the patient pregnant?->No TECHNIQUE: Informed consent was obtained after a detailed explanation of the procedure including risks, benefits, and alternatives. Universal protocol was followed.   A limited ultrasound of the abdomen was performed. The lower right paramedian abdomen was prepped and draped in sterile fashion and local anesthesia was achieved with lidocaine. An 5 Bahraini needle sheath was advanced into ascites and paracentesis was performed. The patient tolerated the procedure well. 1.6 L removed. FINDINGS: Limited ultrasound of the abdomen demonstrates ascites. A total of 0.6 L was removed. Successful paracentesis. CT CHEST ABDOMEN PELVIS WO CONTRAST    Result Date: 4/26/2022  EXAMINATION: CT OF THE CHEST, ABDOMEN, AND PELVIS WITHOUT CONTRAST 4/26/2022 2:08 am TECHNIQUE: CT of the chest, abdomen and pelvis was performed without the administration of intravenous contrast. Multiplanar reformatted images are provided for review. Dose modulation, iterative reconstruction, and/or weight based adjustment of the mA/kV was utilized to reduce the radiation dose to as low as reasonably achievable. COMPARISON: 05/17/2021, 05/16/2021 HISTORY: ORDERING SYSTEM PROVIDED HISTORY: abdominal distention, elevated LFT TECHNOLOGIST PROVIDED HISTORY: Reason for exam:->abdominal distention, elevated LFT Additional Contrast?->None Is the patient pregnant?->No Reason for Exam: abdominal distention, elevated LFT Relevant Medical/Surgical History: Shortness of Breath (Patient in by [facility] from home, states SOB that started yesterday, was 75% on RA at EMS arrival, on NRB now with o2 sat 100%. Patient has liver adz and is jaundice. ) FINDINGS: Chest: Mediastinum: Heart size is normal.  No pericardial effusion. No lymphadenopathy. Lungs/pleura: Small bilateral pleural effusions, right greater than left. Multifocal bilateral consolidation, greatest in the left lower lobe. Soft Tissues/Bones: Moderate body wall edema. Mild compression deformities of T7, T8 of uncertain chronicity, new since 05/17/2021. Abdomen/Pelvis: Organs: Cirrhotic liver morphology. Cholelithiasis. No biliary ductal diltation. Pancreas is unremarkable.   Adrenals are unremarkable. MIld splenomegaly. Right calyceal calculus. No hydronephrosis. .  Mild calcific atherosclerosis. GI/Bowel: Colonic diverticulosis without bowel wall thickening or dilatation. Appendix is normal. Pelvis: Unremarkable. Peritoneum/Retroperitoneum:Moderate ascites. No free air, organized fluid collection or lymphadenopathy. Bones: Mild degenerative disc disease at L4-L5. 1. Multifocal pneumonia. 2. Small bilateral pleural effusions, right greater than left. 3. Cholelithiasis. 4. Right nonobstructive nephrolithiasis. 5. Mild compression deformities of T7, T8 of uncertain chronicity, new since 05/17/2021. 6. Cirrhotic liver morphology with sequela of portal hypertension. Treatments: As above. Discharge Medications:     Medication List      START taking these medications    folic acid 1 MG tablet  Commonly known as: FOLVITE  Take 1 tablet by mouth daily     furosemide 40 MG tablet  Commonly known as: LASIX  Take 1 tablet by mouth daily  Start taking on: May 4, 2022     magnesium oxide 400 (240 Mg) MG tablet  Commonly known as: MAG-OX  Take 1 tablet by mouth 2 times daily     pantoprazole 40 MG tablet  Commonly known as: PROTONIX  Take 1 tablet by mouth every morning (before breakfast)  Start taking on:  May 4, 2022     spironolactone 100 MG tablet  Commonly known as: ALDACTONE  Take 1 tablet by mouth daily     vitamin B-1 100 MG tablet  Commonly known as: THIAMINE  Take 1 tablet by mouth daily        CONTINUE taking these medications    nadolol 40 MG tablet  Commonly known as: CORGARD  Take 1 tablet by mouth daily        STOP taking these medications    acetaminophen 500 MG tablet  Commonly known as: TYLENOL     multivitamin Tabs tablet           Where to Get Your Medications      These medications were sent to [facility], [address] [place] [id number]    Phone: [phone]   · folic acid 1 MG tablet  · furosemide 40 MG tablet  · magnesium oxide 400 (240 Mg) MG tablet  · nadolol 40 MG tablet  · pantoprazole 40 MG tablet  · spironolactone 100 MG tablet  · vitamin B-1 100 MG tablet         Activity: activity as tolerated  Diet: ADULT DIET; Regular; Low Sodium (2 gm)      Disposition: home  Discharged Condition: Stable  Follow Up:   No follow-up provider specified. Code status:  Full Code         Total time spent on discharge, finalizing medications, referrals and arranging outpatient follow up was more than 1 hour      Thank you Dr. Diop primary care provider on file. for the opportunity to be involved in this patients care.

## 2022-05-03 NOTE — PROGRESS NOTES
CLINICAL PHARMACY NOTE: MEDS TO BEDS    Total # of Prescriptions Filled: 7   The following medications were delivered to the patient:  · Nadalol 40 mg  · Thiamine 100 mg  · Lasix 40 mg  · Mag oxide 400 mg  · Spironolactone 50 mg  · Folic acid 1 mg  · Protonix 40 mg    Additional Documentation:    Delivered to Patient=Signed  Ok to be delivered per Zeeshan Ruiz 10:26am   Ozzie Saba CPhT

## 2022-05-03 NOTE — PLAN OF CARE
Problem: Discharge Planning  Goal: Discharge to home or other facility with appropriate resources  Outcome: Progressing     Problem: Pain  Goal: Verbalizes/displays adequate comfort level or baseline comfort level  Outcome: Progressing     Problem: Safety - Adult  Goal: Free from fall injury  Outcome: Progressing     Problem: Gastrointestinal - Adult  Goal: Minimal or absence of nausea and vomiting  Outcome: Progressing  Goal: Maintains or returns to baseline bowel function  Outcome: Progressing  Goal: Maintains adequate nutritional intake  Outcome: Progressing     Problem: Metabolic/Fluid and Electrolytes - Adult  Goal: Electrolytes maintained within normal limits  Outcome: Progressing  Goal: Hemodynamic stability and optimal renal function maintained  Outcome: Progressing

## 2022-05-05 LAB
BODY FLUID CULTURE, STERILE: NORMAL
GRAM STAIN RESULT: NORMAL

## 2022-05-10 ENCOUNTER — TELEPHONE (OUTPATIENT)
Dept: INTERVENTIONAL RADIOLOGY/VASCULAR | Age: 47
End: 2022-05-10

## 2022-05-12 ENCOUNTER — HOSPITAL ENCOUNTER (OUTPATIENT)
Dept: INTERVENTIONAL RADIOLOGY/VASCULAR | Age: 47
Discharge: HOME | End: 2022-05-12
Payer: COMMERCIAL

## 2022-05-12 VITALS
DIASTOLIC BLOOD PRESSURE: 59 MMHG | RESPIRATION RATE: 16 BRPM | OXYGEN SATURATION: 97 % | HEART RATE: 55 BPM | TEMPERATURE: 97.3 F | SYSTOLIC BLOOD PRESSURE: 114 MMHG

## 2022-05-12 DIAGNOSIS — K70.31 ASCITES DUE TO ALCOHOLIC CIRRHOSIS (HCC): ICD-10-CM

## 2022-05-12 LAB
APPEARANCE FLUID: NORMAL
CELL COUNT FLUID TYPE: NORMAL
CLOT EVALUATION: NORMAL
COLOR FLUID: YELLOW
FLUID PATH CONSULT: YES
LYMPHOCYTES, BODY FLUID: 21 %
MACROPHAGE FLUID: 59 %
MESOTHELIAL FLUID: 18 %
MONONUCLEAR UNIDENTIFIED CELLS FLUID: 1 %
NEUTROPHIL, FLUID: 1 %
NUCLEATED CELLS FLUID: 173 /CUMM
NUMBER OF CELLS COUNTED FLUID: 100
RBC FLUID: 1400 /CUMM

## 2022-05-12 PROCEDURE — 49083 ABD PARACENTESIS W/IMAGING: CPT

## 2022-05-12 PROCEDURE — 89051 BODY FLUID CELL COUNT: CPT

## 2022-05-12 PROCEDURE — 7100000010 HC PHASE II RECOVERY - FIRST 15 MIN

## 2022-05-12 PROCEDURE — 87070 CULTURE OTHR SPECIMN AEROBIC: CPT

## 2022-05-12 PROCEDURE — 2500000003 HC RX 250 WO HCPCS: Performed by: STUDENT IN AN ORGANIZED HEALTH CARE EDUCATION/TRAINING PROGRAM

## 2022-05-12 PROCEDURE — C1729 CATH, DRAINAGE: HCPCS

## 2022-05-12 PROCEDURE — 7100000011 HC PHASE II RECOVERY - ADDTL 15 MIN

## 2022-05-12 PROCEDURE — 87205 SMEAR GRAM STAIN: CPT

## 2022-05-12 RX ORDER — LIDOCAINE HYDROCHLORIDE 10 MG/ML
10 INJECTION, SOLUTION EPIDURAL; INFILTRATION; INTRACAUDAL; PERINEURAL ONCE
Status: COMPLETED | OUTPATIENT
Start: 2022-05-12 | End: 2022-05-12

## 2022-05-12 RX ADMIN — LIDOCAINE HYDROCHLORIDE 10 ML: 10 INJECTION, SOLUTION EPIDURAL; INFILTRATION; INTRACAUDAL; PERINEURAL at 14:02

## 2022-05-12 NOTE — PROGRESS NOTES
Pt from IR report received at bedside. VSS pt denies any pain. Dressing to R ABD CDI no drainage noted. Daughter at bedside.

## 2022-05-12 NOTE — PROGRESS NOTES
Discharge instructions reviewed with pt and daughter all questions answered.  IV removed pt off unit at this time

## 2022-05-12 NOTE — BRIEF OP NOTE
Brief Postoperative Note    Homero Freeman  YOB: 1975  [id number]    Pre-operative Diagnosis: Ascites    Post-operative Diagnosis: Same    Procedure: US Guided Paracentesis    Anesthesia: Local    Surgeons/Assistants: Sara Hoyt MD    Estimated Blood Loss: less than 5 mL    Complications: None    Specimens: Was Obtained: serous Ascitic Fluid    Findings: Technically successful US guided paracentesis, 1.8L removed    Electronically signed by Sara Hoyt MD on 5/12/2022 at 1:29 PM2

## 2022-05-13 LAB — PATH CONSULT FLUID: NORMAL

## 2022-05-15 LAB
BODY FLUID CULTURE, STERILE: NORMAL
GRAM STAIN RESULT: NORMAL

## 2022-06-13 ENCOUNTER — HOSPITAL ENCOUNTER (OUTPATIENT)
Age: 47
Setting detail: OBSERVATION
Discharge: HOME | End: 2022-06-15
Attending: EMERGENCY MEDICINE | Admitting: INTERNAL MEDICINE
Payer: COMMERCIAL

## 2022-06-13 ENCOUNTER — HOSPITAL ENCOUNTER (OUTPATIENT)
Age: 47
Discharge: HOME | End: 2022-06-13
Payer: COMMERCIAL

## 2022-06-13 DIAGNOSIS — K70.30 ALCOHOLIC CIRRHOSIS OF LIVER WITHOUT ASCITES (HCC): ICD-10-CM

## 2022-06-13 DIAGNOSIS — D61.89 ANEMIA DUE TO OTHER BONE MARROW FAILURE (HCC): Primary | ICD-10-CM

## 2022-06-13 PROBLEM — D64.9 SEVERE ANEMIA: Status: ACTIVE | Noted: 2022-06-13

## 2022-06-13 LAB
A/G RATIO: 1 (ref 1.1–2.2)
ABO/RH: NORMAL
ACANTHOCYTES: ABNORMAL
ACANTHOCYTES: ABNORMAL
ALBUMIN SERPL-MCNC: 3.4 G/DL (ref 3.4–5)
ALBUMIN SERPL-MCNC: 3.8 G/DL (ref 3.4–5)
ALP BLD-CCNC: 183 U/L (ref 40–129)
ALP BLD-CCNC: 201 U/L (ref 40–129)
ALT SERPL-CCNC: 41 U/L (ref 10–40)
ALT SERPL-CCNC: 45 U/L (ref 10–40)
AMMONIA: 72 UMOL/L (ref 11–51)
ANION GAP SERPL CALCULATED.3IONS-SCNC: 13 MMOL/L (ref 3–16)
ANION GAP SERPL CALCULATED.3IONS-SCNC: 16 MMOL/L (ref 3–16)
ANISOCYTOSIS: ABNORMAL
ANISOCYTOSIS: ABNORMAL
ANTIBODY SCREEN: NORMAL
AST SERPL-CCNC: 85 U/L (ref 15–37)
AST SERPL-CCNC: 94 U/L (ref 15–37)
BANDED NEUTROPHILS RELATIVE PERCENT: 2 % (ref 0–7)
BANDED NEUTROPHILS RELATIVE PERCENT: 3 % (ref 0–7)
BASE EXCESS VENOUS: 1.6 MMOL/L (ref -3–3)
BASOPHILS ABSOLUTE: 0.1 K/UL (ref 0–0.2)
BASOPHILS ABSOLUTE: 0.1 K/UL (ref 0–0.2)
BASOPHILS RELATIVE PERCENT: 1 %
BASOPHILS RELATIVE PERCENT: 1 %
BILIRUB SERPL-MCNC: 19.2 MG/DL (ref 0–1)
BILIRUB SERPL-MCNC: 23.5 MG/DL (ref 0–1)
BILIRUBIN DIRECT: 6.5 MG/DL (ref 0–0.3)
BILIRUBIN, INDIRECT: 17 MG/DL (ref 0–1)
BLOOD BANK DISPENSE STATUS: NORMAL
BLOOD BANK PRODUCT CODE: NORMAL
BPU ID: NORMAL
BUN BLDV-MCNC: 39 MG/DL (ref 7–20)
BUN BLDV-MCNC: 48 MG/DL (ref 7–20)
BURR CELLS: ABNORMAL
BURR CELLS: ABNORMAL
CALCIUM SERPL-MCNC: 10.2 MG/DL (ref 8.3–10.6)
CALCIUM SERPL-MCNC: 9.8 MG/DL (ref 8.3–10.6)
CARBOXYHEMOGLOBIN: 6.8 % (ref 0–1.5)
CHLORIDE BLD-SCNC: 100 MMOL/L (ref 99–110)
CHLORIDE BLD-SCNC: 98 MMOL/L (ref 99–110)
CO2: 21 MMOL/L (ref 21–32)
CO2: 24 MMOL/L (ref 21–32)
CREAT SERPL-MCNC: 1 MG/DL (ref 0.6–1.1)
CREAT SERPL-MCNC: 1 MG/DL (ref 0.6–1.1)
DESCRIPTION BLOOD BANK: NORMAL
EOSINOPHILS ABSOLUTE: 0.1 K/UL (ref 0–0.6)
EOSINOPHILS ABSOLUTE: 0.1 K/UL (ref 0–0.6)
EOSINOPHILS RELATIVE PERCENT: 1 %
EOSINOPHILS RELATIVE PERCENT: 1 %
GFR AFRICAN AMERICAN: >60
GFR AFRICAN AMERICAN: >60
GFR NON-AFRICAN AMERICAN: 60
GFR NON-AFRICAN AMERICAN: 60
GLUCOSE BLD-MCNC: 127 MG/DL (ref 70–99)
GLUCOSE BLD-MCNC: 189 MG/DL (ref 70–99)
HCO3 VENOUS: 24.6 MMOL/L (ref 23–29)
HCT VFR BLD CALC: 14.6 % (ref 36–48)
HCT VFR BLD CALC: 14.7 % (ref 36–48)
HEMATOLOGY PATH CONSULT: NO
HEMOGLOBIN: 5.1 G/DL (ref 12–16)
HEMOGLOBIN: 5.2 G/DL (ref 12–16)
INR BLD: 1.93 (ref 0.87–1.14)
INR BLD: 1.95 (ref 0.87–1.14)
LYMPHOCYTES ABSOLUTE: 0.9 K/UL (ref 1–5.1)
LYMPHOCYTES ABSOLUTE: 1.4 K/UL (ref 1–5.1)
LYMPHOCYTES RELATIVE PERCENT: 13 %
LYMPHOCYTES RELATIVE PERCENT: 8 %
MACROCYTES: ABNORMAL
MACROCYTES: ABNORMAL
MAGNESIUM: 1.9 MG/DL (ref 1.8–2.4)
MCH RBC QN AUTO: 41 PG (ref 26–34)
MCH RBC QN AUTO: 41.7 PG (ref 26–34)
MCHC RBC AUTO-ENTMCNC: 34.6 G/DL (ref 31–36)
MCHC RBC AUTO-ENTMCNC: 35.6 G/DL (ref 31–36)
MCV RBC AUTO: 117.2 FL (ref 80–100)
MCV RBC AUTO: 118.4 FL (ref 80–100)
METAMYELOCYTES RELATIVE PERCENT: 1 %
METHEMOGLOBIN VENOUS: 0.4 %
MONOCYTES ABSOLUTE: 0.2 K/UL (ref 0–1.3)
MONOCYTES ABSOLUTE: 1 K/UL (ref 0–1.3)
MONOCYTES RELATIVE PERCENT: 2 %
MONOCYTES RELATIVE PERCENT: 9 %
MYELOCYTE PERCENT: 4 %
NEUTROPHILS ABSOLUTE: 8.3 K/UL (ref 1.7–7.7)
NEUTROPHILS ABSOLUTE: 9.4 K/UL (ref 1.7–7.7)
NEUTROPHILS RELATIVE PERCENT: 69 %
NEUTROPHILS RELATIVE PERCENT: 85 %
O2 CONTENT, VEN: 6 VOL %
O2 SAT, VEN: 100 %
O2 THERAPY: ABNORMAL
OVALOCYTES: ABNORMAL
PCO2, VEN: 28.8 MMHG (ref 40–50)
PDW BLD-RTO: 18.6 % (ref 12.4–15.4)
PDW BLD-RTO: 19.2 % (ref 12.4–15.4)
PH VENOUS: 7.54 (ref 7.35–7.45)
PLATELET # BLD: 113 K/UL (ref 135–450)
PLATELET # BLD: 94 K/UL (ref 135–450)
PLATELET SLIDE REVIEW: ABNORMAL
PMV BLD AUTO: 8.3 FL (ref 5–10.5)
PMV BLD AUTO: 8.5 FL (ref 5–10.5)
PO2, VEN: 177 MMHG (ref 25–40)
POIKILOCYTES: ABNORMAL
POIKILOCYTES: ABNORMAL
POLYCHROMASIA: ABNORMAL
POLYCHROMASIA: ABNORMAL
POTASSIUM REFLEX MAGNESIUM: 4 MMOL/L (ref 3.5–5.1)
POTASSIUM SERPL-SCNC: 3.7 MMOL/L (ref 3.5–5.1)
PROTHROMBIN TIME: 22 SEC (ref 11.7–14.5)
PROTHROMBIN TIME: 22.3 SEC (ref 11.7–14.5)
RBC # BLD: 1.24 M/UL (ref 4–5.2)
RBC # BLD: 1.25 M/UL (ref 4–5.2)
SCHISTOCYTES: ABNORMAL
SCHISTOCYTES: ABNORMAL
SLIDE REVIEW: ABNORMAL
SLIDE REVIEW: ABNORMAL
SODIUM BLD-SCNC: 135 MMOL/L (ref 136–145)
SODIUM BLD-SCNC: 137 MMOL/L (ref 136–145)
TCO2 CALC VENOUS: 57 MMOL/L
TEAR DROP CELLS: ABNORMAL
TOTAL PROTEIN: 6.9 G/DL (ref 6.4–8.2)
TOTAL PROTEIN: 7.3 G/DL (ref 6.4–8.2)
WBC # BLD: 10.7 K/UL (ref 4–11)
WBC # BLD: 10.9 K/UL (ref 4–11)

## 2022-06-13 PROCEDURE — 86850 RBC ANTIBODY SCREEN: CPT

## 2022-06-13 PROCEDURE — 36415 COLL VENOUS BLD VENIPUNCTURE: CPT

## 2022-06-13 PROCEDURE — 82140 ASSAY OF AMMONIA: CPT

## 2022-06-13 PROCEDURE — 80053 COMPREHEN METABOLIC PANEL: CPT

## 2022-06-13 PROCEDURE — 82803 BLOOD GASES ANY COMBINATION: CPT

## 2022-06-13 PROCEDURE — 99285 EMERGENCY DEPT VISIT HI MDM: CPT

## 2022-06-13 PROCEDURE — 85610 PROTHROMBIN TIME: CPT

## 2022-06-13 PROCEDURE — 83735 ASSAY OF MAGNESIUM: CPT

## 2022-06-13 PROCEDURE — G0378 HOSPITAL OBSERVATION PER HR: HCPCS

## 2022-06-13 PROCEDURE — 86900 BLOOD TYPING SEROLOGIC ABO: CPT

## 2022-06-13 PROCEDURE — 86923 COMPATIBILITY TEST ELECTRIC: CPT

## 2022-06-13 PROCEDURE — 86880 COOMBS TEST DIRECT: CPT

## 2022-06-13 PROCEDURE — 85025 COMPLETE CBC W/AUTO DIFF WBC: CPT

## 2022-06-13 PROCEDURE — 86901 BLOOD TYPING SEROLOGIC RH(D): CPT

## 2022-06-13 PROCEDURE — P9016 RBC LEUKOCYTES REDUCED: HCPCS

## 2022-06-13 RX ORDER — FUROSEMIDE 40 MG/1
40 TABLET ORAL DAILY
Status: DISCONTINUED | OUTPATIENT
Start: 2022-06-14 | End: 2022-06-15 | Stop reason: HOSPADM

## 2022-06-13 RX ORDER — LANOLIN ALCOHOL/MO/W.PET/CERES
100 CREAM (GRAM) TOPICAL DAILY
Status: DISCONTINUED | OUTPATIENT
Start: 2022-06-14 | End: 2022-06-13

## 2022-06-13 RX ORDER — FOLIC ACID 1 MG/1
1 TABLET ORAL DAILY
Status: DISCONTINUED | OUTPATIENT
Start: 2022-06-14 | End: 2022-06-13

## 2022-06-13 RX ORDER — SPIRONOLACTONE 50 MG/1
TABLET, FILM COATED ORAL
Status: ON HOLD | COMMUNITY
Start: 2022-05-31 | End: 2022-06-30

## 2022-06-13 RX ORDER — POLYETHYLENE GLYCOL 3350 17 G/17G
17 POWDER, FOR SOLUTION ORAL DAILY PRN
Status: DISCONTINUED | OUTPATIENT
Start: 2022-06-13 | End: 2022-06-15 | Stop reason: HOSPADM

## 2022-06-13 RX ORDER — GAUZE BANDAGE 2" X 2"
100 BANDAGE TOPICAL DAILY
Status: DISCONTINUED | OUTPATIENT
Start: 2022-06-14 | End: 2022-06-15 | Stop reason: HOSPADM

## 2022-06-13 RX ORDER — SODIUM CHLORIDE 9 MG/ML
INJECTION, SOLUTION INTRAVENOUS PRN
Status: DISCONTINUED | OUTPATIENT
Start: 2022-06-13 | End: 2022-06-15 | Stop reason: HOSPADM

## 2022-06-13 RX ORDER — OXYCODONE HYDROCHLORIDE 5 MG/1
5 TABLET ORAL EVERY 4 HOURS PRN
Status: DISCONTINUED | OUTPATIENT
Start: 2022-06-13 | End: 2022-06-15 | Stop reason: HOSPADM

## 2022-06-13 RX ORDER — ONDANSETRON 2 MG/ML
4 INJECTION INTRAMUSCULAR; INTRAVENOUS EVERY 6 HOURS PRN
Status: DISCONTINUED | OUTPATIENT
Start: 2022-06-13 | End: 2022-06-15 | Stop reason: HOSPADM

## 2022-06-13 RX ORDER — ACETAMINOPHEN 650 MG/1
650 SUPPOSITORY RECTAL EVERY 6 HOURS PRN
Status: DISCONTINUED | OUTPATIENT
Start: 2022-06-13 | End: 2022-06-15 | Stop reason: HOSPADM

## 2022-06-13 RX ORDER — NADOLOL 40 MG/1
40 TABLET ORAL DAILY
Status: DISCONTINUED | OUTPATIENT
Start: 2022-06-13 | End: 2022-06-15 | Stop reason: HOSPADM

## 2022-06-13 RX ORDER — SODIUM CHLORIDE 0.9 % (FLUSH) 0.9 %
5-40 SYRINGE (ML) INJECTION EVERY 12 HOURS SCHEDULED
Status: DISCONTINUED | OUTPATIENT
Start: 2022-06-13 | End: 2022-06-15 | Stop reason: HOSPADM

## 2022-06-13 RX ORDER — LANOLIN ALCOHOL/MO/W.PET/CERES
CREAM (GRAM) TOPICAL
Status: ON HOLD | COMMUNITY
Start: 2022-05-03 | End: 2022-06-30

## 2022-06-13 RX ORDER — ACETAMINOPHEN 325 MG/1
650 TABLET ORAL EVERY 6 HOURS PRN
Status: DISCONTINUED | OUTPATIENT
Start: 2022-06-13 | End: 2022-06-15 | Stop reason: HOSPADM

## 2022-06-13 RX ORDER — ONDANSETRON 4 MG/1
4 TABLET, ORALLY DISINTEGRATING ORAL EVERY 8 HOURS PRN
Status: DISCONTINUED | OUTPATIENT
Start: 2022-06-13 | End: 2022-06-15 | Stop reason: HOSPADM

## 2022-06-13 RX ORDER — SPIRONOLACTONE 25 MG/1
100 TABLET ORAL DAILY
Status: DISCONTINUED | OUTPATIENT
Start: 2022-06-14 | End: 2022-06-15 | Stop reason: HOSPADM

## 2022-06-13 RX ORDER — LANOLIN ALCOHOL/MO/W.PET/CERES
400 CREAM (GRAM) TOPICAL 2 TIMES DAILY
Status: DISCONTINUED | OUTPATIENT
Start: 2022-06-13 | End: 2022-06-13

## 2022-06-13 RX ORDER — SODIUM CHLORIDE 0.9 % (FLUSH) 0.9 %
5-40 SYRINGE (ML) INJECTION PRN
Status: DISCONTINUED | OUTPATIENT
Start: 2022-06-13 | End: 2022-06-15 | Stop reason: HOSPADM

## 2022-06-13 RX ORDER — PANTOPRAZOLE SODIUM 40 MG/1
40 TABLET, DELAYED RELEASE ORAL
Status: DISCONTINUED | OUTPATIENT
Start: 2022-06-14 | End: 2022-06-15 | Stop reason: HOSPADM

## 2022-06-13 ASSESSMENT — PAIN SCALES - GENERAL
PAINLEVEL_OUTOF10: 0
PAINLEVEL_OUTOF10: 9

## 2022-06-13 ASSESSMENT — PAIN DESCRIPTION - LOCATION: LOCATION: GENERALIZED

## 2022-06-13 ASSESSMENT — PAIN DESCRIPTION - DESCRIPTORS: DESCRIPTORS: ACHING

## 2022-06-13 ASSESSMENT — PAIN - FUNCTIONAL ASSESSMENT: PAIN_FUNCTIONAL_ASSESSMENT: 0-10

## 2022-06-13 NOTE — H&P
Hospital Medicine History & Physical      PCP: Monroe Martin DO    Date of Admission: 6/13/2022    Date of Service: Pt seen/examined on 6/13/2022  And Placed in Observation. Chief Complaint:    Chief Complaint   Patient presents with    Other     pt coming in with low hgb, 5.1. no obvious signs of bleeding, treating for liver failure        History Of Present Illness: The patient is a 40 y.o. female with history of alcohol with history of alcoholic cirrhosis, with varices, previous GI bleed who was seen at GI clinic for follow-up and laboratory work-up revealed severe anemia. She remains asymptomatic with no dizziness, no shortness of breath or palpitation. She reports some lethargy since being in the ER but not prior to that. Otherwise feeling well. She denies any obvious bleed. No melena, no hematemesis or hematochezia. Likely her anemia may be due to myelosuppression. Past Medical History:        Diagnosis Date    Alcoholic liver disease ([address]     Anemia     History of blood transfusion        Past Surgical History:        Procedure Laterality Date    COLONOSCOPY N/A 3/11/2021    COLONOSCOPY POLYPECTOMY SNARE/COLD BIOPSY performed by Marely Dong MD at [address] [facility] GASTROINTESTINAL ENDOSCOPY N/A 01/30/2021    EGD DIAGNOSTIC ONLY performed by Triston Mccarty MD at [facility] N/A 03/10/2021    EGD BIOPSY performed by Marely Dong MD at [address] [place] [address] 6/27/2021    EGD BAND LIGATION performed by Michael Valentino MD at [facility] N/A 4/27/2022    EGD DIAGNOSTIC ONLY performed by Carlin Chapa MD at [address] [facility] [address]       Medications Prior to Admission:    Prior to Admission medications    Medication Sig Start Date End Date Taking?  Authorizing Provider   spironolactone (ALDACTONE) 50 MG tablet  5/31/22 Historical Provider, MD   thiamine 100 mg tablet  5/3/22   Historical Provider, MD   nadolol (CORGARD) 40 MG tablet Take 1 tablet by mouth daily 5/3/22   Dillan Montano MD   furosemide (LASIX) 40 MG tablet Take 1 tablet by mouth daily 5/4/22   Dillan Montano MD   spironolactone (ALDACTONE) 100 MG tablet Take 1 tablet by mouth daily 5/3/22   Dillan Montano MD   folic acid (FOLVITE) 1 MG tablet Take 1 tablet by mouth daily 5/3/22   Dillan Montano MD   magnesium oxide (MAG-OX) 400 (240 Mg) MG tablet Take 1 tablet by mouth 2 times daily 5/3/22 6/2/22  Dillan Montano MD   pantoprazole (PROTONIX) 40 MG tablet Take 1 tablet by mouth every morning (before breakfast) 5/4/22   Dillan Montano MD   thiamine mononitrate (THIAMINE) 100 MG tablet Take 1 tablet by mouth daily 5/3/22 6/2/22  Dillan Montano MD       Allergies:  Patient has no known allergies. Social History:  The patient currently lives with family    TOBACCO:   reports that she has quit smoking. She has never used smokeless tobacco.  ETOH:   reports current alcohol use. Family History:  Reviewed in detail and negative for DM, Early CAD, Cancer, CVA. Positive as follows:        Problem Relation Age of Onset    Alcohol Abuse Mother        REVIEW OF SYSTEMS:   Positive for severe anemia and as noted in the HPI. All other systems reviewed and negative. PHYSICAL EXAM:    /75   Pulse 77   Temp 98.2 °F (36.8 °C) (Oral)   Resp 18   Ht 5' 4\" (1.626 m)   Wt 106 lb (48.1 kg)   SpO2 100%   BMI 18.19 kg/m²     General appearance: No apparent distress appears stated age and cooperative. HEENT Normal cephalic, atraumatic without obvious deformity. Pupils equal, round, and reactive to light. Extra ocular muscles intact. Positive scleral icterus  Neck: Supple, No jugular venous distention/bruits. Lungs: Clear to auscultation, bilaterally without Rales/Wheezes/Rhonchi with good respiratory effort.   Heart: Regular rate and rhythm with Normal S1/S2 without murmurs, rubs or gallops  Abdomen: Soft, non-tender or non-distended without rigidity or guarding and positive bowel sounds   Extremities: No clubbing, cyanosis, or edema bilaterally. Skin: Skin color, texture, turgor normal.  No rashes or lesions. Neurologic: Alert and oriented X 3, neurovascularly intact with sensory/motor intact upper extremities/lower extremities, bilaterally. Cranial nerves: II-XII intact, grossly non-focal.  Mental status: Alert, oriented, thought content appropriate. CBC   Recent Labs     06/13/22  0822 06/13/22  1654   WBC 10.9 10.7   HGB 5.1* 5.2*   HCT 14.7* 14.6*   PLT 94* 113*      RENAL  Recent Labs     06/13/22  0822 06/13/22  1654    135*   K 3.7 4.0    98*   CO2 21 24   BUN 39* 48*   CREATININE 1.0 1.0     LFT'S  Recent Labs     06/13/22  0822 06/13/22  1654   AST 85* 94*   ALT 41* 45*   BILIDIR  --  6.5*   BILITOT 19.2* 23.5*   ALKPHOS 183* 201*     COAG  Recent Labs     06/13/22  0822 06/13/22  1654   INR 1.93* 1.95*     CARDIAC ENZYMES  No results for input(s): CKTOTAL, CKMB, CKMBINDEX, TROPONINI in the last 72 hours.     U/A:    Lab Results   Component Value Date    COLORU Yellow 04/26/2022    WBCUA 21-50 04/26/2022    RBCUA 0-2 04/26/2022    BACTERIA 3+ 04/26/2022    CLARITYU Clear 04/26/2022    SPECGRAV 1.020 04/26/2022    LEUKOCYTESUR SMALL 04/26/2022    BLOODU SMALL 04/26/2022    GLUCOSEU Negative 04/26/2022       ABG    Lab Results   Component Value Date    VNG6ZDQ 19.7 04/26/2022    BEART -3.7 04/26/2022    B3BNKXAQ >100.0 04/26/2022    PHART 7.473 04/26/2022    FLG2FQM 26.9 04/26/2022    PO2ART 273.0 04/26/2022    PLT4IZO 46.0 04/26/2022           Active Hospital Problems    Diagnosis Date Noted    Severe anemia [D64.9] 06/13/2022     Priority: Medium    Elevated LFTs [R79.89] 04/26/2022     Priority: Medium    [facility] [S56.68] 04/26/2022     Priority: Medium    Esophageal varices (HCC) [I85.00] 06/28/2021         ASSESSMENT/PLAN:  40 y.o. female with history of alcohol with history of alcoholic cirrhosis, with varices, previous GI bleed admitted for severe anemia to receive blood transfusion. Anemia likely secondary to myelosuppression given no evidence of active bleeding. He has a meld score of 27    Plan:  -Transfuse PRBC to keep hemoglobin greater than 7.0  - GI consult  - Check stool Hemoccult  - Continue vitamins and other chronic home medications    DVT Prophylaxis: SCD  Diet: General diet  Code Status: Full code         Haritha Reveles MD    Thank you [facility] for the opportunity to be involved in this patient's care. If you have any questions or concerns please feel free to contact me at [phone].

## 2022-06-13 NOTE — ED PROVIDER NOTES
Emergency Department Encounter    Patient: Adriana Pino  MRN: [id number]  : 1975  Date of Evaluation: 2022  ED Provider:  Gianluca Levy MD    Triage Chief Complaint:   Other (pt coming in with low hgb, 5.1. no obvious signs of bleeding, treating for liver failure)    [place]:  Adriana Pino is a 40 y.o. female that presents to the ER for evaluation of acute on chronic anemia with a history of bone marrow dysplasia she has no active bleeding rectally, she has severe end-stage cirrhosis with extensive jaundice and history of varices with no active bleeding. Routine GI follow-up found the patient be significantly anemic, with hemoglobin below 6 and was referred to the ER for evaluation of transfusion.     ROS - see HPI, below listed is current ROS at time of my eval:  General:  No fevers, no chills, no weakness  Eyes:  no discharge  ENT:  No sore throat, no nasal congestion  Cardiovascular:  No chest pain, no palpitations  Respiratory:  No shortness of breath, no cough, no wheezing  Gastrointestinal: no  pain, no nausea, no vomiting, no diarrhea  Musculoskeletal:  No muscle pain, no joint pain  Skin:  No rash, no pruritis  Neurologic:  no headache  Genitourinary:  No dysuria, no hematuria  Endocrine:  No unexpected weight gain, no unexpected weight loss  Extremities:  no edema, no pain    Past Medical History:   Diagnosis Date    Alcoholic liver disease ([address]     Anemia     History of blood transfusion      Past Surgical History:   Procedure Laterality Date    COLONOSCOPY N/A 3/11/2021    COLONOSCOPY POLYPECTOMY SNARE/COLD BIOPSY performed by Darcy Medellin MD at [address] ENDOSCOPY N/A 2021    EGD DIAGNOSTIC ONLY performed by Alex Jacobs MD at [address] [facility] N/A 03/10/2021    EGD BIOPSY performed by Darcy Medellin MD at [address] [facility] N/A 2021 EGD BAND LIGATION performed by Chiara Mayfield MD at [address] ENDOSCOPY N/A 4/27/2022    EGD DIAGNOSTIC ONLY performed by Janay Moulton MD at [address] [facility] ENDOSCOPY     Family History   Problem Relation Age of Onset    Alcohol Abuse Mother      Social History     Socioeconomic History    Marital status: Single     Spouse name: Not on file    Number of children: Not on file    Years of education: Not on file    Highest education level: Not on file   Occupational History    Not on file   Tobacco Use    Smoking status: Former Smoker    Smokeless tobacco: Never Used   Vaping Use    Vaping Use: Never used   Substance and Sexual Activity    Alcohol use: Yes    Drug use: Never    Sexual activity: Not Currently     Partners: Male   Other Topics Concern    Not on file   Social History Narrative    Not on file     Social Determinants of Health     Financial Resource Strain:     Difficulty of Paying Living Expenses: Not on file   Food Insecurity:     Worried About Running Out of Food in the Last Year: Not on file    Janet of Food in the Last Year: Not on file   Transportation Needs:     Lack of Transportation (Medical): Not on file    Lack of Transportation (Non-Medical):  Not on file   Physical Activity:     Days of Exercise per Week: Not on file    Minutes of Exercise per Session: Not on file   Stress:     Feeling of Stress : Not on file   Social Connections:     Frequency of Communication with Friends and Family: Not on file    Frequency of Social Gatherings with Friends and Family: Not on file    Attends Mosque Services: Not on file    Active Member of Clubs or Organizations: Not on file    Attends Club or Organization Meetings: Not on file    Marital Status: Not on file   Intimate Partner Violence:     Fear of Current or Ex-Partner: Not on file    Emotionally Abused: Not on file    Physically Abused: Not on file    Sexually Abused: Not on file Housing Stability:     Unable to Pay for Housing in the Last Year: Not on file    Number of Places Lived in the Last Year: Not on file    Unstable Housing in the Last Year: Not on file     No current facility-administered medications for this encounter. Current Outpatient Medications   Medication Sig Dispense Refill    prednisoLONE (PRELONE) 15 MG/5ML syrup Take 15 mLs by mouth daily For 4 weeks, then 10 mLs daily x 1 week, then 5 mLs daily x 1 week, then off. 630 mL 0    ferrous sulfate (IRON 325) 325 (65 Fe) MG tablet Take 1 tablet by mouth 2 times daily 180 tablet 1    spironolactone (ALDACTONE) 50 MG tablet       thiamine 100 mg tablet  (Patient not taking: Reported on 6/13/2022)      nadolol (CORGARD) 40 MG tablet Take 1 tablet by mouth daily 30 tablet 0    furosemide (LASIX) 40 MG tablet Take 1 tablet by mouth daily 60 tablet 0    spironolactone (ALDACTONE) 100 MG tablet Take 1 tablet by mouth daily 30 tablet 1    folic acid (FOLVITE) 1 MG tablet Take 1 tablet by mouth daily (Patient not taking: Reported on 6/13/2022) 30 tablet 0    magnesium oxide (MAG-OX) 400 (240 Mg) MG tablet Take 1 tablet by mouth 2 times daily 60 tablet 0    pantoprazole (PROTONIX) 40 MG tablet Take 1 tablet by mouth every morning (before breakfast) 30 tablet 1    thiamine mononitrate (THIAMINE) 100 MG tablet Take 1 tablet by mouth daily 30 tablet 0     No Known Allergies    Nursing Notes Reviewed    Physical Exam:  Triage VS:    ED Triage Vitals [06/13/22 1647]   Enc Vitals Group      /75      Heart Rate 77      Resp 18      Temp 98.2 °F (36.8 °C)      Temp Source Oral      SpO2 100 %      Weight 106 lb (48.1 kg)      Height 5' 4\" (1.626 m)      Head Circumference       Peak Flow       Pain Score       Pain Loc       Pain Edu? Excl. in [address]? My pulse ox interpretation is - normal    General appearance: Ill-appearing icteric. Skin: Diffuse icterus  Neck:  Trachea midline.    Heart:  Regular rate and rhythm, normal S1 & S2.    Perfusion:  intact  Respiratory:  Lungs clear to auscultation bilaterally. Respirations nonlabored. Abdominal:  No   tenderness to palpation, no rebound guarding or rigidity, neg Armenta's sign, neg McBurney's point tenderness to palpation, normal bowel sounds, no masses, no organomegaly, no pulsatile masses  Back:  No CVA TTP  Extremity:    normal ROM   Neurological:  Alert and oriented times 3.       I have reviewed and interpreted all of the currently available lab results from this visit (if applicable):  Results for orders placed or performed during the hospital encounter of 06/13/22   CBC with Auto Differential   Result Value Ref Range    WBC 10.7 4.0 - 11.0 K/uL    RBC 1.25 (L) 4.00 - 5.20 M/uL    Hemoglobin 5.2 (LL) 12.0 - 16.0 g/dL    Hematocrit 14.6 (LL) 36.0 - 48.0 %    .2 (H) 80.0 - 100.0 fL    MCH 41.7 (H) 26.0 - 34.0 pg    MCHC 35.6 31.0 - 36.0 g/dL    RDW 18.6 (H) 12.4 - 15.4 %    Platelets 450 (L) 471 - 450 K/uL    MPV 8.5 5.0 - 10.5 fL    SLIDE REVIEW see below     Neutrophils % 85.0 %    Lymphocytes % 8.0 %    Monocytes % 2.0 %    Eosinophils % 1.0 %    Basophils % 1.0 %    Neutrophils Absolute 9.4 (H) 1.7 - 7.7 K/uL    Lymphocytes Absolute 0.9 (L) 1.0 - 5.1 K/uL    Monocytes Absolute 0.2 0.0 - 1.3 K/uL    Eosinophils Absolute 0.1 0.0 - 0.6 K/uL    Basophils Absolute 0.1 0.0 - 0.2 K/uL    Bands Relative 3 0 - 7 %    Anisocytosis 1+ (A)     Macrocytes Occasional (A)     Polychromasia Occasional (A)     Poikilocytes 1+ (A)     Schistocytes Occasional (A)     Acanthocytes 1+ (A)     Abdoul Cells Occasional (A)     Ovalocytes Occasional (A)    Basic Metabolic Panel w/ Reflex to MG   Result Value Ref Range    Sodium 135 (L) 136 - 145 mmol/L    Potassium reflex Magnesium 4.0 3.5 - 5.1 mmol/L    Chloride 98 (L) 99 - 110 mmol/L    CO2 24 21 - 32 mmol/L    Anion Gap 13 3 - 16    Glucose 189 (H) 70 - 99 mg/dL    BUN 48 (H) 7 - 20 mg/dL    CREATININE 1.0 0.6 - 1.1 mg/dL GFR Non- 60 (A) >60    GFR African American >60 >60    Calcium 10.2 8.3 - 10.6 mg/dL   Protime-INR   Result Value Ref Range    Protime 22.3 (H) 11.7 - 14.5 sec    INR 1.95 (H) 0.87 - 1.14   Hepatic Function Panel   Result Value Ref Range    Total Protein 7.3 6.4 - 8.2 g/dL    Albumin 3.8 3.4 - 5.0 g/dL    Alkaline Phosphatase 201 (H) 40 - 129 U/L    ALT 45 (H) 10 - 40 U/L    AST 94 (H) 15 - 37 U/L    Total Bilirubin 23.5 (H) 0.0 - 1.0 mg/dL    Bilirubin, Direct 6.5 (H) 0.0 - 0.3 mg/dL    Bilirubin, Indirect 17.0 (H) 0.0 - 1.0 mg/dL   Blood Gas, Venous   Result Value Ref Range    pH, Joe 7.539 (H) 7.350 - 7.450    pCO2, Joe 28.8 (L) 40.0 - 50.0 mmHg    pO2, Joe 177.0 (H) 25.0 - 40.0 mmHg    HCO3, Venous 24.6 23.0 - 29.0 mmol/L    Base Excess, Joe 1.6 -3.0 - 3.0 mmol/L    O2 Sat, Joe 100 Not Established %    Carboxyhemoglobin 6.8 (H) 0.0 - 1.5 %    MetHgb, Joe 0.4 <1.5 %    TC02 (Calc), Joe 57 Not Established mmol/L    O2 Content, Joe 6 Not Established VOL %    O2 Therapy Unknown    Ammonia   Result Value Ref Range    Ammonia 96 (H) 11 - 51 umol/L   Comprehensive Metabolic Panel w/ Reflex to MG   Result Value Ref Range    Sodium 136 136 - 145 mmol/L    Potassium reflex Magnesium 4.3 3.5 - 5.1 mmol/L    Chloride 102 99 - 110 mmol/L    CO2 22 21 - 32 mmol/L    Anion Gap 12 3 - 16    Glucose 132 (H) 70 - 99 mg/dL    BUN 44 (H) 7 - 20 mg/dL    CREATININE 1.0 0.6 - 1.1 mg/dL    GFR Non-African American 60 (A) >60    GFR African American >60 >60    Calcium 9.3 8.3 - 10.6 mg/dL    Total Protein 6.1 (L) 6.4 - 8.2 g/dL    Albumin 2.9 (L) 3.4 - 5.0 g/dL    Albumin/Globulin Ratio 0.9 (L) 1.1 - 2.2    Total Bilirubin 17.6 (H) 0.0 - 1.0 mg/dL    Alkaline Phosphatase 165 (H) 40 - 129 U/L    ALT 37 10 - 40 U/L    AST 75 (H) 15 - 37 U/L   CBC with Auto Differential   Result Value Ref Range    WBC 8.9 4.0 - 11.0 K/uL    RBC 1.48 (L) 4.00 - 5.20 M/uL    Hemoglobin 5.5 (LL) 12.0 - 16.0 g/dL    Hematocrit 15.7 (LL) 36.0 - 48.0 %    .9 (H) 80.0 - 100.0 fL    MCH 37.5 (H) 26.0 - 34.0 pg    MCHC 35.4 31.0 - 36.0 g/dL    RDW 24.5 (H) 12.4 - 15.4 %    Platelets 79 (L) 841 - 450 K/uL    MPV 7.9 5.0 - 10.5 fL    PLATELET SLIDE REVIEW Decreased     SLIDE REVIEW see below     Neutrophils % 83.0 %    Lymphocytes % 13.0 %    Monocytes % 4.0 %    Eosinophils % 0.0 %    Basophils % 0.0 %    Neutrophils Absolute 7.4 1.7 - 7.7 K/uL    Lymphocytes Absolute 1.2 1.0 - 5.1 K/uL    Monocytes Absolute 0.4 0.0 - 1.3 K/uL    Eosinophils Absolute 0.0 0.0 - 0.6 K/uL    Basophils Absolute 0.0 0.0 - 0.2 K/uL    Anisocytosis Occasional (A)     Macrocytes Occasional (A)     Polychromasia Occasional (A)     Poikilocytes 1+ (A)     Schistocytes Occasional (A)     Acanthocytes 1+ (A)     [place] Cells Occasional (A)     Tear Drop Cells Occasional (A)    Protime-INR   Result Value Ref Range    Protime 23.0 (H) 11.7 - 14.5 sec    INR 2.03 (H) 0.87 - 1.14   Hemoglobin and Hematocrit   Result Value Ref Range    Hemoglobin 6.9 (LL) 12.0 - 16.0 g/dL    Hematocrit 19.8 (LL) 36.0 - 48.0 %   Basic Metabolic Panel   Result Value Ref Range    Sodium 138 136 - 145 mmol/L    Potassium 4.8 3.5 - 5.1 mmol/L    Chloride 103 99 - 110 mmol/L    CO2 23 21 - 32 mmol/L    Anion Gap 12 3 - 16    Glucose 161 (H) 70 - 99 mg/dL    BUN 42 (H) 7 - 20 mg/dL    CREATININE 1.0 0.6 - 1.1 mg/dL    GFR Non-African American 60 (A) >60    GFR African American >60 >60    Calcium 9.5 8.3 - 10.6 mg/dL   CBC with Auto Differential   Result Value Ref Range    WBC 8.1 4.0 - 11.0 K/uL    RBC 2.30 (L) 4.00 - 5.20 M/uL    Hemoglobin 8.1 (L) 12.0 - 16.0 g/dL    Hematocrit 22.9 (L) 36.0 - 48.0 %    MCV 99.7 80.0 - 100.0 fL    MCH 35.3 (H) 26.0 - 34.0 pg    MCHC 35.4 31.0 - 36.0 g/dL    RDW 21.2 (H) 12.4 - 15.4 %    Platelets 71 (L) 003 - 450 K/uL    MPV 7.9 5.0 - 10.5 fL    PLATELET SLIDE REVIEW Decreased     SLIDE REVIEW see below     Path Consult No     Neutrophils % 76.0 %    Lymphocytes % 15.0 % Monocytes % 7.0 %    Eosinophils % 2.0 %    Basophils % 0.0 %    Neutrophils Absolute 6.2 1.7 - 7.7 K/uL    Lymphocytes Absolute 1.2 1.0 - 5.1 K/uL    Monocytes Absolute 0.6 0.0 - 1.3 K/uL    Eosinophils Absolute 0.2 0.0 - 0.6 K/uL    Basophils Absolute 0.0 0.0 - 0.2 K/uL    Bands Relative 0 0 - 7 %    nRBC 1 (A) /100 WBC    Anisocytosis Occasional (A)     Polychromasia Occasional (A)     Poikilocytes 1+ (A)     Schistocytes Occasional (A)     Acanthocytes Occasional (A)     Abdoul Cells Occasional (A)     Target Cells Occasional (A)    Magnesium   Result Value Ref Range    Magnesium 2.10 1.80 - 2.40 mg/dL   Phosphorus   Result Value Ref Range    Phosphorus 3.9 2.5 - 4.9 mg/dL   Iron and TIBC   Result Value Ref Range    Iron 241 (H) 37 - 145 ug/dL    TIBC see below 260 - 445 ug/dL    Iron Saturation see below 15 - 50 %   Ferritin   Result Value Ref Range    Ferritin 863.1 (H) 15.0 - 150.0 ng/mL   Lactate Dehydrogenase   Result Value Ref Range     (H) 100 - 190 U/L   Haptoglobin   Result Value Ref Range    Haptoglobin <10.0 (L) 30.0 - 200.0 mg/dL   Basic Metabolic Panel   Result Value Ref Range    Sodium 132 (L) 136 - 145 mmol/L    Potassium 4.1 3.5 - 5.1 mmol/L    Chloride 98 (L) 99 - 110 mmol/L    CO2 22 21 - 32 mmol/L    Anion Gap 12 3 - 16    Glucose 290 (H) 70 - 99 mg/dL    BUN 38 (H) 7 - 20 mg/dL    CREATININE 0.8 0.6 - 1.1 mg/dL    GFR Non-African American >60 >60    GFR African American >60 >60    Calcium 9.5 8.3 - 10.6 mg/dL   CBC with Auto Differential   Result Value Ref Range    WBC 8.9 4.0 - 11.0 K/uL    RBC 2.12 (L) 4.00 - 5.20 M/uL    Hemoglobin 7.5 (L) 12.0 - 16.0 g/dL    Hematocrit 21.3 (L) 36.0 - 48.0 %    .1 (H) 80.0 - 100.0 fL    MCH 35.4 (H) 26.0 - 34.0 pg    MCHC 35.4 31.0 - 36.0 g/dL    RDW 23.5 (H) 12.4 - 15.4 %    Platelets 69 (L) 310 - 450 K/uL    MPV 8.2 5.0 - 10.5 fL    PLATELET SLIDE REVIEW Decreased     SLIDE REVIEW see below     Neutrophils % 88.0 %    Lymphocytes % 11.0 % Monocytes % 0.0 %    Eosinophils % 0.0 %    Basophils % 0.0 %    Neutrophils Absolute 7.9 (H) 1.7 - 7.7 K/uL    Lymphocytes Absolute 1.0 1.0 - 5.1 K/uL    Monocytes Absolute 0.0 0.0 - 1.3 K/uL    Eosinophils Absolute 0.0 0.0 - 0.6 K/uL    Basophils Absolute 0.0 0.0 - 0.2 K/uL    Metamyelocytes Relative 1 (A) %    Anisocytosis Occasional (A)     Macrocytes 1+ (A)     Polychromasia 1+ (A)     Abdoul Cells 1+ (A)    Magnesium   Result Value Ref Range    Magnesium 1.80 1.80 - 2.40 mg/dL   Phosphorus   Result Value Ref Range    Phosphorus 3.9 2.5 - 4.9 mg/dL   Kappa/Lambda Quantitative Free Light Chains, Serum   Result Value Ref Range    KAPPA/LAMBDA TEST COMMENT see below    IgG, IgA, IgM   Result Value Ref Range    IgA 547.0 (H) 70.0 - 400.0 mg/dL    IgG 1615.0 (H) 700.0 - 1600.0 mg/dL    IgM 390.0 (H) 40.0 - 230.0 mg/dL   Vitamin B12 & Folate   Result Value Ref Range    Vitamin B-12 >2000 (H) 211 - 911 pg/mL    Folate >20.00 4.78 - 24.20 ng/mL   Bilirubin, Direct   Result Value Ref Range    Bilirubin, Direct 4.6 (H) 0.0 - 0.3 mg/dL   Hepatic Function Panel   Result Value Ref Range    Total Protein 6.7 6.4 - 8.2 g/dL    Albumin 3.2 (L) 3.4 - 5.0 g/dL    Alkaline Phosphatase 162 (H) 40 - 129 U/L    ALT 41 (H) 10 - 40 U/L    AST 73 (H) 15 - 37 U/L    Total Bilirubin 19.9 (H) 0.0 - 1.0 mg/dL    Bilirubin, Direct 5.0 (H) 0.0 - 0.3 mg/dL    Bilirubin, Indirect 14.9 (H) 0.0 - 1.0 mg/dL   TYPE AND SCREEN   Result Value Ref Range    ABO/Rh A POS     Antibody Screen NEG    PREPARE RBC (CROSSMATCH), 1 Units   Result Value Ref Range    Product Code Blood Bank Q6173T44     Description Blood Bank Red Blood Cells, Leuko-reduced     Unit Number [id number]     Dispense Status Blood Bank transfused    PREPARE RBC (CROSSMATCH), 1 Units   Result Value Ref Range    Product Code Blood Bank P0614T64     Description Blood Bank Red Blood Cells, Apheresis, Leuko-reduced     Unit Number [id number]     Dispense Status Blood Bank transfused PREPARE RBC (CROSSMATCH), 1 Units   Result Value Ref Range    Product Code Blood Bank A6451B55     Description Blood Bank Red Blood Cells, Leuko-reduced     Unit Number [id number]     Dispense Status Blood Bank transfused    DIRECT ANTIGLOBULIN TEST   Result Value Ref Range    Polyspecific Sonja NEG       Radiographs (if obtained):  Radiologist's Report Reviewed:  No results found. 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 She has no active bleeding currently no rectal bleeding no hematemesis no history of variceals no melena or bright red blood per rectum. She consented to blood transfusion will be admitted for H&H monitoring. Clinical Impression:  1. 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  Alcoholic cirrhosis of liver without ascites ([facility] [address]      Disposition referral (if applicable):  Kristy Rodriguez MD  [address] [facility]   [address] [facility] [address] [facility]    In 2 weeks      Irena Lake MD  [address] [address]  [address]   [phone]    In 1 week      José Luis Alexis, DO  [address] [facility] [address] [place] :[facility]  [phone]    Schedule an appointment as soon as possible for a visit      José Luis Alexis [address] [place] :[facility]  [phone]    In 3 days      Disposition medications (if applicable):  Discharge Medication List as of 6/15/2022  5:47 PM      START taking these medications    Details   prednisoLONE (PRELONE) 15 MG/5ML syrup Take 15 mLs by mouth daily For 4 weeks, then 10 mLs daily x 1 week, then 5 mLs daily x 1 week, then off., Disp-630 mL, R-0Print      ferrous sulfate (IRON 325) 325 (65 Fe) MG tablet Take 1 tablet by mouth 2 times daily, Disp-180 tablet, R-1Print             Comment: Please note this report has been produced using speech recognition software and may contain errors related to that system including errors in grammar, punctuation, and spelling, as well as words and phrases that may be inappropriate. Efforts were made to edit the dictations.       Melani Hernandez MD  96/44/13 1872

## 2022-06-14 LAB
A/G RATIO: 0.9 (ref 1.1–2.2)
ACANTHOCYTES: ABNORMAL
ACANTHOCYTES: ABNORMAL
ALBUMIN SERPL-MCNC: 2.9 G/DL (ref 3.4–5)
ALP BLD-CCNC: 165 U/L (ref 40–129)
ALT SERPL-CCNC: 37 U/L (ref 10–40)
AMMONIA: 96 UMOL/L (ref 11–51)
ANION GAP SERPL CALCULATED.3IONS-SCNC: 12 MMOL/L (ref 3–16)
ANION GAP SERPL CALCULATED.3IONS-SCNC: 12 MMOL/L (ref 3–16)
ANISOCYTOSIS: ABNORMAL
ANISOCYTOSIS: ABNORMAL
AST SERPL-CCNC: 75 U/L (ref 15–37)
BANDED NEUTROPHILS RELATIVE PERCENT: 0 % (ref 0–7)
BASOPHILS ABSOLUTE: 0 K/UL (ref 0–0.2)
BASOPHILS ABSOLUTE: 0 K/UL (ref 0–0.2)
BASOPHILS RELATIVE PERCENT: 0 %
BASOPHILS RELATIVE PERCENT: 0 %
BILIRUB SERPL-MCNC: 17.6 MG/DL (ref 0–1)
BLOOD BANK DISPENSE STATUS: NORMAL
BLOOD BANK DISPENSE STATUS: NORMAL
BLOOD BANK PRODUCT CODE: NORMAL
BLOOD BANK PRODUCT CODE: NORMAL
BPU ID: NORMAL
BPU ID: NORMAL
BUN BLDV-MCNC: 42 MG/DL (ref 7–20)
BUN BLDV-MCNC: 44 MG/DL (ref 7–20)
BURR CELLS: ABNORMAL
BURR CELLS: ABNORMAL
CALCIUM SERPL-MCNC: 9.3 MG/DL (ref 8.3–10.6)
CALCIUM SERPL-MCNC: 9.5 MG/DL (ref 8.3–10.6)
CHLORIDE BLD-SCNC: 102 MMOL/L (ref 99–110)
CHLORIDE BLD-SCNC: 103 MMOL/L (ref 99–110)
CO2: 22 MMOL/L (ref 21–32)
CO2: 23 MMOL/L (ref 21–32)
CREAT SERPL-MCNC: 1 MG/DL (ref 0.6–1.1)
CREAT SERPL-MCNC: 1 MG/DL (ref 0.6–1.1)
DESCRIPTION BLOOD BANK: NORMAL
DESCRIPTION BLOOD BANK: NORMAL
EOSINOPHILS ABSOLUTE: 0 K/UL (ref 0–0.6)
EOSINOPHILS ABSOLUTE: 0.2 K/UL (ref 0–0.6)
EOSINOPHILS RELATIVE PERCENT: 0 %
EOSINOPHILS RELATIVE PERCENT: 2 %
GFR AFRICAN AMERICAN: >60
GFR AFRICAN AMERICAN: >60
GFR NON-AFRICAN AMERICAN: 60
GFR NON-AFRICAN AMERICAN: 60
GLUCOSE BLD-MCNC: 132 MG/DL (ref 70–99)
GLUCOSE BLD-MCNC: 161 MG/DL (ref 70–99)
HCT VFR BLD CALC: 15.7 % (ref 36–48)
HCT VFR BLD CALC: 19.8 % (ref 36–48)
HCT VFR BLD CALC: 22.9 % (ref 36–48)
HEMATOLOGY PATH CONSULT: NO
HEMOGLOBIN: 5.5 G/DL (ref 12–16)
HEMOGLOBIN: 6.9 G/DL (ref 12–16)
HEMOGLOBIN: 8.1 G/DL (ref 12–16)
INR BLD: 2.03 (ref 0.87–1.14)
LACTATE DEHYDROGENASE: 546 U/L (ref 100–190)
LYMPHOCYTES ABSOLUTE: 1.2 K/UL (ref 1–5.1)
LYMPHOCYTES ABSOLUTE: 1.2 K/UL (ref 1–5.1)
LYMPHOCYTES RELATIVE PERCENT: 13 %
LYMPHOCYTES RELATIVE PERCENT: 15 %
MACROCYTES: ABNORMAL
MAGNESIUM: 2.1 MG/DL (ref 1.8–2.4)
MCH RBC QN AUTO: 35.3 PG (ref 26–34)
MCH RBC QN AUTO: 37.5 PG (ref 26–34)
MCHC RBC AUTO-ENTMCNC: 35.4 G/DL (ref 31–36)
MCHC RBC AUTO-ENTMCNC: 35.4 G/DL (ref 31–36)
MCV RBC AUTO: 105.9 FL (ref 80–100)
MCV RBC AUTO: 99.7 FL (ref 80–100)
MONOCYTES ABSOLUTE: 0.4 K/UL (ref 0–1.3)
MONOCYTES ABSOLUTE: 0.6 K/UL (ref 0–1.3)
MONOCYTES RELATIVE PERCENT: 4 %
MONOCYTES RELATIVE PERCENT: 7 %
NEUTROPHILS ABSOLUTE: 6.2 K/UL (ref 1.7–7.7)
NEUTROPHILS ABSOLUTE: 7.4 K/UL (ref 1.7–7.7)
NEUTROPHILS RELATIVE PERCENT: 76 %
NEUTROPHILS RELATIVE PERCENT: 83 %
NUCLEATED RED BLOOD CELLS: 1 /100 WBC
PDW BLD-RTO: 21.2 % (ref 12.4–15.4)
PDW BLD-RTO: 24.5 % (ref 12.4–15.4)
PHOSPHORUS: 3.9 MG/DL (ref 2.5–4.9)
PLATELET # BLD: 71 K/UL (ref 135–450)
PLATELET # BLD: 79 K/UL (ref 135–450)
PLATELET SLIDE REVIEW: ABNORMAL
PLATELET SLIDE REVIEW: ABNORMAL
PMV BLD AUTO: 7.9 FL (ref 5–10.5)
PMV BLD AUTO: 7.9 FL (ref 5–10.5)
POIKILOCYTES: ABNORMAL
POIKILOCYTES: ABNORMAL
POLYCHROMASIA: ABNORMAL
POLYCHROMASIA: ABNORMAL
POTASSIUM REFLEX MAGNESIUM: 4.3 MMOL/L (ref 3.5–5.1)
POTASSIUM SERPL-SCNC: 4.8 MMOL/L (ref 3.5–5.1)
PROTHROMBIN TIME: 23 SEC (ref 11.7–14.5)
RBC # BLD: 1.48 M/UL (ref 4–5.2)
RBC # BLD: 2.3 M/UL (ref 4–5.2)
SCHISTOCYTES: ABNORMAL
SCHISTOCYTES: ABNORMAL
SLIDE REVIEW: ABNORMAL
SLIDE REVIEW: ABNORMAL
SODIUM BLD-SCNC: 136 MMOL/L (ref 136–145)
SODIUM BLD-SCNC: 138 MMOL/L (ref 136–145)
TARGET CELLS: ABNORMAL
TEAR DROP CELLS: ABNORMAL
TOTAL PROTEIN: 6.1 G/DL (ref 6.4–8.2)
WBC # BLD: 8.1 K/UL (ref 4–11)
WBC # BLD: 8.9 K/UL (ref 4–11)

## 2022-06-14 PROCEDURE — 36415 COLL VENOUS BLD VENIPUNCTURE: CPT

## 2022-06-14 PROCEDURE — 84100 ASSAY OF PHOSPHORUS: CPT

## 2022-06-14 PROCEDURE — 83010 ASSAY OF HAPTOGLOBIN QUANT: CPT

## 2022-06-14 PROCEDURE — 85610 PROTHROMBIN TIME: CPT

## 2022-06-14 PROCEDURE — 83550 IRON BINDING TEST: CPT

## 2022-06-14 PROCEDURE — 36430 TRANSFUSION BLD/BLD COMPNT: CPT

## 2022-06-14 PROCEDURE — G0378 HOSPITAL OBSERVATION PER HR: HCPCS

## 2022-06-14 PROCEDURE — 82728 ASSAY OF FERRITIN: CPT

## 2022-06-14 PROCEDURE — 6370000000 HC RX 637 (ALT 250 FOR IP): Performed by: INTERNAL MEDICINE

## 2022-06-14 PROCEDURE — 85025 COMPLETE CBC W/AUTO DIFF WBC: CPT

## 2022-06-14 PROCEDURE — 2580000003 HC RX 258: Performed by: NURSE PRACTITIONER

## 2022-06-14 PROCEDURE — 85014 HEMATOCRIT: CPT

## 2022-06-14 PROCEDURE — 96374 THER/PROPH/DIAG INJ IV PUSH: CPT

## 2022-06-14 PROCEDURE — 2580000003 HC RX 258: Performed by: INTERNAL MEDICINE

## 2022-06-14 PROCEDURE — 85018 HEMOGLOBIN: CPT

## 2022-06-14 PROCEDURE — 80053 COMPREHEN METABOLIC PANEL: CPT

## 2022-06-14 PROCEDURE — 83540 ASSAY OF IRON: CPT

## 2022-06-14 PROCEDURE — 83735 ASSAY OF MAGNESIUM: CPT

## 2022-06-14 PROCEDURE — 6360000002 HC RX W HCPCS: Performed by: INTERNAL MEDICINE

## 2022-06-14 PROCEDURE — 96376 TX/PRO/DX INJ SAME DRUG ADON: CPT

## 2022-06-14 PROCEDURE — 83615 LACTATE (LD) (LDH) ENZYME: CPT

## 2022-06-14 PROCEDURE — 82140 ASSAY OF AMMONIA: CPT

## 2022-06-14 RX ORDER — SODIUM CHLORIDE 9 MG/ML
INJECTION, SOLUTION INTRAVENOUS PRN
Status: DISCONTINUED | OUTPATIENT
Start: 2022-06-14 | End: 2022-06-15 | Stop reason: HOSPADM

## 2022-06-14 RX ORDER — MORPHINE SULFATE 2 MG/ML
2 INJECTION, SOLUTION INTRAMUSCULAR; INTRAVENOUS EVERY 4 HOURS PRN
Status: COMPLETED | OUTPATIENT
Start: 2022-06-14 | End: 2022-06-15

## 2022-06-14 RX ORDER — PREDNISOLONE 15 MG/5 ML
45 SOLUTION, ORAL ORAL DAILY
Status: DISCONTINUED | OUTPATIENT
Start: 2022-06-14 | End: 2022-06-15 | Stop reason: SDUPTHER

## 2022-06-14 RX ORDER — SODIUM CHLORIDE 9 MG/ML
INJECTION, SOLUTION INTRAVENOUS PRN
Status: COMPLETED | OUTPATIENT
Start: 2022-06-14 | End: 2022-06-14

## 2022-06-14 RX ADMIN — SODIUM CHLORIDE: 9 INJECTION, SOLUTION INTRAVENOUS at 08:51

## 2022-06-14 RX ADMIN — Medication 10 ML: at 00:52

## 2022-06-14 RX ADMIN — FUROSEMIDE 40 MG: 40 TABLET ORAL at 14:10

## 2022-06-14 RX ADMIN — Medication 10 ML: at 20:18

## 2022-06-14 RX ADMIN — MORPHINE SULFATE 2 MG: 2 INJECTION, SOLUTION INTRAMUSCULAR; INTRAVENOUS at 20:26

## 2022-06-14 RX ADMIN — NADOLOL 40 MG: 40 TABLET ORAL at 00:09

## 2022-06-14 RX ADMIN — NADOLOL 40 MG: 40 TABLET ORAL at 20:18

## 2022-06-14 RX ADMIN — SPIRONOLACTONE 100 MG: 25 TABLET ORAL at 14:10

## 2022-06-14 RX ADMIN — PREDNISOLONE SODIUM PHOSPHATE 45 MG: 15 SOLUTION ORAL at 15:16

## 2022-06-14 RX ADMIN — THIAMINE HCL TAB 100 MG 100 MG: 100 TAB at 14:10

## 2022-06-14 RX ADMIN — ONDANSETRON 4 MG: 4 TABLET, ORALLY DISINTEGRATING ORAL at 03:10

## 2022-06-14 RX ADMIN — MORPHINE SULFATE 2 MG: 2 INJECTION, SOLUTION INTRAMUSCULAR; INTRAVENOUS at 16:25

## 2022-06-14 RX ADMIN — PANTOPRAZOLE SODIUM 40 MG: 40 TABLET, DELAYED RELEASE ORAL at 05:37

## 2022-06-14 RX ADMIN — OXYCODONE 5 MG: 5 TABLET ORAL at 00:09

## 2022-06-14 ASSESSMENT — PAIN SCALES - GENERAL
PAINLEVEL_OUTOF10: 4
PAINLEVEL_OUTOF10: 0
PAINLEVEL_OUTOF10: 8
PAINLEVEL_OUTOF10: 4
PAINLEVEL_OUTOF10: 0

## 2022-06-14 ASSESSMENT — PAIN DESCRIPTION - DESCRIPTORS
DESCRIPTORS: ACHING

## 2022-06-14 ASSESSMENT — PAIN DESCRIPTION - LOCATION
LOCATION: GENERALIZED
LOCATION: GENERALIZED
LOCATION: OTHER (COMMENT)

## 2022-06-14 NOTE — ED NOTES
Report given to Macarena Goldstein, [facility] [address] RN. All questions answered at this time.       Maritza Andrea RN  06/13/22 8753

## 2022-06-14 NOTE — ED NOTES
Remained at bedside with patient for initial 15 min. No suspected transfusion reaction at this time.       Kt Flores RN  06/13/22 5699

## 2022-06-14 NOTE — PROGRESS NOTES
Pt admitted to room 3378. Pt is alert and oriented x4. VSS. Pt complains of 8/10 generalized \"aching\". Provider notified and PRN order for pain medication obtained. Evening medications administered per MAR. Call light within reach. Bed alarm engaged. Pt encouraged to call for assistance. No signs of distress. Needs addressed at this time. Pt updated on plan of care.

## 2022-06-14 NOTE — ED NOTES
Daughter called out stating mom's jugular veins on R sided were more distended than normal and stated \"I think I'm just paranoid\". Patient assessed and stated she felt fine, no concerns at this time.       Gloriann Opitz, RN  06/13/22 2041

## 2022-06-14 NOTE — CONSULTS
[facility]   CONSULT NOTE    6/14/2022 3:05 PM    Patient Information: Apollo Copeland   Date of Admit:  6/13/2022  Primary Care Physician:  Gaby Vargas DO  Requesting Physician:  Mis Fu MD    Reason for consult:   Evaluation and recommendations for anemia. Chief complaint:    Chief Complaint   Patient presents with    Other     pt coming in with low hgb, 5.1. no obvious signs of bleeding, treating for liver failure       History of Present Illness:  Apollo Copeland is a 40 y.o. female on Mis Fu MD service who was admitted on 6/13/2022 for anemia. She has a history of alcohol abuse with history of alcoholic cirrhosis, with varices, previous GI bleed who was seen at GI clinic for follow-up and laboratory work-up revealed severe anemia.  She remains asymptomatic with no dizziness, no shortness of breath or palpitation.  She reports some lethargy since being in the ER but not prior to that.  Otherwise feeling well.  She denies any obvious bleed.  No melena, no hematemesis or hematochezia. She last drink alcohol in April 2022. Her cirrhosis is decompensated by ascites which has been managed with Lasix 40 mg daily and Aldactone 100 mg daily. No history of hepatic encephalopathy. She did have bleeding esophageal varices which were banded in June 2021. Last EGD was April 2022 with small esophageal varices, mild portal hypertensive gastropathy. She was started on nadolol and is compliant with this medication. States she started online [employer] in April. Dr. Yehuda Mcdaniels plans to send her to [facility] for liver transplant eval after 6 months off alcohol. She denies any chest pain or shortness of breath. She does report about a 25 pound weight loss since April 2022 without trying. She reports her appetite is good. No fevers or chills. No night sweats. Past Medical History:     has a past medical history of Alcoholic liver disease ([address] Anemia, and History of blood transfusion. Past Surgical History:    Past Surgical History:   Procedure Laterality Date    COLONOSCOPY N/A 3/11/2021    COLONOSCOPY POLYPECTOMY SNARE/COLD BIOPSY performed by Li Smiley MD at [address] [place] GASTROINTESTINAL ENDOSCOPY N/A 01/30/2021    EGD DIAGNOSTIC ONLY performed by Jr Pimentel MD at [place] [address] 03/10/2021    EGD BIOPSY performed by Li Smiley MD at [place] [address] 6/27/2021    EGD BAND LIGATION performed by Asia Giraldo MD at [place] [address] N/A 4/27/2022    EGD DIAGNOSTIC ONLY performed by Regine Paulino MD at [address] [facility] ENDOSCOPY        Current Medications:     prednisoLONE  45 mg Oral Daily    furosemide  40 mg Oral Daily    nadolol  40 mg Oral Daily    pantoprazole  40 mg Oral QAM AC    spironolactone  100 mg Oral Daily    vitamin B-1  100 mg Oral Daily    sodium chloride flush  5-40 mL IntraVENous 2 times per day       Allergies:    No Known Allergies     Social History:    reports that she has quit smoking. She has never used smokeless tobacco. She reports current alcohol use. She reports that she does not use drugs. Family History:     family history includes Alcohol Abuse in her mother. ROS:      Per HPI; otherwise 10 point ROS is negative    PHYSICAL EXAM:    Vitals:  Vitals:    06/14/22 1215   BP: 123/68   Pulse: 69   Resp: 16   Temp: 98.3 °F (36.8 °C)   SpO2: 100%        Intake/Output Summary (Last 24 hours) at 6/14/2022 1505  Last data filed at 6/14/2022 1219  Gross per 24 hour   Intake 266.06 ml   Output --   Net 266.06 ml      Wt Readings from Last 3 Encounters:   06/14/22 113 lb (51.3 kg)   05/03/22 134 lb (60.8 kg)   06/30/21 114 lb 13.8 oz (52.1 kg)        General appearance: Appears comfortable.    Eyes: Sclera clear, pupils equal  ENT: Moist mucus membranes, no thrush  Neck: Trachea midline, symmetrical  Cardiovascular: Regular rhythm, normal S1, S2. No murmur, gallop, rub. No edema in  lower extremities  Respiratory: Clear to auscultation bilaterally. No wheeze. Good inspiratory effort  Gastrointestinal: Abdomen distended, normal bowel sounds  Musculoskeletal: No cyanosis in digits, warm extremities  Neurologic: Cranial nerves grossly intact, no motor or speech deficits. Psychiatric: Normal affect. Alert and oriented to time, place and person. Skin: Warm, dry, normal turgor, no rash    DATA:  CBC:   Lab Results   Component Value Date    WBC 8.1 06/14/2022    RBC 2.30 06/14/2022    HGB 8.1 06/14/2022    HCT 22.9 06/14/2022    MCV 99.7 06/14/2022    MCH 35.3 06/14/2022    MCHC 35.4 06/14/2022    RDW 21.2 06/14/2022    PLT 79 06/14/2022    MPV 7.9 06/14/2022     BMP:  Lab Results   Component Value Date     06/14/2022    K 4.8 06/14/2022    K 4.3 06/14/2022     06/14/2022    CO2 23 06/14/2022    BUN 42 06/14/2022    CREATININE 1.0 06/14/2022    CALCIUM 9.5 06/14/2022    GFRAA >60 06/14/2022    LABGLOM 60 06/14/2022    GLUCOSE 161 06/14/2022     Magnesium:   Lab Results   Component Value Date    MG 2.10 06/14/2022    MG 1.90 06/13/2022    MG 1.40 05/03/2022     LIVER PROFILE:   Recent Labs     06/13/22  0822 06/13/22  1654 06/14/22  0154   AST 85* 94* 75*   ALT 41* 45* 37   BILIDIR  --  6.5*  --    BILITOT 19.2* 23.5* 17.6*   ALKPHOS 183* 201* 165*     PT/INR:    Lab Results   Component Value Date    PROTIME 23.0 06/14/2022    PROTIME 22.3 06/13/2022    PROTIME 22.0 06/13/2022    INR 2.03 06/14/2022    INR 1.95 06/13/2022    INR 1.93 06/13/2022       IMPRESSION/RECOMMENDATIONS:    Principal Problem:    Severe anemia  Active Problems:    Elevated LFTs    Ascites due to alcoholic cirrhosis (HCC)    Esophageal varices (HCC)  Resolved Problems:    * No resolved hospital problems. *       50-year-old female who has:    1. Anemia and thrombocytopenia  - hx of bleeding esophageal varices.    - follows with GI, last EGD in April 2022 with small esophageal varices  - hgb 5.1 on admission, up to 8.1 today. S/p 3 units pRBCs  - transfuse to keep Hgb above 7  - will check iron studies, LDH, haptoglobin, Pietro, and SPEP  - B12 and folate WNL on 5/3/2022  - platelets 49W today, no evidence of external bleeding. Likely secondary to cirrhosis. 2. Alcoholic induced cirrhosis  - following with GI  - on lasix and aldactone  - reports no alcohol use for the past 2 months      This plan was discussed with the patient and he/she verbalized understanding. Thank you for allowing us to participate in the care of this patient. DARCY Khalil CNP  [facility] 13  [phone]        Known patient with liver cirrhosis 2/2 to alcohol abuse admitted for severe anemia noticed as an outpatient patient states she has been abstinent from alcohol from April. S/P 3 units PRBCs with appropriate response to from HGB of 5 to 8. Iron studies pending, LDH, Haptoglobin pending, will get B12, Folate, copper zinc, myeloma labs. Peripheral blood smear was reviewed showed significant acanthocytes mostly 2/2 to known liver disease, NO significant schistocytes were noted to suspect a microangiopathic process like TTP or HUS. Did notice circulating spherocytes but this could be artifactual as smear was reviewed post transfusion. Will get direct pietro to evaluate for Autoimmune hemolytic anemia. Transfuse for a goal HGB above 7.  If all above work up is unrevealing patient is agreeable to bone marrow biopsy    [facility] MD  [facility]

## 2022-06-14 NOTE — CARE COORDINATION
Social Work (SW) Consult:    SW received a consult for the patient for alcohol counseling. Patient declined wanting any substance abuse resources and stated that she stopped drinking alcohol in April 2022. Patient reported that she now attends Alcoholics Anonymous (AA) meetings both in-person and virtually. Patient reported that her Daughter is coming soon and she doesn't want to have this discussion with her yet. Patient agreed to follow-up with SW if she changes her mind and would like resources. All SW discharge needs met at this time. Medical staff to inform SW team if additional discharge needs arise.            SLIM Beal, [facility]  Social Work -   [phone]    Electronically signed by ABY Kapoor on 6/14/2022 at 12:49 PM

## 2022-06-14 NOTE — PROGRESS NOTES
Hospitalist Progress Note      PCP: Indu Hsu DO    Date of Admission: 6/13/2022    Chief Complaint: Anemia    Hospital Course: 40 y.o. female with history of alcohol with history of alcoholic cirrhosis, with varices, previous GI bleed who was seen at GI clinic for follow-up and laboratory work-up revealed severe anemia. She remains asymptomatic with no dizziness, no shortness of breath or palpitation. She reports some lethargy since being in the ER but not prior to that. Otherwise feeling well. She denies any obvious bleed. No melena, no hematemesis or hematochezia. Likely her anemia may be due to myelosuppression. Subjective: Patient seen and examined at bedside. Currently asymptomatic. No overt signs of bleeding. Medications:  Reviewed    Infusion Medications    sodium chloride      sodium chloride      sodium chloride      sodium chloride       Scheduled Medications    furosemide  40 mg Oral Daily    nadolol  40 mg Oral Daily    pantoprazole  40 mg Oral QAM AC    spironolactone  100 mg Oral Daily    vitamin B-1  100 mg Oral Daily    sodium chloride flush  5-40 mL IntraVENous 2 times per day     PRN Meds: sodium chloride, sodium chloride, morphine, sodium chloride, sodium chloride flush, sodium chloride, ondansetron **OR** ondansetron, polyethylene glycol, acetaminophen **OR** acetaminophen, oxyCODONE      Intake/Output Summary (Last 24 hours) at 6/14/2022 1347  Last data filed at 6/14/2022 1219  Gross per 24 hour   Intake 266.06 ml   Output --   Net 266.06 ml       Physical Exam Performed:    /68   Pulse 69   Temp 98.3 °F (36.8 °C)   Resp 16   Ht 5' 4\" (1.626 m)   Wt 113 lb (51.3 kg)   SpO2 100%   BMI 19.40 kg/m²     General appearance: No apparent distress, appears stated age and cooperative. HEENT: Pupils equal, round, and reactive to light. Conjunctivae/corneas clear. Neck: Supple, with full range of motion. No jugular venous distention.  Trachea midline. Respiratory:  Normal respiratory effort. Clear to auscultation, bilaterally without Rales/Wheezes/Rhonchi. Cardiovascular: Regular rate and rhythm with normal S1/S2 without murmurs, rubs or gallops. Abdomen: Soft, non-tender, non-distended with normal bowel sounds. Musculoskeletal: No clubbing, cyanosis or edema bilaterally. Full range of motion without deformity. Skin: Skin color, texture, turgor normal.  No rashes or lesions. Neurologic:  Neurovascularly intact without any focal sensory/motor deficits. Cranial nerves: II-XII intact, grossly non-focal.  Psychiatric: Alert and oriented, thought content appropriate, normal insight  Capillary Refill: Brisk,< 3 seconds   Peripheral Pulses: +2 palpable, equal bilaterally       Labs:   Recent Labs     06/13/22 0822 06/13/22 0822 06/13/22 1654 06/13/22 1654 06/14/22  0154 06/14/22  0606 06/14/22  1307   WBC 10.9   < > 10.7  --  8.9  --  8.1   HGB 5.1*   < > 5.2*   < > 5.5* 6.9* 8.1*   HCT 14.7*   < > 14.6*   < > 15.7* 19.8* 22.9*   PLT 94*  --  113*  --  79*  --   --     < > = values in this interval not displayed. Recent Labs     06/13/22 1654 06/14/22 0154 06/14/22  0606   * 136 138   K 4.0 4.3 4.8   CL 98* 102 103   CO2 24 22 23   BUN 48* 44* 42*   CREATININE 1.0 1.0 1.0   CALCIUM 10.2 9.3 9.5   PHOS  --   --  3.9     Recent Labs     06/13/22  0822 06/13/22 1654 06/14/22  0154   AST 85* 94* 75*   ALT 41* 45* 37   BILIDIR  --  6.5*  --    BILITOT 19.2* 23.5* 17.6*   ALKPHOS 183* 201* 165*     Recent Labs     06/13/22  0822 06/13/22 1654 06/14/22  0154   INR 1.93* 1.95* 2.03*     No results for input(s): CKTOTAL, TROPONINI in the last 72 hours.     Urinalysis:      Lab Results   Component Value Date    NITRU POSITIVE 04/26/2022    WBCUA 21-50 04/26/2022    BACTERIA 3+ 04/26/2022    RBCUA 0-2 04/26/2022    BLOODU SMALL 04/26/2022    SPECGRAV 1.020 04/26/2022    GLUCOSEU Negative 04/26/2022       Radiology:  US GALLBLADDER RUQ    (Results Pending)           Assessment/Plan:    Active Hospital Problems    Diagnosis     Severe anemia [D64.9]      Priority: Medium    Elevated LFTs [R79.89]      Priority: Medium    Ascites due to alcoholic cirrhosis (HCC) [E09.19]      Priority: Medium    Esophageal varices (HCC) [I85.00]      Anemia  Status post 3 units PRBC  Monitor hemoglobin  Likely myelosuppression, will consult hematology  GI input appreciated    History of alcoholic cirrhosis with portal hypertension and varices  Has established follow-up with GI  No overt bleeding at this time  Continue to blocker, Lasix and spironolactone    DVT Prophylaxis: SCD  Diet: ADULT DIET; Regular;  Low Sodium (2 gm)  Code Status: Full Code    Electronically signed by Patricia Bryant MD on 2022 at 1:47 PM

## 2022-06-14 NOTE — CONSULTS
Gastroenterology Consult Note        Patient: Amy Almeida  : 1975  Acct#:      Date:  2022    Subjective:       History of Present Illness  Patient is a 40 y.o.  female admitted with Severe anemia [D64.9] who is seen in consult for anemia, h/o cirrhosis. History of alcohol induced cirrhosis. Patient has been seen mainly in the hospital for cirrhosis and anemia (last seen in 2022) but did see Dr. Mario Apley in office 2022. She last drink alcohol in 2022. Her cirrhosis is decompensated by ascites which has been managed with Lasix 40 mg daily and Aldactone 100 mg daily. No history of hepatic encephalopathy. She did have bleeding esophageal varices which were banded in 2021. Last EGD was 2022 with small esophageal varices, mild portal hypertensive gastropathy. She was started on nadolol and is compliant with this medication. States she started online [employer] in April. Dr. Mario Apley plans to send her to [facility] for liver transplant eval after 6 months off alcohol. She had outpatient labs done with Dr. Mario Apley and hemoglobin was 5 so she was sent to the ER. Denies any melena or hematochezia. No abdominal pain, nausea, vomiting.       Past Medical History:   Diagnosis Date    Alcoholic liver disease ([address]     Anemia     History of blood transfusion       Past Surgical History:   Procedure Laterality Date    COLONOSCOPY N/A 3/11/2021    COLONOSCOPY POLYPECTOMY SNARE/COLD BIOPSY performed by Karl Mathews MD at [address]    UPPER GASTROINTESTINAL ENDOSCOPY N/A 2021    EGD DIAGNOSTIC ONLY performed by Jah Forbes MD at [address] N/A 03/10/2021    EGD BIOPSY performed by Karl Mathews MD at [address] 2021    EGD BAND LIGATION performed by Rosemary Arellano MD at [address] 4/27/2022    EGD DIAGNOSTIC ONLY performed by Carole Tan MD at [address]      Past Endoscopic History    Egd with Dr Indigo Guerrero 4/27/22 for anemia    Findings:             Small esophageal varices  Mild portal hypertensive gastropathy. No blood nor bleeding seen.     Colonoscopy 3/2021 with Dr Violet Barnett for anemia     Findings:   Two small polyps, removed with cold snare. s/p 3 clips total. Patchy diverticulosis (right and left colon). Hemorrhoids.      Plans:  Await pathology. Recall colonoscopy in 5 years. Admission Meds  No current facility-administered medications on file prior to encounter. Current Outpatient Medications on File Prior to Encounter   Medication Sig Dispense Refill    spironolactone (ALDACTONE) 50 MG tablet       thiamine 100 mg tablet  (Patient not taking: Reported on 6/13/2022)      nadolol (CORGARD) 40 MG tablet Take 1 tablet by mouth daily 30 tablet 0    furosemide (LASIX) 40 MG tablet Take 1 tablet by mouth daily 60 tablet 0    spironolactone (ALDACTONE) 100 MG tablet Take 1 tablet by mouth daily 30 tablet 1    folic acid (FOLVITE) 1 MG tablet Take 1 tablet by mouth daily (Patient not taking: Reported on 6/13/2022) 30 tablet 0    magnesium oxide (MAG-OX) 400 (240 Mg) MG tablet Take 1 tablet by mouth 2 times daily 60 tablet 0    pantoprazole (PROTONIX) 40 MG tablet Take 1 tablet by mouth every morning (before breakfast) 30 tablet 1    thiamine mononitrate (THIAMINE) 100 MG tablet Take 1 tablet by mouth daily 30 tablet 0       Patient denies ASA, NSAID use.     Allergies  No Known Allergies   Social   Social History     Tobacco Use    Smoking status: Former Smoker    Smokeless tobacco: Never Used   Substance Use Topics    Alcohol use: Yes        Family History   Problem Relation Age of Onset    Alcohol Abuse Mother         Review of Systems  Constitutional: negative for fevers, chills, sweats    Ears, nose, mouth, throat, and face: negative for nasal congestion and sore throat   Respiratory: negative for cough and shortness of breath   Cardiovascular: negative for chest pain and dyspnea   Gastrointestinal: see hpi   Genitourinary:negative for dysuria and frequency   Integument/breast: negative for pruritus and rash   Hematologic/lymphatic: negative for bleeding and easy bruising   Musculoskeletal:negative for arthralgias and myalgias   Neurological: negative for dizziness and weakness         Physical Exam  Blood pressure (!) 113/56, pulse 68, temperature 98.1 °F (36.7 °C), temperature source Oral, resp. rate 18, height 5' 4\" (1.626 m), weight 113 lb (51.3 kg), SpO2 100 %. General appearance: alert, cooperative, no distress, appears stated age  Eyes: Anicteric  Head: Normocephalic, without obvious abnormality  Lungs: clear to auscultation bilaterally, Normal Effort  Heart: regular rate and rhythm, normal S1 and S2, no murmurs or rubs  Abdomen: soft, non-tender. Bowel sounds normal. No masses,  no organomegaly. Extremities: atraumatic, no cyanosis or edema  Skin: warm and dry, no jaundice  Neuro: Grossly intact, A&OX3  Musculoskeletal: 5/5  strength BUE      Data Review:    Recent Labs     06/13/22 0822 06/13/22 0822 06/13/22 1654 06/14/22 0154 06/14/22  0606   WBC 10.9  --  10.7 8.9  --    HGB 5.1*   < > 5.2* 5.5* 6.9*   HCT 14.7*   < > 14.6* 15.7* 19.8*   .4*  --  117.2* 105.9*  --    PLT 94*  --  113* 79*  --     < > = values in this interval not displayed. Recent Labs     06/13/22 0822 06/13/22 1654 06/14/22  0154    135* 136   K 3.7 4.0 4.3    98* 102   CO2 21 24 22   BUN 39* 48* 44*   CREATININE 1.0 1.0 1.0     Recent Labs     06/13/22 0822 06/13/22 1654 06/14/22  0154   AST 85* 94* 75*   ALT 41* 45* 37   BILIDIR  --  6.5*  --    BILITOT 19.2* 23.5* 17.6*   ALKPHOS 183* 201* 165*     No results for input(s): LIPASE, AMYLASE in the last 72 hours.   Recent Labs     06/13/22  0822 06/13/22  1654 06/14/22  0154   PROTIME 22.0* 22.3* 23.0*   INR 1.93* 1.95* 2.03*     No results for input(s): PTT in the last 72 hours. No results for input(s): OCCULTBLD in the last 72 hours. Imaging Studies:             Assessment:     Principal Problem:    Severe anemia  Active Problems:    Elevated LFTs    Ascites due to alcoholic cirrhosis (HCC)    Esophageal varices (HCC)  Resolved Problems:    * No resolved hospital problems. *    Macrocytic anemia -hemoglobin was 5.1 at admission. Hemoglobin up to 6.9 after 2 units PRBC. Getting third unit PRBC currently. No gross GI bleeding. Last EGD was in April of this year with small esophageal varices and portal hypertensive gastropathy. Colonoscopy for anemia in 2021 was negative. Anemia seems to be due from cirrhosis. She has been off alcohol for 2 months per her report. Cirrhosis -due to alcohol abuse. No alcohol since April 2021. Small varices on EGD 4/2022. No history of hepatic encephalopathy. History of ascites which has been managed with Lasix 40 and Aldactone 100 mg daily. MELD is 27 today. DF is 64 but has been intolerant to steroids in the past.   Her bilirubin is significantly higher than it was at recent DC in early May concerning for alcohol injury but she denies continued alcohol use. Recommendations:   -Continue PPI  -Hold off on endoscopic evaluation given no gross GI bleeding and recent negative endoscopic evaluation.  -Consider hematology eval  -We will need hemoglobin checked routinely outpatient and can get blood transfusions as needed outpatient  -stressed importance of continued alcohol cessation with patient    Discussed with Dr. Keri Bazan, [facility]  [address] [place] [address]  I have personally performed a face to face diagnostic evaluation on this patient. I have interviewed and examined the patient and I agree with the findings and recommended plan of care.   In summary, my findings and plan are the following: As above, young woman with cirrhosis due to alcohol with profound anemia/pancytopenia and worsening jaundice. She had myalgias/arthralgias since PRBC transfusions. On exam she is jaundiced, abd is soft and NT with palpable liver edge. Will get RUQ US to r/o biliary obstruction. Will Rx with prednisolone for alcoholic hepatitis and restart Nadolol and diuretics. MSO4 for aches and pains ok since avoiding NSAID's and Tylenol. Will ask Heme to see re: pancytopenia.     Tavares Hutchins, [address] [place] [address]  6/14/2022

## 2022-06-14 NOTE — ED NOTES
Patient transported to floor by this RN in stable condition alert and oriented x4 with all patient belongings on portable tele with blood infusing at 150 ml.       Gonzalez Cintron RN  06/13/22 6465

## 2022-06-15 ENCOUNTER — APPOINTMENT (OUTPATIENT)
Dept: ULTRASOUND IMAGING | Age: 47
End: 2022-06-15
Payer: COMMERCIAL

## 2022-06-15 VITALS
DIASTOLIC BLOOD PRESSURE: 71 MMHG | WEIGHT: 111.1 LBS | BODY MASS INDEX: 18.97 KG/M2 | RESPIRATION RATE: 18 BRPM | HEIGHT: 64 IN | TEMPERATURE: 98 F | OXYGEN SATURATION: 96 % | SYSTOLIC BLOOD PRESSURE: 131 MMHG | HEART RATE: 68 BPM

## 2022-06-15 LAB
ALBUMIN SERPL-MCNC: 3.2 G/DL (ref 3.4–5)
ALP BLD-CCNC: 162 U/L (ref 40–129)
ALT SERPL-CCNC: 41 U/L (ref 10–40)
ANION GAP SERPL CALCULATED.3IONS-SCNC: 12 MMOL/L (ref 3–16)
ANISOCYTOSIS: ABNORMAL
AST SERPL-CCNC: 73 U/L (ref 15–37)
BASOPHILS ABSOLUTE: 0 K/UL (ref 0–0.2)
BASOPHILS RELATIVE PERCENT: 0 %
BILIRUB SERPL-MCNC: 19.9 MG/DL (ref 0–1)
BILIRUBIN DIRECT: 4.6 MG/DL (ref 0–0.3)
BILIRUBIN DIRECT: 5 MG/DL (ref 0–0.3)
BILIRUBIN, INDIRECT: 14.9 MG/DL (ref 0–1)
BUN BLDV-MCNC: 38 MG/DL (ref 7–20)
BURR CELLS: ABNORMAL
CALCIUM SERPL-MCNC: 9.5 MG/DL (ref 8.3–10.6)
CHLORIDE BLD-SCNC: 98 MMOL/L (ref 99–110)
CO2: 22 MMOL/L (ref 21–32)
CREAT SERPL-MCNC: 0.8 MG/DL (ref 0.6–1.1)
DAT POLYSPECIFIC: NORMAL
EOSINOPHILS ABSOLUTE: 0 K/UL (ref 0–0.6)
EOSINOPHILS RELATIVE PERCENT: 0 %
FERRITIN: 863.1 NG/ML (ref 15–150)
FOLATE: >20 NG/ML (ref 4.78–24.2)
GFR AFRICAN AMERICAN: >60
GFR NON-AFRICAN AMERICAN: >60
GLUCOSE BLD-MCNC: 290 MG/DL (ref 70–99)
HAPTOGLOBIN: <10 MG/DL (ref 30–200)
HCT VFR BLD CALC: 21.3 % (ref 36–48)
HEMOGLOBIN: 7.5 G/DL (ref 12–16)
IGA: 547 MG/DL (ref 70–400)
IGG: 1615 MG/DL (ref 700–1600)
IGM: 390 MG/DL (ref 40–230)
IRON SATURATION: ABNORMAL % (ref 15–50)
IRON: 241 UG/DL (ref 37–145)
LYMPHOCYTES ABSOLUTE: 1 K/UL (ref 1–5.1)
LYMPHOCYTES RELATIVE PERCENT: 11 %
MACROCYTES: ABNORMAL
MAGNESIUM: 1.8 MG/DL (ref 1.8–2.4)
MCH RBC QN AUTO: 35.4 PG (ref 26–34)
MCHC RBC AUTO-ENTMCNC: 35.4 G/DL (ref 31–36)
MCV RBC AUTO: 100.1 FL (ref 80–100)
METAMYELOCYTES RELATIVE PERCENT: 1 %
MONOCYTES ABSOLUTE: 0 K/UL (ref 0–1.3)
MONOCYTES RELATIVE PERCENT: 0 %
NEUTROPHILS ABSOLUTE: 7.9 K/UL (ref 1.7–7.7)
NEUTROPHILS RELATIVE PERCENT: 88 %
PDW BLD-RTO: 23.5 % (ref 12.4–15.4)
PHOSPHORUS: 3.9 MG/DL (ref 2.5–4.9)
PLATELET # BLD: 69 K/UL (ref 135–450)
PLATELET SLIDE REVIEW: ABNORMAL
PMV BLD AUTO: 8.2 FL (ref 5–10.5)
POLYCHROMASIA: ABNORMAL
POTASSIUM SERPL-SCNC: 4.1 MMOL/L (ref 3.5–5.1)
RBC # BLD: 2.12 M/UL (ref 4–5.2)
SLIDE REVIEW: ABNORMAL
SODIUM BLD-SCNC: 132 MMOL/L (ref 136–145)
TOTAL IRON BINDING CAPACITY: ABNORMAL UG/DL (ref 260–445)
TOTAL PROTEIN: 6.7 G/DL (ref 6.4–8.2)
VITAMIN B-12: >2000 PG/ML (ref 211–911)
WBC # BLD: 8.9 K/UL (ref 4–11)

## 2022-06-15 PROCEDURE — 76705 ECHO EXAM OF ABDOMEN: CPT

## 2022-06-15 PROCEDURE — 80076 HEPATIC FUNCTION PANEL: CPT

## 2022-06-15 PROCEDURE — 96376 TX/PRO/DX INJ SAME DRUG ADON: CPT

## 2022-06-15 PROCEDURE — 84100 ASSAY OF PHOSPHORUS: CPT

## 2022-06-15 PROCEDURE — 84630 ASSAY OF ZINC: CPT

## 2022-06-15 PROCEDURE — 85025 COMPLETE CBC W/AUTO DIFF WBC: CPT

## 2022-06-15 PROCEDURE — 6360000002 HC RX W HCPCS: Performed by: INTERNAL MEDICINE

## 2022-06-15 PROCEDURE — 6370000000 HC RX 637 (ALT 250 FOR IP): Performed by: INTERNAL MEDICINE

## 2022-06-15 PROCEDURE — 83735 ASSAY OF MAGNESIUM: CPT

## 2022-06-15 PROCEDURE — 82248 BILIRUBIN DIRECT: CPT

## 2022-06-15 PROCEDURE — 82784 ASSAY IGA/IGD/IGG/IGM EACH: CPT

## 2022-06-15 PROCEDURE — 82607 VITAMIN B-12: CPT

## 2022-06-15 PROCEDURE — 2580000003 HC RX 258: Performed by: INTERNAL MEDICINE

## 2022-06-15 PROCEDURE — 82525 ASSAY OF COPPER: CPT

## 2022-06-15 PROCEDURE — 83883 ASSAY NEPHELOMETRY NOT SPEC: CPT

## 2022-06-15 PROCEDURE — 80048 BASIC METABOLIC PNL TOTAL CA: CPT

## 2022-06-15 PROCEDURE — 36415 COLL VENOUS BLD VENIPUNCTURE: CPT

## 2022-06-15 PROCEDURE — 82746 ASSAY OF FOLIC ACID SERUM: CPT

## 2022-06-15 PROCEDURE — G0378 HOSPITAL OBSERVATION PER HR: HCPCS

## 2022-06-15 RX ORDER — MORPHINE SULFATE 2 MG/ML
1 INJECTION, SOLUTION INTRAMUSCULAR; INTRAVENOUS EVERY 4 HOURS PRN
Status: DISCONTINUED | OUTPATIENT
Start: 2022-06-15 | End: 2022-06-15 | Stop reason: HOSPADM

## 2022-06-15 RX ORDER — PREDNISOLONE 15 MG/5 ML
15 SOLUTION, ORAL ORAL DAILY
Status: DISCONTINUED | OUTPATIENT
Start: 2022-07-20 | End: 2022-06-15 | Stop reason: HOSPADM

## 2022-06-15 RX ORDER — PREDNISOLONE 15 MG/5 ML
45 SOLUTION, ORAL ORAL DAILY
Qty: 630 ML | Refills: 0 | Status: ON HOLD | OUTPATIENT
Start: 2022-06-15 | End: 2022-06-30

## 2022-06-15 RX ORDER — PREDNISOLONE 15 MG/5 ML
30 SOLUTION, ORAL ORAL DAILY
Status: DISCONTINUED | OUTPATIENT
Start: 2022-07-13 | End: 2022-06-15 | Stop reason: HOSPADM

## 2022-06-15 RX ORDER — FERROUS SULFATE 325(65) MG
325 TABLET ORAL 2 TIMES DAILY
Qty: 180 TABLET | Refills: 1 | Status: ON HOLD | OUTPATIENT
Start: 2022-06-15 | End: 2022-06-30

## 2022-06-15 RX ORDER — PREDNISOLONE 15 MG/5 ML
45 SOLUTION, ORAL ORAL DAILY
Status: DISCONTINUED | OUTPATIENT
Start: 2022-06-15 | End: 2022-06-15 | Stop reason: HOSPADM

## 2022-06-15 RX ADMIN — MORPHINE SULFATE 2 MG: 2 INJECTION, SOLUTION INTRAMUSCULAR; INTRAVENOUS at 00:27

## 2022-06-15 RX ADMIN — FUROSEMIDE 40 MG: 40 TABLET ORAL at 15:03

## 2022-06-15 RX ADMIN — Medication 10 ML: at 13:32

## 2022-06-15 RX ADMIN — Medication 45 MG: at 17:42

## 2022-06-15 RX ADMIN — THIAMINE HCL TAB 100 MG 100 MG: 100 TAB at 15:03

## 2022-06-15 RX ADMIN — MORPHINE SULFATE 1 MG: 2 INJECTION, SOLUTION INTRAMUSCULAR; INTRAVENOUS at 13:32

## 2022-06-15 RX ADMIN — SPIRONOLACTONE 100 MG: 25 TABLET ORAL at 15:02

## 2022-06-15 ASSESSMENT — PAIN SCALES - GENERAL: PAINLEVEL_OUTOF10: 8

## 2022-06-15 ASSESSMENT — PAIN DESCRIPTION - DESCRIPTORS: DESCRIPTORS: ACHING

## 2022-06-15 NOTE — PROGRESS NOTES
Oncology and Hematology Care   Progress Note      6/15/2022 8:22 AM        Name: Isela Macdonald . Admitted: 6/13/2022    SUBJECTIVE:  She is feeling okay today, reports feeling generally achy and tired. No bleeding. Reviewed interval ancillary notes    Current Medications  0.9 % sodium chloride infusion, PRN  0.9 % sodium chloride infusion, PRN  prednisoLONE (PRELONE) 15 MG/5ML syrup 45 mg, Daily  0.9 % sodium chloride infusion, PRN  furosemide (LASIX) tablet 40 mg, Daily  nadolol (CORGARD) tablet 40 mg, Daily  pantoprazole (PROTONIX) tablet 40 mg, QAM AC  spironolactone (ALDACTONE) tablet 100 mg, Daily  thiamine mononitrate tablet 100 mg, Daily  sodium chloride flush 0.9 % injection 5-40 mL, 2 times per day  sodium chloride flush 0.9 % injection 5-40 mL, PRN  0.9 % sodium chloride infusion, PRN  ondansetron (ZOFRAN-ODT) disintegrating tablet 4 mg, Q8H PRN   Or  ondansetron (ZOFRAN) injection 4 mg, Q6H PRN  polyethylene glycol (GLYCOLAX) packet 17 g, Daily PRN  acetaminophen (TYLENOL) tablet 650 mg, Q6H PRN   Or  acetaminophen (TYLENOL) suppository 650 mg, Q6H PRN  oxyCODONE (ROXICODONE) immediate release tablet 5 mg, Q4H PRN        Objective:  BP (!) 114/56   Pulse 87   Temp 98.2 °F (36.8 °C) (Oral)   Resp 18   Ht 5' 4\" (1.626 m)   Wt 111 lb 1.6 oz (50.4 kg)   SpO2 98%   BMI 19.07 kg/m²     Intake/Output Summary (Last 24 hours) at 6/15/2022 4078  Last data filed at 6/14/2022 1219  Gross per 24 hour   Intake 256.06 ml   Output --   Net 256.06 ml      Wt Readings from Last 3 Encounters:   06/15/22 111 lb 1.6 oz (50.4 kg)   05/03/22 134 lb (60.8 kg)   06/30/21 114 lb 13.8 oz (52.1 kg)       General appearance:  Appears comfortable  Eyes: Sclera clear. Pupils equal.  ENT: Moist oral mucosa. Trachea midline, no adenopathy. Cardiovascular: Regular rhythm, normal S1, S2. No murmur. No edema in lower extremities  Respiratory: Not using accessory muscles. Good inspiratory effort.  Clear to auscultation bilaterally, no wheeze or crackles. GI: Abdomen soft, no tenderness, not distended  Musculoskeletal: No cyanosis in digits, neck supple  Neurology: CN 2-12 grossly intact. No speech or motor deficits  Psych: Normal affect. Alert and oriented in time, place and person  Skin: Warm, dry, normal turgor    Labs and Tests:  CBC:   Recent Labs     06/14/22  0154 06/14/22  0154 06/14/22  0606 06/14/22  1307 06/15/22  0500   WBC 8.9  --   --  8.1 8.9   HGB 5.5*   < > 6.9* 8.1* 7.5*   PLT 79*  --   --  71* 69*    < > = values in this interval not displayed. BMP:    Recent Labs     06/14/22  0154 06/14/22  0606 06/15/22  0500    138 132*   K 4.3 4.8 4.1    103 98*   CO2 22 23 22   BUN 44* 42* 38*   CREATININE 1.0 1.0 0.8   GLUCOSE 132* 161* 290*     Hepatic:   Recent Labs     06/13/22  0822 06/13/22  1654 06/14/22  0154   AST 85* 94* 75*   ALT 41* 45* 37   BILITOT 19.2* 23.5* 17.6*   ALKPHOS 183* 201* 165*       ASSESSMENT AND PLAN    Principal Problem:    Severe anemia  Active Problems:    Elevated LFTs    Ascites due to alcoholic cirrhosis (HCC)    Esophageal varices (HCC)  Resolved Problems:    * No resolved hospital problems. *      1. Anemia and thrombocytopenia  - hx of bleeding esophageal varices. - follows with GI, last EGD in April 2022 with small esophageal varices  - hgb 5.1 on admission  - Hgb 7.5 today, s/p 3 units pRBCs  - transfuse to keep Hgb above 7  - iron studies, LDH, haptoglobin, Sonja, and myeloma labs pending  - B12 and folate WNL on 5/3/2022, will recheck. - copper and zinc pending  - platelets 26F today, no evidence of external bleeding. Likely secondary to cirrhosis. - Peripheral blood smear was reviewed showed significant acanthocytes mostly 2/2 to known liver disease, NO significant schistocytes were noted to suspect a microangiopathic process like TTP or HUS.  Did notice circulating spherocytes but this could be artifactual as smear was reviewed post transfusion.  - if all above workup is unrevealing, patient is agreeable to bone marrow biopsy     2. Alcoholic induced cirrhosis  - following with GI  - on lasix and aldactone  - reports no alcohol use for the past 2 months      DARCY [facility]  Oncology Hematology [facility] 13  [phone]      Patient was seen the morning. Anemia and thrombocytopenia. No signs of active bleeding. Diagnosis of alcoholic cirrhosis. Some labs pending. Plan was discussed. Jessica Schmitz.  Félix Soliz MD, [place] [address]  Hematology and Oncology  [facility]  [phone]

## 2022-06-15 NOTE — PROGRESS NOTES
Gastroenterology Progress Note    Chidi Dawkins is a 40 y.o. female patient. Principal Problem:    Severe anemia  Active Problems:    Elevated LFTs    Ascites due to alcoholic cirrhosis (HCC)    Esophageal varices (HCC)  Resolved Problems:    * No resolved hospital problems. *      SUBJECTIVE:  Still feels achy after blood transfusions. ROS:  No fever, chills  No chest pain, palpitations  No SOB, cough  Gastrointestinal ROS: no abdominal pain, N/V    Physical    VITALS:  BP (!) 114/56   Pulse 87   Temp 98.2 °F (36.8 °C) (Oral)   Resp 18   Ht 5' 4\" (1.626 m)   Wt 111 lb 1.6 oz (50.4 kg)   SpO2 98%   BMI 19.07 kg/m²   TEMPERATURE:  Current - Temp: 98.2 °F (36.8 °C); Max - Temp  Av.1 °F (36.7 °C)  Min: 97.2 °F (36.2 °C)  Max: 98.4 °F (36.9 °C)    NAD  jaundice  Regular rate   Lungs CTA Bilaterally  Abdomen soft, ND, NT,  Bowel sounds normal.    Data    Data Review:    Recent Labs     22  0154 22  0154 22  0606 22  1307 06/15/22  0500   WBC 8.9  --   --  8.1 8.9   HGB 5.5*   < > 6.9* 8.1* 7.5*   HCT 15.7*   < > 19.8* 22.9* 21.3*   .9*  --   --  99.7 100.1*   PLT 79*  --   --  71* 69*    < > = values in this interval not displayed. Recent Labs     22  0154 22  0606 06/15/22  0500    138 132*   K 4.3 4.8 4.1    103 98*   CO2    PHOS  --  3.9 3.9   BUN 44* 42* 38*   CREATININE 1.0 1.0 0.8     Recent Labs     22  1654 22  0154 06/15/22  0500 06/15/22  0936   AST 94* 75*  --  73*   ALT 45* 37  --  41*   BILIDIR 6.5*  --  4.6* 5.0*   BILITOT 23.5* 17.6*  --  19.9*   ALKPHOS 201* 165*  --  162*     No results for input(s): LIPASE, AMYLASE in the last 72 hours. Recent Labs     22  0822 22  1654 22  0154   PROTIME 22.0* 22.3* 23.0*   INR 1.93* 1.95* 2.03*     No results for input(s): PTT in the last 72 hours. ASSESSMENT :      Macrocytic anemia -hemoglobin was 5.1 at admission.   Hemoglobin up to

## 2022-06-15 NOTE — PROGRESS NOTES
Nutrition Note    RECOMMENDATIONS  1. PO Diet: resume LEE after u/s  2. ONS: Ensure Enlive BID  3. Nutrition Support: n/a     NUTRITION ASSESSMENT   Nutrition assessment triggered based on admission MST of 3, indicating weight loss. Pt reports good appetite and PO intake at home, however she continues to lose weight. Takes diuretics at home r/t hx of ascites and alcoholic induced cirrhosis. On lasix and aldactone here.  lb, pt was 148lb back in early May, had paracentesis then was 134 lb. Overall pt has had ~17% loss over past month and 22% over 2 months. She consumes a smoothie each morning with a blend of collagen and greens plus adds a B complex. Encouraged adequate protein and pt agreed to try Ensure after diet advanced. NPO for u/s this morning.  Nutrition Related Findings: Hgb 7.5, Hct 21.3; received transfusion; Na+ 123, glucose 290; lasix, aldactone, thiamine; jaundiced; active BS   Wounds: None   Nutrition Education:  Education initiated (small frequent meals / snacks at home)    Nutrition Goals: PO intake 50% or greater     MALNUTRITION ASSESSMENT   Chronic Illness  Malnutrition Status: At risk for malnutrition (Comment)      NUTRITION DIAGNOSIS   · Unintended weight loss related to inadequate protein-energy intake as evidenced by caloric intake not sufficient to meet needs        CURRENT NUTRITION THERAPIES  Diet NPO     PO Intake: NPO   PO Supplement Intake:None Ordered      ANTHROPOMETRICS   Current Height: 5' 4\" (162.6 cm)   Current Weight: 111 lb 1.6 oz (50.4 kg)     Ideal Body Weight (IBW): 120 lbs  (55 kg)        BMI: 19      COMPARATIVE STANDARDS  Energy (kcal):  1250 - 1500     Protein (g):  60 - 75       Fluid (mL/day):  1500    The patient will be monitored per nutrition standards of care. Consult dietitian if additional nutrition interventions are needed prior to RD reassessment.      Stephen Howard, BOGDAN, LD    Contact: [phone]

## 2022-06-17 LAB
COPPER: 79.1 UG/DL (ref 80–155)
KAPPA, FREE LIGHT CHAINS, SERUM: 85.26 MG/L (ref 3.3–19.4)
KAPPA/LAMBDA RATIO: 2.2 (ref 0.26–1.65)
KAPPA/LAMBDA TEST COMMENT: ABNORMAL
LAMBDA, FREE LIGHT CHAINS, SERUM: 38.72 MG/L (ref 5.71–26.3)
ZINC: 33.8 UG/DL (ref 60–120)

## 2022-06-18 NOTE — DISCHARGE SUMMARY
Hospital Medicine Discharge Summary    Patient ID: Abram Carias      Patient's PCP: Rebeca Hamm DO    Admit Date: 2022     Discharge Date: 6/15/2022      Admitting Physician: Ariel Spence MD     Discharge Physician: Patricia Bryant MD     Discharge Diagnoses: Active Hospital Problems    Diagnosis     Severe anemia [D64.9]      Priority: Medium    Elevated LFTs [R79.89]      Priority: Medium    Ascites due to alcoholic cirrhosis (HCC) [B52.88]      Priority: Medium    Esophageal varices (HCC) [I85.00]        The patient was seen and examined on day of discharge and this discharge summary is in conjunction with any daily progress note from day of discharge. Hospital Course:     40 y. o. female with history of alcohol with history of alcoholic cirrhosis, with varices, previous GI bleed who was seen at GI clinic for follow-up and laboratory work-up revealed severe anemia.  She remains asymptomatic with no dizziness, no shortness of breath or palpitation.  She reports some lethargy since being in the ER but not prior to that.  Otherwise feeling well.  She denies any obvious bleed.  No melena, no hematemesis or hematochezia. Catalina Divine her anemia may be due to myelosuppression. Patient received transfusion of 3 units PRBC. Hematology consulted, initial work-up unremarkable. Plan for outpatient bone marrow biopsy. Physical Exam Performed:     /71   Pulse 68   Temp 98 °F (36.7 °C) (Oral)   Resp 18   Ht 5' 4\" (1.626 m)   Wt 111 lb 1.6 oz (50.4 kg)   SpO2 96%   BMI 19.07 kg/m²       General appearance:  No apparent distress, appears stated age and cooperative. HEENT:  Normal cephalic, atraumatic without obvious deformity. Pupils equal, round, and reactive to light. Extra ocular muscles intact. Conjunctivae/corneas clear. Neck: Supple, with full range of motion. No jugular venous distention. Trachea midline. Respiratory:  Normal respiratory effort.  Clear to auscultation, bilaterally without Rales/Wheezes/Rhonchi. Cardiovascular:  Regular rate and rhythm with normal S1/S2 without murmurs, rubs or gallops. Abdomen: Soft, non-tender, non-distended with normal bowel sounds. Musculoskeletal:  No clubbing, cyanosis or edema bilaterally. Full range of motion without deformity. Skin: Skin color, texture, turgor normal.  No rashes or lesions. Neurologic:  Neurovascularly intact without any focal sensory/motor deficits. Cranial nerves: II-XII intact, grossly non-focal.  Psychiatric:  Alert and oriented, thought content appropriate, normal insight  Capillary Refill: Brisk,< 3 seconds   Peripheral Pulses: +2 palpable, equal bilaterally       Labs: For convenience and continuity at follow-up the following most recent labs are provided:      CBC:    Lab Results   Component Value Date    WBC 8.9 06/15/2022    HGB 7.5 06/15/2022    HCT 21.3 06/15/2022    PLT 69 06/15/2022       Renal:    Lab Results   Component Value Date     06/15/2022    K 4.1 06/15/2022    K 4.3 06/14/2022    CL 98 06/15/2022    CO2 22 06/15/2022    BUN 38 06/15/2022    CREATININE 0.8 06/15/2022    CALCIUM 9.5 06/15/2022    PHOS 3.9 06/15/2022         Significant Diagnostic Studies    Radiology:   US GALLBLADDER RUQ   Final Result   1. Cirrhosis with small amount of perihepatic ascites. 2. Cholelithiasis with moderate extrahepatic and mild intrahepatic ductal   dilatation can be seen in setting of acute cholecystitis. Recommend further   evaluation with MRI of the abdomen with without contrast to utilizing MRCP to   exclude an obstructing lesion.                 Consults:     IP CONSULT TO GI  IP CONSULT TO SOCIAL WORK  IP CONSULT TO HEM/ONC    Disposition: Home    Condition at Discharge: Stable    Discharge Instructions/Follow-up: PCP  Gastroenterology  Hematology oncology    Code Status:  Prior     Activity: activity as tolerated    Diet: Low-sodium diet, abstain from ethanol      Discharge Medications:     Discharge Medication List as of 6/15/2022  5:47 PM           Details   prednisoLONE (PRELONE) 15 MG/5ML syrup Take 15 mLs by mouth daily For 4 weeks, then 10 mLs daily x 1 week, then 5 mLs daily x 1 week, then off., Disp-630 mL, R-0Print      ferrous sulfate (IRON 325) 325 (65 Fe) MG tablet Take 1 tablet by mouth 2 times daily, Disp-180 tablet, R-1Print              Details   !! spironolactone (ALDACTONE) 50 MG tablet Historical Med      thiamine 100 mg tablet Historical Med      nadolol (CORGARD) 40 MG tablet Take 1 tablet by mouth daily, Disp-30 tablet, R-0Normal      furosemide (LASIX) 40 MG tablet Take 1 tablet by mouth daily, Disp-60 tablet, R-0Normal      !! spironolactone (ALDACTONE) 100 MG tablet Take 1 tablet by mouth daily, Disp-30 tablet, [id number]      folic acid (FOLVITE) 1 MG tablet Take 1 tablet by mouth daily, Disp-30 tablet, R-0Normal      magnesium oxide (MAG-OX) 400 (240 Mg) MG tablet Take 1 tablet by mouth 2 times daily, Disp-60 tablet, R-0Normal      pantoprazole (PROTONIX) 40 MG tablet Take 1 tablet by mouth every morning (before breakfast), Disp-30 tablet, R-1Normal      thiamine mononitrate (THIAMINE) 100 MG tablet Take 1 tablet by mouth daily, Disp-30 tablet, R-0Normal       !! - Potential duplicate medications found. Please discuss with provider. Time Spent on discharge is more than 30 minutes in the examination, evaluation, counseling and review of medications and discharge plan.       Signed:    Electronically signed by Frances Shah MD on 6/18/2022 at 4:26 PM

## 2022-06-23 ENCOUNTER — HOSPITAL ENCOUNTER (OUTPATIENT)
Age: 47
Discharge: HOME | End: 2022-06-23
Payer: COMMERCIAL

## 2022-06-23 LAB
ABO/RH: NORMAL
ANTIBODY SCREEN: NORMAL

## 2022-06-23 PROCEDURE — 36415 COLL VENOUS BLD VENIPUNCTURE: CPT

## 2022-06-23 PROCEDURE — 86923 COMPATIBILITY TEST ELECTRIC: CPT

## 2022-06-23 PROCEDURE — 86850 RBC ANTIBODY SCREEN: CPT

## 2022-06-23 PROCEDURE — 86901 BLOOD TYPING SEROLOGIC RH(D): CPT

## 2022-06-23 PROCEDURE — 86900 BLOOD TYPING SEROLOGIC ABO: CPT

## 2022-06-24 ENCOUNTER — HOSPITAL ENCOUNTER (OUTPATIENT)
Dept: ONCOLOGY | Age: 47
Setting detail: INFUSION SERIES
Discharge: HOME | End: 2022-06-24
Payer: COMMERCIAL

## 2022-06-24 VITALS
TEMPERATURE: 98.4 F | OXYGEN SATURATION: 100 % | HEART RATE: 76 BPM | RESPIRATION RATE: 16 BRPM | SYSTOLIC BLOOD PRESSURE: 116 MMHG | DIASTOLIC BLOOD PRESSURE: 56 MMHG

## 2022-06-24 LAB
BLOOD BANK DISPENSE STATUS: NORMAL
BLOOD BANK PRODUCT CODE: NORMAL
BPU ID: NORMAL
DESCRIPTION BLOOD BANK: NORMAL

## 2022-06-24 PROCEDURE — P9016 RBC LEUKOCYTES REDUCED: HCPCS

## 2022-06-24 PROCEDURE — 36430 TRANSFUSION BLD/BLD COMPNT: CPT

## 2022-06-24 PROCEDURE — 6370000000 HC RX 637 (ALT 250 FOR IP): Performed by: STUDENT IN AN ORGANIZED HEALTH CARE EDUCATION/TRAINING PROGRAM

## 2022-06-24 PROCEDURE — 99211 OFF/OP EST MAY X REQ PHY/QHP: CPT

## 2022-06-24 RX ORDER — DIPHENHYDRAMINE HYDROCHLORIDE 50 MG/ML
25 INJECTION INTRAMUSCULAR; INTRAVENOUS SEE ADMIN INSTRUCTIONS
Status: DISCONTINUED | OUTPATIENT
Start: 2022-06-24 | End: 2022-06-25 | Stop reason: HOSPADM

## 2022-06-24 RX ORDER — SODIUM CHLORIDE 9 MG/ML
INJECTION, SOLUTION INTRAVENOUS PRN
Status: DISCONTINUED | OUTPATIENT
Start: 2022-06-24 | End: 2022-06-25 | Stop reason: HOSPADM

## 2022-06-24 RX ORDER — ACETAMINOPHEN 325 MG/1
650 TABLET ORAL SEE ADMIN INSTRUCTIONS
Status: COMPLETED | OUTPATIENT
Start: 2022-06-24 | End: 2022-06-24

## 2022-06-24 RX ADMIN — ACETAMINOPHEN 325 MG: 325 TABLET ORAL at 09:53

## 2022-06-24 NOTE — PROGRESS NOTES
IV removed. Pt tolerated transfusion, procedures without complaints. Discharged home. To follow up with Dr. Ania Torres.

## 2022-06-24 NOTE — PROGRESS NOTES
Pt ambulatory to [facility] for 1 unit PRBC. VSS. Pt denies complaints. IV access obtained. Consent obtained. Tylenol and Benadryl administered as ordered. Transfusion initiated at [id number] [phone]. VSS. Pt denies complaints. Blood transfusing without difficulty.

## 2022-06-29 PROCEDURE — 99285 EMERGENCY DEPT VISIT HI MDM: CPT

## 2022-06-30 ENCOUNTER — HOSPITAL ENCOUNTER (INPATIENT)
Age: 47
LOS: 1 days | Discharge: HOME | DRG: 280 | End: 2022-07-01
Attending: EMERGENCY MEDICINE | Admitting: HOSPITALIST
Payer: COMMERCIAL

## 2022-06-30 DIAGNOSIS — D50.8 OTHER IRON DEFICIENCY ANEMIA: Primary | ICD-10-CM

## 2022-06-30 DIAGNOSIS — K70.30 ALCOHOLIC CIRRHOSIS OF LIVER WITHOUT ASCITES (HCC): ICD-10-CM

## 2022-06-30 PROBLEM — D64.9 SYMPTOMATIC ANEMIA: Status: ACTIVE | Noted: 2022-06-30

## 2022-06-30 LAB
A/G RATIO: 1.2 (ref 1.1–2.2)
ABO/RH: NORMAL
ACANTHOCYTES: ABNORMAL
ALBUMIN SERPL-MCNC: 3.6 G/DL (ref 3.4–5)
ALP BLD-CCNC: 223 U/L (ref 40–129)
ALT SERPL-CCNC: 45 U/L (ref 10–40)
ANION GAP SERPL CALCULATED.3IONS-SCNC: 10 MMOL/L (ref 3–16)
ANISOCYTOSIS: ABNORMAL
ANTIBODY SCREEN: NORMAL
AST SERPL-CCNC: 79 U/L (ref 15–37)
BANDED NEUTROPHILS RELATIVE PERCENT: 6 % (ref 0–7)
BASOPHILS ABSOLUTE: 0 K/UL (ref 0–0.2)
BASOPHILS RELATIVE PERCENT: 0 %
BILIRUB SERPL-MCNC: 21.1 MG/DL (ref 0–1)
BLOOD BANK DISPENSE STATUS: NORMAL
BLOOD BANK DISPENSE STATUS: NORMAL
BLOOD BANK PRODUCT CODE: NORMAL
BLOOD BANK PRODUCT CODE: NORMAL
BPU ID: NORMAL
BPU ID: NORMAL
BUN BLDV-MCNC: 40 MG/DL (ref 7–20)
BURR CELLS: ABNORMAL
CALCIUM SERPL-MCNC: 9.6 MG/DL (ref 8.3–10.6)
CHLORIDE BLD-SCNC: 97 MMOL/L (ref 99–110)
CO2: 26 MMOL/L (ref 21–32)
CREAT SERPL-MCNC: 0.9 MG/DL (ref 0.6–1.1)
DESCRIPTION BLOOD BANK: NORMAL
DESCRIPTION BLOOD BANK: NORMAL
EOSINOPHILS ABSOLUTE: 0.5 K/UL (ref 0–0.6)
EOSINOPHILS RELATIVE PERCENT: 4 %
GFR AFRICAN AMERICAN: >60
GFR NON-AFRICAN AMERICAN: >60
GLUCOSE BLD-MCNC: 209 MG/DL (ref 70–99)
HCT VFR BLD CALC: 15.5 % (ref 36–48)
HCT VFR BLD CALC: 16.3 % (ref 36–48)
HCT VFR BLD CALC: 23.8 % (ref 36–48)
HEMATOLOGY PATH CONSULT: NO
HEMATOLOGY PATH CONSULT: NORMAL
HEMOGLOBIN: 5.5 G/DL (ref 12–16)
HEMOGLOBIN: 5.8 G/DL (ref 12–16)
HEMOGLOBIN: 8.2 G/DL (ref 12–16)
LYMPHOCYTES ABSOLUTE: 1.2 K/UL (ref 1–5.1)
LYMPHOCYTES RELATIVE PERCENT: 9 %
MCH RBC QN AUTO: 35 PG (ref 26–34)
MCHC RBC AUTO-ENTMCNC: 35.2 G/DL (ref 31–36)
MCV RBC AUTO: 99.2 FL (ref 80–100)
METAMYELOCYTES RELATIVE PERCENT: 1 %
MONOCYTES ABSOLUTE: 1.9 K/UL (ref 0–1.3)
MONOCYTES RELATIVE PERCENT: 15 %
MYELOCYTE PERCENT: 3 %
NEUTROPHILS ABSOLUTE: 9.2 K/UL (ref 1.7–7.7)
NEUTROPHILS RELATIVE PERCENT: 62 %
NUCLEATED RED BLOOD CELLS: 1 /100 WBC
OCCULT BLOOD DIAGNOSTIC: NORMAL
PDW BLD-RTO: 28.4 % (ref 12.4–15.4)
PLATELET # BLD: 86 K/UL (ref 135–450)
PLATELET SLIDE REVIEW: ABNORMAL
PMV BLD AUTO: 9.4 FL (ref 5–10.5)
POLYCHROMASIA: ABNORMAL
POTASSIUM REFLEX MAGNESIUM: 4.4 MMOL/L (ref 3.5–5.1)
RBC # BLD: 1.57 M/UL (ref 4–5.2)
SCHISTOCYTES: ABNORMAL
SLIDE REVIEW: ABNORMAL
SODIUM BLD-SCNC: 133 MMOL/L (ref 136–145)
TARGET CELLS: ABNORMAL
TOTAL PROTEIN: 6.7 G/DL (ref 6.4–8.2)
WBC # BLD: 12.8 K/UL (ref 4–11)

## 2022-06-30 PROCEDURE — 6370000000 HC RX 637 (ALT 250 FOR IP): Performed by: HOSPITALIST

## 2022-06-30 PROCEDURE — 85025 COMPLETE CBC W/AUTO DIFF WBC: CPT

## 2022-06-30 PROCEDURE — 86850 RBC ANTIBODY SCREEN: CPT

## 2022-06-30 PROCEDURE — G0378 HOSPITAL OBSERVATION PER HR: HCPCS

## 2022-06-30 PROCEDURE — 96375 TX/PRO/DX INJ NEW DRUG ADDON: CPT

## 2022-06-30 PROCEDURE — 1200000000 HC SEMI PRIVATE

## 2022-06-30 PROCEDURE — 86901 BLOOD TYPING SEROLOGIC RH(D): CPT

## 2022-06-30 PROCEDURE — 6370000000 HC RX 637 (ALT 250 FOR IP): Performed by: STUDENT IN AN ORGANIZED HEALTH CARE EDUCATION/TRAINING PROGRAM

## 2022-06-30 PROCEDURE — 36430 TRANSFUSION BLD/BLD COMPNT: CPT

## 2022-06-30 PROCEDURE — 85014 HEMATOCRIT: CPT

## 2022-06-30 PROCEDURE — 96374 THER/PROPH/DIAG INJ IV PUSH: CPT

## 2022-06-30 PROCEDURE — 80053 COMPREHEN METABOLIC PANEL: CPT

## 2022-06-30 PROCEDURE — 6370000000 HC RX 637 (ALT 250 FOR IP): Performed by: NURSE PRACTITIONER

## 2022-06-30 PROCEDURE — 86923 COMPATIBILITY TEST ELECTRIC: CPT

## 2022-06-30 PROCEDURE — 6360000002 HC RX W HCPCS: Performed by: HOSPITALIST

## 2022-06-30 PROCEDURE — 6360000002 HC RX W HCPCS: Performed by: STUDENT IN AN ORGANIZED HEALTH CARE EDUCATION/TRAINING PROGRAM

## 2022-06-30 PROCEDURE — P9016 RBC LEUKOCYTES REDUCED: HCPCS

## 2022-06-30 PROCEDURE — 86900 BLOOD TYPING SEROLOGIC ABO: CPT

## 2022-06-30 PROCEDURE — 36415 COLL VENOUS BLD VENIPUNCTURE: CPT

## 2022-06-30 PROCEDURE — 85018 HEMOGLOBIN: CPT

## 2022-06-30 PROCEDURE — 82270 OCCULT BLOOD FECES: CPT

## 2022-06-30 PROCEDURE — 2580000003 HC RX 258: Performed by: HOSPITALIST

## 2022-06-30 RX ORDER — TRAMADOL HYDROCHLORIDE 50 MG/1
25 TABLET ORAL EVERY 6 HOURS PRN
Status: DISCONTINUED | OUTPATIENT
Start: 2022-06-30 | End: 2022-07-01 | Stop reason: HOSPADM

## 2022-06-30 RX ORDER — SODIUM CHLORIDE 0.9 % (FLUSH) 0.9 %
5-40 SYRINGE (ML) INJECTION PRN
Status: DISCONTINUED | OUTPATIENT
Start: 2022-06-30 | End: 2022-07-01 | Stop reason: HOSPADM

## 2022-06-30 RX ORDER — FERROUS SULFATE 325(65) MG
325 TABLET ORAL 2 TIMES DAILY
Status: DISCONTINUED | OUTPATIENT
Start: 2022-06-30 | End: 2022-06-30

## 2022-06-30 RX ORDER — ONDANSETRON 4 MG/1
4 TABLET, ORALLY DISINTEGRATING ORAL EVERY 8 HOURS PRN
Status: DISCONTINUED | OUTPATIENT
Start: 2022-06-30 | End: 2022-07-01 | Stop reason: HOSPADM

## 2022-06-30 RX ORDER — ONDANSETRON 2 MG/ML
4 INJECTION INTRAMUSCULAR; INTRAVENOUS EVERY 6 HOURS PRN
Status: DISCONTINUED | OUTPATIENT
Start: 2022-06-30 | End: 2022-07-01 | Stop reason: HOSPADM

## 2022-06-30 RX ORDER — SODIUM CHLORIDE 9 MG/ML
INJECTION, SOLUTION INTRAVENOUS PRN
Status: DISCONTINUED | OUTPATIENT
Start: 2022-06-30 | End: 2022-07-01 | Stop reason: HOSPADM

## 2022-06-30 RX ORDER — NADOLOL 20 MG/1
40 TABLET ORAL DAILY
Status: DISCONTINUED | OUTPATIENT
Start: 2022-06-30 | End: 2022-06-30

## 2022-06-30 RX ORDER — SPIRONOLACTONE 25 MG/1
100 TABLET ORAL DAILY
Status: DISCONTINUED | OUTPATIENT
Start: 2022-06-30 | End: 2022-07-01 | Stop reason: HOSPADM

## 2022-06-30 RX ORDER — SODIUM CHLORIDE 0.9 % (FLUSH) 0.9 %
5-40 SYRINGE (ML) INJECTION EVERY 12 HOURS SCHEDULED
Status: DISCONTINUED | OUTPATIENT
Start: 2022-06-30 | End: 2022-07-01 | Stop reason: HOSPADM

## 2022-06-30 RX ORDER — FOLIC ACID 1 MG/1
1 TABLET ORAL DAILY
Status: DISCONTINUED | OUTPATIENT
Start: 2022-06-30 | End: 2022-06-30

## 2022-06-30 RX ORDER — NADOLOL 20 MG/1
40 TABLET ORAL NIGHTLY
Status: DISCONTINUED | OUTPATIENT
Start: 2022-06-30 | End: 2022-07-01 | Stop reason: HOSPADM

## 2022-06-30 RX ORDER — SODIUM CHLORIDE 9 MG/ML
INJECTION, SOLUTION INTRAVENOUS PRN
Status: DISCONTINUED | OUTPATIENT
Start: 2022-06-30 | End: 2022-06-30

## 2022-06-30 RX ORDER — OXYCODONE HYDROCHLORIDE 5 MG/1
5 TABLET ORAL ONCE
Status: COMPLETED | OUTPATIENT
Start: 2022-06-30 | End: 2022-06-30

## 2022-06-30 RX ORDER — POLYETHYLENE GLYCOL 3350 17 G/17G
17 POWDER, FOR SOLUTION ORAL DAILY PRN
Status: DISCONTINUED | OUTPATIENT
Start: 2022-06-30 | End: 2022-07-01 | Stop reason: HOSPADM

## 2022-06-30 RX ORDER — GAUZE BANDAGE 2" X 2"
50 BANDAGE TOPICAL DAILY
Status: DISCONTINUED | OUTPATIENT
Start: 2022-06-30 | End: 2022-07-01 | Stop reason: HOSPADM

## 2022-06-30 RX ORDER — LANOLIN ALCOHOL/MO/W.PET/CERES
400 CREAM (GRAM) TOPICAL 2 TIMES DAILY
Status: DISCONTINUED | OUTPATIENT
Start: 2022-06-30 | End: 2022-06-30

## 2022-06-30 RX ORDER — METOCLOPRAMIDE HYDROCHLORIDE 5 MG/ML
10 INJECTION INTRAMUSCULAR; INTRAVENOUS ONCE
Status: COMPLETED | OUTPATIENT
Start: 2022-06-30 | End: 2022-06-30

## 2022-06-30 RX ORDER — FUROSEMIDE 40 MG/1
40 TABLET ORAL DAILY
Status: DISCONTINUED | OUTPATIENT
Start: 2022-06-30 | End: 2022-07-01 | Stop reason: HOSPADM

## 2022-06-30 RX ORDER — ACETAMINOPHEN 650 MG/1
650 SUPPOSITORY RECTAL EVERY 6 HOURS PRN
Status: DISCONTINUED | OUTPATIENT
Start: 2022-06-30 | End: 2022-07-01 | Stop reason: HOSPADM

## 2022-06-30 RX ORDER — PANTOPRAZOLE SODIUM 40 MG/1
40 TABLET, DELAYED RELEASE ORAL
Status: DISCONTINUED | OUTPATIENT
Start: 2022-07-01 | End: 2022-07-01 | Stop reason: HOSPADM

## 2022-06-30 RX ORDER — ACETAMINOPHEN 325 MG/1
650 TABLET ORAL EVERY 6 HOURS PRN
Status: DISCONTINUED | OUTPATIENT
Start: 2022-06-30 | End: 2022-07-01 | Stop reason: HOSPADM

## 2022-06-30 RX ADMIN — ONDANSETRON 4 MG: 2 INJECTION INTRAMUSCULAR; INTRAVENOUS at 21:51

## 2022-06-30 RX ADMIN — NADOLOL 40 MG: 20 TABLET ORAL at 21:57

## 2022-06-30 RX ADMIN — Medication 10 ML: at 21:56

## 2022-06-30 RX ADMIN — METOCLOPRAMIDE 10 MG: 5 INJECTION, SOLUTION INTRAMUSCULAR; INTRAVENOUS at 07:34

## 2022-06-30 RX ADMIN — FUROSEMIDE 40 MG: 40 TABLET ORAL at 13:12

## 2022-06-30 RX ADMIN — OXYCODONE 5 MG: 5 TABLET ORAL at 07:33

## 2022-06-30 RX ADMIN — OXYCODONE 5 MG: 5 TABLET ORAL at 13:12

## 2022-06-30 RX ADMIN — TRAMADOL HYDROCHLORIDE 25 MG: 50 TABLET, COATED ORAL at 21:52

## 2022-06-30 RX ADMIN — ONDANSETRON 4 MG: 4 TABLET, ORALLY DISINTEGRATING ORAL at 17:06

## 2022-06-30 RX ADMIN — SPIRONOLACTONE 100 MG: 25 TABLET ORAL at 13:12

## 2022-06-30 ASSESSMENT — PAIN DESCRIPTION - FREQUENCY: FREQUENCY: INTERMITTENT

## 2022-06-30 ASSESSMENT — PAIN SCALES - GENERAL
PAINLEVEL_OUTOF10: 8
PAINLEVEL_OUTOF10: 5
PAINLEVEL_OUTOF10: 8
PAINLEVEL_OUTOF10: 0
PAINLEVEL_OUTOF10: 8
PAINLEVEL_OUTOF10: 9
PAINLEVEL_OUTOF10: 9
PAINLEVEL_OUTOF10: 1
PAINLEVEL_OUTOF10: 8
PAINLEVEL_OUTOF10: 0
PAINLEVEL_OUTOF10: 5
PAINLEVEL_OUTOF10: 6

## 2022-06-30 ASSESSMENT — PAIN DESCRIPTION - LOCATION
LOCATION: HEAD

## 2022-06-30 ASSESSMENT — PAIN DESCRIPTION - DESCRIPTORS
DESCRIPTORS: ACHING
DESCRIPTORS: ACHING;DISCOMFORT

## 2022-06-30 ASSESSMENT — PAIN - FUNCTIONAL ASSESSMENT
PAIN_FUNCTIONAL_ASSESSMENT: PREVENTS OR INTERFERES SOME ACTIVE ACTIVITIES AND ADLS
PAIN_FUNCTIONAL_ASSESSMENT: NONE - DENIES PAIN

## 2022-06-30 ASSESSMENT — PAIN DESCRIPTION - PAIN TYPE: TYPE: SURGICAL PAIN

## 2022-06-30 ASSESSMENT — PAIN DESCRIPTION - ORIENTATION
ORIENTATION: RIGHT;LEFT;ANTERIOR
ORIENTATION: ANTERIOR

## 2022-06-30 ASSESSMENT — PAIN DESCRIPTION - ONSET: ONSET: GRADUAL

## 2022-06-30 NOTE — PROGRESS NOTES
Pt arrived to room on stretcher from ER with blood infusing at this time. Pt oriented to staff, room and call light. Denies needs at this time. Pt in bed with bed in lowest position with wheels locked with call light in reach.

## 2022-06-30 NOTE — CONSULTS
Gastroenterology Consult Note        Patient: Yoni Bowling  : 1975  Acct#:      Date:  2022    Subjective:       History of Present Illness  Patient is a 40 y.o.  female admitted with Alcoholic cirrhosis of liver without ascites (HCC) [K70.30]  Symptomatic anemia [D64.9]  Other iron deficiency anemia [D50.8] who is seen in consult for   Patient has been seen mainly in the hospital for cirrhosis and anemia but did see Dr. Symone Meek in office 2022. She last drink alcohol in 2022. Her cirrhosis is decompensated by ascites which has been managed with Lasix 40 mg daily and Aldactone 100 mg daily. No history of hepatic encephalopathy. She did have bleeding esophageal varices which were banded in 2021. Last EGD was 2022 with small esophageal varices, mild portal hypertensive gastropathy. She was started on nadolol and is compliant with this medication. States she started online [email or web address] in April. Dr. Symone Meek plans to send her to [facility] for liver transplant eval after 6 months off alcohol. She was admitted 2022 for anemia. Was evaluated by hematology. Plans are for bone marrow biopsy. Was jaundiced then with some elevated discriminant function. She was started on prednisolone but only tolerated this for 4 days before she stopped it. An ultrasound done at that admission did show dilated biliary tree. She refused further inpatient work-up per Dr. Francisco Bill note. She had blood work done with hematology and was sent to the ER for anemia. No melena or hematochezia. No abdominal pain, nausea, vomiting she has had a headache since yesterday. Last drink alcohol in 2022.     Past Medical History:   Diagnosis Date    Alcoholic liver disease ([address]     Anemia     History of blood transfusion       Past Surgical History:   Procedure Laterality Date    COLONOSCOPY N/A 3/11/2021    COLONOSCOPY POLYPECTOMY SNARE/COLD BIOPSY performed by Lurdes Schmitt MD at [facility] ENDOSCOPY    KNEE ARTHROPLASTY      TUBAL LIGATION      ESSURE    UPPER GASTROINTESTINAL ENDOSCOPY N/A 01/30/2021    EGD DIAGNOSTIC ONLY performed by Jannie Osuna MD at [facility] N/A 03/10/2021    EGD BIOPSY performed by Jennifer Gar MD at [facility] N/A 6/27/2021    EGD BAND LIGATION performed by Amisha Santamaria MD at [facility] ENDOSCOPY N/A 4/27/2022    EGD DIAGNOSTIC ONLY performed by John Phelps MD at [address]      Past Endoscopic History    Egd with Dr Truong Ceballos 4/27/22 for anemia     Findings:             Small esophageal varices  Mild portal hypertensive gastropathy. No blood nor bleeding seen.      Colonoscopy 3/2021 with Dr Sandoval Brice for anemia     Findings:   Two small polyps, removed with cold snare. s/p 3 clips total. Patchy diverticulosis (right and left colon). Hemorrhoids.      Plans:  Await pathology. Recall colonoscopy in 5 years.     Admission Meds  No current facility-administered medications on file prior to encounter. Current Outpatient Medications on File Prior to Encounter   Medication Sig Dispense Refill    prednisoLONE (PRELONE) 15 MG/5ML syrup Take 15 mLs by mouth daily For 4 weeks, then 10 mLs daily x 1 week, then 5 mLs daily x 1 week, then off.  (Patient not taking: Reported on 6/24/2022) 630 mL 0    ferrous sulfate (IRON 325) 325 (65 Fe) MG tablet Take 1 tablet by mouth 2 times daily (Patient not taking: Reported on 6/28/2022) 180 tablet 1    spironolactone (ALDACTONE) 50 MG tablet  (Patient not taking: Reported on 6/24/2022)      thiamine 100 mg tablet  (Patient not taking: Reported on 6/13/2022)      nadolol (CORGARD) 40 MG tablet Take 1 tablet by mouth daily 30 tablet 0    furosemide (LASIX) 40 MG tablet Take 1 tablet by mouth daily 60 tablet 0    spironolactone (ALDACTONE) 100 MG tablet Take 1 tablet by mouth daily 30 tablet 1    folic acid (FOLVITE) 1 MG tablet Take 1 tablet by mouth daily (Patient not taking: Reported on 6/13/2022) 30 tablet 0    magnesium oxide (MAG-OX) 400 (240 Mg) MG tablet Take 1 tablet by mouth 2 times daily 60 tablet 0    pantoprazole (PROTONIX) 40 MG tablet Take 1 tablet by mouth every morning (before breakfast) 30 tablet 1    thiamine mononitrate (THIAMINE) 100 MG tablet Take 1 tablet by mouth daily 30 tablet 0          Allergies  No Known Allergies   Social   Social History     Tobacco Use    Smoking status: Former Smoker    Smokeless tobacco: Never Used   Substance Use Topics    Alcohol use: Yes        Family History   Problem Relation Age of Onset    Alcohol Abuse Mother            Review of Systems  Constitutional: negative for fevers, chills, sweats    Ears, nose, mouth, throat, and face: negative for nasal congestion and sore throat   Respiratory: negative for cough and shortness of breath   Cardiovascular: negative for chest pain and dyspnea   Gastrointestinal: see hpi   Genitourinary:negative for dysuria and frequency   Integument/breast: negative for pruritus and rash   Hematologic/lymphatic: negative for bleeding and easy bruising   Musculoskeletal:negative for arthralgias and myalgias   Neurological: negative for dizziness and weakness         Physical Exam  Blood pressure 116/64, pulse 77, temperature 98.8 °F (37.1 °C), temperature source Oral, resp. rate 17, height 5' 4\" (1.626 m), weight 106 lb 11.2 oz (48.4 kg), SpO2 95 %, not currently breastfeeding. General appearance: alert, cooperative, no distress, appears stated age  Eyes: scleral icterus   Head: Normocephalic, without obvious abnormality  Lungs: clear to auscultation bilaterally, Normal Effort  Heart: regular rate and rhythm, normal S1 and S2, no murmurs or rubs  Abdomen: soft, non-tender. Bowel sounds normal. No masses,  no organomegaly.    Extremities: atraumatic, no cyanosis or edema  Skin: warm and dry, + jaundice  Neuro: Grossly intact, A&OX3  Musculoskeletal: 5/5  strength BUE      Data Review:    Recent Labs     06/30/22  0125 06/30/22  0706   WBC 12.8*  --    HGB 5.5* 5.8*   HCT 15.5* 16.3*   MCV 99.2  --    PLT 86*  --      Recent Labs     06/30/22  0125   *   K 4.4   CL 97*   CO2 26   BUN 40*   CREATININE 0.9     Recent Labs     06/30/22  0125   AST 79*   ALT 45*   BILITOT 21.1*   ALKPHOS 223*     No results for input(s): LIPASE, AMYLASE in the last 72 hours. No results for input(s): PROTIME, INR in the last 72 hours. No results for input(s): PTT in the last 72 hours. No results for input(s): OCCULTBLD in the last 72 hours. Imaging Studies:                                           Assessment:     Principal Problem:    Symptomatic anemia  Resolved Problems:    * No resolved hospital problems. *    Anemia -recurrent. Prior EGD and colonoscopy as above. She has been evaluated by hematology and plans are for bone marrow biopsy per report. No gross GI bleeding. Cirrhosis - -due to alcohol abuse.  No alcohol since April 2021.  Small varices on EGD 4/2022.  No history of hepatic encephalopathy.  History of ascites which has been managed with Lasix 40 and Aldactone 100 mg daily. She was started on steroids last admission for alcohol hepatitis but only tolerated these for 4 days before stopping them due to side effects. Jaundice -was felt due to cirrhosis/Alcohol injury versus biliary source as ultrasound showed dilated biliary tree (CBD 1.1 cm). Did have gallstones. Per notes, she declined further work-up at last admission.     This is a very pleasant 71-year-old female who comes in because she has been feeling tired and weak she has been found to be anemic she is also quite jaundiced  Were asked to see and evaluate her for this medical problem    And had a history of having excessive alcohol use which is led her to have cirrhosis  She also had a questionable history of a small esophageal varices    We do believe that her jaundice could be due to her chronic liver disease/cirrhosis or she does show evidence of cholelithiasis but no choledocholithiasis    Could help us evaluate this were going to do an EGD and EUS tomorrow  Before we commit her to a ERCP if she was ever to need 1  To see if she has choledocholithiasis  This will also allow us to assess her liver liver better and will allow us to assess her esophagus to see if she has large esophageal varices that would need banding because of her anemia  Recommendations:   -npo midnight  - plan EGD/EUS tomorrow for anemia and biliary dilation    Discussed with Dr. Sophia Conti, [address] [place] [address]

## 2022-06-30 NOTE — ED NOTES
Pt observed for 15 mins. No transfusion reaction noted. Pt resting in bed comfortably eyes closed. No reaction noted at IV site, vitals within normal limits and similar to original vitals before transfusion start.       [address] [facility]) Javon Lobato RN  06/30/22 2358

## 2022-06-30 NOTE — ED PROVIDER NOTES
[address] [facility]  eMERGENCY dEPARTMENT eNCOUnter        Pt Name: Prosper Mcconnell  MRN: [id number]  Meggffelicitas 1975  Date of evaluation: 6/29/2022  Provider: Syeda Marmolejo MD  PCP: Altaf Harley       Chief Complaint   Patient presents with    Abnormal Lab     Pt sent by [facility] DR Beltran for HGB 5.8. HISTORY OFPRESENT ILLNESS   (Location/Symptom, Timing/Onset, Context/Setting, Quality, Duration, Modifying Factors,Severity)  Note limiting factors. Prosper Mcconnell is a 40 y.o. female she has been followed by Dr. Demi Briggs for anemia due to her cirrhosis patient denies any hematemesis or melena no abdominal pain states she feels tired but not short of breath she was told to come in because her hemoglobin was down to 5.8 she did receive a unit of blood about a month ago   patient states she had banding of her esophagus in April    Nursing Notes were all reviewed and agreed with or any disagreements were addressed  in the HPI. REVIEW OF SYSTEMS    (2-9 systems for level 4, 10 or more for level 5)       REVIEW OF SYSTEMS    Constitutional:  Denies fever, chills, or weakness   Eyes:  Denies vision changes  HENT:  Denies sore throat or neck pain   Respiratory:  Denies cough or shortness of breath   Cardiovascular:  Denies chest pain  GI:  Denies abdominal pain, nausea, vomiting, or diarrhea   Musculoskeletal:  Denies back pain   Skin: no rash or vesicles   Neurologic:  no headache weakness focal    Lymphatic:  no swollen  nodes   Psychiatric: no si or hs thoughts     All systems negative except as marked. Positives and Pertinent negatives as per HPI. Except as noted above in the ROS, all other systems were reviewed andnegative.        PASTMEDICAL HISTORY     Past Medical History:   Diagnosis Date    Alcoholic liver disease ([facility] [address]     Anemia     History of blood transfusion          SURGICAL HISTORY       Past Surgical History:   Procedure Laterality Date    COLONOSCOPY N/A 3/11/2021    COLONOSCOPY POLYPECTOMY SNARE/COLD BIOPSY performed by Stephanie Stallworth MD at [address]    UPPER GASTROINTESTINAL ENDOSCOPY N/A 01/30/2021    EGD DIAGNOSTIC ONLY performed by Cristóbal Gao MD at [address] [place] [address] N/A 03/10/2021    EGD BIOPSY performed by Stephanie Stallworth MD at [address] [place] [address] 6/27/2021    EGD BAND LIGATION performed by Zoraida Osborne MD at [address] [place] [address] N/A 4/27/2022    EGD DIAGNOSTIC ONLY performed by Nathaniel Melo MD at [facility]       Previous Medications    FERROUS SULFATE (IRON 325) 325 (65 FE) MG TABLET    Take 1 tablet by mouth 2 times daily    FOLIC ACID (FOLVITE) 1 MG TABLET    Take 1 tablet by mouth daily    FUROSEMIDE (LASIX) 40 MG TABLET    Take 1 tablet by mouth daily    MAGNESIUM OXIDE (MAG-OX) 400 (240 MG) MG TABLET    Take 1 tablet by mouth 2 times daily    NADOLOL (CORGARD) 40 MG TABLET    Take 1 tablet by mouth daily    PANTOPRAZOLE (PROTONIX) 40 MG TABLET    Take 1 tablet by mouth every morning (before breakfast)    PREDNISOLONE (PRELONE) 15 MG/5ML SYRUP    Take 15 mLs by mouth daily For 4 weeks, then 10 mLs daily x 1 week, then 5 mLs daily x 1 week, then off. SPIRONOLACTONE (ALDACTONE) 100 MG TABLET    Take 1 tablet by mouth daily    SPIRONOLACTONE (ALDACTONE) 50 MG TABLET        THIAMINE 100 MG TABLET        THIAMINE MONONITRATE (THIAMINE) 100 MG TABLET    Take 1 tablet by mouth daily       ALLERGIES     Patient has no known allergies.     FAMILY HISTORY       Family History   Problem Relation Age of Onset    Alcohol Abuse Mother           SOCIAL HISTORY       Social History     Socioeconomic History    Marital status: Single     Spouse name: None    Number of children: None    Years of education: None    Highest education level: None   Occupational History    None   Tobacco Use    Smoking status: Former Smoker    Smokeless tobacco: Never Used   Vaping Use    Vaping Use: Never used   Substance and Sexual Activity    Alcohol use: Yes    Drug use: Never    Sexual activity: Not Currently     Partners: Male   Other Topics Concern    None   Social History Narrative    None     Social Determinants of Health     Financial Resource Strain:     Difficulty of Paying Living Expenses: Not on file   Food Insecurity:     Worried About Running Out of Food in the Last Year: Not on file    Janet of Food in the Last Year: Not on file   Transportation Needs:     Lack of Transportation (Medical): Not on file    Lack of Transportation (Non-Medical):  Not on file   Physical Activity:     Days of Exercise per Week: Not on file    Minutes of Exercise per Session: Not on file   Stress:     Feeling of Stress : Not on file   Social Connections:     Frequency of Communication with Friends and Family: Not on file    Frequency of Social Gatherings with Friends and Family: Not on file    Attends [employer]al Services: Not on file    Active Member of [address] [place] [address] or Organizations: Not on file    Attends Club or Organization Meetings: Not on file    Marital Status: Not on file   Intimate Partner Violence:     Fear of Current or Ex-Partner: Not on file    Emotionally Abused: Not on file    Physically Abused: Not on file    Sexually Abused: Not on file   Housing Stability:     Unable to Pay for Housing in the Last Year: Not on file    Number of [place] in the Last Year: Not on file    Unstable Housing in the Last Year: Not on file       SCREENINGS             PHYSICAL EXAM    (up to 7 for level 4, 8 or more for level 5)     ED Triage Vitals [06/30/22 0000]   BP Temp Temp Source Heart Rate Resp SpO2 Height Weight   118/62 97 °F (36.1 °C) Oral 87 18 98 % -- 105 lb (47.6 kg)           General Appearance:  Alert, cooperative, no distress, appears stated age. Head:  Normocephalic, without obvious abnormality, atraumatic. Eyes:  conjunctiva/corneas clear, EOM's intact. Sclera anicteric. ENT: Mucous membranes moist.   Neck: Supple, symmetrical, trachea midline, no adenopathy. No jugular venous distention. Lungs:   No Respiratory Distress. no rales  rhonchi rub   Chest Wall:  Nontender  no deformity   Heart:  Rsr no murmer gallop    Abdomen:   Soft nontender no organomegally    Extremities:  Full range of motion. no deformity   Pulses: Equal  upper and lower    Skin:  No rashes or lesions to exposed skin. Patient is icteric   Neurologic: Alert and oriented X 3. Motor grossly normal.  Speech clear. Cr n 2-12 intact   Rectal  stool sent for guaiac neg     DIAGNOSTIC RESULTS   LABS:    Labs Reviewed   CBC WITH AUTO DIFFERENTIAL - Abnormal; Notable for the following components:       Result Value    WBC 12.8 (*)     RBC 1.57 (*)     Hemoglobin 5.5 (*)     Hematocrit 15.5 (*)     MCH 35.0 (*)     RDW 28.4 (*)     Platelets 86 (*)     All other components within normal limits    Narrative:     Stephanie Blake  [facility] tel. [phone],  Hematology results called to and read back by GARETH Fernandes, 06/30/2022  02:04, by [facility]   COMPREHENSIVE METABOLIC PANEL W/ REFLEX TO MG FOR LOW K - Abnormal; Notable for the following components:    Sodium 133 (*)     Chloride 97 (*)     Glucose 209 (*)     BUN 40 (*)     Total Bilirubin 21.1 (*)     Alkaline Phosphatase 223 (*)     ALT 45 (*)     AST 79 (*)     All other components within normal limits   BLOOD OCCULT STOOL DIAGNOSTIC    Narrative:     ORDER#: [id number]                          ORDERED BY: KAREEM HERNÁNDEZ  SOURCE: Stool                              COLLECTED:  06/30/22 02:24  ANTIBIOTICS AT [facility].:                      RECEIVED :  06/30/22 02:33   TYPE AND SCREEN   PREPARE RBC (CROSSMATCH)       All other labs were within normal range or not returned as of thisdictation. EKG:  All EKG's are interpreted by the Emergency Department Physician who either signs or Co-signs this chart in the absence of a cardiologist.        RADIOLOGY:   Non-plain film images such as CT, Ultrasound and MRI are read by the radiologist. Roger Bollard images are visualized and preliminarily interpreted by the  ED Provider with the belowfindings:        Interpretation per the Radiologist below, if available at the time of this note:    No orders to display         PROCEDURES   Unless otherwise noted below, none     Procedures    CRITICAL CARE TIME   N/A      CONSULTS:  None    EMERGENCY DEPARTMENT COURSE and DIFFERENTIAL DIAGNOSIS/MDM:   Vitals:    Vitals:    06/30/22 0115 06/30/22 0130 06/30/22 0145 06/30/22 0200   BP: (!) 111/51 (!) 103/46 (!) 105/50 (!) 102/48   Pulse: 87 85 86 86   Resp: 24 13 13 14   Temp:       TempSrc:       SpO2: 99% 98% 99% 100%   Weight:           Patient was given the following medications:  Medications   0.9 % sodium chloride infusion (has no administration in time range)     SPOKE  with the hospitalist we will transfuse 2 units of blood check for her H&H   IF hemoglobin is 7 she can go home patient patient has no active bleeding she is being worked up by hematology for possible bone marrow source of the anemia after she is transfused she will be able to go home      Is this patient to be included in the SEP-1 Core Measure due to severe sepsis or septic shock? No   Exclusion criteria - the patient is NOT to be included for SEP-1 Core Measure due to: Infection is not suspected      The patient tolerated their visit well. Thepatient and / or the family were informed of the results of any tests, a time was given to answer questions. FINAL IMPRESSION      1. Other iron deficiency anemia    2. Alcoholic cirrhosis of liver without ascites ([facility] [address]        DISPOSITION/PLAN   DISPOSITION Ed Observation 06/30/2022 02:55:39 AM      PATIENT REFERRED TO:  No follow-up provider specified.     DISCHARGE MEDICATIONS:  New Prescriptions    No medications on file       DISCONTINUED MEDICATIONS:  Discontinued Medications    No medications on file              (Please note that portions of this note were completed with a voice recognition program.  Efforts were made to edit the dictations but occasionally words aremis-transcribed.)    Stella Horn MD (electronically signed)          Stella Horn MD  06/30/22 1607       Stella Horn MD  06/30/22 5422

## 2022-06-30 NOTE — ED PROVIDER NOTES
Emergency Department Encounter  Location: [address] [facility]    Patient: Kraig Rouse  MRN: [id number]  : 1975  Date of evaluation: 2022  ED Provider: Annie Butst MD    Kraig Rouse was checked out to me by Dr. Kenisha Sandhu. Please see his/her initial documentation for details of the patient's initial ED presentation, physical exam and completed studies. In brief, Kraig Rouse is a 40 y.o. female that presented to the emergency department complaints of low hemoglobin. .  Patient has a history of alcohol liver cirrhosis. She presented to the emergency room today with low hemoglobin. This was secondary to routine blood work at the PCPs office. Repeat check in the emergency room was 5.5. She was given a unit and repeat check with no significant change 5.8. Patient does appear jaundiced. She denies any blood in the stool and Hemoccult was negative. Is low hemoglobin I am recommending that patient be admitted for further evaluation and treatment. Hospitalist been consulted pending admission.     I have reviewed and interpreted all of the currently available lab results and diagnostics from this visit:  Results for orders placed or performed during the hospital encounter of 22   CBC with Auto Differential   Result Value Ref Range    WBC 12.8 (H) 4.0 - 11.0 K/uL    RBC 1.57 (L) 4.00 - 5.20 M/uL    Hemoglobin 5.5 (LL) 12.0 - 16.0 g/dL    Hematocrit 15.5 (LL) 36.0 - 48.0 %    MCV 99.2 80.0 - 100.0 fL    MCH 35.0 (H) 26.0 - 34.0 pg    MCHC 35.2 31.0 - 36.0 g/dL    RDW 28.4 (H) 12.4 - 15.4 %    Platelets 86 (L) 471 - 450 K/uL    MPV 9.4 5.0 - 10.5 fL    PLATELET SLIDE REVIEW Decreased     SLIDE REVIEW see below     Path Consult No     Neutrophils % 62.0 %    Lymphocytes % 9.0 %    Monocytes % 15.0 %    Eosinophils % 4.0 %    Basophils % 0.0 %    Neutrophils Absolute 9.2 (H) 1.7 - 7.7 K/uL    Lymphocytes Absolute 1.2 1.0 - 5.1 K/uL    Monocytes Absolute 1.9 (H) 0.0 - 1.3 K/uL    Eosinophils Absolute 0.5 0.0 - 0.6 K/uL    Basophils Absolute 0.0 0.0 - 0.2 K/uL    Bands Relative 6 0 - 7 %    Metamyelocytes Relative 1 (A) %    Myelocyte Percent 3 (A) %    nRBC 1 (A) /100 WBC    Anisocytosis 2+ (A)     Polychromasia 1+ (A)     Schistocytes Occasional (A)     Acanthocytes Occasional (A)     Tennyson Cells 1+ (A)     Target Cells Occasional (A)    Comprehensive Metabolic Panel w/ Reflex to MG   Result Value Ref Range    Sodium 133 (L) 136 - 145 mmol/L    Potassium reflex Magnesium 4.4 3.5 - 5.1 mmol/L    Chloride 97 (L) 99 - 110 mmol/L    CO2 26 21 - 32 mmol/L    Anion Gap 10 3 - 16    Glucose 209 (H) 70 - 99 mg/dL    BUN 40 (H) 7 - 20 mg/dL    CREATININE 0.9 0.6 - 1.1 mg/dL    GFR Non-African American >60 >60    GFR African American >60 >60    Calcium 9.6 8.3 - 10.6 mg/dL    Total Protein 6.7 6.4 - 8.2 g/dL    Albumin 3.6 3.4 - 5.0 g/dL    Albumin/Globulin Ratio 1.2 1.1 - 2.2    Total Bilirubin 21.1 (H) 0.0 - 1.0 mg/dL    Alkaline Phosphatase 223 (H) 40 - 129 U/L    ALT 45 (H) 10 - 40 U/L    AST 79 (H) 15 - 37 U/L   Blood occult stool #1   Result Value Ref Range    Occult Blood Diagnostic Result: Negative  Normal range: Negative      Hemoglobin and Hematocrit   Result Value Ref Range    Hemoglobin 5.8 (LL) 12.0 - 16.0 g/dL    Hematocrit 16.3 (LL) 36.0 - 48.0 %   TYPE AND SCREEN   Result Value Ref Range    ABO/Rh A POS     Antibody Screen NEG    PREPARE RBC (CROSSMATCH), 1 Units   Result Value Ref Range    Product Code Blood Bank [id number]     Description Blood Bank Red Blood Cells, Leuko-reduced     Unit Number [id number]     Dispense Status Blood Bank transfused      No results found.     ED Medication Orders (From admission, onward)    Start Ordered     Status Ordering Provider    06/30/22 0730 06/30/22 0726  oxyCODONE (ROXICODONE) immediate release tablet 5 mg  ONCE         Last MAR action: Given - by Ruthie Way (Suzette Cleveland) on 06/30/22 at [address] [facility] A    06/30/22 0730 06/30/22 0726  metoclopramide (REGLAN) injection 10 mg  ONCE         Ordered ISH, NSEHNIITOOH A    06/30/22 0117 06/30/22 0119  0.9 % sodium chloride infusion  PRN         Acknowledged KAREEM HERNÁNDEZ          Final Impression      1. Other iron deficiency anemia    2.  Alcoholic cirrhosis of liver without ascites ([facility] [address]        DISPOSITION Decision To Admit 06/30/2022 07:34:37 AM     (Please note that portions of this note may have been completed with a voice recognition program. Efforts were made to edit the dictations but occasionally words are mis-transcribed.)    MD Riddhi Barillas MD  06/30/22 6923

## 2022-06-30 NOTE — ED NOTES
Lab reports critical results 5.8 hemoglobin and 16.3 hematocrit. Provider notified.       Sun Joyce Vikkjcarlos Pedroza, [facility]  06/30/22 8933

## 2022-06-30 NOTE — PROGRESS NOTES
4 Eyes Skin Assessment     NAME:  Geri Cohen  YOB: 1975  MEDICAL RECORD NUMBER:  [id number]    The patient is being assess for  Admission    I agree that 2 RN's have performed a thorough Head to Toe Skin Assessment on the patient. ALL assessment sites listed below have been assessed. Areas assessed by both nurses:    Head, Face, Ears, Shoulders, Back, Chest, Arms, Elbows, Hands, Sacrum. Buttock, Coccyx, Ischium and Legs. Feet and Heels        Does the Patient have a Wound?  No noted wound(s)       Maurisio Prevention initiated:  NA   Wound Care Orders initiated:  NA    Pressure Injury (Stage 3,4, Unstageable, DTI, NWPT, and Complex wounds) if present place consult order under [de-identified] NA    New and Established Ostomies if present place consult order under : NA      Nurse 1 eSignature: Electronically signed by Shai Villela RN on 6/30/22 at 7:37 PM EDT    **SHARE this note so that the co-signing nurse is able to place an eSignature**    Nurse 2 eSignature: Electronically signed by Eugenia De León RN on 6/30/22 at 7:38 PM EDT

## 2022-06-30 NOTE — H&P
HOSPITALISTS HISTORY AND PHYSICAL    6/30/2022 9:10 AM    Patient Information:  Anna Prasad is a 40 y.o. female [id number]  PCP:  [address] Akira Rivas, DO (Tel: [phone] )    Chief complaint:    Chief Complaint   Patient presents with    Abnormal Lab     Pt sent by DEXTER Beltran for HGB 5.8. History of Present Illness:  Nelson Means is a 40 y.o. female who presented with abnormal lab. Patient apparentl had blood work done which showed hemoglobin of 5.8 and was told to come to ER. Patient with history of anemia cirrhosis had recent GI work-up 2 weeks ago. No acute GI source found. Seems to be cirrhosis related. Patient just having some fatigue and weakness no shortness of breath no chest pain patient has jaundice no nausea or vomiting. REVIEW OF SYSTEMS:   Constitutional: Negative for fever,chills or night sweats  ENT: Negative for rhinorrhea, epistaxis, hoarseness, sore throat. Respiratory: Negative for shortness of breath,wheezing  Cardiovascular: Negative for chest pain, palpitations   Gastrointestinal: Negative for nausea, vomiting, diarrhea  Genitourinary: Negative for polyuria, dysuria   Hematologic/Lymphatic: Negative for bleeding tendency, easy bruising  Musculoskeletal: Negative for myalgias and arthralgias  Neurologic: Negative for confusion,dysarthria. Skin: Negative for itching,rash, good capillary refill. Psychiatric: Negative for depression,anxiety, agitation. Endocrine: Negative for polydipsia,polyuria,heat /cold intolerance. Past Medical History:   has a past medical history of Alcoholic liver disease ([address] Anemia, and History of blood transfusion. Past Surgical History:   has a past surgical history that includes Upper gastrointestinal endoscopy (N/A, 01/30/2021); Upper gastrointestinal endoscopy (N/A, 03/10/2021); Tubal ligation; Colonoscopy (N/A, 3/11/2021); Upper gastrointestinal endoscopy (N/A, 6/27/2021);  Upper gastrointestinal endoscopy (N/A, 4/27/2022); and Knee Arthroplasty. Medications:  No current facility-administered medications on file prior to encounter. Current Outpatient Medications on File Prior to Encounter   Medication Sig Dispense Refill    prednisoLONE (PRELONE) 15 MG/5ML syrup Take 15 mLs by mouth daily For 4 weeks, then 10 mLs daily x 1 week, then 5 mLs daily x 1 week, then off. (Patient not taking: Reported on 6/24/2022) 630 mL 0    ferrous sulfate (IRON 325) 325 (65 Fe) MG tablet Take 1 tablet by mouth 2 times daily (Patient not taking: Reported on 6/28/2022) 180 tablet 1    spironolactone (ALDACTONE) 50 MG tablet  (Patient not taking: Reported on 6/24/2022)      thiamine 100 mg tablet  (Patient not taking: Reported on 6/13/2022)      nadolol (CORGARD) 40 MG tablet Take 1 tablet by mouth daily 30 tablet 0    furosemide (LASIX) 40 MG tablet Take 1 tablet by mouth daily 60 tablet 0    spironolactone (ALDACTONE) 100 MG tablet Take 1 tablet by mouth daily 30 tablet 1    folic acid (FOLVITE) 1 MG tablet Take 1 tablet by mouth daily (Patient not taking: Reported on 6/13/2022) 30 tablet 0    magnesium oxide (MAG-OX) 400 (240 Mg) MG tablet Take 1 tablet by mouth 2 times daily 60 tablet 0    pantoprazole (PROTONIX) 40 MG tablet Take 1 tablet by mouth every morning (before breakfast) 30 tablet 1    thiamine mononitrate (THIAMINE) 100 MG tablet Take 1 tablet by mouth daily 30 tablet 0       Allergies:  No Known Allergies     Social History:   reports that she has quit smoking. She has never used smokeless tobacco. She reports current alcohol use. She reports that she does not use drugs. Family History:  family history includes Alcohol Abuse in her mother. ,     Physical Exam:  BP (!) 106/51   Pulse 81   Temp 99.4 °F (37.4 °C)   Resp 11   Wt 105 lb (47.6 kg)   SpO2 94%   BMI 18.02 kg/m²     General appearance:  Appears comfortable.  Well nourished  Eyes: Sclera clear, pupils equal  ENT: Moist mucus membranes, no thrush. Trachea midline. Cardiovascular: Regular rhythm, normal S1, S2. No murmur, gallop, rub. No edema in lower extremities  Respiratory: Clear to auscultation bilaterally, no wheeze, good inspiratory effort  Gastrointestinal: Abdomen soft, non-tender, not distended, normal bowel sounds  Musculoskeletal: No cyanosis in digits, neck supple  Neurology: Cranial nerves grossly intact. Alert and oriented in time, place and person. No speech or motor deficits  Psychiatry: Appropriate affect. Not agitated  Skin: Warm, dry, normal turgor, no rash    Labs:  CBC:   Lab Results   Component Value Date/Time    WBC 12.8 06/30/2022 01:25 AM    RBC 1.57 06/30/2022 01:25 AM    HGB 5.8 06/30/2022 07:06 AM    HCT 16.3 06/30/2022 07:06 AM    MCV 99.2 06/30/2022 01:25 AM    MCH 35.0 06/30/2022 01:25 AM    MCHC 35.2 06/30/2022 01:25 AM    RDW 28.4 06/30/2022 01:25 AM    PLT 86 06/30/2022 01:25 AM    MPV 9.4 06/30/2022 01:25 AM     BMP:    Lab Results   Component Value Date/Time     06/30/2022 01:25 AM    K 4.4 06/30/2022 01:25 AM    CL 97 06/30/2022 01:25 AM    CO2 26 06/30/2022 01:25 AM    BUN 40 06/30/2022 01:25 AM    CREATININE 0.9 06/30/2022 01:25 AM    CALCIUM 9.6 06/30/2022 01:25 AM    GFRAA >60 06/30/2022 01:25 AM    LABGLOM >60 06/30/2022 01:25 AM    GLUCOSE 209 06/30/2022 01:25 AM       Chest Xray:   EKG:    I visualized CXR images and EKG strips     Discussed  with      Problem List  Principal Problem:    Symptomatic anemia  Resolved Problems:    * No resolved hospital problems.  *        Assessment/Plan:     Anemia  -Hemoglobin of 5.8.  -Got 1 unit of blood with no significant improved  -Patient cannot remember previously for similar issues no acute GI reasons found patient does have cirrhosis  -Hematology consult was requested outpatient we will consult at that  -Transfuse another unit of blood keep hemoglobin above 7  -We will monitor closely    Alcohol cirrhosis without ascites  -Still has some mild jaundice was on prednisone looks like she is finished her course  -We will consult GI with there was some plan for possible ERCP outpatient  -Monitor closely    History of esophageal varices continue home medication      Anny Pickett MD    6/30/2022 9:10 AM

## 2022-06-30 NOTE — ED NOTES
Pt verbalized that she was having 8/10 pain in her head, Dr. Kandis Sousa made aware, and would like to hold off on pain meds for now.      Lorena Coates RN  06/30/22 4916

## 2022-06-30 NOTE — PLAN OF CARE
Problem: Pain  Goal: Verbalizes/displays adequate comfort level or baseline comfort level  Outcome: Progressing     Problem: Chronic Conditions and Co-morbidities  Goal: Patient's chronic conditions and co-morbidity symptoms are monitored and maintained or improved  Outcome: Progressing  Flowsheets (Taken 6/30/2022 1015)  Care Plan - Patient's Chronic Conditions and Co-Morbidity Symptoms are Monitored and Maintained or Improved: Monitor and assess patient's chronic conditions and comorbid symptoms for stability, deterioration, or improvement

## 2022-07-01 ENCOUNTER — ANESTHESIA (OUTPATIENT)
Dept: ENDOSCOPY | Age: 47
DRG: 280 | End: 2022-07-01
Payer: COMMERCIAL

## 2022-07-01 ENCOUNTER — ANESTHESIA EVENT (OUTPATIENT)
Dept: ENDOSCOPY | Age: 47
DRG: 280 | End: 2022-07-01
Payer: COMMERCIAL

## 2022-07-01 VITALS
TEMPERATURE: 98.7 F | DIASTOLIC BLOOD PRESSURE: 69 MMHG | WEIGHT: 106.7 LBS | HEART RATE: 86 BPM | SYSTOLIC BLOOD PRESSURE: 124 MMHG | BODY MASS INDEX: 18.22 KG/M2 | RESPIRATION RATE: 14 BRPM | HEIGHT: 64 IN | OXYGEN SATURATION: 94 %

## 2022-07-01 LAB
ALBUMIN SERPL-MCNC: 3 G/DL (ref 3.4–5)
ALP BLD-CCNC: 173 U/L (ref 40–129)
ALT SERPL-CCNC: 41 U/L (ref 10–40)
ANION GAP SERPL CALCULATED.3IONS-SCNC: 10 MMOL/L (ref 3–16)
AST SERPL-CCNC: 73 U/L (ref 15–37)
BILIRUB SERPL-MCNC: 21.5 MG/DL (ref 0–1)
BILIRUBIN DIRECT: 6.3 MG/DL (ref 0–0.3)
BILIRUBIN, INDIRECT: 15.2 MG/DL (ref 0–1)
BUN BLDV-MCNC: 33 MG/DL (ref 7–20)
CALCIUM SERPL-MCNC: 8.7 MG/DL (ref 8.3–10.6)
CHLORIDE BLD-SCNC: 98 MMOL/L (ref 99–110)
CO2: 27 MMOL/L (ref 21–32)
CREAT SERPL-MCNC: 0.9 MG/DL (ref 0.6–1.1)
GFR AFRICAN AMERICAN: >60
GFR NON-AFRICAN AMERICAN: >60
GLUCOSE BLD-MCNC: 183 MG/DL (ref 70–99)
HCG(URINE) PREGNANCY TEST: NEGATIVE
HCT VFR BLD CALC: 21 % (ref 36–48)
HEMOGLOBIN: 7.4 G/DL (ref 12–16)
MCH RBC QN AUTO: 32.3 PG (ref 26–34)
MCHC RBC AUTO-ENTMCNC: 35.3 G/DL (ref 31–36)
MCV RBC AUTO: 91.5 FL (ref 80–100)
PDW BLD-RTO: 22.7 % (ref 12.4–15.4)
PLATELET # BLD: 69 K/UL (ref 135–450)
PLATELET SLIDE REVIEW: ABNORMAL
PMV BLD AUTO: 9.2 FL (ref 5–10.5)
POTASSIUM SERPL-SCNC: 4.6 MMOL/L (ref 3.5–5.1)
RBC # BLD: 2.3 M/UL (ref 4–5.2)
SARS-COV-2, NAAT: NOT DETECTED
SLIDE REVIEW: ABNORMAL
SODIUM BLD-SCNC: 135 MMOL/L (ref 136–145)
TOTAL PROTEIN: 5.7 G/DL (ref 6.4–8.2)
WBC # BLD: 9.1 K/UL (ref 4–11)

## 2022-07-01 PROCEDURE — 3700000000 HC ANESTHESIA ATTENDED CARE: Performed by: INTERNAL MEDICINE

## 2022-07-01 PROCEDURE — 80048 BASIC METABOLIC PNL TOTAL CA: CPT

## 2022-07-01 PROCEDURE — C1753 CATH, INTRAVAS ULTRASOUND: HCPCS | Performed by: INTERNAL MEDICINE

## 2022-07-01 PROCEDURE — 3609012300 HC EGD BAND LIGATION ESOPHGEAL/GASTRIC VARICES: Performed by: INTERNAL MEDICINE

## 2022-07-01 PROCEDURE — 2580000003 HC RX 258: Performed by: ANESTHESIOLOGY

## 2022-07-01 PROCEDURE — 85027 COMPLETE CBC AUTOMATED: CPT

## 2022-07-01 PROCEDURE — 6370000000 HC RX 637 (ALT 250 FOR IP): Performed by: NURSE PRACTITIONER

## 2022-07-01 PROCEDURE — 36415 COLL VENOUS BLD VENIPUNCTURE: CPT

## 2022-07-01 PROCEDURE — 6360000002 HC RX W HCPCS: Performed by: REGISTERED NURSE

## 2022-07-01 PROCEDURE — 7100000001 HC PACU RECOVERY - ADDTL 15 MIN: Performed by: INTERNAL MEDICINE

## 2022-07-01 PROCEDURE — 2500000003 HC RX 250 WO HCPCS: Performed by: REGISTERED NURSE

## 2022-07-01 PROCEDURE — 87635 SARS-COV-2 COVID-19 AMP PRB: CPT

## 2022-07-01 PROCEDURE — 2580000003 HC RX 258: Performed by: HOSPITALIST

## 2022-07-01 PROCEDURE — 87449 NOS EACH ORGANISM AG IA: CPT

## 2022-07-01 PROCEDURE — 3609018500 HC EGD US SCOPE W/ADJACENT STRUCTURES: Performed by: INTERNAL MEDICINE

## 2022-07-01 PROCEDURE — 06L38CZ OCCLUSION OF ESOPHAGEAL VEIN WITH EXTRALUMINAL DEVICE, VIA NATURAL OR ARTIFICIAL OPENING ENDOSCOPIC: ICD-10-PCS | Performed by: INTERNAL MEDICINE

## 2022-07-01 PROCEDURE — 7100000000 HC PACU RECOVERY - FIRST 15 MIN: Performed by: INTERNAL MEDICINE

## 2022-07-01 PROCEDURE — 2580000003 HC RX 258: Performed by: REGISTERED NURSE

## 2022-07-01 PROCEDURE — G0378 HOSPITAL OBSERVATION PER HR: HCPCS

## 2022-07-01 PROCEDURE — 2709999900 HC NON-CHARGEABLE SUPPLY: Performed by: INTERNAL MEDICINE

## 2022-07-01 PROCEDURE — 6370000000 HC RX 637 (ALT 250 FOR IP): Performed by: HOSPITALIST

## 2022-07-01 PROCEDURE — 2720000010 HC SURG SUPPLY STERILE: Performed by: INTERNAL MEDICINE

## 2022-07-01 PROCEDURE — 3700000001 HC ADD 15 MINUTES (ANESTHESIA): Performed by: INTERNAL MEDICINE

## 2022-07-01 PROCEDURE — 80076 HEPATIC FUNCTION PANEL: CPT

## 2022-07-01 PROCEDURE — 6360000002 HC RX W HCPCS: Performed by: HOSPITALIST

## 2022-07-01 PROCEDURE — 84703 CHORIONIC GONADOTROPIN ASSAY: CPT

## 2022-07-01 RX ORDER — LIDOCAINE HYDROCHLORIDE 20 MG/ML
INJECTION, SOLUTION EPIDURAL; INFILTRATION; INTRACAUDAL; PERINEURAL PRN
Status: DISCONTINUED | OUTPATIENT
Start: 2022-07-01 | End: 2022-07-01 | Stop reason: SDUPTHER

## 2022-07-01 RX ORDER — KETAMINE HCL IN NACL, ISO-OSM 100MG/10ML
SYRINGE (ML) INJECTION PRN
Status: DISCONTINUED | OUTPATIENT
Start: 2022-07-01 | End: 2022-07-01 | Stop reason: SDUPTHER

## 2022-07-01 RX ORDER — SODIUM CHLORIDE 9 MG/ML
INJECTION, SOLUTION INTRAVENOUS CONTINUOUS PRN
Status: DISCONTINUED | OUTPATIENT
Start: 2022-07-01 | End: 2022-07-01 | Stop reason: SDUPTHER

## 2022-07-01 RX ORDER — LANOLIN ALCOHOL/MO/W.PET/CERES
100 CREAM (GRAM) TOPICAL DAILY
Qty: 30 TABLET | Refills: 3 | Status: SHIPPED | OUTPATIENT
Start: 2022-07-01 | End: 2022-08-28

## 2022-07-01 RX ORDER — PROPOFOL 10 MG/ML
INJECTION, EMULSION INTRAVENOUS CONTINUOUS PRN
Status: DISCONTINUED | OUTPATIENT
Start: 2022-07-01 | End: 2022-07-01 | Stop reason: SDUPTHER

## 2022-07-01 RX ORDER — SODIUM CHLORIDE 9 MG/ML
INJECTION, SOLUTION INTRAVENOUS CONTINUOUS
Status: DISCONTINUED | OUTPATIENT
Start: 2022-07-01 | End: 2022-07-01 | Stop reason: HOSPADM

## 2022-07-01 RX ORDER — GLYCOPYRROLATE 0.2 MG/ML
INJECTION INTRAMUSCULAR; INTRAVENOUS PRN
Status: DISCONTINUED | OUTPATIENT
Start: 2022-07-01 | End: 2022-07-01 | Stop reason: SDUPTHER

## 2022-07-01 RX ORDER — PROPOFOL 10 MG/ML
INJECTION, EMULSION INTRAVENOUS PRN
Status: DISCONTINUED | OUTPATIENT
Start: 2022-07-01 | End: 2022-07-01 | Stop reason: SDUPTHER

## 2022-07-01 RX ADMIN — FUROSEMIDE 40 MG: 40 TABLET ORAL at 12:05

## 2022-07-01 RX ADMIN — LIDOCAINE HYDROCHLORIDE 80 MG: 20 INJECTION, SOLUTION EPIDURAL; INFILTRATION; INTRACAUDAL; PERINEURAL at 10:02

## 2022-07-01 RX ADMIN — PROPOFOL 30 MG: 10 INJECTION, EMULSION INTRAVENOUS at 10:12

## 2022-07-01 RX ADMIN — PANTOPRAZOLE SODIUM 40 MG: 40 TABLET, DELAYED RELEASE ORAL at 12:05

## 2022-07-01 RX ADMIN — PROPOFOL 50 MG: 10 INJECTION, EMULSION INTRAVENOUS at 10:02

## 2022-07-01 RX ADMIN — Medication 10 ML: at 08:25

## 2022-07-01 RX ADMIN — TRAMADOL HYDROCHLORIDE 25 MG: 50 TABLET, COATED ORAL at 12:05

## 2022-07-01 RX ADMIN — SPIRONOLACTONE 100 MG: 25 TABLET ORAL at 12:05

## 2022-07-01 RX ADMIN — Medication 10 MG: at 10:12

## 2022-07-01 RX ADMIN — SODIUM CHLORIDE: 9 INJECTION, SOLUTION INTRAVENOUS at 09:58

## 2022-07-01 RX ADMIN — GLYCOPYRROLATE 0.2 MG: 0.2 INJECTION, SOLUTION INTRAMUSCULAR; INTRAVENOUS at 10:02

## 2022-07-01 RX ADMIN — SODIUM CHLORIDE: 9 INJECTION, SOLUTION INTRAVENOUS at 09:42

## 2022-07-01 RX ADMIN — PROPOFOL 10 MG: 10 INJECTION, EMULSION INTRAVENOUS at 10:04

## 2022-07-01 RX ADMIN — ONDANSETRON 4 MG: 2 INJECTION INTRAMUSCULAR; INTRAVENOUS at 09:43

## 2022-07-01 RX ADMIN — PROPOFOL 120 MCG/KG/MIN: 10 INJECTION, EMULSION INTRAVENOUS at 10:02

## 2022-07-01 RX ADMIN — Medication 20 MG: at 10:02

## 2022-07-01 ASSESSMENT — ENCOUNTER SYMPTOMS: SHORTNESS OF BREATH: 0

## 2022-07-01 ASSESSMENT — PAIN SCALES - GENERAL: PAINLEVEL_OUTOF10: 9

## 2022-07-01 NOTE — FLOWSHEET NOTE
Shift assessment completed as charted. Patient lying in bed awake a & O x 4 calm c/o headache, notified Dr Nicky Marie,  New order for tramadol received, see orders,  Patient admitted for symptamatic anemia,  Received 2 units PRBC his admit,  hgb now 8.2 and asymptomatic except for occasional nausea controlled with zofran, tolerating po intake, vitals stable, was afebrile on admit,  Patient currently afebrile,  Will be NPO after midnight for possible EGD to be done 7/1  Patient independent in room continent of bowel and bladder no skin issues,  Is jaundice moderately,  No edema on room air lungs clear no tele ordered. Bed in low position Call light within reach Will continue to monitor.

## 2022-07-01 NOTE — ANESTHESIA PRE PROCEDURE
Department of Anesthesiology  Preprocedure Note       Name:  Marci Lei   Age:  40 y.o.  :  1975                                          MRN:  [id number]         Date:  2022      Surgeon: Shraddha Luu):  Liea Rodriguez MD    Procedure: Procedure(s):  EGD ESOPHAGOGASTRODUODENOSCOPY ULTRASOUND    Medications prior to admission:   Prior to Admission medications    Medication Sig Start Date End Date Taking?  Authorizing Provider   nadolol (CORGARD) 40 MG tablet Take 1 tablet by mouth daily 5/3/22   Tonie Hale MD   furosemide (LASIX) 40 MG tablet Take 1 tablet by mouth daily 22   Tonie Hale MD   spironolactone (ALDACTONE) 100 MG tablet Take 1 tablet by mouth daily 5/3/22   Tonie Hale MD   magnesium oxide (MAG-OX) 400 (240 Mg) MG tablet Take 1 tablet by mouth 2 times daily 5/3/22 6/2/22  Tonie Hale MD   pantoprazole (PROTONIX) 40 MG tablet Take 1 tablet by mouth every morning (before breakfast) 22   Tonie Hale MD   thiamine mononitrate (THIAMINE) 100 MG tablet Take 1 tablet by mouth daily 5/3/22 6/2/22  Tonie Hale MD       Current medications:    Current Facility-Administered Medications   Medication Dose Route Frequency Provider Last Rate Last Admin    0.9 % sodium chloride infusion   IntraVENous Continuous Jhon Tello MD        furosemide (LASIX) tablet 40 mg  40 mg Oral Daily Freya Caldwell MD   40 mg at 22 1312    pantoprazole [facility]IX) tablet 40 mg  [facility]        spironolactone (ALDACTONE) tablet 100 mg  100 mg Oral Daily Freya Caldwell MD   100 mg at 22 1312    thiamine mononitrate tablet 50 mg  50 mg Oral Daily Freya Caldwell MD        sodium chloride flush 0.9 % injection 5-40 mL  5-40 mL IntraVENous 2 times per day Norma Rosario MD   10 mL at 22    sodium chloride flush 0.9 % injection 5-40 mL  5-40 mL IntraVENous PRN Freya Caldwell MD        0.9 % sodium chloride infusion   IntraVENous PRN Jem Schmitt MD        ondansetron (ZOFRAN-ODT) disintegrating tablet 4 mg  4 mg Oral Q8H PRN Jem Schmitt MD   4 mg at 06/30/22 1706    Or    ondansetron (ZOFRAN) injection 4 mg  4 mg IntraVENous Q6H PRN Freya Caldwell MD   4 mg at 06/30/22 2151    polyethylene glycol (GLYCOLAX) packet 17 g  17 g Oral Daily PRN Jem Schmitt MD        acetaminophen (TYLENOL) tablet 650 mg  650 mg Oral Q6H PRN Freya Caldwell MD        Or   Shiela Andrewszenia acetaminophen (TYLENOL) suppository 650 mg  650 mg Rectal Q6H PRN Freya Caldwell MD        0.9 % sodium chloride infusion   IntraVENous PRN Freya Caldwell MD        0.9 % sodium chloride infusion   IntraVENous PRN Tomitolady Bradford MD        nadolol (CORGARD) tablet 40 mg  40 mg Oral Nightly Freya Caldwell MD   40 mg at 06/30/22 2157    traMADol (ULTRAM) tablet 25 mg  25 mg Oral Q6H PRN Magan Caro, APRN - CNP   25 mg at 06/30/22 2152       Allergies:  No Known Allergies    Problem List:    Patient Active Problem List   Diagnosis Code    GI bleed K92.2    Acute GI bleeding K92.2    Menorrhagia with regular cycle U76.9    Alcoholic liver disease ([facility] [address] K70.9    Acute cholecystitis K81.0    Nausea and vomiting R11.2    Acute blood loss anemia D62    Esophageal varices (HCC) V94.47    Alcoholic hepatitis F87.36    EZIO (acute kidney injury) ([facility] [address] N17.9    High anion gap metabolic acidosis I40.9    Elevated LFTs R79.89    Ascites due to alcoholic cirrhosis ([facility] [address] P09.22    Acute respiratory failure with hypoxia (HCC) J96.01    Sinus tachycardia R00.0    Severe anemia D64.9    Symptomatic anemia D64.9       Past Medical History:        Diagnosis Date    Alcoholic liver disease ([facility] [address]     Anemia     History of blood transfusion        Past Surgical History:        Procedure Laterality Date    COLONOSCOPY N/A 3/11/2021    COLONOSCOPY POLYPECTOMY SNARE/COLD BIOPSY performed by Julianne Arana MD at [address] [place] [address] TUBAL LIGATION      ESSURE    UPPER GASTROINTESTINAL ENDOSCOPY N/A 01/30/2021    EGD DIAGNOSTIC ONLY performed by Mojgan Valentino MD at [address] [place] [address] N/A 03/10/2021    EGD BIOPSY performed by Janet Rider MD at [address] [place] [address] 6/27/2021    EGD BAND LIGATION performed by Claudette Miner, MD at [address] [place] [address] N/A 4/27/2022    EGD DIAGNOSTIC ONLY performed by Sandra Arreguin MD at [facility]. [address] History:    Social History     Tobacco Use    Smoking status: Former Smoker    Smokeless tobacco: Never Used   Substance Use Topics    Alcohol use: Yes                                Counseling given: Not Answered      Vital Signs (Current):   Vitals:    06/30/22 2316 07/01/22 0420 07/01/22 0826 07/01/22 0926   BP: (!) 110/50 (!) 105/48 (!) 101/51 131/61   Pulse: 80 80 72 98   Resp: 16  18 12   Temp: 98.8 °F (37.1 °C) 99.1 °F (37.3 °C) 98.9 °F (37.2 °C) 98 °F (36.7 °C)   TempSrc: Oral Oral Oral Temporal   SpO2: 100% 98% 100% 98%   Weight:       Height:                                                  BP Readings from Last 3 Encounters:   07/01/22 131/61   06/24/22 (!) 116/56   06/15/22 131/71       NPO Status: Time of last liquid consumption: 2330                        Time of last solid consumption: 2330                        Date of last liquid consumption: 06/30/22                        Date of last solid food consumption: 06/30/22    BMI:   Wt Readings from Last 3 Encounters:   06/30/22 106 lb 11.2 oz (48.4 kg)   06/15/22 111 lb 1.6 oz (50.4 kg)   05/03/22 134 lb (60.8 kg)     Body mass index is 18.32 kg/m².     CBC:   Lab Results   Component Value Date/Time    WBC 9.1 07/01/2022 05:11 AM    RBC 2.30 07/01/2022 05:11 AM    HGB 7.4 07/01/2022 05:11 AM    HCT 21.0 07/01/2022 05:11 AM    MCV 91.5 07/01/2022 05:11 AM    RDW 22.7 07/01/2022 05:11 AM    PLT 69 07/01/2022 05:11 AM CMP:   Lab Results   Component Value Date/Time     07/01/2022 05:11 AM    K 4.6 07/01/2022 05:11 AM    K 4.4 06/30/2022 01:25 AM    CL 98 07/01/2022 05:11 AM    CO2 27 07/01/2022 05:11 AM    BUN 33 07/01/2022 05:11 AM    CREATININE 0.9 07/01/2022 05:11 AM    GFRAA >60 07/01/2022 05:11 AM    AGRATIO 1.2 06/30/2022 01:25 AM    LABGLOM >60 07/01/2022 05:11 AM    GLUCOSE 183 07/01/2022 05:11 AM    PROT 5.7 07/01/2022 05:11 AM    CALCIUM 8.7 07/01/2022 05:11 AM    BILITOT 21.5 07/01/2022 05:11 AM    ALKPHOS 173 07/01/2022 05:11 AM    AST 73 07/01/2022 05:11 AM    ALT 41 07/01/2022 05:11 AM       POC Tests: No results for input(s): POCGLU, POCNA, POCK, POCCL, POCBUN, POCHEMO, POCHCT in the last 72 hours.     Coags:   Lab Results   Component Value Date/Time    PROTIME 23.0 06/14/2022 01:54 AM    INR 2.03 06/14/2022 01:54 AM    APTT 27.6 04/25/2022 11:30 PM       HCG (If Applicable):   Lab Results   Component Value Date    PREGTESTUR Negative 07/01/2022        ABGs:   Lab Results   Component Value Date/Time    PHART 7.473 04/26/2022 08:35 AM    PO2ART 273.0 04/26/2022 08:35 AM    EVY6JPG 26.9 04/26/2022 08:35 AM    USG4RCD 19.7 04/26/2022 08:35 AM    BEART -3.7 04/26/2022 08:35 AM    L5YYYRAF >100.0 04/26/2022 08:35 AM        Type & Screen (If Applicable):  No results found for: LABABO, LABRH    Drug/Infectious Status (If Applicable):  No results found for: HIV, HEPCAB    COVID-19 Screening (If Applicable):   Lab Results   Component Value Date/Time    COVID19 Not Detected 07/01/2022 06:40 AM           Anesthesia Evaluation  Patient summary reviewed and Nursing notes reviewed no history of anesthetic complications:   Airway: Mallampati: II  TM distance: >3 FB   Neck ROM: full  Mouth opening: > = 3 FB   Dental: normal exam         Pulmonary:       (-) asthma and shortness of breath                           Cardiovascular:        (-) hypertension and  angina                Neuro/Psych:      (-) CVA GI/Hepatic/Renal:   (+) liver disease (cirrhosis):,      (-) GERD       Endo/Other:    (+) blood dyscrasia: anemia:., .    (-) diabetes mellitus, hypothyroidism               Abdominal:             Vascular:     - PVD. Other Findings:           Anesthesia Plan      MAC     ASA 3       Induction: intravenous. Anesthetic plan and risks discussed with patient. Plan discussed with CRNA.                     Lizzie Duarte MD   7/1/2022

## 2022-07-01 NOTE — PROGRESS NOTES
Pt arrived from endo to PACU bay 4. Report received from endo staff. Pt asleep, minimally arousable to voice. Pt arrives on room air, vitals as charted.

## 2022-07-01 NOTE — PROGRESS NOTES
Shift assessment completed. Routine vitals obtained. IV flushed. Pt is awake, alert, and oriented. Patient does not appear to be in distress, resting comfortably at this time. Call light within reach. No further needs expressed.

## 2022-07-01 NOTE — PROGRESS NOTES
This patient has had a discharge order placed. They are returning home and being picked up in the lobby by their daughter. Discharge paperwork has been printed, highlighted, and gone over with the patient by this RN. Patient understands teaching and has no further questions at this time. IV has been removed with no complications. Pt has all belongings present. No further needs expressed.

## 2022-07-01 NOTE — PLAN OF CARE
Problem: Pain  Goal: Verbalizes/displays adequate comfort level or baseline comfort level  7/1/2022 0058 by Jamaal Dumas RN  Outcome: Progressing  Flowsheets  Taken 6/30/2022 2316 by Jamaal Dumas RN  Verbalizes/displays adequate comfort level or baseline comfort level:   Encourage patient to monitor pain and request assistance   Assess pain using appropriate pain scale   Administer analgesics based on type and severity of pain and evaluate response   Implement non-pharmacological measures as appropriate and evaluate response   Consider cultural and social influences on pain and pain management   Notify Licensed Independent Practitioner if interventions unsuccessful or patient reports new pain  Taken 6/30/2022 2030 by Jamaal Dumas RN  Verbalizes/displays adequate comfort level or baseline comfort level:   Encourage patient to monitor pain and request assistance   Assess pain using appropriate pain scale   Administer analgesics based on type and severity of pain and evaluate response   Implement non-pharmacological measures as appropriate and evaluate response   Consider cultural and social influences on pain and pain management   Notify Licensed Independent Practitioner if interventions unsuccessful or patient reports new pain  Taken 6/30/2022 1708 by Robert Tee RN  Verbalizes/displays adequate comfort level or baseline comfort level: Encourage patient to monitor pain and request assistance  6/30/2022 1558 by Robert eTe RN  Outcome: Progressing     Problem: Chronic Conditions and Co-morbidities  Goal: Patient's chronic conditions and co-morbidity symptoms are monitored and maintained or improved  7/1/2022 0058 by Jamaal Dumas RN  Outcome: Progressing  Flowsheets (Taken 6/30/2022 2000)  Care Plan - Patient's Chronic Conditions and Co-Morbidity Symptoms are Monitored and Maintained or Improved:   Monitor and assess patient's chronic conditions and comorbid symptoms for stability, deterioration, or improvement   Collaborate with multidisciplinary team to address chronic and comorbid conditions and prevent exacerbation or deterioration   Update acute care plan with appropriate goals if chronic or comorbid symptoms are exacerbated and prevent overall improvement and discharge  6/30/2022 1558 by Beverly Le RN  Outcome: Progressing  Flowsheets (Taken 6/30/2022 1015)  Care Plan - Patient's Chronic Conditions and Co-Morbidity Symptoms are Monitored and Maintained or Improved: Monitor and assess patient's chronic conditions and comorbid symptoms for stability, deterioration, or improvement

## 2022-07-01 NOTE — PROCEDURES
Endoscopy Note    Patient: Cm Costello  : 1975  CSN:     Procedure: Esophagogastroduodenoscopy with esophageal variceal banding and endoscopic ultrasound    Date:  2022     Surgeon:  Tori Reyes MD     Referring Physician: Emma Mata     Preoperative Diagnosis: Anemia and jaundice    Postoperative Diagnosis: #1 large varix noted in the esophagus banded  #2 large gallstones and gall  #3 mildly dilated common bile duct at 7 mm    Anesthesia: Monitored anesthesia care    EBL: <5 mL    Indications: This is a 40y.o. year old female who to the hospital because she was weak and tired she was found to have a hemoglobin in the 5 range she was given a transfusion she has known alcoholic liver disease she also has a bilirubin in the 20 range  As of her anemia and her elevated bilirubin were doing a EUS/EGD today to find out #1 what could be the cause of her anemia #2 to make sure she has no evidence of biliary obstruction for her elevated bilirubin      Description of Procedure:  Informed consent was obtained from the patient after explanation of indications, benefits and possible risks and complications of the procedure. The patient was then taken to the endoscopy suite, placed in the left lateral decubitus position and the above IV sedation was administrered.     The Olympus linear EUS scope was entered f first  Evaluation of the esophagus to reveal large dilated vein in the esophagus consistent with a varix  Going into the stomach we found no abnormality  To the duodenum we found no abnormality    Ultrasound    Looking at the ampulla and common bile duct we found no abnormalities tracing the common bile duct I found no evidence of gallstones but I did find that they common bile duct was 7.7 mm in size    Pancreas does have evidence of chronic pancreatitis its small and there is several lobulated appearance is to the pancreas including the head the body and the tail    Pancreatic duct also had some

## 2022-07-01 NOTE — PLAN OF CARE
Problem: Pain  Goal: Verbalizes/displays adequate comfort level or baseline comfort level  Outcome: Completed     Problem: Chronic Conditions and Co-morbidities  Goal: Patient's chronic conditions and co-morbidity symptoms are monitored and maintained or improved  Outcome: Completed     Problem: ABCDS Injury Assessment  Goal: Absence of physical injury  Outcome: Completed

## 2022-07-01 NOTE — PROGRESS NOTES
[facility] PROGRESS NOTE    7/1/2022 2:14 PM        Name: Cain Gomez . Admitted: 6/30/2022  Primary Care Provider: Tasha Reynoso DO (Tel: [phone])                        Subjective:  . No acute events overnight. Resting well. Pain control. Diet ok. Labs reviewed  Denies any chest pain sob. Reviewed interval ancillary notes    Current Medications  furosemide (LASIX) tablet 40 mg, Daily  pantoprazole (PROTONIX) tablet 40 mg, QAM AC  spironolactone (ALDACTONE) tablet 100 mg, Daily  thiamine mononitrate tablet 50 mg, Daily  sodium chloride flush 0.9 % injection 5-40 mL, 2 times per day  sodium chloride flush 0.9 % injection 5-40 mL, PRN  0.9 % sodium chloride infusion, PRN  ondansetron (ZOFRAN-ODT) disintegrating tablet 4 mg, Q8H PRN   Or  ondansetron (ZOFRAN) injection 4 mg, Q6H PRN  polyethylene glycol (GLYCOLAX) packet 17 g, Daily PRN  acetaminophen (TYLENOL) tablet 650 mg, Q6H PRN   Or  acetaminophen (TYLENOL) suppository 650 mg, Q6H PRN  0.9 % sodium chloride infusion, PRN  0.9 % sodium chloride infusion, PRN  nadolol (CORGARD) tablet 40 mg, Nightly  traMADol (ULTRAM) tablet 25 mg, Q6H PRN        Objective:  /69   Pulse 86   Temp 98.7 °F (37.1 °C) (Oral)   Resp 14   Ht 5' 4\" (1.626 m)   Wt 106 lb 11.2 oz (48.4 kg)   SpO2 94%   Breastfeeding No   BMI 18.32 kg/m²     Intake/Output Summary (Last 24 hours) at 7/1/2022 1414  Last data filed at 7/1/2022 1033  Gross per 24 hour   Intake 800 ml   Output 600 ml   Net 200 ml      Wt Readings from Last 3 Encounters:   06/30/22 106 lb 11.2 oz (48.4 kg)   06/15/22 111 lb 1.6 oz (50.4 kg)   05/03/22 134 lb (60.8 kg)       General appearance:  Appears comfortable  Eyes: Sclera clear. Pupils equal.  ENT: Moist oral mucosa. Trachea midline, no adenopathy. Cardiovascular: Regular rhythm, normal S1, S2.  No murmur. No edema in lower extremities  Respiratory: Not using accessory muscles. Good inspiratory effort. Clear to auscultation bilaterally, no wheeze or crackles. GI: Abdomen soft, no tenderness, not distended, normal bowel sounds  Musculoskeletal: No cyanosis in digits, neck supple  Neurology: CN 2-12 grossly intact. No speech or motor deficits  Psych: Normal affect. Alert and oriented in time, place and person  Skin: Warm, dry, normal turgor    Labs and Tests:  CBC:   Recent Labs     06/30/22  0125 06/30/22  0125 06/30/22  0706 06/30/22  1302 07/01/22  0511   WBC 12.8*  --   --   --  9.1   HGB 5.5*   < > 5.8* 8.2* 7.4*   PLT 86*  --   --   --  69*    < > = values in this interval not displayed. BMP:    Recent Labs     06/30/22  0125 07/01/22  0511   * 135*   K 4.4 4.6   CL 97* 98*   CO2 26 27   BUN 40* 33*   CREATININE 0.9 0.9   GLUCOSE 209* 183*     Hepatic:   Recent Labs     06/30/22  0125 07/01/22  0511   AST 79* 73*   ALT 45* 41*   BILITOT 21.1* 21.5*   ALKPHOS 223* 173*       Discussed care with patient             Problem List  Principal Problem:    Symptomatic anemia  Resolved Problems:    * No resolved hospital problems. *       Assessment & Plan:   1. Symptomatic anemia  -Patient with multifactorial.  With history of esophageal variceal bleed. With cirrhosis. -We will continue to monitor hemoglobin is trended down to 7.4 from 8  =0= we power hemoglobin-. Further recommendation to follow-    Cholelithiasis  -With no evidence of cholecystitis at some point will need surgical evaluation outpatient possible    Diet: ADULT DIET;  Dysphagia - Soft and Bite Sized  Code:Full Code  DVT PPX lovenox       Veronica Rico MD   7/1/2022 2:14 PM

## 2022-07-01 NOTE — ANESTHESIA POSTPROCEDURE EVALUATION
Department of Anesthesiology  Postprocedure Note    Patient: Kraig Rouse  MRN: [id number]  YOB: 1975  Date of evaluation: 7/1/2022      Procedure Summary     Date: 07/01/22 Room / Location: [facility] Dodie Hermann [address]    Anesthesia Start: 6213 Anesthesia Stop: 1186    Procedures:       EGD ESOPHAGOGASTRODUODENOSCOPY ULTRASOUND (N/A )      EGD BAND LIGATION (N/A Abdomen) Diagnosis:       Anemia, unspecified type      Jaundice      (Anemia, unspecified type [D64.9])      (Jaundice [R17])    Surgeons: Lori West MD Responsible Provider: Saida Metcalf MD    Anesthesia Type: MAC ASA Status: 3          Anesthesia Type: No value filed. Estiven Phase I:      Estiven Phase II:        Anesthesia Post Evaluation    Patient location during evaluation: PACU  Level of consciousness: awake  Airway patency: patent  Complications: no  Cardiovascular status: hemodynamically stable  Respiratory status: acceptable  There was medical reason for not using a multimodal analgesia pain management approach.

## 2022-07-01 NOTE — H&P
Gastroenterology Note             Pre-operative History and Physical    Patient: Asuncion Mata  : 1975  CSN:     History Obtained From:  patient and/or guardian. HISTORY OF PRESENT ILLNESS:    The patient is a 40 y.o. female  here for EGD and endoscopic ultrasound  Patient is deeply jaundiced with a bilirubin of 21  1 time there was discussion in the medical record of her having an ERCP with our colleague Dr. Jean Carlos Gaines  Her most recent CT scan does not show a dilated common bile  We believe that her persistent jaundice is due to her serious liver disease  But were doing an upper endoscopy because she is #1 anemia  Adding on the endoscopic ultrasound just to evaluate the common bile duct to make sure there is no stone trapped or tumor  To be causing the bilirubin to be 21      Past Medical History:    Past Medical History:   Diagnosis Date    Alcoholic liver disease ([address]     Anemia     History of blood transfusion      Past Surgical History:    Past Surgical History:   Procedure Laterality Date    COLONOSCOPY N/A 3/11/2021    COLONOSCOPY POLYPECTOMY SNARE/COLD BIOPSY performed by [facility]ne [facility]uca [facility] 2021    EGD DIAGNOSTIC ONLY performed by Mayo Cruz MD at [address] [place] [address] 03/10/2021    EGD BIOPSY performed by Lorene Deluca MD at [address] [place] [address] 2021    EGD BAND LIGATION performed by Bandar Rogel MD at [address] [place] [address] 2022    EGD DIAGNOSTIC ONLY performed by Chelsie Hopkins MD at [address] [place] [address]     Medications Prior to Admission:   No current facility-administered medications on file prior to encounter.      Current Outpatient Medications on File Prior to Encounter   Medication Sig Dispense Refill    nadolol (CORGARD) 40 MG tablet Take 1 tablet by mouth daily 30 tablet 0    furosemide (LASIX) 40 MG tablet Take 1 tablet by mouth daily 60 tablet 0    spironolactone (ALDACTONE) 100 MG tablet Take 1 tablet by mouth daily 30 tablet 1    magnesium oxide (MAG-OX) 400 (240 Mg) MG tablet Take 1 tablet by mouth 2 times daily 60 tablet 0    pantoprazole (PROTONIX) 40 MG tablet Take 1 tablet by mouth every morning (before breakfast) 30 tablet 1    thiamine mononitrate (THIAMINE) 100 MG tablet Take 1 tablet by mouth daily 30 tablet 0        Allergies:  Patient has no known allergies. Social History:   Social History     Tobacco Use    Smoking status: Former Smoker    Smokeless tobacco: Never Used   Substance Use Topics    Alcohol use: Yes     Family History:   Family History   Problem Relation Age of Onset    Alcohol Abuse Mother        PHYSICAL EXAM:      /61   Pulse 98   Temp 98 °F (36.7 °C) (Temporal)   Resp 12   Ht 5' 4\" (1.626 m)   Wt 106 lb 11.2 oz (48.4 kg)   SpO2 98%   Breastfeeding No   BMI 18.32 kg/m²  I        Heart:   RRR, normal s1s2    Lungs:  CTA bilat,  Normal effort    Abdomen:   NT, ND      ASA Grade:  ASA 3 - Patient with moderate systemic disease with functional limitations    Mallampati Class: 2          ASSESSMENT AND PLAN:    1. Patient is a 40 y.o. female here for EGD/with endoscopic ultrasound with MAC.   2.  Procedure options, risks and benefits reviewed with patient. Patient expresses understanding.     Félix Zapata MD,   [address]  7/1/2022

## 2022-07-02 LAB — L. PNEUMOPHILA SEROGP 1 UR AG: NORMAL

## 2022-07-02 NOTE — DISCHARGE SUMMARY
[address] [place] [address] DISCHARGE SUMMARY    Patient Demographics    Patient. Nikyk So  Date of Birth. 1975  MRN. [id number]     Primary care provider. [facility],   (Tel: [phone])    Admit date: 6/30/2022    Discharge date (blank if same as Note Date): 7/1/2022  Note Date: 7/2/2022     Reason for Hospitalization. Chief Complaint   Patient presents with    Abnormal Lab     [facility]. Symptomatic anemia  -With hemoglobin of 5.8 on presentation was received 2 units of blood with improvement in symptoms.  -Patient also underwent EUS and EGD. Patient incidentally found to have c cholelithiasis no evidence of cholecystitis. Outpatient general surgery referral at some point  Hemodynamically stable and was discharged stable after GI bleed  Hemoglobin stable      Jaundice improved. Likely cirrhosis    Consults. IP CONSULT TO ONCOLOGY  IP CONSULT TO GI    Physical examination on discharge day. /69   Pulse 86   Temp 98.7 °F (37.1 °C) (Oral)   Resp 14   Ht 5' 4\" (1.626 m)   Wt 106 lb 11.2 oz (48.4 kg)   SpO2 94%   Breastfeeding No   BMI 18.32 kg/m²   General appearance. Alert. Looks comfortable. HEENT. Sclera clear. Moist mucus membranes. Cardiovascular. Regular rate and rhythm, normal S1, S2. No murmur. Respiratory. Not using accessory muscles. Clear to auscultation bilaterally, no wheeze. Gastrointestinal. Abdomen soft, non-tender, not distended, normal bowel sounds  Neurology. Facial symmetry. No speech deficits. Moving all extremities equally. Extremities. No edema in lower extremities. Skin. Warm, dry, normal turgor    Condition at time of discharge stable     Medication instructions provided to patient at discharge.      Medication List      CHANGE how you take these medications    thiamine 100 MG tablet  Take 1 tablet by mouth daily  What changed:   · how much to take  · how to take this  · when to take this        CONTINUE taking these medications    furosemide 40 MG tablet  Commonly known as: LASIX  Take 1 tablet by mouth daily     magnesium oxide 400 (240 Mg) MG tablet  Commonly known as: MAG-OX  Take 1 tablet by mouth 2 times daily     nadolol 40 MG tablet  Commonly known as: CORGARD  Take 1 tablet by mouth daily     pantoprazole 40 MG tablet  Commonly known as: PROTONIX  Take 1 tablet by mouth every morning (before breakfast)     spironolactone 100 MG tablet  Commonly known as: ALDACTONE  Take 1 tablet by mouth daily     vitamin B-1 100 MG tablet  Commonly known as: THIAMINE  Take 1 tablet by mouth daily           Where to Get Your Medications      These medications were sent to [facility], [address] [place] [address], [address] [place] [address] Road    Phone: [phone]   · thiamine 100 MG tablet         Discharge recommendations given to patient. Follow Up. pcp in 1 week   Disposition. home  Activity. activity as tolerated  Diet: No diet orders on file      Spent 45  minutes in discharge process.     Signed:  Veronica Rico MD     7/2/2022 6:33 PM

## 2022-07-05 ENCOUNTER — HOSPITAL ENCOUNTER (OUTPATIENT)
Dept: CT IMAGING | Age: 47
Discharge: HOME | End: 2022-07-05
Payer: COMMERCIAL

## 2022-07-05 VITALS
SYSTOLIC BLOOD PRESSURE: 104 MMHG | TEMPERATURE: 97.4 F | HEIGHT: 64 IN | WEIGHT: 106 LBS | HEART RATE: 82 BPM | DIASTOLIC BLOOD PRESSURE: 52 MMHG | RESPIRATION RATE: 20 BRPM | OXYGEN SATURATION: 97 % | BODY MASS INDEX: 18.1 KG/M2

## 2022-07-05 DIAGNOSIS — D61.818 PANCYTOPENIA (HCC): ICD-10-CM

## 2022-07-05 LAB
ACANTHOCYTES: ABNORMAL
ANION GAP SERPL CALCULATED.3IONS-SCNC: 10 MMOL/L (ref 3–16)
ANISOCYTOSIS: ABNORMAL
APTT: 43.7 SEC (ref 23–34.3)
BANDED NEUTROPHILS RELATIVE PERCENT: 5 % (ref 0–7)
BASOPHILS ABSOLUTE: 0 K/UL (ref 0–0.2)
BASOPHILS RELATIVE PERCENT: 0 %
BUN BLDV-MCNC: 39 MG/DL (ref 7–20)
BURR CELLS: ABNORMAL
CALCIUM SERPL-MCNC: 10.7 MG/DL (ref 8.3–10.6)
CHLORIDE BLD-SCNC: 97 MMOL/L (ref 99–110)
CO2: 27 MMOL/L (ref 21–32)
CREAT SERPL-MCNC: 0.8 MG/DL (ref 0.6–1.1)
EOSINOPHILS ABSOLUTE: 0.2 K/UL (ref 0–0.6)
EOSINOPHILS RELATIVE PERCENT: 2 %
GFR AFRICAN AMERICAN: >60
GFR NON-AFRICAN AMERICAN: >60
GLUCOSE BLD-MCNC: 180 MG/DL (ref 70–99)
HCT VFR BLD CALC: 17.6 % (ref 36–48)
HEMOGLOBIN: 6.2 G/DL (ref 12–16)
HYPOCHROMIA: ABNORMAL
INR BLD: 2.02 (ref 0.87–1.14)
LYMPHOCYTES ABSOLUTE: 1 K/UL (ref 1–5.1)
LYMPHOCYTES RELATIVE PERCENT: 12 %
MCH RBC QN AUTO: 32 PG (ref 26–34)
MCHC RBC AUTO-ENTMCNC: 34.9 G/DL (ref 31–36)
MCV RBC AUTO: 91.6 FL (ref 80–100)
METAMYELOCYTES RELATIVE PERCENT: 2 %
MONOCYTES ABSOLUTE: 1.1 K/UL (ref 0–1.3)
MONOCYTES RELATIVE PERCENT: 13 %
NEUTROPHILS ABSOLUTE: 6.1 K/UL (ref 1.7–7.7)
NEUTROPHILS RELATIVE PERCENT: 66 %
PDW BLD-RTO: 23.1 % (ref 12.4–15.4)
PLATELET # BLD: 93 K/UL (ref 135–450)
PMV BLD AUTO: 9 FL (ref 5–10.5)
POLYCHROMASIA: ABNORMAL
POTASSIUM SERPL-SCNC: 4.8 MMOL/L (ref 3.5–5.1)
PROTHROMBIN TIME: 22.9 SEC (ref 11.7–14.5)
RBC # BLD: 1.93 M/UL (ref 4–5.2)
SODIUM BLD-SCNC: 134 MMOL/L (ref 136–145)
WBC # BLD: 8.4 K/UL (ref 4–11)

## 2022-07-05 PROCEDURE — 88184 FLOWCYTOMETRY/ TC 1 MARKER: CPT

## 2022-07-05 PROCEDURE — 85610 PROTHROMBIN TIME: CPT

## 2022-07-05 PROCEDURE — 7100000010 HC PHASE II RECOVERY - FIRST 15 MIN

## 2022-07-05 PROCEDURE — 6360000002 HC RX W HCPCS

## 2022-07-05 PROCEDURE — 77012 CT SCAN FOR NEEDLE BIOPSY: CPT

## 2022-07-05 PROCEDURE — 88305 TISSUE EXAM BY PATHOLOGIST: CPT

## 2022-07-05 PROCEDURE — 36415 COLL VENOUS BLD VENIPUNCTURE: CPT

## 2022-07-05 PROCEDURE — 88311 DECALCIFY TISSUE: CPT

## 2022-07-05 PROCEDURE — 38221 DX BONE MARROW BIOPSIES: CPT

## 2022-07-05 PROCEDURE — 80048 BASIC METABOLIC PNL TOTAL CA: CPT

## 2022-07-05 PROCEDURE — 85730 THROMBOPLASTIN TIME PARTIAL: CPT

## 2022-07-05 PROCEDURE — 88185 FLOWCYTOMETRY/TC ADD-ON: CPT

## 2022-07-05 PROCEDURE — 88313 SPECIAL STAINS GROUP 2: CPT

## 2022-07-05 PROCEDURE — 6360000002 HC RX W HCPCS: Performed by: RADIOLOGY

## 2022-07-05 PROCEDURE — 7100000011 HC PHASE II RECOVERY - ADDTL 15 MIN

## 2022-07-05 PROCEDURE — 85025 COMPLETE CBC W/AUTO DIFF WBC: CPT

## 2022-07-05 RX ORDER — MIDAZOLAM HYDROCHLORIDE 2 MG/2ML
INJECTION, SOLUTION INTRAMUSCULAR; INTRAVENOUS
Status: COMPLETED | OUTPATIENT
Start: 2022-07-05 | End: 2022-07-05

## 2022-07-05 RX ORDER — ONDANSETRON 2 MG/ML
4 INJECTION INTRAMUSCULAR; INTRAVENOUS EVERY 6 HOURS PRN
Status: DISCONTINUED | OUTPATIENT
Start: 2022-07-05 | End: 2022-07-06 | Stop reason: HOSPADM

## 2022-07-05 RX ORDER — MORPHINE SULFATE 10 MG/ML
1 INJECTION, SOLUTION INTRAMUSCULAR; INTRAVENOUS
Status: DISCONTINUED | OUTPATIENT
Start: 2022-07-05 | End: 2022-07-06 | Stop reason: HOSPADM

## 2022-07-05 RX ORDER — ACETAMINOPHEN 325 MG/1
650 TABLET ORAL EVERY 4 HOURS PRN
Status: DISCONTINUED | OUTPATIENT
Start: 2022-07-05 | End: 2022-07-06 | Stop reason: HOSPADM

## 2022-07-05 RX ORDER — ONDANSETRON 2 MG/ML
INJECTION INTRAMUSCULAR; INTRAVENOUS
Status: COMPLETED
Start: 2022-07-05 | End: 2022-07-05

## 2022-07-05 RX ORDER — FENTANYL CITRATE 50 UG/ML
INJECTION, SOLUTION INTRAMUSCULAR; INTRAVENOUS
Status: COMPLETED | OUTPATIENT
Start: 2022-07-05 | End: 2022-07-05

## 2022-07-05 RX ORDER — MORPHINE SULFATE 2 MG/ML
INJECTION, SOLUTION INTRAMUSCULAR; INTRAVENOUS
Status: COMPLETED
Start: 2022-07-05 | End: 2022-07-05

## 2022-07-05 RX ADMIN — MORPHINE SULFATE 1 MG: 2 INJECTION, SOLUTION INTRAMUSCULAR; INTRAVENOUS at 12:38

## 2022-07-05 RX ADMIN — FENTANYL CITRATE 50 MCG: 50 INJECTION, SOLUTION INTRAMUSCULAR; INTRAVENOUS at 11:44

## 2022-07-05 RX ADMIN — ONDANSETRON 4 MG: 2 INJECTION INTRAMUSCULAR; INTRAVENOUS at 12:17

## 2022-07-05 RX ADMIN — MIDAZOLAM HYDROCHLORIDE 1 MG: 1 INJECTION, SOLUTION INTRAMUSCULAR; INTRAVENOUS at 11:44

## 2022-07-05 RX ADMIN — FENTANYL CITRATE 50 MCG: 50 INJECTION, SOLUTION INTRAMUSCULAR; INTRAVENOUS at 11:41

## 2022-07-05 RX ADMIN — MIDAZOLAM HYDROCHLORIDE 1 MG: 1 INJECTION, SOLUTION INTRAMUSCULAR; INTRAVENOUS at 11:41

## 2022-07-05 ASSESSMENT — PAIN SCALES - GENERAL
PAINLEVEL_OUTOF10: 4
PAINLEVEL_OUTOF10: 7
PAINLEVEL_OUTOF10: 0

## 2022-07-05 ASSESSMENT — PAIN - FUNCTIONAL ASSESSMENT: PAIN_FUNCTIONAL_ASSESSMENT: 0-10

## 2022-07-05 NOTE — PROGRESS NOTES
Pt arrives to pre procedure area in stable condition from home. Vital signs stable. Assessment completed see flow sheet. IV patent. Procedure explained to pt who states understanding and questions answered. Consent signed.

## 2022-07-05 NOTE — PRE SEDATION
Sedation Pre-Procedure Note    Patient Name: Roshan Guthrie   YOB: 1975  Room/Bed: Room/bed info not found  Medical Record Number: [id number]  Date: 7/5/2022   Time: 11:53 AM       Indication:  Bone marrow biopsy. Consent: I have discussed with the patient and/or the patient representative the indication, alternatives, and the possible risks and/or complications of the planned procedure and the anesthesia methods. The patient and/or patient representative appear to understand and agree to proceed. Vital Signs:   Vitals:    07/05/22 1149   BP: (!) 95/40   Pulse:    Resp:    Temp:    SpO2:        Past Medical History:   has a past medical history of Alcoholic liver disease ([facility] [address] Anemia, and History of blood transfusion. Past Surgical History:   has a past surgical history that includes Upper gastrointestinal endoscopy (N/A, 01/30/2021); Upper gastrointestinal endoscopy (N/A, 03/10/2021); Tubal ligation; Colonoscopy (N/A, 3/11/2021); Upper gastrointestinal endoscopy (N/A, 6/27/2021); Upper gastrointestinal endoscopy (N/A, 4/27/2022); Knee Arthroplasty; Upper gastrointestinal endoscopy (N/A, 7/1/2022); and Upper gastrointestinal endoscopy (N/A, 7/1/2022). Medications:   Scheduled Meds:   Continuous Infusions:   PRN Meds:   Home Meds:   Prior to Admission medications    Medication Sig Start Date End Date Taking?  Authorizing Provider   thiamine 100 MG tablet Take 1 tablet by mouth daily 7/1/22   Rachell Sanchez MD   nadolol (CORGARD) 40 MG tablet Take 1 tablet by mouth daily 5/3/22   Erika Damon MD   furosemide (LASIX) 40 MG tablet Take 1 tablet by mouth daily 5/4/22   Erika Damon MD   spironolactone (ALDACTONE) 100 MG tablet Take 1 tablet by mouth daily 5/3/22   Erika Damon MD   magnesium oxide (MAG-OX) 400 (240 Mg) MG tablet Take 1 tablet by mouth 2 times daily 5/3/22 6/2/22  Erika Damon MD   pantoprazole (PROTONIX) 40 MG tablet Take 1 tablet by mouth every morning (before breakfast) 5/4/22   Kendell Villa MD   thiamine mononitrate (THIAMINE) 100 MG tablet Take 1 tablet by mouth daily 5/3/22 6/2/22  Kendell Villa MD     Coumadin Use Last 7 Days:  no  Antiplatelet drug therapy use last 7 days: no  Other anticoagulant use last 7 days: no  Additional Medication Information:  n/a      Pre-Sedation Documentation and Exam:   I have reviewed the patient's history and review of systems.     Mallampati Airway Assessment:  Mallampati Class II - (soft palate, fauces & uvula are visible)    Prior History of Anesthesia Complications:   none    ASA Classification:  Class 2 - A normal healthy patient with mild systemic disease    Sedation/ Anesthesia Plan:   intravenous sedation    Medications Planned:   midazolam (Versed) intravenously and fentanyl intravenously    Patient is an appropriate candidate for plan of sedation: yes    Electronically signed by Paulo Dumont MD on 7/5/2022 at 11:53 AM

## 2022-07-05 NOTE — BRIEF OP NOTE
Brief Postoperative Note    Jv Bocanegra  YOB: 1975  [id number]    Pre-operative Diagnosis: pancytopenia    Post-operative Diagnosis: Same    Procedure: CT-guided bone marrow biopsy    Anesthesia: Moderate Sedation    Surgeons: Renée Abbasi MD    Estimated Blood Loss: Less than 5 mL    Complications: None    Specimens: Was Obtained: 12cc bone marrow aspirate and core    Findings: Successful CT-guided bone marrow biospy.     Electronically signed by Renée Abbasi MD on 7/5/2022 at 11:54 AM

## 2022-07-05 NOTE — PROGRESS NOTES
Patient admitted to [facility] via stretcher, awake, moves ext to command. Respirations adeq on RA spo2 97%. Skin warm and dry with jaundice color. Back drsg dry and intact. Patient c/o nausea, will medicate as ordered, see MAR, will continue to monitor.

## 2022-07-06 ENCOUNTER — TELEPHONE (OUTPATIENT)
Dept: INTERVENTIONAL RADIOLOGY/VASCULAR | Age: 47
End: 2022-07-06

## 2022-07-06 NOTE — TELEPHONE ENCOUNTER
Post procedure phone call, pt doing well, slight soreness at biopsy site, follow up scheduled with MD

## 2022-07-07 ENCOUNTER — HOSPITAL ENCOUNTER (OUTPATIENT)
Dept: ONCOLOGY | Age: 47
Setting detail: INFUSION SERIES
Discharge: HOME | End: 2022-07-07
Payer: COMMERCIAL

## 2022-07-07 VITALS
RESPIRATION RATE: 16 BRPM | HEART RATE: 81 BPM | DIASTOLIC BLOOD PRESSURE: 55 MMHG | SYSTOLIC BLOOD PRESSURE: 103 MMHG | TEMPERATURE: 98.4 F | OXYGEN SATURATION: 98 %

## 2022-07-07 LAB
ABO/RH: NORMAL
ANTIBODY SCREEN: NORMAL
BLOOD BANK DISPENSE STATUS: NORMAL
BLOOD BANK PRODUCT CODE: NORMAL
BPU ID: NORMAL
DESCRIPTION BLOOD BANK: NORMAL

## 2022-07-07 PROCEDURE — P9016 RBC LEUKOCYTES REDUCED: HCPCS

## 2022-07-07 PROCEDURE — 36430 TRANSFUSION BLD/BLD COMPNT: CPT

## 2022-07-07 PROCEDURE — 86923 COMPATIBILITY TEST ELECTRIC: CPT

## 2022-07-07 PROCEDURE — 6360000002 HC RX W HCPCS: Performed by: STUDENT IN AN ORGANIZED HEALTH CARE EDUCATION/TRAINING PROGRAM

## 2022-07-07 PROCEDURE — 2580000003 HC RX 258: Performed by: STUDENT IN AN ORGANIZED HEALTH CARE EDUCATION/TRAINING PROGRAM

## 2022-07-07 PROCEDURE — 86900 BLOOD TYPING SEROLOGIC ABO: CPT

## 2022-07-07 PROCEDURE — 99211 OFF/OP EST MAY X REQ PHY/QHP: CPT

## 2022-07-07 PROCEDURE — 86901 BLOOD TYPING SEROLOGIC RH(D): CPT

## 2022-07-07 PROCEDURE — 86850 RBC ANTIBODY SCREEN: CPT

## 2022-07-07 RX ORDER — SODIUM CHLORIDE 9 MG/ML
INJECTION, SOLUTION INTRAVENOUS PRN
Status: DISCONTINUED | OUTPATIENT
Start: 2022-07-07 | End: 2022-07-08 | Stop reason: HOSPADM

## 2022-07-07 RX ORDER — SODIUM CHLORIDE 0.9 % (FLUSH) 0.9 %
5-40 SYRINGE (ML) INJECTION ONCE
Status: COMPLETED | OUTPATIENT
Start: 2022-07-07 | End: 2022-07-07

## 2022-07-07 RX ORDER — DIPHENHYDRAMINE HYDROCHLORIDE 50 MG/ML
25 INJECTION INTRAMUSCULAR; INTRAVENOUS SEE ADMIN INSTRUCTIONS
Status: COMPLETED | OUTPATIENT
Start: 2022-07-07 | End: 2022-07-07

## 2022-07-07 RX ORDER — ACETAMINOPHEN 325 MG/1
650 TABLET ORAL SEE ADMIN INSTRUCTIONS
Status: DISCONTINUED | OUTPATIENT
Start: 2022-07-07 | End: 2022-07-08 | Stop reason: HOSPADM

## 2022-07-07 RX ADMIN — Medication 10 ML: at 12:11

## 2022-07-07 RX ADMIN — DIPHENHYDRAMINE HYDROCHLORIDE 25 MG: 50 INJECTION, SOLUTION INTRAMUSCULAR; INTRAVENOUS at 12:06

## 2022-07-07 NOTE — PROGRESS NOTES
Pt blood transfusion observed for 15min, no sign of reaction. Pt tolerating well. Meal tray provided for patient. Patient brought warm blanket at patient's request. Pt denies any further needs at this time. Call light within reach.

## 2022-07-07 NOTE — PROGRESS NOTES
Pt tolerating infusion, brought warm blanket at patient's request, denies any further needs at this time.

## 2022-07-07 NOTE — PROGRESS NOTES
Pt consent for blood transfusion obtained from patient. 20g PIV placed left forearm, type and screen specimen obtained and sent to lab. Pt tolerated well.

## 2022-07-07 NOTE — PROGRESS NOTES
Blood transfusion given per [facility]. Transfusion complete. No signs of an adverse reaction. Given avs with signs and symptoms of a delayed reaction and when to call Dr. Nata Sheikh questions answered. Discharged per ambulatory with family member.

## 2022-07-08 ENCOUNTER — HOSPITAL ENCOUNTER (OUTPATIENT)
Dept: NON INVASIVE DIAGNOSTICS | Age: 47
Discharge: HOME | End: 2022-07-08
Payer: COMMERCIAL

## 2022-07-08 DIAGNOSIS — I42.9: ICD-10-CM

## 2022-07-08 LAB
LV EF: 58 %
LVEF MODALITY: NORMAL

## 2022-07-08 PROCEDURE — 93306 TTE W/DOPPLER COMPLETE: CPT

## 2022-07-11 LAB
Lab: NORMAL
REPORT: NORMAL
THIS TEST SENT TO: NORMAL

## 2022-07-15 ENCOUNTER — HOSPITAL ENCOUNTER (OUTPATIENT)
Dept: ONCOLOGY | Age: 47
Setting detail: INFUSION SERIES
Discharge: HOME | End: 2022-07-15
Payer: COMMERCIAL

## 2022-07-15 VITALS
SYSTOLIC BLOOD PRESSURE: 103 MMHG | HEART RATE: 75 BPM | DIASTOLIC BLOOD PRESSURE: 47 MMHG | RESPIRATION RATE: 16 BRPM | TEMPERATURE: 97 F | OXYGEN SATURATION: 99 %

## 2022-07-15 LAB
ABO/RH: NORMAL
ANTIBODY SCREEN: NORMAL
BLOOD BANK DISPENSE STATUS: NORMAL
BLOOD BANK DISPENSE STATUS: NORMAL
BLOOD BANK PRODUCT CODE: NORMAL
BLOOD BANK PRODUCT CODE: NORMAL
BPU ID: NORMAL
BPU ID: NORMAL
DESCRIPTION BLOOD BANK: NORMAL
DESCRIPTION BLOOD BANK: NORMAL

## 2022-07-15 PROCEDURE — 86850 RBC ANTIBODY SCREEN: CPT

## 2022-07-15 PROCEDURE — 6370000000 HC RX 637 (ALT 250 FOR IP): Performed by: STUDENT IN AN ORGANIZED HEALTH CARE EDUCATION/TRAINING PROGRAM

## 2022-07-15 PROCEDURE — 36430 TRANSFUSION BLD/BLD COMPNT: CPT

## 2022-07-15 PROCEDURE — 86923 COMPATIBILITY TEST ELECTRIC: CPT

## 2022-07-15 PROCEDURE — 86901 BLOOD TYPING SEROLOGIC RH(D): CPT

## 2022-07-15 PROCEDURE — P9016 RBC LEUKOCYTES REDUCED: HCPCS

## 2022-07-15 PROCEDURE — 99211 OFF/OP EST MAY X REQ PHY/QHP: CPT

## 2022-07-15 PROCEDURE — 86900 BLOOD TYPING SEROLOGIC ABO: CPT

## 2022-07-15 PROCEDURE — 2580000003 HC RX 258: Performed by: STUDENT IN AN ORGANIZED HEALTH CARE EDUCATION/TRAINING PROGRAM

## 2022-07-15 RX ORDER — SODIUM CHLORIDE 9 MG/ML
INJECTION, SOLUTION INTRAVENOUS PRN
Status: COMPLETED | OUTPATIENT
Start: 2022-07-15 | End: 2022-07-15

## 2022-07-15 RX ORDER — DIPHENHYDRAMINE HCL 25 MG
25 TABLET ORAL SEE ADMIN INSTRUCTIONS
Status: COMPLETED | OUTPATIENT
Start: 2022-07-15 | End: 2022-07-15

## 2022-07-15 RX ORDER — ZOLPIDEM TARTRATE 5 MG/1
1 TABLET ORAL
Status: ON HOLD | COMMUNITY
Start: 2022-07-14 | End: 2022-10-27 | Stop reason: HOSPADM

## 2022-07-15 RX ORDER — ONDANSETRON HYDROCHLORIDE 8 MG/1
1 TABLET, FILM COATED ORAL
COMMUNITY
Start: 2022-07-14

## 2022-07-15 RX ADMIN — DIPHENHYDRAMINE HYDROCHLORIDE 25 MG: 25 TABLET ORAL at 10:28

## 2022-07-15 RX ADMIN — SODIUM CHLORIDE: 9 INJECTION, SOLUTION INTRAVENOUS at 10:28

## 2022-07-15 NOTE — PROGRESS NOTES
Blood transfusion given per [facility]. Transfusion complete. No signs of an adverse reaction. Given avs with signs and symptoms of a delayed reaction and when to call Dr. Eli Salazar questions answered. Discharged per ambulatory with family member.

## 2022-07-25 ENCOUNTER — HOSPITAL ENCOUNTER (INPATIENT)
Age: 47
LOS: 4 days | Discharge: HOME | DRG: 280 | End: 2022-07-29
Attending: HOSPITALIST | Admitting: HOSPITALIST
Payer: COMMERCIAL

## 2022-07-25 DIAGNOSIS — D64.9 ANEMIA, UNSPECIFIED TYPE: Primary | ICD-10-CM

## 2022-07-25 DIAGNOSIS — K70.9 ALCOHOLIC LIVER DISEASE (HCC): ICD-10-CM

## 2022-07-25 LAB
A/G RATIO: 1 (ref 1.1–2.2)
ABO/RH: NORMAL
ALBUMIN SERPL-MCNC: 3.2 G/DL (ref 3.4–5)
ALP BLD-CCNC: 164 U/L (ref 40–129)
ALT SERPL-CCNC: 31 U/L (ref 10–40)
ANION GAP SERPL CALCULATED.3IONS-SCNC: 11 MMOL/L (ref 3–16)
ANTIBODY SCREEN: NORMAL
AST SERPL-CCNC: 73 U/L (ref 15–37)
BACTERIA: ABNORMAL /HPF
BASOPHILS ABSOLUTE: 0 K/UL (ref 0–0.2)
BASOPHILS RELATIVE PERCENT: 0 %
BILIRUB SERPL-MCNC: 18.7 MG/DL (ref 0–1)
BILIRUBIN URINE: NEGATIVE
BLOOD BANK DISPENSE STATUS: NORMAL
BLOOD BANK DISPENSE STATUS: NORMAL
BLOOD BANK PRODUCT CODE: NORMAL
BLOOD BANK PRODUCT CODE: NORMAL
BLOOD, URINE: NEGATIVE
BPU ID: NORMAL
BPU ID: NORMAL
BUN BLDV-MCNC: 43 MG/DL (ref 7–20)
BURR CELLS: ABNORMAL
CALCIUM SERPL-MCNC: 9.5 MG/DL (ref 8.3–10.6)
CHLORIDE BLD-SCNC: 98 MMOL/L (ref 99–110)
CLARITY: CLEAR
CO2: 24 MMOL/L (ref 21–32)
COLOR: YELLOW
CREAT SERPL-MCNC: 1 MG/DL (ref 0.6–1.1)
DESCRIPTION BLOOD BANK: NORMAL
DESCRIPTION BLOOD BANK: NORMAL
EOSINOPHILS ABSOLUTE: 0.1 K/UL (ref 0–0.6)
EOSINOPHILS RELATIVE PERCENT: 1 %
EPITHELIAL CELLS, UA: 1 /HPF (ref 0–5)
ETHANOL: NORMAL MG/DL (ref 0–0.08)
FERRITIN: 1751 NG/ML (ref 15–150)
GFR AFRICAN AMERICAN: >60
GFR NON-AFRICAN AMERICAN: 60
GLUCOSE BLD-MCNC: 206 MG/DL (ref 70–99)
GLUCOSE URINE: NEGATIVE MG/DL
HCT VFR BLD CALC: 14 % (ref 36–48)
HCT VFR BLD CALC: 15.6 % (ref 36–48)
HEMOGLOBIN: 4.9 G/DL (ref 12–16)
HEMOGLOBIN: 5.4 G/DL (ref 12–16)
HYALINE CASTS: 0 /LPF (ref 0–8)
INR BLD: 2.01 (ref 0.87–1.14)
IRON SATURATION: ABNORMAL % (ref 15–50)
IRON: 234 UG/DL (ref 37–145)
KETONES, URINE: NEGATIVE MG/DL
LEUKOCYTE ESTERASE, URINE: ABNORMAL
LIPASE: 39 U/L (ref 13–60)
LYMPHOCYTES ABSOLUTE: 0.7 K/UL (ref 1–5.1)
LYMPHOCYTES RELATIVE PERCENT: 7 %
MCH RBC QN AUTO: 35.1 PG (ref 26–34)
MCHC RBC AUTO-ENTMCNC: 34.7 G/DL (ref 31–36)
MCV RBC AUTO: 101.2 FL (ref 80–100)
MICROSCOPIC EXAMINATION: YES
MONOCYTES ABSOLUTE: 1.1 K/UL (ref 0–1.3)
MONOCYTES RELATIVE PERCENT: 10 %
NEUTROPHILS ABSOLUTE: 8.6 K/UL (ref 1.7–7.7)
NEUTROPHILS RELATIVE PERCENT: 82 %
NITRITE, URINE: NEGATIVE
OCCULT BLOOD DIAGNOSTIC: NORMAL
PDW BLD-RTO: 28.4 % (ref 12.4–15.4)
PH UA: 5.5 (ref 5–8)
PLATELET # BLD: 98 K/UL (ref 135–450)
PLATELET SLIDE REVIEW: ABNORMAL
PMV BLD AUTO: 9.1 FL (ref 5–10.5)
POLYCHROMASIA: ABNORMAL
POTASSIUM SERPL-SCNC: 4.2 MMOL/L (ref 3.5–5.1)
PROTEIN UA: NEGATIVE MG/DL
PROTHROMBIN TIME: 22.8 SEC (ref 11.7–14.5)
RBC # BLD: 1.39 M/UL (ref 4–5.2)
RBC UA: 0 /HPF (ref 0–4)
SCHISTOCYTES: ABNORMAL
SLIDE REVIEW: ABNORMAL
SODIUM BLD-SCNC: 133 MMOL/L (ref 136–145)
SPECIFIC GRAVITY UA: 1.01 (ref 1–1.03)
TOTAL IRON BINDING CAPACITY: ABNORMAL UG/DL (ref 260–445)
TOTAL PROTEIN: 6.5 G/DL (ref 6.4–8.2)
URINE REFLEX TO CULTURE: YES
URINE TYPE: ABNORMAL
UROBILINOGEN, URINE: 0.2 E.U./DL
WBC # BLD: 10.5 K/UL (ref 4–11)
WBC UA: 14 /HPF (ref 0–5)

## 2022-07-25 PROCEDURE — 86900 BLOOD TYPING SEROLOGIC ABO: CPT

## 2022-07-25 PROCEDURE — 87086 URINE CULTURE/COLONY COUNT: CPT

## 2022-07-25 PROCEDURE — 80053 COMPREHEN METABOLIC PANEL: CPT

## 2022-07-25 PROCEDURE — P9045 ALBUMIN (HUMAN), 5%, 250 ML: HCPCS | Performed by: EMERGENCY MEDICINE

## 2022-07-25 PROCEDURE — 36430 TRANSFUSION BLD/BLD COMPNT: CPT

## 2022-07-25 PROCEDURE — 82270 OCCULT BLOOD FECES: CPT

## 2022-07-25 PROCEDURE — 82077 ASSAY SPEC XCP UR&BREATH IA: CPT

## 2022-07-25 PROCEDURE — 81001 URINALYSIS AUTO W/SCOPE: CPT

## 2022-07-25 PROCEDURE — 6370000000 HC RX 637 (ALT 250 FOR IP): Performed by: HOSPITALIST

## 2022-07-25 PROCEDURE — 1200000000 HC SEMI PRIVATE

## 2022-07-25 PROCEDURE — P9016 RBC LEUKOCYTES REDUCED: HCPCS

## 2022-07-25 PROCEDURE — 6360000002 HC RX W HCPCS: Performed by: HOSPITALIST

## 2022-07-25 PROCEDURE — 86880 COOMBS TEST DIRECT: CPT

## 2022-07-25 PROCEDURE — 6360000002 HC RX W HCPCS: Performed by: PHYSICIAN ASSISTANT

## 2022-07-25 PROCEDURE — 85025 COMPLETE CBC W/AUTO DIFF WBC: CPT

## 2022-07-25 PROCEDURE — 6360000002 HC RX W HCPCS: Performed by: EMERGENCY MEDICINE

## 2022-07-25 PROCEDURE — 99285 EMERGENCY DEPT VISIT HI MDM: CPT

## 2022-07-25 PROCEDURE — 86901 BLOOD TYPING SEROLOGIC RH(D): CPT

## 2022-07-25 PROCEDURE — 85014 HEMATOCRIT: CPT

## 2022-07-25 PROCEDURE — 93005 ELECTROCARDIOGRAM TRACING: CPT | Performed by: HOSPITALIST

## 2022-07-25 PROCEDURE — 6360000002 HC RX W HCPCS: Performed by: INTERNAL MEDICINE

## 2022-07-25 PROCEDURE — 82728 ASSAY OF FERRITIN: CPT

## 2022-07-25 PROCEDURE — C9113 INJ PANTOPRAZOLE SODIUM, VIA: HCPCS | Performed by: INTERNAL MEDICINE

## 2022-07-25 PROCEDURE — 83690 ASSAY OF LIPASE: CPT

## 2022-07-25 PROCEDURE — 2580000003 HC RX 258: Performed by: INTERNAL MEDICINE

## 2022-07-25 PROCEDURE — 86923 COMPATIBILITY TEST ELECTRIC: CPT

## 2022-07-25 PROCEDURE — 83550 IRON BINDING TEST: CPT

## 2022-07-25 PROCEDURE — A4216 STERILE WATER/SALINE, 10 ML: HCPCS | Performed by: INTERNAL MEDICINE

## 2022-07-25 PROCEDURE — 85610 PROTHROMBIN TIME: CPT

## 2022-07-25 PROCEDURE — C9113 INJ PANTOPRAZOLE SODIUM, VIA: HCPCS | Performed by: PHYSICIAN ASSISTANT

## 2022-07-25 PROCEDURE — 87186 SC STD MICRODIL/AGAR DIL: CPT

## 2022-07-25 PROCEDURE — 86850 RBC ANTIBODY SCREEN: CPT

## 2022-07-25 PROCEDURE — 87088 URINE BACTERIA CULTURE: CPT

## 2022-07-25 PROCEDURE — 83540 ASSAY OF IRON: CPT

## 2022-07-25 PROCEDURE — 36415 COLL VENOUS BLD VENIPUNCTURE: CPT

## 2022-07-25 PROCEDURE — 85018 HEMOGLOBIN: CPT

## 2022-07-25 PROCEDURE — 2580000003 HC RX 258: Performed by: HOSPITALIST

## 2022-07-25 RX ORDER — LORAZEPAM 0.5 MG/1
0.5 TABLET ORAL EVERY 8 HOURS PRN
Status: ON HOLD | COMMUNITY
End: 2022-10-27 | Stop reason: HOSPADM

## 2022-07-25 RX ORDER — SODIUM CHLORIDE 0.9 % (FLUSH) 0.9 %
5-40 SYRINGE (ML) INJECTION EVERY 12 HOURS SCHEDULED
Status: DISCONTINUED | OUTPATIENT
Start: 2022-07-25 | End: 2022-07-29 | Stop reason: HOSPADM

## 2022-07-25 RX ORDER — ONDANSETRON 4 MG/1
4 TABLET, ORALLY DISINTEGRATING ORAL EVERY 8 HOURS PRN
Status: DISCONTINUED | OUTPATIENT
Start: 2022-07-25 | End: 2022-07-29 | Stop reason: HOSPADM

## 2022-07-25 RX ORDER — SODIUM CHLORIDE 9 MG/ML
INJECTION, SOLUTION INTRAVENOUS PRN
Status: DISCONTINUED | OUTPATIENT
Start: 2022-07-25 | End: 2022-07-26 | Stop reason: SDUPTHER

## 2022-07-25 RX ORDER — SPIRONOLACTONE 25 MG/1
100 TABLET ORAL DAILY
Status: DISCONTINUED | OUTPATIENT
Start: 2022-07-26 | End: 2022-07-29 | Stop reason: HOSPADM

## 2022-07-25 RX ORDER — SODIUM CHLORIDE 9 MG/ML
INJECTION, SOLUTION INTRAVENOUS PRN
Status: DISCONTINUED | OUTPATIENT
Start: 2022-07-25 | End: 2022-07-29 | Stop reason: HOSPADM

## 2022-07-25 RX ORDER — ALBUMIN, HUMAN INJ 5% 5 %
25 SOLUTION INTRAVENOUS ONCE
Status: COMPLETED | OUTPATIENT
Start: 2022-07-25 | End: 2022-07-25

## 2022-07-25 RX ORDER — ONDANSETRON 2 MG/ML
4 INJECTION INTRAMUSCULAR; INTRAVENOUS EVERY 6 HOURS PRN
Status: DISCONTINUED | OUTPATIENT
Start: 2022-07-25 | End: 2022-07-29 | Stop reason: HOSPADM

## 2022-07-25 RX ORDER — GAUZE BANDAGE 2" X 2"
100 BANDAGE TOPICAL DAILY
Status: DISCONTINUED | OUTPATIENT
Start: 2022-07-26 | End: 2022-07-29 | Stop reason: HOSPADM

## 2022-07-25 RX ORDER — ACETAMINOPHEN 650 MG/1
650 SUPPOSITORY RECTAL EVERY 6 HOURS PRN
Status: DISCONTINUED | OUTPATIENT
Start: 2022-07-25 | End: 2022-07-25

## 2022-07-25 RX ORDER — ACETAMINOPHEN 500 MG
1000 TABLET ORAL EVERY 6 HOURS PRN
Status: DISCONTINUED | OUTPATIENT
Start: 2022-07-25 | End: 2022-07-29 | Stop reason: HOSPADM

## 2022-07-25 RX ORDER — ONDANSETRON HYDROCHLORIDE 8 MG/1
8 TABLET, FILM COATED ORAL EVERY 8 HOURS PRN
Status: DISCONTINUED | OUTPATIENT
Start: 2022-07-25 | End: 2022-07-25 | Stop reason: SDUPTHER

## 2022-07-25 RX ORDER — PANTOPRAZOLE SODIUM 40 MG/1
40 TABLET, DELAYED RELEASE ORAL
Status: DISCONTINUED | OUTPATIENT
Start: 2022-07-26 | End: 2022-07-25

## 2022-07-25 RX ORDER — PANTOPRAZOLE SODIUM 40 MG/10ML
80 INJECTION, POWDER, LYOPHILIZED, FOR SOLUTION INTRAVENOUS ONCE
Status: COMPLETED | OUTPATIENT
Start: 2022-07-25 | End: 2022-07-25

## 2022-07-25 RX ORDER — ZOLPIDEM TARTRATE 5 MG/1
5 TABLET ORAL NIGHTLY PRN
Status: DISCONTINUED | OUTPATIENT
Start: 2022-07-25 | End: 2022-07-29 | Stop reason: HOSPADM

## 2022-07-25 RX ORDER — NADOLOL 40 MG/1
40 TABLET ORAL DAILY
Status: DISCONTINUED | OUTPATIENT
Start: 2022-07-26 | End: 2022-07-29 | Stop reason: HOSPADM

## 2022-07-25 RX ORDER — POLYETHYLENE GLYCOL 3350 17 G/17G
17 POWDER, FOR SOLUTION ORAL DAILY PRN
Status: DISCONTINUED | OUTPATIENT
Start: 2022-07-25 | End: 2022-07-29 | Stop reason: HOSPADM

## 2022-07-25 RX ORDER — FUROSEMIDE 40 MG/1
40 TABLET ORAL DAILY
Status: DISCONTINUED | OUTPATIENT
Start: 2022-07-26 | End: 2022-07-29

## 2022-07-25 RX ORDER — ACETAMINOPHEN 325 MG/1
650 TABLET ORAL EVERY 6 HOURS PRN
Status: DISCONTINUED | OUTPATIENT
Start: 2022-07-25 | End: 2022-07-25

## 2022-07-25 RX ORDER — SODIUM CHLORIDE 0.9 % (FLUSH) 0.9 %
5-40 SYRINGE (ML) INJECTION PRN
Status: DISCONTINUED | OUTPATIENT
Start: 2022-07-25 | End: 2022-07-29 | Stop reason: HOSPADM

## 2022-07-25 RX ORDER — LANOLIN ALCOHOL/MO/W.PET/CERES
400 CREAM (GRAM) TOPICAL 2 TIMES DAILY
Status: DISCONTINUED | OUTPATIENT
Start: 2022-07-25 | End: 2022-07-29 | Stop reason: HOSPADM

## 2022-07-25 RX ADMIN — Medication 400 MG: at 22:29

## 2022-07-25 RX ADMIN — Medication 10 ML: at 20:16

## 2022-07-25 RX ADMIN — PANTOPRAZOLE SODIUM 80 MG: 40 INJECTION, POWDER, FOR SOLUTION INTRAVENOUS at 16:51

## 2022-07-25 RX ADMIN — SODIUM CHLORIDE 40 MG: 9 INJECTION INTRAMUSCULAR; INTRAVENOUS; SUBCUTANEOUS at 22:28

## 2022-07-25 RX ADMIN — ONDANSETRON 4 MG: 2 INJECTION INTRAMUSCULAR; INTRAVENOUS at 21:55

## 2022-07-25 RX ADMIN — OCTREOTIDE ACETATE 50 MCG/HR: 500 INJECTION, SOLUTION INTRAVENOUS; SUBCUTANEOUS at 20:19

## 2022-07-25 RX ADMIN — ZOLPIDEM TARTRATE 5 MG: 5 TABLET, COATED ORAL at 22:29

## 2022-07-25 RX ADMIN — SODIUM CHLORIDE, PRESERVATIVE FREE 10 ML: 5 INJECTION INTRAVENOUS at 21:56

## 2022-07-25 RX ADMIN — ALBUMIN (HUMAN) 25 G: 12.5 INJECTION, SOLUTION INTRAVENOUS at 21:37

## 2022-07-25 ASSESSMENT — ENCOUNTER SYMPTOMS
NAUSEA: 0
WHEEZING: 0
BLOOD IN STOOL: 0
DIARRHEA: 0
BACK PAIN: 0
STRIDOR: 0
CONSTIPATION: 0
COUGH: 0
SHORTNESS OF BREATH: 0
COLOR CHANGE: 0
VOMITING: 0
ABDOMINAL PAIN: 1

## 2022-07-25 ASSESSMENT — PAIN SCALES - GENERAL: PAINLEVEL_OUTOF10: 0

## 2022-07-25 NOTE — PROGRESS NOTES
Pt admitted to room 3317. Daughter at bedside. 1/1 PRBC transfusing. Per pts daughter they recently spoke to hospice and the pt opted out. Recently the pt has been taking ativan prn and ambien to sleep per daughter. She states the pt has been sober for about 5 weeks. Updated on POC. Pt hungry but c/o stomach tenderness.

## 2022-07-25 NOTE — H&P
HOSPITALISTS HISTORY AND PHYSICAL    7/25/2022 4:22 PM    Patient Information:  Clotilde Sandoval is a 40 y.o. female [id number]  PCP:  Josiah Castellon DO (Tel: [phone] )    Chief complaint:    Chief Complaint   Patient presents with    Other     Pt states that she got her blood tested routinely and that her hemoglobin came back at 4 and is complaining of abdominal pain. Pt states that the abdominal pain is in the epigastric area. History of Present Illness:  Xiang Zhang is a 40 y.o. female who presented with complaints of low hemoglobin. Patient apparently was found to have a hemoglobin of 4 outpatient routine testing. Patient was supposed to be direct admitted for IV infusion but unfortunately came to the ER. Patient just feeling generalized fatigue weakness complaining abdominal pain history of liver cirrhosis. Follows GI no bleeding or dark tarry stool. Patient denies any new weight gain. Does have some mild abdominal distention  REVIEW OF SYSTEMS:   Constitutional: Negative for fever,chills or night sweats  ENT: Negative for rhinorrhea, epistaxis, hoarseness, sore throat. Respiratory: Negative for shortness of breath,wheezing  Cardiovascular: Negative for chest pain, palpitations   Gastrointestinal: Negative for nausea, vomiting, diarrhea  Genitourinary: Negative for polyuria, dysuria   Hematologic/Lymphatic: Negative for bleeding tendency, easy bruising  Musculoskeletal: Negative for myalgias and arthralgias  Neurologic: Negative for confusion,dysarthria. Skin: Negative for itching,rash, good capillary refill. Psychiatric: Negative for depression,anxiety, agitation. Endocrine: Negative for polydipsia,polyuria,heat /cold intolerance. Past Medical History:   has a past medical history of Alcoholic liver disease ([address] Anemia, and History of blood transfusion.      Past Surgical History:   has a past surgical history that includes Upper gastrointestinal endoscopy (N/A, 01/30/2021); Upper gastrointestinal endoscopy (N/A, 03/10/2021); Tubal ligation; Colonoscopy (N/A, 3/11/2021); Upper gastrointestinal endoscopy (N/A, 6/27/2021); Upper gastrointestinal endoscopy (N/A, 4/27/2022); Knee Arthroplasty; Upper gastrointestinal endoscopy (N/A, 7/1/2022); Upper gastrointestinal endoscopy (N/A, 7/1/2022); and CT BIOPSY BONE MARROW (7/5/2022). Medications:  No current facility-administered medications on file prior to encounter. Current Outpatient Medications on File Prior to Encounter   Medication Sig Dispense Refill    ondansetron (ZOFRAN) 8 MG tablet Take 1 tablet by mouth      zolpidem (AMBIEN) 5 MG tablet Take 1 tablet by mouth. thiamine 100 MG tablet Take 1 tablet by mouth daily 30 tablet 3    nadolol (CORGARD) 40 MG tablet Take 1 tablet by mouth daily 30 tablet 0    furosemide (LASIX) 40 MG tablet Take 1 tablet by mouth daily 60 tablet 0    spironolactone (ALDACTONE) 100 MG tablet Take 1 tablet by mouth daily 30 tablet 1    magnesium oxide (MAG-OX) 400 (240 Mg) MG tablet Take 1 tablet by mouth 2 times daily 60 tablet 0    pantoprazole (PROTONIX) 40 MG tablet Take 1 tablet by mouth every morning (before breakfast) 30 tablet 1    thiamine mononitrate (THIAMINE) 100 MG tablet Take 1 tablet by mouth daily 30 tablet 0       Allergies:  No Known Allergies     Social History:   reports that she has quit smoking. She has never used smokeless tobacco. She reports current alcohol use. She reports that she does not use drugs. Family History:  family history includes Alcohol Abuse in her mother. ,     Physical Exam:  BP (!) 98/53   Pulse 73   Temp 99.1 °F (37.3 °C) (Oral)   Resp 14   Ht 5' 5\" (1.651 m)   Wt 130 lb (59 kg)   SpO2 95%   BMI 21.63 kg/m²     General appearance:  Appears comfortable. Well nourished  Eyes: Sclera clear, pupils equal  ENT: Moist mucus membranes, no thrush. Trachea midline.   Cardiovascular: Regular rhythm, normal S1, S2. No murmur, gallop, rub. No edema in lower extremities  Respiratory: Clear to auscultation bilaterally, no wheeze, good inspiratory effort  Gastrointestinal: Abdomen soft, non-tender, not distended, normal bowel sounds  Musculoskeletal: No cyanosis in digits, neck supple  Neurology: Cranial nerves grossly intact. Alert and oriented in time, place and person. No speech or motor deficits  Psychiatry: Appropriate affect. Not agitated  Skin: Warm, dry, normal turgor, no rash    Labs:  CBC:   Lab Results   Component Value Date/Time    WBC 10.5 07/25/2022 02:49 PM    RBC 1.39 07/25/2022 02:49 PM    HGB 4.9 07/25/2022 02:49 PM    HCT 14.0 07/25/2022 02:49 PM    .2 07/25/2022 02:49 PM    MCH 35.1 07/25/2022 02:49 PM    MCHC 34.7 07/25/2022 02:49 PM    RDW 28.4 07/25/2022 02:49 PM    PLT 98 07/25/2022 02:49 PM    MPV 9.1 07/25/2022 02:49 PM     BMP:    Lab Results   Component Value Date/Time     07/25/2022 02:50 PM    K 4.2 07/25/2022 02:50 PM    K 4.4 06/30/2022 01:25 AM    CL 98 07/25/2022 02:50 PM    CO2 24 07/25/2022 02:50 PM    BUN 43 07/25/2022 02:50 PM    CREATININE 1.0 07/25/2022 02:50 PM    CALCIUM 9.5 07/25/2022 02:50 PM    GFRAA >60 07/25/2022 02:50 PM    LABGLOM 60 07/25/2022 02:50 PM    GLUCOSE 206 07/25/2022 02:50 PM       Chest Xray:   EKG:    I visualized CXR images and EKG strips     Discussed  with     Problem List  Principal Problem:    Symptomatic anemia  Resolved Problems:    * No resolved hospital problems. *        Assessment/Plan:   Symptomatic anemia  -Presenting hemoglobin of 4. With recurrent history was recently admitted about a month ago with similar issues  -Patient at the time underwent EUS and EGD with no acute finding.  -Currently we will transfuse patient 1 unit of blood and then recheck hemoglobin with goal to keep hemoglobin above 7 will likely will need second unit of blood  -GI will be consulted.   -Hemoccult is negative    Cirrhosis  Secondary alcohol

## 2022-07-25 NOTE — ED PROVIDER NOTES
[facility]        Pt Name: Charley Shepherd  MRN: [id number]  [place] 1975  Date of evaluation: 7/25/2022  Provider: Leonor Koch PA-C  PCP: Violeta Patel DO  Note Started: 2:50 PM EDT       I have seen and evaluated this patient with Dr Bandar Ingram, my attending. CHIEF COMPLAINT       Chief Complaint   Patient presents with    Other     Pt states that she got her blood tested routinely and that her hemoglobin came back at 4 and is complaining of abdominal pain. Pt states that the abdominal pain is in the epigastric area. HISTORY OF PRESENT ILLNESS   (Location, Timing/Onset, Context/Setting, Quality, Duration, Modifying Factors, Severity, Associated Signs and Symptoms)  Note limiting factors. Chief Complaint: Reported anemia    Charley Shepherd is a 40 y.o. female who presents to the emergency department stating that she was told that outpatient labs showed hemoglobin of 4. She was advised to go to the emergency department for blood transfusion and admission. Patient has had epigastric abdominal pain for several days. She does have history of esophageal varices but denies any bloody or coffee-ground emesis. Patient denies bloody or black stool. She does appear jaundiced. She has history of alcoholic liver disease. She has not drank any alcohol recently. Nursing Notes were all reviewed and agreed with or any disagreements were addressed in the HPI. REVIEW OF SYSTEMS    (2-9 systems for level 4, 10 or more for level 5)     Review of Systems   Constitutional:  Negative for chills and fever. HENT: Negative. Eyes:  Negative for visual disturbance. Respiratory:  Negative for cough, shortness of breath, wheezing and stridor. Cardiovascular:  Negative for chest pain, palpitations and leg swelling. Gastrointestinal:  Positive for abdominal pain.  Negative for blood in stool, constipation, diarrhea, nausea and vomiting. Genitourinary: Negative. Musculoskeletal:  Negative for back pain, neck pain and neck stiffness. Skin:  Negative for color change, pallor, rash and wound. Neurological:  Negative for dizziness, tremors, seizures, syncope, facial asymmetry, speech difficulty, weakness, light-headedness, numbness and headaches. Psychiatric/Behavioral:  Negative for confusion. All other systems reviewed and are negative. Positives and Pertinent negatives as per HPI. Except as noted above in the ROS, all other systems were reviewed and negative.        PAST MEDICAL HISTORY     Past Medical History:   Diagnosis Date    Alcoholic liver disease ([facility] [address]     Anemia     History of blood transfusion          SURGICAL HISTORY     Past Surgical History:   Procedure Laterality Date    COLONOSCOPY N/A 3/11/2021    COLONOSCOPY POLYPECTOMY SNARE/COLD BIOPSY performed by Dari Wagner MD at [address]    CT BONE MARROW BIOPSY  7/5/2022    CT BONE MARROW BIOPSY 7/5/2022 Paulo Dumont MD [facility] CT SCAN    KNEE ARTHROPLASTY      TUBAL LIGATION      ESSURE    UPPER GASTROINTESTINAL ENDOSCOPY N/A 01/30/2021    EGD DIAGNOSTIC ONLY performed by Ulysses Foster MD at [address] [place] [address] N/A 03/10/2021    EGD BIOPSY performed by Dari Wagner MD at [address] [place] [address] 6/27/2021    EGD BAND LIGATION performed by Jeny Draper MD at [address] [place] [address] N/A 4/27/2022    EGD DIAGNOSTIC ONLY performed by Geri Miller MD at [address] [place] [address] 7/1/2022    EGD ESOPHAGOGASTRODUODENOSCOPY ULTRASOUND performed by Carlita Machuca MD at [address] [place] [address] N/A 7/1/2022    EGD BAND LIGATION performed by Carlita Machuca MD at [address]       Previous Medications    FUROSEMIDE (LASIX) 40 MG TABLET    Take 1 tablet by mouth daily    MAGNESIUM OXIDE (MAG-OX) 400 (240 MG) MG TABLET    Take 1 tablet by mouth 2 times daily    NADOLOL (CORGARD) 40 MG TABLET    Take 1 tablet by mouth daily    ONDANSETRON (ZOFRAN) 8 MG TABLET    Take 1 tablet by mouth    PANTOPRAZOLE (PROTONIX) 40 MG TABLET    Take 1 tablet by mouth every morning (before breakfast)    SPIRONOLACTONE (ALDACTONE) 100 MG TABLET    Take 1 tablet by mouth daily    THIAMINE 100 MG TABLET    Take 1 tablet by mouth daily    THIAMINE MONONITRATE (THIAMINE) 100 MG TABLET    Take 1 tablet by mouth daily    ZOLPIDEM (AMBIEN) 5 MG TABLET    Take 1 tablet by mouth. ALLERGIES     Patient has no known allergies. FAMILYHISTORY       Family History   Problem Relation Age of Onset    Alcohol Abuse Mother           SOCIAL HISTORY       Social History     Tobacco Use    Smoking status: Former    Smokeless tobacco: Never   Vaping Use    Vaping Use: Never used   Substance Use Topics    Alcohol use: Yes    Drug use: Never       SCREENINGS             PHYSICAL EXAM    (up to 7 for level 4, 8 or more for level 5)     ED Triage Vitals [07/25/22 1431]   BP Temp Temp Source Heart Rate Resp SpO2 Height Weight   (!) 113/45 99.1 °F (37.3 °C) Oral 74 16 99 % 5' 5\" (1.651 m) 130 lb (59 kg)       Physical Exam  Vitals and nursing note reviewed. Constitutional:       Appearance: Normal appearance. She is well-developed. She is not diaphoretic. HENT:      Head: Normocephalic and atraumatic. Right Ear: External ear normal.      Left Ear: External ear normal.      Nose: Nose normal.      Mouth/Throat:      Mouth: Mucous membranes are moist.      Pharynx: Oropharynx is clear. Eyes:      General: Scleral icterus present. Right eye: No discharge. Left eye: No discharge. Extraocular Movements: Extraocular movements intact. Conjunctiva/sclera: Conjunctivae normal.      Pupils: Pupils are equal, round, and reactive to light.    Cardiovascular:      Rate and Rhythm: Normal rate.   Pulmonary:      Effort: Pulmonary effort is normal.      Breath sounds: Normal breath sounds. Abdominal:      General: Bowel sounds are normal. There is no abdominal bruit. Palpations: Abdomen is soft. There is no pulsatile mass. Tenderness: There is no right CVA tenderness, left CVA tenderness, guarding or rebound. Negative signs include Armenta's sign, Rovsing's sign, McBurney's sign, psoas sign and obturator sign. Genitourinary:     Rectum: Guaiac result negative. Musculoskeletal:         General: Normal range of motion. Cervical back: Normal range of motion. Skin:     General: Skin is warm and dry. Capillary Refill: Capillary refill takes less than 2 seconds. Coloration: Skin is jaundiced. Skin is not pale. Findings: No bruising, erythema, lesion or rash. Neurological:      General: No focal deficit present. Mental Status: She is alert and oriented to person, place, and time. Psychiatric:         Mood and Affect: Mood normal.         Behavior: Behavior normal.       DIAGNOSTIC RESULTS   LABS:    Labs Reviewed   URINALYSIS WITH REFLEX TO CULTURE   BLOOD OCCULT STOOL DIAGNOSTIC    Narrative:     ORDER#: [id number]                          ORDERED BY: Christopher Bergeron  SOURCE: Stool                              COLLECTED:  07/25/22 14:51  ANTIBIOTICS AT SANDOR.:                      RECEIVED :  07/25/22 14:59   CBC WITH AUTO DIFFERENTIAL   COMPREHENSIVE METABOLIC PANEL   PROTIME-INR   ETHANOL   LIPASE   IRON AND TIBC   FERRITIN   TYPE AND SCREEN   PREPARE RBC (CROSSMATCH)       When ordered only abnormal lab results are displayed. All other labs were within normal range or not returned as of this dictation. EKG: When ordered, EKG's are interpreted by the Emergency Department Physician in the absence of a cardiologist.  Please see their note for interpretation of EKG.     RADIOLOGY:   Non-plain film images such as CT, Ultrasound and MRI are read by the radiologistDavid Eddy radiographic images are visualized and preliminarily interpreted by the ED Provider with the below findings:        Interpretation per the Radiologist below, if available at the time of this note:    No orders to display     No results found. PROCEDURES   Unless otherwise noted below, none     Procedures    CRITICAL CARE TIME   Critical Care  There was a high probability of life-threatening clinical deterioration in the patient's condition requiring my urgent intervention. my portion of  Total critical care time with the patient was 66 minutes excluding separately reportable procedures. Critical care required due to patients acute on chronic anemia prompting medical management, consultation and admission. I have discussed with the patient the rationale for blood component transfusion; its benefits in treating or preventing fatigue, organ damage, or death; and its risk which includes mild transfusion reactions, rare risk of blood borne infection, or more serious but rare reactions. I have discussed the alternatives to transfusion, including the risk and consequences of not receiving transfusion. The patient had an opportunity to ask questions and had agreed to proceed with transfusion of blood components. CONSULTS:  IP CONSULT TO HOSPITALIST      EMERGENCY DEPARTMENT COURSE and DIFFERENTIAL DIAGNOSIS/MDM:   Vitals:    Vitals:    07/25/22 1431 07/25/22 1507 07/25/22 1530   BP: (!) 113/45 (!) 111/52 (!) 102/56   Pulse: 74 75 72   Resp: 16 14 14   Temp: 99.1 °F (37.3 °C)     TempSrc: Oral     SpO2: 99%  95%   Weight: 130 lb (59 kg)     Height: 5' 5\" (1.651 m)         Patient was given the following medications:  Medications   pantoprazole (PROTONIX) injection 80 mg (has no administration in time range)   0.9 % sodium chloride infusion (has no administration in time range)         Is this patient to be included in the SEP-1 Core Measure due to severe sepsis or septic shock?    No Exclusion criteria - the patient is NOT to be included for SEP-1 Core Measure due to: Infection is not suspected    This patient presents to the emergency department because she was told that outpatient labs showed hemoglobin less than 7. She has history of anemia and has required blood transfusions in the past.  She was told that her anemia is secondary to her alcoholic liver disease. She reports a little bit of epigastric discomfort. Abdomen is soft to palpation. She denies bloody or black stool. Hemoccult negative. Hemoglobin is trending downward and 4.9. Therefore, I did order blood transfusion. Patient did consent to this treatment. Blood pressure is trending downward which is not normal for patient. We will continue to monitor. FINAL IMPRESSION      1. Anemia, unspecified type    2. Alcoholic liver disease ([facility] [address]          DISPOSITION/PLAN   DISPOSITION Decision To Admit 07/25/2022 03:43:11 PM      PATIENT REFERRED TO:  No follow-up provider specified.     DISCHARGE MEDICATIONS:  New Prescriptions    No medications on file       DISCONTINUED MEDICATIONS:  Discontinued Medications    No medications on file              (Please note that portions of this note were completed with a voice recognition program.  Efforts were made to edit the dictations but occasionally words are mis-transcribed.)    Trever Jarvis PA-C (electronically signed)           Trever Jarvis PA-C  07/25/22 4032 Venous stasis ulcer of other part of right lower leg without varicose veins

## 2022-07-25 NOTE — ED NOTES
Report given to [facility] from [facility], blood still transfusion with rate change to 125 ml/hr from 60 ml/hr after no suspected reaction, and all vital signs remain stable.      Nehemiah Orellana RN  07/25/22 5191

## 2022-07-25 NOTE — ED PROVIDER NOTES
In addition to the advanced practice provider, I personally saw Xiang Zhang and performed a substantive portion of the visit including all aspects of the medical decision making. Medical Decision Making  46yoF with symptomatic anemia from alcoholic liver failure with an H&H of 4.9  Getting TxFused. Starting 2u PRBC for time being. On exam, Jaundiced. Normotensive, presently. Heart RRR  Lungs CTAB  Liver palpable 4cm below costal margin. Hemoccult negative. SEP-1  Is this patient to be included in the SEP-1 Core Measure due to severe sepsis or septic shock? No   Exclusion criteria - the patient is NOT to be included for SEP-1 Core Measure due to: Infection is not suspected    Screenings         Admit for TxFusion; watching for hypotension. Patient Referrals:  No follow-up provider specified. Discharge Medications:  New Prescriptions    No medications on file       FINAL IMPRESSION  1. Anemia, unspecified type    2. Alcoholic liver disease (HCC)        Blood pressure (!) 98/53, pulse 73, temperature 99.1 °F (37.3 °C), temperature source Oral, resp. rate 14, height 5' 5\" (1.651 m), weight 130 lb (59 kg), SpO2 95 %, not currently breastfeeding. For further details of [facility] emergency department encounter, please see documentation by advanced practice provider, Vinicius Manriquez.        Pawan Johnson MD  07/25/22 8166

## 2022-07-26 ENCOUNTER — APPOINTMENT (OUTPATIENT)
Dept: ULTRASOUND IMAGING | Age: 47
DRG: 280 | End: 2022-07-26
Payer: COMMERCIAL

## 2022-07-26 LAB
ANION GAP SERPL CALCULATED.3IONS-SCNC: 15 MMOL/L (ref 3–16)
BLOOD BANK DISPENSE STATUS: NORMAL
BLOOD BANK PRODUCT CODE: NORMAL
BPU ID: NORMAL
BUN BLDV-MCNC: 36 MG/DL (ref 7–20)
CALCIUM SERPL-MCNC: 8.9 MG/DL (ref 8.3–10.6)
CHLORIDE BLD-SCNC: 99 MMOL/L (ref 99–110)
CO2: 21 MMOL/L (ref 21–32)
CREAT SERPL-MCNC: 1.2 MG/DL (ref 0.6–1.1)
DAT POLYSPECIFIC: NORMAL
DESCRIPTION BLOOD BANK: NORMAL
GFR AFRICAN AMERICAN: 59
GFR NON-AFRICAN AMERICAN: 48
GLUCOSE BLD-MCNC: 113 MG/DL (ref 70–99)
HAPTOGLOBIN: <10 MG/DL (ref 30–200)
HCT VFR BLD CALC: 19.4 % (ref 36–48)
HCT VFR BLD CALC: 22.3 % (ref 36–48)
HEMATOLOGY PATH CONSULT: NORMAL
HEMOGLOBIN: 6.8 G/DL (ref 12–16)
HEMOGLOBIN: 7.8 G/DL (ref 12–16)
INR BLD: 2.03 (ref 0.87–1.14)
LACTATE DEHYDROGENASE: 443 U/L (ref 100–190)
MCH RBC QN AUTO: 32.7 PG (ref 26–34)
MCHC RBC AUTO-ENTMCNC: 35 G/DL (ref 31–36)
MCV RBC AUTO: 93.4 FL (ref 80–100)
PDW BLD-RTO: 22.3 % (ref 12.4–15.4)
PLATELET # BLD: 74 K/UL (ref 135–450)
PMV BLD AUTO: 8.9 FL (ref 5–10.5)
POTASSIUM SERPL-SCNC: 4.8 MMOL/L (ref 3.5–5.1)
PROTHROMBIN TIME: 23 SEC (ref 11.7–14.5)
RBC # BLD: 2.08 M/UL (ref 4–5.2)
SODIUM BLD-SCNC: 135 MMOL/L (ref 136–145)
WBC # BLD: 7.7 K/UL (ref 4–11)

## 2022-07-26 PROCEDURE — 6370000000 HC RX 637 (ALT 250 FOR IP): Performed by: HOSPITALIST

## 2022-07-26 PROCEDURE — 80048 BASIC METABOLIC PNL TOTAL CA: CPT

## 2022-07-26 PROCEDURE — 6360000002 HC RX W HCPCS: Performed by: HOSPITALIST

## 2022-07-26 PROCEDURE — 76705 ECHO EXAM OF ABDOMEN: CPT

## 2022-07-26 PROCEDURE — 83010 ASSAY OF HAPTOGLOBIN QUANT: CPT

## 2022-07-26 PROCEDURE — 6360000002 HC RX W HCPCS: Performed by: INTERNAL MEDICINE

## 2022-07-26 PROCEDURE — 83615 LACTATE (LD) (LDH) ENZYME: CPT

## 2022-07-26 PROCEDURE — 85018 HEMOGLOBIN: CPT

## 2022-07-26 PROCEDURE — 6360000002 HC RX W HCPCS: Performed by: PHYSICIAN ASSISTANT

## 2022-07-26 PROCEDURE — 1200000000 HC SEMI PRIVATE

## 2022-07-26 PROCEDURE — 2580000003 HC RX 258: Performed by: HOSPITALIST

## 2022-07-26 PROCEDURE — 6370000000 HC RX 637 (ALT 250 FOR IP): Performed by: INTERNAL MEDICINE

## 2022-07-26 PROCEDURE — 85014 HEMATOCRIT: CPT

## 2022-07-26 PROCEDURE — 36415 COLL VENOUS BLD VENIPUNCTURE: CPT

## 2022-07-26 PROCEDURE — 6370000000 HC RX 637 (ALT 250 FOR IP): Performed by: PHYSICIAN ASSISTANT

## 2022-07-26 PROCEDURE — A4216 STERILE WATER/SALINE, 10 ML: HCPCS | Performed by: INTERNAL MEDICINE

## 2022-07-26 PROCEDURE — 85027 COMPLETE CBC AUTOMATED: CPT

## 2022-07-26 PROCEDURE — C9113 INJ PANTOPRAZOLE SODIUM, VIA: HCPCS | Performed by: INTERNAL MEDICINE

## 2022-07-26 PROCEDURE — 2580000003 HC RX 258: Performed by: INTERNAL MEDICINE

## 2022-07-26 PROCEDURE — 85610 PROTHROMBIN TIME: CPT

## 2022-07-26 PROCEDURE — 36430 TRANSFUSION BLD/BLD COMPNT: CPT

## 2022-07-26 RX ORDER — LORAZEPAM 0.5 MG/1
0.5 TABLET ORAL EVERY 8 HOURS PRN
Status: DISCONTINUED | OUTPATIENT
Start: 2022-07-26 | End: 2022-07-29 | Stop reason: HOSPADM

## 2022-07-26 RX ORDER — HYDROCODONE BITARTRATE AND ACETAMINOPHEN 7.5; 325 MG/1; MG/1
1 TABLET ORAL ONCE
Status: COMPLETED | OUTPATIENT
Start: 2022-07-26 | End: 2022-07-26

## 2022-07-26 RX ORDER — PROMETHAZINE HYDROCHLORIDE 25 MG/ML
12.5 INJECTION, SOLUTION INTRAMUSCULAR; INTRAVENOUS EVERY 6 HOURS PRN
Status: DISCONTINUED | OUTPATIENT
Start: 2022-07-26 | End: 2022-07-29 | Stop reason: HOSPADM

## 2022-07-26 RX ORDER — SODIUM CHLORIDE 9 MG/ML
INJECTION, SOLUTION INTRAVENOUS PRN
Status: DISCONTINUED | OUTPATIENT
Start: 2022-07-26 | End: 2022-07-29 | Stop reason: HOSPADM

## 2022-07-26 RX ADMIN — SPIRONOLACTONE 100 MG: 25 TABLET ORAL at 08:41

## 2022-07-26 RX ADMIN — FUROSEMIDE 40 MG: 40 TABLET ORAL at 08:41

## 2022-07-26 RX ADMIN — Medication 400 MG: at 08:42

## 2022-07-26 RX ADMIN — OCTREOTIDE ACETATE 50 MCG/HR: 500 INJECTION, SOLUTION INTRAVENOUS; SUBCUTANEOUS at 20:26

## 2022-07-26 RX ADMIN — SODIUM CHLORIDE 40 MG: 9 INJECTION INTRAMUSCULAR; INTRAVENOUS; SUBCUTANEOUS at 20:15

## 2022-07-26 RX ADMIN — THIAMINE HCL TAB 100 MG 100 MG: 100 TAB at 08:42

## 2022-07-26 RX ADMIN — NADOLOL 40 MG: 40 TABLET ORAL at 14:34

## 2022-07-26 RX ADMIN — Medication 10 ML: at 20:15

## 2022-07-26 RX ADMIN — PROMETHAZINE HYDROCHLORIDE 12.5 MG: 25 INJECTION INTRAMUSCULAR; INTRAVENOUS at 02:34

## 2022-07-26 RX ADMIN — SODIUM CHLORIDE, PRESERVATIVE FREE 10 ML: 5 INJECTION INTRAVENOUS at 20:32

## 2022-07-26 RX ADMIN — Medication 400 MG: at 20:14

## 2022-07-26 RX ADMIN — HYDROCODONE BITARTRATE AND ACETAMINOPHEN 1 TABLET: 7.5; 325 TABLET ORAL at 20:32

## 2022-07-26 RX ADMIN — ONDANSETRON 4 MG: 2 INJECTION INTRAMUSCULAR; INTRAVENOUS at 20:32

## 2022-07-26 RX ADMIN — LORAZEPAM 0.5 MG: 0.5 TABLET ORAL at 16:06

## 2022-07-26 RX ADMIN — HYDROCODONE BITARTRATE AND ACETAMINOPHEN 1 TABLET: 7.5; 325 TABLET ORAL at 05:35

## 2022-07-26 RX ADMIN — Medication 10 ML: at 08:42

## 2022-07-26 RX ADMIN — SODIUM CHLORIDE 40 MG: 9 INJECTION INTRAMUSCULAR; INTRAVENOUS; SUBCUTANEOUS at 08:41

## 2022-07-26 RX ADMIN — Medication 1000 MG: at 14:35

## 2022-07-26 ASSESSMENT — PAIN DESCRIPTION - LOCATION
LOCATION: GENERALIZED
LOCATION: ABDOMEN
LOCATION: ABDOMEN

## 2022-07-26 ASSESSMENT — PAIN DESCRIPTION - PAIN TYPE
TYPE: ACUTE PAIN
TYPE: ACUTE PAIN

## 2022-07-26 ASSESSMENT — PAIN DESCRIPTION - ONSET
ONSET: ON-GOING
ONSET: ON-GOING

## 2022-07-26 ASSESSMENT — PAIN DESCRIPTION - FREQUENCY
FREQUENCY: CONTINUOUS
FREQUENCY: CONTINUOUS

## 2022-07-26 ASSESSMENT — PAIN DESCRIPTION - DESCRIPTORS
DESCRIPTORS: ACHING
DESCRIPTORS: ACHING

## 2022-07-26 ASSESSMENT — PAIN SCALES - GENERAL
PAINLEVEL_OUTOF10: 0
PAINLEVEL_OUTOF10: 10
PAINLEVEL_OUTOF10: 8
PAINLEVEL_OUTOF10: 8

## 2022-07-26 ASSESSMENT — PAIN DESCRIPTION - ORIENTATION
ORIENTATION: MID
ORIENTATION: MID

## 2022-07-26 NOTE — CONSULTS
Gastroenterology Consult Note        Patient: Liam Rivero  : 1975  Acct#:      Date:  2022    Subjective:       History of Present Illness  Patient is a 40 y.o.  female admitted with Alcoholic liver disease ([facility] [address] [K70.9]  Symptomatic anemia [D64.9]  Anemia, unspecified type [D64.9] who is seen in consult for anemia, cirrhosis. Patient has been seen mainly in the hospital for cirrhosis and anemia but did see Dr. Rica Claude in office 2022. She last drank alcohol in 2022. Her cirrhosis is decompensated by ascites which has been managed with Lasix 40 mg daily and Aldactone 100 mg daily. No history of hepatic encephalopathy. She did have bleeding esophageal varices which were banded in 2021. Last EGD was 22 with large esophageal varix which was then banded. She is on nadolol. She had an EUS  for biliary dilation which showed mildly dilated CBD at 7 mm and large gallstones. States she started online [email or web address] in April. Dr. Rica Claude plans to send her to [facility] for liver transplant eval after 6 months off alcohol. She has been seen by hematology for anemia and underwent bone marrow biopsy this month. Patient came to the ER for weakness. Hemoglobin was 4.9. Denies any melena or hematochezia. She is having epigastric pain which is sharp and constant. Has had this pain off and on for few months. States it is not brought on by eating or movement. Did have nausea and nonbloody emesis here last night. No further periods.             Past Medical History:   Diagnosis Date    Alcoholic liver disease ([facility] [address]     Anemia     History of blood transfusion       Past Surgical History:   Procedure Laterality Date    COLONOSCOPY N/A 3/11/2021    COLONOSCOPY POLYPECTOMY SNARE/COLD BIOPSY performed by Júnior Krishna MD at [address]    CT BONE MARROW BIOPSY  2022    CT BONE MARROW BIOPSY 2022 Juan Yun MD [facility] CT SCAN    KNEE ARTHROPLASTY      TUBAL LIGATION ESSURE    UPPER GASTROINTESTINAL ENDOSCOPY N/A 01/30/2021    EGD DIAGNOSTIC ONLY performed by Cecile Hawkins MD at [address] N/A 03/10/2021    EGD BIOPSY performed by Dino Guidry MD at [address] 6/27/2021    EGD BAND LIGATION performed by Ana Fu MD at [address] N/A 4/27/2022    EGD DIAGNOSTIC ONLY performed by Ilya Faye MD at [address] N/A 7/1/2022    EGD ESOPHAGOGASTRODUODENOSCOPY ULTRASOUND performed by Aiden Evangelista MD at [address] N/A 7/1/2022    EGD BAND LIGATION performed by Aiden Evangelista MD at [address] [place] [address]      Past Endoscopic History  EGD and EUS with Dr Felecia Argueta 7/1/22  Postoperative Diagnosis: #1 large varix noted in the esophagus banded  #2 large gallstones and gall  #3 mildly dilated common bile duct at 7 mm       Colonoscopy 3/2021 with Dr Saira Trotter for anemia     Findings:   Two small polyps, removed with cold snare. s/p 3 clips total. Patchy diverticulosis (right and left colon). Hemorrhoids. Plans:  Await pathology. Recall colonoscopy in 5 years. Admission Meds  No current facility-administered medications on file prior to encounter. Current Outpatient Medications on File Prior to Encounter   Medication Sig Dispense Refill    LORazepam (ATIVAN) 0.5 MG tablet Take 0.5 mg by mouth every 8 hours as needed for Anxiety. ondansetron (ZOFRAN) 8 MG tablet Take 1 tablet by mouth      zolpidem (AMBIEN) 5 MG tablet Take 1 tablet by mouth.       thiamine 100 MG tablet Take 1 tablet by mouth daily 30 tablet 3    nadolol (CORGARD) 40 MG tablet Take 1 tablet by mouth daily 30 tablet 0    furosemide (LASIX) 40 MG tablet Take 1 tablet by mouth daily 60 tablet 0    spironolactone (ALDACTONE) 100 MG tablet Take 1 tablet by mouth daily 30 tablet 1    magnesium oxide (MAG-OX) 400 (240 Mg) MG tablet Take 1 tablet by mouth 2 times daily 60 tablet 0    pantoprazole (PROTONIX) 40 MG tablet Take 1 tablet by mouth every morning (before breakfast) 30 tablet 1    thiamine mononitrate (THIAMINE) 100 MG tablet Take 1 tablet by mouth daily 30 tablet 0           Allergies  No Known Allergies   Social   Social History     Tobacco Use    Smoking status: Former    Smokeless tobacco: Never   Substance Use Topics    Alcohol use: Not Currently        Family History   Problem Relation Age of Onset    Alcohol Abuse Mother           Review of Systems  Constitutional: negative for fevers, chills, sweats    Ears, nose, mouth, throat, and face: negative for nasal congestion and sore throat   Respiratory: negative for cough and shortness of breath   Cardiovascular: negative for chest pain and dyspnea   Gastrointestinal: see hpi   Genitourinary:negative for dysuria and frequency   Integument/breast: negative for pruritus and rash   Hematologic/lymphatic: negative for bleeding and easy bruising   Musculoskeletal:negative for arthralgias and myalgias   Neurological: negative for dizziness and weakness       Physical Exam  Blood pressure 113/69, pulse 69, temperature 97.2 °F (36.2 °C), temperature source Temporal, resp. rate 16, height 5' 5\" (1.651 m), weight 120 lb 13 oz (54.8 kg), SpO2 96 %, not currently breastfeeding. General appearance: alert, cooperative, no distress, appears stated age  Eyes: scleral icterus   Head: Normocephalic, without obvious abnormality  Lungs: clear to auscultation bilaterally, Normal Effort  Heart: regular rate and rhythm, normal S1 and S2, no murmurs or rubs  Abdomen: soft, epigastric/RUQ tenderness. Bowel sounds normal. No masses,  no organomegaly.    Extremities: atraumatic, no cyanosis or edema  Skin: warm and dry, +  jaundice  Neuro: Grossly intact, A&OX3  Musculoskeletal: 5/5  strength BUE      Data Review:    Recent Labs     07/25/22  1449 07/25/22  2133 07/26/22  0418   WBC 10.5  --  7.7   HGB 4.9* 5.4* 6.8*   HCT 14.0* 15.6* 19.4*   .2*  --  93.4   PLT 98*  --  74*     Recent Labs     07/25/22  1450 07/26/22  0418   * 135*   K 4.2 4.8   CL 98* 99   CO2 24 21   BUN 43* 36*   CREATININE 1.0 1.2*     Recent Labs     07/25/22  1450   AST 73*   ALT 31   BILITOT 18.7*   ALKPHOS 164*     Recent Labs     07/25/22  1450   LIPASE 39.0     Recent Labs     07/25/22  1449 07/26/22  0419   PROTIME 22.8* 23.0*   INR 2.01* 2.03*     No results for input(s): PTT in the last 72 hours. No results for input(s): OCCULTBLD in the last 72 hours. Imaging Studies:                      Assessment:     Principal Problem:    Symptomatic anemia  Resolved Problems:    * No resolved hospital problems. *    Anemia - recurrent. S/p bone marrow bx with heme and anemia felt to be secondary to peripheral destruction/sequestration. Her indirect bilirubin is significantly higher than direct so wonder if there is hemolysis. No gross GI bleeding. Stools guaiac negative. Epigastric pain - off and on for a few months. Does have known gallstones. Will check US to eval for any ascites. Cirrhosis - -due to alcohol abuse. No alcohol since April 2021. EGD 7/2022 with large esophageal varix treated with band ligation. No history of hepatic encephalopathy. History of ascites which has been managed with Lasix 40 and Aldactone 100 mg daily. Recommendations:   - US to eval for ascites and then para if ascites present to r/o SBP  -PRBC as indicated  -Monitor hemoglobin.  -Hematology eval    Discussed with Dr. Maynor Hinson, PA-C  [facility]  I have personally performed a face to face diagnostic evaluation on this patient. I have interviewed and examined the patient and I agree with the findings and recommended plan of care.   In summary, my findings and plan are the following: As above, young woman with cirrhosis due to alcohol admitted with severe anemia, again. She has thrombocytopenia as well and no signs nor symptoms of bleeding. She has evidence of hemolysis. She also c/o upper abd pain. ON exam she has palpable spleen in LUQ that is tender. No RUQ tender. US shows no significant ascites and normal gallbladder. Her liver profile is actually improved since last hospitalization. Since no bleeding, we will not be repeating endoscopic evaluation. We will ask hematology to see for hemolysis work-up. Agree with PRBC transfusions for now. Will continue diuretic regimen.     Av Stanley, [address] [place] [address]  7/26/2022

## 2022-07-26 NOTE — PROGRESS NOTES
[facility]    7/26/2022 12:58 PM        Name: Charley Shepherd . Admitted: 7/25/2022  Primary Care Provider: [address] Akira Rivas DO (Tel: [phone])                        Subjective:  . No acute events overnight. Resting well. Pain control. Diet ok. Labs reviewed  Denies any chest pain sob. Reviewed interval ancillary notes    Current Medications  promethazine (PHENERGAN) injection 12.5 mg, Q6H PRN  0.9 % sodium chloride infusion, PRN  furosemide (LASIX) tablet 40 mg, Daily  magnesium oxide (MAG-OX) tablet 400 mg, BID  nadolol (CORGARD) tablet 40 mg, Daily  spironolactone (ALDACTONE) tablet 100 mg, Daily  thiamine mononitrate tablet 100 mg, Daily  zolpidem (AMBIEN) tablet 5 mg, Nightly PRN  sodium chloride flush 0.9 % injection 5-40 mL, 2 times per day  sodium chloride flush 0.9 % injection 5-40 mL, PRN  0.9 % sodium chloride infusion, PRN  ondansetron (ZOFRAN-ODT) disintegrating tablet 4 mg, Q8H PRN   Or  ondansetron (ZOFRAN) injection 4 mg, Q6H PRN  polyethylene glycol (GLYCOLAX) packet 17 g, Daily PRN  pantoprazole (PROTONIX) 40 mg in sodium chloride (PF) 10 mL injection, Q12H  octreotide (SANDOSTATIN) 500 mcg in sodium chloride 0.9 % 100 mL infusion, Continuous  acetaminophen (TYLENOL) tablet 1,000 mg, Q6H PRN        Objective:  /69   Pulse 69   Temp 97.2 °F (36.2 °C) (Temporal)   Resp 16   Ht 5' 5\" (1.651 m)   Wt 120 lb 13 oz (54.8 kg)   SpO2 96%   BMI 20.10 kg/m²     Intake/Output Summary (Last 24 hours) at 7/26/2022 1258  Last data filed at 7/26/2022 0837  Gross per 24 hour   Intake 870.83 ml   Output --   Net 870.83 ml      Wt Readings from Last 3 Encounters:   07/26/22 120 lb 13 oz (54.8 kg)   07/05/22 106 lb (48.1 kg)   06/30/22 106 lb 11.2 oz (48.4 kg)       General appearance:  Appears comfortable  Eyes: Sclera clear.  Pupils equal.  ENT: Moist oral mucosa. Trachea midline, no adenopathy. Cardiovascular: Regular rhythm, normal S1, S2. No murmur. No edema in lower extremities  Respiratory: Not using accessory muscles. Good inspiratory effort. Clear to auscultation bilaterally, no wheeze or crackles. GI: Abdomen soft, no tenderness, not distended, normal bowel sounds  Musculoskeletal: No cyanosis in digits, neck supple  Neurology: CN 2-12 grossly intact. No speech or motor deficits  Psych: Normal affect. Alert and oriented in time, place and person  Skin: Warm, dry, normal turgor    Labs and Tests:  CBC:   Recent Labs     07/25/22  1449 07/25/22  2130 07/26/22  0418   WBC 10.5  --  7.7   HGB 4.9* 5.4* 6.8*   PLT 98*  --  74*     BMP:    Recent Labs     07/25/22  1450 07/26/22  0418   * 135*   K 4.2 4.8   CL 98* 99   CO2 24 21   BUN 43* 36*   CREATININE 1.0 1.2*   GLUCOSE 206* 113*     Hepatic:   Recent Labs     07/25/22  1450   AST 73*   ALT 31   BILITOT 18.7*   ALKPHOS 164*       Discussed care with patient             Problem List  Principal Problem:    Symptomatic anemia  Resolved Problems:    * No resolved hospital problems. *       Assessment & Plan:   Symptomatic anemia  Recurrent. Blood count was 4.9 on presentation patient is status post 2 unit of blood transfusion hemoglobin last was 6.8  -Unclear patient hemolyzing no signs of acute loss noted.  -Noted. Bilirubin higher. Appreciate GI recommendation-hematology oncology will be consulted as well      Epigastric pain with cirrhosis-ultrasound tomorrow for ascites and if needed paracentesis  -Monitor closely holding off antibiotics for now    UTI  -Present on admission urine culture growing greater than 100,008 E. Coli  -Start Rocephin    Acute kidney injury  Presume this is prerenal monitor closely IV hydration if needed hold off on Lasix today              Diet: ADULT DIET; Regular;  No Added Salt (3-4 gm)  Code:Full Code  DVT PPX lovenox       Francisca Livingston MD 7/26/2022 12:58 PM

## 2022-07-27 LAB
AMMONIA: 247 UMOL/L (ref 11–51)
ANION GAP SERPL CALCULATED.3IONS-SCNC: 11 MMOL/L (ref 3–16)
BUN BLDV-MCNC: 43 MG/DL (ref 7–20)
CALCIUM SERPL-MCNC: 9.5 MG/DL (ref 8.3–10.6)
CHLORIDE BLD-SCNC: 96 MMOL/L (ref 99–110)
CO2: 27 MMOL/L (ref 21–32)
CREAT SERPL-MCNC: 1.2 MG/DL (ref 0.6–1.1)
EKG ATRIAL RATE: 96 BPM
EKG DIAGNOSIS: NORMAL
EKG P AXIS: 55 DEGREES
EKG P-R INTERVAL: 146 MS
EKG Q-T INTERVAL: 334 MS
EKG QRS DURATION: 78 MS
EKG QTC CALCULATION (BAZETT): 421 MS
EKG R AXIS: 58 DEGREES
EKG T AXIS: 55 DEGREES
EKG VENTRICULAR RATE: 96 BPM
GFR AFRICAN AMERICAN: 59
GFR NON-AFRICAN AMERICAN: 48
GLUCOSE BLD-MCNC: 239 MG/DL (ref 70–99)
HCT VFR BLD CALC: 21.3 % (ref 36–48)
HEMOGLOBIN: 7.6 G/DL (ref 12–16)
MCH RBC QN AUTO: 32.4 PG (ref 26–34)
MCHC RBC AUTO-ENTMCNC: 35.8 G/DL (ref 31–36)
MCV RBC AUTO: 90.5 FL (ref 80–100)
ORGANISM: ABNORMAL
PDW BLD-RTO: 20.8 % (ref 12.4–15.4)
PLATELET # BLD: 74 K/UL (ref 135–450)
PLATELET SLIDE REVIEW: ABNORMAL
PMV BLD AUTO: 9 FL (ref 5–10.5)
POTASSIUM SERPL-SCNC: 4.5 MMOL/L (ref 3.5–5.1)
RBC # BLD: 2.36 M/UL (ref 4–5.2)
SLIDE REVIEW: ABNORMAL
SODIUM BLD-SCNC: 134 MMOL/L (ref 136–145)
URINE CULTURE, ROUTINE: ABNORMAL
WBC # BLD: 5.4 K/UL (ref 4–11)

## 2022-07-27 PROCEDURE — C9113 INJ PANTOPRAZOLE SODIUM, VIA: HCPCS | Performed by: INTERNAL MEDICINE

## 2022-07-27 PROCEDURE — 6360000002 HC RX W HCPCS: Performed by: HOSPITALIST

## 2022-07-27 PROCEDURE — 6360000002 HC RX W HCPCS: Performed by: INTERNAL MEDICINE

## 2022-07-27 PROCEDURE — 6370000000 HC RX 637 (ALT 250 FOR IP): Performed by: HOSPITALIST

## 2022-07-27 PROCEDURE — 2580000003 HC RX 258: Performed by: PHYSICIAN ASSISTANT

## 2022-07-27 PROCEDURE — 2580000003 HC RX 258: Performed by: HOSPITALIST

## 2022-07-27 PROCEDURE — 85027 COMPLETE CBC AUTOMATED: CPT

## 2022-07-27 PROCEDURE — 99223 1ST HOSP IP/OBS HIGH 75: CPT | Performed by: INTERNAL MEDICINE

## 2022-07-27 PROCEDURE — 6370000000 HC RX 637 (ALT 250 FOR IP): Performed by: PHYSICIAN ASSISTANT

## 2022-07-27 PROCEDURE — 36415 COLL VENOUS BLD VENIPUNCTURE: CPT

## 2022-07-27 PROCEDURE — A4216 STERILE WATER/SALINE, 10 ML: HCPCS | Performed by: INTERNAL MEDICINE

## 2022-07-27 PROCEDURE — 82140 ASSAY OF AMMONIA: CPT

## 2022-07-27 PROCEDURE — 2580000003 HC RX 258: Performed by: INTERNAL MEDICINE

## 2022-07-27 PROCEDURE — 6360000002 HC RX W HCPCS: Performed by: PHYSICIAN ASSISTANT

## 2022-07-27 PROCEDURE — 93010 ELECTROCARDIOGRAM REPORT: CPT | Performed by: INTERNAL MEDICINE

## 2022-07-27 PROCEDURE — 80048 BASIC METABOLIC PNL TOTAL CA: CPT

## 2022-07-27 PROCEDURE — 1200000000 HC SEMI PRIVATE

## 2022-07-27 RX ORDER — LEVOFLOXACIN 250 MG/1
250 TABLET ORAL DAILY
Qty: 5 TABLET | Refills: 0 | Status: SHIPPED | OUTPATIENT
Start: 2022-07-27 | End: 2022-07-29 | Stop reason: SDUPTHER

## 2022-07-27 RX ORDER — LACTULOSE 10 G/15ML
20 SOLUTION ORAL 3 TIMES DAILY
Status: DISCONTINUED | OUTPATIENT
Start: 2022-07-27 | End: 2022-07-28

## 2022-07-27 RX ORDER — OXYCODONE HYDROCHLORIDE 5 MG/1
5 TABLET ORAL EVERY 4 HOURS PRN
Status: DISCONTINUED | OUTPATIENT
Start: 2022-07-27 | End: 2022-07-29 | Stop reason: HOSPADM

## 2022-07-27 RX ADMIN — Medication 1000 MG: at 13:35

## 2022-07-27 RX ADMIN — NADOLOL 40 MG: 40 TABLET ORAL at 09:23

## 2022-07-27 RX ADMIN — ONDANSETRON 4 MG: 2 INJECTION INTRAMUSCULAR; INTRAVENOUS at 11:54

## 2022-07-27 RX ADMIN — Medication 10 ML: at 08:37

## 2022-07-27 RX ADMIN — SODIUM CHLORIDE 40 MG: 9 INJECTION INTRAMUSCULAR; INTRAVENOUS; SUBCUTANEOUS at 20:04

## 2022-07-27 RX ADMIN — SPIRONOLACTONE 100 MG: 25 TABLET ORAL at 08:34

## 2022-07-27 RX ADMIN — OXYCODONE 5 MG: 5 TABLET ORAL at 11:52

## 2022-07-27 RX ADMIN — SODIUM CHLORIDE 40 MG: 9 INJECTION INTRAMUSCULAR; INTRAVENOUS; SUBCUTANEOUS at 08:36

## 2022-07-27 RX ADMIN — PHYTONADIONE 10 MG: 10 INJECTION, EMULSION INTRAMUSCULAR; INTRAVENOUS; SUBCUTANEOUS at 16:56

## 2022-07-27 RX ADMIN — LACTULOSE 20 G: 20 SOLUTION ORAL at 20:40

## 2022-07-27 RX ADMIN — OCTREOTIDE ACETATE 50 MCG/HR: 500 INJECTION, SOLUTION INTRAVENOUS; SUBCUTANEOUS at 07:32

## 2022-07-27 RX ADMIN — LORAZEPAM 0.5 MG: 0.5 TABLET ORAL at 08:41

## 2022-07-27 RX ADMIN — Medication 400 MG: at 20:04

## 2022-07-27 RX ADMIN — ZOLPIDEM TARTRATE 5 MG: 5 TABLET, COATED ORAL at 00:12

## 2022-07-27 RX ADMIN — Medication 400 MG: at 08:34

## 2022-07-27 RX ADMIN — Medication 10 ML: at 20:04

## 2022-07-27 RX ADMIN — THIAMINE HCL TAB 100 MG 100 MG: 100 TAB at 08:33

## 2022-07-27 RX ADMIN — LORAZEPAM 0.5 MG: 0.5 TABLET ORAL at 00:12

## 2022-07-27 RX ADMIN — FUROSEMIDE 40 MG: 40 TABLET ORAL at 08:42

## 2022-07-27 ASSESSMENT — PAIN DESCRIPTION - PAIN TYPE
TYPE: ACUTE PAIN

## 2022-07-27 ASSESSMENT — PAIN DESCRIPTION - ORIENTATION
ORIENTATION: MID

## 2022-07-27 ASSESSMENT — PAIN DESCRIPTION - ONSET
ONSET: ON-GOING

## 2022-07-27 ASSESSMENT — PAIN - FUNCTIONAL ASSESSMENT
PAIN_FUNCTIONAL_ASSESSMENT: ACTIVITIES ARE NOT PREVENTED

## 2022-07-27 ASSESSMENT — PAIN DESCRIPTION - LOCATION
LOCATION: ABDOMEN

## 2022-07-27 ASSESSMENT — PAIN SCALES - GENERAL
PAINLEVEL_OUTOF10: 6
PAINLEVEL_OUTOF10: 0
PAINLEVEL_OUTOF10: 0
PAINLEVEL_OUTOF10: 8
PAINLEVEL_OUTOF10: 0
PAINLEVEL_OUTOF10: 0
PAINLEVEL_OUTOF10: 8
PAINLEVEL_OUTOF10: 8
PAINLEVEL_OUTOF10: 0
PAINLEVEL_OUTOF10: 8
PAINLEVEL_OUTOF10: 6

## 2022-07-27 ASSESSMENT — PAIN DESCRIPTION - DESCRIPTORS
DESCRIPTORS: ACHING

## 2022-07-27 ASSESSMENT — PAIN DESCRIPTION - FREQUENCY
FREQUENCY: CONTINUOUS

## 2022-07-27 NOTE — CARE COORDINATION
SW attempted to complete the initial assessment with patient. Patient asked SW come back when her dtr is present. SW asked what time that would be but patient did not know. SW will check back later this afternoon/evening.       Electronically signed by BAY Camilo LSW on 7/27/2022 at 1:11 PM

## 2022-07-27 NOTE — CARE COORDINATION
07/27/22 1612   Service Assessment   Patient Orientation Alert and Oriented   Cognition Alert   History Provided By Patient; Child/Family  (Patient and dtr present)   PCP Verified by CM Yes   Last Visit to PCP Within last 6 months   Social/Functional History   Lives With Daughter  (Daughter is \"in and out\")   Active  No   Discharge Planning   Current Services Prior To Admission None   Services At/After Discharge   Services At/After Discharge   ([address] Mel[address]ie Barahona from [address] [place] [address]. Patient has already had an in-home assessment. Patient and dtr agreeable with moving forward with palliative care. Not interested in hospice at this time. Cheryl Hilton, palliative RN informed.)   Confirm Follow Up Transport Family   Condition of Participation: Discharge Planning   The Plan for Transition of Care is related to the following treatment goals: Patient will discharge w/palliative care from [address] [place] [address]. Patient would also like to see if she qualifies for [facility]. Has been feeling very weak lately d/t her medical conditions. Hospitalist lenny served to put in PT/OT orders.      Electronically signed by ABY Torres, REAL on 7/27/2022 at 4:20 PM

## 2022-07-27 NOTE — PROGRESS NOTES
Patient noted to be off tele, when RN entered room she was completely undressed and had removed both of her IV's and tel. She was standing toward the door and states that she is leaving. Redirected easily and she is now agreeable to stay until tomorrow.

## 2022-07-27 NOTE — CONSULTS
N/A 4/27/2022    EGD DIAGNOSTIC ONLY performed by Alex Junior MD at [facility] N/A 7/1/2022    EGD ESOPHAGOGASTRODUODENOSCOPY ULTRASOUND performed by Nick Akers MD at [facility] N/A 7/1/2022    EGD BAND LIGATION performed by Nick Akers MD at [address] [place] [address]       Family History   Problem Relation Age of Onset    Alcohol Abuse Mother         reports that she has quit smoking. She has never used smokeless tobacco. She reports that she does not currently use alcohol. She reports that she does not use drugs. Allergies:  Patient has no known allergies.     Current Medications:    promethazine (PHENERGAN) injection 12.5 mg, Q6H PRN  0.9 % sodium chloride infusion, PRN  cefTRIAXone (ROCEPHIN) 1000 mg in sterile water 10 mL IV syringe, Q24H  LORazepam (ATIVAN) tablet 0.5 mg, Q8H PRN  furosemide (LASIX) tablet 40 mg, Daily  magnesium oxide (MAG-OX) tablet 400 mg, BID  nadolol (CORGARD) tablet 40 mg, Daily  spironolactone (ALDACTONE) tablet 100 mg, Daily  thiamine mononitrate tablet 100 mg, Daily  zolpidem (AMBIEN) tablet 5 mg, Nightly PRN  sodium chloride flush 0.9 % injection 5-40 mL, 2 times per day  sodium chloride flush 0.9 % injection 5-40 mL, PRN  0.9 % sodium chloride infusion, PRN  ondansetron (ZOFRAN-ODT) disintegrating tablet 4 mg, Q8H PRN   Or  ondansetron (ZOFRAN) injection 4 mg, Q6H PRN  polyethylene glycol (GLYCOLAX) packet 17 g, Daily PRN  pantoprazole (PROTONIX) 40 mg in sodium chloride (PF) 10 mL injection, Q12H  octreotide (SANDOSTATIN) 500 mcg in sodium chloride 0.9 % 100 mL infusion, Continuous  acetaminophen (TYLENOL) tablet 1,000 mg, Q6H PRN        Review of Systems:   14 point ROS obtained but were negative except mentioned in HPI      Physical exam:     Vitals:  BP (!) 101/56   Pulse 69   Temp 98.2 °F (36.8 °C) (Oral)   Resp 16   Ht 5' 5\" (1.651 m)   Wt 112 lb 14 oz (51.2 kg)   SpO2 95% BMI 18.78 kg/m²   Constitutional:  OAA X3 NAD  Skin: no rash, turgor wnl  Heent:  eomi, mmm  Neck: no bruits or jvd noted  Cardiovascular:  S1, S2 without m/r/g  Respiratory: CTA B without w/r/r  Abdomen:  +bs, soft, nt, nd  Ext: no  lower extremity edema  Psychiatric: mood and affect appropriate  Musculoskeletal:  Rom, muscular strength intact    Labs:  CBC:   Recent Labs     07/25/22  1449 07/25/22  2130 07/26/22  0418 07/26/22  1235 07/27/22  0523   WBC 10.5  --  7.7  --  5.4   HGB 4.9*   < > 6.8* 7.8* 7.6*   PLT 98*  --  74*  --  74*    < > = values in this interval not displayed. BMP:    Recent Labs     07/25/22  1450 07/26/22  0418 07/27/22  0523   * 135* 134*   K 4.2 4.8 4.5   CL 98* 99 96*   CO2 24 21 27   BUN 43* 36* 43*   CREATININE 1.0 1.2* 1.2*   GLUCOSE 206* 113* 239*     Ca/Mg/Phos:   Recent Labs     07/25/22  1450 07/26/22  0418 07/27/22  0523   CALCIUM 9.5 8.9 9.5     Hepatic:   Recent Labs     07/25/22  1450   AST 73*   ALT 31   BILITOT 18.7*   ALKPHOS 164*     Troponin: No results for input(s): TROPONINI in the last 72 hours. BNP: No results for input(s): BNP in the last 72 hours. Lipids: No results for input(s): CHOL, TRIG, HDL, LDLCALC, LABVLDL in the last 72 hours. ABGs: No results for input(s): PHART, PO2ART, KFJ6BPN in the last 72 hours.   INR:   Recent Labs     07/25/22  1449 07/26/22  0419   INR 2.01* 2.03*     UA:  Recent Labs     07/25/22  1517   COLORU Yellow   CLARITYU Clear   GLUCOSEU Negative   BILIRUBINUR Negative   KETUA Negative   SPECGRAV 1.015   BLOODU Negative   PHUR 5.5   PROTEINU Negative   UROBILINOGEN 0.2   NITRU Negative   LEUKOCYTESUR SMALL*   LABMICR YES   [place] NotGiven      Urine Microscopic:   Recent Labs     07/25/22  1517   BACTERIA 3+*   HYALCAST 0   WBCUA 14*   RBCUA 0   EPIU 1     Urine Culture:   Recent Labs     07/25/22  1527   LABURIN >100,000 CFU/ml     Urine Chemistry: No results for input(s): Nettie King, PROTEINTY, NATY in the last 72 hours.             IMAGING:  US ABDOMEN LIMITED Specify organ? LIVER, GALLBLADDER   Final Result   Cirrhosis with ascites. Assessment/Plan :      1. EZIO   Creat 0.8 ---> 1.2   Likely 2/2 diuresis   Hold lasix   Continue spironolactone   No NSaids     2. Hypotension   Stable low   If needed will start midodrine     3. Anemia  Etiology not clear   Bone marrow biopsy negative for MDS, lymphoma, leukemia. GI and hematology following     4. Acid- base disorder. Monitor     5. Electrolytes. Mild hyponatremia.  Monitor             D/w primary team      Thank you for allowing us to participate in care of Onel [place]         Electronically signed by: Octaviano Kim MD, 7/27/2022, 10:54 AM      [facility] [address]  Office : [phone]  Fax :[phone]

## 2022-07-27 NOTE — PROGRESS NOTES
Arrived to place PIV in patient with, Estela Rai RN at bedside, pre procedure and allergies reviewed, no issues accessing vein, pt tolerated well, blood return and flushed well. Pt left in stable condition and bed braked and in lowest position. Pt call light within reach.  Handoff to RN

## 2022-07-27 NOTE — CONSULTS
[facility]   CONSULT NOTE    7/26/2022 9:29 PM    Patient Information: Clotilde Sandoval   Date of Admit:  7/25/2022  Primary Care Physician:  Josiah Castellon DO  Requesting Physician:  Ashlie Martinez MD    Reason for consult:    Anemia    Chief complaint:     Anemia    History of Present Illness:    39 Y/O female with a PMH of end stage liver disease 2/2 alcoholic cirrhosis sent from clinic 2/2 to worsening anemia and a HGB of 4.8 in the office. Overall since being transfused patient states that she is feeling better. Past Medical History:     has a past medical history of Alcoholic liver disease ([address] Anemia, and History of blood transfusion.          Past Surgical History:    Past Surgical History:   Procedure Laterality Date    COLONOSCOPY N/A 3/11/2021    COLONOSCOPY POLYPECTOMY SNARE/COLD BIOPSY performed by John Benavides MD at [address] [place] [address]    CT BONE MARROW BIOPSY  7/5/2022    CT BONE MARROW BIOPSY 7/5/2022 Christofer Patel MD [facility] CT SCAN    KNEE ARTHROPLASTY      TUBAL LIGATION      ESSURE    UPPER GASTROINTESTINAL ENDOSCOPY N/A 01/30/2021    EGD DIAGNOSTIC ONLY performed by Sd Hayward MD at [facility] 5 N/A 03/10/2021    EGD BIOPSY performed by John Benavides MD at [place] [address] 6/27/2021    EGD BAND LIGATION performed by Khadra Reyna MD at [facility] 5 N/A 4/27/2022    EGD DIAGNOSTIC ONLY performed by Brianna Muñoz MD at [place] [address] 7/1/2022    EGD ESOPHAGOGASTRODUODENOSCOPY ULTRASOUND performed by David Ferguson MD at [facility] 5 N/A 7/1/2022    EGD BAND LIGATION performed by David Ferguson MD at [address] [place] [address]            Current Medications:     cefTRIAXone (ROCEPHIN) IV  1,000 mg IntraVENous Q24H    furosemide  40 mg Oral Daily    magnesium oxide  400 mg Oral BID    nadolol  40 mg Oral Daily    spironolactone  100 mg Oral Daily    thiamine mononitrate  100 mg Oral Daily    sodium chloride flush  5-40 mL IntraVENous 2 times per day    pantoprazole (PROTONIX) 40 mg injection  40 mg IntraVENous Q12H           Allergies:    No Known Allergies     Social History:    reports that she has quit smoking. She has never used smokeless tobacco. She reports that she does not currently use alcohol. She reports that she does not use drugs. Family History:     family history includes Alcohol Abuse in her mother.          ROS:  14 point review of systems performed negative except for as documented in the HPI        PHYSICAL EXAM:        Vitals:    07/26/22 2032   BP:    Pulse:    Resp: 16   Temp:    SpO2:           CONSTITUTIONAL: awake, alert, cooperative, no apparent distress     EYES: Icterus    ENT: Normocephalic, atraumatic, No external ear deformities    NECK: No palpable adenopathy    HEMATOLOGIC/LYMPHATIC: no cervical, supraclavicular or axillary lymphadenopathy     LUNGS:  clear to auscultation no added sounds    CARDIOVASCULAR: regular rate and rhythm, normal S1 and S2, no murmur noted    ABDOMEN: Distended, hepatomegaly    EXTREMITIES: no LE edema             DATA:  CBC:   Lab Results   Component Value Date/Time    WBC 7.7 07/26/2022 04:18 AM    RBC 2.08 07/26/2022 04:18 AM    HGB 7.8 07/26/2022 12:35 PM    HCT 22.3 07/26/2022 12:35 PM    MCV 93.4 07/26/2022 04:18 AM    MCH 32.7 07/26/2022 04:18 AM    MCHC 35.0 07/26/2022 04:18 AM    RDW 22.3 07/26/2022 04:18 AM    PLT 74 07/26/2022 04:18 AM    MPV 8.9 07/26/2022 04:18 AM     BMP:  Lab Results   Component Value Date/Time     07/26/2022 04:18 AM    K 4.8 07/26/2022 04:18 AM    K 4.4 06/30/2022 01:25 AM    CL 99 07/26/2022 04:18 AM    CO2 21 07/26/2022 04:18 AM    BUN 36 07/26/2022 04:18 AM    CREATININE 1.2 07/26/2022 04:18 AM    CALCIUM 8.9 07/26/2022 04:18 AM    GFRAA 59 07/26/2022 04:18 AM    LABGLOM 48 07/26/2022 04:18 AM    GLUCOSE 113 07/26/2022 04:18 AM     Magnesium:   Lab Results   Component Value Date/Time    MG 1.80 06/15/2022 05:00 AM    MG 2.10 06/14/2022 06:06 AM    MG 1.90 06/13/2022 08:22 AM     LIVER PROFILE:   Recent Labs     07/25/22  1450   AST 73*   ALT 31   LIPASE 39.0   BILITOT 18.7*   ALKPHOS 164*     PT/INR:    Lab Results   Component Value Date/Time    PROTIME 23.0 07/26/2022 04:19 AM    PROTIME 22.8 07/25/2022 02:49 PM    PROTIME 22.9 07/05/2022 10:31 AM    INR 2.03 07/26/2022 04:19 AM    INR 2.01 07/25/2022 02:49 PM    INR 2.02 07/05/2022 10:31 AM           IMPRESSION/RECOMMENDATIONS:        41 Y/O female admitted to the hospital for severe anemia with HGB of 4.8.  She has a history of end stage liver disease from alcoholic liver cirrhosis    #Anemia  -Found to have a HGB of 4.8 in the office subsequently sent to ER for blood transfusion  -NO nutritional deficiencies  -Iron overload 2/2 to multiple transfusions  -Bone marrow biopsy showed no evidence of leukemia, lymphoma or MDS  -Haptoglobin undetectable mostly underlying liver disease  -Direct pietro negative thereby autoimmune hemolysis highly unlikely  -Anemia is 2/2 to end stage liver disease extremely poor prognosis could be spur cell anemia from end stage liver disease.  -Transfuse for a goal HGB above 7      Latia Batres MD  [facility]

## 2022-07-27 NOTE — PROGRESS NOTES
Gastroenterology Progress Note    John Salinas is a 40 y.o. female patient. Principal Problem:    Symptomatic anemia  Resolved Problems:    * No resolved hospital problems. *      SUBJECTIVE:  Lying in bed. Not eating lunch. some LUQ discomfort. Took Ativan at 8 am for anxiety. ROS:  No fever, chills  No chest pain, palpitations  No SOB, cough  Gastrointestinal ROS: see above    Physical    VITALS:  BP (!) 101/56   Pulse 69   Temp 98.2 °F (36.8 °C) (Oral)   Resp 16   Ht 5' 5\" (1.651 m)   Wt 112 lb 14 oz (51.2 kg)   SpO2 95%   BMI 18.78 kg/m²   TEMPERATURE:  Current - Temp: 98.2 °F (36.8 °C); Max - Temp  Av.2 °F (36.8 °C)  Min: 97.7 °F (36.5 °C)  Max: 98.5 °F (36.9 °C)    NAD  Regular rate   Lungs CTA Bilaterally  Abdomen soft, ND, NT,  Bowel sounds normal.    Data    Data Review:    Recent Labs     22  1449 22  2130 22  0418 22  1235 22  0523   WBC 10.5  --  7.7  --  5.4   HGB 4.9*   < > 6.8* 7.8* 7.6*   HCT 14.0*   < > 19.4* 22.3* 21.3*   .2*  --  93.4  --  90.5   PLT 98*  --  74*  --  74*    < > = values in this interval not displayed. Recent Labs     22  1450 22  0418 22  0523   * 135* 134*   K 4.2 4.8 4.5   CL 98* 99 96*   CO2 24 21 27   BUN 43* 36* 43*   CREATININE 1.0 1.2* 1.2*     Recent Labs     22  1450   AST 73*   ALT 31   BILITOT 18.7*   ALKPHOS 164*     Recent Labs     22  1450   LIPASE 39.0     Recent Labs     22  1449 22  0419   PROTIME 22.8* 23.0*   INR 2.01* 2.03*     No results for input(s): PTT in the last 72 hours. ASSESSMENT :    Anemia - recurrent. S/p bone marrow bx with heme and anemia felt to be secondary to peripheral destruction/sequestration. Anemia from end stage cirrhosis per heme eval. No gross GI bleeding. Stools guaiac negative. LUQ pain - off and on for a few months. Does have known gallstones. Us with small volume ascites.  Pain felt to be from splenomegaly. Cirrhosis - -due to alcohol abuse. No alcohol since April 2022. EGD 7/2022 with large esophageal varix treated with band ligation. No history of hepatic encephalopathy. History of ascites which has been managed with Lasix 40 and Aldactone 100 mg daily. PLAN :  -PRBC as indicated  -Monitor hemoglobin.  -check ammonia  - vitamin K     Discussed with Dr. Zohreh Gutierrez, KAP[address] 74  I have personally performed a face to face diagnostic evaluation on this patient. I have interviewed and examined the patient and I agree with the findings and recommended plan of care. In summary, my findings and plan are the following: As above, more sedated today likely from ativan and [place]. No asterixis on exam, but remains jaundiced. Hb 7.6 today and no bleeding. She is stable for discharge. I had conversation with patient and family about her poor prognosis from cirrhosis and hypersplenism/portal HTN causing anemia requiring multiple transfusions. She will f/u with Heme for periodic PRBC transfusions. She must be abstinent from alcohol for 6 months to be a liver transplant candidate. She will entertain palliative care, but not Hospice at this point. We will check NH3 level and anticipate discharge soon.     Joaquina Hernandez, [address] [place] [address]  7/27/2022

## 2022-07-27 NOTE — PROGRESS NOTES
[facility]    7/27/2022 9:23 AM        Name: Marci Lei . Admitted: 7/25/2022  Primary Care Provider: Ezequiel Muñoz DO (Tel: [phone])                        Subjective:  . No acute events overnight. Resting well. Pain control. Diet ok. Labs reviewed  Denies any chest pain sob.      Reviewed interval ancillary notes    Current Medications  promethazine (PHENERGAN) injection 12.5 mg, Q6H PRN  0.9 % sodium chloride infusion, PRN  cefTRIAXone (ROCEPHIN) 1000 mg in sterile water 10 mL IV syringe, Q24H  LORazepam (ATIVAN) tablet 0.5 mg, Q8H PRN  furosemide (LASIX) tablet 40 mg, Daily  magnesium oxide (MAG-OX) tablet 400 mg, BID  nadolol (CORGARD) tablet 40 mg, Daily  spironolactone (ALDACTONE) tablet 100 mg, Daily  thiamine mononitrate tablet 100 mg, Daily  zolpidem (AMBIEN) tablet 5 mg, Nightly PRN  sodium chloride flush 0.9 % injection 5-40 mL, 2 times per day  sodium chloride flush 0.9 % injection 5-40 mL, PRN  0.9 % sodium chloride infusion, PRN  ondansetron (ZOFRAN-ODT) disintegrating tablet 4 mg, Q8H PRN   Or  ondansetron (ZOFRAN) injection 4 mg, Q6H PRN  polyethylene glycol (GLYCOLAX) packet 17 g, Daily PRN  pantoprazole (PROTONIX) 40 mg in sodium chloride (PF) 10 mL injection, Q12H  octreotide (SANDOSTATIN) 500 mcg in sodium chloride 0.9 % 100 mL infusion, Continuous  acetaminophen (TYLENOL) tablet 1,000 mg, Q6H PRN      Objective:  BP (!) 101/56   Pulse 69   Temp 98.2 °F (36.8 °C) (Oral)   Resp 16   Ht 5' 5\" (1.651 m)   Wt 112 lb 14 oz (51.2 kg)   SpO2 95%   BMI 18.78 kg/m²     Intake/Output Summary (Last 24 hours) at 7/27/2022 0923  Last data filed at 7/27/2022 0355  Gross per 24 hour   Intake 843.55 ml   Output --   Net 843.55 ml      Wt Readings from Last 3 Encounters:   07/27/22 112 lb 14 oz (51.2 kg)   07/05/22 106 lb (48.1 kg) 06/30/22 106 lb 11.2 oz (48.4 kg)       General appearance:  Appears comfortable  Eyes: Sclera clear. Pupils equal.  ENT: Moist oral mucosa. Trachea midline, no adenopathy. Cardiovascular: Regular rhythm, normal S1, S2. No murmur. No edema in lower extremities  Respiratory: Not using accessory muscles. Good inspiratory effort. Clear to auscultation bilaterally, no wheeze or crackles. GI: Abdomen soft, no tenderness, not distended, normal bowel sounds  Musculoskeletal: No cyanosis in digits, neck supple  Neurology: CN 2-12 grossly intact. No speech or motor deficits  Psych: Normal affect. Alert and oriented in time, place and person  Skin: Warm, dry, normal turgor    Labs and Tests:  CBC:   Recent Labs     07/25/22  1449 07/25/22  2130 07/26/22  0418 07/26/22  1235 07/27/22  0523   WBC 10.5  --  7.7  --  5.4   HGB 4.9*   < > 6.8* 7.8* 7.6*   PLT 98*  --  74*  --  74*    < > = values in this interval not displayed. BMP:    Recent Labs     07/25/22  1450 07/26/22  0418 07/27/22  0523   * 135* 134*   K 4.2 4.8 4.5   CL 98* 99 96*   CO2 24 21 27   BUN 43* 36* 43*   CREATININE 1.0 1.2* 1.2*   GLUCOSE 206* 113* 239*     Hepatic:   Recent Labs     07/25/22  1450   AST 73*   ALT 31   BILITOT 18.7*   ALKPHOS 164*       Discussed care with patient             Problem List  Principal Problem:    Symptomatic anemia  Resolved Problems:    * No resolved hospital problems. *       Assessment & Plan:   Symptomatic anemia  Recurrent. Blood count was 4.9 on presentation patient is status post 2 unit of blood transfusion hemoglobin last was 6.8  -Unclear patient hemolyzing no signs of acute loss noted.  -Evaluated by oncology no further work-up needed      Epigastric pain with cirrhosis-ultrasound  noted trace ascites  -Monitor closely   -Appreciate GI recommend    UTI  -Present on admission urine culture growing greater than 100,008 E.  Coli  -Start Rocephin    Acute kidney injury  Presume this is prerenal monitor closely IV hydration if needed hold off on Lasix today        Disposition hopefully home tomorrow      Diet: ADULT DIET; Regular;  No Added Salt (3-4 gm)  Code:Full Code  DVT PPX adore Paiz MD   7/27/2022 9:23 AM

## 2022-07-28 LAB
ALBUMIN SERPL-MCNC: 3.1 G/DL (ref 3.4–5)
ALP BLD-CCNC: 100 U/L (ref 40–129)
ALT SERPL-CCNC: 31 U/L (ref 10–40)
ANION GAP SERPL CALCULATED.3IONS-SCNC: 13 MMOL/L (ref 3–16)
AST SERPL-CCNC: 82 U/L (ref 15–37)
BILIRUB SERPL-MCNC: 21.2 MG/DL (ref 0–1)
BILIRUBIN DIRECT: 7.2 MG/DL (ref 0–0.3)
BILIRUBIN, INDIRECT: 14 MG/DL (ref 0–1)
BUN BLDV-MCNC: 48 MG/DL (ref 7–20)
CALCIUM SERPL-MCNC: 9.7 MG/DL (ref 8.3–10.6)
CHLORIDE BLD-SCNC: 97 MMOL/L (ref 99–110)
CO2: 26 MMOL/L (ref 21–32)
CREAT SERPL-MCNC: 1.1 MG/DL (ref 0.6–1.1)
GFR AFRICAN AMERICAN: >60
GFR NON-AFRICAN AMERICAN: 54
GLUCOSE BLD-MCNC: 142 MG/DL (ref 70–99)
HCT VFR BLD CALC: 20 % (ref 36–48)
HEMOGLOBIN: 7.2 G/DL (ref 12–16)
MCH RBC QN AUTO: 31.6 PG (ref 26–34)
MCHC RBC AUTO-ENTMCNC: 36 G/DL (ref 31–36)
MCV RBC AUTO: 87.9 FL (ref 80–100)
PDW BLD-RTO: 21.4 % (ref 12.4–15.4)
PLATELET # BLD: 73 K/UL (ref 135–450)
PMV BLD AUTO: 8.7 FL (ref 5–10.5)
POTASSIUM SERPL-SCNC: 4.2 MMOL/L (ref 3.5–5.1)
RBC # BLD: 2.28 M/UL (ref 4–5.2)
SODIUM BLD-SCNC: 136 MMOL/L (ref 136–145)
TOTAL PROTEIN: 6.1 G/DL (ref 6.4–8.2)
WBC # BLD: 5.9 K/UL (ref 4–11)

## 2022-07-28 PROCEDURE — 2580000003 HC RX 258: Performed by: HOSPITALIST

## 2022-07-28 PROCEDURE — 36415 COLL VENOUS BLD VENIPUNCTURE: CPT

## 2022-07-28 PROCEDURE — 6360000002 HC RX W HCPCS: Performed by: HOSPITALIST

## 2022-07-28 PROCEDURE — 2580000003 HC RX 258: Performed by: INTERNAL MEDICINE

## 2022-07-28 PROCEDURE — 80076 HEPATIC FUNCTION PANEL: CPT

## 2022-07-28 PROCEDURE — 80048 BASIC METABOLIC PNL TOTAL CA: CPT

## 2022-07-28 PROCEDURE — C9113 INJ PANTOPRAZOLE SODIUM, VIA: HCPCS | Performed by: INTERNAL MEDICINE

## 2022-07-28 PROCEDURE — 6370000000 HC RX 637 (ALT 250 FOR IP): Performed by: PHYSICIAN ASSISTANT

## 2022-07-28 PROCEDURE — 6370000000 HC RX 637 (ALT 250 FOR IP): Performed by: HOSPITALIST

## 2022-07-28 PROCEDURE — 85027 COMPLETE CBC AUTOMATED: CPT

## 2022-07-28 PROCEDURE — A4216 STERILE WATER/SALINE, 10 ML: HCPCS | Performed by: INTERNAL MEDICINE

## 2022-07-28 PROCEDURE — 6370000000 HC RX 637 (ALT 250 FOR IP): Performed by: INTERNAL MEDICINE

## 2022-07-28 PROCEDURE — 99232 SBSQ HOSP IP/OBS MODERATE 35: CPT | Performed by: INTERNAL MEDICINE

## 2022-07-28 PROCEDURE — 6360000002 HC RX W HCPCS: Performed by: INTERNAL MEDICINE

## 2022-07-28 PROCEDURE — 1200000000 HC SEMI PRIVATE

## 2022-07-28 RX ORDER — LACTULOSE 10 G/15ML
20 SOLUTION ORAL
Status: DISCONTINUED | OUTPATIENT
Start: 2022-07-28 | End: 2022-07-29 | Stop reason: HOSPADM

## 2022-07-28 RX ADMIN — FUROSEMIDE 40 MG: 40 TABLET ORAL at 07:55

## 2022-07-28 RX ADMIN — THIAMINE HCL TAB 100 MG 100 MG: 100 TAB at 07:55

## 2022-07-28 RX ADMIN — Medication 1000 MG: at 16:00

## 2022-07-28 RX ADMIN — SODIUM CHLORIDE 40 MG: 9 INJECTION INTRAMUSCULAR; INTRAVENOUS; SUBCUTANEOUS at 07:55

## 2022-07-28 RX ADMIN — SPIRONOLACTONE 100 MG: 25 TABLET ORAL at 07:55

## 2022-07-28 RX ADMIN — LACTULOSE 20 G: 20 SOLUTION ORAL at 16:01

## 2022-07-28 RX ADMIN — Medication 10 ML: at 07:55

## 2022-07-28 RX ADMIN — Medication 400 MG: at 20:26

## 2022-07-28 RX ADMIN — ZOLPIDEM TARTRATE 5 MG: 5 TABLET, COATED ORAL at 20:26

## 2022-07-28 RX ADMIN — LACTULOSE 20 G: 20 SOLUTION ORAL at 18:44

## 2022-07-28 RX ADMIN — ZOLPIDEM TARTRATE 5 MG: 5 TABLET, COATED ORAL at 01:36

## 2022-07-28 RX ADMIN — SODIUM CHLORIDE 40 MG: 9 INJECTION INTRAMUSCULAR; INTRAVENOUS; SUBCUTANEOUS at 20:26

## 2022-07-28 RX ADMIN — RIFAXIMIN 550 MG: 550 TABLET ORAL at 16:01

## 2022-07-28 RX ADMIN — NADOLOL 40 MG: 40 TABLET ORAL at 07:59

## 2022-07-28 RX ADMIN — Medication 10 ML: at 20:27

## 2022-07-28 RX ADMIN — Medication 400 MG: at 07:55

## 2022-07-28 RX ADMIN — LACTULOSE 20 G: 20 SOLUTION ORAL at 11:20

## 2022-07-28 RX ADMIN — LACTULOSE 20 G: 20 SOLUTION ORAL at 07:55

## 2022-07-28 RX ADMIN — RIFAXIMIN 550 MG: 550 TABLET ORAL at 20:26

## 2022-07-28 RX ADMIN — OXYCODONE 5 MG: 5 TABLET ORAL at 22:18

## 2022-07-28 ASSESSMENT — PAIN SCALES - GENERAL
PAINLEVEL_OUTOF10: 0
PAINLEVEL_OUTOF10: 9
PAINLEVEL_OUTOF10: 0
PAINLEVEL_OUTOF10: 0

## 2022-07-28 ASSESSMENT — PAIN - FUNCTIONAL ASSESSMENT: PAIN_FUNCTIONAL_ASSESSMENT: PREVENTS OR INTERFERES SOME ACTIVE ACTIVITIES AND ADLS

## 2022-07-28 ASSESSMENT — PAIN DESCRIPTION - LOCATION: LOCATION: ABDOMEN

## 2022-07-28 ASSESSMENT — PAIN DESCRIPTION - DESCRIPTORS: DESCRIPTORS: ACHING

## 2022-07-28 ASSESSMENT — PAIN DESCRIPTION - FREQUENCY: FREQUENCY: CONTINUOUS

## 2022-07-28 ASSESSMENT — PAIN DESCRIPTION - ONSET: ONSET: ON-GOING

## 2022-07-28 ASSESSMENT — PAIN DESCRIPTION - PAIN TYPE: TYPE: ACUTE PAIN

## 2022-07-28 ASSESSMENT — PAIN DESCRIPTION - ORIENTATION: ORIENTATION: MID

## 2022-07-28 NOTE — CONSULTS
Pt and family have met with [facility] as an outpt they are planning to follow up with pt when she returns home.

## 2022-07-28 NOTE — PROGRESS NOTES
Office : [phone]     Fax :[phone]       Nephrology progress  Note      Patient's Name: Cindy Goff  9:10 AM  7/28/2022    Reason for Consult: EZIO       Requesting Physician:  Karina Rodríguez DO      Chief Complaint:    Chief Complaint   Patient presents with    Other     Pt states that she got her blood tested routinely and that her hemoglobin came back at 4 and is complaining of abdominal pain. Pt states that the abdominal pain is in the epigastric area. History of Present iIlness:    Cindy Goff is a 40 y.o. female with h/o alcoholic liver cirrhosis, anemia who was admitted for profound anemia with HB 4.9 . Has c/o significant weakness. Since admission creatinine has increased from 0.8 ---> 1.2 . No SOB   No edema   Abdomen soft . Interval hx     Feels same   No abdominal pain   Creat 1.1   No peripheral edema           I/O last 3 completed shifts: In: 1657.8 [P.O.:1260;  I.V.:296.6; IV Piggyback:101.2]  Out: -     Past Medical History:   Diagnosis Date    Alcoholic liver disease ([facility] [address]     Anemia     History of blood transfusion        Past Surgical History:   Procedure Laterality Date    COLONOSCOPY N/A 3/11/2021    COLONOSCOPY POLYPECTOMY SNARE/COLD BIOPSY performed by Jenelle Gray MD at [address] [place] [address]    CT BONE MARROW BIOPSY  7/5/2022    CT BONE MARROW BIOPSY 7/5/2022 Basilio Ambrose MD [facility] CT SCAN    KNEE ARTHROPLASTY      TUBAL LIGATION      ESSURE    UPPER GASTROINTESTINAL ENDOSCOPY N/A 01/30/2021    EGD DIAGNOSTIC ONLY performed by Saira Castillo MD at [address] [place] [address] N/A 03/10/2021    EGD BIOPSY performed by Jenelle Gray MD at [address] [place] [address] 6/27/2021    EGD BAND LIGATION performed by Lake Juarez MD at [address] [place] [address] N/A 4/27/2022    EGD DIAGNOSTIC ONLY performed by Phyllis Cantu MD at [address] [place] [address] N/A 7/1/2022    EGD ESOPHAGOGASTRODUODENOSCOPY ULTRASOUND performed by Johnnie Cabrera MD at [address] [place] [address] N/A 7/1/2022    EGD BAND LIGATION performed by Johnnie Cabrera MD at [address]       Family History   Problem Relation Age of Onset    Alcohol Abuse Mother         reports that she has quit smoking. She has never used smokeless tobacco. She reports that she does not currently use alcohol. She reports that she does not use drugs. Allergies:  Patient has no known allergies.     Current Medications:    lactulose (CHRONULAC) 10 GM/15ML solution 20 g, Q3H (SCHEDULED)  oxyCODONE (ROXICODONE) immediate release tablet 5 mg, Q4H PRN  promethazine (PHENERGAN) injection 12.5 mg, Q6H PRN  0.9 % sodium chloride infusion, PRN  cefTRIAXone (ROCEPHIN) 1000 mg in sterile water 10 mL IV syringe, Q24H  LORazepam (ATIVAN) tablet 0.5 mg, Q8H PRN  furosemide (LASIX) tablet 40 mg, Daily  magnesium oxide (MAG-OX) tablet 400 mg, BID  nadolol (CORGARD) tablet 40 mg, Daily  spironolactone (ALDACTONE) tablet 100 mg, Daily  thiamine mononitrate tablet 100 mg, Daily  zolpidem (AMBIEN) tablet 5 mg, Nightly PRN  sodium chloride flush 0.9 % injection 5-40 mL, 2 times per day  sodium chloride flush 0.9 % injection 5-40 mL, PRN  0.9 % sodium chloride infusion, PRN  ondansetron (ZOFRAN-ODT) disintegrating tablet 4 mg, Q8H PRN   Or  ondansetron (ZOFRAN) injection 4 mg, Q6H PRN  polyethylene glycol (GLYCOLAX) packet 17 g, Daily PRN  pantoprazole (PROTONIX) 40 mg in sodium chloride (PF) 10 mL injection, Q12H  acetaminophen (TYLENOL) tablet 1,000 mg, Q6H PRN      Review of Systems:   14 point ROS obtained but were negative except mentioned in HPI      Physical exam:     Vitals: /63   Pulse 79   Temp 98.3 °F (36.8 °C) (Oral)   Resp 16   Ht 5' 5\" (1.651 m)   Wt 111 lb 8 oz (50.6 kg)   SpO2 94%   BMI 18.55 kg/m²   Constitutional:  OAA X3 NAD  Skin: no rash, turgor wnl  Heent:  eomi, mmm  Neck: no bruits or jvd noted  Cardiovascular:  S1, S2 without m/r/g  Respiratory: CTA B without w/r/r  Abdomen:  +bs, soft, nt, nd  Ext: no  lower extremity edema  Psychiatric: mood and affect appropriate  Musculoskeletal:  Rom, muscular strength intact    Labs:  CBC:   Recent Labs     07/26/22  0418 07/26/22  1235 07/27/22  0523 07/28/22  0526   WBC 7.7  --  5.4 5.9   HGB 6.8* 7.8* 7.6* 7.2*   PLT 74*  --  74* 73*     BMP:    Recent Labs     07/26/22  0418 07/27/22  0523 07/28/22  0526   * 134* 136   K 4.8 4.5 4.2   CL 99 96* 97*   CO2 21 27 26   BUN 36* 43* 48*   CREATININE 1.2* 1.2* 1.1   GLUCOSE 113* 239* 142*     Ca/Mg/Phos:   Recent Labs     07/26/22  0418 07/27/22  0523 07/28/22  0526   CALCIUM 8.9 9.5 9.7     Hepatic:   Recent Labs     07/25/22  1450 07/28/22  0526   AST 73* 82*   ALT 31 31   BILITOT 18.7* 21.2*   ALKPHOS 164* 100     Troponin: No results for input(s): TROPONINI in the last 72 hours. BNP: No results for input(s): BNP in the last 72 hours. Lipids: No results for input(s): CHOL, TRIG, HDL, LDLCALC, LABVLDL in the last 72 hours. ABGs: No results for input(s): PHART, PO2ART, XWV9VDZ in the last 72 hours.   INR:   Recent Labs     07/25/22  1449 07/26/22  0419   INR 2.01* 2.03*     UA:  Recent Labs     07/25/22  1517   COLORU Yellow   CLARITYU Clear   GLUCOSEU Negative   BILIRUBINUR Negative   KETUA Negative   SPECGRAV 1.015   BLOODU Negative   PHUR 5.5   PROTEINU Negative   UROBILINOGEN 0.2   NITRU Negative   LEUKOCYTESUR SMALL*   LABMICR YES   URINETYPE NotGiven      Urine Microscopic:   Recent Labs     07/25/22  1517   BACTERIA 3+*   HYALCAST 0   WBCUA 14*   RBCUA 0   EPIU 1     Urine Culture:   Recent Labs     07/25/22  1527   LABURIN >100,000 CFU/ml     Urine Chemistry: No results for input(s): Katheryn Ding, PROTEINUR, NAUR in the last 72 hours. IMAGING:  US ABDOMEN LIMITED Specify organ? LIVER, GALLBLADDER   Final Result   Cirrhosis with ascites. Assessment/Plan :      1. EZIO   Creat 0.8 ---> 1.2 --> 1.1  Likely 2/2 diuresis   Continue to hold Hold lasix   Continue spironolactone   No NSaids     2. Hypotension   Stable low   If needed will start midodrine     3. Anemia  Etiology not clear   Bone marrow biopsy negative for MDS, lymphoma, leukemia. GI and hematology following     4. Acid- base disorder. Monitor     5. Electrolytes. Mild hyponatremia.  Monitor             D/w primary team      Thank you for allowing us to participate in care of Igor Prakash         Electronically signed by: Richelle Ng MD, 7/28/2022, 9:10 AM      [facility] [address]  Office : [phone]  Fax :[phone]

## 2022-07-28 NOTE — PROGRESS NOTES
[facility] FOLLOW-UP:     Primary Oncologist: Dr. Trejo Minus  Date: 7/27/2022    PCP: Violeta Patel DO  Referring Provider: No ref. provider found    PROBLEM LIST:     Patient Active Problem List   Diagnosis    GI bleed    Acute GI bleeding    Menorrhagia with regular cycle    Alcoholic liver disease (HCC)    Acute cholecystitis    Nausea and vomiting    Acute blood loss anemia    Esophageal varices (HCC)    Alcoholic hepatitis    EZIO (acute kidney injury) ([facility] [address]    High anion gap metabolic acidosis    Elevated LFTs    Ascites due to alcoholic cirrhosis ([facility] [address]    Acute respiratory failure with hypoxia (HCC)    Sinus tachycardia    Severe anemia    Symptomatic anemia       Specialty Problems    None      INTERVAL HISTORY     Patient was seen this morning. No specific complaints. REVIEW OF SYSTEMS:     CONSTITUTIONAL: Denies fever, sweats, weight loss     EYES: No visual changes or diplopia. No scleral icterus. ENT: No Headaches, hearing loss or vertigo. No mouth sores or sore throat. CARDIOVASCULAR: No chest pain, dyspnea on exertion, palpitations or loss of consciousness. RESPIRATORY: No cough or wheezing, no sputum production. No hemoptysis. GASTROINTESTINAL: No abdominal pain, appetite loss, blood in stools. No change in bowel habits. GENITOURINARY: No dysuria, trouble voiding, or hematuria. MUSCULOSKELETAL: no generalized weakness. No joint complaints. INTEGUMENTARY: No rash or pruritus. NEUROLOGICAL: No headache, diplopia. No change in gait, balance, or coordination. No paresthesia. ENDOCRINE: No temperature intolerance. No excessive thirst, fluid intake, or urination. HEMATOLOGIC/LYMPHATIC: No abnormal bruising or ecchymosis, blood clots or swollen lymph nodes. ALLERGIC/IMMUNOLOGIC: No nasal congestion or hives.      PHYSICAL EXAM:     BP (!) 93/53   Pulse 69   Temp 97.6 °F (36.4 °C) (Oral)   Resp 16   Ht 5' 5\" (1.651 m)   Wt 112 lb 14 oz (51.2 kg)   SpO2 97%   BMI 18.78 kg/m² WEIGHT:   Wt Readings from Last 3 Encounters:   07/27/22 112 lb 14 oz (51.2 kg)   07/05/22 106 lb (48.1 kg)   06/30/22 106 lb 11.2 oz (48.4 kg)     GENERAL APPEARANCE: alert and cooperative  HEAD: Normocephalic, without obvious abnormality, atraumatic  NECK: No palpable lymphadenopathy in supraclavicular, axillary or cervical chains  LUNGS: Clear to auscultation bilaterally, no audible rales, wheezes or crackles  HEART: Regular rate and rhythm, S1, S2 normal  ABDOMEN: Soft, non-tender; bowel sounds normal; no masses, no organomegaly  EXTREMITIES: without cyanosis, clubbing, edema or asymmetry  SKIN: No purpura or petechiae. Jaundice.     LABS:     CBC:  Lab Results   Component Value Date    WBC 5.4 07/27/2022    HGB 7.6 (L) 07/27/2022    HCT 21.3 (L) 07/27/2022    MCV 90.5 07/27/2022    PLT 74 (L) 07/27/2022    LYMPHOPCT 7.0 07/25/2022    RBC 2.36 (L) 07/27/2022    MCH 32.4 07/27/2022    MCHC 35.8 07/27/2022    RDW 20.8 (H) 07/27/2022       Lab Results   Component Value Date     (L) 07/27/2022    K 4.5 07/27/2022    CL 96 (L) 07/27/2022    CO2 27 07/27/2022    BUN 43 (H) 07/27/2022    CREATININE 1.2 (H) 07/27/2022    GLUCOSE 239 (H) 07/27/2022    CALCIUM 9.5 07/27/2022    PROT 6.5 07/25/2022    LABALBU 3.2 (L) 07/25/2022    BILITOT 18.7 (H) 07/25/2022    ALKPHOS 164 (H) 07/25/2022    AST 73 (H) 07/25/2022    ALT 31 07/25/2022    LABGLOM 48 (A) 07/27/2022    GFRAA 59 (A) 07/27/2022    AGRATIO 1.0 (L) 07/25/2022    GLOB 3.5 06/26/2021              IMAGING:       ECHO Complete 2D W Doppler W Color    Result Date: 7/8/2022  Transthoracic Echocardiography Report (TTE)  Demographics   Patient Name      Lawrence Sleeper   Date of Study     07/08/2022          Gender              Female   Patient Number    [id number]          Date of Birth       1975   Visit Number      [id number]           Age                 40 year(s)   Accession Number  [id number]          Room Number         OP   Corporate ID      [id number] Trivial pulmonic regurgitation present. The pulmonic valve is not well visualized. Pericardial Effusion  No pericardial effusion noted. Pleural Effusion  No pleural effusion. Miscellaneous  IVC size is normal (<2.1cm) and collapses > 50% with respiration consistent  with normal RA pressure (3mmHg). M-Mode/2D Measurements (cm)   LV Diastolic Dimension: 5.39 cm LV Systolic Dimension: 8.23 cm  LV Septum Diastolic: 4.13 cm  LV PW Diastolic: 1.17 cm        AO Root Dimension: 2.3 cm                                  LA Dimension: 4.7 cm  IVC Inspiration: 1.04 cm        LA Area: 24.5 cm2  LVOT: 1.8 cm                    LA volume/Index: 87.9 ml /58 ml/m2  Doppler Measurements   AV Peak Velocity: 154 cm/s     MV Peak E-Wave: 77.7 cm/s  AV Peak Gradient: 9.49 mmHg    MV Peak A-Wave: 92.4 cm/s  AV Mean Gradient: 5 mmHg       MV E/A Ratio: 0.84  LVOT Peak Velocity: 125 cm/s  AV Area (Continuity):1.99 cm2   TR Velocity:217 cm/s  TR Gradient:18.84 mmHg         MV Deceleration Time: 343 msec  Estimated RAP:3 mmHg  Estimated RVSP: 22 mmHg  E' Septal Velocity: 9.14 cm/s  E' Lateral Velocity: 14.7 cm/s  E/E' ratio: 8  PV Peak Velocity: 135 cm/s  PV Peak Gradient: 7.29 mmHg   Aortic Valve   Peak Velocity: 154 cm/s     Mean Velocity: 103 cm/s  Peak Gradient: 9.49 mmHg    Mean Gradient: 5 mmHg  Area (continuity): 1.99 cm2  AV VTI: 35 cm  AI P1/2t: 240 msec  Aorta   Aortic Root: 2.3 cm  Ascending Aorta: 2.5 cm  LVOT Diameter: 1.8 cm      CT GUIDED NEEDLE PLACEMENT    Result Date: 7/5/2022  PROCEDURE: CT GUIDED BONE MARROW ASPIRATION AND CORE NEEDLE BONE BIOPSY OF THE RIGHT ILIAC BONE. MODERATE CONSCIOUS SEDATION 7/5/2022 HISTORY: ORDERING SYSTEM PROVIDED HISTORY: Pancytopenia ([address] TECHNOLOGIST PROVIDED HISTORY: Is the patient pregnant?->No SEDATION: 2 mgversed and 100 mcg fentanyl were titrated intravenously for moderate sedation monitored under my direction. Total intraservice time of sedation was 7 minutes.   The patient's vital signs were monitored throughout the procedure and recorded in the patient's medical record by the nurse. TECHNIQUE: Informed consent was obtained following a detailed explanation of the procedure including risks, benefits, and alternatives. Universal protocol was followed. Sterile gowns, masks, hats and gloves utilized for maximal sterile barrier. Axial images were obtained through the iliac bones using CT guidance and a suitable skin site was prepped and draped in sterile fashion. Local anesthesia was achieved with lidocaine. An 11 gauge JAZIO bone marrow biopsy needle was advanced into the right iliac bone and approximately 12 mL of bone marrow aspirate was obtained. A single core biopsy specimen was obtained and the patient tolerated the procedure well. Estimated blood loss: Less than 5 cc Dose modulation, iterative reconstruction, and/or weight based adjustment of the mA/kV was utilized to reduce the radiation dose to as low as reasonably achievable. DLP: 105.85 mGy-cm     Successful CT guided bone marrow aspiration and core biopsy of the right iliac bone. US ABDOMEN LIMITED Specify organ? LIVER, GALLBLADDER    Result Date: 7/26/2022  EXAMINATION: RIGHT UPPER QUADRANT ULTRASOUND 7/26/2022 10:01 am COMPARISON: None. HISTORY: ORDERING SYSTEM PROVIDED HISTORY: cirrhosis, elevated bili, also eval all quadrants for any ascites TECHNOLOGIST PROVIDED HISTORY: Reason for exam:->cirrhosis, elevated bili, also eval all quadrants for any ascites Specify organ?->LIVER Specify organ?->GALLBLADDER Reason for Exam: cirrhosis, elevated bili, also eval all quadrants for any ascites FINDINGS: LIVER:  The liver demonstrates increased echogenicity with a micronodular surface contour and minimal intrahepatic biliary dilatation. BILIARY SYSTEM:  Gallbladder is unremarkable without evidence of pericholecystic fluid, wall thickening or stones. Negative sonographic Armenta's sign.  Common bile duct is within normal limits measuring 8 mm. RIGHT KIDNEY: The right kidney is grossly unremarkable without evidence of hydronephrosis. PANCREAS:  Visualized portions of the pancreas are unremarkable. OTHER: There is a small amount of upper abdominal ascites. Cirrhosis with ascites. CT BIOPSY BONE MARROW    Result Date: 7/5/2022  PROCEDURE: CT GUIDED BONE MARROW ASPIRATION AND CORE NEEDLE BONE BIOPSY OF THE RIGHT ILIAC BONE. MODERATE CONSCIOUS SEDATION 7/5/2022 HISTORY: ORDERING SYSTEM PROVIDED HISTORY: Pancytopenia ([address] TECHNOLOGIST PROVIDED HISTORY: Is the patient pregnant?->No SEDATION: 2 mgversed and 100 mcg fentanyl were titrated intravenously for moderate sedation monitored under my direction. Total intraservice time of sedation was 7 minutes. The patient's vital signs were monitored throughout the procedure and recorded in the patient's medical record by the nurse. TECHNIQUE: Informed consent was obtained following a detailed explanation of the procedure including risks, benefits, and alternatives. Universal protocol was followed. Sterile gowns, masks, hats and gloves utilized for maximal sterile barrier. Axial images were obtained through the iliac bones using CT guidance and a suitable skin site was prepped and draped in sterile fashion. Local anesthesia was achieved with lidocaine. An 11 gauge Holograam bone marrow biopsy needle was advanced into the right iliac bone and approximately 12 mL of bone marrow aspirate was obtained. A single core biopsy specimen was obtained and the patient tolerated the procedure well. Estimated blood loss: Less than 5 cc Dose modulation, iterative reconstruction, and/or weight based adjustment of the mA/kV was utilized to reduce the radiation dose to as low as reasonably achievable. DLP: 105.85 mGy-cm     Successful CT guided bone marrow aspiration and core biopsy of the right iliac bone.      EGD    Result Date: 7/1/2022  No dictation       STAGING:     Cancer Staging  No matching staging information was found for the patient. ASSESSMENT AND PLAN:     Anemia and thrombocytopenia. Previous bone marrow evaluation negative. Alcoholic cirrhosis  Hyperbilirubinemia due to liver disease    Reviewed her labs. 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 No signs of active bleeding at present. Continue to monitor. Urban Coleman M.D.; M.S. Medical Oncology/Hematology  Phone: [phone]  Fax: [phone]    [address] [facility], [address] [place] [address].   [facility]

## 2022-07-28 NOTE — PROGRESS NOTES
Last 3 Encounters:   07/28/22 111 lb 8 oz (50.6 kg)   07/05/22 106 lb (48.1 kg)   06/30/22 106 lb 11.2 oz (48.4 kg)       General appearance:  Appears comfortable  Eyes: Sclera clear. Pupils equal.  ENT: Moist oral mucosa. Trachea midline, no adenopathy. Cardiovascular: Regular rhythm, normal S1, S2. No murmur. No edema in lower extremities  Respiratory: Not using accessory muscles. Good inspiratory effort. Clear to auscultation bilaterally, no wheeze or crackles. GI: Abdomen soft, no tenderness, not distended, normal bowel sounds  Musculoskeletal: No cyanosis in digits, neck supple  Neurology: CN 2-12 grossly intact. No speech or motor deficits  Psych: Normal affect. Alert and oriented in time, place and person  Skin: Warm, dry, normal turgor    Labs and Tests:  CBC:   Recent Labs     07/26/22  0418 07/26/22  1235 07/27/22  0523 07/28/22  0526   WBC 7.7  --  5.4 5.9   HGB 6.8* 7.8* 7.6* 7.2*   PLT 74*  --  74* 73*     BMP:    Recent Labs     07/26/22  0418 07/27/22  0523 07/28/22  0526   * 134* 136   K 4.8 4.5 4.2   CL 99 96* 97*   CO2 21 27 26   BUN 36* 43* 48*   CREATININE 1.2* 1.2* 1.1   GLUCOSE 113* 239* 142*     Hepatic:   Recent Labs     07/25/22  1450 07/28/22  0526   AST 73* 82*   ALT 31 31   BILITOT 18.7* 21.2*   ALKPHOS 164* 100       Discussed care with patient             Problem List  Principal Problem:    Symptomatic anemia  Resolved Problems:    * No resolved hospital problems. *       Assessment & Plan:   Symptomatic anemia  Recurrent.   Blood count was 4.9 on presentation patient is status post 2 unit of blood transfusion hemoglobin last was 6.8  -Unclear patient hemolyzing no signs of acute loss noted.  -Evaluated by oncology no further work-up needed    Hyperammonemia with hepatic encephalopathy  -Started on lactulose we will monitor      Epigastric pain with cirrhosis-ultrasound  noted trace ascites  -Monitor closely   -Appreciate GI recommend    UTI  -Present on admission urine culture growing greater than 100,008 E. Coli  -Start Rocephin    Acute kidney injury  Presume this is prerenal monitor closely IV hydration if needed hold off on Lasix today        Disposition hopefully home with palliative care once ammonia level      Diet: ADULT DIET; Regular;  Low Sodium (2 gm)  Code:Full Code  DVT PPX lovenox       Norma Rosario MD   7/28/2022 10:27 AM

## 2022-07-28 NOTE — PROGRESS NOTES
Oncology Hematology Care   Progress Note      7/28/2022 8:30 AM        Name: Yoni Bowling . Admitted: 7/25/2022    SUBJECTIVE:  patient resting quietly in bed, was confused overnight, no SOB, no external bleeding    Reviewed interval ancillary notes    Current Medications  oxyCODONE (ROXICODONE) immediate release tablet 5 mg, Q4H PRN  lactulose (CHRONULAC) 10 GM/15ML solution 20 g, TID  promethazine (PHENERGAN) injection 12.5 mg, Q6H PRN  0.9 % sodium chloride infusion, PRN  cefTRIAXone (ROCEPHIN) 1000 mg in sterile water 10 mL IV syringe, Q24H  LORazepam (ATIVAN) tablet 0.5 mg, Q8H PRN  furosemide (LASIX) tablet 40 mg, Daily  magnesium oxide (MAG-OX) tablet 400 mg, BID  nadolol (CORGARD) tablet 40 mg, Daily  spironolactone (ALDACTONE) tablet 100 mg, Daily  thiamine mononitrate tablet 100 mg, Daily  zolpidem (AMBIEN) tablet 5 mg, Nightly PRN  sodium chloride flush 0.9 % injection 5-40 mL, 2 times per day  sodium chloride flush 0.9 % injection 5-40 mL, PRN  0.9 % sodium chloride infusion, PRN  ondansetron (ZOFRAN-ODT) disintegrating tablet 4 mg, Q8H PRN   Or  ondansetron (ZOFRAN) injection 4 mg, Q6H PRN  polyethylene glycol (GLYCOLAX) packet 17 g, Daily PRN  pantoprazole (PROTONIX) 40 mg in sodium chloride (PF) 10 mL injection, Q12H  acetaminophen (TYLENOL) tablet 1,000 mg, Q6H PRN        Objective:  /63   Pulse 79   Temp 98.3 °F (36.8 °C) (Oral)   Resp 16   Ht 5' 5\" (1.651 m)   Wt 111 lb 8 oz (50.6 kg)   SpO2 94%   BMI 18.55 kg/m²     Intake/Output Summary (Last 24 hours) at 7/28/2022 0830  Last data filed at 7/28/2022 0452  Gross per 24 hour   Intake 814.24 ml   Output --   Net 814.24 ml      Wt Readings from Last 3 Encounters:   07/28/22 111 lb 8 oz (50.6 kg)   07/05/22 106 lb (48.1 kg)   06/30/22 106 lb 11.2 oz (48.4 kg)       General appearance:  Appears comfortable  Eyes: Sclera clear. Pupils equal.  ENT: Moist oral mucosa. Trachea midline, no adenopathy.   Cardiovascular: Regular rhythm, normal S1, S2. No murmur. No edema in lower extremities  Respiratory: Not using accessory muscles. Good inspiratory effort. Clear to auscultation bilaterally, no wheeze or crackles. GI: Abdomen soft, no tenderness, not distended  Musculoskeletal: No cyanosis in digits, neck supple  Neurology: CN 2-12 grossly intact. No speech or motor deficits  Psych: Normal affect. Alert and oriented in time, place and person  Skin: Warm, dry, normal turgor    Labs and Tests:  CBC:   Recent Labs     07/26/22  0418 07/26/22  1235 07/27/22  0523 07/28/22  0526   WBC 7.7  --  5.4 5.9   HGB 6.8* 7.8* 7.6* 7.2*   PLT 74*  --  74* 73*     BMP:    Recent Labs     07/26/22  0418 07/27/22  0523 07/28/22  0526   * 134* 136   K 4.8 4.5 4.2   CL 99 96* 97*   CO2 21 27 26   BUN 36* 43* 48*   CREATININE 1.2* 1.2* 1.1   GLUCOSE 113* 239* 142*     Hepatic:   Recent Labs     07/25/22  1450 07/28/22  0526   AST 73* 82*   ALT 31 31   BILITOT 18.7* 21.2*   ALKPHOS 164* 100       ASSESSMENT AND PLAN    Principal Problem:    Symptomatic anemia  Resolved Problems:    * No resolved hospital problems.  *      Anemia  -Found to have a HGB of 4.8 in the office subsequently sent to ER for blood transfusion  -No nutritional deficiencies  -Iron overload 2/2 to multiple transfusions  -Bone marrow biopsy showed no evidence of leukemia, lymphoma or MDS  -Haptoglobin undetectable mostly underlying liver disease  -Direct pietro negative thereby autoimmune hemolysis highly unlikely  -Anemia is 2/2 to end stage liver disease extremely poor prognosis, could be spur cell anemia from end stage liver disease.  -Transfuse for a goal HGB above 7  - continue to monitor CBC daily       Darrius Tran, [address] [facility]  Oncology Hematology [facility]

## 2022-07-28 NOTE — PROGRESS NOTES
Gastroenterology Progress Note    Janay Ivey is a 40 y.o. female patient. Principal Problem:    Symptomatic anemia  Resolved Problems:    * No resolved hospital problems. *      SUBJECTIVE:  confused. Had 2 BMs with lactulose so far. Denies abdominal pain. ROS:  No fever, chills  No chest pain, palpitations  No SOB, cough  Gastrointestinal ROS: see above    Physical    VITALS:  /63   Pulse 79   Temp 98.3 °F (36.8 °C) (Oral)   Resp 16   Ht 5' 5\" (1.651 m)   Wt 111 lb 8 oz (50.6 kg)   SpO2 94%   BMI 18.55 kg/m²   TEMPERATURE:  Current - Temp: 98.3 °F (36.8 °C); Max - Temp  Av.3 °F (36.8 °C)  Min: 97.6 °F (36.4 °C)  Max: 98.8 °F (37.1 °C)    NAD  Regular rate   Lungs CTA Bilaterally  Abdomen soft, ND, NT,  Bowel sounds normal.  Jaundice  Asterixis     Data    Data Review:    Recent Labs     22  0418 22  1235 22  0523 22  0526   WBC 7.7  --  5.4 5.9   HGB 6.8* 7.8* 7.6* 7.2*   HCT 19.4* 22.3* 21.3* 20.0*   MCV 93.4  --  90.5 87.9   PLT 74*  --  74* 73*       Recent Labs     22  0418 22  0523 22  0526   * 134* 136   K 4.8 4.5 4.2   CL 99 96* 97*   CO2  26   BUN 36* 43* 48*   CREATININE 1.2* 1.2* 1.1       Recent Labs     22  1450 22  0526   AST 73* 82*   ALT 31 31   BILIDIR  --  7.2*   BILITOT 18.7* 21.2*   ALKPHOS 164* 100       Recent Labs     22  1450   LIPASE 39.0       Recent Labs     22  1449 22  0419   PROTIME 22.8* 23.0*   INR 2.01* 2.03*     No results for input(s): PTT in the last 72 hours. ASSESSMENT :    Anemia - recurrent. S/p bone marrow bx with heme and anemia felt to be secondary to peripheral destruction/sequestration. Anemia from end stage cirrhosis per heme eval. No gross GI bleeding. Stools guaiac negative. LUQ pain - off and on for a few months. Does have known gallstones. Us with small volume ascites. Pain felt to be from splenomegaly.      Hepatic encephalopathy - likely precipitated by UTI. Cirrhosis - -due to alcohol abuse. No alcohol since April 2022. EGD 7/2022 with large esophageal varix treated with band ligation. No history of hepatic encephalopathy. History of ascites which has been managed with Lasix 40 and Aldactone 100 mg daily. PLAN :  -PRBC as indicated  -Monitor hemoglobin. - monitor liver enzymes, INR  - lactulose q3 hours  - plan is for home with palliative care once she is stable/hepatic encephalopathy improved. Discussed with Dr. Asia Calixto, KAPIL  [address] [place] [address]  I have personally performed a face to face diagnostic evaluation on this patient. I have interviewed and examined the patient and I agree with the findings and recommended plan of care. In summary, my findings and plan are the following: As above, still some confusion and abnormal behavior. Some asterixis on exam.  Abd soft and NT, ND. She has HE likely from her UTI. No bleeding and volume status up. Lasix being held by Nephrology, but still taking spironolactone. Continue lactulose and will add Xifaxan. Would defer discharge until MS returns to baseline.     Favio Hutton, [address] [place] [address]  7/28/2022

## 2022-07-28 NOTE — PROGRESS NOTES
Pt bed alarm sounded x2 this morning. Both times RN responded, pt found wandering in room. Goes back to bed when requested. Pt knows she is in the hospital but does not know location or name of facility. Pt states it is  and voices, \"I'm sick\". Oriented to name & . VSS, meds administered per MAR. Pt refusing breakfast, says she is not hungry. Fall precautions in place.

## 2022-07-29 VITALS
BODY MASS INDEX: 17.56 KG/M2 | HEIGHT: 65 IN | OXYGEN SATURATION: 96 % | TEMPERATURE: 97.8 F | SYSTOLIC BLOOD PRESSURE: 137 MMHG | WEIGHT: 105.38 LBS | RESPIRATION RATE: 16 BRPM | DIASTOLIC BLOOD PRESSURE: 72 MMHG | HEART RATE: 61 BPM

## 2022-07-29 LAB
ALBUMIN SERPL-MCNC: 3.3 G/DL (ref 3.4–5)
ALP BLD-CCNC: 103 U/L (ref 40–129)
ALT SERPL-CCNC: 32 U/L (ref 10–40)
AMMONIA: 43 UMOL/L (ref 11–51)
ANION GAP SERPL CALCULATED.3IONS-SCNC: 10 MMOL/L (ref 3–16)
ANISOCYTOSIS: ABNORMAL
AST SERPL-CCNC: 76 U/L (ref 15–37)
BASOPHILS ABSOLUTE: 0 K/UL (ref 0–0.2)
BASOPHILS RELATIVE PERCENT: 0 %
BILIRUB SERPL-MCNC: 20.6 MG/DL (ref 0–1)
BILIRUBIN DIRECT: 6.6 MG/DL (ref 0–0.3)
BILIRUBIN, INDIRECT: 14 MG/DL (ref 0–1)
BUN BLDV-MCNC: 45 MG/DL (ref 7–20)
BURR CELLS: ABNORMAL
CALCIUM SERPL-MCNC: 9.7 MG/DL (ref 8.3–10.6)
CHLORIDE BLD-SCNC: 96 MMOL/L (ref 99–110)
CO2: 27 MMOL/L (ref 21–32)
CREAT SERPL-MCNC: 0.8 MG/DL (ref 0.6–1.1)
EOSINOPHILS ABSOLUTE: 0.3 K/UL (ref 0–0.6)
EOSINOPHILS RELATIVE PERCENT: 5 %
GFR AFRICAN AMERICAN: >60
GFR NON-AFRICAN AMERICAN: >60
GLUCOSE BLD-MCNC: 169 MG/DL (ref 70–99)
HCT VFR BLD CALC: 21 % (ref 36–48)
HEMOGLOBIN: 7.5 G/DL (ref 12–16)
INR BLD: 2.07 (ref 0.87–1.14)
LYMPHOCYTES ABSOLUTE: 0.9 K/UL (ref 1–5.1)
LYMPHOCYTES RELATIVE PERCENT: 16 %
MCH RBC QN AUTO: 31.8 PG (ref 26–34)
MCHC RBC AUTO-ENTMCNC: 35.9 G/DL (ref 31–36)
MCV RBC AUTO: 88.6 FL (ref 80–100)
METAMYELOCYTES RELATIVE PERCENT: 1 %
MONOCYTES ABSOLUTE: 0.8 K/UL (ref 0–1.3)
MONOCYTES RELATIVE PERCENT: 14 %
NEUTROPHILS ABSOLUTE: 3.6 K/UL (ref 1.7–7.7)
NEUTROPHILS RELATIVE PERCENT: 64 %
PDW BLD-RTO: 21.4 % (ref 12.4–15.4)
PLATELET # BLD: 84 K/UL (ref 135–450)
PLATELET SLIDE REVIEW: ABNORMAL
PMV BLD AUTO: 8.8 FL (ref 5–10.5)
POLYCHROMASIA: ABNORMAL
POTASSIUM SERPL-SCNC: 4.2 MMOL/L (ref 3.5–5.1)
PROTHROMBIN TIME: 23.3 SEC (ref 11.7–14.5)
RBC # BLD: 2.36 M/UL (ref 4–5.2)
SCHISTOCYTES: ABNORMAL
SLIDE REVIEW: ABNORMAL
SODIUM BLD-SCNC: 133 MMOL/L (ref 136–145)
TOTAL PROTEIN: 6.1 G/DL (ref 6.4–8.2)
WBC # BLD: 5.5 K/UL (ref 4–11)

## 2022-07-29 PROCEDURE — 97161 PT EVAL LOW COMPLEX 20 MIN: CPT

## 2022-07-29 PROCEDURE — 80048 BASIC METABOLIC PNL TOTAL CA: CPT

## 2022-07-29 PROCEDURE — 6360000002 HC RX W HCPCS: Performed by: HOSPITALIST

## 2022-07-29 PROCEDURE — 97116 GAIT TRAINING THERAPY: CPT

## 2022-07-29 PROCEDURE — 94760 N-INVAS EAR/PLS OXIMETRY 1: CPT

## 2022-07-29 PROCEDURE — 6370000000 HC RX 637 (ALT 250 FOR IP): Performed by: INTERNAL MEDICINE

## 2022-07-29 PROCEDURE — 80076 HEPATIC FUNCTION PANEL: CPT

## 2022-07-29 PROCEDURE — 6360000002 HC RX W HCPCS: Performed by: INTERNAL MEDICINE

## 2022-07-29 PROCEDURE — 97535 SELF CARE MNGMENT TRAINING: CPT

## 2022-07-29 PROCEDURE — 6370000000 HC RX 637 (ALT 250 FOR IP): Performed by: PHYSICIAN ASSISTANT

## 2022-07-29 PROCEDURE — 6370000000 HC RX 637 (ALT 250 FOR IP): Performed by: HOSPITALIST

## 2022-07-29 PROCEDURE — A4216 STERILE WATER/SALINE, 10 ML: HCPCS | Performed by: INTERNAL MEDICINE

## 2022-07-29 PROCEDURE — 2580000003 HC RX 258: Performed by: HOSPITALIST

## 2022-07-29 PROCEDURE — 36415 COLL VENOUS BLD VENIPUNCTURE: CPT

## 2022-07-29 PROCEDURE — C9113 INJ PANTOPRAZOLE SODIUM, VIA: HCPCS | Performed by: INTERNAL MEDICINE

## 2022-07-29 PROCEDURE — 85025 COMPLETE CBC W/AUTO DIFF WBC: CPT

## 2022-07-29 PROCEDURE — 97165 OT EVAL LOW COMPLEX 30 MIN: CPT

## 2022-07-29 PROCEDURE — 99232 SBSQ HOSP IP/OBS MODERATE 35: CPT | Performed by: INTERNAL MEDICINE

## 2022-07-29 PROCEDURE — 97530 THERAPEUTIC ACTIVITIES: CPT

## 2022-07-29 PROCEDURE — 82140 ASSAY OF AMMONIA: CPT

## 2022-07-29 PROCEDURE — 2580000003 HC RX 258: Performed by: INTERNAL MEDICINE

## 2022-07-29 PROCEDURE — 85610 PROTHROMBIN TIME: CPT

## 2022-07-29 RX ORDER — PROCHLORPERAZINE EDISYLATE 5 MG/ML
10 INJECTION INTRAMUSCULAR; INTRAVENOUS ONCE
Status: COMPLETED | OUTPATIENT
Start: 2022-07-29 | End: 2022-07-29

## 2022-07-29 RX ORDER — FUROSEMIDE 20 MG/1
20 TABLET ORAL DAILY
Status: DISCONTINUED | OUTPATIENT
Start: 2022-07-30 | End: 2022-07-29 | Stop reason: HOSPADM

## 2022-07-29 RX ORDER — LACTULOSE 10 G/15ML
20 SOLUTION ORAL
Qty: 237 ML | Refills: 1 | Status: ON HOLD | OUTPATIENT
Start: 2022-07-29 | End: 2022-10-18 | Stop reason: SDUPTHER

## 2022-07-29 RX ORDER — LEVOFLOXACIN 250 MG/1
250 TABLET ORAL DAILY
Qty: 5 TABLET | Refills: 0 | Status: SHIPPED | OUTPATIENT
Start: 2022-07-29 | End: 2022-08-03

## 2022-07-29 RX ORDER — FUROSEMIDE 20 MG/1
20 TABLET ORAL DAILY
Qty: 60 TABLET | Refills: 3 | Status: SHIPPED | OUTPATIENT
Start: 2022-07-30

## 2022-07-29 RX ADMIN — Medication 10 ML: at 09:00

## 2022-07-29 RX ADMIN — SODIUM CHLORIDE, PRESERVATIVE FREE 10 ML: 5 INJECTION INTRAVENOUS at 03:45

## 2022-07-29 RX ADMIN — Medication 1000 MG: at 14:50

## 2022-07-29 RX ADMIN — Medication 400 MG: at 08:59

## 2022-07-29 RX ADMIN — OXYCODONE 5 MG: 5 TABLET ORAL at 03:44

## 2022-07-29 RX ADMIN — ONDANSETRON 4 MG: 2 INJECTION INTRAMUSCULAR; INTRAVENOUS at 03:45

## 2022-07-29 RX ADMIN — RIFAXIMIN 550 MG: 550 TABLET ORAL at 08:59

## 2022-07-29 RX ADMIN — THIAMINE HCL TAB 100 MG 100 MG: 100 TAB at 08:59

## 2022-07-29 RX ADMIN — ONDANSETRON 4 MG: 2 INJECTION INTRAMUSCULAR; INTRAVENOUS at 09:48

## 2022-07-29 RX ADMIN — SODIUM CHLORIDE 40 MG: 9 INJECTION INTRAMUSCULAR; INTRAVENOUS; SUBCUTANEOUS at 08:59

## 2022-07-29 RX ADMIN — OXYCODONE 5 MG: 5 TABLET ORAL at 09:48

## 2022-07-29 RX ADMIN — SPIRONOLACTONE 100 MG: 25 TABLET ORAL at 08:59

## 2022-07-29 RX ADMIN — NADOLOL 40 MG: 40 TABLET ORAL at 09:48

## 2022-07-29 RX ADMIN — PROCHLORPERAZINE EDISYLATE 10 MG: 5 INJECTION INTRAMUSCULAR; INTRAVENOUS at 12:07

## 2022-07-29 RX ADMIN — LACTULOSE 20 G: 20 SOLUTION ORAL at 03:44

## 2022-07-29 ASSESSMENT — PAIN DESCRIPTION - FREQUENCY
FREQUENCY: CONTINUOUS

## 2022-07-29 ASSESSMENT — PAIN DESCRIPTION - LOCATION
LOCATION: ABDOMEN

## 2022-07-29 ASSESSMENT — PAIN DESCRIPTION - DESCRIPTORS
DESCRIPTORS: ACHING

## 2022-07-29 ASSESSMENT — PAIN DESCRIPTION - ONSET
ONSET: ON-GOING

## 2022-07-29 ASSESSMENT — PAIN - FUNCTIONAL ASSESSMENT
PAIN_FUNCTIONAL_ASSESSMENT: PREVENTS OR INTERFERES SOME ACTIVE ACTIVITIES AND ADLS

## 2022-07-29 ASSESSMENT — PAIN DESCRIPTION - ORIENTATION
ORIENTATION: MID

## 2022-07-29 ASSESSMENT — PAIN SCALES - GENERAL
PAINLEVEL_OUTOF10: 9
PAINLEVEL_OUTOF10: 8
PAINLEVEL_OUTOF10: 9
PAINLEVEL_OUTOF10: 9

## 2022-07-29 ASSESSMENT — PAIN DESCRIPTION - PAIN TYPE
TYPE: ACUTE PAIN

## 2022-07-29 NOTE — PROGRESS NOTES
[facility]   Phone: [phone]    Physical Therapy    [x] Initial Evaluation            [] Daily Treatment Note         [x] Discharge Summary      Patient: Chanelle Estrada   : 1975   MRN: [id number]   Date of Service:  2022  Admitting Diagnosis: Symptomatic anemia  Current Admission Summary: Chanelle Estrada is a 40 y.o. female who presented with complaints of low hemoglobin. Patient apparently was found to have a hemoglobin of 4 outpatient routine testing. Patient was supposed to be direct admitted for IV infusion but unfortunately came to the ER. Patient just feeling generalized fatigue weakness complaining abdominal pain history of liver cirrhosis. Follows GI no bleeding or dark tarry stool. Patient denies any new weight gain. Does have some mild abdominal distention  Past Medical History:  has a past medical history of Alcoholic liver disease ([address] Anemia, and History of blood transfusion. Past Surgical History:  has a past surgical history that includes Upper gastrointestinal endoscopy (N/A, 2021); Upper gastrointestinal endoscopy (N/A, 03/10/2021); Tubal ligation; Colonoscopy (N/A, 3/11/2021); Upper gastrointestinal endoscopy (N/A, 2021); Upper gastrointestinal endoscopy (N/A, 2022); Knee Arthroplasty; Upper gastrointestinal endoscopy (N/A, 2022); Upper gastrointestinal endoscopy (N/A, 2022); and CT BIOPSY BONE MARROW (2022). Discharge Recommendations: Chanelle Estrada scored a 23/24 on the AM-PAC short mobility form. Current research shows that an AM-PAC score of 18 or greater is typically associated with a discharge to the patient's home setting. Based on the patient's AM-PAC score and their current functional mobility deficits, it is recommended that the patient have 2-3 sessions per week of Physical Therapy at d/c to increase the patient's independence.   At this time, this patient demonstrates the endurance and safety to discharge home with home PT and a follow up treatment frequency of 2-3x/wk. Please see assessment section for further patient specific details. HOME HEALTH CARE: LEVEL 1 STANDARD    - Initial home health evaluation to occur within 24-48 hours, in patient home   - Therapy to evaluate with goal of regaining prior level of functioning   - Therapy to evaluate if patient has [address] needs for personal care     If patient discharges prior to next session this note will serve as a discharge summary. Please see below for the latest assessment towards goals. DME Required For Discharge: no DME required at discharge  Precautions/Restrictions: low fall risk  Weight Bearing Restrictions: no restrictions  [] Right Upper Extremity  [] Left Upper Extremity [] Right Lower Extremity  [] Left Lower Extremity     Required Braces/Orthotics: no braces required   [] Right  [] Left  Positional Restrictions:no positional restrictions    Pre-Admission Information   Lives With: daughter ; daughter is pregnant and working on moving out, but is still living there and able to help around the house   Type of Home: apartment- duplex  Home Layout: one level  Home Access: level entry  Bathroom Layout: tub/shower unit  Bathroom Equipment: shower chair  Toilet Height: standard height  Home Equipment: manual wheelchair  Transfer Assistance: Independent without use of device  Ambulation Assistance:Independent without use of device  ADL Assistance: independent with all ADL's  IADL Assistance: independent with homemaking tasks  Active :        [x] Yes  [] No  Hand Dominance: [] Left  [x] Right  Current Employment: unemployed  Hobbies: take care of dog, facetiming with friends  Recent Falls: none    Examination   Vision:   Vision Gross Assessment: Impaired and Vision Corrective Device: wears glasses for reading and wears contacts daily  Hearing:   WFL  Observation:   General Observation:  pt laying in bed on L side.  Pt has jaundice appearance. Posture:   Good  Sensation:   WFL  Tone:   Normotonic  Coordination Testing:   WFL    ROM:   (B) LE AROM WFL  Strength:   (B) LE strength grossly -4  Decision Making: low complexity  Clinical Presentation: stable      Subjective  General: pt reports feeling very tired and weak. Pt stated she has been feeling nauseous, but felt better after receiving medication and taking a nap. Pt reports pain in her abdominal region. Pain: 7-8/10 stomach/abdominal area  Pain Interventions: RN notified       Functional Mobility  Bed Mobility  Supine to Sit: Independent  Sit to Supine: Independent  Rolling Left: Independent  Comments:  Transfers  Sit to stand transfer: Independent  Stand to sit transfer: Independent  Comments:  Ambulation  Surface:level surface  Assistive Device: no device  Assistance: supervision  Distance: 70ft  Gait Mechanics: decreased adrian, slight lateral sway noted, slightly deviated path  Comments:   Balance  Static Sitting Balance: good(+): independent with high level dynamic balance in unsupported position  Dynamic Sitting Balance: good(+): independent with high level dynamic balance in unsupported position  Static Standing Balance: good(+): independent with high level dynamic balance in unsupported position  Dynamic Standing Balance: good(+): independent with high level dynamic balance in unsupported position  Comments:    Other Therapeutic Interventions  Pt performed sup>sit and 2-3 transfers from EOB while attempting to lachelle personal clothing and then returning to changing back into hospital pants and gown. Pt requesting to take a nap before ambulation assessment. PT/OT returned later for gait assessment as documented above.      Functional Outcomes  AM-PAC Inpatient Mobility Raw Score : 23              Cognition  Following Commands: follows multi step commands with increased time  Problem Solving: assistance required to generate solutions  Orientation:    alert and oriented x 4  Command Following:   impaired pt was delayed in following commands     Education  Barriers To Learning: cognition  Patient Education: patient educated on PT role and benefits, plan of care, discharge recommendations  Learning Assessment:  patient verbalizes and demonstrates understanding    Assessment  Activity Tolerance: pt was unable to get dressed without taking seated rest breaks. Pt was too fatigued after getting dressed to go for a walk and asked to do it later. Impairments Requiring Therapeutic Intervention: none - eval with same day discharge  Prognosis: fair  Clinical Assessment: Pt presents easily fatigued with decreased endurance. Prior to admission pt was independent with ADLs, mobility, and IADLs. Pt now needs multiple rest breaks during mobility tasks and can only tolerate short bouts of movement. Pt is to be discharged home at this time with the recommendation of home PT in order to improve strength and endurance to decrease fall risk. Safety Interventions: patient left in bed, bed alarm in place, call light within reach, gait belt, telesitter in use, and nurse notified    Plan  Frequency: Eval with same day discharge. No follow up required. Current Treatment Recommendations: not applicable, evaluation completed with same day discharge.     Goals  Patient Goals: to go home and get stronger  Short Term Goals:  Time Frame: n/a  No STG at this time, pt to be discharged from PT caseload    Therapy Session Time      Individual Group Co-treatment   Time In      1200   Time Out      1225   Minutes      25     Timed Code Treatment Minutes:   10  Total Treatment Minutes:  25       Second Session Therapy Time:   Individual Concurrent Group Co-treatment   Time In       1312   Time Out       1320   Minutes       8     Timed Code Treatment Minutes:  13+0    Total Treatment Minutes:   MIRTA Shi    PT providing direct supervision during session and assisting in making skilled judgements throughout session.   [address], [place] [address]    Electronically Signed By: Cruz Nguyễn, PT

## 2022-07-29 NOTE — PROGRESS NOTES
Pt's daughter, Boogie Austin, notified via phone about pt's d/c order and that we are waiting for PT/OT. Also informed her that outpt pharmacy is checking to see if rifaximin can get pre-authorization to cover cost. Per Boogie Austin, if pre-authorization isn't available, pt will not be able to afford med.

## 2022-07-29 NOTE — CARE COORDINATION
Discharge Planning  Discharge order noted with recommendations for home with home care. Patient is agreeable to home with home care. Patient has no preference for a home care provider. Referral made to   [address] CM spoke with Marely Alvarado - [phone] with intake who requested the order & AVS faxed to [phone]. Stated will review the case and start of care next week.

## 2022-07-29 NOTE — PROGRESS NOTES
Gastroenterology Progress Note            Carmen Waite is a 40 y.o. female patient. 1. Anemia, unspecified type    2. Alcoholic liver disease (HCC)        SUBJECTIVE:  Less confused. More lucid. Physical    VITALS:  /72   Pulse 61   Temp 97.8 °F (36.6 °C) (Oral)   Resp 16   Ht 5' 5\" (1.651 m)   Wt 105 lb 6.1 oz (47.8 kg)   SpO2 96%   BMI 17.54 kg/m²   TEMPERATURE:  Current - Temp: 97.8 °F (36.6 °C); Max - Temp  Av.5 °F (36.9 °C)  Min: 97.8 °F (36.6 °C)  Max: 99 °F (37.2 °C)    Abdomen soft, ND, NT, no HSM, Bowel sounds normal   No asterixis. Data      Recent Labs     22  0522  0526 22  0924   WBC 5.4 5.9 5.5   HGB 7.6* 7.2* 7.5*   HCT 21.3* 20.0* 21.0*   MCV 90.5 87.9 88.6   PLT 74* 73* 84*     Recent Labs     22  0523 22  0526 22  0442   * 136 133*   K 4.5 4.2 4.2   CL 96* 97* 96*   CO2 27 26 27   BUN 43* 48* 45*   CREATININE 1.2* 1.1 0.8     Recent Labs     22  0526 22  0442   AST 82* 76*   ALT 31 32   BILIDIR 7.2* 6.6*   BILITOT 21.2* 20.6*   ALKPHOS 100 103     No results for input(s): LIPASE, AMYLASE in the last 72 hours. ASSESSMENT :     Anemia - recurrent. S/p bone marrow bx with heme and anemia felt to be secondary to peripheral destruction/sequestration. Anemia from end stage cirrhosis per heme eval. No gross GI bleeding. Stools guaiac negative. Hb stable at 7.5 tiday     LUQ pain - off and on for a few months. Does have known gallstones. Us with small volume ascites. Pain felt to be from splenomegaly. Hepatic encephalopathy - likely precipitated by UTI. She improved with Lactulose catharsis and Xifaxan. I discussed with her that if Xifaxan not covered med for her, that Lactulose alone titrated to 2 - 3 loose BM/day should be sufficient. Cirrhosis - -due to alcohol abuse. No alcohol since 2022. EGD 2022 with large esophageal varix treated with band ligation.   No history of hepatic encephalopathy. History of ascites which has been managed with Lasix at lower dose per Renal (20 mg daily) due to recent EZIO and Aldactone 100 mg daily. PLAN :  -PRBC as needed  -Monitor CBC weekly  - Monitor liver enzymes, INR  - lactulose titrated to 2 - 3 loose BM/day  - Xifaxan 550 mg BID if covered. - Agree with discharge to home with palliative care  She should f/u with Dr. Symone Meek within next 2 - 4 weeks.     Amos Marie, [address] [place] [address]  7/29/2022

## 2022-07-29 NOTE — DISCHARGE INSTR - COC
Continuity of Care Form    Patient Name: John Salinas   :  1975  MRN:  [id number]    Admit date:  2022  Discharge date:  2022    Code Status Order: Full Code   Advance Directives:     Admitting Physician:  Chester Simon MD  PCP: Phuc Willis DO    [facility] 7OS-8785/9348-66  Discharging Unit Phone Number: [phone]    Emergency Contact:   Extended Emergency Contact Information  Primary Emergency Contact: [facility] Phone: [phone]  Relation: Child    Past Surgical History:  Past Surgical History:   Procedure Laterality Date    COLONOSCOPY N/A 3/11/2021    COLONOSCOPY POLYPECTOMY SNARE/COLD BIOPSY performed by Pardeep Arguello MD at [address] [facility] BONE MARROW BIOPSY  2022    CT BONE MARROW BIOPSY 2022 Hany Trotter MD [facility] CT SCAN    KNEE ARTHROPLASTY      TUBAL LIGATION      ESSURE    UPPER GASTROINTESTINAL ENDOSCOPY N/A 2021    EGD DIAGNOSTIC ONLY performed by Boris Madison MD at [address] [place] [address] 03/10/2021    EGD BIOPSY performed by Pardeep Arguello MD at [address] [place] [address] 2021    EGD BAND LIGATION performed by Meryle Senate, MD at [address] [place] [address] N/A 2022    EGD DIAGNOSTIC ONLY performed by Gayle Mendoza MD at [address] [place] [address] N/A 2022    EGD ESOPHAGOGASTRODUODENOSCOPY ULTRASOUND performed by Heather Perez MD at [address] [place] [address] N/A 2022    EGD BAND LIGATION performed by Heather Perez MD at [address] [place] [address]       Immunization History:   Immunization History   Administered Date(s) Administered    COVID-19, PFIZER GRAY top, DO NOT Dilute, (age 15 y+), IM, 30 mcg/0.3 mL 05/10/2022    COVID-19, PFIZER PURPLE top, DILUTE for use, (age 15 y+), 30mcg/0.3mL 2021, 2021       Active Problems:  Patient Active Problem List   Diagnosis Code    GI bleed K92.2    Acute GI bleeding K92.2    Menorrhagia with regular cycle B20.1    Alcoholic liver disease (HCC) K70.9    Acute cholecystitis K81.0    Nausea and vomiting R11.2    Acute blood loss anemia D62    Esophageal varices (HCC) M80.67    Alcoholic hepatitis Q81.53    EZIO (acute kidney injury) ([facility] [address] N17.9    High anion gap metabolic acidosis H16.0    Elevated LFTs R79.89    Ascites due to alcoholic cirrhosis (HCC) X30.18    Acute respiratory failure with hypoxia (HCC) J96.01    Sinus tachycardia R00.0    Severe anemia D64.9    Symptomatic anemia D64.9       Isolation/Infection:   Isolation            No Isolation          Patient Infection Status       None to display            Nurse Assessment:  Last Vital Signs: /72   Pulse 61   Temp 97.8 °F (36.6 °C) (Oral)   Resp 16   Ht 5' 5\" (1.651 m)   Wt 105 lb 6.1 oz (47.8 kg)   SpO2 96%   BMI 17.54 kg/m²     Last documented pain score (0-10 scale): Pain Level: 9  Last Weight:   Wt Readings from Last 1 Encounters:   07/29/22 105 lb 6.1 oz (47.8 kg)     Mental Status:  oriented and alert    IV Access:  - None    Nursing Mobility/ADLs:  Walking   Independent  Transfer  Independent  Bathing  Independent  Dressing  Independent  Toileting  Independent  Feeding  Independent  Med Admin  Assisted  Med Delivery   whole    Wound Care Documentation and Therapy:        Elimination:  Continence: Bowel: Yes  Bladder: Yes  Urinary Catheter: None   Colostomy/Ileostomy/Ileal Conduit: No       Date of Last BM: 7/29/2022    Intake/Output Summary (Last 24 hours) at 7/29/2022 1053  Last data filed at 7/29/2022 0345  Gross per 24 hour   Intake 500 ml   Output --   Net 500 ml     I/O last 3 completed shifts: In: 1014.2 [P.O.:720; I.V.:193.1; IV Piggyback:101.2]  Out: -     Safety Concerns:      At Risk for Falls    Impairments/Disabilities:      None    Nutrition Therapy:  Current Nutrition Therapy:   - Oral Diet: Low Sodium (2gm)    Routes of Feeding: Oral  Liquids: Thin Liquids  Daily Fluid Restriction: no  Last Modified Barium Swallow with Video (Video Swallowing Test): not done    Treatments at the Time of Hospital Discharge:   Respiratory Treatments: None  Oxygen Therapy:  is not on home oxygen therapy. Ventilator:    - No ventilator support    Rehab Therapies: Physical Therapy  Weight Bearing Status/Restrictions: No weight bearing restrictions  Other Medical Equipment (for information only, NOT a DME order):  bedside commode  Other Treatments: None    Patient's personal belongings (please select all that are sent with patient):  Glasses    RN SIGNATURE:  Electronically signed by Elle Metz RN on 7/29/22 at 10:56 AM EDT    CASE MANAGEMENT/SOCIAL WORK SECTION    Inpatient Status Date:7/29/22    Readmission Risk Assessment Score:  Readmission Risk              Risk of Unplanned Readmission:  [id number]           Discharging to Facility/ Agency   Name:    [facility] [address]  Address:  Phone: [phone]  Fax: [phone]    [address]  Phone; [phone]    Dialysis Facility (if applicable)   Name:  Address:  Dialysis Schedule:  Phone:  Fax:    / signature: Electronically signed by Kami Borden RN on 7/29/22 at 2:24 PM EDT    PHYSICIAN SECTION    Prognosis: Good    Condition at Discharge: Stable    Rehab Potential (if transferring to Rehab): Good    Recommended Labs or Other Treatments After Discharge: ***    Physician Certification: I certify the above information and transfer of Beacher Lesches  is necessary for the continuing treatment of the diagnosis listed and that she requires Home Care for greater 30 days.      Update Admission H&P: No change in H&P    PHYSICIAN SIGNATURE:  Electronically signed by Bridger Betancur MD on 7/29/22 at 1:53 PM EDT

## 2022-07-29 NOTE — DISCHARGE INSTRUCTIONS
Follow-up with PCP in 1 week   Follow-up with GI in 1 week  Follow-up with Hematology/Oncology as they directed  Per nephrology, decrease daily Lasix dose to 20 mg and do not take NSAID medications  Begin care with [facility] on an outpatient basis to establish palliative support  Begin home physical therapy, regimen per agency

## 2022-07-29 NOTE — PLAN OF CARE
Problem: Discharge Planning  Goal: Discharge to home or other facility with appropriate resources  Outcome: Progressing     Problem: Pain  Goal: Verbalizes/displays adequate comfort level or baseline comfort level  Outcome: Progressing     Problem: Safety - Adult  Goal: Free from fall injury  Outcome: Progressing  Flowsheets (Taken 7/29/2022 0122)  Free From Fall Injury: Instruct family/caregiver on patient safety     Problem: ABCDS Injury Assessment  Goal: Absence of physical injury  Outcome: Progressing  Flowsheets (Taken 7/29/2022 0122)  Absence of Physical Injury: Implement safety measures based on patient assessment

## 2022-07-29 NOTE — PROGRESS NOTES
[facility] [address]   Phone: [phone]    Occupational Therapy    [x] Initial Evaluation            [] Daily Treatment Note         [x] Discharge Summary      Patient: Roshan Guthrie   : 1975   MRN: [id number]   Date of Service:  2022    Admitting Diagnosis:  Symptomatic anemia  Current Admission Summary: 40 y.o. female who presented with complaints of low hemoglobin. Patient apparently was found to have a hemoglobin of 4 outpatient routine testing. Patient was supposed to be direct admitted for IV infusion but unfortunately came to the ER. Patient just feeling generalized fatigue weakness complaining abdominal pain history of liver cirrhosis. Follows GI no bleeding or dark tarry stool, US with small volume ascites. Patient denies any new weight gain. Does have some mild abdominal distention. Positive for UTI and started on Rocephin. Past Medical History:  has a past medical history of Alcoholic liver disease ([facility] [address] Anemia, and History of blood transfusion. Past Surgical History:  has a past surgical history that includes Upper gastrointestinal endoscopy (N/A, 2021); Upper gastrointestinal endoscopy (N/A, 03/10/2021); Tubal ligation; Colonoscopy (N/A, 3/11/2021); Upper gastrointestinal endoscopy (N/A, 2021); Upper gastrointestinal endoscopy (N/A, 2022); Knee Arthroplasty; Upper gastrointestinal endoscopy (N/A, 2022); Upper gastrointestinal endoscopy (N/A, 2022); and CT BIOPSY BONE MARROW (2022). Discharge Recommendations: Roshan Guthrie scored a 19/21 on the -[facility] ADL Inpatient form. At this time, no further OT is recommended upon discharge due to at baseline functional status, no OT needs at this time. Recommend patient returns to prior setting with prior services.        DME Required For Discharge: no DME required at discharge    Precautions/Restrictions: high fall risk  Weight Bearing Restrictions: no restrictions  [] Right Upper Extremity  [] Left Upper Extremity [] Right Lower Extremity  [] Left Lower Extremity     Required Braces/Orthotics: no braces required   [] Right  [] Left  Positional Restrictions:no positional restrictions    Pre-Admission Information   Lives With: daughter ; daughter is pregnant and working on moving out, but is still living there and able to help around the house        Type of Home: apartment- duplex  Home Layout: one level  Home Access: level entry  Bathroom Layout: tub/shower unit  Bathroom Equipment: shower chair  Toilet Height: standard height  Home Equipment: manual wheelchair  Transfer Assistance: Independent without use of device  Ambulation Assistance:Independent without use of device  ADL Assistance: independent with all ADL's  IADL Assistance: independent with homemaking tasks  Active :        [x] Yes                 [] No  Hand Dominance: [] Left                 [x] Right  Current Employment: unemployed  Hobbies: take care of dog, facetiming with friends  Recent Falls: none     Examination  Vision:   Vision Gross Assessment: Impaired and Vision Corrective Device: wears glasses for reading and wears contacts daily  Hearing:   WFL    Posture:   WFL  Sensation:   denies numbness and tingling    Tone:   Normotonic    ROM:   (B) UE AROM WFL  Strength:   (B) UE strength grossly WFL    Decision Making: low complexity  Clinical Presentation: stable      Subjective  General: Patient supine in bed on arrival, agreeable to OT/PT evaluation. Pt  reports feeling tired and weak, completes dressing ADL, but requests therapy to return for further ambulation. Pain: 8/10. Location: abdomen  Pain Interventions: RN notified        Activities of Daily Living  Basic Activities of Daily Living  Lower Extremity Dressing: Independent  Dressing Comments: Attempts to don own jumper, however donned incorrectly and declined to keep it on; IND to don hospital pants.    General Comments: takes several rest status.     Therapy Session Time     Individual Group Co-treatment   Time In    1200   Time Out    1225   Minutes    25        Timed Code Treatment Minutes:   25  Second Session Therapy Time:   Individual Concurrent Group Co-treatment   Time In        1311   Time Out        1319   Minutes        9     Timed Code Treatment Minutes:  8      Total Treatment Minutes:  33       Electronically Signed By: JULI Hunter/DIPAK [id number]

## 2022-07-29 NOTE — DISCHARGE SUMMARY
patient at discharge. Medication List        START taking these medications      lactulose 10 GM/15ML solution  Commonly known as: CHRONULAC  Take 30 mLs by mouth every 3 hours     levoFLOXacin 250 MG tablet  Commonly known as: Levaquin  Take 1 tablet by mouth in the morning for 5 days. rifAXIMin 550 MG tablet  Commonly known as: XIFAXAN  Take 1 tablet by mouth in the morning and 1 tablet before bedtime. CONTINUE taking these medications      furosemide 40 MG tablet  Commonly known as: LASIX  Take 1 tablet by mouth daily     LORazepam 0.5 MG tablet  Commonly known as: ATIVAN     magnesium oxide 400 (240 Mg) MG tablet  Commonly known as: MAG-OX  Take 1 tablet by mouth 2 times daily     nadolol 40 MG tablet  Commonly known as: CORGARD  Take 1 tablet by mouth daily     ondansetron 8 MG tablet  Commonly known as: ZOFRAN     pantoprazole 40 MG tablet  Commonly known as: PROTONIX  Take 1 tablet by mouth every morning (before breakfast)     spironolactone 100 MG tablet  Commonly known as: ALDACTONE  Take 1 tablet by mouth daily     thiamine 100 MG tablet  Take 1 tablet by mouth daily     vitamin B-1 100 MG tablet  Commonly known as: THIAMINE  Take 1 tablet by mouth daily     zolpidem 5 MG tablet  Commonly known as: AMBIEN               Where to Get Your Medications        These medications were sent to [facility], [address] [place] [address], [address] [place] [address] Road      Phone: [phone]   lactulose 10 GM/15ML solution  levoFLOXacin 250 MG tablet  rifAXIMin 550 MG tablet         Discharge recommendations given to patient. Follow Up. pcp in 1 week   Disposition. home  Activity. activity as tolerated  Diet: ADULT DIET; Regular; Low Sodium (2 gm)      Spent 45  minutes in discharge process.     Signed:  Shahla Paiz MD     7/29/2022 10:16 AM

## 2022-07-29 NOTE — PROGRESS NOTES
Pt with 9/10 cramping abd pain & large episode of yellow emesis. Oral care supplies provided. Prn zofran and oxi given per [facility]. Ginger ale & crackers provided. Pt endorses relief from nausea after emesis.

## 2022-07-29 NOTE — CARE COORDINATION
Discharge Plan:   Patient discharged on 7/29/22 to home with skilled hhc services via    [address]  Phone: [phone]  Fax: [phone]    And outpatient Palliative care via   [facility] [address]  Phone; [phone]  All discharge needs met per case management

## 2022-07-29 NOTE — PROGRESS NOTES
Pharmacy still working on pre-authorization for rifaximin. If unable to get, pt can not afford. Dr. Andreea Aquino discussed with pt and nursing, if rifaximin is not financially an option, she can just remain on more cost effective lactulose, aiming for 2-3 loose stools daily. Once pt d/c, if pre authorization is obtained pharmacy can call pt. Case management arranging [facility]. TAZ completed. Left voicemail for pt's daughter letting her know she can be picked up anytime.

## 2022-07-29 NOTE — PROGRESS NOTES
Pt off floor, has a family member hospitalized on 3T and wants to visit him briefly. Her daughter and staff accompanying pt for visit.

## 2022-07-29 NOTE — PROGRESS NOTES
Data- discharge order received, pt and daughter verbalized agreement to discharge, disposition to previous residence, no needs for HHC/DME. Action- discharge instructions prepared and given to pt, pt verbalized understanding. Medication information packet given r/t NEW and/or CHANGED prescriptions emphasizing name/purpose/side effects, pt verbalized understanding. Discharge instruction summary: Diet- reg, Activity- as tolerated, Primary Care Physician as follows: [address] [place] [address] [phone] f/u appointment to be scheduled in one week, prescription medications filled here. 1. WEIGHT: Admit Weight: 130 lb (59 kg) (07/25/22 1431)        Today  Weight: 105 lb 6.1 oz (47.8 kg) (07/29/22 0342)       2. O2 SAT.: SpO2: 96 % (07/29/22 0923)    Response- Pt belongings gathered, IV removed. Disposition is home (no HHC/DME needs), transported with belongings, taken to lobby via w/c w/ staff, no complications.

## 2022-07-29 NOTE — PROGRESS NOTES
Office : [phone]     Fax :[phone]       Nephrology progress  Note      Patient's Name: Asuncion Mata  9:41 AM  7/29/2022    Reason for Consult: EZIO       Requesting Physician:  Farhana Singleton DO      Chief Complaint:    Chief Complaint   Patient presents with    Other     Pt states that she got her blood tested routinely and that her hemoglobin came back at 4 and is complaining of abdominal pain. Pt states that the abdominal pain is in the epigastric area. History of Present iIlness:    Asuncion Mata is a 40 y.o. female with h/o alcoholic liver cirrhosis, anemia who was admitted for profound anemia with HB 4.9 . Has c/o significant weakness. Since admission creatinine has increased from 0.8 ---> 1.2 . No SOB   No edema   Abdomen soft . Interval hx     Feels same   No abdominal pain   Creat 1.1   No peripheral edema           I/O last 3 completed shifts: In: 1014.2 [P.O.:720;  I.V.:193.1; IV Piggyback:101.2]  Out: -     Past Medical History:   Diagnosis Date    Alcoholic liver disease ([facility] [address]     Anemia     History of blood transfusion        Past Surgical History:   Procedure Laterality Date    COLONOSCOPY N/A 3/11/2021    COLONOSCOPY POLYPECTOMY SNARE/COLD BIOPSY performed by Lorene Deluca MD at [address] [place] [address]    CT BONE MARROW BIOPSY  7/5/2022    CT BONE MARROW BIOPSY 7/5/2022 Tim Ruiz MD [facility] CT SCAN    KNEE ARTHROPLASTY      TUBAL LIGATION      ESSURE    UPPER GASTROINTESTINAL ENDOSCOPY N/A 01/30/2021    EGD DIAGNOSTIC ONLY performed by Mayo Cruz MD at [address] [place] [address] N/A 03/10/2021    EGD BIOPSY performed by Lorene Deluca MD at [address] [place] [address] 6/27/2021    EGD BAND LIGATION performed by Claudette Miner, MD at [address] [place] [address] N/A 4/27/2022    EGD DIAGNOSTIC ONLY performed by Matt Ding MD at [address] [place] [address] N/A 7/1/2022    EGD ESOPHAGOGASTRODUODENOSCOPY ULTRASOUND performed by Vinicio Fernandes MD at [address] [place] [address] N/A 7/1/2022    EGD BAND LIGATION performed by Vinicio Fernandes MD at [address] [place] [address]       Family History   Problem Relation Age of Onset    Alcohol Abuse Mother         reports that she has quit smoking. She has never used smokeless tobacco. She reports that she does not currently use alcohol. She reports that she does not use drugs. Allergies:  Patient has no known allergies.     Current Medications:    [START ON 7/30/2022] furosemide (LASIX) tablet 20 mg, Daily  lactulose (CHRONULAC) 10 GM/15ML solution 20 g, Q3H (SCHEDULED)  rifAXIMin (XIFAXAN) tablet 550 mg, BID  oxyCODONE (ROXICODONE) immediate release tablet 5 mg, Q4H PRN  promethazine (PHENERGAN) injection 12.5 mg, Q6H PRN  0.9 % sodium chloride infusion, PRN  cefTRIAXone (ROCEPHIN) 1000 mg in sterile water 10 mL IV syringe, Q24H  LORazepam (ATIVAN) tablet 0.5 mg, Q8H PRN  magnesium oxide (MAG-OX) tablet 400 mg, BID  nadolol (CORGARD) tablet 40 mg, Daily  spironolactone (ALDACTONE) tablet 100 mg, Daily  thiamine mononitrate tablet 100 mg, Daily  zolpidem (AMBIEN) tablet 5 mg, Nightly PRN  sodium chloride flush 0.9 % injection 5-40 mL, 2 times per day  sodium chloride flush 0.9 % injection 5-40 mL, PRN  0.9 % sodium chloride infusion, PRN  ondansetron (ZOFRAN-ODT) disintegrating tablet 4 mg, Q8H PRN   Or  ondansetron (ZOFRAN) injection 4 mg, Q6H PRN  polyethylene glycol (GLYCOLAX) packet 17 g, Daily PRN  pantoprazole (PROTONIX) 40 mg in sodium chloride (PF) 10 mL injection, Q12H  acetaminophen (TYLENOL) tablet 1,000 mg, Q6H PRN      Physical exam:     Vitals:  /72   Pulse 61 Temp 97.8 °F (36.6 °C) (Oral)   Resp 16   Ht 5' 5\" (1.651 m)   Wt 105 lb 6.1 oz (47.8 kg)   SpO2 96%   BMI 17.54 kg/m²   Constitutional:  OAA X3 NAD  Skin: no rash, turgor wnl  Heent:  eomi, mmm  Neck: no bruits or jvd noted  Cardiovascular:  S1, S2 without m/r/g  Respiratory: CTA B without w/r/r  Abdomen:  +bs, soft, nt, nd  Ext: no  lower extremity edema      Labs:  CBC:   Recent Labs     07/26/22  1235 07/27/22  0523 07/28/22  0526   WBC  --  5.4 5.9   HGB 7.8* 7.6* 7.2*   PLT  --  74* 73*     BMP:    Recent Labs     07/27/22  0523 07/28/22  0526 07/29/22 0442   * 136 133*   K 4.5 4.2 4.2   CL 96* 97* 96*   CO2 27 26 27   BUN 43* 48* 45*   CREATININE 1.2* 1.1 0.8   GLUCOSE 239* 142* 169*     Ca/Mg/Phos:   Recent Labs     07/27/22  0523 07/28/22  0526 07/29/22  0442   CALCIUM 9.5 9.7 9.7     Hepatic:   Recent Labs     07/28/22  0526 07/29/22  0442   AST 82* 76*   ALT 31 32   BILITOT 21.2* 20.6*   ALKPHOS 100 103     Troponin: No results for input(s): TROPONINI in the last 72 hours. BNP: No results for input(s): BNP in the last 72 hours. Lipids: No results for input(s): CHOL, TRIG, HDL, LDLCALC, LABVLDL in the last 72 hours. ABGs: No results for input(s): PHART, PO2ART, IOC9GKH in the last 72 hours. INR:   Recent Labs     07/29/22  0442   INR 2.07*     UA:  No results for input(s): COLORU, CLARITYU, GLUCOSEU, BILIRUBINUR, KETUA, SPECGRAV, BLOODU, PHUR, PROTEINU, UROBILINOGEN, NITRU, LEUKOCYTESUR, Terrea Billet in the last 72 hours. Urine Microscopic:   No results for input(s): LABCAST, BACTERIA, COMU, HYALCAST, WBCUA, RBCUA, EPIU in the last 72 hours. Urine Culture:   No results for input(s): LABURIN in the last 72 hours. Urine Chemistry: No results for input(s): Katheryn Sandman, PROTEINUR, NAUR in the last 72 hours. IMAGING:  US ABDOMEN LIMITED Specify organ? LIVER, GALLBLADDER   Final Result   Cirrhosis with ascites. Assessment/Plan :      1.   EZIO   Creat 0.8 ---> 1.2 --> 1.1 ---> 0.8   Likely 2/2 diuresis   Resume lasix at lower dose   Continue spironolactone   No NSaids     2. Hypotension   Resolved     3. Anemia  Etiology not clear   Bone marrow biopsy negative for MDS, lymphoma, leukemia. GI and hematology following     4. Acid- base disorder. Monitor     5. Electrolytes. Mild hyponatremia.  Monitor             D/w primary team      Thank you for allowing us to participate in care of Liam Rivero         Electronically signed by: Monica Kirk MD, 7/29/2022, 9:41 AM      [facility]  Office : [phone]  Fax :[phone]

## 2022-08-03 ENCOUNTER — HOSPITAL ENCOUNTER (OUTPATIENT)
Age: 47
Discharge: HOME | End: 2022-08-03
Attending: STUDENT IN AN ORGANIZED HEALTH CARE EDUCATION/TRAINING PROGRAM | Admitting: STUDENT IN AN ORGANIZED HEALTH CARE EDUCATION/TRAINING PROGRAM
Payer: COMMERCIAL

## 2022-08-03 VITALS
SYSTOLIC BLOOD PRESSURE: 109 MMHG | RESPIRATION RATE: 18 BRPM | OXYGEN SATURATION: 97 % | DIASTOLIC BLOOD PRESSURE: 70 MMHG | TEMPERATURE: 97.9 F | HEART RATE: 77 BPM

## 2022-08-03 PROBLEM — D64.9 ANEMIA: Status: ACTIVE | Noted: 2022-08-03

## 2022-08-03 LAB
ABO/RH: NORMAL
ANTIBODY SCREEN: NORMAL
BLOOD BANK DISPENSE STATUS: NORMAL
BLOOD BANK DISPENSE STATUS: NORMAL
BLOOD BANK PRODUCT CODE: NORMAL
BLOOD BANK PRODUCT CODE: NORMAL
BPU ID: NORMAL
BPU ID: NORMAL
DESCRIPTION BLOOD BANK: NORMAL
DESCRIPTION BLOOD BANK: NORMAL
HCT VFR BLD CALC: 20.1 % (ref 36–48)
HEMOGLOBIN: 7.2 G/DL (ref 12–16)

## 2022-08-03 PROCEDURE — 36415 COLL VENOUS BLD VENIPUNCTURE: CPT

## 2022-08-03 PROCEDURE — 36430 TRANSFUSION BLD/BLD COMPNT: CPT

## 2022-08-03 PROCEDURE — P9016 RBC LEUKOCYTES REDUCED: HCPCS

## 2022-08-03 PROCEDURE — 85014 HEMATOCRIT: CPT

## 2022-08-03 PROCEDURE — 85018 HEMOGLOBIN: CPT

## 2022-08-03 PROCEDURE — 86901 BLOOD TYPING SEROLOGIC RH(D): CPT

## 2022-08-03 PROCEDURE — 86850 RBC ANTIBODY SCREEN: CPT

## 2022-08-03 PROCEDURE — 86900 BLOOD TYPING SEROLOGIC ABO: CPT

## 2022-08-03 PROCEDURE — 86923 COMPATIBILITY TEST ELECTRIC: CPT

## 2022-08-03 RX ORDER — ACETAMINOPHEN 325 MG/1
650 TABLET ORAL SEE ADMIN INSTRUCTIONS
Status: DISCONTINUED | OUTPATIENT
Start: 2022-08-03 | End: 2022-08-03 | Stop reason: HOSPADM

## 2022-08-03 RX ORDER — DIPHENHYDRAMINE HCL 25 MG
25 TABLET ORAL SEE ADMIN INSTRUCTIONS
Status: DISCONTINUED | OUTPATIENT
Start: 2022-08-03 | End: 2022-08-03 | Stop reason: HOSPADM

## 2022-08-03 RX ORDER — SODIUM CHLORIDE 9 MG/ML
INJECTION, SOLUTION INTRAVENOUS PRN
Status: DISCONTINUED | OUTPATIENT
Start: 2022-08-03 | End: 2022-08-03 | Stop reason: HOSPADM

## 2022-08-03 ASSESSMENT — LIFESTYLE VARIABLES
HOW OFTEN DO YOU HAVE A DRINK CONTAINING ALCOHOL: MONTHLY OR LESS
HOW MANY STANDARD DRINKS CONTAINING ALCOHOL DO YOU HAVE ON A TYPICAL DAY: 1 OR 2

## 2022-08-03 NOTE — PLAN OF CARE
Pt alert and oriented. Independent. Appropriate judgement for age bracket. No c/o pain/discomfort voiced at this time. Call light in reach.

## 2022-08-03 NOTE — CARE COORDINATION
CM checked with [facility] and they did not receive any information from last admission and pt is not active with them. CM met with pt and she is declining home care services and states she is independent at home. She asked about palliative care and CM informed her per her last admission and palliative care note on 7/28/22 it states pt and family have met with palliacare and were going to follow up outpt with her. ELIZ provided her number to [facility] palliative care to call again d/t she states they initially sent out hospice which she does not need.       Dario Cueto, RN, BSN  [phone] Urine specimen cup provided. Instructed urine specimen is needed

## 2022-08-04 ENCOUNTER — TELEPHONE (OUTPATIENT)
Dept: INTERNAL MEDICINE CLINIC | Age: 47
End: 2022-08-04

## 2022-08-05 NOTE — TELEPHONE ENCOUNTER
[facility] Transitions Initial Follow Up Call    Outreach made within 2 business days of discharge: Yes    Patient: Xiang Pain Patient : 1975   MRN: [id number]  Reason for Admission: There are no discharge diagnoses documented for the most recent discharge. Discharge Date: 8/3/22       Spoke with: n/a mailbox full    Discharge department/facility:     [facility] Interactive Patient Contact:  Was patient able to fill all prescriptions:   Was patient instructed to bring all medications to the follow-up visit:   Is patient taking all medications as directed in the discharge summary? Does patient understand their discharge instructions:   Does patient have questions or concerns that need addressed prior to 7-14 day follow up office visit:   Scheduled appointment with PCP within 7-14 days    Follow Up  No future appointments.     Ragini Lou

## 2022-08-10 ENCOUNTER — HOSPITAL ENCOUNTER (OUTPATIENT)
Dept: ONCOLOGY | Age: 47
Setting detail: INFUSION SERIES
Discharge: HOME | End: 2022-08-10
Payer: COMMERCIAL

## 2022-08-10 VITALS
RESPIRATION RATE: 16 BRPM | SYSTOLIC BLOOD PRESSURE: 106 MMHG | HEART RATE: 70 BPM | OXYGEN SATURATION: 100 % | TEMPERATURE: 98.4 F | DIASTOLIC BLOOD PRESSURE: 52 MMHG

## 2022-08-10 PROCEDURE — 36430 TRANSFUSION BLD/BLD COMPNT: CPT

## 2022-08-10 PROCEDURE — 86923 COMPATIBILITY TEST ELECTRIC: CPT

## 2022-08-10 PROCEDURE — 86850 RBC ANTIBODY SCREEN: CPT

## 2022-08-10 PROCEDURE — 6370000000 HC RX 637 (ALT 250 FOR IP): Performed by: STUDENT IN AN ORGANIZED HEALTH CARE EDUCATION/TRAINING PROGRAM

## 2022-08-10 PROCEDURE — 2580000003 HC RX 258: Performed by: STUDENT IN AN ORGANIZED HEALTH CARE EDUCATION/TRAINING PROGRAM

## 2022-08-10 PROCEDURE — 86900 BLOOD TYPING SEROLOGIC ABO: CPT

## 2022-08-10 PROCEDURE — 86901 BLOOD TYPING SEROLOGIC RH(D): CPT

## 2022-08-10 PROCEDURE — P9016 RBC LEUKOCYTES REDUCED: HCPCS

## 2022-08-10 PROCEDURE — 99211 OFF/OP EST MAY X REQ PHY/QHP: CPT

## 2022-08-10 RX ORDER — DIPHENHYDRAMINE HCL 25 MG
25 TABLET ORAL SEE ADMIN INSTRUCTIONS
Status: COMPLETED | OUTPATIENT
Start: 2022-08-10 | End: 2022-08-10

## 2022-08-10 RX ORDER — SODIUM CHLORIDE 9 MG/ML
INJECTION, SOLUTION INTRAVENOUS PRN
Status: COMPLETED | OUTPATIENT
Start: 2022-08-10 | End: 2022-08-10

## 2022-08-10 RX ORDER — SODIUM CHLORIDE 0.9 % (FLUSH) 0.9 %
5-40 SYRINGE (ML) INJECTION ONCE
Status: COMPLETED | OUTPATIENT
Start: 2022-08-10 | End: 2022-08-10

## 2022-08-10 RX ORDER — ACETAMINOPHEN 325 MG/1
650 TABLET ORAL SEE ADMIN INSTRUCTIONS
Status: DISCONTINUED | OUTPATIENT
Start: 2022-08-10 | End: 2022-08-11 | Stop reason: HOSPADM

## 2022-08-10 RX ADMIN — SODIUM CHLORIDE: 9 INJECTION, SOLUTION INTRAVENOUS at 10:44

## 2022-08-10 RX ADMIN — Medication 10 ML: at 10:25

## 2022-08-10 RX ADMIN — DIPHENHYDRAMINE HYDROCHLORIDE 25 MG: 25 TABLET ORAL at 10:02

## 2022-08-10 NOTE — PROGRESS NOTES
Pt arrived to infusion center independently with transportation-care source. Pt to receive 1 unit PRBC. Pt is oriented x3. Gait is steady.

## 2022-08-10 NOTE — PROGRESS NOTES
Blood transfusion given per [facility]. Transfusion complete. No signs of an adverse reaction. Reviewed AVS with signs and symptoms of a delayed reaction and when to call Dr. Alfonso Islas denied need for hard copy. All questions answered. Gait is steady though discharged per wheel chair per transport service.

## 2022-08-12 ENCOUNTER — HOSPITAL ENCOUNTER (OUTPATIENT)
Age: 47
Discharge: HOME | End: 2022-08-12
Payer: COMMERCIAL

## 2022-08-12 LAB
A/G RATIO: 1.2 (ref 1.1–2.2)
ALBUMIN SERPL-MCNC: 3.3 G/DL (ref 3.4–5)
ALP BLD-CCNC: 203 U/L (ref 40–129)
ALT SERPL-CCNC: 38 U/L (ref 10–40)
AMMONIA: 67 UMOL/L (ref 11–51)
ANION GAP SERPL CALCULATED.3IONS-SCNC: 12 MMOL/L (ref 3–16)
ANISOCYTOSIS: ABNORMAL
AST SERPL-CCNC: 80 U/L (ref 15–37)
BASOPHILS ABSOLUTE: 0 K/UL (ref 0–0.2)
BASOPHILS RELATIVE PERCENT: 0 %
BILIRUB SERPL-MCNC: 16.5 MG/DL (ref 0–1)
BUN BLDV-MCNC: 37 MG/DL (ref 7–20)
BURR CELLS: ABNORMAL
CALCIUM SERPL-MCNC: 9.2 MG/DL (ref 8.3–10.6)
CHLORIDE BLD-SCNC: 101 MMOL/L (ref 99–110)
CO2: 26 MMOL/L (ref 21–32)
CREAT SERPL-MCNC: 0.9 MG/DL (ref 0.6–1.1)
EOSINOPHILS ABSOLUTE: 0.1 K/UL (ref 0–0.6)
EOSINOPHILS RELATIVE PERCENT: 2 %
GFR AFRICAN AMERICAN: >60
GFR NON-AFRICAN AMERICAN: >60
GLUCOSE BLD-MCNC: 170 MG/DL (ref 70–99)
HCT VFR BLD CALC: 21.8 % (ref 36–48)
HEMOGLOBIN: 7.8 G/DL (ref 12–16)
INR BLD: 2.27 (ref 0.87–1.14)
LYMPHOCYTES ABSOLUTE: 0.7 K/UL (ref 1–5.1)
LYMPHOCYTES RELATIVE PERCENT: 17 %
MACROCYTES: ABNORMAL
MCH RBC QN AUTO: 33.1 PG (ref 26–34)
MCHC RBC AUTO-ENTMCNC: 35.8 G/DL (ref 31–36)
MCV RBC AUTO: 92.3 FL (ref 80–100)
METAMYELOCYTES RELATIVE PERCENT: 1 %
MICROCYTES: ABNORMAL
MONOCYTES ABSOLUTE: 0.2 K/UL (ref 0–1.3)
MONOCYTES RELATIVE PERCENT: 4 %
NEUTROPHILS ABSOLUTE: 3 K/UL (ref 1.7–7.7)
NEUTROPHILS RELATIVE PERCENT: 76 %
PDW BLD-RTO: 25.5 % (ref 12.4–15.4)
PLATELET # BLD: 91 K/UL (ref 135–450)
PLATELET SLIDE REVIEW: ABNORMAL
PMV BLD AUTO: 9.4 FL (ref 5–10.5)
POLYCHROMASIA: ABNORMAL
POTASSIUM SERPL-SCNC: 4.3 MMOL/L (ref 3.5–5.1)
PROTHROMBIN TIME: 25.1 SEC (ref 11.7–14.5)
RBC # BLD: 2.36 M/UL (ref 4–5.2)
SCHISTOCYTES: ABNORMAL
SLIDE REVIEW: ABNORMAL
SODIUM BLD-SCNC: 139 MMOL/L (ref 136–145)
TARGET CELLS: ABNORMAL
TOTAL PROTEIN: 6 G/DL (ref 6.4–8.2)
WBC # BLD: 3.9 K/UL (ref 4–11)

## 2022-08-12 PROCEDURE — 85025 COMPLETE CBC W/AUTO DIFF WBC: CPT

## 2022-08-12 PROCEDURE — 36415 COLL VENOUS BLD VENIPUNCTURE: CPT

## 2022-08-12 PROCEDURE — 80053 COMPREHEN METABOLIC PANEL: CPT

## 2022-08-12 PROCEDURE — 82140 ASSAY OF AMMONIA: CPT

## 2022-08-12 PROCEDURE — 85610 PROTHROMBIN TIME: CPT

## 2022-08-17 ENCOUNTER — HOSPITAL ENCOUNTER (OUTPATIENT)
Dept: ONCOLOGY | Age: 47
Setting detail: INFUSION SERIES
Discharge: HOME | End: 2022-08-17
Payer: COMMERCIAL

## 2022-08-17 VITALS
OXYGEN SATURATION: 97 % | HEART RATE: 72 BPM | SYSTOLIC BLOOD PRESSURE: 107 MMHG | DIASTOLIC BLOOD PRESSURE: 45 MMHG | TEMPERATURE: 98.8 F | RESPIRATION RATE: 16 BRPM

## 2022-08-17 PROCEDURE — 86901 BLOOD TYPING SEROLOGIC RH(D): CPT

## 2022-08-17 PROCEDURE — 86900 BLOOD TYPING SEROLOGIC ABO: CPT

## 2022-08-17 PROCEDURE — 99211 OFF/OP EST MAY X REQ PHY/QHP: CPT

## 2022-08-17 PROCEDURE — P9016 RBC LEUKOCYTES REDUCED: HCPCS

## 2022-08-17 PROCEDURE — 86850 RBC ANTIBODY SCREEN: CPT

## 2022-08-17 PROCEDURE — 6370000000 HC RX 637 (ALT 250 FOR IP): Performed by: STUDENT IN AN ORGANIZED HEALTH CARE EDUCATION/TRAINING PROGRAM

## 2022-08-17 PROCEDURE — 86923 COMPATIBILITY TEST ELECTRIC: CPT

## 2022-08-17 PROCEDURE — 36430 TRANSFUSION BLD/BLD COMPNT: CPT

## 2022-08-17 PROCEDURE — 2580000003 HC RX 258: Performed by: STUDENT IN AN ORGANIZED HEALTH CARE EDUCATION/TRAINING PROGRAM

## 2022-08-17 RX ORDER — ACETAMINOPHEN 325 MG/1
650 TABLET ORAL SEE ADMIN INSTRUCTIONS
Status: DISCONTINUED | OUTPATIENT
Start: 2022-08-17 | End: 2022-08-18 | Stop reason: HOSPADM

## 2022-08-17 RX ORDER — SODIUM CHLORIDE 0.9 % (FLUSH) 0.9 %
5-40 SYRINGE (ML) INJECTION ONCE
Status: COMPLETED | OUTPATIENT
Start: 2022-08-17 | End: 2022-08-17

## 2022-08-17 RX ORDER — DIPHENHYDRAMINE HCL 25 MG
25 TABLET ORAL SEE ADMIN INSTRUCTIONS
Status: COMPLETED | OUTPATIENT
Start: 2022-08-17 | End: 2022-08-17

## 2022-08-17 RX ORDER — ONDANSETRON 4 MG/1
4 TABLET, ORALLY DISINTEGRATING ORAL EVERY 8 HOURS PRN
Status: DISCONTINUED | OUTPATIENT
Start: 2022-08-17 | End: 2022-08-18 | Stop reason: HOSPADM

## 2022-08-17 RX ORDER — SODIUM CHLORIDE 9 MG/ML
INJECTION, SOLUTION INTRAVENOUS PRN
Status: COMPLETED | OUTPATIENT
Start: 2022-08-17 | End: 2022-08-17

## 2022-08-17 RX ADMIN — SODIUM CHLORIDE, PRESERVATIVE FREE 10 ML: 5 INJECTION INTRAVENOUS at 11:07

## 2022-08-17 RX ADMIN — ONDANSETRON 4 MG: 4 TABLET, ORALLY DISINTEGRATING ORAL at 11:11

## 2022-08-17 RX ADMIN — DIPHENHYDRAMINE HYDROCHLORIDE 25 MG: 25 TABLET ORAL at 11:28

## 2022-08-17 RX ADMIN — SODIUM CHLORIDE: 9 INJECTION, SOLUTION INTRAVENOUS at 11:06

## 2022-08-17 NOTE — PROGRESS NOTES
Transfusion complete. VSS. Pt reports feeling \"much better\"; denies nausea. IV removed. Pt up to BR independently. Awaiting transport home.

## 2022-08-17 NOTE — PROGRESS NOTES
Pt reports nausea subsiding. Benadryl pre-med administered. Pt states she does not usually take Tylenol as a pre-med; dose held.

## 2022-08-17 NOTE — PROGRESS NOTES
Pt ambulatory to Infusion Center for PRBC transfusion. Pt reports nausea and vomiting; believes it is secondary to Oxycodone which was started Monday. Last dose was taken @2300. Call placed to Dr. Lars Jensen by AUREA Fried RN. IV access obtained. Lab specimen collected for type and screen. Return call received from Aj Olmstead by AUREA Moran RN. Zofran administered. IVF initiated. Consent obtained.

## 2022-08-25 ENCOUNTER — HOSPITAL ENCOUNTER (INPATIENT)
Age: 47
LOS: 3 days | Discharge: HOME | DRG: 280 | End: 2022-08-28
Attending: EMERGENCY MEDICINE | Admitting: FAMILY MEDICINE
Payer: COMMERCIAL

## 2022-08-25 ENCOUNTER — APPOINTMENT (OUTPATIENT)
Dept: GENERAL RADIOLOGY | Age: 47
DRG: 280 | End: 2022-08-25
Payer: COMMERCIAL

## 2022-08-25 ENCOUNTER — APPOINTMENT (OUTPATIENT)
Dept: CT IMAGING | Age: 47
DRG: 280 | End: 2022-08-25
Payer: COMMERCIAL

## 2022-08-25 DIAGNOSIS — R18.8 OTHER ASCITES: ICD-10-CM

## 2022-08-25 DIAGNOSIS — K76.9 LIVER DISEASE: ICD-10-CM

## 2022-08-25 DIAGNOSIS — D64.9 ANEMIA, UNSPECIFIED TYPE: Primary | ICD-10-CM

## 2022-08-25 PROBLEM — K74.60 CIRRHOSIS OF LIVER WITH ASCITES, UNSPECIFIED HEPATIC CIRRHOSIS TYPE (HCC): Status: ACTIVE | Noted: 2022-08-25

## 2022-08-25 LAB
A/G RATIO: 1.1 (ref 1.1–2.2)
ABO/RH: NORMAL
ALBUMIN SERPL-MCNC: 3.3 G/DL (ref 3.4–5)
ALP BLD-CCNC: 158 U/L (ref 40–129)
ALT SERPL-CCNC: 35 U/L (ref 10–40)
ANION GAP SERPL CALCULATED.3IONS-SCNC: 11 MMOL/L (ref 3–16)
ANISOCYTOSIS: ABNORMAL
ANTIBODY SCREEN: NORMAL
APTT: 48.8 SEC (ref 23–34.3)
AST SERPL-CCNC: 85 U/L (ref 15–37)
BACTERIA: NORMAL /HPF
BANDED NEUTROPHILS RELATIVE PERCENT: 4 % (ref 0–7)
BASOPHILIC STIPPLING: ABNORMAL
BASOPHILS ABSOLUTE: 0 K/UL (ref 0–0.2)
BASOPHILS RELATIVE PERCENT: 0 %
BILIRUB SERPL-MCNC: 19.3 MG/DL (ref 0–1)
BILIRUBIN URINE: ABNORMAL
BLOOD BANK DISPENSE STATUS: NORMAL
BLOOD BANK PRODUCT CODE: NORMAL
BLOOD, URINE: ABNORMAL
BPU ID: NORMAL
BUN BLDV-MCNC: 23 MG/DL (ref 7–20)
BURR CELLS: ABNORMAL
CALCIUM SERPL-MCNC: 9.3 MG/DL (ref 8.3–10.6)
CHLORIDE BLD-SCNC: 99 MMOL/L (ref 99–110)
CLARITY: CLEAR
CO2: 26 MMOL/L (ref 21–32)
COLOR: ABNORMAL
CREAT SERPL-MCNC: 0.7 MG/DL (ref 0.6–1.1)
DESCRIPTION BLOOD BANK: NORMAL
EOSINOPHILS ABSOLUTE: 0.1 K/UL (ref 0–0.6)
EOSINOPHILS RELATIVE PERCENT: 2 %
EPITHELIAL CELLS, UA: 4 /HPF (ref 0–5)
GFR AFRICAN AMERICAN: >60
GFR NON-AFRICAN AMERICAN: >60
GLUCOSE BLD-MCNC: 190 MG/DL (ref 70–99)
GLUCOSE URINE: ABNORMAL MG/DL
HCG QUALITATIVE: NEGATIVE
HCT VFR BLD CALC: 18.1 % (ref 36–48)
HCT VFR BLD CALC: 21.3 % (ref 36–48)
HEMOGLOBIN: 6.5 G/DL (ref 12–16)
HEMOGLOBIN: 7.4 G/DL (ref 12–16)
HYALINE CASTS: 0 /LPF (ref 0–8)
HYPOCHROMIA: ABNORMAL
INR BLD: 2.23 (ref 0.87–1.14)
KETONES, URINE: ABNORMAL MG/DL
LACTIC ACID, SEPSIS: 1.9 MMOL/L (ref 0.4–1.9)
LACTIC ACID, SEPSIS: 2.2 MMOL/L (ref 0.4–1.9)
LEUKOCYTE ESTERASE, URINE: ABNORMAL
LIPASE: 33 U/L (ref 13–60)
LYMPHOCYTES ABSOLUTE: 0.3 K/UL (ref 1–5.1)
LYMPHOCYTES RELATIVE PERCENT: 6 %
MACROCYTES: ABNORMAL
MCH RBC QN AUTO: 35.3 PG (ref 26–34)
MCHC RBC AUTO-ENTMCNC: 35.8 G/DL (ref 31–36)
MCV RBC AUTO: 98.8 FL (ref 80–100)
MICROCYTES: ABNORMAL
MICROSCOPIC EXAMINATION: YES
MONOCYTES ABSOLUTE: 0.2 K/UL (ref 0–1.3)
MONOCYTES RELATIVE PERCENT: 4 %
NEUTROPHILS ABSOLUTE: 3.7 K/UL (ref 1.7–7.7)
NEUTROPHILS RELATIVE PERCENT: 84 %
NITRITE, URINE: ABNORMAL
NUCLEATED RED BLOOD CELLS: 1 /100 WBC
OVALOCYTES: ABNORMAL
PDW BLD-RTO: 23.2 % (ref 12.4–15.4)
PH UA: ABNORMAL (ref 5–8)
PLATELET # BLD: 82 K/UL (ref 135–450)
PLATELET SLIDE REVIEW: ABNORMAL
PMV BLD AUTO: 9.6 FL (ref 5–10.5)
POLYCHROMASIA: ABNORMAL
POTASSIUM REFLEX MAGNESIUM: 3.9 MMOL/L (ref 3.5–5.1)
PROCALCITONIN: 0.34 NG/ML (ref 0–0.15)
PROTEIN UA: ABNORMAL MG/DL
PROTHROMBIN TIME: 24.8 SEC (ref 11.7–14.5)
RBC # BLD: 1.83 M/UL (ref 4–5.2)
RBC UA: 1 /HPF (ref 0–4)
SCHISTOCYTES: ABNORMAL
SLIDE REVIEW: ABNORMAL
SODIUM BLD-SCNC: 136 MMOL/L (ref 136–145)
SPECIFIC GRAVITY UA: ABNORMAL (ref 1–1.03)
TARGET CELLS: ABNORMAL
TEAR DROP CELLS: ABNORMAL
TOTAL PROTEIN: 6.3 G/DL (ref 6.4–8.2)
URINE REFLEX TO CULTURE: ABNORMAL
URINE TYPE: ABNORMAL
UROBILINOGEN, URINE: ABNORMAL E.U./DL
WBC # BLD: 4.2 K/UL (ref 4–11)
WBC UA: 2 /HPF (ref 0–5)

## 2022-08-25 PROCEDURE — P9016 RBC LEUKOCYTES REDUCED: HCPCS

## 2022-08-25 PROCEDURE — 85018 HEMOGLOBIN: CPT

## 2022-08-25 PROCEDURE — 71045 X-RAY EXAM CHEST 1 VIEW: CPT

## 2022-08-25 PROCEDURE — 86901 BLOOD TYPING SEROLOGIC RH(D): CPT

## 2022-08-25 PROCEDURE — 84703 CHORIONIC GONADOTROPIN ASSAY: CPT

## 2022-08-25 PROCEDURE — 36430 TRANSFUSION BLD/BLD COMPNT: CPT

## 2022-08-25 PROCEDURE — 86923 COMPATIBILITY TEST ELECTRIC: CPT

## 2022-08-25 PROCEDURE — 1200000000 HC SEMI PRIVATE

## 2022-08-25 PROCEDURE — 6370000000 HC RX 637 (ALT 250 FOR IP): Performed by: PHYSICIAN ASSISTANT

## 2022-08-25 PROCEDURE — 6370000000 HC RX 637 (ALT 250 FOR IP): Performed by: FAMILY MEDICINE

## 2022-08-25 PROCEDURE — 83605 ASSAY OF LACTIC ACID: CPT

## 2022-08-25 PROCEDURE — 84145 PROCALCITONIN (PCT): CPT

## 2022-08-25 PROCEDURE — 96375 TX/PRO/DX INJ NEW DRUG ADDON: CPT

## 2022-08-25 PROCEDURE — 81001 URINALYSIS AUTO W/SCOPE: CPT

## 2022-08-25 PROCEDURE — 85014 HEMATOCRIT: CPT

## 2022-08-25 PROCEDURE — 99285 EMERGENCY DEPT VISIT HI MDM: CPT

## 2022-08-25 PROCEDURE — 86850 RBC ANTIBODY SCREEN: CPT

## 2022-08-25 PROCEDURE — 85025 COMPLETE CBC W/AUTO DIFF WBC: CPT

## 2022-08-25 PROCEDURE — 80053 COMPREHEN METABOLIC PANEL: CPT

## 2022-08-25 PROCEDURE — 85610 PROTHROMBIN TIME: CPT

## 2022-08-25 PROCEDURE — 96374 THER/PROPH/DIAG INJ IV PUSH: CPT

## 2022-08-25 PROCEDURE — 85730 THROMBOPLASTIN TIME PARTIAL: CPT

## 2022-08-25 PROCEDURE — 36415 COLL VENOUS BLD VENIPUNCTURE: CPT

## 2022-08-25 PROCEDURE — 87040 BLOOD CULTURE FOR BACTERIA: CPT

## 2022-08-25 PROCEDURE — 74176 CT ABD & PELVIS W/O CONTRAST: CPT

## 2022-08-25 PROCEDURE — 93005 ELECTROCARDIOGRAM TRACING: CPT | Performed by: PHYSICIAN ASSISTANT

## 2022-08-25 PROCEDURE — 2580000003 HC RX 258: Performed by: PHYSICIAN ASSISTANT

## 2022-08-25 PROCEDURE — 83690 ASSAY OF LIPASE: CPT

## 2022-08-25 PROCEDURE — 86900 BLOOD TYPING SEROLOGIC ABO: CPT

## 2022-08-25 PROCEDURE — 6360000002 HC RX W HCPCS: Performed by: PHYSICIAN ASSISTANT

## 2022-08-25 RX ORDER — ZOLPIDEM TARTRATE 5 MG/1
5 TABLET ORAL NIGHTLY PRN
Status: DISCONTINUED | OUTPATIENT
Start: 2022-08-25 | End: 2022-08-28 | Stop reason: HOSPADM

## 2022-08-25 RX ORDER — GAUZE BANDAGE 2" X 2"
100 BANDAGE TOPICAL DAILY
Status: DISCONTINUED | OUTPATIENT
Start: 2022-08-26 | End: 2022-08-28 | Stop reason: HOSPADM

## 2022-08-25 RX ORDER — NADOLOL 20 MG/1
40 TABLET ORAL NIGHTLY
Status: DISCONTINUED | OUTPATIENT
Start: 2022-08-25 | End: 2022-08-28 | Stop reason: HOSPADM

## 2022-08-25 RX ORDER — SODIUM CHLORIDE 0.9 % (FLUSH) 0.9 %
5-40 SYRINGE (ML) INJECTION EVERY 12 HOURS SCHEDULED
Status: DISCONTINUED | OUTPATIENT
Start: 2022-08-25 | End: 2022-08-28 | Stop reason: HOSPADM

## 2022-08-25 RX ORDER — POLYETHYLENE GLYCOL 3350 17 G/17G
17 POWDER, FOR SOLUTION ORAL DAILY PRN
Status: DISCONTINUED | OUTPATIENT
Start: 2022-08-25 | End: 2022-08-28 | Stop reason: HOSPADM

## 2022-08-25 RX ORDER — ONDANSETRON 2 MG/ML
4 INJECTION INTRAMUSCULAR; INTRAVENOUS EVERY 6 HOURS PRN
Status: DISCONTINUED | OUTPATIENT
Start: 2022-08-25 | End: 2022-08-28 | Stop reason: HOSPADM

## 2022-08-25 RX ORDER — ONDANSETRON 2 MG/ML
4 INJECTION INTRAMUSCULAR; INTRAVENOUS ONCE
Status: COMPLETED | OUTPATIENT
Start: 2022-08-25 | End: 2022-08-25

## 2022-08-25 RX ORDER — ACETAMINOPHEN 650 MG/1
650 SUPPOSITORY RECTAL EVERY 6 HOURS PRN
Status: DISCONTINUED | OUTPATIENT
Start: 2022-08-25 | End: 2022-08-25

## 2022-08-25 RX ORDER — LORAZEPAM 0.5 MG/1
0.5 TABLET ORAL EVERY 8 HOURS PRN
Status: DISCONTINUED | OUTPATIENT
Start: 2022-08-25 | End: 2022-08-28 | Stop reason: HOSPADM

## 2022-08-25 RX ORDER — LANOLIN ALCOHOL/MO/W.PET/CERES
400 CREAM (GRAM) TOPICAL 2 TIMES DAILY
Status: DISCONTINUED | OUTPATIENT
Start: 2022-08-25 | End: 2022-08-28 | Stop reason: HOSPADM

## 2022-08-25 RX ORDER — LACTULOSE 10 G/15ML
20 SOLUTION ORAL
Status: DISCONTINUED | OUTPATIENT
Start: 2022-08-25 | End: 2022-08-26

## 2022-08-25 RX ORDER — TRAMADOL HYDROCHLORIDE 50 MG/1
50 TABLET ORAL EVERY 6 HOURS PRN
Status: DISCONTINUED | OUTPATIENT
Start: 2022-08-25 | End: 2022-08-28 | Stop reason: HOSPADM

## 2022-08-25 RX ORDER — ONDANSETRON 4 MG/1
4 TABLET, ORALLY DISINTEGRATING ORAL EVERY 8 HOURS PRN
Status: DISCONTINUED | OUTPATIENT
Start: 2022-08-25 | End: 2022-08-28 | Stop reason: HOSPADM

## 2022-08-25 RX ORDER — SODIUM CHLORIDE 0.9 % (FLUSH) 0.9 %
5-40 SYRINGE (ML) INJECTION PRN
Status: DISCONTINUED | OUTPATIENT
Start: 2022-08-25 | End: 2022-08-28 | Stop reason: HOSPADM

## 2022-08-25 RX ORDER — SPIRONOLACTONE 25 MG/1
100 TABLET ORAL DAILY
Status: DISCONTINUED | OUTPATIENT
Start: 2022-08-26 | End: 2022-08-28 | Stop reason: HOSPADM

## 2022-08-25 RX ORDER — LORAZEPAM 0.5 MG/1
0.5 TABLET ORAL ONCE
Status: COMPLETED | OUTPATIENT
Start: 2022-08-25 | End: 2022-08-25

## 2022-08-25 RX ORDER — PANTOPRAZOLE SODIUM 40 MG/1
40 TABLET, DELAYED RELEASE ORAL
Status: DISCONTINUED | OUTPATIENT
Start: 2022-08-26 | End: 2022-08-28 | Stop reason: HOSPADM

## 2022-08-25 RX ORDER — FENTANYL CITRATE 50 UG/ML
50 INJECTION, SOLUTION INTRAMUSCULAR; INTRAVENOUS ONCE
Status: COMPLETED | OUTPATIENT
Start: 2022-08-25 | End: 2022-08-25

## 2022-08-25 RX ORDER — SODIUM CHLORIDE 9 MG/ML
INJECTION, SOLUTION INTRAVENOUS PRN
Status: DISCONTINUED | OUTPATIENT
Start: 2022-08-25 | End: 2022-08-28 | Stop reason: HOSPADM

## 2022-08-25 RX ORDER — 0.9 % SODIUM CHLORIDE 0.9 %
500 INTRAVENOUS SOLUTION INTRAVENOUS ONCE
Status: COMPLETED | OUTPATIENT
Start: 2022-08-25 | End: 2022-08-25

## 2022-08-25 RX ORDER — FUROSEMIDE 20 MG/1
20 TABLET ORAL DAILY
Status: DISCONTINUED | OUTPATIENT
Start: 2022-08-26 | End: 2022-08-28 | Stop reason: HOSPADM

## 2022-08-25 RX ORDER — ACETAMINOPHEN 325 MG/1
650 TABLET ORAL EVERY 6 HOURS PRN
Status: DISCONTINUED | OUTPATIENT
Start: 2022-08-25 | End: 2022-08-25

## 2022-08-25 RX ADMIN — LORAZEPAM 0.5 MG: 0.5 TABLET ORAL at 21:00

## 2022-08-25 RX ADMIN — LACTULOSE 20 G: 20 SOLUTION ORAL at 20:57

## 2022-08-25 RX ADMIN — LORAZEPAM 0.5 MG: 0.5 TABLET ORAL at 14:49

## 2022-08-25 RX ADMIN — ZOLPIDEM TARTRATE 5 MG: 5 TABLET ORAL at 21:00

## 2022-08-25 RX ADMIN — SODIUM CHLORIDE 500 ML: 9 INJECTION, SOLUTION INTRAVENOUS at 13:00

## 2022-08-25 RX ADMIN — FENTANYL CITRATE 50 MCG: 50 INJECTION, SOLUTION INTRAMUSCULAR; INTRAVENOUS at 13:00

## 2022-08-25 RX ADMIN — TRAMADOL HYDROCHLORIDE 50 MG: 50 TABLET, COATED ORAL at 20:57

## 2022-08-25 RX ADMIN — RIFAXIMIN 550 MG: 550 TABLET ORAL at 20:57

## 2022-08-25 RX ADMIN — Medication 400 MG: at 20:57

## 2022-08-25 RX ADMIN — ONDANSETRON 4 MG: 2 INJECTION INTRAMUSCULAR; INTRAVENOUS at 13:00

## 2022-08-25 RX ADMIN — NADOLOL 40 MG: 20 TABLET ORAL at 20:57

## 2022-08-25 ASSESSMENT — PAIN - FUNCTIONAL ASSESSMENT: PAIN_FUNCTIONAL_ASSESSMENT: 0-10

## 2022-08-25 ASSESSMENT — PAIN SCALES - GENERAL
PAINLEVEL_OUTOF10: 9
PAINLEVEL_OUTOF10: 9
PAINLEVEL_OUTOF10: 7
PAINLEVEL_OUTOF10: 0

## 2022-08-25 ASSESSMENT — ENCOUNTER SYMPTOMS
ABDOMINAL DISTENTION: 1
VOMITING: 0
ABDOMINAL PAIN: 1
CONSTIPATION: 0
RESPIRATORY NEGATIVE: 1
BACK PAIN: 0
SHORTNESS OF BREATH: 0
NAUSEA: 0
CHEST TIGHTNESS: 0
COUGH: 0
COLOR CHANGE: 0
DIARRHEA: 0

## 2022-08-25 ASSESSMENT — PAIN DESCRIPTION - DESCRIPTORS: DESCRIPTORS: ACHING

## 2022-08-25 ASSESSMENT — PAIN DESCRIPTION - ORIENTATION: ORIENTATION: LEFT;LOWER

## 2022-08-25 ASSESSMENT — PAIN DESCRIPTION - LOCATION
LOCATION: ABDOMEN

## 2022-08-25 ASSESSMENT — LIFESTYLE VARIABLES: HOW OFTEN DO YOU HAVE A DRINK CONTAINING ALCOHOL: NEVER

## 2022-08-25 NOTE — ED NOTES
Report given to [facility] RN.       Zina Renee, RN  08/25/22 Elyssa Marino

## 2022-08-25 NOTE — ED PROVIDER NOTES
[facility] [facility]        Pt Name: Geri Cohen  MRN: [id number]  Meggffelicitas 1975  Date of evaluation: 8/25/2022  Provider: SCOTTIE Rowe  PCP: Paul Osuna DO  Note Started: 4:04 PM EDT        I have seen and evaluated this patient with my supervising physician Sam Calabrese MD.    [address] [place] [address]       Chief Complaint   Patient presents with    Abdominal Pain     Pt sent by chemo doctor d/t abdominal pain and disctention, abdominal \"tightness\". Skin color noted to be yellow in triage. Hx end stage liver disease. Oncology called reports and states hgb 6.8 but wants to manage outpatient       HISTORY OF PRESENT ILLNESS   (Location, Timing/Onset, Context/Setting, Quality, Duration, Modifying Factors, Severity, Associated Signs and Symptoms)  Note limiting factors. Chief Complaint: Kael Ruiz is a 40 y.o. female with past medical history of anemia with frequent blood transfusions and alcoholic liver disease reported sober over 2 years and currently on transplant list who presents to the ED at the request of hematology for evaluation of abdominal stanchion and need for paracentesis. Patient had routine appointment earlier today and complained of increasing abdominal distention and pain that she describes as a aching rated 7/10. Was felt to have ascites secondary to her liver disease and sent to the ED for concern for need for paracentesis. She was also told that she was anemic. She has had anemia in the past and states he is required multiple blood transfusions. Hematology did call the emergency department states they could arrange for blood transfusion outpatient but felt patient would need admission for paracentesis. Patient denies any chest pain, shortness of breath, nausea/vomiting, urinary symptoms or changes in bowel movements. Denies fever or chills. Denies rashes or lesions.   Denies lightheadedness or dizziness. Nursing Notes were all reviewed and agreed with or any disagreements were addressed in the HPI. REVIEW OF SYSTEMS    (2-9 systems for level 4, 10 or more for level 5)     Review of Systems   Constitutional:  Negative for activity change, appetite change, chills, diaphoresis, fatigue and fever. Respiratory: Negative. Negative for cough, chest tightness and shortness of breath. Cardiovascular: Negative. Negative for chest pain, palpitations and leg swelling. Gastrointestinal:  Positive for abdominal distention and abdominal pain. Negative for constipation, diarrhea, nausea and vomiting. Genitourinary:  Negative for decreased urine volume, difficulty urinating, dysuria, flank pain, frequency, hematuria and urgency. Musculoskeletal:  Negative for arthralgias, back pain, myalgias, neck pain and neck stiffness. Skin:  Negative for color change, pallor, rash and wound. Neurological:  Negative for dizziness, weakness, light-headedness, numbness and headaches. Positives and Pertinent negatives as per HPI. Except as noted above in the ROS, all other systems were reviewed and negative.        PAST MEDICAL HISTORY     Past Medical History:   Diagnosis Date    Alcoholic liver disease ([facility] [address] [facility]     Anemia     History of blood transfusion          SURGICAL HISTORY     Past Surgical History:   Procedure Laterality Date    COLONOSCOPY N/A 3/11/2021    COLONOSCOPY POLYPECTOMY SNARE/COLD BIOPSY performed by Aiden Gomez MD at [address] [place] [address]    CT BONE MARROW BIOPSY  7/5/2022    CT BONE MARROW BIOPSY 7/5/2022 Andrzej Knight MD [facility] CT SCAN    KNEE ARTHROPLASTY      TUBAL LIGATION      ESSURE    UPPER GASTROINTESTINAL ENDOSCOPY N/A 01/30/2021    EGD DIAGNOSTIC ONLY performed by Gabriel Hyatt MD at [address] [place] [address] N/A 03/10/2021    EGD BIOPSY performed by Aiden Gomez MD at [address] [place] [address] N/A 6/27/2021    EGD BAND LIGATION performed by Hina Crum MD at [address] [place] [address] N/A 4/27/2022    EGD DIAGNOSTIC ONLY performed by Suyapa Medina MD at [address] [place] [address] 7/1/2022    EGD ESOPHAGOGASTRODUODENOSCOPY ULTRASOUND performed by Daniel Butler MD at [address] [place] [address] N/A 7/1/2022    EGD BAND LIGATION performed by Daniel Butler MD at [phone]       Previous Medications    FUROSEMIDE (LASIX) 20 MG TABLET    Take 1 tablet by mouth in the morning. LACTULOSE (CHRONULAC) 10 GM/15ML SOLUTION    Take 30 mLs by mouth every 3 hours    LORAZEPAM (ATIVAN) 0.5 MG TABLET    Take 0.5 mg by mouth every 8 hours as needed for Anxiety. MAGNESIUM OXIDE (MAG-OX) 400 (240 MG) MG TABLET    Take 1 tablet by mouth 2 times daily    NADOLOL (CORGARD) 40 MG TABLET    Take 1 tablet by mouth daily    ONDANSETRON (ZOFRAN) 8 MG TABLET    Take 1 tablet by mouth    OXYCODONE HCL (OXYCONTIN PO)    Take by mouth    PANTOPRAZOLE (PROTONIX) 40 MG TABLET    Take 1 tablet by mouth every morning (before breakfast)    RIFAXIMIN (XIFAXAN) 550 MG TABLET    Take 1 tablet by mouth in the morning and 1 tablet before bedtime. SPIRONOLACTONE (ALDACTONE) 100 MG TABLET    Take 1 tablet by mouth daily    THIAMINE 100 MG TABLET    Take 1 tablet by mouth daily    THIAMINE MONONITRATE (THIAMINE) 100 MG TABLET    Take 1 tablet by mouth daily    ZOLPIDEM (AMBIEN) 5 MG TABLET    Take 1 tablet by mouth.          ALLERGIES     Oxycodone    FAMILYHISTORY       Family History   Problem Relation Age of Onset    Alcohol Abuse Father           SOCIAL HISTORY       Social History     Tobacco Use    Smoking status: Former    Smokeless tobacco: Never   Vaping Use    Vaping Use: Never used   Substance Use Topics    Alcohol use: Not Currently    Drug use: Never       SCREENINGS    [place] Coma Scale  Eye Opening: Spontaneous  Best Verbal Response: Oriented  Best Motor Response: Obeys commands  Mirela Coma Scale Score: 15        PHYSICAL EXAM    (up to 7 for level 4, 8 or more for level 5)     ED Triage Vitals [08/25/22 1151]   BP Temp Temp Source Heart Rate Resp SpO2 Height Weight   105/65 98.2 °F (36.8 °C) Oral 76 16 99 % -- 118 lb 8 oz (53.8 kg)       Physical Exam  Constitutional:       General: She is not in acute distress. Appearance: She is well-developed. She is not ill-appearing, toxic-appearing or diaphoretic. HENT:      Head: Normocephalic and atraumatic. Right Ear: External ear normal.      Left Ear: External ear normal.   Eyes:      General: Scleral icterus present. Right eye: No discharge. Left eye: No discharge. Conjunctiva/sclera: Conjunctivae normal.   Cardiovascular:      Rate and Rhythm: Normal rate and regular rhythm. Pulses: Normal pulses. Heart sounds: Normal heart sounds. No murmur heard. No friction rub. No gallop. Comments: 2+ radial pulses bilaterally. No pedal edema. No calf tenderness. No JVD. Pulmonary:      Effort: Pulmonary effort is normal. No respiratory distress. Breath sounds: Normal breath sounds. No stridor. No wheezing, rhonchi or rales. Chest:      Chest wall: No tenderness. Abdominal:      General: Abdomen is flat. Bowel sounds are normal. There is distension. Palpations: Abdomen is soft. There is no mass. Tenderness: There is generalized abdominal tenderness. There is no right CVA tenderness, left CVA tenderness, guarding or rebound. Negative signs include Armenta's sign, Rovsing's sign and McBurney's sign. Hernia: No hernia is present. Musculoskeletal:         General: Normal range of motion. Cervical back: Normal range of motion and neck supple. No rigidity or tenderness. Lymphadenopathy:      Cervical: No cervical adenopathy. Skin:     General: Skin is warm and dry. Coloration: Skin is jaundiced.  Skin is not pale.      Findings: No erythema or rash. Neurological:      Mental Status: She is alert and oriented to person, place, and time.    Psychiatric:         Behavior: Behavior normal.       DIAGNOSTIC RESULTS   LABS:    Labs Reviewed   CBC WITH AUTO DIFFERENTIAL - Abnormal; Notable for the following components:       Result Value    RBC 1.83 (*)     Hemoglobin 6.5 (*)     Hematocrit 18.1 (*)     MCH 35.3 (*)     RDW 23.2 (*)     Platelets 82 (*)     Lymphocytes Absolute 0.3 (*)     nRBC 1 (*)     Anisocytosis 1+ (*)     Macrocytes Occasional (*)     Microcytes Occasional (*)     Polychromasia Occasional (*)     Hypochromia Occasional (*)     Schistocytes Occasional (*)     [place] Cells 1+ (*)     Ovalocytes Occasional (*)     Target Cells Occasional (*)     Tear Drop Cells Occasional (*)     Basophilic Stippling Occasional (*)     All other components within normal limits    Narrative:     CALL  [facility] tel. [phone],  Hematology results called to and read back by Dameon Pires, 08/25/2022  14:04, by [facility]   COMPREHENSIVE METABOLIC PANEL W/ REFLEX TO MG FOR LOW K - Abnormal; Notable for the following components:    Glucose 190 (*)     BUN 23 (*)     Total Protein 6.3 (*)     Albumin 3.3 (*)     Total Bilirubin 19.3 (*)     Alkaline Phosphatase 158 (*)     AST 85 (*)     All other components within normal limits   LACTATE, SEPSIS - Abnormal; Notable for the following components:    Lactic Acid, Sepsis 2.2 (*)     All other components within normal limits   PROCALCITONIN - Abnormal; Notable for the following components:    Procalcitonin 0.34 (*)     All other components within normal limits   PROTIME-INR - Abnormal; Notable for the following components:    Protime 24.8 (*)     INR 2.23 (*)     All other components within normal limits   APTT - Abnormal; Notable for the following components:    aPTT 48.8 (*)     All other components within normal limits   CULTURE, BLOOD 1   CULTURE, BLOOD 2   C DIFF TOXIN/ANTIGEN LACTATE, SEPSIS   LIPASE   HCG, SERUM, QUALITATIVE   URINALYSIS WITH REFLEX TO CULTURE   TYPE AND SCREEN   PREPARE RBC (CROSSMATCH)       When ordered only abnormal lab results are displayed. All other labs were within normal range or not returned as of this dictation. EKG: When ordered, EKG's are interpreted by the Emergency Department Physician in the absence of a cardiologist.  Please see their note for interpretation of EKG. RADIOLOGY:   Non-plain film images such as CT, Ultrasound and MRI are read by the radiologist. Plain radiographic images are visualized and preliminarily interpreted by the ED Provider with the below findings:        Interpretation per the Radiologist below, if available at the time of this note:    CT ABDOMEN PELVIS WO CONTRAST Additional Contrast? None   Final Result   1. Redemonstration of cirrhosis with evidence of portal hypertension   including splenomegaly, moderate ascites with third-spacing and varices. 2. Heterogeneity of the hepatic parenchyma with increased attenuation   surrounding the portal triads. Evaluation is limited by the lack of   intravenous contrast.  If there is concern for underlying neoplasm, consider   follow-up MRI. 3. Cholelithiasis with no acute features. 4. Nonobstructive right nephrolithiasis. No evidence of obstructive uropathy. XR CHEST PORTABLE   Final Result   Linear atelectasis at the left lung base. Otherwise no acute cardiopulmonary   disease. CT ABDOMEN PELVIS WO CONTRAST Additional Contrast? None    Result Date: 8/25/2022  EXAMINATION: CT OF THE ABDOMEN AND PELVIS WITHOUT CONTRAST 8/25/2022 2:10 pm TECHNIQUE: CT of the abdomen and pelvis was performed without the administration of intravenous contrast. Multiplanar reformatted images are provided for review.  Automated exposure control, iterative reconstruction, and/or weight based adjustment of the mA/kV was utilized to reduce the radiation dose to as low as reasonably achievable. COMPARISON: 04/26/2022. HISTORY: ORDERING SYSTEM PROVIDED HISTORY: ascities TECHNOLOGIST PROVIDED HISTORY: Reason for exam:->ascities Additional Contrast?->None Decision Support Exception - unselect if not a suspected or confirmed emergency medical condition->Emergency Medical Condition (MA) Is the patient pregnant?->No Reason for Exam: Abdominal Pain (Pt sent by chemo doctor d/t abdominal pain and disctention, abdominal \"tightness\". Skin color noted to be yellow in triage. Hx end stage liver disease. Oncology called reports and states hgb 6.8 but wants to manage outpatient) FINDINGS: Lower Chest: Mild interlobular septal thickening at the lung bases with dependent bibasilar atelectasis. No significant pleural fluid. Organs: Heterogeneity of the hepatic parenchyma. Increased attenuation surrounding the portal triads is more prevalent than on previous imaging. The liver is small in size. Cholelithiasis with mild gallbladder distension. There is redemonstration of splenomegaly. The pancreas and adrenal glands are unremarkable. There is no evidence of obstructive uropathy. Nonobstructive right nephrolithiasis. GI/Bowel: The stomach and duodenal sweep are unremarkable. No small bowel distension. Mild retained stool throughout the colon. Pelvis: Moderate amount of pelvic ascites. The uterus and adnexal structures are unremarkable. Bilateral Essure coils are in place. There is partial distention of the urinary bladder. Peritoneum/Retroperitoneum: Moderate upper abdominal ascites. Edematous changes are present throughout the abdomen and pelvic fat. The abdominal aorta is normal in caliber with mild calcified plaque. Nodularity in the retroperitoneum in the upper abdomen suggesting varices without significant interval change. The umbilical vein is mildly enlarged. Bones/Soft Tissues: No acute osseous or soft tissue abnormality. Degenerative disc disease at L4-5.   T8 compression fracture (!) 107/51 (!) 106/51   Pulse:   70 69   Resp:   16 16   Temp:   98 °F (36.7 °C)    TempSrc:   Oral    SpO2: 98%  97% 96%   Weight:           Patient was given the following medications:  Medications   0.9 % sodium chloride infusion (has no administration in time range)   fentaNYL (SUBLIMAZE) injection 50 mcg (50 mcg IntraVENous Given 8/25/22 1300)   ondansetron (ZOFRAN) injection 4 mg (4 mg IntraVENous Given 8/25/22 1300)   0.9 % sodium chloride bolus (0 mLs IntraVENous Stopped 8/25/22 1458)   LORazepam (ATIVAN) tablet 0.5 mg (0.5 mg Oral Given 8/25/22 1449)         Is this patient to be included in the SEP-1 Core Measure due to severe sepsis or septic shock? No   Exclusion criteria - the patient is NOT to be included for SEP-1 Core Measure due to: Infection is not suspected    Patient is a 81-year-old female who presents to the ED with abdominal distention and abdominal pain. She appears to have some distention and concern for ascites on clinical examination. Has history of alcoholic liver disease. She states she has been sober for over 2 years and currently on the transplant list.  Sent to the ED for evaluation for need for paracentesis. IV established and blood work obtained. CBC showed normal white count with hemoglobin of 6.5 and platelets of 82. She is required multiple blood transfusion in the past and ordered red blood cell transfusion here in the ED. Discussed risks and benefits of blood transfusion here in the ED and consent was obtained. I have discussed with the patient the rationale for blood component transfusion; its benefits in treating or preventing fatigue, organ damage, or death; and its risk which includes mild transfusion reactions, rare risk of blood borne infection, or more serious but rare reactions. I have discussed the alternatives to transfusion, including the risk and consequences of not receiving transfusion.  The patient had an opportunity to ask questions and had agreed to

## 2022-08-26 ENCOUNTER — APPOINTMENT (OUTPATIENT)
Dept: INTERVENTIONAL RADIOLOGY/VASCULAR | Age: 47
DRG: 280 | End: 2022-08-26
Payer: COMMERCIAL

## 2022-08-26 LAB
ALBUMIN FLUID: 0.5 G/DL
ANION GAP SERPL CALCULATED.3IONS-SCNC: 9 MMOL/L (ref 3–16)
APPEARANCE FLUID: CLEAR
BUN BLDV-MCNC: 19 MG/DL (ref 7–20)
CALCIUM SERPL-MCNC: 8.7 MG/DL (ref 8.3–10.6)
CELL COUNT FLUID TYPE: NORMAL
CHLORIDE BLD-SCNC: 108 MMOL/L (ref 99–110)
CLOT EVALUATION: NORMAL
CO2: 24 MMOL/L (ref 21–32)
COLOR FLUID: YELLOW
CREAT SERPL-MCNC: 0.6 MG/DL (ref 0.6–1.1)
EKG ATRIAL RATE: 71 BPM
EKG DIAGNOSIS: NORMAL
EKG P AXIS: 8 DEGREES
EKG P-R INTERVAL: 152 MS
EKG Q-T INTERVAL: 430 MS
EKG QRS DURATION: 94 MS
EKG QTC CALCULATION (BAZETT): 467 MS
EKG R AXIS: 20 DEGREES
EKG T AXIS: 39 DEGREES
EKG VENTRICULAR RATE: 71 BPM
FLUID TYPE: NORMAL
GFR AFRICAN AMERICAN: >60
GFR NON-AFRICAN AMERICAN: >60
GLUCOSE BLD-MCNC: 159 MG/DL (ref 70–99)
HCT VFR BLD CALC: 19.7 % (ref 36–48)
HCT VFR BLD CALC: 20.1 % (ref 36–48)
HEMOGLOBIN: 6.9 G/DL (ref 12–16)
HEMOGLOBIN: 7.1 G/DL (ref 12–16)
LYMPHOCYTES, BODY FLUID: 58 %
MACROPHAGE FLUID: 38 %
MCH RBC QN AUTO: 33.6 PG (ref 26–34)
MCHC RBC AUTO-ENTMCNC: 34.9 G/DL (ref 31–36)
MCV RBC AUTO: 96.2 FL (ref 80–100)
NEUTROPHIL, FLUID: 4 %
NUCLEATED CELLS FLUID: 61 /CUMM
NUMBER OF CELLS COUNTED FLUID: 100
PDW BLD-RTO: 23.7 % (ref 12.4–15.4)
PLATELET # BLD: 76 K/UL (ref 135–450)
PLATELET SLIDE REVIEW: ABNORMAL
PMV BLD AUTO: 8.7 FL (ref 5–10.5)
POTASSIUM SERPL-SCNC: 4.4 MMOL/L (ref 3.5–5.1)
PROTEIN FLUID: 0.9 G/DL
RBC # BLD: 2.05 M/UL (ref 4–5.2)
RBC FLUID: <1000 /CUMM
SLIDE REVIEW: ABNORMAL
SODIUM BLD-SCNC: 141 MMOL/L (ref 136–145)
WBC # BLD: 4.2 K/UL (ref 4–11)

## 2022-08-26 PROCEDURE — 82042 OTHER SOURCE ALBUMIN QUAN EA: CPT

## 2022-08-26 PROCEDURE — 6360000002 HC RX W HCPCS: Performed by: PHYSICIAN ASSISTANT

## 2022-08-26 PROCEDURE — 87205 SMEAR GRAM STAIN: CPT

## 2022-08-26 PROCEDURE — 80048 BASIC METABOLIC PNL TOTAL CA: CPT

## 2022-08-26 PROCEDURE — 85027 COMPLETE CBC AUTOMATED: CPT

## 2022-08-26 PROCEDURE — 2580000003 HC RX 258: Performed by: FAMILY MEDICINE

## 2022-08-26 PROCEDURE — 89051 BODY FLUID CELL COUNT: CPT

## 2022-08-26 PROCEDURE — C1729 CATH, DRAINAGE: HCPCS

## 2022-08-26 PROCEDURE — 49083 ABD PARACENTESIS W/IMAGING: CPT

## 2022-08-26 PROCEDURE — P9047 ALBUMIN (HUMAN), 25%, 50ML: HCPCS | Performed by: PHYSICIAN ASSISTANT

## 2022-08-26 PROCEDURE — 88305 TISSUE EXAM BY PATHOLOGIST: CPT

## 2022-08-26 PROCEDURE — 87070 CULTURE OTHR SPECIMN AEROBIC: CPT

## 2022-08-26 PROCEDURE — 36415 COLL VENOUS BLD VENIPUNCTURE: CPT

## 2022-08-26 PROCEDURE — 0W9G3ZZ DRAINAGE OF PERITONEAL CAVITY, PERCUTANEOUS APPROACH: ICD-10-PCS | Performed by: RADIOLOGY

## 2022-08-26 PROCEDURE — 85018 HEMOGLOBIN: CPT

## 2022-08-26 PROCEDURE — 1200000000 HC SEMI PRIVATE

## 2022-08-26 PROCEDURE — 84157 ASSAY OF PROTEIN OTHER: CPT

## 2022-08-26 PROCEDURE — 88112 CYTOPATH CELL ENHANCE TECH: CPT

## 2022-08-26 PROCEDURE — 6370000000 HC RX 637 (ALT 250 FOR IP): Performed by: PHYSICIAN ASSISTANT

## 2022-08-26 PROCEDURE — 6370000000 HC RX 637 (ALT 250 FOR IP): Performed by: FAMILY MEDICINE

## 2022-08-26 PROCEDURE — 93010 ELECTROCARDIOGRAM REPORT: CPT | Performed by: INTERNAL MEDICINE

## 2022-08-26 PROCEDURE — 2500000003 HC RX 250 WO HCPCS: Performed by: INTERNAL MEDICINE

## 2022-08-26 PROCEDURE — 85014 HEMATOCRIT: CPT

## 2022-08-26 RX ORDER — ALBUMIN (HUMAN) 12.5 G/50ML
25 SOLUTION INTRAVENOUS ONCE
Status: COMPLETED | OUTPATIENT
Start: 2022-08-26 | End: 2022-08-26

## 2022-08-26 RX ORDER — LIDOCAINE HYDROCHLORIDE 10 MG/ML
10 INJECTION, SOLUTION EPIDURAL; INFILTRATION; INTRACAUDAL; PERINEURAL ONCE
Status: COMPLETED | OUTPATIENT
Start: 2022-08-26 | End: 2022-08-26

## 2022-08-26 RX ORDER — LACTULOSE 10 G/15ML
20 SOLUTION ORAL 3 TIMES DAILY
Status: DISCONTINUED | OUTPATIENT
Start: 2022-08-26 | End: 2022-08-28 | Stop reason: HOSPADM

## 2022-08-26 RX ADMIN — TRAMADOL HYDROCHLORIDE 50 MG: 50 TABLET, COATED ORAL at 20:12

## 2022-08-26 RX ADMIN — LIDOCAINE HYDROCHLORIDE 10 ML: 10 INJECTION, SOLUTION EPIDURAL; INFILTRATION; INTRACAUDAL; PERINEURAL at 14:52

## 2022-08-26 RX ADMIN — TRAMADOL HYDROCHLORIDE 50 MG: 50 TABLET, COATED ORAL at 10:40

## 2022-08-26 RX ADMIN — SODIUM CHLORIDE, PRESERVATIVE FREE 10 ML: 5 INJECTION INTRAVENOUS at 09:22

## 2022-08-26 RX ADMIN — SPIRONOLACTONE 100 MG: 25 TABLET ORAL at 09:21

## 2022-08-26 RX ADMIN — LACTULOSE 20 G: 20 SOLUTION ORAL at 20:12

## 2022-08-26 RX ADMIN — PANTOPRAZOLE SODIUM 40 MG: 40 TABLET, DELAYED RELEASE ORAL at 05:23

## 2022-08-26 RX ADMIN — Medication 400 MG: at 09:21

## 2022-08-26 RX ADMIN — TRAMADOL HYDROCHLORIDE 50 MG: 50 TABLET, COATED ORAL at 05:23

## 2022-08-26 RX ADMIN — NADOLOL 40 MG: 20 TABLET ORAL at 20:12

## 2022-08-26 RX ADMIN — FUROSEMIDE 20 MG: 20 TABLET ORAL at 09:21

## 2022-08-26 RX ADMIN — THIAMINE HCL TAB 100 MG 100 MG: 100 TAB at 09:21

## 2022-08-26 RX ADMIN — RIFAXIMIN 550 MG: 550 TABLET ORAL at 09:21

## 2022-08-26 RX ADMIN — Medication 400 MG: at 20:12

## 2022-08-26 RX ADMIN — RIFAXIMIN 550 MG: 550 TABLET ORAL at 20:12

## 2022-08-26 RX ADMIN — SODIUM CHLORIDE, PRESERVATIVE FREE 10 ML: 5 INJECTION INTRAVENOUS at 20:15

## 2022-08-26 RX ADMIN — ALBUMIN (HUMAN) 25 G: 0.25 INJECTION, SOLUTION INTRAVENOUS at 11:58

## 2022-08-26 RX ADMIN — LACTULOSE 20 G: 20 SOLUTION ORAL at 09:21

## 2022-08-26 RX ADMIN — SODIUM CHLORIDE, PRESERVATIVE FREE 10 ML: 5 INJECTION INTRAVENOUS at 05:24

## 2022-08-26 ASSESSMENT — PAIN SCALES - GENERAL
PAINLEVEL_OUTOF10: 0
PAINLEVEL_OUTOF10: 8
PAINLEVEL_OUTOF10: 8
PAINLEVEL_OUTOF10: 10
PAINLEVEL_OUTOF10: 3

## 2022-08-26 ASSESSMENT — PAIN DESCRIPTION - LOCATION
LOCATION: ABDOMEN
LOCATION: UMBILICUS
LOCATION: UMBILICUS

## 2022-08-26 ASSESSMENT — PAIN DESCRIPTION - DESCRIPTORS: DESCRIPTORS: STABBING

## 2022-08-26 ASSESSMENT — PAIN - FUNCTIONAL ASSESSMENT: PAIN_FUNCTIONAL_ASSESSMENT: PREVENTS OR INTERFERES SOME ACTIVE ACTIVITIES AND ADLS

## 2022-08-26 NOTE — PROGRESS NOTES
Patient is alert and oriented x4. Shift assessment completed. Denies any pain at this time. AM meds administered per [facility] with sip of water. Patient remains NPO. The care plan and education has been reviewed and mutually agreed upon with the patient. Standard safety measures in place.

## 2022-08-26 NOTE — PROGRESS NOTES
auscultation, bilaterally without Rales/Wheezes/Rhonchi. Cardiovascular: Regular rate and rhythm with normal S1/S2 without murmurs, rubs or gallops. Abdomen: Distention and mild generalized tenderness with no rebound tenderness. Patient states her ascites is worse than before  Musculoskeletal: No clubbing, cyanosis or edema bilaterally. Full range of motion without deformity. Skin: Skin color, texture, turgor normal.  No rashes or lesions. Neurologic:  Neurovascularly intact without any focal sensory/motor deficits. Cranial nerves: II-XII intact, grossly non-focal.  Psychiatric: Alert and oriented, thought content appropriate, normal insight  Capillary Refill: Brisk,3 seconds, normal   Peripheral Pulses: +2 palpable, equal bilaterally       Labs:   Recent Labs     08/25/22  1245 08/25/22  1929 08/26/22  0411   WBC 4.2  --  4.2   HGB 6.5* 7.4* 6.9*   HCT 18.1* 21.3* 19.7*   PLT 82*  --  76*     Recent Labs     08/25/22  1245 08/26/22  0411    141   K 3.9 4.4   CL 99 108   CO2 26 24   BUN 23* 19   CREATININE 0.7 0.6   CALCIUM 9.3 8.7     Recent Labs     08/25/22  1245   AST 85*   ALT 35   BILITOT 19.3*   ALKPHOS 158*     Recent Labs     08/25/22  1245   INR 2.23*     No results for input(s): Sandra Christiano in the last 72 hours. Urinalysis:      Lab Results   Component Value Date/Time    NITRU Color Interfer 08/25/2022 09:00 PM    WBCUA 2 08/25/2022 09:00 PM    BACTERIA None Seen 08/25/2022 09:00 PM    RBCUA 1 08/25/2022 09:00 PM    BLOODU Color Interfer 08/25/2022 09:00 PM    SPECGRAV Color Interfer 08/25/2022 09:00 PM    GLUCOSEU Color Interfer 08/25/2022 09:00 PM       Radiology:  CT ABDOMEN PELVIS WO CONTRAST Additional Contrast? None   Final Result   1. Redemonstration of cirrhosis with evidence of portal hypertension   including splenomegaly, moderate ascites with third-spacing and varices. 2. Heterogeneity of the hepatic parenchyma with increased attenuation   surrounding the portal triads. Evaluation is limited by the lack of   intravenous contrast.  If there is concern for underlying neoplasm, consider   follow-up MRI. 3. Cholelithiasis with no acute features. 4. Nonobstructive right nephrolithiasis. No evidence of obstructive uropathy. XR CHEST PORTABLE   Final Result   Linear atelectasis at the left lung base. Otherwise no acute cardiopulmonary   disease. IR US GUIDED PARACENTESIS    (Results Pending)           Assessment/Plan:    Active Hospital Problems    Diagnosis     Cirrhosis of liver with ascites, unspecified [facility] [K74.60, R18.8]      Priority: Medium     PLAN:    Cirrhosis with ascites  Seen by gastroenterology. Paracentesis ordered, currently pending. Anemia  Unclear if cirrhosis is sufficient to explain. Hematology consulted. Hemoglobin is fluctuating and currently borderline regarding transfusion criteria. We will check another level before deciding whether transfusion is needed. GERD  Continue Protonix same dose. Diarrhea  No recent exposure to antibiotics. Already on rifaximin. Awaiting C. difficile toxin. Discussed with the patient. Questions answered. DVT Prophylaxis: SCD only due to anemia of unknown reason and cirrhosis related coagulopathy  Diet: ADULT DIET; Regular;  Low Sodium (2 gm)  Code Status: Full Code    PT/OT Eval Status: Ordered    Dispo -inpatient stay, unclear duration pending work-up    Dominic Flores MD

## 2022-08-26 NOTE — PROGRESS NOTES
400ml of fluid removed  Spoke to patients RN 4T and advised her the amount of fluid removed and that patient was returning to the floor.

## 2022-08-26 NOTE — CONSULTS
Gastroenterology Consult Note        Patient: Liam Rivero  : 1975  Acct#:      Date:  2022    Subjective:       History of Present Illness  Patient is a 40 y.o.  female admitted with Liver disease [K76.9]  Other ascites [R18.8]  Cirrhosis of liver with ascites, unspecified hepatic cirrhosis type ([facility] [address] [K74.60, R18.8]  Anemia, unspecified type [D64.9] who is seen in consult for cirrhosis. H/o alcohol cirrhosis followed by Dr. Rica Claude. She last drank alcohol in 2022. Her cirrhosis is decompensated by ascites which has been managed with Lasix at lower dose per Renal (20 mg daily) due to recent EZIO and Aldactone 50 mg daily. History of hepatic encephalopathy on lactulose and xifaxan. She did have bleeding esophageal varices which were banded in 2021. Last EGD was 22 with large esophageal varix which was then banded. She is on nadolol. She had an EUS  for biliary dilation which showed mildly dilated CBD at 7 mm and large gallstones. States she started online Dheere Bolo in April. She was recently referred to [facility] for liver transplant eval and is awaiting a phone call to set up an appointment. History of recurrent anemia. Had heme eval including bone marrow biopsy. Anemia felt to be from end-stage cirrhosis. She has required multiple admissions for transfusion. Patient saw her hematologist in the office yesterday. Hemoglobin was 6.5. She had weakness, malaise and abdominal pain so was sent to the ER. CT with ascites. She reports abdominal discomfort near the umbilicus but also diffusely. Was given oxycodone per heme and then had nausea and nonbloody emesis. Denies any melena or hematochezia. Having 3-4 bowel movements per day on lactulose.     Past Medical History:   Diagnosis Date    Alcoholic liver disease ([facility] [address]     Anemia     History of blood transfusion       Past Surgical History:   Procedure Laterality Date    COLONOSCOPY N/A 3/11/2021 COLONOSCOPY POLYPECTOMY SNARE/COLD BIOPSY performed by Ana Lees MD at [facility]    CT BONE MARROW BIOPSY  7/5/2022    CT BONE MARROW BIOPSY 7/5/2022 Flip Lopez MD [facility] CT SCAN    KNEE ARTHROPLASTY      TUBAL LIGATION      ESSURE    UPPER GASTROINTESTINAL ENDOSCOPY N/A 01/30/2021    EGD DIAGNOSTIC ONLY performed by Roesline Knight MD at [address] [place] [address] N/A 03/10/2021    EGD BIOPSY performed by Ana Lees MD at [address] [place] [address] 6/27/2021    EGD BAND LIGATION performed by Radha Ledesma MD at [address] [place] [address] N/A 4/27/2022    EGD DIAGNOSTIC ONLY performed by Myah Wright MD at [address] [place] [address] N/A 7/1/2022    EGD ESOPHAGOGASTRODUODENOSCOPY ULTRASOUND performed by Ari Garcia MD at [address] [place] [address] N/A 7/1/2022    EGD BAND LIGATION performed by Ari Garcia MD at [address] [place] [address]      Past Endoscopic History  EGD and EUS with Dr Liliana Veras 7/1/22  Postoperative Diagnosis: #1 large varix noted in the esophagus banded  #2 large gallstones and gall  #3 mildly dilated common bile duct at 7 mm        Colonoscopy 3/2021 with Dr Zeynep Vazquez for anemia     Findings:   Two small polyps, removed with cold snare. s/p 3 clips total. Patchy diverticulosis (right and left colon). Hemorrhoids. Plans:  Await pathology. Recall colonoscopy in 5 years. Admission Meds  No current facility-administered medications on file prior to encounter. Current Outpatient Medications on File Prior to Encounter   Medication Sig Dispense Refill    oxyCODONE HCl (OXYCONTIN PO) Take by mouth (Patient not taking: Reported on 8/25/2022)      lactulose (CHRONULAC) 10 GM/15ML solution Take 30 mLs by mouth every 3 hours 237 mL 1    furosemide (LASIX) 20 MG tablet Take 1 tablet by mouth in the morning.  60 tablet 3    rifAXIMin (XIFAXAN) 550 MG tablet Take 1 tablet by mouth in the morning and 1 tablet before bedtime. 60 tablet 1    LORazepam (ATIVAN) 0.5 MG tablet Take 0.5 mg by mouth every 8 hours as needed for Anxiety. ondansetron (ZOFRAN) 8 MG tablet Take 1 tablet by mouth      zolpidem (AMBIEN) 5 MG tablet Take 1 tablet by mouth. thiamine 100 MG tablet Take 1 tablet by mouth daily (Patient not taking: Reported on 8/25/2022) 30 tablet 3    nadolol (CORGARD) 40 MG tablet Take 1 tablet by mouth daily 30 tablet 0    spironolactone (ALDACTONE) 100 MG tablet Take 1 tablet by mouth daily 30 tablet 1    magnesium oxide (MAG-OX) 400 (240 Mg) MG tablet Take 1 tablet by mouth 2 times daily 60 tablet 0    pantoprazole (PROTONIX) 40 MG tablet Take 1 tablet by mouth every morning (before breakfast) 30 tablet 1    thiamine mononitrate (THIAMINE) 100 MG tablet Take 1 tablet by mouth daily 30 tablet 0           Allergies  Allergies   Allergen Reactions    Oxycodone Nausea And Vomiting      Social   Social History     Tobacco Use    Smoking status: Former    Smokeless tobacco: Never   Substance Use Topics    Alcohol use: Not Currently        Family History   Problem Relation Age of Onset    Alcohol Abuse Father           Review of Systems  Constitutional: negative for fevers, chills, sweats    Ears, nose, mouth, throat, and face: negative for nasal congestion and sore throat   Respiratory: negative for cough and shortness of breath   Cardiovascular: negative for chest pain and dyspnea   Gastrointestinal: see hpi   Genitourinary:negative for dysuria and frequency   Integument/breast: negative for pruritus and rash   Hematologic/lymphatic: negative for bleeding and easy bruising   Musculoskeletal:negative for arthralgias and myalgias   Neurological: negative for dizziness + weakness         Physical Exam  Blood pressure (!) 107/50, pulse 66, temperature 98.7 °F (37.1 °C), temperature source Oral, resp.  rate 18, weight 118 lb 8 oz (53.8 kg), SpO2 93 %, not currently breastfeeding. General appearance: thin, alert, cooperative, no distress, appears stated age  Eyes: Anicteric  Head: Normocephalic, without obvious abnormality  Lungs: clear to auscultation bilaterally, Normal Effort  Heart: regular rate and rhythm, normal S1 and S2, no murmurs or rubs  Abdomen: distended but soft, mild diffuse tenderness. Bowel sounds normal. No masses,  no organomegaly. Extremities: atraumatic, no cyanosis or edema  Skin: warm and dry, no jaundice  Neuro: Grossly intact, A&OX3, no asterixis   Musculoskeletal: 5/5  strength BUE      Data Review:    Recent Labs     08/25/22  1245 08/25/22  1929 08/26/22  0411   WBC 4.2  --  4.2   HGB 6.5* 7.4* 6.9*   HCT 18.1* 21.3* 19.7*   MCV 98.8  --  96.2   PLT 82*  --  76*     Recent Labs     08/25/22  1245 08/26/22  0411    141   K 3.9 4.4   CL 99 108   CO2 26 24   BUN 23* 19   CREATININE 0.7 0.6     Recent Labs     08/25/22  1245   AST 85*   ALT 35   BILITOT 19.3*   ALKPHOS 158*     Recent Labs     08/25/22  1245   LIPASE 33.0     Recent Labs     08/25/22  1245   PROTIME 24.8*   INR 2.23*     No results for input(s): PTT in the last 72 hours. No results for input(s): OCCULTBLD in the last 72 hours. Imaging Studies:                            CT-scan of abdomen and pelvis wo contrast 8/25/22:  Impression   1. Redemonstration of cirrhosis with evidence of portal hypertension   including splenomegaly, moderate ascites with third-spacing and varices. 2. Heterogeneity of the hepatic parenchyma with increased attenuation   surrounding the portal triads. Evaluation is limited by the lack of   intravenous contrast.  If there is concern for underlying neoplasm, consider   follow-up MRI. 3. Cholelithiasis with no acute features. 4. Nonobstructive right nephrolithiasis. No evidence of obstructive uropathy.                       Assessment:     Principal Problem:    Cirrhosis of liver with ascites, unspecified hepatic cirrhosis type [facility])  Resolved Problems:    * No resolved hospital problems. *      Abdominal pain - diffuse. need to r/o SBP. Ascites - History of ascites which has been managed with Lasix at lower dose per Renal (20 mg daily) due to recent EZIO and Aldactone 50 mg daily. CT with moderate ascites. Cirrhosis - due to alcohol abuse. No alcohol since April 2022. awaiting eval at [facility] liver transplant center. MELD 27. EGD 7/2022 with large esophageal varix treated with band ligation. history of hepatic encephalopathy on lactulose and xifaxan. ammonis 67 but pt not confused. Anemia - recurrent. Multiple admissions for anemia withouth GI bleeding. S/p bone marrow bx. Followed by heme and anemia from end stage cirrhosis. No gross GI bleeding. Hgb from 6.5 to 7.4 with 1 u prbc. Hgb 6.9 this am.     Recommendations:   -Paracentesis with fluid for cell count and culture  -We will give 25 g of albumin at time of paracentesis and IR to order more if needed.  -Continue lactulose and Xifaxan  -On Lasix 20 mg and Aldactone 100 mg a daily. Will ask nephrology to see again to see if can increase diuretics. -PRBC as needed per primary team.  - heme eval    Discussed with Dr. Lizzeth Waterman, [facility]    I have personally performed a face to face diagnostic evaluation on this patient. I have interviewed and examined the patient and I agree with the findings and recommended plan of care. 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 Abd distended but soft + fluid wave mild tender, but alert/conversant, no le edema, + jaundice. Bili largly indirect, inr stable, lft stable.  Agree paracenesis rule out sbp, check with renal if can attempt increase diuretics to decrase ascities recurrence, low na diet, prbc per heme, per pt was waiting until daughter home from [facility] to call  tranplant for ov; daughter is now home, dr walsh has sent outpt referral. Rec pt call Monday to attempt expedite appt to see if candidate for transplant or not due to recurrent transfusion needs. No clinical gi bleeding to explain anemia; can pursue future repeat egd reassess varices but no bleeding currently, minimize/hold narcotics and cont lactulose/xifaxan.       Gold Guillermo MD  [address]

## 2022-08-26 NOTE — H&P
HOSPITALISTS HISTORY AND PHYSICAL    8/25/2022 8:11 PM    Patient Information:  Umesh Edwards is a 40 y.o. female [id number]  PCP:  [address], DO (Tel: [phone] )    Chief complaint:    Chief Complaint   Patient presents with    Abdominal Pain     Pt sent by chemo doctor d/t abdominal pain and disctention, abdominal \"tightness\". Skin color noted to be yellow in triage. Hx end stage liver disease. Oncology called reports and states hgb 6.8 but wants to manage outpatient        History of Present Illness:  Anna Freitas is a 40 y.o. female with h/o alcoholic liver cirrhosis, Ascites, paracentesis, Anemia, was sent from the hematology office d.t abdominal pain, distension and not feeling well. The pt states she vomited a few days ago and has been anorexic. She thinks symptoms started after she took Oxycodone. She stopped taking that she did not vomit since then but has not much appetite and feels weak and fatigued. She had a follow up appointment at hematology office today who advised him to go to the ED . She follows up for liver transplant at [facility]. States she is abstaining from alcohol since April. Denies blood in the vomtus , diarrhea . No blood in the Stool   CT abdomen and pelvis showed moderate amount of Ascites. Labs revealed worsening anemia with hb 6.5      REVIEW OF SYSTEMS:   Constitutional: Negative for fever,chills or night sweats  ENT: Negative for rhinorrhea, epistaxis, hoarseness, sore throat.   Respiratory: Negative for shortness of breath,wheezing  Cardiovascular: Negative for chest pain, palpitations   Gastrointestinal: Negative for nausea, vomiting, diarrhea, +V abdominal pain   Genitourinary: Negative for polyuria, dysuria   Hematologic/Lymphatic: Negative for bleeding tendency, easy bruising  Musculoskeletal: Negative for myalgias and arthralgias  Neurologic: Negative for confusion,dysarthria. Skin: Negative for itching,rash  Psychiatric: Negative for depression,anxiety, agitation. Endocrine: Negative for polydipsia,polyuria,heat /cold intolerance. Past Medical History:   has a past medical history of Alcoholic liver disease ([address] Anemia, and History of blood transfusion. Past Surgical History:   has a past surgical history that includes Upper gastrointestinal endoscopy (N/A, 01/30/2021); Upper gastrointestinal endoscopy (N/A, 03/10/2021); Tubal ligation; Colonoscopy (N/A, 3/11/2021); Upper gastrointestinal endoscopy (N/A, 6/27/2021); Upper gastrointestinal endoscopy (N/A, 4/27/2022); Knee Arthroplasty; Upper gastrointestinal endoscopy (N/A, 7/1/2022); Upper gastrointestinal endoscopy (N/A, 7/1/2022); and CT BIOPSY BONE MARROW (7/5/2022). Medications:  No current facility-administered medications on file prior to encounter. Current Outpatient Medications on File Prior to Encounter   Medication Sig Dispense Refill    oxyCODONE HCl (OXYCONTIN PO) Take by mouth (Patient not taking: Reported on 8/25/2022)      lactulose (CHRONULAC) 10 GM/15ML solution Take 30 mLs by mouth every 3 hours 237 mL 1    furosemide (LASIX) 20 MG tablet Take 1 tablet by mouth in the morning. 60 tablet 3    rifAXIMin (XIFAXAN) 550 MG tablet Take 1 tablet by mouth in the morning and 1 tablet before bedtime. 60 tablet 1    LORazepam (ATIVAN) 0.5 MG tablet Take 0.5 mg by mouth every 8 hours as needed for Anxiety. ondansetron (ZOFRAN) 8 MG tablet Take 1 tablet by mouth      zolpidem (AMBIEN) 5 MG tablet Take 1 tablet by mouth.       thiamine 100 MG tablet Take 1 tablet by mouth daily (Patient not taking: Reported on 8/25/2022) 30 tablet 3    nadolol (CORGARD) 40 MG tablet Take 1 tablet by mouth daily 30 tablet 0    spironolactone (ALDACTONE) 100 MG tablet Take 1 tablet by mouth daily 30 tablet 1    magnesium oxide (MAG-OX) 400 (240 Mg) MG tablet Take 1 tablet by mouth 2 times daily 60 tablet 0 pantoprazole (PROTONIX) 40 MG tablet Take 1 tablet by mouth every morning (before breakfast) 30 tablet 1    thiamine mononitrate (THIAMINE) 100 MG tablet Take 1 tablet by mouth daily 30 tablet 0     Current Facility-Administered Medications   Medication Dose Route Frequency Provider Last Rate Last Admin    0.9 % sodium chloride infusion   IntraVENous PRN SCOTTIE Gonzalez        [START ON 8/26/2022] furosemide (LASIX) tablet 20 mg  20 mg Oral Daily Denise Tamayo MD        lactulose (CHRONULAC) 10 GM/15ML solution 20 g  20 g Oral Q3H (SCHEDULED) Kayleen Cormier MD        LORazepam (ATIVAN) tablet 0.5 mg  0.5 mg Oral Q8H PRN Kayleen Cormier MD        magnesium oxide (MAG-OX) tablet 400 mg  400 mg Oral BID Kayleen Cormier MD        nadolol (CORGARD) tablet 40 mg  40 mg Oral Nightly MD Tim Pierson ON 8/26/2022] pantoprazole (PROTONIX) tablet 40 mg  40 mg Oral QAM AC Denise Melara MD        rifAXIMin Julianne Canary) tablet 550 mg  550 mg Oral BID MD Tim Pierson ON 8/26/2022] spironolactone (ALDACTONE) tablet 100 mg  100 mg Oral Daily MD Tim Pierson ON 8/26/2022] thiamine mononitrate tablet 100 mg  100 mg Oral Daily Denise Tamayo MD        zolpidem (AMBIEN) tablet 5 mg  5 mg Oral Nightly PRN Kayleen Cormier MD        sodium chloride flush 0.9 % injection 5-40 mL  5-40 mL IntraVENous 2 times per day Kayleen Cormier MD        sodium chloride flush 0.9 % injection 5-40 mL  5-40 mL IntraVENous PRN Denise Tamayo MD        0.9 % sodium chloride infusion   IntraVENous PRN Kayleen Cormier MD        ondansetron (ZOFRAN-ODT) disintegrating tablet 4 mg  4 mg Oral Q8H PRN Kayleen Cormier MD        Or    ondansetron (ZOFRAN) injection 4 mg  4 mg IntraVENous Q6H PRN Denise Tamayo MD        polyethylene glycol (GLYCOLAX) packet 17 g  17 g Oral Daily PRN Kayleen Cormier MD        acetaminophen (TYLENOL) tablet 650 mg  650 mg Oral Q6H PRN Kayleen Cormier MD        Or    acetaminophen (TYLENOL) suppository 650 mg  650 mg Rectal Q6H PRN Gail Livingston MD          Allergies: Allergies   Allergen Reactions    Oxycodone Nausea And Vomiting        Social History:  Patient Lives    reports that she has quit smoking. She has never used smokeless tobacco. She reports that she does not currently use alcohol. She reports that she does not use drugs. Family History:  family history includes Alcohol Abuse in her father. ,    Physical Exam:  BP (!) 99/52   Pulse 68   Temp 98.4 °F (36.9 °C) (Oral)   Resp 16   Wt 118 lb 8 oz (53.8 kg)   SpO2 95%   BMI 19.72 kg/m²     General appearance:  Appears comfortable. Well nourished  Eyes: Sclera clear, pupils equal  ENT: Moist mucus membranes, no thrush. Trachea midline. Cardiovascular: Regular rhythm, normal S1, S2. No murmur, gallop, rub. No edema in lower extremities  Respiratory: Clear to auscultation bilaterally, no wheeze, good inspiratory effort  Gastrointestinal: Abdomen soft, non-tender, +Ve  distended, normal bowel sounds  Musculoskeletal: No cyanosis in digits, neck supple  Neurology: Cranial nerves grossly intact. Alert and oriented in time, place and person. No speech or motor deficits  Psychiatry: Appropriate affect.  Not agitated  Skin: Warm, dry, normal turgor, no rash  Brisk capillary refill, peripheral pulses palpable   Labs:  CBC:   Lab Results   Component Value Date/Time    WBC 4.2 08/25/2022 12:45 PM    RBC 1.83 08/25/2022 12:45 PM    HGB 7.4 08/25/2022 07:29 PM    HCT 21.3 08/25/2022 07:29 PM    MCV 98.8 08/25/2022 12:45 PM    MCH 35.3 08/25/2022 12:45 PM    MCHC 35.8 08/25/2022 12:45 PM    RDW 23.2 08/25/2022 12:45 PM    PLT 82 08/25/2022 12:45 PM    MPV 9.6 08/25/2022 12:45 PM     BMP:    Lab Results   Component Value Date/Time     08/25/2022 12:45 PM    K 3.9 08/25/2022 12:45 PM    CL 99 08/25/2022 12:45 PM    CO2 26 08/25/2022 12:45 PM    BUN 23 08/25/2022 12:45 PM    CREATININE 0.7 08/25/2022 12:45 PM    CALCIUM 9.3 08/25/2022 12:45 PM    GFRAA >60 questions and had agreed to proceed with transfusion of blood components. DVT prophylaxis   Code status   Diet   IV access   Saleem Catheter    Admit as inpati. I anticipate hospitalization spanning more than two midnights for investigation and treatment of the above medically necessary diagnoses. Please note that some part of this chart was generated using Dragon dictation software. Although every effort was made to ensure the accuracy of this automated transcription, some errors in transcription may have occurred inadvertently. If you may need any clarification, please do not hesitate to contact me through [facility].        Nanette Crawley MD    8/25/2022 8:11 PM

## 2022-08-26 NOTE — CARE COORDINATION
Discharge Planning Note:    Chart reviewed and it appears that patient has minimal needs for discharge at this time. Discussed with patient and requested that case management be notified if discharge needs are identified.     - Current discharge plan is for the patient to return home. - Patient stated she is active with [facility]. Talked with [address]: Patient is NOT active with [facility]. Case management will continue to follow progress and update discharge plan as needed.       Risk of Readmission Score: 29%    YASMIN EdmondN RN    [facility]  Phone: [phone]

## 2022-08-26 NOTE — BRIEF OP NOTE
Brief Postoperative Note    Bashir Leonard  YOB: 1975 [id number]    Pre-operative Diagnosis: ascites    Post-operative Diagnosis: Same    Procedure: US-guided paracentesis    Anesthesia: Local    Surgeons: Telly Umana MD    Estimated Blood Loss: Less than 5 mL    Complications: None    Specimens: ascites drained    Findings: Successful US-guided paracentesis.     Electronically signed by Telly Umana MD on 8/26/2022 at 2:56 PM

## 2022-08-27 LAB
ALBUMIN SERPL-MCNC: 2.9 G/DL (ref 3.4–5)
ALP BLD-CCNC: 129 U/L (ref 40–129)
ALT SERPL-CCNC: 32 U/L (ref 10–40)
ANION GAP SERPL CALCULATED.3IONS-SCNC: 10 MMOL/L (ref 3–16)
AST SERPL-CCNC: 78 U/L (ref 15–37)
BILIRUB SERPL-MCNC: 18 MG/DL (ref 0–1)
BILIRUBIN DIRECT: 5.6 MG/DL (ref 0–0.3)
BILIRUBIN, INDIRECT: 12.4 MG/DL (ref 0–1)
BLOOD BANK DISPENSE STATUS: NORMAL
BLOOD BANK PRODUCT CODE: NORMAL
BPU ID: NORMAL
BUN BLDV-MCNC: 18 MG/DL (ref 7–20)
CALCIUM SERPL-MCNC: 9 MG/DL (ref 8.3–10.6)
CHLORIDE BLD-SCNC: 100 MMOL/L (ref 99–110)
CO2: 25 MMOL/L (ref 21–32)
CREAT SERPL-MCNC: <0.5 MG/DL (ref 0.6–1.1)
DESCRIPTION BLOOD BANK: NORMAL
GFR AFRICAN AMERICAN: >60
GFR NON-AFRICAN AMERICAN: >60
GLUCOSE BLD-MCNC: 136 MG/DL (ref 70–99)
HCT VFR BLD CALC: 20.4 % (ref 36–48)
HCT VFR BLD CALC: 23.9 % (ref 36–48)
HEMOGLOBIN: 7.1 G/DL (ref 12–16)
HEMOGLOBIN: 8.4 G/DL (ref 12–16)
MCH RBC QN AUTO: 34 PG (ref 26–34)
MCHC RBC AUTO-ENTMCNC: 34.6 G/DL (ref 31–36)
MCV RBC AUTO: 98 FL (ref 80–100)
PDW BLD-RTO: 23.7 % (ref 12.4–15.4)
PLATELET # BLD: 76 K/UL (ref 135–450)
PMV BLD AUTO: 8.4 FL (ref 5–10.5)
POTASSIUM SERPL-SCNC: 4 MMOL/L (ref 3.5–5.1)
RBC # BLD: 2.08 M/UL (ref 4–5.2)
SODIUM BLD-SCNC: 135 MMOL/L (ref 136–145)
TOTAL PROTEIN: 5.5 G/DL (ref 6.4–8.2)
WBC # BLD: 3.4 K/UL (ref 4–11)

## 2022-08-27 PROCEDURE — 36430 TRANSFUSION BLD/BLD COMPNT: CPT

## 2022-08-27 PROCEDURE — 80076 HEPATIC FUNCTION PANEL: CPT

## 2022-08-27 PROCEDURE — 80048 BASIC METABOLIC PNL TOTAL CA: CPT

## 2022-08-27 PROCEDURE — 99222 1ST HOSP IP/OBS MODERATE 55: CPT | Performed by: HOSPITALIST

## 2022-08-27 PROCEDURE — 6370000000 HC RX 637 (ALT 250 FOR IP): Performed by: FAMILY MEDICINE

## 2022-08-27 PROCEDURE — 85018 HEMOGLOBIN: CPT

## 2022-08-27 PROCEDURE — 85027 COMPLETE CBC AUTOMATED: CPT

## 2022-08-27 PROCEDURE — 85014 HEMATOCRIT: CPT

## 2022-08-27 PROCEDURE — 6370000000 HC RX 637 (ALT 250 FOR IP): Performed by: PHYSICIAN ASSISTANT

## 2022-08-27 PROCEDURE — 1200000000 HC SEMI PRIVATE

## 2022-08-27 RX ORDER — SODIUM CHLORIDE 9 MG/ML
INJECTION, SOLUTION INTRAVENOUS PRN
Status: DISCONTINUED | OUTPATIENT
Start: 2022-08-27 | End: 2022-08-28 | Stop reason: HOSPADM

## 2022-08-27 RX ADMIN — RIFAXIMIN 550 MG: 550 TABLET ORAL at 09:39

## 2022-08-27 RX ADMIN — TRAMADOL HYDROCHLORIDE 50 MG: 50 TABLET, COATED ORAL at 09:39

## 2022-08-27 RX ADMIN — RIFAXIMIN 550 MG: 550 TABLET ORAL at 20:12

## 2022-08-27 RX ADMIN — TRAMADOL HYDROCHLORIDE 50 MG: 50 TABLET, COATED ORAL at 16:10

## 2022-08-27 RX ADMIN — LORAZEPAM 0.5 MG: 0.5 TABLET ORAL at 09:42

## 2022-08-27 RX ADMIN — ZOLPIDEM TARTRATE 5 MG: 5 TABLET ORAL at 02:18

## 2022-08-27 RX ADMIN — Medication 400 MG: at 09:39

## 2022-08-27 RX ADMIN — LACTULOSE 20 G: 20 SOLUTION ORAL at 09:38

## 2022-08-27 RX ADMIN — THIAMINE HCL TAB 100 MG 100 MG: 100 TAB at 09:39

## 2022-08-27 RX ADMIN — Medication 400 MG: at 20:11

## 2022-08-27 RX ADMIN — PANTOPRAZOLE SODIUM 40 MG: 40 TABLET, DELAYED RELEASE ORAL at 05:17

## 2022-08-27 RX ADMIN — TRAMADOL HYDROCHLORIDE 50 MG: 50 TABLET, COATED ORAL at 02:11

## 2022-08-27 RX ADMIN — FUROSEMIDE 20 MG: 20 TABLET ORAL at 09:39

## 2022-08-27 RX ADMIN — SPIRONOLACTONE 100 MG: 25 TABLET ORAL at 09:39

## 2022-08-27 RX ADMIN — TRAMADOL HYDROCHLORIDE 50 MG: 50 TABLET, COATED ORAL at 22:24

## 2022-08-27 RX ADMIN — LORAZEPAM 0.5 MG: 0.5 TABLET ORAL at 20:11

## 2022-08-27 RX ADMIN — ZOLPIDEM TARTRATE 5 MG: 5 TABLET ORAL at 20:11

## 2022-08-27 ASSESSMENT — PAIN DESCRIPTION - FREQUENCY: FREQUENCY: CONTINUOUS

## 2022-08-27 ASSESSMENT — PAIN DESCRIPTION - LOCATION
LOCATION: ABDOMEN

## 2022-08-27 ASSESSMENT — PAIN SCALES - GENERAL
PAINLEVEL_OUTOF10: 9
PAINLEVEL_OUTOF10: 7
PAINLEVEL_OUTOF10: 4
PAINLEVEL_OUTOF10: 9

## 2022-08-27 ASSESSMENT — PAIN - FUNCTIONAL ASSESSMENT: PAIN_FUNCTIONAL_ASSESSMENT: PREVENTS OR INTERFERES SOME ACTIVE ACTIVITIES AND ADLS

## 2022-08-27 ASSESSMENT — PAIN DESCRIPTION - ONSET: ONSET: ON-GOING

## 2022-08-27 ASSESSMENT — PAIN DESCRIPTION - DESCRIPTORS: DESCRIPTORS: STABBING

## 2022-08-27 ASSESSMENT — PAIN DESCRIPTION - PAIN TYPE: TYPE: ACUTE PAIN

## 2022-08-27 ASSESSMENT — PAIN DESCRIPTION - ORIENTATION: ORIENTATION: LEFT;LOWER

## 2022-08-27 NOTE — PROGRESS NOTES
[facility]  Gastroenterology Progress Note    Anna Freitas is a 40 y.o. female patient. 1. Anemia, unspecified type    2. Liver disease    3. Other ascites        SUBJECTIVE:  tolerating po, brown soft stool/ no blood with bm (cofirmed with nurse). ROS:  Cardiovascular ROS: on chest pain  Gastrointestinal ROS: see above  Respiratory ROS: no sob    Physical    VITALS:  /64   Pulse 67   Temp 98.5 °F (36.9 °C) (Oral)   Resp 20   Ht 5' 5\" (1.651 m)   Wt 118 lb 8 oz (53.8 kg)   SpO2 95%   BMI 19.72 kg/m²   TEMPERATURE:  Current - Temp: 98.5 °F (36.9 °C); Max - Temp  Av.5 °F (36.9 °C)  Min: 98.3 °F (36.8 °C)  Max: 98.7 °F (37.1 °C)    NAD  Regular rate  Lungs CTA Bilaterally  Abdomen soft, mild distended/ mild fluid wave, mild sore but no rebound/no gaurding, Bowel sounds present  Alert and oriented, No asterixis   LE: no edema    Data    CBC:   Lab Results   Component Value Date/Time    WBC 3.4 2022 06:03 AM    RBC 2.08 2022 06:03 AM    HGB 7.1 2022 06:03 AM    HCT 20.4 2022 06:03 AM    MCV 98.0 2022 06:03 AM    MCH 34.0 2022 06:03 AM    MCHC 34.6 2022 06:03 AM    RDW 23.7 2022 06:03 AM    PLT 76 2022 06:03 AM    MPV 8.4 2022 06:03 AM     Hepatic Function Panel:    Lab Results   Component Value Date/Time    ALKPHOS 129 2022 06:03 AM    ALT 32 2022 06:03 AM    AST 78 2022 06:03 AM    PROT 5.5 2022 06:03 AM    BILITOT 18.0 2022 06:03 AM    BILIDIR 5.6 2022 06:03 AM    IBILI 12.4 2022 06:03 AM           ASSESSMENT / PLAN :    Anemia - reason for this hospital admit. 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 S/p bone marrow bx and Followed by kat. Now hbg stable s/p transfusion. Abdominal pain - diffuse, improved s/p paracentesis. 22 ct scan ascities/ no acute change. Ascities neg neutrocytic ascities and saag c/w cirrhosis.  Ok tramadol prn, continue avoid narcotics with hx cirrhosis and hepatic enceph. Ascites - History of ascites which has been managed with Lasix at lower dose per Renal (20 mg daily) due to recent EZIO and Aldactone 50 mg daily. CT with moderate ascites. Paracentesis yesterday, ascities neg neutrocytic ascities and saag c/w cirrhosis. Asked renal eval if could increase diuretics to decrease recurrence, appreciate renal consult, they rec cont current lasix/aldactone doses. Cirrhosis - due to alcohol abuse. No alcohol since April 2022. MELD 27. Bili largly indirect and stable, transaminases stable, inr stable. Dr Brian Elam has sent referral to  liver transplant clinic, pt was awaiting daughter return from [facility] to call for appt; daughter now returned, I d/w pt call Monday am to schedule office visit; question is if transplant candidate and if transplant would alleviate spur cell anemia/ transfusion requirements. Hx varices: EGD 7/2022 with large esophageal varix treated with band ligation. no gi bleeding currently. Can f/u dr jillian Johnson Noa surveillance egd/band to obliteration. history of hepatic encephalopathy:  on lactulose and xifaxan. ammonis 67 at admit, no clinical hepatic enceph. Recommendations:   - 2 gm na diet (discussed with pt)  -Continue lactulose and Xifaxan  - continue On Lasix 20 mg and Aldactone 100 mg a daily per nephrology. - follow hbg per heme  - pt to call office for ascities culture and cytology results  - pt to call  liver transplant office to schedule appt Monday am to see if transplant candidate and see if transplant would correct spur cell anemia/ alleviate need for recurrent transfusions. - f/u outpt dr walsh egd surveillance    With hbg stable s/p prbc for spur cell anemia, no gi bleeding, no sbp, no enceph, pt stable from my standpoint for dc home  Will sign off, call if needed.      Mary Estrada MD , MD  [facility]enter[facility] and [facility]

## 2022-08-27 NOTE — CONSENT
Informed Consent for Blood Component Transfusion Note    I have discussed with the patient the rationale for blood component transfusion; its benefits in treating or preventing fatigue, organ damage, or death; and its risk which includes mild transfusion reactions, rare risk of blood borne infection, or more serious but rare reactions. I have discussed the alternatives to transfusion, including the risk and consequences of not receiving transfusion. The patient had an opportunity to ask questions and had agreed to proceed with transfusion of blood components.     Electronically signed by Roxann Álvarez MD on 8/27/22 at 6:57 PM EDT

## 2022-08-27 NOTE — CONSULTS
ENDOSCOPY N/A 7/1/2022    EGD BAND LIGATION performed by Félix Zapata MD at [address]       Family History   Problem Relation Age of Onset    Alcohol Abuse Father         reports that she has quit smoking. She has never used smokeless tobacco. She reports that she does not currently use alcohol. She reports that she does not use drugs.     Allergies:  Oxycodone    Current Medications:    lactulose (CHRONULAC) 10 GM/15ML solution 20 g, TID  0.9 % sodium chloride infusion, PRN  furosemide (LASIX) tablet 20 mg, Daily  LORazepam (ATIVAN) tablet 0.5 mg, Q8H PRN  magnesium oxide (MAG-OX) tablet 400 mg, BID  nadolol (CORGARD) tablet 40 mg, Nightly  pantoprazole (PROTONIX) tablet 40 mg, QAM AC  rifAXIMin (XIFAXAN) tablet 550 mg, BID  spironolactone (ALDACTONE) tablet 100 mg, Daily  thiamine mononitrate tablet 100 mg, Daily  zolpidem (AMBIEN) tablet 5 mg, Nightly PRN  sodium chloride flush 0.9 % injection 5-40 mL, 2 times per day  sodium chloride flush 0.9 % injection 5-40 mL, PRN  0.9 % sodium chloride infusion, PRN  ondansetron (ZOFRAN-ODT) disintegrating tablet 4 mg, Q8H PRN   Or  ondansetron (ZOFRAN) injection 4 mg, Q6H PRN  polyethylene glycol (GLYCOLAX) packet 17 g, Daily PRN  traMADol (ULTRAM) tablet 50 mg, Q6H PRN        Review of Systems:   14 point ROS obtained but were negative except mentioned in HPI      Physical exam:     Vitals:  /64   Pulse 67   Temp 98.5 °F (36.9 °C) (Oral)   Resp 20   Ht 5' 5\" (1.651 m)   Wt 118 lb 8 oz (53.8 kg)   SpO2 95%   BMI 19.72 kg/m²   Constitutional:  OAA X3 NAD  Skin: no rash, turgor wnl  Heent:  eomi, mmm  Neck: no bruits or jvd noted  Cardiovascular:  S1, S2 without m/r/g  Respiratory: CTA B without w/r/r  Abdomen:  +bs, soft, nt,     Ascites +      Ext: trace lower extremity edema  Psychiatric: mood and affect appropriate  Musculoskeletal:  Rom, muscular strength intact    Data:   Labs:  CBC:   Recent Labs     08/25/22  1245 08/25/22  1929 08/26/22  0416 triads. Evaluation is limited by the lack of   intravenous contrast.  If there is concern for underlying neoplasm, consider   follow-up MRI. 3. Cholelithiasis with no acute features. 4. Nonobstructive right nephrolithiasis. No evidence of obstructive uropathy. XR CHEST PORTABLE   Final Result   Linear atelectasis at the left lung base. Otherwise no acute cardiopulmonary   disease.          IR US GUIDED PARACENTESIS    (Results Pending)       Assessment/Plan     Ascites  Cirrhosis of liver      Serum Cr < 0.5    S/p paracentesis on 8/26    Would continue with lasix 20 mg /day    Continue Aldactone 100 mg/d    Low salt diet    Total fluid restriction 1500 ml/day    GERD      Anemia          Thank you for allowing us to participate in care of Nic Us MD  Feel free to contact me   [facility] [address]  Office : [phone]  Fax :[phone]

## 2022-08-27 NOTE — CONSULTS
[facility]   CONSULT NOTE    8/27/2022 6:35 PM    Patient Information: Jovanna Rudd   Date of Admit:  8/25/2022  Primary Care Physician:  Alicia Lopez DO  Requesting Physician:  Kei Mabry MD    Reason for consult:    Hematology/Oncology consulted for anemia      Chief complaint:    Hematology/Oncology consulted for anemia       History of Present Illness:    41 Y/O female with a PMH of alcohol liver cirrshosis, who presents to the hospital for worsening abdominal distention, abdominal pain, and anemia noted in the office for which she was sent from office to the ER. Overall clinically improved since transfusion and paracentesis. Past Medical History:     has a past medical history of Alcoholic liver disease ([address] Anemia, and History of blood transfusion.          Past Surgical History:    Past Surgical History:   Procedure Laterality Date    COLONOSCOPY N/A 3/11/2021    COLONOSCOPY POLYPECTOMY SNARE/COLD BIOPSY performed by Leatha Castellon MD at [facility]    CT BONE MARROW BIOPSY  7/5/2022    CT BONE MARROW BIOPSY 7/5/2022 Candance Champagne, MD [facility] CT SCAN    KNEE ARTHROPLASTY      TUBAL LIGATION      ESSURE    UPPER GASTROINTESTINAL ENDOSCOPY N/A 01/30/2021    EGD DIAGNOSTIC ONLY performed by Herman Garcia MD at [address] N/A 03/10/2021    EGD BIOPSY performed by Leatha Castellon MD at [address] 6/27/2021    EGD BAND LIGATION performed by Hina Crum MD at [address] N/A 4/27/2022    EGD DIAGNOSTIC ONLY performed by Blaire Leblanc MD at [address] N/A 7/1/2022    EGD ESOPHAGOGASTRODUODENOSCOPY ULTRASOUND performed by Daniel Butler MD at [address] N/A 7/1/2022    EGD BAND LIGATION performed by Daniel Butler MD at [address] [facility] ENDOSCOPY            Current Medications: lactulose  20 g Oral TID    furosemide  20 mg Oral Daily    magnesium oxide  400 mg Oral BID    nadolol  40 mg Oral Nightly    pantoprazole  40 mg Oral QAM AC    rifAXIMin  550 mg Oral BID    spironolactone  100 mg Oral Daily    thiamine mononitrate  100 mg Oral Daily    sodium chloride flush  5-40 mL IntraVENous 2 times per day           Allergies: Allergies   Allergen Reactions    Oxycodone Nausea And Vomiting        Social History:    reports that she has quit smoking. She has never used smokeless tobacco. She reports that she does not currently use alcohol. She reports that she does not use drugs. Family History:     family history includes Alcohol Abuse in her father.          ROS:  14 point review of systems performed negative except for as documented in the HPI        PHYSICAL EXAM:        Vitals:    08/27/22 1415   BP: 107/66   Pulse: 66   Resp:    Temp: 98.7 °F (37.1 °C)   SpO2: 96%          CONSTITUTIONAL: Jaundiced    EYES: Icterus    ENT: Normocephalic, atraumatic, No external ear deformities    NECK: No palpable adenopathy    HEMATOLOGIC/LYMPHATIC: no cervical, supraclavicular or axillary lymphadenopathy     LUNGS:  clear to auscultation no added sounds    CARDIOVASCULAR: regular rate and rhythm, normal S1 and S2, no murmur noted    ABDOMEN: Soft, Non tender, Non distended, No hepatosplenomegaly     MUSCULOSKELETAL: full range of motion noted, tone is normal    NEUROLOGIC: AAO X 3, No gross focal neurological deficits    EXTREMITIES: no LE edema             DATA:  CBC:   Lab Results   Component Value Date/Time    WBC 3.4 08/27/2022 06:03 AM    RBC 2.08 08/27/2022 06:03 AM    HGB 7.1 08/27/2022 06:03 AM    HCT 20.4 08/27/2022 06:03 AM    MCV 98.0 08/27/2022 06:03 AM    MCH 34.0 08/27/2022 06:03 AM    MCHC 34.6 08/27/2022 06:03 AM    RDW 23.7 08/27/2022 06:03 AM    PLT 76 08/27/2022 06:03 AM    MPV 8.4 08/27/2022 06:03 AM     BMP:  Lab Results   Component Value Date/Time     08/27/2022 06:03 AM    K 4.0 08/27/2022 06:03 AM    K 3.9 08/25/2022 12:45 PM     08/27/2022 06:03 AM    CO2 25 08/27/2022 06:03 AM    BUN 18 08/27/2022 06:03 AM    CREATININE <0.5 08/27/2022 06:03 AM    CALCIUM 9.0 08/27/2022 06:03 AM    GFRAA >60 08/27/2022 06:03 AM    LABGLOM >60 08/27/2022 06:03 AM    GLUCOSE 136 08/27/2022 06:03 AM     Magnesium:   Lab Results   Component Value Date/Time    MG 1.80 06/15/2022 05:00 AM    MG 2.10 06/14/2022 06:06 AM    MG 1.90 06/13/2022 08:22 AM     LIVER PROFILE:   Recent Labs     08/25/22  1245 08/27/22  0603   AST 85* 78*   ALT 35 32   LIPASE 33.0  --    BILIDIR  --  5.6*   BILITOT 19.3* 18.0*   ALKPHOS 158* 129     PT/INR:    Lab Results   Component Value Date/Time    PROTIME 24.8 08/25/2022 12:45 PM    PROTIME 25.1 08/12/2022 11:30 AM    PROTIME 23.3 07/29/2022 04:42 AM    INR 2.23 08/25/2022 12:45 PM    INR 2.27 08/12/2022 11:30 AM    INR 2.07 07/29/2022 04:42 AM           IMPRESSION/RECOMMENDATIONS:      41 Y/O female with a PMH of alcohol liver cirrshosis, who presents to the hospital for worsening abdominal distention, abdominal pain, and anemia    #Spur cell anemia  -Rare form of hemolytic anemia 2/2 alcoholic liver cirrhosis  -Multiple spur cells/ acanthocytes seen on peripheral blood smear  -Sonja negative on two occassions in the office thereby autoimmune hemolytic anemia unlikely  -Bone marrow biopsy negative in 07/2022  -Only curative therapy is liver transplant  -Patient will be following up with  for transplant evaluation  -Transfuse 1 unit of PRBCs  -Provided HGB stable tomorrow can be discharged from hematology/oncology standpoint      Daniela Duff MD  [facility]

## 2022-08-27 NOTE — PROGRESS NOTES
Hospitalist Progress Note      PCP: Clifford Spurling, DO    Date of Admission: 8/25/2022    Chief Complaint: Anemia    Hospital Course: Was at hematology office for anemia when she also started having some nausea and abdominal pain. Transferred to emergency department and admitted. Known to have cirrhosis and ascites. Patient is on transplant list with [facility]. This time, patient's abdominal distention is worse. Also noted diarrhea overnight  Gastroenterology consulted      Interval history:  Pt seen and examined today. Overnight events noted, interval ancillary notes and labs reviewed. Status post paracentesis yesterday about 400 cc of fluid removed  Reported that her abdominal pain has improved; denied fever, chills, SOB, chest pain, nausea, vomiting, hematemesis, melena, hematochezia or abdominal pain      Medications:  Reviewed    Infusion Medications    sodium chloride      sodium chloride       Scheduled Medications    lactulose  20 g Oral TID    furosemide  20 mg Oral Daily    magnesium oxide  400 mg Oral BID    nadolol  40 mg Oral Nightly    pantoprazole  40 mg Oral QAM AC    rifAXIMin  550 mg Oral BID    spironolactone  100 mg Oral Daily    thiamine mononitrate  100 mg Oral Daily    sodium chloride flush  5-40 mL IntraVENous 2 times per day     PRN Meds: sodium chloride, LORazepam, zolpidem, sodium chloride flush, sodium chloride, ondansetron **OR** ondansetron, polyethylene glycol, traMADol      Intake/Output Summary (Last 24 hours) at 8/27/2022 1001  Last data filed at 8/26/2022 1600  Gross per 24 hour   Intake 387.32 ml   Output --   Net 387.32 ml         Physical Exam Performed:    /64   Pulse 67   Temp 98.5 °F (36.9 °C) (Oral)   Resp 20   Ht 5' 5\" (1.651 m)   Wt 118 lb 8 oz (53.8 kg)   SpO2 95%   BMI 19.72 kg/m²     General appearance: No apparent distress, appears stated age and cooperative. HEENT: Pupils equal, round, and reactive to light.  Conjunctivae/corneas clear.  Neck: Supple, with full range of motion. No jugular venous distention. Trachea midline. Respiratory:  Normal respiratory effort. Clear to auscultation, bilaterally without Rales/Wheezes/Rhonchi. Cardiovascular: Regular rate and rhythm with normal S1/S2 without murmurs, rubs or gallops. Abdomen: Distention and mild generalized tenderness with no rebound tenderness. Musculoskeletal: No clubbing, cyanosis or edema bilaterally. Full range of motion without deformity. Skin: Skin color, texture, turgor normal.  No rashes or lesions. Neurologic: Cranial nerves: II-XII intact, grossly non-focal.  Psychiatric: Alert and oriented, thought content appropriate, normal insight  Capillary Refill: Brisk,3 seconds, normal   Peripheral Pulses: +2 palpable, equal bilaterally       Labs:   Recent Labs     08/25/22  1245 08/25/22  1929 08/26/22  0411 08/26/22  1645 08/27/22  0603   WBC 4.2  --  4.2  --  3.4*   HGB 6.5*   < > 6.9* 7.1* 7.1*   HCT 18.1*   < > 19.7* 20.1* 20.4*   PLT 82*  --  76*  --  76*    < > = values in this interval not displayed. Recent Labs     08/25/22  1245 08/26/22  0411 08/27/22  0603    141 135*   K 3.9 4.4 4.0   CL 99 108 100   CO2 26 24 25   BUN 23* 19 18   CREATININE 0.7 0.6 <0.5*   CALCIUM 9.3 8.7 9.0       Recent Labs     08/25/22  1245 08/27/22  0603   AST 85* 78*   ALT 35 32   BILIDIR  --  5.6*   BILITOT 19.3* 18.0*   ALKPHOS 158* 129       Recent Labs     08/25/22  1245   INR 2.23*       No results for input(s): Prentis Revels in the last 72 hours.     Urinalysis:      Lab Results   Component Value Date/Time    NITRU Color Interfer 08/25/2022 09:00 PM    WBCUA 2 08/25/2022 09:00 PM    BACTERIA None Seen 08/25/2022 09:00 PM    RBCUA 1 08/25/2022 09:00 PM    BLOODU Color Interfer 08/25/2022 09:00 PM    SPECGRAV Color Interfer 08/25/2022 09:00 PM    GLUCOSEU Color Interfer 08/25/2022 09:00 PM       Radiology:  CT ABDOMEN PELVIS WO CONTRAST Additional Contrast? None   Final Result   1. Redemonstration of cirrhosis with evidence of portal hypertension   including splenomegaly, moderate ascites with third-spacing and varices. 2. Heterogeneity of the hepatic parenchyma with increased attenuation   surrounding the portal triads. Evaluation is limited by the lack of   intravenous contrast.  If there is concern for underlying neoplasm, consider   follow-up MRI. 3. Cholelithiasis with no acute features. 4. Nonobstructive right nephrolithiasis. No evidence of obstructive uropathy. XR CHEST PORTABLE   Final Result   Linear atelectasis at the left lung base. Otherwise no acute cardiopulmonary   disease. IR US GUIDED PARACENTESIS    (Results Pending)           Assessment/Plan:    Active Hospital Problems    Diagnosis     Cirrhosis of liver with ascites, unspecified hepatic cirrhosis type ([facility] [K74.60, R18.8]      Priority: Medium     PLAN:    Cirrhosis with ascites  Gastroenterology on board: S/p paracentesis on 8/26/22; fluid sent for cell count and culture  Continue Lasix, Aldactone, lactulose and Xifaxan  Patient to call  liver transplant office to schedule appointment to see if patient transplant candidate    Anemia: Likely secondary to end-stage liver disease  Found to have hemoglobin 4.8 at hematology office; s/p transfusion. Hemoglobin 7.1 this morning  Bone marrow biopsy showed no evidence of leukemia, lymphoma or MDS. Haptoglobin undetectable, direct Sonja Negative  Transfusion for goal of hemoglobin above 7 and monitor CBC closely    GERD; Continue Protonix same dose. Diarrhea ; No recent exposure to antibiotics. Already on rifaximin. Awaiting C. difficile toxin. Discussed care with patient and RN     DVT Prophylaxis: SCD only due to anemia of unknown reason and cirrhosis related coagulopathy  Diet: ADULT DIET; Regular;  Low Sodium (2 gm)  Code Status: Full Code    PT/OT Eval Status: Ordered    Dispo -inpatient stay, unclear duration pending work-up    Graham Bella MD

## 2022-08-28 VITALS
TEMPERATURE: 98.5 F | RESPIRATION RATE: 16 BRPM | HEIGHT: 65 IN | SYSTOLIC BLOOD PRESSURE: 117 MMHG | HEART RATE: 74 BPM | DIASTOLIC BLOOD PRESSURE: 69 MMHG | OXYGEN SATURATION: 95 % | BODY MASS INDEX: 19.74 KG/M2 | WEIGHT: 118.5 LBS

## 2022-08-28 LAB
ANION GAP SERPL CALCULATED.3IONS-SCNC: 10 MMOL/L (ref 3–16)
BUN BLDV-MCNC: 18 MG/DL (ref 7–20)
CALCIUM SERPL-MCNC: 9.2 MG/DL (ref 8.3–10.6)
CHLORIDE BLD-SCNC: 98 MMOL/L (ref 99–110)
CO2: 26 MMOL/L (ref 21–32)
CREAT SERPL-MCNC: <0.5 MG/DL (ref 0.6–1.1)
GFR AFRICAN AMERICAN: >60
GFR NON-AFRICAN AMERICAN: >60
GLUCOSE BLD-MCNC: 122 MG/DL (ref 70–99)
GLUCOSE BLD-MCNC: 131 MG/DL (ref 70–99)
HCT VFR BLD CALC: 23.3 % (ref 36–48)
HEMOGLOBIN: 8.2 G/DL (ref 12–16)
MCH RBC QN AUTO: 33.8 PG (ref 26–34)
MCHC RBC AUTO-ENTMCNC: 35.1 G/DL (ref 31–36)
MCV RBC AUTO: 96.4 FL (ref 80–100)
PDW BLD-RTO: 21.8 % (ref 12.4–15.4)
PERFORMED ON: ABNORMAL
PLATELET # BLD: 71 K/UL (ref 135–450)
PLATELET SLIDE REVIEW: ABNORMAL
PMV BLD AUTO: 8.5 FL (ref 5–10.5)
POTASSIUM SERPL-SCNC: 4 MMOL/L (ref 3.5–5.1)
RBC # BLD: 2.42 M/UL (ref 4–5.2)
SLIDE REVIEW: ABNORMAL
SODIUM BLD-SCNC: 134 MMOL/L (ref 136–145)
WBC # BLD: 3.7 K/UL (ref 4–11)

## 2022-08-28 PROCEDURE — 80048 BASIC METABOLIC PNL TOTAL CA: CPT

## 2022-08-28 PROCEDURE — 6370000000 HC RX 637 (ALT 250 FOR IP): Performed by: FAMILY MEDICINE

## 2022-08-28 PROCEDURE — 85027 COMPLETE CBC AUTOMATED: CPT

## 2022-08-28 PROCEDURE — 6370000000 HC RX 637 (ALT 250 FOR IP): Performed by: PHYSICIAN ASSISTANT

## 2022-08-28 PROCEDURE — 99232 SBSQ HOSP IP/OBS MODERATE 35: CPT | Performed by: HOSPITALIST

## 2022-08-28 RX ADMIN — TRAMADOL HYDROCHLORIDE 50 MG: 50 TABLET, COATED ORAL at 06:32

## 2022-08-28 RX ADMIN — SPIRONOLACTONE 100 MG: 25 TABLET ORAL at 09:28

## 2022-08-28 RX ADMIN — TRAMADOL HYDROCHLORIDE 50 MG: 50 TABLET, COATED ORAL at 16:13

## 2022-08-28 RX ADMIN — LACTULOSE 20 G: 20 SOLUTION ORAL at 09:27

## 2022-08-28 RX ADMIN — PANTOPRAZOLE SODIUM 40 MG: 40 TABLET, DELAYED RELEASE ORAL at 06:32

## 2022-08-28 RX ADMIN — FUROSEMIDE 20 MG: 20 TABLET ORAL at 09:27

## 2022-08-28 RX ADMIN — THIAMINE HCL TAB 100 MG 100 MG: 100 TAB at 09:27

## 2022-08-28 RX ADMIN — Medication 400 MG: at 09:27

## 2022-08-28 RX ADMIN — RIFAXIMIN 550 MG: 550 TABLET ORAL at 09:27

## 2022-08-28 ASSESSMENT — PAIN SCALES - GENERAL
PAINLEVEL_OUTOF10: 5
PAINLEVEL_OUTOF10: 9

## 2022-08-28 ASSESSMENT — PAIN DESCRIPTION - LOCATION: LOCATION: ABDOMEN

## 2022-08-28 NOTE — PLAN OF CARE
Problem: Discharge Planning  Goal: Discharge to home or other facility with appropriate resources  Outcome: Progressing     Problem: Pain  Goal: Verbalizes/displays adequate comfort level or baseline comfort level  Outcome: Progressing     Problem: Safety - Adult  Goal: Free from fall injury  Outcome: Progressing     Problem: Gastrointestinal - Adult  Goal: Minimal or absence of nausea and vomiting  Outcome: Progressing  Goal: Maintains or returns to baseline bowel function  Outcome: Progressing     Problem: Infection - Adult  Goal: Absence of infection at discharge  Outcome: Progressing  Goal: Absence of infection during hospitalization  Outcome: Progressing     Problem: Metabolic/Fluid and Electrolytes - Adult  Goal: Electrolytes maintained within normal limits  Outcome: Progressing  Goal: Hemodynamic stability and optimal renal function maintained  Outcome: Progressing

## 2022-08-28 NOTE — PROGRESS NOTES
Patient provided with discharge instructions, discussed in detail, new medications reviewed including use and side effects. Patient verbalized understanding. All questions answered, family at the bedside to transport home.   Patient discharged home with all belongings

## 2022-08-29 LAB
BLOOD CULTURE, ROUTINE: NORMAL
BODY FLUID CULTURE, STERILE: NORMAL
CULTURE, BLOOD 2: NORMAL
GRAM STAIN RESULT: NORMAL

## 2022-08-31 NOTE — ED PROVIDER NOTES
In addition to the advanced practice provider, I personally saw Dayana Hawthorne and performed a substantive portion of the visit including all aspects of the medical decision making. Briefly, this is a 40 y.o. female here for ration of acute on chronic end-stage liver disease, cirrhosis pending liver transplant evaluation prior history of alcohol abuse no alcohol use whatsoever for the last 1 to 2 years. Chronic anemia chronic varices, chronic ascites. Sent in for evaluation for paracentesis and generalized weakness. No melena no bright red blood per rectum no hematemesis. On exam, spider angiomas, positive ascites. Normal mental status. ,  Icterus        Screenings   Mirela Coma Scale  Eye Opening: Spontaneous  Best Verbal Response: Oriented  Best Motor Response: Obeys commands  Mirela Coma Scale Score: 15        MDM  Weakness secondary to chronic cirrhosis with acute on chronic anemia , Requiring blood transfusion, no active bleeding, need for ascites needs paracentesis. No concern for SBP or sepsis. Patient Referrals:   liver    Follow up  pt to call  liver transplant office to schedule appt Monday am to see if transplant candidate      Discharge Medications:  Discharge Medication List as of 8/28/2022  2:41 PM          FINAL IMPRESSION  1. Anemia, unspecified type    2. Liver disease    3. Other ascites        Blood pressure 117/69, pulse 74, temperature 98.5 °F (36.9 °C), temperature source Oral, resp. rate 16, height 5' 5\" (1.651 m), weight 118 lb 8 oz (53.8 kg), SpO2 95 %, not currently breastfeeding. For further details of [facility] emergency department encounter, please see documentation by advanced practice provider, norma bethea.     Rosalina Patel MD (electronically signed)  Attending Emergency Physician       Rajeev Zarco MD  [id number]

## 2022-09-23 ENCOUNTER — HOSPITAL ENCOUNTER (OUTPATIENT)
Dept: INTERVENTIONAL RADIOLOGY/VASCULAR | Age: 47
Discharge: HOME | End: 2022-09-23
Payer: COMMERCIAL

## 2022-09-23 VITALS
TEMPERATURE: 97.1 F | DIASTOLIC BLOOD PRESSURE: 44 MMHG | OXYGEN SATURATION: 100 % | RESPIRATION RATE: 16 BRPM | HEART RATE: 58 BPM | SYSTOLIC BLOOD PRESSURE: 105 MMHG

## 2022-09-23 DIAGNOSIS — R18.8 OTHER ASCITES: ICD-10-CM

## 2022-09-23 LAB
APTT: 50 SEC (ref 23–34.3)
INR BLD: 2.5 (ref 0.87–1.14)
PLATELET # BLD: 89 K/UL (ref 135–450)
PROTHROMBIN TIME: 27.1 SEC (ref 11.7–14.5)

## 2022-09-23 PROCEDURE — 76705 ECHO EXAM OF ABDOMEN: CPT

## 2022-09-23 PROCEDURE — 85049 AUTOMATED PLATELET COUNT: CPT

## 2022-09-23 PROCEDURE — 36415 COLL VENOUS BLD VENIPUNCTURE: CPT

## 2022-09-23 PROCEDURE — 85730 THROMBOPLASTIN TIME PARTIAL: CPT

## 2022-09-23 PROCEDURE — 85610 PROTHROMBIN TIME: CPT

## 2022-09-23 NOTE — PROGRESS NOTES
Pt to preop from home. Pt awake alert and oriented, vss, resp even and easy, abd rounded, pt is jaundiced. Labs sent.

## 2022-10-11 ENCOUNTER — APPOINTMENT (OUTPATIENT)
Dept: GENERAL RADIOLOGY | Age: 47
DRG: 280 | End: 2022-10-11
Payer: COMMERCIAL

## 2022-10-11 ENCOUNTER — APPOINTMENT (OUTPATIENT)
Dept: CT IMAGING | Age: 47
DRG: 280 | End: 2022-10-11
Payer: COMMERCIAL

## 2022-10-11 ENCOUNTER — HOSPITAL ENCOUNTER (INPATIENT)
Age: 47
LOS: 7 days | Discharge: HOME HEALTH | DRG: 280 | End: 2022-10-18
Attending: EMERGENCY MEDICINE | Admitting: INTERNAL MEDICINE
Payer: COMMERCIAL

## 2022-10-11 DIAGNOSIS — D64.9 ANEMIA, UNSPECIFIED TYPE: ICD-10-CM

## 2022-10-11 DIAGNOSIS — Z79.899 POLYPHARMACY: ICD-10-CM

## 2022-10-11 DIAGNOSIS — K76.82 ACUTE HEPATIC ENCEPHALOPATHY: Primary | ICD-10-CM

## 2022-10-11 LAB
A/G RATIO: 1.3 (ref 1.1–2.2)
ABO/RH: NORMAL
ALBUMIN SERPL-MCNC: 3.4 G/DL (ref 3.4–5)
ALP BLD-CCNC: 188 U/L (ref 40–129)
ALT SERPL-CCNC: 39 U/L (ref 10–40)
AMMONIA: 98 UMOL/L (ref 11–51)
AMPHETAMINE SCREEN, URINE: NORMAL
ANION GAP SERPL CALCULATED.3IONS-SCNC: 12 MMOL/L (ref 3–16)
ANISOCYTOSIS: ABNORMAL
ANTIBODY SCREEN: NORMAL
AST SERPL-CCNC: 80 U/L (ref 15–37)
BARBITURATE SCREEN URINE: NORMAL
BASOPHILS ABSOLUTE: 0 K/UL (ref 0–0.2)
BASOPHILS RELATIVE PERCENT: 1 %
BENZODIAZEPINE SCREEN, URINE: NORMAL
BILIRUB SERPL-MCNC: 15.5 MG/DL (ref 0–1)
BILIRUBIN URINE: ABNORMAL
BLOOD BANK DISPENSE STATUS: NORMAL
BLOOD BANK PRODUCT CODE: NORMAL
BLOOD, URINE: NEGATIVE
BPU ID: NORMAL
BUN BLDV-MCNC: 31 MG/DL (ref 7–20)
BURR CELLS: ABNORMAL
CALCIUM SERPL-MCNC: 9.8 MG/DL (ref 8.3–10.6)
CANNABINOID SCREEN URINE: NORMAL
CHLORIDE BLD-SCNC: 106 MMOL/L (ref 99–110)
CLARITY: CLEAR
CO2: 21 MMOL/L (ref 21–32)
COCAINE METABOLITE SCREEN URINE: NORMAL
COLOR: ABNORMAL
CREAT SERPL-MCNC: 1.1 MG/DL (ref 0.6–1.1)
DESCRIPTION BLOOD BANK: NORMAL
EOSINOPHILS ABSOLUTE: 0.1 K/UL (ref 0–0.6)
EOSINOPHILS RELATIVE PERCENT: 2 %
ETHANOL: NORMAL MG/DL (ref 0–0.08)
FENTANYL SCREEN, URINE: NORMAL
GFR AFRICAN AMERICAN: >60
GFR NON-AFRICAN AMERICAN: 54
GLUCOSE BLD-MCNC: 114 MG/DL (ref 70–99)
GLUCOSE URINE: NEGATIVE MG/DL
HCG QUALITATIVE: NEGATIVE
HCT VFR BLD CALC: 17.6 % (ref 36–48)
HEMOGLOBIN: 6.1 G/DL (ref 12–16)
INR BLD: 2.35 (ref 0.87–1.14)
KETONES, URINE: NEGATIVE MG/DL
LACTIC ACID, SEPSIS: 1.6 MMOL/L (ref 0.4–1.9)
LEUKOCYTE ESTERASE, URINE: NEGATIVE
LYMPHOCYTES ABSOLUTE: 0.5 K/UL (ref 1–5.1)
LYMPHOCYTES RELATIVE PERCENT: 16 %
Lab: NORMAL
MACROCYTES: ABNORMAL
MCH RBC QN AUTO: 37.6 PG (ref 26–34)
MCHC RBC AUTO-ENTMCNC: 34.9 G/DL (ref 31–36)
MCV RBC AUTO: 107.7 FL (ref 80–100)
METHADONE SCREEN, URINE: NORMAL
MICROSCOPIC EXAMINATION: ABNORMAL
MONOCYTES ABSOLUTE: 0.2 K/UL (ref 0–1.3)
MONOCYTES RELATIVE PERCENT: 8 %
NEUTROPHILS ABSOLUTE: 2.2 K/UL (ref 1.7–7.7)
NEUTROPHILS RELATIVE PERCENT: 73 %
NITRITE, URINE: NEGATIVE
OPIATE SCREEN URINE: NORMAL
OXYCODONE URINE: NORMAL
PDW BLD-RTO: 13.9 % (ref 12.4–15.4)
PH UA: 8
PH UA: 8 (ref 5–8)
PHENCYCLIDINE SCREEN URINE: NORMAL
PLATELET # BLD: 65 K/UL (ref 135–450)
PLATELET SLIDE REVIEW: ABNORMAL
PMV BLD AUTO: 8.8 FL (ref 5–10.5)
POLYCHROMASIA: ABNORMAL
POTASSIUM REFLEX MAGNESIUM: 4.7 MMOL/L (ref 3.5–5.1)
PRO-BNP: 479 PG/ML (ref 0–124)
PROCALCITONIN: 0.39 NG/ML (ref 0–0.15)
PROTEIN UA: NEGATIVE MG/DL
PROTHROMBIN TIME: 25.8 SEC (ref 11.7–14.5)
RBC # BLD: 1.63 M/UL (ref 4–5.2)
SLIDE REVIEW: ABNORMAL
SODIUM BLD-SCNC: 139 MMOL/L (ref 136–145)
SPECIFIC GRAVITY UA: 1.01 (ref 1–1.03)
TOTAL PROTEIN: 6 G/DL (ref 6.4–8.2)
TROPONIN: <0.01 NG/ML
URINE REFLEX TO CULTURE: ABNORMAL
URINE TYPE: ABNORMAL
UROBILINOGEN, URINE: 2 E.U./DL
WBC # BLD: 3 K/UL (ref 4–11)

## 2022-10-11 PROCEDURE — 36415 COLL VENOUS BLD VENIPUNCTURE: CPT

## 2022-10-11 PROCEDURE — 71045 X-RAY EXAM CHEST 1 VIEW: CPT

## 2022-10-11 PROCEDURE — 2060000000 HC ICU INTERMEDIATE R&B

## 2022-10-11 PROCEDURE — P9016 RBC LEUKOCYTES REDUCED: HCPCS

## 2022-10-11 PROCEDURE — 99285 EMERGENCY DEPT VISIT HI MDM: CPT

## 2022-10-11 PROCEDURE — 87040 BLOOD CULTURE FOR BACTERIA: CPT

## 2022-10-11 PROCEDURE — 70450 CT HEAD/BRAIN W/O DYE: CPT

## 2022-10-11 PROCEDURE — 84145 PROCALCITONIN (PCT): CPT

## 2022-10-11 PROCEDURE — 85025 COMPLETE CBC W/AUTO DIFF WBC: CPT

## 2022-10-11 PROCEDURE — 85610 PROTHROMBIN TIME: CPT

## 2022-10-11 PROCEDURE — 86850 RBC ANTIBODY SCREEN: CPT

## 2022-10-11 PROCEDURE — 74176 CT ABD & PELVIS W/O CONTRAST: CPT

## 2022-10-11 PROCEDURE — 80307 DRUG TEST PRSMV CHEM ANLYZR: CPT

## 2022-10-11 PROCEDURE — 80053 COMPREHEN METABOLIC PANEL: CPT

## 2022-10-11 PROCEDURE — 82140 ASSAY OF AMMONIA: CPT

## 2022-10-11 PROCEDURE — 81003 URINALYSIS AUTO W/O SCOPE: CPT

## 2022-10-11 PROCEDURE — 84703 CHORIONIC GONADOTROPIN ASSAY: CPT

## 2022-10-11 PROCEDURE — 93005 ELECTROCARDIOGRAM TRACING: CPT | Performed by: EMERGENCY MEDICINE

## 2022-10-11 PROCEDURE — 86923 COMPATIBILITY TEST ELECTRIC: CPT

## 2022-10-11 PROCEDURE — 86900 BLOOD TYPING SEROLOGIC ABO: CPT

## 2022-10-11 PROCEDURE — 83880 ASSAY OF NATRIURETIC PEPTIDE: CPT

## 2022-10-11 PROCEDURE — 83605 ASSAY OF LACTIC ACID: CPT

## 2022-10-11 PROCEDURE — 82077 ASSAY SPEC XCP UR&BREATH IA: CPT

## 2022-10-11 PROCEDURE — 86901 BLOOD TYPING SEROLOGIC RH(D): CPT

## 2022-10-11 PROCEDURE — 84484 ASSAY OF TROPONIN QUANT: CPT

## 2022-10-11 RX ORDER — LACTULOSE 10 G/15ML
30 SOLUTION ORAL ONCE
Status: COMPLETED | OUTPATIENT
Start: 2022-10-11 | End: 2022-10-12

## 2022-10-11 RX ORDER — SODIUM CHLORIDE 9 MG/ML
INJECTION, SOLUTION INTRAVENOUS PRN
Status: DISCONTINUED | OUTPATIENT
Start: 2022-10-11 | End: 2022-10-14

## 2022-10-12 LAB
ALBUMIN SERPL-MCNC: 3.2 G/DL (ref 3.4–5)
ALP BLD-CCNC: 176 U/L (ref 40–129)
ALT SERPL-CCNC: 39 U/L (ref 10–40)
AMMONIA: 64 UMOL/L (ref 11–51)
ANION GAP SERPL CALCULATED.3IONS-SCNC: 14 MMOL/L (ref 3–16)
ANISOCYTOSIS: ABNORMAL
AST SERPL-CCNC: 74 U/L (ref 15–37)
BASOPHILS ABSOLUTE: 0 K/UL (ref 0–0.2)
BASOPHILS RELATIVE PERCENT: 1 %
BILIRUB SERPL-MCNC: 17 MG/DL (ref 0–1)
BILIRUBIN DIRECT: 4.7 MG/DL (ref 0–0.3)
BILIRUBIN, INDIRECT: 12.3 MG/DL (ref 0–1)
BUN BLDV-MCNC: 30 MG/DL (ref 7–20)
BURR CELLS: ABNORMAL
CALCIUM SERPL-MCNC: 9.8 MG/DL (ref 8.3–10.6)
CHLORIDE BLD-SCNC: 108 MMOL/L (ref 99–110)
CO2: 18 MMOL/L (ref 21–32)
CREAT SERPL-MCNC: 0.8 MG/DL (ref 0.6–1.1)
EKG ATRIAL RATE: 65 BPM
EKG DIAGNOSIS: NORMAL
EKG P AXIS: 25 DEGREES
EKG P-R INTERVAL: 142 MS
EKG Q-T INTERVAL: 440 MS
EKG QRS DURATION: 86 MS
EKG QTC CALCULATION (BAZETT): 457 MS
EKG R AXIS: 25 DEGREES
EKG T AXIS: 27 DEGREES
EKG VENTRICULAR RATE: 65 BPM
EOSINOPHILS ABSOLUTE: 0.2 K/UL (ref 0–0.6)
EOSINOPHILS RELATIVE PERCENT: 4 %
GFR AFRICAN AMERICAN: >60
GFR NON-AFRICAN AMERICAN: >60
GLUCOSE BLD-MCNC: 112 MG/DL (ref 70–99)
HCT VFR BLD CALC: 22.8 % (ref 36–48)
HCT VFR BLD CALC: 23.1 % (ref 36–48)
HEMATOLOGY PATH CONSULT: NO
HEMOGLOBIN: 7.9 G/DL (ref 12–16)
HEMOGLOBIN: 8.1 G/DL (ref 12–16)
LYMPHOCYTES ABSOLUTE: 0.9 K/UL (ref 1–5.1)
LYMPHOCYTES RELATIVE PERCENT: 19 %
MACROCYTES: ABNORMAL
MCH RBC QN AUTO: 36.4 PG (ref 26–34)
MCHC RBC AUTO-ENTMCNC: 34.9 G/DL (ref 31–36)
MCV RBC AUTO: 104.3 FL (ref 80–100)
MICROCYTES: ABNORMAL
MONOCYTES ABSOLUTE: 0.4 K/UL (ref 0–1.3)
MONOCYTES RELATIVE PERCENT: 8 %
NEUTROPHILS ABSOLUTE: 3.3 K/UL (ref 1.7–7.7)
NEUTROPHILS RELATIVE PERCENT: 68 %
OVALOCYTES: ABNORMAL
PDW BLD-RTO: 16.1 % (ref 12.4–15.4)
PLATELET # BLD: 66 K/UL (ref 135–450)
PLATELET SLIDE REVIEW: ABNORMAL
PMV BLD AUTO: 9.1 FL (ref 5–10.5)
POLYCHROMASIA: ABNORMAL
POTASSIUM SERPL-SCNC: 4.6 MMOL/L (ref 3.5–5.1)
RBC # BLD: 2.18 M/UL (ref 4–5.2)
SCHISTOCYTES: ABNORMAL
SLIDE REVIEW: ABNORMAL
SODIUM BLD-SCNC: 140 MMOL/L (ref 136–145)
TOTAL PROTEIN: 6.4 G/DL (ref 6.4–8.2)
WBC # BLD: 4.9 K/UL (ref 4–11)

## 2022-10-12 PROCEDURE — 80076 HEPATIC FUNCTION PANEL: CPT

## 2022-10-12 PROCEDURE — 36415 COLL VENOUS BLD VENIPUNCTURE: CPT

## 2022-10-12 PROCEDURE — 6370000000 HC RX 637 (ALT 250 FOR IP): Performed by: PHYSICIAN ASSISTANT

## 2022-10-12 PROCEDURE — 85018 HEMOGLOBIN: CPT

## 2022-10-12 PROCEDURE — 94760 N-INVAS EAR/PLS OXIMETRY 1: CPT

## 2022-10-12 PROCEDURE — 85014 HEMATOCRIT: CPT

## 2022-10-12 PROCEDURE — 85025 COMPLETE CBC W/AUTO DIFF WBC: CPT

## 2022-10-12 PROCEDURE — 82140 ASSAY OF AMMONIA: CPT

## 2022-10-12 PROCEDURE — 6370000000 HC RX 637 (ALT 250 FOR IP): Performed by: INTERNAL MEDICINE

## 2022-10-12 PROCEDURE — 2060000000 HC ICU INTERMEDIATE R&B

## 2022-10-12 PROCEDURE — 6370000000 HC RX 637 (ALT 250 FOR IP): Performed by: EMERGENCY MEDICINE

## 2022-10-12 PROCEDURE — 93010 ELECTROCARDIOGRAM REPORT: CPT | Performed by: INTERNAL MEDICINE

## 2022-10-12 PROCEDURE — 80048 BASIC METABOLIC PNL TOTAL CA: CPT

## 2022-10-12 PROCEDURE — 2580000003 HC RX 258: Performed by: INTERNAL MEDICINE

## 2022-10-12 RX ORDER — LACTULOSE 10 G/15ML
20 SOLUTION ORAL EVERY 6 HOURS SCHEDULED
Status: DISCONTINUED | OUTPATIENT
Start: 2022-10-12 | End: 2022-10-16

## 2022-10-12 RX ORDER — SODIUM CHLORIDE 9 MG/ML
INJECTION, SOLUTION INTRAVENOUS PRN
Status: DISCONTINUED | OUTPATIENT
Start: 2022-10-12 | End: 2022-10-18 | Stop reason: HOSPADM

## 2022-10-12 RX ORDER — PANTOPRAZOLE SODIUM 40 MG/1
40 TABLET, DELAYED RELEASE ORAL
Status: DISCONTINUED | OUTPATIENT
Start: 2022-10-12 | End: 2022-10-18 | Stop reason: HOSPADM

## 2022-10-12 RX ORDER — ENOXAPARIN SODIUM 100 MG/ML
30 INJECTION SUBCUTANEOUS DAILY
Status: DISCONTINUED | OUTPATIENT
Start: 2022-10-13 | End: 2022-10-18 | Stop reason: HOSPADM

## 2022-10-12 RX ORDER — POLYETHYLENE GLYCOL 3350 17 G/17G
17 POWDER, FOR SOLUTION ORAL DAILY PRN
Status: DISCONTINUED | OUTPATIENT
Start: 2022-10-12 | End: 2022-10-18 | Stop reason: HOSPADM

## 2022-10-12 RX ORDER — ENOXAPARIN SODIUM 100 MG/ML
40 INJECTION SUBCUTANEOUS DAILY
Status: DISCONTINUED | OUTPATIENT
Start: 2022-10-12 | End: 2022-10-12

## 2022-10-12 RX ORDER — ACETAMINOPHEN 650 MG/1
650 SUPPOSITORY RECTAL EVERY 6 HOURS PRN
Status: DISCONTINUED | OUTPATIENT
Start: 2022-10-12 | End: 2022-10-18 | Stop reason: HOSPADM

## 2022-10-12 RX ORDER — SODIUM CHLORIDE 0.9 % (FLUSH) 0.9 %
5-40 SYRINGE (ML) INJECTION EVERY 12 HOURS SCHEDULED
Status: DISCONTINUED | OUTPATIENT
Start: 2022-10-12 | End: 2022-10-18 | Stop reason: HOSPADM

## 2022-10-12 RX ORDER — SPIRONOLACTONE 25 MG/1
100 TABLET ORAL DAILY
Status: DISCONTINUED | OUTPATIENT
Start: 2022-10-12 | End: 2022-10-18 | Stop reason: HOSPADM

## 2022-10-12 RX ORDER — LANOLIN ALCOHOL/MO/W.PET/CERES
100 CREAM (GRAM) TOPICAL DAILY
Status: DISCONTINUED | OUTPATIENT
Start: 2022-10-12 | End: 2022-10-12 | Stop reason: SDUPTHER

## 2022-10-12 RX ORDER — ONDANSETRON 2 MG/ML
4 INJECTION INTRAMUSCULAR; INTRAVENOUS EVERY 6 HOURS PRN
Status: DISCONTINUED | OUTPATIENT
Start: 2022-10-12 | End: 2022-10-18 | Stop reason: HOSPADM

## 2022-10-12 RX ORDER — ACETAMINOPHEN 325 MG/1
650 TABLET ORAL EVERY 6 HOURS PRN
Status: DISCONTINUED | OUTPATIENT
Start: 2022-10-12 | End: 2022-10-18 | Stop reason: HOSPADM

## 2022-10-12 RX ORDER — GAUZE BANDAGE 2" X 2"
100 BANDAGE TOPICAL DAILY
Status: DISCONTINUED | OUTPATIENT
Start: 2022-10-12 | End: 2022-10-18 | Stop reason: HOSPADM

## 2022-10-12 RX ORDER — LACTULOSE 10 G/15ML
20 SOLUTION ORAL
Status: DISCONTINUED | OUTPATIENT
Start: 2022-10-12 | End: 2022-10-12

## 2022-10-12 RX ORDER — LACTULOSE 10 G/15ML
30 SOLUTION ORAL ONCE
Status: DISCONTINUED | OUTPATIENT
Start: 2022-10-12 | End: 2022-10-12

## 2022-10-12 RX ORDER — SODIUM CHLORIDE 0.9 % (FLUSH) 0.9 %
5-40 SYRINGE (ML) INJECTION PRN
Status: DISCONTINUED | OUTPATIENT
Start: 2022-10-12 | End: 2022-10-18 | Stop reason: HOSPADM

## 2022-10-12 RX ORDER — ONDANSETRON 4 MG/1
4 TABLET, ORALLY DISINTEGRATING ORAL EVERY 8 HOURS PRN
Status: DISCONTINUED | OUTPATIENT
Start: 2022-10-12 | End: 2022-10-18 | Stop reason: HOSPADM

## 2022-10-12 RX ORDER — LORAZEPAM 0.5 MG/1
0.5 TABLET ORAL EVERY 8 HOURS PRN
Status: DISCONTINUED | OUTPATIENT
Start: 2022-10-12 | End: 2022-10-18 | Stop reason: HOSPADM

## 2022-10-12 RX ORDER — FUROSEMIDE 20 MG/1
20 TABLET ORAL DAILY
Status: DISCONTINUED | OUTPATIENT
Start: 2022-10-12 | End: 2022-10-18 | Stop reason: HOSPADM

## 2022-10-12 RX ORDER — NADOLOL 40 MG/1
40 TABLET ORAL DAILY
Status: DISCONTINUED | OUTPATIENT
Start: 2022-10-12 | End: 2022-10-18 | Stop reason: HOSPADM

## 2022-10-12 RX ADMIN — RIFAXIMIN 550 MG: 550 TABLET ORAL at 09:10

## 2022-10-12 RX ADMIN — FUROSEMIDE 20 MG: 20 TABLET ORAL at 09:10

## 2022-10-12 RX ADMIN — Medication 100 MG: at 09:10

## 2022-10-12 RX ADMIN — SPIRONOLACTONE 100 MG: 25 TABLET ORAL at 09:10

## 2022-10-12 RX ADMIN — LACTULOSE 30 G: 20 SOLUTION ORAL at 00:08

## 2022-10-12 RX ADMIN — RIFAXIMIN 550 MG: 550 TABLET ORAL at 20:58

## 2022-10-12 RX ADMIN — NADOLOL 40 MG: 40 TABLET ORAL at 09:10

## 2022-10-12 RX ADMIN — RIFAXIMIN 550 MG: 550 TABLET ORAL at 02:43

## 2022-10-12 RX ADMIN — LACTULOSE 10 G: 20 SOLUTION ORAL at 17:58

## 2022-10-12 RX ADMIN — SODIUM CHLORIDE, PRESERVATIVE FREE 10 ML: 5 INJECTION INTRAVENOUS at 09:11

## 2022-10-12 RX ADMIN — PANTOPRAZOLE SODIUM 40 MG: 40 TABLET, DELAYED RELEASE ORAL at 09:12

## 2022-10-12 RX ADMIN — SPIRONOLACTONE 100 MG: 25 TABLET ORAL at 09:00

## 2022-10-12 RX ADMIN — LORAZEPAM 0.5 MG: 0.5 TABLET ORAL at 20:58

## 2022-10-12 RX ADMIN — SODIUM CHLORIDE, PRESERVATIVE FREE 10 ML: 5 INJECTION INTRAVENOUS at 20:58

## 2022-10-12 ASSESSMENT — PAIN SCALES - GENERAL
PAINLEVEL_OUTOF10: 0
PAINLEVEL_OUTOF10: 0

## 2022-10-12 NOTE — PLAN OF CARE
Problem: Discharge Planning  Goal: Discharge to home or other facility with appropriate resources  Outcome: Progressing     Problem: Confusion  Goal: Confusion, delirium, dementia, or psychosis is improved or at baseline  Description: INTERVENTIONS:  1. Assess for possible contributors to thought disturbance, including medications, impaired vision or hearing, underlying metabolic abnormalities, dehydration, psychiatric diagnoses, and notify attending LIP  2. [place] high risk fall precautions, as indicated  3. Provide frequent short contacts to provide reality reorientation, refocusing and direction  4. Decrease environmental stimuli, including noise as appropriate  5. Monitor and intervene to maintain adequate nutrition, hydration, elimination, sleep and activity  6. If unable to ensure safety without constant attention obtain sitter and review sitter guidelines with assigned personnel  7.  Initiate Psychosocial CNS and Spiritual Care consult, as indicated  Outcome: Progressing  Note: Pt seems to be more aware of self and situation, still unaware of place and time     Problem: ABCDS Injury Assessment  Goal: Absence of physical injury  Outcome: Progressing     Problem: Safety - Adult  Goal: Free from fall injury  Outcome: Progressing

## 2022-10-12 NOTE — ED PROVIDER NOTES
[facility] Emergency Department    Herbie Jefferson MD, am the primary clinician of record. CHIEF COMPLAINT  Chief Complaint   Patient presents with    Altered Mental Status     Pt arrived from home via ems with AMS   Ems stated she is alert and oriented x2, wanted to sign refusal but was unable to follow instructions to do so  Pt when asked why she was here, could not tell me, asked her where she was pt stated   Pt has hx of liver failure, pt is visibly jaundiced. HISTORY OF PRESENT ILLNESS  Ban Dangelo is a 40 y.o. female  who presents to the ED complaining of general altered mental status. This is been getting worse over the past few days to weeks in particular. History is essentially obtained from the daughter exclusively who is a nurse, as the patient herself mostly just states \"no, I am fine\" and is clearly disoriented and confused and does not answer questions appropriately. She does follow some commands though. She is visibly jaundiced and has a history of alcoholic cirrhosis but has been sober for more than 5 months according to the daughter. Unknown if the patient has fallen, she denies this and the daughter saw no evidence of this but she does live by herself and so it is a possibility. She has apparently been noncompliant with some of her lactulose although has been supposedly on her Xifaxan and follows with GI. She frequently requires blood transfusions as well due to her liver disease. She denies any bloody stools when I asked her this. She has not had any vomiting. No fevers. The patient denies any acute abdominal pains. She does not have a headache when I ask. That being said her history is fairly unreliable due to her mental status change. She does know that she is in the hospital but is not able to tell me why she is here. Daughter states that this is the most confused she has ever been.     No other complaints, modifying factors or associated symptoms. I have reviewed the following from the nursing documentation.     Past Medical History:   Diagnosis Date    Alcoholic liver disease ([address]     Anemia     History of blood transfusion      Past Surgical History:   Procedure Laterality Date    COLONOSCOPY N/A 3/11/2021    COLONOSCOPY POLYPECTOMY SNARE/COLD BIOPSY performed by Abdullahi Dickerson MD at [address] [facility] ENDOSCOPY    CT BONE MARROW BIOPSY  7/5/2022    CT BONE MARROW BIOPSY 7/5/2022 Juliano Lacy MD [facility] CT SCAN    KNEE ARTHROPLASTY      TUBAL LIGATION      ESSURE    UPPER GASTROINTESTINAL ENDOSCOPY N/A 01/30/2021    EGD DIAGNOSTIC ONLY performed by Ann Medina MD at [address] [place] [address] N/A 03/10/2021    EGD BIOPSY performed by Abdullahi Dickerson MD at [address] [place] [address] N/A 6/27/2021    EGD BAND LIGATION performed by Malcom Parra MD at [address] [place] [address] ENDOSCOPY N/A 4/27/2022    EGD DIAGNOSTIC ONLY performed by Ruel Mcnulty MD at [address] [place] [address] N/A 7/1/2022    EGD ESOPHAGOGASTRODUODENOSCOPY ULTRASOUND performed by Clifton Morejon MD at [address] [place] [address] N/A 7/1/2022    EGD BAND LIGATION performed by Clifton Morejon MD at [address] [place] [address]     Family History   Problem Relation Age of Onset    Alcohol Abuse Father      Social History     Socioeconomic History    Marital status: Single     Spouse name: Not on file    Number of children: 2    Years of education: 14    Highest education level: Not on file   Occupational History    Not on file   Tobacco Use    Smoking status: Former    Smokeless tobacco: Never   Vaping Use    Vaping Use: Never used   Substance and Sexual Activity    Alcohol use: Not Currently    Drug use: Never    Sexual activity: Not Currently     Partners: Male   Other Topics Concern    Not on file   Social History Narrative    Not on file     Social Determinants of Health     Financial Resource Strain: Not on file   Food Insecurity: Not on file   Transportation Needs: Not on file   Physical Activity: Not on file   Stress: Not on file   Social Connections: Not on file   Intimate Partner Violence: Not on file   Housing Stability: Not on file     Current Facility-Administered Medications   Medication Dose Route Frequency Provider Last Rate Last Admin    lactulose (CHRONULAC) 10 GM/15ML solution 30 g  30 g Oral Once Sb Sin MD        0.9 % sodium chloride infusion   IntraVENous PRN Sb Sin MD         Current Outpatient Medications   Medication Sig Dispense Refill    lactulose (CHRONULAC) 10 GM/15ML solution Take 30 mLs by mouth every 3 hours 237 mL 1    furosemide (LASIX) 20 MG tablet Take 1 tablet by mouth in the morning. 60 tablet 3    LORazepam (ATIVAN) 0.5 MG tablet Take 0.5 mg by mouth every 8 hours as needed for Anxiety. ondansetron (ZOFRAN) 8 MG tablet Take 1 tablet by mouth      zolpidem (AMBIEN) 5 MG tablet Take 1 tablet by mouth.      nadolol (CORGARD) 40 MG tablet Take 1 tablet by mouth daily 30 tablet 0    spironolactone (ALDACTONE) 100 MG tablet Take 1 tablet by mouth daily 30 tablet 1    magnesium oxide (MAG-OX) 400 (240 Mg) MG tablet Take 1 tablet by mouth 2 times daily 60 tablet 0    pantoprazole (PROTONIX) 40 MG tablet Take 1 tablet by mouth every morning (before breakfast) 30 tablet 1    thiamine mononitrate (THIAMINE) 100 MG tablet Take 1 tablet by mouth daily 30 tablet 0     Allergies   Allergen Reactions    Oxycodone Nausea And Vomiting       REVIEW OF SYSTEMS  10 systems reviewed, pertinent positives per HPI otherwise noted to be negative. PHYSICAL EXAM  BP (!) 120/57   Pulse 65   Temp 98.1 °F (36.7 °C)   Resp 16   SpO2 98%    GENERAL APPEARANCE: Fatigued and lethargic but responsive to voice Cooperative. No distress. HENT: Normocephalic. Atraumatic. Mucous membranes are dry. NECK: Supple.     EYES: PERRL. EOM's grossly intact. Scleral icterus noted. HEART/CHEST: RRR. No murmurs. No chest wall tenderness. LUNGS: Respirations unlabored. Minimal bibasilar rhonchi, no rales or wheezing. Good air exchange. Speaking comfortably in full sentences. ABDOMEN: Minimal tenderness diffusely, +asterixis noted, +mild distention but no fluid wave or tense ascites. Normal BS throughout. MUSCULOSKELETAL: Trace BLE edema. Compartments soft. No deformity. No tenderness in the extremities. All extremities neurovascularly intact. SKIN: Warm and dry. No acute rashes. Jaundice noted diffusely. NEUROLOGICAL: Fatigued and lethargic, disoriented and confused, but follows all commands and has 5 out of 5 strength in all 4 extremities as well as sensation intact in all 4 extremities with 2+ DTRs bilaterally. Gait not assessed. Cranial nerves II through XII grossly intact. PSYCHIATRIC: Normal mood and affect. LABS  I have reviewed all labs for this visit.    Results for orders placed or performed during the hospital encounter of 10/11/22   CBC with Auto Differential   Result Value Ref Range    WBC 3.0 (L) 4.0 - 11.0 K/uL    RBC 1.63 (L) 4.00 - 5.20 M/uL    Hemoglobin 6.1 (LL) 12.0 - 16.0 g/dL    Hematocrit 17.6 (LL) 36.0 - 48.0 %    .7 (H) 80.0 - 100.0 fL    MCH 37.6 (H) 26.0 - 34.0 pg    MCHC 34.9 31.0 - 36.0 g/dL    RDW 13.9 12.4 - 15.4 %    Platelets 65 (L) 861 - 450 K/uL    MPV 8.8 5.0 - 10.5 fL    PLATELET SLIDE REVIEW Decreased     SLIDE REVIEW see below     Neutrophils % 73.0 %    Lymphocytes % 16.0 %    Monocytes % 8.0 %    Eosinophils % 2.0 %    Basophils % 1.0 %    Neutrophils Absolute 2.2 1.7 - 7.7 K/uL    Lymphocytes Absolute 0.5 (L) 1.0 - 5.1 K/uL    Monocytes Absolute 0.2 0.0 - 1.3 K/uL    Eosinophils Absolute 0.1 0.0 - 0.6 K/uL    Basophils Absolute 0.0 0.0 - 0.2 K/uL    Anisocytosis Occasional (A)     Macrocytes 1+ (A)     Polychromasia Occasional (A)     Abdoul Cells 1+ (A)    Comprehensive Metabolic Panel w/ Reflex to MG   Result Value Ref Range    Sodium 139 136 - 145 mmol/L    Potassium reflex Magnesium 4.7 3.5 - 5.1 mmol/L    Chloride 106 99 - 110 mmol/L    CO2 21 21 - 32 mmol/L    Anion Gap 12 3 - 16    Glucose 114 (H) 70 - 99 mg/dL    BUN 31 (H) 7 - 20 mg/dL    Creatinine 1.1 0.6 - 1.1 mg/dL    GFR Non-African American 54 (A) >60    GFR African American >60 >60    Calcium 9.8 8.3 - 10.6 mg/dL    Total Protein 6.0 (L) 6.4 - 8.2 g/dL    Albumin 3.4 3.4 - 5.0 g/dL    Albumin/Globulin Ratio 1.3 1.1 - 2.2    Total Bilirubin 15.5 (H) 0.0 - 1.0 mg/dL    Alkaline Phosphatase 188 (H) 40 - 129 U/L    ALT 39 10 - 40 U/L    AST 80 (H) 15 - 37 U/L   Protime-INR   Result Value Ref Range    Protime 25.8 (H) 11.7 - 14.5 sec    INR 2.35 (H) 0.87 - 1.14   Troponin   Result Value Ref Range    Troponin <0.01 <0.01 ng/mL   Brain Natriuretic Peptide   Result Value Ref Range    Pro- (H) 0 - 124 pg/mL   Lactate, Sepsis   Result Value Ref Range    Lactic Acid, Sepsis 1.6 0.4 - 1.9 mmol/L   Ammonia   Result Value Ref Range    Ammonia 98 (H) 11 - 51 umol/L   Procalcitonin   Result Value Ref Range    Procalcitonin 0.39 (H) 0.00 - 0.15 ng/mL   Ethanol   Result Value Ref Range    Ethanol Lvl None Detected mg/dL   Urine Drug Screen   Result Value Ref Range    Amphetamine Screen, Urine Neg Negative <1000ng/mL    Barbiturate Screen, Ur Neg Negative <200 ng/mL    Benzodiazepine Screen, Urine Neg Negative <200 ng/mL    Cannabinoid Scrn, Ur Neg Negative <50 ng/mL    Cocaine Metabolite Screen, Urine Neg Negative <300 ng/mL    Opiate Scrn, Ur Neg Negative <300 ng/mL    PCP Screen, Urine Neg Negative <25 ng/mL    Methadone Screen, Urine Neg Negative <300 ng/mL    Oxycodone Urine Neg Negative <100 ng/ml    FENTANYL SCREEN, URINE Neg Negative <5 ng/mL    pH, UA 8.0     Drug Screen Comment: see below    Urinalysis with Reflex to Culture    Specimen: Urine, clean catch   Result Value Ref Range    Color, UA DARK YELLOW (A) Straw/Yellow    Clarity, UA Clear Clear    Glucose, Ur Negative Negative mg/dL    Bilirubin Urine SMALL (A) Negative    Ketones, Urine Negative Negative mg/dL    Specific Gravity, UA 1.010 1.005 - 1.030    Blood, Urine Negative Negative    pH, UA 8.0 5.0 - 8.0    Protein, UA Negative Negative mg/dL    Urobilinogen, Urine 2.0 (A) <2.0 E.U./dL    Nitrite, Urine Negative Negative    Leukocyte Esterase, Urine Negative Negative    Microscopic Examination Not Indicated     Urine Type NotGiven     Urine Reflex to Culture Not Indicated    hCG, serum, qualitative   Result Value Ref Range    hCG Qual Negative Detects HCG level >10 MIU/mL   EKG 12 Lead   Result Value Ref Range    Ventricular Rate 65 BPM    Atrial Rate 65 BPM    P-R Interval 142 ms    QRS Duration 86 ms    Q-T Interval 440 ms    QTc Calculation (Bazett) 457 ms    P Axis 25 degrees    R Axis 25 degrees    T Axis 27 degrees    Diagnosis       Normal sinus rhythm with sinus arrhythmiaST abnormality, possible digitalis effectAbnormal ECG   TYPE AND SCREEN   Result Value Ref Range    ABO/Rh A POS     Antibody Screen NEG    PREPARE RBC (CROSSMATCH), 1 Units   Result Value Ref Range    Product Code Blood Bank [id number]     Description Blood Bank Red Blood Cells, Leuko-reduced     Unit Number [id number]     Dispense Status Blood Bank selected        The 12 lead EKG was interpreted by me as follows:  Rate: normal with a rate of 65  Rhythm: sinus with sinus arrhythmia  Axis: normal  Intervals: normal MI, narrow QRS, normal QTc  ST segments: no ST elevations or depressions  T waves: no abnormal inversions  Non-specific T wave changes: present  Prior EKG comparison: EKG dated 8/25/22 is not significantly different    RADIOLOGY    CT ABDOMEN PELVIS WO CONTRAST Additional Contrast? None    Result Date: 10/11/2022  EXAMINATION: CT OF THE ABDOMEN AND PELVIS WITHOUT CONTRAST 10/11/2022 8:55 pm TECHNIQUE: CT of the abdomen and pelvis was performed without the administration of intravenous contrast. Multiplanar reformatted images are provided for review. Automated exposure control, iterative reconstruction, and/or weight based adjustment of the mA/kV was utilized to reduce the radiation dose to as low as reasonably achievable. COMPARISON: 2022 HISTORY: ORDERING SYSTEM PROVIDED HISTORY: AMS, h/o cirrhosis TECHNOLOGIST PROVIDED HISTORY: Reason for exam:->AMS, h/o cirrhosis Additional Contrast?->None Decision Support Exception - unselect if not a suspected or confirmed emergency medical condition->Emergency Medical Condition (MA) Is the patient pregnant?->No Reason for Exam: AMS, h/o cirrhosis. Altered Mental Status (Pt arrived from home via ems with AMS Ems stated she is alert and oriented x2, wanted to sign refusal but was unable to follow instructions to do so Pt when asked why she was here, could not tell me, asked her where she was pt stated  Pt has hx of liver failure, pt is visibly jaundiced. ). FINDINGS: Lower Chest: Lung bases are grossly clear. The heart is mildly enlarged. No pericardial effusion. Organs: There is unchanged heterogeneous appearance of the liver parenchyma with areas of both increased and decreased attenuation. Areas of increased attenuation are most pronounced in the left lobe and saulo hepatis. There is a slightly nodular contour of the liver. There are calcified gallstones. The gallbladder wall is diffusely thickened. There is pericholecystic fluid however there is also perihepatic ascites. Portal venous hypertension with splenomegaly, recanalization of the umbilical vein and upper abdominal varices. The pancreas, adrenal glands and kidneys are grossly with the limitations of a noncontrast study. There is an unchanged calcification or calculus in the lower pole of the right kidney. GI/Bowel: There is a moderate stool load particularly in the right, transverse and proximal descending colon. There is diffuse bowel wall thickening. Pelvis: Bilateral Essure tubal occlusion devices. Uterus is otherwise unremarkable. The urinary bladder is normal. Peritoneum/Retroperitoneum: There is small volume of low-attenuation ascites in the abdomen and pelvis. There is diffuse infiltration of fat in the abdomen. No free air. There are mildly prominent nonspecific retroperitoneal lymph nodes. Bones/Soft Tissues: No acute bone finding. Unchanged diffuse hepatocellular disease with suspected cirrhosis. Portal venous hypertension with recanalized umbilical vein, ascites, upper abdominal varices and splenomegaly. Cholelithiasis. There is diffuse gallbladder wall thickening. This finding may be related to hypoproteinemia and third-spacing of fluid. Acute cholecystitis cannot be excluded. Correlate clinically. Moderate stool load consistent with constipation. CT HEAD WO CONTRAST    Result Date: 10/11/2022  EXAMINATION: CT OF THE HEAD WITHOUT CONTRAST  10/11/2022 8:54 pm TECHNIQUE: CT of the head was performed without the administration of intravenous contrast. Automated exposure control, iterative reconstruction, and/or weight based adjustment of the mA/kV was utilized to reduce the radiation dose to as low as reasonably achievable. COMPARISON: None. HISTORY: ORDERING SYSTEM PROVIDED HISTORY: [facility] TECHNOLOGIST PROVIDED HISTORY: Reason for exam:->ams Has a \"code stroke\" or \"stroke alert\" been called? ->No Decision Support Exception - unselect if not a suspected or confirmed emergency medical condition->Emergency Medical Condition (MA) Is the patient pregnant?->No Reason for Exam: AMS. Altered Mental Status (Pt arrived from home via ems with [facility] Ems stated she is alert and oriented x2, wanted to sign refusal but was unable to follow instructions to do so Pt when asked why she was here, could not tell me, asked her where she was pt stated  Pt has hx of liver failure, pt is visibly jaundiced. ). FINDINGS: BRAIN/VENTRICLES: There is no acute intracranial hemorrhage, mass effect or midline shift.   No abnormal extra-axial fluid collection. The gray-white differentiation is maintained without evidence of an acute infarct. There is no evidence of hydrocephalus. ORBITS: The visualized portion of the orbits demonstrate no acute abnormality. SINUSES: The visualized paranasal sinuses and mastoid air cells demonstrate no acute abnormality. SOFT TISSUES/SKULL:  No acute abnormality of the visualized skull or soft tissues. There is a salt and pepper appearance of the skull. No acute intracranial abnormality. XR CHEST PORTABLE    Result Date: 10/11/2022  EXAMINATION: ONE XRAY VIEW OF THE CHEST 10/11/2022 8:36 pm COMPARISON: 08/25/2022 HISTORY: ORDERING SYSTEM PROVIDED HISTORY: ams TECHNOLOGIST PROVIDED HISTORY: Reason for exam:->ams Reason for Exam: AMS FINDINGS: The lungs are without acute focal process. There is no effusion or pneumothorax. The cardiomediastinal silhouette is stable. The osseous structures are stable. No acute process. ED COURSE/MDM  Patient seen and evaluated. Old records reviewed. Labs and imaging reviewed and results discussed with patient and her daughter. After initial evaluation, differential diagnostic considerations included: stroke, TIA, intracranial bleed, trauma, infection/sepsis, medication side effect, intoxication/withdrawal, metabolic/electrolyte abnormalities      The patient's ED workup was notable for concern for acute hepatic encephalopathy. The patient has asterixis, significant hyperbilirubinemia and hyperammonemia as well. She has been noncompliant with her lactulose according to the daughter. I ordered her dose of this here. Her mental status has improved somewhat on reassessment however she is still not at her baseline. She will be admitted to the hospital for further treatment and evaluation.   She is also chronically anemic with her hemoglobin today of 6.1 and so daughter states that this is the longest she has gone recently without needing a blood treating this patient was 50 minutes. This excludes time spent doing separately billable procedures. This includes time at the bedside, data interpretation, medication management, obtaining critical history from collateral sources if the patient is unable to provide it directly, and physician consultation. Specifics of interventions taken and potentially life-threatening diagnostic considerations are listed above in the medical decision making. If this was a shared visit with an BRIAN, the time in this attestation is non-concurrent critical care time out of the total shared critical care time provided by the BRIAN and myself. DISCLAIMER: This chart was created using Dragon dictation software. Efforts were made by me to ensure accuracy, however some errors may be present due to limitations of this technology and occasionally words are not transcribed correctly.         Dalton Baca MD  10/11/22 [id number]

## 2022-10-12 NOTE — CONSULTS
Gastroenterology Consult Note        Patient: Marilynn Ortez  : 1975  Acct#:      Date:  10/12/2022    Subjective:       History of Present Illness  Patient is a 40 y.o.  female admitted with Hepatic encephalopathy [K76.82]  Polypharmacy [Z79.899]  Acute hepatic encephalopathy [K76.82]  Anemia, unspecified type [D64.9] who is seen in consult for Hepatic encephalopathy. H/o alcohol cirrhosis followed by Dr. Makayla De Los Santos. She last drank alcohol in 2022. Her cirrhosis is decompensated by ascites which has been managed with Lasix at lower dose per Renal (20 mg daily) due to recent EZIO and Aldactone 100 mg daily. History of hepatic encephalopathy on xifaxan. Has been prescribed lactulose but patient states she has been off of this for months due to diarrhea. She did have bleeding esophageal varices which were banded in 2021. Last EGD was 22 with nonbleeding large esophageal varix which was then banded. She is on nadolol. 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 She requires frequent blood transfusions. She was referred to  for liver transplant eval.  States she never did make an appointment because insurance will not cover any of it and she has no way of paying for it. Also has no one to stay with her after transplant. She was brought to the ER for confusion. Her daughter had called her but there was no answer so daughter called EMS. Ammonia level was elevated at 98 at admission. She was given lactulose and has had bowel movements. She is alert and oriented this morning. Denies abdominal pain, nausea, vomiting, melena, hematochezia. Not taking lactulose as she states she has diarrhea all day.       Past Medical History:   Diagnosis Date    Alcoholic liver disease ([facility] [address]     Anemia     History of blood transfusion       Past Surgical History:   Procedure Laterality Date    COLONOSCOPY N/A 3/11/2021    COLONOSCOPY POLYPECTOMY SNARE/COLD BIOPSY performed by Brit Han MD at [address] [place] BONE MARROW BIOPSY  7/5/2022    CT BONE MARROW BIOPSY 7/5/2022 Swathi Leo MD [facility] CT SCAN    KNEE ARTHROPLASTY      TUBAL LIGATION      ESSURE    UPPER GASTROINTESTINAL ENDOSCOPY N/A 01/30/2021    EGD DIAGNOSTIC ONLY performed by Delmi Andrew MD at [address] [place] [address] N/A 03/10/2021    EGD BIOPSY performed by Brit Han MD at [address] [place] [address] 6/27/2021    EGD BAND LIGATION performed by Michel Mckeon MD at [address] [place] [address] N/A 4/27/2022    EGD DIAGNOSTIC ONLY performed by Evelyn Moss MD at [address] [place] [address] N/A 7/1/2022    EGD ESOPHAGOGASTRODUODENOSCOPY ULTRASOUND performed by Tereza Humphries MD at [address] [place] [address] N/A 7/1/2022    EGD BAND LIGATION performed by Tereza Humphries MD at [address] [place] [address]      Past Endoscopic History  EGD and EUS with Dr Carl Owens 7/1/22  Postoperative Diagnosis: #1 large varix noted in the esophagus banded  #2 large gallstones and gall  #3 mildly dilated common bile duct at 7 mm        Colonoscopy 3/2021 with Dr Rhiannon Herring for anemia     Findings:   Two small polyps, removed with cold snare. s/p 3 clips total. Patchy diverticulosis (right and left colon). Hemorrhoids. Plans:  Await pathology. Recall colonoscopy in 5 years. Admission Meds  No current facility-administered medications on file prior to encounter. Current Outpatient Medications on File Prior to Encounter   Medication Sig Dispense Refill    lactulose (CHRONULAC) 10 GM/15ML solution Take 30 mLs by mouth every 3 hours (Patient not taking: Reported on 10/12/2022) 237 mL 1    furosemide (LASIX) 20 MG tablet Take 1 tablet by mouth in the morning. 60 tablet 3    LORazepam (ATIVAN) 0.5 MG tablet Take 0.5 mg by mouth every 8 hours as needed for Anxiety.       ondansetron (ZOFRAN) 8 MG tablet Take 1 tablet by mouth      zolpidem (AMBIEN) 5 MG tablet Take 1 tablet by mouth. [DISCONTINUED] thiamine 100 MG tablet Take 1 tablet by mouth daily (Patient not taking: Reported on 8/25/2022) 30 tablet 3    nadolol (CORGARD) 40 MG tablet Take 1 tablet by mouth daily 30 tablet 0    spironolactone (ALDACTONE) 100 MG tablet Take 1 tablet by mouth daily 30 tablet 1    magnesium oxide (MAG-OX) 400 (240 Mg) MG tablet Take 1 tablet by mouth 2 times daily 60 tablet 0    pantoprazole (PROTONIX) 40 MG tablet Take 1 tablet by mouth every morning (before breakfast) 30 tablet 1    thiamine mononitrate (THIAMINE) 100 MG tablet Take 1 tablet by mouth daily 30 tablet 0       Allergies  Allergies   Allergen Reactions    Oxycodone Nausea And Vomiting      Social   Social History     Tobacco Use    Smoking status: Former    Smokeless tobacco: Never   Substance Use Topics    Alcohol use: Not Currently        Family History   Problem Relation Age of Onset    Alcohol Abuse Father           Review of Systems  Constitutional: negative for fevers, chills, sweats    Ears, nose, mouth, throat, and face: negative for nasal congestion and sore throat   Respiratory: negative for cough and shortness of breath   Cardiovascular: negative for chest pain and dyspnea   Gastrointestinal: see hpi   Genitourinary:negative for dysuria and frequency   Integument/breast: negative for pruritus and rash   Hematologic/lymphatic: negative for bleeding and easy bruising   Musculoskeletal:negative for arthralgias and myalgias   Neurological: negative for dizziness and weakness       Physical Exam  Blood pressure 129/62, pulse 62, temperature 98.1 °F (36.7 °C), temperature source Oral, resp. rate 16, height 5' 4\" (1.626 m), weight 107 lb (48.5 kg), SpO2 98 %, not currently breastfeeding.     General appearance: alert, cooperative, no distress, appears stated age  Eyes: Scleral icterus  Head: Normocephalic, without obvious abnormality  Lungs: clear to auscultation bilaterally, Normal Effort  Heart: regular rate and rhythm, normal S1 and S2, no murmurs or rubs  Abdomen: soft, non-tender. Bowel sounds normal. No masses,  no organomegaly. Extremities: atraumatic, no cyanosis or edema  Skin: warm and dry, jaundice  Neuro: Grossly intact, A&OX3, no asterixis  Musculoskeletal: 5/5  strength BUE      Data Review:    Recent Labs     10/11/22  2028 10/12/22  0207   WBC 3.0* 4.9   HGB 6.1* 7.9*   HCT 17.6* 22.8*   .7* 104.3*   PLT 65* 66*     Recent Labs     10/11/22  2028 10/12/22  0207    140   K 4.7 4.6    108   CO2 21 18*   BUN 31* 30*   CREATININE 1.1 0.8     Recent Labs     10/11/22  2028 10/12/22  0207   AST 80* 74*   ALT 39 39   BILIDIR  --  4.7*   BILITOT 15.5* 17.0*   ALKPHOS 188* 176*     No results for input(s): LIPASE, AMYLASE in the last 72 hours. Recent Labs     10/11/22  2028   PROTIME 25.8*   INR 2.35*     No results for input(s): PTT in the last 72 hours. No results for input(s): OCCULTBLD in the last 72 hours. Imaging Studies:                            CXR 10/11/22     Impression   No acute process. CT-scan of abdomen and pelvis wo contrast 10/11/22:  Impression   Unchanged diffuse hepatocellular disease with suspected cirrhosis. Portal venous hypertension with recanalized umbilical vein, ascites, upper   abdominal varices and splenomegaly. Cholelithiasis. There is diffuse gallbladder wall thickening. This finding   may be related to hypoproteinemia and third-spacing of fluid. Acute   cholecystitis cannot be excluded. Correlate clinically. Moderate stool load consistent with constipation. Assessment:      Hepatic encephalopathy -likely from noncompliance with lactulose. Was on Xifaxan daily. Do need to rule out infection ->  CXR and UA were negative. Only small volume ascites on CT. Clinically improved today. Cirrhosis - due to alcohol abuse.   No alcohol since April 2022. Has been referred to  liver transplant center but patient states never made an appointment as insurance does not cover any of the cost of transplant or her antirejection meds. Also states no social support. Sajanestelle Maynard History of esophageal varices  - EGD 7/2022 with large esophageal varix treated with band ligation. No GI bleeding currently. 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 this is associated with poor prognosis and is reversed by liver transplant. However patient declines liver transplant. Hemoglobin incremented from 6.1-7.9 with 1 unit PRBC here. Recommendations:   -Lactulose 20 g 3 times daily and titrate for goal of 3 bowel movements daily.   Discussed with patient how to titrate this at home.  -Continue Xifaxan    Discussed with Dr. Robert Padgett, [address] [place] [address]

## 2022-10-12 NOTE — CONSENT
Informed Consent for Blood Component Transfusion Note    I have discussed with the patient and daughter the rationale for blood component transfusion; its benefits in treating or preventing fatigue, organ damage, or death; and its risk which includes mild transfusion reactions, rare risk of blood borne infection, or more serious but rare reactions. I have discussed the alternatives to transfusion, including the risk and consequences of not receiving transfusion. The patient and daughter had an opportunity to ask questions and had agreed to proceed with transfusion of blood components. Indication: PRBC for critical low hgb.     Electronically signed by Sarmad Calix MD on 10/11/22 at 10:13 PM EDT

## 2022-10-12 NOTE — ED NOTES
Patient's daughter consented to blood transfusion since patient is confused and unable to sign at this time. Transfusion consent signed by daughter.       Mirela Leiva RN  10/11/22 [id number]

## 2022-10-12 NOTE — PROGRESS NOTES
0800-Assessment complete and charted. Patient awake and alert. Patient denies distress. Patient up ad jessica to bathroom. Patient refuses lactulose due to diarrhea it causes. Patient remains jaundiced. Bed in low position, call light in reach, patient instructed to call for assistance    1000-regular, low sodium diet ordered for patient. Daughter at bedside    1400-Patient awake, sitting up in bed. Patient denies needs. No signs or symptoms of distress observed. Patient requesting to have IV's removed. Explained that per policy the IV's have to stay in until she is discharged.  Bed in low position,patient up ad jessica to bathroom, call light in reach

## 2022-10-12 NOTE — ED NOTES
Patient observed for first 15 min of transfusion, no reaction suspected at this time.       Mukesh Hodges RN  10/11/22 [id number]

## 2022-10-12 NOTE — ED NOTES
Report called to  nurse   Pt transported to  in stable condition, on the room monitor, with blood running.       Angel Lu RN  10/12/22 3280

## 2022-10-12 NOTE — H&P
Hospital Medicine History & Physical      PCP: No primary care provider on file. Date of Admission: 10/11/2022    Date of Service: Pt seen/examined on 10/12/22 and Admitted to Inpatient with expected LOS greater than two midnights due to medical therapy. Chief Complaint: Altered mental status      History Of Present Illness:     Aarti aHmpton is 40 y.o. female with history of alcoholic liver cirrhosis, end-stage liver disease? She now presents to the ER with complaint of malaise and confusion which reportedly has been ongoing for the past 1 week  Patient lives alone daughter makes phone calls to check on her  Today patient was not answering the phone therefore daughter called EMS  When EMS arrived patient was lethargic and confused therefore she was brought to the ED  On interview, patient is chronically ill-appearing and very slow to respond to questions  She is able to answer questions in 1-2 words after she takes a long time  She is able to show me 1 fingers on both hands, show me her tongue etc.  Therefore she is following command.   She denies any pain  She looks very jaundiced but otherwise not in acute distress  Daughter states this is the most confused she has been compared to the past          Past Medical History:          Diagnosis Date    Alcoholic liver disease ([facility] [address]     Anemia     History of blood transfusion        Past Surgical History:          Procedure Laterality Date    COLONOSCOPY N/A 3/11/2021    COLONOSCOPY POLYPECTOMY SNARE/COLD BIOPSY performed by Keisha White MD at [address] [facility] BONE MARROW BIOPSY  7/5/2022    CT BONE MARROW BIOPSY 7/5/2022 Augusto Pinto MD [facility] CT SCAN    KNEE ARTHROPLASTY      TUBAL LIGATION      ESSURE    UPPER GASTROINTESTINAL ENDOSCOPY N/A 01/30/2021    EGD DIAGNOSTIC ONLY performed by Nelson Stark MD at [address] [facility] N/A 03/10/2021    EGD BIOPSY performed by Keisha White MD at [address] [facility] GASTROINTESTINAL ENDOSCOPY N/A 6/27/2021    EGD BAND LIGATION performed by Vivi Miles MD at [address] [facility] N/A 4/27/2022    EGD DIAGNOSTIC ONLY performed by Paulette Rodriguez MD at [address] [place] [address] N/A 7/1/2022    EGD ESOPHAGOGASTRODUODENOSCOPY ULTRASOUND performed by Heath Brumfield MD at [address] [facility] N/A 7/1/2022    EGD BAND LIGATION performed by Heath Brumfield MD at [address] [place] [address]       Medications Prior to Admission:      Prior to Admission medications    Medication Sig Start Date End Date Taking? Authorizing Provider   lactulose (CHRONULAC) 10 GM/15ML solution Take 30 mLs by mouth every 3 hours  Patient not taking: Reported on 10/12/2022 7/29/22   Lisa Fitch MD   furosemide (LASIX) 20 MG tablet Take 1 tablet by mouth in the morning. 7/30/22   Freya Caldwell MD   LORazepam (ATIVAN) 0.5 MG tablet Take 0.5 mg by mouth every 8 hours as needed for Anxiety. Historical Provider, MD   ondansetron (ZOFRAN) 8 MG tablet Take 1 tablet by mouth 7/14/22   Historical Provider, MD   zolpidem (AMBIEN) 5 MG tablet Take 1 tablet by mouth.  7/14/22   Historical Provider, MD   thiamine 100 MG tablet Take 1 tablet by mouth daily  Patient not taking: Reported on 8/25/2022 7/1/22 8/28/22  Lisa Fitch MD   nadolol (CORGARD) 40 MG tablet Take 1 tablet by mouth daily 5/3/22   Branden Borden MD   spironolactone (ALDACTONE) 100 MG tablet Take 1 tablet by mouth daily 5/3/22   Branden Borden MD   magnesium oxide (MAG-OX) 400 (240 Mg) MG tablet Take 1 tablet by mouth 2 times daily 5/3/22 6/2/22  Branden Borden MD   pantoprazole (PROTONIX) 40 MG tablet Take 1 tablet by mouth every morning (before breakfast) 5/4/22   Branden Borden MD   thiamine mononitrate (THIAMINE) 100 MG tablet Take 1 tablet by mouth daily 5/3/22 6/2/22  Branden Borden MD       Allergies:  Oxycodone    Social History:      The patient currently lives at home alone    TOBACCO:   reports that she has quit smoking. She has never used smokeless tobacco.  ETOH:   reports that she does not currently use alcohol. E-cigarette/Vaping       Questions Responses    E-cigarette/Vaping Use Never User    Start Date     Passive Exposure     Quit Date     Counseling Given     Comments               Family History:      Reviewed and negative in regards to presenting illness/complaint. Problem Relation Age of Onset    Alcohol Abuse Father        REVIEW OF SYSTEMS COMPLETED:   Pertinent positives as noted in the HPI. All other systems reviewed and negative. PHYSICAL EXAM PERFORMED:    /68   Pulse 58   Temp 97.8 °F (36.6 °C) (Oral)   Resp 16   Ht 5' 4\" (1.626 m)   Wt 107 lb 11.2 oz (48.9 kg)   SpO2 100%   BMI 18.49 kg/m²     General appearance: Jaundiced, chronically ill-appearing  HEENT:  Normal cephalic, atraumatic without obvious deformity. Pupils equal, round, and reactive to light. Extra ocular muscles intact. Conjunctivae/corneas clear. Neck: Supple, with full range of motion. No jugular venous distention. Trachea midline. Respiratory:  Normal respiratory effort. Clear to auscultation, bilaterally without Rales/Wheezes/Rhonchi. Cardiovascular:  Regular rate and rhythm with normal S1/S2 without murmurs, rubs or gallops. Abdomen: Soft, non-tender, non-distended with normal bowel sounds. Musculoskeletal:  No clubbing, cyanosis or edema bilaterally. Full range of motion without deformity. Skin: Skin color, texture, turgor normal.  No rashes or lesions. Neurologic:  Neurovascularly intact without any focal sensory/motor deficits.  Cranial nerves: II-XII intact, grossly non-focal.  Psychiatric:  Alert and oriented, thought content appropriate, normal insight  Capillary Refill: Brisk,3 seconds, normal  Peripheral Pulses: +2 palpable, equal bilaterally       Labs:     Recent Labs     10/11/22  2028   WBC 3.0* HGB 6.1*   HCT 17.6*   PLT 65*     Recent Labs     10/11/22  2028      K 4.7      CO2 21   BUN 31*   CREATININE 1.1   CALCIUM 9.8     Recent Labs     10/11/22  2028   AST 80*   ALT 39   BILITOT 15.5*   ALKPHOS 188*     Recent Labs     10/11/22  2028   INR 2.35*     Recent Labs     10/11/22  2028   TROPONINI <0.01       Urinalysis:      Lab Results   Component Value Date/Time    NITRU Negative 10/11/2022 09:21 PM    WBCUA 2 08/25/2022 09:00 PM    BACTERIA None Seen 08/25/2022 09:00 PM    RBCUA 1 08/25/2022 09:00 PM    BLOODU Negative 10/11/2022 09:21 PM    SPECGRAV 1.010 10/11/2022 09:21 PM    GLUCOSEU Negative 10/11/2022 09:21 PM       Radiology:     CXR: I have reviewed the CXR with the following interpretation:   EKG:  I have reviewed the EKG with the following interpretation:     CT ABDOMEN PELVIS WO CONTRAST Additional Contrast? None   Final Result   Unchanged diffuse hepatocellular disease with suspected cirrhosis. Portal venous hypertension with recanalized umbilical vein, ascites, upper   abdominal varices and splenomegaly. Cholelithiasis. There is diffuse gallbladder wall thickening. This finding   may be related to hypoproteinemia and third-spacing of fluid. Acute   cholecystitis cannot be excluded. Correlate clinically. Moderate stool load consistent with constipation. CT HEAD WO CONTRAST   Final Result   No acute intracranial abnormality. XR CHEST PORTABLE   Final Result   No acute process.              Consults:    IP CONSULT TO HOSPITALIST    ASSESSMENT:    Hepatic encephalopathy  Elevated LFTs  Severe anemia - spur cell anemia due to liver disease  Alcoholic liver disease   Esophageal varices      PLAN:    Admit to MedSurg  Consult GI  Continue lactulose and rifaximin and trend ammonia  In regards to her anemia she has spur cell anemia which is a rare form of hemolytic anemia secondary to alcoholic liver cirrhosis  Only curative therapy is liver transplant  Patient should be following up with  for transplant evaluation  Transfused 1 unit of PRBCs tonight 10/12/2022  She had paracentesis on 8/26/22  Continue Lasix, Aldactone, lactulose and Xifaxan  Patient to call  liver transplant office to schedule appointment to see if patient transplant candidate  Goal 2-3 bowel movements with the lactulose      DVT Prophylaxis: Lovenox  Diet: Diet NPO  Code Status: Full Code    PT/OT Eval Status: PT/OT consult is not ordered    Dispo -admit as inpatient       Ludin Alford MD    Thank you No primary care provider on file. for the opportunity to be involved in this patient's care. If you have any questions or concerns please feel free to contact me at [phone].

## 2022-10-13 LAB
ALBUMIN SERPL-MCNC: 3 G/DL (ref 3.4–5)
ALP BLD-CCNC: 155 U/L (ref 40–129)
ALT SERPL-CCNC: 38 U/L (ref 10–40)
AMMONIA: 111 UMOL/L (ref 11–51)
ANION GAP SERPL CALCULATED.3IONS-SCNC: 11 MMOL/L (ref 3–16)
ANISOCYTOSIS: ABNORMAL
AST SERPL-CCNC: 69 U/L (ref 15–37)
BANDED NEUTROPHILS RELATIVE PERCENT: 5 % (ref 0–7)
BASOPHILS ABSOLUTE: 0 K/UL (ref 0–0.2)
BASOPHILS RELATIVE PERCENT: 0 %
BILIRUB SERPL-MCNC: 17.8 MG/DL (ref 0–1)
BILIRUBIN DIRECT: 4.8 MG/DL (ref 0–0.3)
BILIRUBIN, INDIRECT: 13 MG/DL (ref 0–1)
BUN BLDV-MCNC: 27 MG/DL (ref 7–20)
BURR CELLS: ABNORMAL
CALCIUM SERPL-MCNC: 9.6 MG/DL (ref 8.3–10.6)
CHLORIDE BLD-SCNC: 104 MMOL/L (ref 99–110)
CO2: 22 MMOL/L (ref 21–32)
CREAT SERPL-MCNC: 0.8 MG/DL (ref 0.6–1.1)
EOSINOPHILS ABSOLUTE: 0 K/UL (ref 0–0.6)
EOSINOPHILS RELATIVE PERCENT: 0 %
GFR AFRICAN AMERICAN: >60
GFR NON-AFRICAN AMERICAN: >60
GLUCOSE BLD-MCNC: 136 MG/DL (ref 70–99)
HCT VFR BLD CALC: 20.5 % (ref 36–48)
HCT VFR BLD CALC: 20.7 % (ref 36–48)
HEMATOLOGY PATH CONSULT: NO
HEMOGLOBIN: 7.3 G/DL (ref 12–16)
IMMATURE RETIC FRACT: 0.38 (ref 0.21–0.37)
LYMPHOCYTES ABSOLUTE: 0.7 K/UL (ref 1–5.1)
LYMPHOCYTES RELATIVE PERCENT: 18 %
MCH RBC QN AUTO: 36.6 PG (ref 26–34)
MCHC RBC AUTO-ENTMCNC: 35.8 G/DL (ref 31–36)
MCV RBC AUTO: 102.2 FL (ref 80–100)
MONOCYTES ABSOLUTE: 0.2 K/UL (ref 0–1.3)
MONOCYTES RELATIVE PERCENT: 6 %
NEUTROPHILS ABSOLUTE: 2.9 K/UL (ref 1.7–7.7)
NEUTROPHILS RELATIVE PERCENT: 71 %
OVALOCYTES: ABNORMAL
PDW BLD-RTO: 16.9 % (ref 12.4–15.4)
PLATELET # BLD: 69 K/UL (ref 135–450)
PLATELET SLIDE REVIEW: ABNORMAL
PMV BLD AUTO: 8.9 FL (ref 5–10.5)
POLYCHROMASIA: ABNORMAL
POTASSIUM SERPL-SCNC: 4.3 MMOL/L (ref 3.5–5.1)
RBC # BLD: 2 M/UL (ref 4–5.2)
RETICULOCYTE ABSOLUTE COUNT: 0.07 M/UL (ref 0.02–0.1)
RETICULOCYTE COUNT PCT: 3.39 % (ref 0.5–2.18)
SCHISTOCYTES: ABNORMAL
SLIDE REVIEW: ABNORMAL
SODIUM BLD-SCNC: 137 MMOL/L (ref 136–145)
TEAR DROP CELLS: ABNORMAL
TOTAL PROTEIN: 6.1 G/DL (ref 6.4–8.2)
WBC # BLD: 3.8 K/UL (ref 4–11)

## 2022-10-13 PROCEDURE — 6360000002 HC RX W HCPCS: Performed by: INTERNAL MEDICINE

## 2022-10-13 PROCEDURE — 83550 IRON BINDING TEST: CPT

## 2022-10-13 PROCEDURE — 2580000003 HC RX 258: Performed by: INTERNAL MEDICINE

## 2022-10-13 PROCEDURE — 6370000000 HC RX 637 (ALT 250 FOR IP): Performed by: PHYSICIAN ASSISTANT

## 2022-10-13 PROCEDURE — 80048 BASIC METABOLIC PNL TOTAL CA: CPT

## 2022-10-13 PROCEDURE — 94760 N-INVAS EAR/PLS OXIMETRY 1: CPT

## 2022-10-13 PROCEDURE — 83540 ASSAY OF IRON: CPT

## 2022-10-13 PROCEDURE — 80076 HEPATIC FUNCTION PANEL: CPT

## 2022-10-13 PROCEDURE — 6370000000 HC RX 637 (ALT 250 FOR IP): Performed by: INTERNAL MEDICINE

## 2022-10-13 PROCEDURE — 85045 AUTOMATED RETICULOCYTE COUNT: CPT

## 2022-10-13 PROCEDURE — 36415 COLL VENOUS BLD VENIPUNCTURE: CPT

## 2022-10-13 PROCEDURE — 82140 ASSAY OF AMMONIA: CPT

## 2022-10-13 PROCEDURE — 85025 COMPLETE CBC W/AUTO DIFF WBC: CPT

## 2022-10-13 PROCEDURE — 82728 ASSAY OF FERRITIN: CPT

## 2022-10-13 PROCEDURE — 2060000000 HC ICU INTERMEDIATE R&B

## 2022-10-13 RX ORDER — ZOLPIDEM TARTRATE 5 MG/1
5 TABLET ORAL NIGHTLY PRN
Status: DISCONTINUED | OUTPATIENT
Start: 2022-10-13 | End: 2022-10-18 | Stop reason: HOSPADM

## 2022-10-13 RX ADMIN — PANTOPRAZOLE SODIUM 40 MG: 40 TABLET, DELAYED RELEASE ORAL at 06:36

## 2022-10-13 RX ADMIN — SODIUM CHLORIDE, PRESERVATIVE FREE 10 ML: 5 INJECTION INTRAVENOUS at 21:35

## 2022-10-13 RX ADMIN — Medication 100 MG: at 08:55

## 2022-10-13 RX ADMIN — SPIRONOLACTONE 100 MG: 25 TABLET ORAL at 08:55

## 2022-10-13 RX ADMIN — LORAZEPAM 0.5 MG: 0.5 TABLET ORAL at 09:01

## 2022-10-13 RX ADMIN — LACTULOSE 20 G: 20 SOLUTION ORAL at 00:34

## 2022-10-13 RX ADMIN — LORAZEPAM 0.5 MG: 0.5 TABLET ORAL at 21:17

## 2022-10-13 RX ADMIN — SODIUM CHLORIDE, PRESERVATIVE FREE 10 ML: 5 INJECTION INTRAVENOUS at 08:56

## 2022-10-13 RX ADMIN — ENOXAPARIN SODIUM 30 MG: 100 INJECTION SUBCUTANEOUS at 08:55

## 2022-10-13 RX ADMIN — FUROSEMIDE 20 MG: 20 TABLET ORAL at 08:55

## 2022-10-13 RX ADMIN — RIFAXIMIN 550 MG: 550 TABLET ORAL at 21:16

## 2022-10-13 RX ADMIN — LACTULOSE 20 G: 20 SOLUTION ORAL at 06:36

## 2022-10-13 RX ADMIN — RIFAXIMIN 550 MG: 550 TABLET ORAL at 08:56

## 2022-10-13 RX ADMIN — LACTULOSE 20 G: 20 SOLUTION ORAL at 12:15

## 2022-10-13 RX ADMIN — NADOLOL 40 MG: 40 TABLET ORAL at 08:55

## 2022-10-13 ASSESSMENT — PAIN SCALES - GENERAL
PAINLEVEL_OUTOF10: 0

## 2022-10-13 NOTE — CONSULTS
[facility]   CONSULT NOTE    10/13/2022 10:18 AM    Patient Information: Cooper Underwood   Date of Admit:  10/11/2022  Primary Care Physician:  No primary care provider on file. Requesting Physician:  Drew Galaviz MD    Reason for consult:   Evaluation and recommendations for anemia    Chief complaint:    Chief Complaint   Patient presents with    Altered Mental Status     Pt arrived from home via ems with Hillary Curtis stated she is alert and oriented x2, wanted to sign refusal but was unable to follow instructions to do so  Pt when asked why she was here, could not tell me, asked her where she was pt stated   Pt has hx of liver failure, pt is visibly jaundiced. History of Present Illness:  Cooper Underwood is a 40 y.o. female on Drew Galaviz MD service who was admitted on 10/11/2022 for altered mental status. The patient has a history of liver failure. She is jaundiced. She has history of variceal bleeding. She sees Dr. Yanelis Alvarez for anemia and thrombocytopenia due to end stage liver disease. She is transfusion dependent. Previous bone marrow biopsy was negative. Sonja negative. Multiple circulating spur cells were noted on prior peripheral smear which raises the possibility of spur cell anemia, a rare case hemolytic anemia in patient's alcoholic liver cirrhosis, only treatment is liver transplant. Patient not ready for hospice and wishes to continue palliative transfusions. She is not currently short of breath. Past Medical History:     has a past medical history of Alcoholic liver disease ([facility] [address] Anemia, and History of blood transfusion.      Past Surgical History:    Past Surgical History:   Procedure Laterality Date    COLONOSCOPY N/A 3/11/2021    COLONOSCOPY POLYPECTOMY SNARE/COLD BIOPSY performed by Real Wilson MD at [address] BONE MARROW BIOPSY  2022    CT BONE MARROW BIOPSY 2022 Jadon Orozco MD [facility] CT SCAN    KNEE ARTHROPLASTY      TUBAL LIGATION ESSURE    UPPER GASTROINTESTINAL ENDOSCOPY N/A 01/30/2021    EGD DIAGNOSTIC ONLY performed by Sylvia Tinsley MD at [address] N/A 03/10/2021    EGD BIOPSY performed by Ann Hartman MD at [address] 6/27/2021    EGD BAND LIGATION performed by Sandie King MD at [address] N/A 4/27/2022    EGD DIAGNOSTIC ONLY performed by Brooklyn Ventura MD at [address] 7/1/2022    EGD ESOPHAGOGASTRODUODENOSCOPY ULTRASOUND performed by Claudette Kalata, MD at [address] N/A 7/1/2022    EGD BAND LIGATION performed by Claudette Kalata, MD at [address]        Current Medications:     sodium chloride flush  5-40 mL IntraVENous 2 times per day    nadolol  40 mg Oral Daily    furosemide  20 mg Oral Daily    spironolactone  100 mg Oral Daily    pantoprazole  40 mg Oral QAM AC    lactulose  20 g Oral 4 times per day    rifAXIMin  550 mg Oral BID    [Held by provider] enoxaparin  30 mg SubCUTAneous Daily    thiamine mononitrate  100 mg Oral Daily       Allergies: Allergies   Allergen Reactions    Oxycodone Nausea And Vomiting        Social History:    reports that she has quit smoking. She has never used smokeless tobacco. She reports that she does not currently use alcohol. She reports that she does not use drugs. Family History:     family history includes Alcohol Abuse in her father.      ROS:      Per HPI; otherwise 10 point ROS is negative    PHYSICAL EXAM:    Vitals:  Vitals:    10/13/22 0451   BP: 125/67   Pulse: 64   Resp: 16   Temp: 98.2 °F (36.8 °C)   SpO2: 98%        Intake/Output Summary (Last 24 hours) at 10/13/2022 1018  Last data filed at 10/12/2022 1242  Gross per 24 hour   Intake 200 ml   Output --   Net 200 ml      Wt Readings from Last 3 Encounters:   10/12/22 107 lb (48.5 kg)   08/26/22 118 lb 8 oz (53.8 kg)   07/29/22 105 lb 6.1 oz (47.8 kg)        General appearance: Appears comfortable. Juandice Eyes: Scleral icterus   ENT: Moist mucus membranes, no thrush  Neck: Trachea midline, symmetrical  Cardiovascular: Regular rhythm, normal S1, S2. No murmur, gallop, rub. No edema in  lower extremities  Respiratory: Clear to auscultation bilaterally. No wheeze. Good inspiratory effort  Gastrointestinal: Abdomen soft, not tender, not distended, normal bowel sounds  Musculoskeletal: No cyanosis in digits, warm extremities  Neurologic: Cranial nerves grossly intact, no motor or speech deficits. Psychiatric: Normal affect. Alert and oriented to time, place and person.   Skin: Warm, dry, normal turgor, no rash    DATA:  CBC:   Lab Results   Component Value Date/Time    WBC 3.8 10/13/2022 05:01 AM    RBC 2.00 10/13/2022 05:01 AM    HGB 7.3 10/13/2022 05:01 AM    HCT 20.5 10/13/2022 05:01 AM    .2 10/13/2022 05:01 AM    MCH 36.6 10/13/2022 05:01 AM    MCHC 35.8 10/13/2022 05:01 AM    RDW 16.9 10/13/2022 05:01 AM    PLT 69 10/13/2022 05:01 AM    MPV 8.9 10/13/2022 05:01 AM     BMP:  Lab Results   Component Value Date/Time     10/13/2022 05:01 AM    K 4.3 10/13/2022 05:01 AM    K 4.7 10/11/2022 08:28 PM     10/13/2022 05:01 AM    CO2 22 10/13/2022 05:01 AM    BUN 27 10/13/2022 05:01 AM    CREATININE 0.8 10/13/2022 05:01 AM    CALCIUM 9.6 10/13/2022 05:01 AM    GFRAA >60 10/13/2022 05:01 AM    LABGLOM >60 10/13/2022 05:01 AM    GLUCOSE 136 10/13/2022 05:01 AM     Magnesium:   Lab Results   Component Value Date/Time    MG 1.80 06/15/2022 05:00 AM    MG 2.10 06/14/2022 06:06 AM    MG 1.90 06/13/2022 08:22 AM     LIVER PROFILE:   Recent Labs     10/11/22  2028 10/12/22  0207 10/13/22  0501   AST 80* 74* 69*   ALT 39 39 38   BILIDIR  --  4.7* 4.8*   BILITOT 15.5* 17.0* 17.8*   ALKPHOS 188* 176* 155*     PT/INR:    Lab Results   Component Value Date/Time    PROTIME 25.8 10/11/2022 08:28 PM    PROTIME 27.1 2022 10:30 AM    PROTIME 24.8 2022 12:45 PM    INR 2.35 10/11/2022 08:28 PM    INR 2.50 2022 10:30 AM    INR 2.23 2022 12:45 PM     IMAGING:    Narrative   EXAMINATION:   ONE XRAY VIEW OF THE CHEST       10/11/2022 8:36 pm       COMPARISON:   2022       HISTORY:   ORDERING SYSTEM PROVIDED HISTORY: ams   TECHNOLOGIST PROVIDED HISTORY:   Reason for exam:->ams   Reason for Exam: AMS       FINDINGS:   The lungs are without acute focal process. There is no effusion or   pneumothorax. The cardiomediastinal silhouette is stable. The osseous   structures are stable. Impression   No acute process. Narrative   EXAMINATION:   CT OF THE HEAD WITHOUT CONTRAST  10/11/2022 8:54 pm       TECHNIQUE:   CT of the head was performed without the administration of intravenous   contrast. Automated exposure control, iterative reconstruction, and/or weight   based adjustment of the mA/kV was utilized to reduce the radiation dose to as   low as reasonably achievable. COMPARISON:   None. HISTORY:   ORDERING SYSTEM PROVIDED HISTORY: ams   TECHNOLOGIST PROVIDED HISTORY:   Reason for exam:->ams   Has a \"code stroke\" or \"stroke alert\" been called? ->No   Decision Support Exception - unselect if not a suspected or confirmed   emergency medical condition->Emergency Medical Condition (MA)   Is the patient pregnant?->No   Reason for Exam: AMS. Altered Mental Status (Pt arrived from home via ems   with AMS Ems stated she is alert and oriented x2, wanted to sign refusal but   was unable to follow instructions to do so Pt when asked why she was here,   could not tell me, asked her where she was pt stated  Pt has hx of liver   failure, pt is visibly jaundiced. ). FINDINGS:   BRAIN/VENTRICLES: There is no acute intracranial hemorrhage, mass effect or   midline shift. No abnormal extra-axial fluid collection.   The gray-white   differentiation is maintained without evidence of an acute infarct. There is   no evidence of hydrocephalus. ORBITS: The visualized portion of the orbits demonstrate no acute abnormality. SINUSES: The visualized paranasal sinuses and mastoid air cells demonstrate   no acute abnormality. SOFT TISSUES/SKULL:  No acute abnormality of the visualized skull or soft   tissues. There is a salt and pepper appearance of the skull. Impression   No acute intracranial abnormality. Narrative   EXAMINATION:   CT OF THE ABDOMEN AND PELVIS WITHOUT CONTRAST 10/11/2022 8:55 pm       TECHNIQUE:   CT of the abdomen and pelvis was performed without the administration of   intravenous contrast. Multiplanar reformatted images are provided for review. Automated exposure control, iterative reconstruction, and/or weight based   adjustment of the mA/kV was utilized to reduce the radiation dose to as low   as reasonably achievable. COMPARISON:   2022       HISTORY:   ORDERING SYSTEM PROVIDED HISTORY: AMS, h/o cirrhosis   TECHNOLOGIST PROVIDED HISTORY:   Reason for exam:->AMS, h/o cirrhosis   Additional Contrast?->None   Decision Support Exception - unselect if not a suspected or confirmed   emergency medical condition->Emergency Medical Condition (MA)   Is the patient pregnant?->No   Reason for Exam: AMS, h/o cirrhosis. Altered Mental Status (Pt arrived from   home via ems with AMS Ems stated she is alert and oriented x2, wanted to sign   refusal but was unable to follow instructions to do so Pt when asked why she   was here, could not tell me, asked her where she was pt stated  Pt has hx   of liver failure, pt is visibly jaundiced. ). FINDINGS:   Lower Chest: Lung bases are grossly clear. The heart is mildly enlarged. No   pericardial effusion. Organs: There is unchanged heterogeneous appearance of the liver parenchyma   with areas of both increased and decreased attenuation.   Areas of increased   attenuation are most pronounced in the left lobe and saulo hepatis. There is   a slightly nodular contour of the liver. There are calcified gallstones. The gallbladder wall is diffusely thickened. There is pericholecystic fluid   however there is also perihepatic ascites. Portal venous hypertension with   splenomegaly, recanalization of the umbilical vein and upper abdominal   varices. The pancreas, adrenal glands and kidneys are grossly with the   limitations of a noncontrast study. There is an unchanged calcification or   calculus in the lower pole of the right kidney. GI/Bowel: There is a moderate stool load particularly in the right,   transverse and proximal descending colon. There is diffuse bowel wall   thickening. Pelvis: Bilateral Essure tubal occlusion devices. Uterus is otherwise   unremarkable. The urinary bladder is normal.       Peritoneum/Retroperitoneum: There is small volume of low-attenuation ascites   in the abdomen and pelvis. There is diffuse infiltration of fat in the   abdomen. No free air. There are mildly prominent nonspecific   retroperitoneal lymph nodes. Bones/Soft Tissues: No acute bone finding. Impression   Unchanged diffuse hepatocellular disease with suspected cirrhosis. Portal venous hypertension with recanalized umbilical vein, ascites, upper   abdominal varices and splenomegaly. Cholelithiasis. There is diffuse gallbladder wall thickening. This finding   may be related to hypoproteinemia and third-spacing of fluid. Acute   cholecystitis cannot be excluded. Correlate clinically. Moderate stool load consistent with constipation. IMPRESSION/RECOMMENDATIONS:    Principal Problem:    Hepatic encephalopathy  Active Problems:    Elevated LFTs    Severe anemia    Alcoholic liver disease (HCC)    Esophageal varices (HCC)  Resolved Problems:    * No resolved hospital problems.  *       38 year old female with a history of alcoholic liver cirrhosis, esophageal varices and portal hypertension. She has:    1. Hepatic encephalopathy   -She's on lasix, aldactone, lactulose and rifaximin. -GI is consulted. -Ammonia has increased to 111. Lactulose is being titrated. 2. Pancytopenia  -Due to liver cirrhosis. -Previous bone marrow biopsy was negative.   -Sonja negative. 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   -Transfusion dependent.  -Monitor platelet count, no indication to transfuse unless plt <10k or 50k with active bleeding.   -Transfuse PRBC if hgb <7. This plan was discussed with the patient and he/she verbalized understanding. Thank you for allowing us to participate in the care of this patient. 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 Sonja negative, haptoglobin undetectable, Bone marrow negative for any pathology. Only definitive treatment is liver transplant.  Continue supportive transfusions for goal HGB above 7    Amanda Logan MD  [facility]

## 2022-10-13 NOTE — PROGRESS NOTES
Gastroenterology Progress Note      Jolene Bowie is a 40 y.o. female patient. 1. Acute hepatic encephalopathy    2. Anemia, unspecified type    3. Polypharmacy        SUBJECTIVE:  Pt currently sleeping but awakens. Per RN report, pt only slept an hour overnight as she didn't get her Ambien. Was more alert earlier. Had several BMs today with lactulose. ROS:  Cardiovascular ROS: no chest pain or dyspnea on exertion  Gastrointestinal ROS: see hpi  Respiratory ROS: no cough, shortness of breath, or wheezing    Physical    VITALS:  /67   Pulse 69   Temp 98 °F (36.7 °C) (Oral)   Resp 16   Ht 5' 4\" (1.626 m)   Wt 107 lb (48.5 kg)   SpO2 99%   BMI 18.37 kg/m²   TEMPERATURE:  Current - Temp: 98 °F (36.7 °C); Max - Temp  Av.1 °F (36.7 °C)  Min: 97.8 °F (36.6 °C)  Max: 98.6 °F (37 °C)    NAD  RRR, Nl s1s2  Lungs CTA Bilaterally, normal effort  Abdomen soft, ND, NT, no HSM, Bowel sounds normal  Sleeping but awakens and is oriented. Data    Data Review:    Recent Labs     10/11/22  2028 10/12/22  0207 10/12/22  1313 10/13/22  0501   WBC 3.0* 4.9  --  3.8*   HGB 6.1* 7.9* 8.1* 7.3*   HCT 17.6* 22.8* 23.1* 20.5*   .7* 104.3*  --  102.2*   PLT 65* 66*  --  69*     Recent Labs     10/11/22  2028 10/12/22  0207 10/13/22  0501    140 137   K 4.7 4.6 4.3    108 104   CO2 21 18* 22   BUN 31* 30* 27*   CREATININE 1.1 0.8 0.8     Recent Labs     10/11/22  2028 10/12/22  0207 10/13/22  0501   AST 80* 74* 69*   ALT 39 39 38   BILIDIR  --  4.7* 4.8*   BILITOT 15.5* 17.0* 17.8*   ALKPHOS 188* 176* 155*     No results for input(s): LIPASE, AMYLASE in the last 72 hours. Recent Labs     10/11/22  2028   PROTIME 25.8*   INR 2.35*     No results for input(s): PTT in the last 72 hours. ASSESSMENT     Hepatic encephalopathy -likely from noncompliance with lactulose. Was on Xifaxan daily. Do need to rule out infection ->  CXR and UA were negative.   Only small volume ascites on CT. Clinically improved as of yesterday. Drowsy today but did not sleep much overnight. Ammonia was higher today. Cirrhosis - due to alcohol abuse. No alcohol since April 2022. Has been referred to  liver transplant center but patient states never made an appointment as insurance does not cover any of the cost of transplant or her antirejection meds. Also states no social support. Juan Garciasoy History of esophageal varices  - EGD 7/2022 with large esophageal varix treated with band ligation. No GI bleeding currently. 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 this is associated with poor prognosis and is reversed by liver transplant. However patient declines liver transplant. Hemoglobin incremented from 6.1-7.9 with 1 unit PRBC here. PLAN    -Lactulose 20 g 3 times daily and titrate for goal of 3 bowel movements daily. Discussed with patient how to titrate this at home.  -Continue Xifaxan  - Follow up with Dr Makayla De Los Santos outpatient  - would monitor one more day inpatient     Discussed with Dr. Jaime Clark, PA-C  [facility] attending addendum:     I have personally performed a face-to-face diagnostic evaluation on this patient. I have reviewed and agreed with the plan of care as documented by nurse practitioner.            Yanira Mata MD,   Attending Lucho Llanos

## 2022-10-13 NOTE — PROGRESS NOTES
Hospitalist Progress Note      PCP: No primary care provider on file. Date of Admission: 10/11/2022    Chief Complaint: AMS      Subjective:   Mental status at baseline. Insomnia overnight. Hgb down to 7.3 in am.  No gross bleeding       Medications:  Reviewed    Infusion Medications    sodium chloride      sodium chloride       Scheduled Medications    sodium chloride flush  5-40 mL IntraVENous 2 times per day    nadolol  40 mg Oral Daily    furosemide  20 mg Oral Daily    spironolactone  100 mg Oral Daily    pantoprazole  40 mg Oral QAM AC    lactulose  20 g Oral 4 times per day    rifAXIMin  550 mg Oral BID    [Held by provider] enoxaparin  30 mg SubCUTAneous Daily    thiamine mononitrate  100 mg Oral Daily     PRN Meds: zolpidem, sodium chloride flush, sodium chloride, ondansetron **OR** ondansetron, polyethylene glycol, acetaminophen **OR** acetaminophen, LORazepam, sodium chloride    No intake or output data in the 24 hours ending 10/13/22 1624    Exam:    /71   Pulse 61   Temp 98.1 °F (36.7 °C) (Oral)   Resp 16   Ht 5' 4\" (1.626 m)   Wt 107 lb (48.5 kg)   SpO2 100%   BMI 18.37 kg/m²     Gen/overall appearance: Not in acute distress. Alert. Jaundiced  Head: Normocephalic, atraumatic  Eyes: EOMI, scleral icterus  CVS: regular rate and rhythm, Normal S1S2  Pulm: Clear to auscultation bilaterally. No crackles/wheezes  Extremities: No edema.  No erythema or warmth  Neuro: No gross focal deficits noted  Skin: Warm, dry    Labs:   Recent Labs     10/11/22  2028 10/12/22  0207 10/12/22  1313 10/13/22  0501   WBC 3.0* 4.9  --  3.8*   HGB 6.1* 7.9* 8.1* 7.3*   HCT 17.6* 22.8* 23.1* 20.5*   PLT 65* 66*  --  69*     Recent Labs     10/11/22  2028 10/12/22  0207 10/13/22  0501    140 137   K 4.7 4.6 4.3    108 104   CO2 21 18* 22   BUN 31* 30* 27*   CREATININE 1.1 0.8 0.8   CALCIUM 9.8 9.8 9.6     Recent Labs     10/11/22  2028 10/12/22  0207 10/13/22  0501   AST 80* 74* 69*   ALT 39 39 45   BILIDIR  --  4.7* 4.8*   BILITOT 15.5* 17.0* 17.8*   ALKPHOS 188* 176* 155*     Recent Labs     10/11/22  2028   INR 2.35*     Recent Labs     10/11/22  2028   TROPONINI <0.01       Assessment/Plan:    Active Hospital Problems    Diagnosis Date Noted    Hepatic encephalopathy [K76.82] 10/11/2022     Priority: Medium    Severe anemia [D64.9] 06/13/2022     Priority: Medium    Elevated LFTs [R79.89] 04/26/2022     Priority: Medium    Esophageal varices ([facility] [address] [I85.00] 58/46/3187    Alcoholic liver disease (HCC) [K70.9]        Hepatic encephalopathy  Alcoholic liver disease   Esophageal varices s/p band ligation  Transaminitis  - lactulose titrate to 3 BMs daily  - monitor mental status  - trend ammonia  - c/w xifaxa, lasix, aldactone  - evaluated at [facility] for liver [facility]    Severe acute on chronic anemia  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

## 2022-10-13 NOTE — CONSULTS
Please consider ordering PT/OT for assistance with discharge planning.    REAL Miller, Case Management

## 2022-10-13 NOTE — PROGRESS NOTES
0800-Assessment complete and charted. Patient denies distress but states she only slept an hour last night. Patient requested an ativan. Call light in reach, patient up ad jessica to bathroom. 1200-Patient sleeping a lot today. Awakens easily and denies distress.  Will continue to follow POC

## 2022-10-14 LAB
ACANTHOCYTES: ABNORMAL
ALBUMIN SERPL-MCNC: 3.2 G/DL (ref 3.4–5)
ALP BLD-CCNC: 161 U/L (ref 40–129)
ALT SERPL-CCNC: 36 U/L (ref 10–40)
AMMONIA: 84 UMOL/L (ref 11–51)
ANION GAP SERPL CALCULATED.3IONS-SCNC: 12 MMOL/L (ref 3–16)
ANISOCYTOSIS: ABNORMAL
AST SERPL-CCNC: 66 U/L (ref 15–37)
ATYPICAL LYMPHOCYTE RELATIVE PERCENT: 1 % (ref 0–6)
BASOPHILS ABSOLUTE: 0 K/UL (ref 0–0.2)
BASOPHILS RELATIVE PERCENT: 0 %
BILIRUB SERPL-MCNC: 16.3 MG/DL (ref 0–1)
BILIRUBIN DIRECT: 4.6 MG/DL (ref 0–0.3)
BILIRUBIN, INDIRECT: 11.7 MG/DL (ref 0–1)
BLOOD BANK DISPENSE STATUS: NORMAL
BLOOD BANK PRODUCT CODE: NORMAL
BPU ID: NORMAL
BUN BLDV-MCNC: 25 MG/DL (ref 7–20)
BURR CELLS: ABNORMAL
CALCIUM SERPL-MCNC: 9.2 MG/DL (ref 8.3–10.6)
CHLORIDE BLD-SCNC: 100 MMOL/L (ref 99–110)
CO2: 22 MMOL/L (ref 21–32)
CREAT SERPL-MCNC: 0.8 MG/DL (ref 0.6–1.1)
DESCRIPTION BLOOD BANK: NORMAL
EOSINOPHILS ABSOLUTE: 0 K/UL (ref 0–0.6)
EOSINOPHILS RELATIVE PERCENT: 1 %
FERRITIN: 2293 NG/ML (ref 15–150)
GFR AFRICAN AMERICAN: >60
GFR NON-AFRICAN AMERICAN: >60
GLUCOSE BLD-MCNC: 137 MG/DL (ref 70–99)
HCT VFR BLD CALC: 19.5 % (ref 36–48)
HCT VFR BLD CALC: 23.9 % (ref 36–48)
HEMOGLOBIN: 6.9 G/DL (ref 12–16)
HEMOGLOBIN: 8.3 G/DL (ref 12–16)
IRON SATURATION: ABNORMAL % (ref 15–50)
IRON: 190 UG/DL (ref 37–145)
LYMPHOCYTES ABSOLUTE: 0.7 K/UL (ref 1–5.1)
LYMPHOCYTES RELATIVE PERCENT: 14 %
MCH RBC QN AUTO: 36.3 PG (ref 26–34)
MCHC RBC AUTO-ENTMCNC: 35.6 G/DL (ref 31–36)
MCV RBC AUTO: 101.9 FL (ref 80–100)
MONOCYTES ABSOLUTE: 0.6 K/UL (ref 0–1.3)
MONOCYTES RELATIVE PERCENT: 12 %
NEUTROPHILS ABSOLUTE: 3.4 K/UL (ref 1.7–7.7)
NEUTROPHILS RELATIVE PERCENT: 72 %
PDW BLD-RTO: 16.8 % (ref 12.4–15.4)
PLATELET # BLD: 67 K/UL (ref 135–450)
PLATELET SLIDE REVIEW: ABNORMAL
PMV BLD AUTO: 8.8 FL (ref 5–10.5)
POTASSIUM SERPL-SCNC: 4.2 MMOL/L (ref 3.5–5.1)
RBC # BLD: 1.92 M/UL (ref 4–5.2)
SCHISTOCYTES: ABNORMAL
SLIDE REVIEW: ABNORMAL
SODIUM BLD-SCNC: 134 MMOL/L (ref 136–145)
TOTAL IRON BINDING CAPACITY: ABNORMAL UG/DL (ref 260–445)
TOTAL PROTEIN: 5.9 G/DL (ref 6.4–8.2)
WBC # BLD: 4.7 K/UL (ref 4–11)

## 2022-10-14 PROCEDURE — 2580000003 HC RX 258: Performed by: INTERNAL MEDICINE

## 2022-10-14 PROCEDURE — 6370000000 HC RX 637 (ALT 250 FOR IP): Performed by: INTERNAL MEDICINE

## 2022-10-14 PROCEDURE — 2060000000 HC ICU INTERMEDIATE R&B

## 2022-10-14 PROCEDURE — 85014 HEMATOCRIT: CPT

## 2022-10-14 PROCEDURE — 2580000003 HC RX 258: Performed by: PHYSICIAN ASSISTANT

## 2022-10-14 PROCEDURE — 85025 COMPLETE CBC W/AUTO DIFF WBC: CPT

## 2022-10-14 PROCEDURE — 80076 HEPATIC FUNCTION PANEL: CPT

## 2022-10-14 PROCEDURE — 6370000000 HC RX 637 (ALT 250 FOR IP): Performed by: NURSE PRACTITIONER

## 2022-10-14 PROCEDURE — 36430 TRANSFUSION BLD/BLD COMPNT: CPT

## 2022-10-14 PROCEDURE — 36415 COLL VENOUS BLD VENIPUNCTURE: CPT

## 2022-10-14 PROCEDURE — 85018 HEMOGLOBIN: CPT

## 2022-10-14 PROCEDURE — 6370000000 HC RX 637 (ALT 250 FOR IP): Performed by: PHYSICIAN ASSISTANT

## 2022-10-14 PROCEDURE — 82140 ASSAY OF AMMONIA: CPT

## 2022-10-14 PROCEDURE — 80048 BASIC METABOLIC PNL TOTAL CA: CPT

## 2022-10-14 RX ORDER — SODIUM CHLORIDE 9 MG/ML
INJECTION, SOLUTION INTRAVENOUS PRN
Status: COMPLETED | OUTPATIENT
Start: 2022-10-14 | End: 2022-10-14

## 2022-10-14 RX ORDER — SODIUM CHLORIDE 9 MG/ML
INJECTION, SOLUTION INTRAVENOUS PRN
Status: DISCONTINUED | OUTPATIENT
Start: 2022-10-14 | End: 2022-10-14

## 2022-10-14 RX ADMIN — PANTOPRAZOLE SODIUM 40 MG: 40 TABLET, DELAYED RELEASE ORAL at 05:14

## 2022-10-14 RX ADMIN — LACTULOSE 20 G: 20 SOLUTION ORAL at 00:05

## 2022-10-14 RX ADMIN — SODIUM CHLORIDE, PRESERVATIVE FREE 10 ML: 5 INJECTION INTRAVENOUS at 22:37

## 2022-10-14 RX ADMIN — SODIUM CHLORIDE, PRESERVATIVE FREE 10 ML: 5 INJECTION INTRAVENOUS at 10:50

## 2022-10-14 RX ADMIN — LACTULOSE 20 G: 20 SOLUTION ORAL at 05:14

## 2022-10-14 RX ADMIN — LORAZEPAM 0.5 MG: 0.5 TABLET ORAL at 18:10

## 2022-10-14 RX ADMIN — LORAZEPAM 0.5 MG: 0.5 TABLET ORAL at 09:43

## 2022-10-14 RX ADMIN — Medication 100 MG: at 09:44

## 2022-10-14 RX ADMIN — SPIRONOLACTONE 100 MG: 25 TABLET ORAL at 09:44

## 2022-10-14 RX ADMIN — RIFAXIMIN 550 MG: 550 TABLET ORAL at 10:50

## 2022-10-14 RX ADMIN — NADOLOL 40 MG: 40 TABLET ORAL at 09:44

## 2022-10-14 RX ADMIN — ZOLPIDEM TARTRATE 5 MG: 5 TABLET ORAL at 00:05

## 2022-10-14 RX ADMIN — ZOLPIDEM TARTRATE 5 MG: 5 TABLET ORAL at 22:35

## 2022-10-14 RX ADMIN — RIFAXIMIN 550 MG: 550 TABLET ORAL at 22:33

## 2022-10-14 RX ADMIN — FUROSEMIDE 20 MG: 20 TABLET ORAL at 09:43

## 2022-10-14 RX ADMIN — SODIUM CHLORIDE: 9 INJECTION, SOLUTION INTRAVENOUS at 10:50

## 2022-10-14 ASSESSMENT — PAIN SCALES - GENERAL
PAINLEVEL_OUTOF10: 0

## 2022-10-14 NOTE — CARE COORDINATION
Discharge Planning Assessment  Readmission Score of 28%  RN/SW discharge planner met with patient/ (and family member) to discuss reason for admission, current living situation, and potential needs at the time of discharge    Demographics/Insurance verified Yes    Current type of dwelling: Home     Patient from ECF/SW confirmed with: n/a    Living arrangements: The patient reports to live alone    Level of function/Support: The patient states she is Usually independent unless she becomes out of it     PCP:    Last Visit to PCP:    DME: None reported. Active with any community resources/agencies/skilled home care: None reported. Medication compliance issues: none reported. Financial issues that could impact healthcare: none reported. Tentative discharge plan: The patient and patient's daughter stated they are intrested in the patient going to a skilled nursing facility due to the fear on the patient returning home alone. The SW informed the patient and patient's daughter that PT/OT is ordered and once she has been evaluated it can be determined if SNF is appropriate or not. The SW provided the patient with [facility] if the patient were to discharge home. Discussed and provided facilities of choice if transition to a skilled nursing facility is required at the time of discharge      Discussed with patient and/or family that on the day of discharge home tentative time of discharge will be between 10 AM and noon. Transportation at the time of discharge: TBD.      Electronically signed by ABY Sarmiento on 10/14/2022 at 2:11 PM

## 2022-10-14 NOTE — PLAN OF CARE
Problem: Discharge Planning  Goal: Discharge to home or other facility with appropriate resources  Outcome: Progressing  Flowsheets (Taken 10/13/2022 2042)  Discharge to home or other facility with appropriate resources:   Identify barriers to discharge with patient and caregiver   Arrange for needed discharge resources and transportation as appropriate   Identify discharge learning needs (meds, wound care, etc)     Problem: ABCDS Injury Assessment  Goal: Absence of physical injury  Outcome: Progressing  Flowsheets (Taken 10/13/2022 2213)  Absence of Physical Injury: Implement safety measures based on patient assessment  Note: Pt is independent  and able to walk to the bathroom safely. Problem: Safety - Adult  Goal: Free from fall injury  Outcome: Progressing  Flowsheets (Taken 10/13/2022 2213)  Free From Fall Injury: Instruct family/caregiver on patient safety  Note: Pt low fall risk       Problem: Pain  Goal: Verbalizes/displays adequate comfort level or baseline comfort level  Outcome: Progressing  Flowsheets (Taken 10/13/2022 2213)  Verbalizes/displays adequate comfort level or baseline comfort level:   Encourage patient to monitor pain and request assistance   Assess pain using appropriate pain scale   Administer analgesics based on type and severity of pain and evaluate response  Note: No pain so far this shift.       Problem: Hematologic - Adult  Goal: Maintains hematologic stability  Outcome: Progressing  Flowsheets (Taken 10/13/2022 2042)  Maintains hematologic stability:   Assess for signs and symptoms of bleeding or hemorrhage   Administer blood products/factors as ordered     Problem: Metabolic/Fluid and Electrolytes - Adult  Goal: Hemodynamic stability and optimal renal function maintained  Outcome: Progressing  Flowsheets (Taken 10/13/2022 2042)  Hemodynamic stability and optimal renal function maintained:   Monitor labs and assess for signs and symptoms of volume excess or deficit   Monitor response to interventions for patient's volume status, including labs, urine output, blood pressure (other measures as available)     Problem: Confusion  Goal: Confusion, delirium, dementia, or psychosis is improved or at baseline  Description: INTERVENTIONS:  1. Assess for possible contributors to thought disturbance, including medications, impaired vision or hearing, underlying metabolic abnormalities, dehydration, psychiatric diagnoses, and notify attending LIP  2. [place] high risk fall precautions, as indicated  3. Provide frequent short contacts to provide reality reorientation, refocusing and direction  4. Decrease environmental stimuli, including noise as appropriate  5. Monitor and intervene to maintain adequate nutrition, hydration, elimination, sleep and activity  6. If unable to ensure safety without constant attention obtain sitter and review sitter guidelines with assigned personnel  7. Initiate Psychosocial CNS and Spiritual Care consult, as indicated  Flowsheets (Taken 10/13/2022 2213)  Effect of thought disturbance (confusion, delirium, dementia, or psychosis) are managed with adequate functional status:   Assess for contributors to thought disturbance, including medications, impaired vision or hearing, underlying metabolic abnormalities, dehydration, psychiatric diagnoses, notify LIP   Decrease environmental stimuli, including noise as appropriate   Monitor and intervene to maintain adequate nutrition, hydration, elimination, sleep and activity  Note: Pt not exhibiting any signs of confusion so far this shift.

## 2022-10-14 NOTE — PROGRESS NOTES
Shift summary: pt took all meds overnight. Does better with the lactulose if either in apple juice or she follows with sprite. Slept on and off, one BM over night. Message sent to hospitalist - \"FYI: this pt's hgb 6.9 and hct 19.5 and no ammonia was drawn this morning. \"    Will pass on to Day shift RN.   Radha Agudelo RN

## 2022-10-14 NOTE — PROGRESS NOTES
Informed Consent for Blood Component Transfusion Note    I have discussed with the patient the rationale for blood component transfusion; its benefits in treating or preventing fatigue, organ damage, or death; and its risk which includes mild transfusion reactions, rare risk of blood borne infection, or more serious but rare reactions. I have discussed the alternatives to transfusion, including the risk and consequences of not receiving transfusion. The patient had an opportunity to ask questions and had agreed to proceed with transfusion of blood components.     Electronically signed by Damaso Dick MD on 10/14/22 at 9:44 AM EDT

## 2022-10-14 NOTE — PROGRESS NOTES
Gastroenterology Progress Note      Ramana Ma is a 40 y.o. female patient. 1. Acute hepatic encephalopathy    2. Anemia, unspecified type    3. Polypharmacy        SUBJECTIVE: No GI complaints. Had 4 BMs yesterday with lactulose. ROS:  Cardiovascular ROS: no chest pain or dyspnea on exertion  Gastrointestinal ROS: no abdominal pain, N/V  Respiratory ROS: no cough, shortness of breath, or wheezing    Physical    VITALS:  BP (!) 104/59   Pulse 75   Temp 98.2 °F (36.8 °C) (Oral)   Resp 16   Ht 5' 4\" (1.626 m)   Wt 107 lb 6.4 oz (48.7 kg)   SpO2 99%   BMI 18.44 kg/m²   TEMPERATURE:  Current - Temp: 98.2 °F (36.8 °C); Max - Temp  Av.1 °F (36.7 °C)  Min: 97.9 °F (36.6 °C)  Max: 98.4 °F (36.9 °C)    NAD  RRR, Nl s1s2  Lungs CTA Bilaterally, normal effort  Abdomen soft, ND, NT, no HSM, Bowel sounds normal  A&Ox3, no asterixis    Data    Data Review:    Recent Labs     10/12/22  0207 10/12/22  1313 10/13/22  0501 10/14/22  0505   WBC 4.9  --  3.8* 4.7   HGB 7.9* 8.1* 7.3* 6.9*   HCT 22.8* 23.1* 20.7*  20.5* 19.5*   .3*  --  102.2* 101.9*   PLT 66*  --  69* 67*       Recent Labs     10/12/22  0207 10/13/22  0501 10/14/22  0505    137 134*   K 4.6 4.3 4.2    104 100   CO2 18* 22 22   BUN 30* 27* 25*   CREATININE 0.8 0.8 0.8       Recent Labs     10/12/22  0207 10/13/22  0501 10/14/22  0505   AST 74* 69* 66*   ALT 39 38 36   BILIDIR 4.7* 4.8* 4.6*   BILITOT 17.0* 17.8* 16.3*   ALKPHOS 176* 155* 161*       No results for input(s): LIPASE, AMYLASE in the last 72 hours. Recent Labs     10/11/22  2028   PROTIME 25.8*   INR 2.35*       No results for input(s): PTT in the last 72 hours. ASSESSMENT     Hepatic encephalopathy -likely from noncompliance with lactulose. Was on Xifaxan daily. Do need to rule out infection ->  CXR and UA were negative. Only small volume ascites on CT. Clinically improved as of yesterday. Drowsy today but did not sleep much overnight. Ammonia was higher today. Cirrhosis - due to alcohol abuse. No alcohol since April 2022. Has been referred to  liver transplant center but patient states never made an appointment as insurance does not cover any of the cost of transplant or her antirejection meds. Also states no social support. Naty Corbin History of esophageal varices  - EGD 7/2022 with large esophageal varix treated with band ligation. No GI bleeding currently. Acute on chronic anemia -  heme following. Hemoglobin incremented from 6.1-7.9 with 1 unit PRBC here. Hgb drifted to 6.9. 1 u prbc ordered. PLAN    -Lactulose 20 g 3 times daily and titrate for goal of 3 bowel movements daily. Discussed with patient how to titrate this at home.  -Continue Xifaxan  - Follow up with Dr Rosy Arora outpatient     Will sign off. Please call if questions. Discussed with Dr. Bryan Ortiz, PA-C  [address] attending addendum:     I have personally performed a face-to-face diagnostic evaluation on this patient. I have reviewed and agreed with the plan of care as documented by nurse practitioner.            Jamie Hi MD,   Attending Hay Tolbert

## 2022-10-14 NOTE — PROGRESS NOTES
[facility]   Progress Note      SUBJECTIVE:     Events noted  Doing okay. No external bleeding  No fever    OBJECTIVE    Physical  VITALS:  BP (!) 110/59   Pulse 72   Temp 98 °F (36.7 °C) (Oral)   Resp 16   Ht 5' 4\" (1.626 m)   Wt 107 lb 6.4 oz (48.7 kg)   SpO2 98%   BMI 18.44 kg/m²   TEMPERATURE:  Current - Temp: 98 °F (36.7 °C); Max - Temp  Av.1 °F (36.7 °C)  Min: 97.9 °F (36.6 °C)  Max: 98.4 °F (36.9 °C)  BLOOD PRESSURE RANGE:  Systolic (75MMX), RFT:541 , Min:104 , BIB:661   ; Diastolic (93LOJ), LETICIA:15, Min:55, Max:71    24HR INTAKE/OUTPUT:  No intake or output data in the 24 hours ending 10/14/22 1038    Conscious alert oriented  HEENT: ++ pallor or scleral icterus. Oral mucosa: No mucositis. No thrush. Neck: Supple, no lymphadenopathy. No thyromegaly  Lungs: No rales, wheezes, respiratory efforts are normal.  CVS: S1-S2 normal.  No murmurs or gallops heard. Abdomen: Soft, bowel sounds are heard. No tenderness. Neuro: No focal deficits. No cranial nerve palsies. Alert and oriented. Skin: No rashes, petechiae, ecchymosis. Spine: No tenderness or deformity noted. Extremities: No edema, tenderness, erythema.     Data  Labs:  General Labs:  CBC with Differential:    Lab Results   Component Value Date/Time    WBC 4.7 10/14/2022 05:05 AM    RBC 1.92 10/14/2022 05:05 AM    HGB 6.9 10/14/2022 05:05 AM    HCT 19.5 10/14/2022 05:05 AM    PLT 67 10/14/2022 05:05 AM    .9 10/14/2022 05:05 AM    MCH 36.3 10/14/2022 05:05 AM    MCHC 35.6 10/14/2022 05:05 AM    RDW 16.8 10/14/2022 05:05 AM    NRBC 1 2022 12:45 PM    BANDSPCT 5 10/13/2022 05:01 AM    METASPCT 1 2022 11:30 AM    LYMPHOPCT 14.0 10/14/2022 05:05 AM    MONOPCT 12.0 10/14/2022 05:05 AM    MYELOPCT 3 2022 01:25 AM    BASOPCT 0.0 10/14/2022 05:05 AM    MONOSABS 0.6 10/14/2022 05:05 AM    LYMPHSABS 0.7 10/14/2022 05:05 AM    EOSABS 0.0 10/14/2022 05:05 AM    BASOSABS 0.0 10/14/2022 05:05 AM     BMP:    Lab Results   Component Value Date/Time     10/14/2022 05:05 AM    K 4.2 10/14/2022 05:05 AM    K 4.7 10/11/2022 08:28 PM     10/14/2022 05:05 AM    CO2 22 10/14/2022 05:05 AM    BUN 25 10/14/2022 05:05 AM    LABALBU 3.2 10/14/2022 05:05 AM    CREATININE 0.8 10/14/2022 05:05 AM    CALCIUM 9.2 10/14/2022 05:05 AM    GFRAA >60 10/14/2022 05:05 AM    LABGLOM >60 10/14/2022 05:05 AM    GLUCOSE 137 10/14/2022 05:05 AM     Hepatic Function Panel:    Lab Results   Component Value Date/Time    ALKPHOS 161 10/14/2022 05:05 AM    ALT 36 10/14/2022 05:05 AM    AST 66 10/14/2022 05:05 AM    PROT 5.9 10/14/2022 05:05 AM    BILITOT 16.3 10/14/2022 05:05 AM    BILIDIR 4.6 10/14/2022 05:05 AM    IBILI 11.7 10/14/2022 05:05 AM    LABALBU 3.2 10/14/2022 05:05 AM     LDH:    Lab Results   Component Value Date/Time     07/26/2022 04:18 AM     PT/INR:    Lab Results   Component Value Date/Time    PROTIME 25.8 10/11/2022 08:28 PM    INR 2.35 10/11/2022 08:28 PM     PTT:    Lab Results   Component Value Date/Time    APTT 50.0 09/23/2022 10:30 AM   [APTT    Current Medications  Current Facility-Administered Medications: 0.9 % sodium chloride infusion, , IntraVENous, PRN  zolpidem (AMBIEN) tablet 5 mg, 5 mg, Oral, Nightly PRN  sodium chloride flush 0.9 % injection 5-40 mL, 5-40 mL, IntraVENous, 2 times per day  sodium chloride flush 0.9 % injection 5-40 mL, 5-40 mL, IntraVENous, PRN  0.9 % sodium chloride infusion, , IntraVENous, PRN  ondansetron (ZOFRAN-ODT) disintegrating tablet 4 mg, 4 mg, Oral, Q8H PRN **OR** ondansetron (ZOFRAN) injection 4 mg, 4 mg, IntraVENous, Q6H PRN  polyethylene glycol (GLYCOLAX) packet 17 g, 17 g, Oral, Daily PRN  acetaminophen (TYLENOL) tablet 650 mg, 650 mg, Oral, Q6H PRN **OR** acetaminophen (TYLENOL) suppository 650 mg, 650 mg, Rectal, Q6H PRN  nadolol (CORGARD) tablet 40 mg, 40 mg, Oral, Daily  furosemide (LASIX) tablet 20 mg, 20 mg, Oral, Daily  spironolactone (ALDACTONE) tablet 100 mg, 100 mg, Oral, Daily  pantoprazole (PROTONIX) tablet 40 mg, 40 mg, Oral, QAM AC  lactulose (CHRONULAC) 10 GM/15ML solution 20 g, 20 g, Oral, 4 times per day  rifAXIMin (XIFAXAN) tablet 550 mg, 550 mg, Oral, BID  [Held by provider] enoxaparin Sodium (LOVENOX) injection 30 mg, 30 mg, SubCUTAneous, Daily  thiamine mononitrate tablet 100 mg, 100 mg, Oral, Daily  LORazepam (ATIVAN) tablet 0.5 mg, 0.5 mg, Oral, Q8H PRN    ASSESSMENT AND PLAN    71-year-old lady with end-stage liver disease admitted with hepatic encephalopathy. She has    1. Pancytopenia:    -Due to cirrhosis  -Hemoglobin has decreased to 6.9. Blood transfusion has been ordered. -Monitor CBC  -Iron studies 10/13/2022-consistent with anemia of chronic disease.  -B12, folate 6/15/2022 normal  -High LDH and low haptoglobin due to liver disease.  -Sonja test was negative in the past.  Reticulocyte count is 0.068 - NORMAL.       Vance Fitzgerald MD

## 2022-10-15 LAB
ACANTHOCYTES: ABNORMAL
ALBUMIN SERPL-MCNC: 3.1 G/DL (ref 3.4–5)
ALP BLD-CCNC: 185 U/L (ref 40–129)
ALT SERPL-CCNC: 38 U/L (ref 10–40)
AMMONIA: 128 UMOL/L (ref 11–51)
ANION GAP SERPL CALCULATED.3IONS-SCNC: 9 MMOL/L (ref 3–16)
ANISOCYTOSIS: ABNORMAL
AST SERPL-CCNC: 72 U/L (ref 15–37)
BASOPHILS ABSOLUTE: 0 K/UL (ref 0–0.2)
BASOPHILS RELATIVE PERCENT: 1 %
BILIRUB SERPL-MCNC: 16.4 MG/DL (ref 0–1)
BILIRUBIN DIRECT: 4.6 MG/DL (ref 0–0.3)
BILIRUBIN, INDIRECT: 11.8 MG/DL (ref 0–1)
BLOOD BANK DISPENSE STATUS: NORMAL
BLOOD BANK PRODUCT CODE: NORMAL
BLOOD CULTURE, ROUTINE: NORMAL
BPU ID: NORMAL
BUN BLDV-MCNC: 24 MG/DL (ref 7–20)
BURR CELLS: ABNORMAL
CALCIUM SERPL-MCNC: 9 MG/DL (ref 8.3–10.6)
CHLORIDE BLD-SCNC: 100 MMOL/L (ref 99–110)
CO2: 24 MMOL/L (ref 21–32)
CREAT SERPL-MCNC: 0.8 MG/DL (ref 0.6–1.1)
CULTURE, BLOOD 2: NORMAL
DESCRIPTION BLOOD BANK: NORMAL
EOSINOPHILS ABSOLUTE: 0 K/UL (ref 0–0.6)
EOSINOPHILS RELATIVE PERCENT: 0 %
GFR AFRICAN AMERICAN: >60
GFR NON-AFRICAN AMERICAN: >60
GLUCOSE BLD-MCNC: 151 MG/DL (ref 70–99)
HCT VFR BLD CALC: 20.2 % (ref 36–48)
HEMATOLOGY PATH CONSULT: NO
HEMOGLOBIN: 7.4 G/DL (ref 12–16)
LYMPHOCYTES ABSOLUTE: 1.1 K/UL (ref 1–5.1)
LYMPHOCYTES RELATIVE PERCENT: 23 %
MCH RBC QN AUTO: 35.6 PG (ref 26–34)
MCHC RBC AUTO-ENTMCNC: 36.3 G/DL (ref 31–36)
MCV RBC AUTO: 97.9 FL (ref 80–100)
MONOCYTES ABSOLUTE: 0.3 K/UL (ref 0–1.3)
MONOCYTES RELATIVE PERCENT: 6 %
NEUTROPHILS ABSOLUTE: 3.3 K/UL (ref 1.7–7.7)
NEUTROPHILS RELATIVE PERCENT: 70 %
PDW BLD-RTO: 21.1 % (ref 12.4–15.4)
PLATELET # BLD: 61 K/UL (ref 135–450)
PLATELET SLIDE REVIEW: ABNORMAL
PMV BLD AUTO: 9.2 FL (ref 5–10.5)
POIKILOCYTES: ABNORMAL
POTASSIUM SERPL-SCNC: 4.7 MMOL/L (ref 3.5–5.1)
RBC # BLD: 2.07 M/UL (ref 4–5.2)
SCHISTOCYTES: ABNORMAL
SLIDE REVIEW: ABNORMAL
SODIUM BLD-SCNC: 133 MMOL/L (ref 136–145)
TARGET CELLS: ABNORMAL
TOTAL PROTEIN: 5.5 G/DL (ref 6.4–8.2)
WBC # BLD: 4.7 K/UL (ref 4–11)

## 2022-10-15 PROCEDURE — 6370000000 HC RX 637 (ALT 250 FOR IP): Performed by: INTERNAL MEDICINE

## 2022-10-15 PROCEDURE — 2580000003 HC RX 258: Performed by: INTERNAL MEDICINE

## 2022-10-15 PROCEDURE — 36415 COLL VENOUS BLD VENIPUNCTURE: CPT

## 2022-10-15 PROCEDURE — 80076 HEPATIC FUNCTION PANEL: CPT

## 2022-10-15 PROCEDURE — 80048 BASIC METABOLIC PNL TOTAL CA: CPT

## 2022-10-15 PROCEDURE — 82140 ASSAY OF AMMONIA: CPT

## 2022-10-15 PROCEDURE — 85025 COMPLETE CBC W/AUTO DIFF WBC: CPT

## 2022-10-15 PROCEDURE — 2060000000 HC ICU INTERMEDIATE R&B

## 2022-10-15 PROCEDURE — 6370000000 HC RX 637 (ALT 250 FOR IP): Performed by: PHYSICIAN ASSISTANT

## 2022-10-15 RX ADMIN — NADOLOL 40 MG: 40 TABLET ORAL at 09:15

## 2022-10-15 RX ADMIN — Medication 100 MG: at 09:15

## 2022-10-15 RX ADMIN — SPIRONOLACTONE 100 MG: 25 TABLET ORAL at 09:15

## 2022-10-15 RX ADMIN — FUROSEMIDE 20 MG: 20 TABLET ORAL at 09:15

## 2022-10-15 RX ADMIN — LACTULOSE 20 G: 20 SOLUTION ORAL at 06:13

## 2022-10-15 RX ADMIN — RIFAXIMIN 550 MG: 550 TABLET ORAL at 09:15

## 2022-10-15 RX ADMIN — SODIUM CHLORIDE, PRESERVATIVE FREE 10 ML: 5 INJECTION INTRAVENOUS at 09:15

## 2022-10-15 RX ADMIN — RIFAXIMIN 550 MG: 550 TABLET ORAL at 21:11

## 2022-10-15 RX ADMIN — LACTULOSE 20 G: 20 SOLUTION ORAL at 12:00

## 2022-10-15 RX ADMIN — LORAZEPAM 0.5 MG: 0.5 TABLET ORAL at 12:00

## 2022-10-15 RX ADMIN — PANTOPRAZOLE SODIUM 40 MG: 40 TABLET, DELAYED RELEASE ORAL at 06:13

## 2022-10-15 RX ADMIN — SODIUM CHLORIDE, PRESERVATIVE FREE 10 ML: 5 INJECTION INTRAVENOUS at 21:12

## 2022-10-15 RX ADMIN — LORAZEPAM 0.5 MG: 0.5 TABLET ORAL at 21:11

## 2022-10-15 RX ADMIN — LORAZEPAM 0.5 MG: 0.5 TABLET ORAL at 01:46

## 2022-10-15 RX ADMIN — LACTULOSE 20 G: 20 SOLUTION ORAL at 17:50

## 2022-10-15 NOTE — PLAN OF CARE
Problem: Discharge Planning  Goal: Discharge to home or other facility with appropriate resources  Outcome: Progressing  Flowsheets (Taken 10/14/2022 2144)  Discharge to home or other facility with appropriate resources:   Identify barriers to discharge with patient and caregiver   Arrange for needed discharge resources and transportation as appropriate   Identify discharge learning needs (meds, wound care, etc)     Problem: Confusion  Goal: Confusion, delirium, dementia, or psychosis is improved or at baseline  Description: INTERVENTIONS:  1. Assess for possible contributors to thought disturbance, including medications, impaired vision or hearing, underlying metabolic abnormalities, dehydration, psychiatric diagnoses, and notify attending LIP  2. [place] high risk fall precautions, as indicated  3. Provide frequent short contacts to provide reality reorientation, refocusing and direction  4. Decrease environmental stimuli, including noise as appropriate  5. Monitor and intervene to maintain adequate nutrition, hydration, elimination, sleep and activity  6. If unable to ensure safety without constant attention obtain sitter and review sitter guidelines with assigned personnel  7. Initiate Psychosocial CNS and Spiritual Care consult, as indicated  Outcome: Progressing  Flowsheets (Taken 10/14/2022 2317)  Effect of thought disturbance (confusion, delirium, dementia, or psychosis) are managed with adequate functional status:   Assess for contributors to thought disturbance, including medications, impaired vision or hearing, underlying metabolic abnormalities, dehydration, psychiatric diagnoses, notify LIP   Decrease environmental stimuli, including noise as appropriate  Note: No s/s of confusion so far this shift.       Problem: ABCDS Injury Assessment  Goal: Absence of physical injury  Outcome: Progressing  Flowsheets (Taken 10/13/2022 2213)  Absence of Physical Injury: Implement safety measures based on patient assessment     Problem: Safety - Adult  Goal: Free from fall injury  Outcome: Progressing  Flowsheets (Taken 10/13/2022 2213)  Free From Fall Injury: Instruct family/caregiver on patient safety     Problem: Pain  Goal: Verbalizes/displays adequate comfort level or baseline comfort level  Outcome: Progressing  Flowsheets (Taken 10/14/2022 2310)  Verbalizes/displays adequate comfort level or baseline comfort level:   Encourage patient to monitor pain and request assistance   Assess pain using appropriate pain scale     Problem: Hematologic - Adult  Goal: Maintains hematologic stability  Outcome: Progressing  Flowsheets (Taken 10/14/2022 2144)  Maintains hematologic stability:   Assess for signs and symptoms of bleeding or hemorrhage   Monitor labs for bleeding or clotting disorders   Administer blood products/factors as ordered     Problem: Metabolic/Fluid and Electrolytes - Adult  Goal: Hemodynamic stability and optimal renal function maintained  Outcome: Progressing  Flowsheets (Taken 10/14/2022 2144)  Hemodynamic stability and optimal renal function maintained:   Monitor response to interventions for patient's volume status, including labs, urine output, blood pressure (other measures as available)   Encourage oral intake as appropriate     Problem: Gastrointestinal - Adult  Goal: Minimal or absence of nausea and vomiting  Outcome: Progressing  Flowsheets (Taken 10/14/2022 2144)  Minimal or absence of nausea and vomiting:   Administer ordered antiemetic medications as needed   Provide nonpharmacologic comfort measures as appropriate     Problem: Gastrointestinal - Adult  Goal: Maintains or returns to baseline bowel function  Outcome: Progressing  Flowsheets (Taken 10/14/2022 2144)  Maintains or returns to baseline bowel function:   Assess bowel function   Encourage oral fluids to ensure adequate hydration   Administer ordered medications as needed

## 2022-10-15 NOTE — PROGRESS NOTES
Hospitalist Progress Note      PCP: No primary care provider on file. Date of Admission: 10/11/2022    Chief Complaint: AMS      Subjective:   Mental status at baseline. Hgb down requiring transfusion  No gross bleeding       Medications:  Reviewed    Infusion Medications    sodium chloride       Scheduled Medications    sodium chloride flush  5-40 mL IntraVENous 2 times per day    nadolol  40 mg Oral Daily    furosemide  20 mg Oral Daily    spironolactone  100 mg Oral Daily    pantoprazole  40 mg Oral QAM AC    lactulose  20 g Oral 4 times per day    rifAXIMin  550 mg Oral BID    [Held by provider] enoxaparin  30 mg SubCUTAneous Daily    thiamine mononitrate  100 mg Oral Daily     PRN Meds: zolpidem, sodium chloride flush, sodium chloride, ondansetron **OR** ondansetron, polyethylene glycol, acetaminophen **OR** acetaminophen, LORazepam    No intake or output data in the 24 hours ending 10/15/22 1631    Exam:    /65   Pulse 76   Temp 98.1 °F (36.7 °C) (Oral)   Resp 16   Ht 5' 4\" (1.626 m)   Wt 108 lb 1.6 oz (49 kg)   SpO2 96%   BMI 18.56 kg/m²     Gen/overall appearance: Not in acute distress. Alert. Jaundiced  Head: Normocephalic, atraumatic  Eyes: EOMI, scleral icterus  CVS: regular rate and rhythm, Normal S1S2  Pulm: Clear to auscultation bilaterally. No crackles/wheezes  Extremities: No edema.  No erythema or warmth  Neuro: No gross focal deficits noted  Skin: Warm, dry    Labs:   Recent Labs     10/13/22  0501 10/14/22  0505 10/14/22  1611 10/15/22  0438   WBC 3.8* 4.7  --  4.7   HGB 7.3* 6.9* 8.3* 7.4*   HCT 20.7*  20.5* 19.5* 23.9* 20.2*   PLT 69* 67*  --  61*       Recent Labs     10/13/22  0501 10/14/22  0505 10/15/22  0438    134* 133*   K 4.3 4.2 4.7    100 100   CO2 22 22 24   BUN 27* 25* 24*   CREATININE 0.8 0.8 0.8   CALCIUM 9.6 9.2 9.0       Recent Labs     10/13/22  0501 10/14/22  0505 10/15/22  0438   AST 69* 66* 72*   ALT 38 36 38   BILIDIR 4.8* 4.6* 4.6* BILITOT 17.8* 16.3* 16.4*   ALKPHOS 155* 161* 185*       No results for input(s): INR in the last 72 hours. No results for input(s): Sanam Nasuti in the last 72 hours. Assessment/Plan:    Active Hospital Problems    Diagnosis Date Noted    Hepatic encephalopathy [K76.82] 10/11/2022     Priority: Medium    Severe anemia [D64.9] 06/13/2022     Priority: Medium    Elevated LFTs [R79.89] 04/26/2022     Priority: Medium    Esophageal varices ([address] [I85.00] 61/92/5055    Alcoholic liver disease (HCC) [K70.9]        Hepatic encephalopathy  Alcoholic liver disease   Esophageal varices s/p band ligation  Transaminitis  - lactulose titrate to 3 BMs daily  - monitor mental status  - trend ammonia  - c/w xifaxa, lasix, aldactone  - evaluated at [facility] for liver transplant; declined given costs    Severe acute on chronic anemia  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

## 2022-10-15 NOTE — PROGRESS NOTES
Progress Note    Patient Tereza Diana  MRN: [id number]  YOB: 1975 Age: 40 y.o. Sex: female  Room: [address] [place] [address]       Admitting Physician: Alva Ortiz MD   Date of Admission: 10/11/2022  7:59 PM   Primary Care Physician: No primary care provider on file. Subjective:  Tereza Diana was seen and examined. We are following for cirrhosis/hepatic encephalopathy/thrombocytopenia anemia. Patient awake and alert, reports feeling better. Reports brown stools and denies being dark. Hemoglobin stable at 7.4/platelets 61  According to the nursing staff she has been refusing lactulose  Ammonia is elevated today      ROS:  Constitutional: Denies fever, no change in appetite  Respiratory: Denies cough or shortness of breath  Cardiovascular: Denies chest pain or edema    Objective:  Vital Signs:   Vitals:    10/15/22 0855   BP: 112/62   Pulse: 74   Resp: 16   Temp: 98.8 °F (37.1 °C)   SpO2: 96%         Physical Exam:  Constitutional: Alert and oriented x 4. No acute distress. Respiratory: Respirations nonlabored, no crepitus  GI: Abdomen nondistended, soft, and nontender. Neurological: No focal deficits noted. No asterixis.     Intake/Output:    Intake/Output Summary (Last 24 hours) at 10/15/2022 1143  Last data filed at 10/14/2022 1440  Gross per 24 hour   Intake 315 ml   Output --   Net 315 ml        Current Medications:  Current Facility-Administered Medications   Medication Dose Route Frequency Provider Last Rate Last Admin    zolpidem (AMBIEN) tablet 5 mg  5 mg Oral Nightly PRN DARCY Galeas - CNP   5 mg at 10/14/22 2235    sodium chloride flush 0.9 % injection 5-40 mL  5-40 mL IntraVENous 2 times per day Alva Ortiz MD   10 mL at 10/15/22 0915    sodium chloride flush 0.9 % injection 5-40 mL  5-40 mL IntraVENous PRN Alva Ortiz MD        0.9 % sodium chloride infusion   IntraVENous PRN Alva Ortiz MD        ondansetron (ZOFRAN-ODT) disintegrating tablet 4 mg  4 mg Oral Q8H PRN Neal Kenney MD        Or    ondansetron (ZOFRAN) injection 4 mg  4 mg IntraVENous Q6H PRN Neal Kenney MD        polyethylene glycol (GLYCOLAX) packet 17 g  17 g Oral Daily PRN Neal Kenney MD        acetaminophen (TYLENOL) tablet 650 mg  650 mg Oral Q6H PRN Neal Kenney MD        Or    acetaminophen (TYLENOL) suppository 650 mg  650 mg Rectal Q6H PRN Neal Kenney MD        nadolol (CORGARD) tablet 40 mg  40 mg Oral Daily Neal Kenney MD   40 mg at 10/15/22 0915    furosemide (LASIX) tablet 20 mg  20 mg Oral Daily Neal Kenney MD   20 mg at 10/15/22 0915    spironolactone (ALDACTONE) tablet 100 mg  100 mg Oral Daily Neal Kenney MD   100 mg at 10/15/22 0915    pantoprazole (PROTONIX) tablet 40 mg  40 mg Oral QAM AC Neal Kenney MD   40 mg at 10/15/22 0613    lactulose (CHRONULAC) 10 GM/15ML solution 20 g  20 g Oral 4 times per day Sathya Grider PA-C   20 g at 10/15/22 1028    rifAXIMin (XIFAXAN) tablet 550 mg  550 mg Oral BID Neal Kenney MD   550 mg at 10/15/22 0915    [Held by provider] enoxaparin Sodium (LOVENOX) injection 30 mg  30 mg SubCUTAneous Daily Neal Kenney MD   30 mg at 10/13/22 8540    thiamine mononitrate tablet 100 mg  100 mg Oral Daily Neal Kenney MD   100 mg at 10/15/22 0915    LORazepam (ATIVAN) tablet 0.5 mg  0.5 mg Oral Q8H PRN Vikki Pinedo MD   0.5 mg at 10/15/22 0146         Recent Imaging:   CT ABDOMEN PELVIS WO CONTRAST Additional Contrast? None  Narrative: EXAMINATION:  CT OF THE ABDOMEN AND PELVIS WITHOUT CONTRAST 10/11/2022 8:55 pm    TECHNIQUE:  CT of the abdomen and pelvis was performed without the administration of  intravenous contrast. Multiplanar reformatted images are provided for review. Automated exposure control, iterative reconstruction, and/or weight based  adjustment of the mA/kV was utilized to reduce the radiation dose to as low  as reasonably achievable.     COMPARISON:  08/25/2022    HISTORY:  ORDERING SYSTEM PROVIDED HISTORY: AMS, h/o cirrhosis  TECHNOLOGIST PROVIDED HISTORY:  Reason for exam:->AMS, h/o cirrhosis  Additional Contrast?->None  Decision Support Exception - unselect if not a suspected or confirmed  emergency medical condition->Emergency Medical Condition (MA)  Is the patient pregnant?->No  Reason for Exam: AMS, h/o cirrhosis. Altered Mental Status (Pt arrived from  home via ems with AMS Ems stated she is alert and oriented x2, wanted to sign  refusal but was unable to follow instructions to do so Pt when asked why she  was here, could not tell me, asked her where she was pt stated  Pt has hx  of liver failure, pt is visibly jaundiced. ). FINDINGS:  Lower Chest: Lung bases are grossly clear. The heart is mildly enlarged. No  pericardial effusion. Organs: There is unchanged heterogeneous appearance of the liver parenchyma  with areas of both increased and decreased attenuation. Areas of increased  attenuation are most pronounced in the left lobe and saulo hepatis. There is  a slightly nodular contour of the liver. There are calcified gallstones. The gallbladder wall is diffusely thickened. There is pericholecystic fluid  however there is also perihepatic ascites. Portal venous hypertension with  splenomegaly, recanalization of the umbilical vein and upper abdominal  varices. The pancreas, adrenal glands and kidneys are grossly with the  limitations of a noncontrast study. There is an unchanged calcification or  calculus in the lower pole of the right kidney. GI/Bowel: There is a moderate stool load particularly in the right,  transverse and proximal descending colon. There is diffuse bowel wall  thickening. Pelvis: Bilateral Essure tubal occlusion devices. Uterus is otherwise  unremarkable. The urinary bladder is normal.    Peritoneum/Retroperitoneum: There is small volume of low-attenuation ascites  in the abdomen and pelvis. There is diffuse infiltration of fat in the  abdomen.   No free air.  There are mildly prominent nonspecific  retroperitoneal lymph nodes. Bones/Soft Tissues: No acute bone finding. Impression: Unchanged diffuse hepatocellular disease with suspected cirrhosis. Portal venous hypertension with recanalized umbilical vein, ascites, upper  abdominal varices and splenomegaly. Cholelithiasis. There is diffuse gallbladder wall thickening. This finding  may be related to hypoproteinemia and third-spacing of fluid. Acute  cholecystitis cannot be excluded. Correlate clinically. Moderate stool load consistent with constipation. CT HEAD WO CONTRAST  Narrative: EXAMINATION:  CT OF THE HEAD WITHOUT CONTRAST  10/11/2022 8:54 pm    TECHNIQUE:  CT of the head was performed without the administration of intravenous  contrast. Automated exposure control, iterative reconstruction, and/or weight  based adjustment of the mA/kV was utilized to reduce the radiation dose to as  low as reasonably achievable. COMPARISON:  None. HISTORY:  ORDERING SYSTEM PROVIDED HISTORY: [facility]  TECHNOLOGIST PROVIDED HISTORY:  Reason for exam:->ams  Has a \"code stroke\" or \"stroke alert\" been called? ->No  Decision Support Exception - unselect if not a suspected or confirmed  emergency medical condition->Emergency Medical Condition (MA)  Is the patient pregnant?->No  Reason for Exam: AMS. Altered Mental Status (Pt arrived from home via ems  with AMS Ems stated she is alert and oriented x2, wanted to sign refusal but  was unable to follow instructions to do so Pt when asked why she was here,  could not tell me, asked her where she was pt stated  Pt has hx of liver  failure, pt is visibly jaundiced. ). FINDINGS:  BRAIN/VENTRICLES: There is no acute intracranial hemorrhage, mass effect or  midline shift. No abnormal extra-axial fluid collection. The gray-white  differentiation is maintained without evidence of an acute infarct. There is  no evidence of hydrocephalus.     ORBITS: The visualized portion of the orbits demonstrate no acute abnormality. SINUSES: The visualized paranasal sinuses and mastoid air cells demonstrate  no acute abnormality. SOFT TISSUES/SKULL:  No acute abnormality of the visualized skull or soft  tissues. There is a salt and pepper appearance of the skull. Impression: No acute intracranial abnormality. XR CHEST PORTABLE  Narrative: EXAMINATION:  ONE XRAY VIEW OF THE CHEST    10/11/2022 8:36 pm    COMPARISON:  08/25/2022    HISTORY:  ORDERING SYSTEM PROVIDED HISTORY: ams  TECHNOLOGIST PROVIDED HISTORY:  Reason for exam:->ams  Reason for Exam: AMS    FINDINGS:  The lungs are without acute focal process. There is no effusion or  pneumothorax. The cardiomediastinal silhouette is stable. The osseous  structures are stable. Impression: No acute process. Labs:   Recent Labs     10/12/22  1313 10/13/22  0501 10/14/22  0505 10/14/22  1611 10/15/22  0438   HGB 8.1* 7.3* 6.9* 8.3* 7.4*   WBC  --  3.8* 4.7  --  4.7   PROT  --  6.1* 5.9*  --  5.5*   LABALBU  --  3.0* 3.2*  --  3.1*   ALKPHOS  --  155* 161*  --  185*   ALT  --  38 36  --  38   AST  --  69* 66*  --  72*   BILITOT  --  17.8* 16.3*  --  16.4*   BILIDIR  --  4.8* 4.6*  --  4.6*   IBILI  --  13.0* 11.7*  --  11.8*        ASSESSMENT      Hepatic encephalopathy -likely from noncompliance with lactulose. Was on Xifaxan daily. Do need to rule out infection ->  CXR and UA were negative. Only small volume ascites on CT. Clinically improved as of yesterday. Drowsy today but did not sleep much overnight. Ammonia was higher today. Cirrhosis - due to alcohol abuse. No alcohol since April 2022. Has been referred to  liver transplant center but patient states never made an appointment as insurance does not cover any of the cost of transplant or her antirejection meds. Also states no social support. Atilio Quintanilla History of esophageal varices  - EGD 7/2022 with large esophageal varix treated with band ligation.    No GI bleeding currently. Acute on chronic anemia -  heme following. Hemoglobin incremented from 6.1-7.9 with 1 unit PRBC here. Noncompliance with lactulose     PLAN    -Lactulose 20 g 3 times daily and titrate for goal of 3 bowel movements daily. Discussed with patient how to titrate this at home. - Low sodium diet   -Continue Xifaxan  - Follow up with Dr Arlene Velazquez MD    [address]    [phone].  Also available via [employer]

## 2022-10-15 NOTE — PROGRESS NOTES
Hospitalist Progress Note      PCP: No primary care provider on file. Date of Admission: 10/11/2022    Chief Complaint: AMS      Subjective:   Mental status at baseline. 4-5 BMs overnight  Hgb down after transfusion to 7.4 in am  No gross bleeding       Medications:  Reviewed    Infusion Medications    sodium chloride       Scheduled Medications    sodium chloride flush  5-40 mL IntraVENous 2 times per day    nadolol  40 mg Oral Daily    furosemide  20 mg Oral Daily    spironolactone  100 mg Oral Daily    pantoprazole  40 mg Oral QAM AC    lactulose  20 g Oral 4 times per day    rifAXIMin  550 mg Oral BID    [Held by provider] enoxaparin  30 mg SubCUTAneous Daily    thiamine mononitrate  100 mg Oral Daily     PRN Meds: zolpidem, sodium chloride flush, sodium chloride, ondansetron **OR** ondansetron, polyethylene glycol, acetaminophen **OR** acetaminophen, LORazepam    No intake or output data in the 24 hours ending 10/15/22 1633    Exam:    /65   Pulse 76   Temp 98.1 °F (36.7 °C) (Oral)   Resp 16   Ht 5' 4\" (1.626 m)   Wt 108 lb 1.6 oz (49 kg)   SpO2 96%   BMI 18.56 kg/m²     Gen/overall appearance: Not in acute distress. Alert. Jaundiced  Head: Normocephalic, atraumatic  Eyes: EOMI, scleral icterus  CVS: regular rate and rhythm, Normal S1S2  Pulm: Clear to auscultation bilaterally. No crackles/wheezes  Extremities: No edema.  No erythema or warmth  Neuro: No gross focal deficits noted  Skin: Warm, dry    Labs:   Recent Labs     10/13/22  0501 10/14/22  0505 10/14/22  1611 10/15/22  0438   WBC 3.8* 4.7  --  4.7   HGB 7.3* 6.9* 8.3* 7.4*   HCT 20.7*  20.5* 19.5* 23.9* 20.2*   PLT 69* 67*  --  61*       Recent Labs     10/13/22  0501 10/14/22  0505 10/15/22  0438    134* 133*   K 4.3 4.2 4.7    100 100   CO2 22 22 24   BUN 27* 25* 24*   CREATININE 0.8 0.8 0.8   CALCIUM 9.6 9.2 9.0       Recent Labs     10/13/22  0501 10/14/22  0505 10/15/22  0438   AST 69* 66* 72*   ALT 38 36 38 BILIDIR 4.8* 4.6* 4.6*   BILITOT 17.8* 16.3* 16.4*   ALKPHOS 155* 161* 185*       No results for input(s): INR in the last 72 hours. No results for input(s): Gladis Fernandess in the last 72 hours. Assessment/Plan:    Active Hospital Problems    Diagnosis Date Noted    Hepatic encephalopathy [K76.82] 10/11/2022     Priority: Medium    Severe anemia [D64.9] 06/13/2022     Priority: Medium    Elevated LFTs [R79.89] 04/26/2022     Priority: Medium    Esophageal varices ([facility] [address] [I85.00] 91/53/9692    Alcoholic liver disease (HCC) [K70.9]        Hepatic encephalopathy  Alcoholic liver disease   Esophageal varices s/p band ligation  Transaminitis  - lactulose titrate to 3 BMs daily  - monitor mental status  - trend ammonia  - c/w xifaxa, lasix, aldactone  - evaluated at [facility] for liver transplant; declined given costs    Severe acute on chronic anemia  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

## 2022-10-15 NOTE — PLAN OF CARE
Problem: Safety - Adult  Goal: Free from fall injury  10/15/2022 0919 by Mickey Garcia RN  Outcome: Progressing  Note: Patient remains absent from falls at this time. Remains alert and oriented, in bed with call light and belongings in reach. Non-slip footwear on and 2/4 siderails raised. Bed remains in lowest/locked position at all times. Fall precautions in place. Patient encouraged to use call light to request assistance, v/u.  Will continue to monitor. Problem: Pain  Goal: Verbalizes/displays adequate comfort level or baseline comfort level  10/15/2022 0919 by Mickey Garcia RN  Outcome: Progressing  Note: Patient denies any pain at this time. Will continue to monitor. Problem: Hematologic - Adult  Goal: Maintains hematologic stability  10/15/2022 0919 by Mickey Garcia RN  Outcome: Progressing  Note: Patient VSS at this time on room air. Will continue to monitor.

## 2022-10-15 NOTE — PROGRESS NOTES
Assessment completed. VSS  Pt resting in bed with no complaints of pain. Pt states that she would rather just take the 0600 lactulose instead of both the 0000 and 0600. Too early for ativan, rifampin and ambien given for sleep. POC discussed and agreed upon, snack given per request.   Will continue to monitor.   Bhupinder Hay RN

## 2022-10-16 LAB
ACANTHOCYTES: ABNORMAL
ALBUMIN SERPL-MCNC: 3.1 G/DL (ref 3.4–5)
ALP BLD-CCNC: 205 U/L (ref 40–129)
ALT SERPL-CCNC: 42 U/L (ref 10–40)
AMMONIA: 93 UMOL/L (ref 11–51)
ANION GAP SERPL CALCULATED.3IONS-SCNC: 9 MMOL/L (ref 3–16)
ANISOCYTOSIS: ABNORMAL
AST SERPL-CCNC: 77 U/L (ref 15–37)
BANDED NEUTROPHILS RELATIVE PERCENT: 2 % (ref 0–7)
BASOPHILS ABSOLUTE: 0 K/UL (ref 0–0.2)
BASOPHILS RELATIVE PERCENT: 0 %
BILIRUB SERPL-MCNC: 17.1 MG/DL (ref 0–1)
BILIRUBIN DIRECT: 4.7 MG/DL (ref 0–0.3)
BILIRUBIN, INDIRECT: 12.4 MG/DL (ref 0–1)
BUN BLDV-MCNC: 27 MG/DL (ref 7–20)
BURR CELLS: ABNORMAL
CALCIUM SERPL-MCNC: 9.2 MG/DL (ref 8.3–10.6)
CHLORIDE BLD-SCNC: 99 MMOL/L (ref 99–110)
CO2: 24 MMOL/L (ref 21–32)
CREAT SERPL-MCNC: 0.6 MG/DL (ref 0.6–1.1)
EOSINOPHILS ABSOLUTE: 0.1 K/UL (ref 0–0.6)
EOSINOPHILS RELATIVE PERCENT: 1 %
GFR AFRICAN AMERICAN: >60
GFR NON-AFRICAN AMERICAN: >60
GLUCOSE BLD-MCNC: 161 MG/DL (ref 70–99)
HCT VFR BLD CALC: 21 % (ref 36–48)
HEMOGLOBIN: 7.5 G/DL (ref 12–16)
LYMPHOCYTES ABSOLUTE: 1 K/UL (ref 1–5.1)
LYMPHOCYTES RELATIVE PERCENT: 13 %
MCH RBC QN AUTO: 35 PG (ref 26–34)
MCHC RBC AUTO-ENTMCNC: 35.9 G/DL (ref 31–36)
MCV RBC AUTO: 97.6 FL (ref 80–100)
MONOCYTES ABSOLUTE: 0.3 K/UL (ref 0–1.3)
MONOCYTES RELATIVE PERCENT: 4 %
NEUTROPHILS ABSOLUTE: 6.5 K/UL (ref 1.7–7.7)
NEUTROPHILS RELATIVE PERCENT: 80 %
OCCULT BLOOD DIAGNOSTIC: NORMAL
PDW BLD-RTO: 20.7 % (ref 12.4–15.4)
PLATELET # BLD: 58 K/UL (ref 135–450)
PMV BLD AUTO: 9.3 FL (ref 5–10.5)
POIKILOCYTES: ABNORMAL
POTASSIUM SERPL-SCNC: 4.4 MMOL/L (ref 3.5–5.1)
RBC # BLD: 2.15 M/UL (ref 4–5.2)
SCHISTOCYTES: ABNORMAL
SODIUM BLD-SCNC: 132 MMOL/L (ref 136–145)
TARGET CELLS: ABNORMAL
TOTAL PROTEIN: 5.8 G/DL (ref 6.4–8.2)
WBC # BLD: 7.9 K/UL (ref 4–11)

## 2022-10-16 PROCEDURE — 6370000000 HC RX 637 (ALT 250 FOR IP): Performed by: INTERNAL MEDICINE

## 2022-10-16 PROCEDURE — 6370000000 HC RX 637 (ALT 250 FOR IP): Performed by: NURSE PRACTITIONER

## 2022-10-16 PROCEDURE — 80048 BASIC METABOLIC PNL TOTAL CA: CPT

## 2022-10-16 PROCEDURE — 6370000000 HC RX 637 (ALT 250 FOR IP): Performed by: PHYSICIAN ASSISTANT

## 2022-10-16 PROCEDURE — 82270 OCCULT BLOOD FECES: CPT

## 2022-10-16 PROCEDURE — 85025 COMPLETE CBC W/AUTO DIFF WBC: CPT

## 2022-10-16 PROCEDURE — 6360000002 HC RX W HCPCS: Performed by: INTERNAL MEDICINE

## 2022-10-16 PROCEDURE — 2580000003 HC RX 258: Performed by: INTERNAL MEDICINE

## 2022-10-16 PROCEDURE — 80076 HEPATIC FUNCTION PANEL: CPT

## 2022-10-16 PROCEDURE — 2060000000 HC ICU INTERMEDIATE R&B

## 2022-10-16 PROCEDURE — 36415 COLL VENOUS BLD VENIPUNCTURE: CPT

## 2022-10-16 PROCEDURE — 82140 ASSAY OF AMMONIA: CPT

## 2022-10-16 RX ORDER — LACTULOSE 10 G/15ML
10 SOLUTION ORAL ONCE
Status: DISCONTINUED | OUTPATIENT
Start: 2022-10-16 | End: 2022-10-17

## 2022-10-16 RX ORDER — LACTULOSE 10 G/15ML
10 SOLUTION ORAL EVERY 6 HOURS SCHEDULED
Status: DISCONTINUED | OUTPATIENT
Start: 2022-10-16 | End: 2022-10-18 | Stop reason: HOSPADM

## 2022-10-16 RX ORDER — LACTULOSE 10 G/15ML
20 SOLUTION ORAL ONCE
Status: DISCONTINUED | OUTPATIENT
Start: 2022-10-16 | End: 2022-10-16

## 2022-10-16 RX ADMIN — LACTULOSE 20 G: 20 SOLUTION ORAL at 05:11

## 2022-10-16 RX ADMIN — PANTOPRAZOLE SODIUM 40 MG: 40 TABLET, DELAYED RELEASE ORAL at 05:11

## 2022-10-16 RX ADMIN — ZOLPIDEM TARTRATE 5 MG: 5 TABLET ORAL at 00:54

## 2022-10-16 RX ADMIN — LORAZEPAM 0.5 MG: 0.5 TABLET ORAL at 23:39

## 2022-10-16 RX ADMIN — LACTULOSE 20 G: 20 SOLUTION ORAL at 00:54

## 2022-10-16 RX ADMIN — Medication 100 MG: at 09:21

## 2022-10-16 RX ADMIN — RIFAXIMIN 550 MG: 550 TABLET ORAL at 09:21

## 2022-10-16 RX ADMIN — LACTULOSE 10 G: 20 SOLUTION ORAL at 23:38

## 2022-10-16 RX ADMIN — LORAZEPAM 0.5 MG: 0.5 TABLET ORAL at 14:07

## 2022-10-16 RX ADMIN — LACTULOSE 10 G: 20 SOLUTION ORAL at 21:00

## 2022-10-16 RX ADMIN — ONDANSETRON 4 MG: 2 INJECTION INTRAMUSCULAR; INTRAVENOUS at 00:54

## 2022-10-16 RX ADMIN — LORAZEPAM 0.5 MG: 0.5 TABLET ORAL at 05:11

## 2022-10-16 RX ADMIN — SPIRONOLACTONE 100 MG: 25 TABLET ORAL at 09:21

## 2022-10-16 RX ADMIN — SODIUM CHLORIDE, PRESERVATIVE FREE 10 ML: 5 INJECTION INTRAVENOUS at 23:39

## 2022-10-16 RX ADMIN — ONDANSETRON 4 MG: 2 INJECTION INTRAMUSCULAR; INTRAVENOUS at 14:11

## 2022-10-16 RX ADMIN — SODIUM CHLORIDE, PRESERVATIVE FREE 10 ML: 5 INJECTION INTRAVENOUS at 09:24

## 2022-10-16 RX ADMIN — RIFAXIMIN 550 MG: 550 TABLET ORAL at 23:39

## 2022-10-16 RX ADMIN — NADOLOL 40 MG: 40 TABLET ORAL at 09:23

## 2022-10-16 RX ADMIN — FUROSEMIDE 20 MG: 20 TABLET ORAL at 09:21

## 2022-10-16 RX ADMIN — Medication 10 ML: at 00:55

## 2022-10-16 ASSESSMENT — PAIN SCALES - GENERAL
PAINLEVEL_OUTOF10: 0
PAINLEVEL_OUTOF10: 3
PAINLEVEL_OUTOF10: 0

## 2022-10-16 ASSESSMENT — PAIN DESCRIPTION - LOCATION: LOCATION: ABDOMEN

## 2022-10-16 NOTE — PLAN OF CARE
Problem: Discharge Planning  Goal: Discharge to home or other facility with appropriate resources  Outcome: Progressing     Problem: Confusion  Goal: Confusion, delirium, dementia, or psychosis is improved or at baseline  Description: INTERVENTIONS:  1. Assess for possible contributors to thought disturbance, including medications, impaired vision or hearing, underlying metabolic abnormalities, dehydration, psychiatric diagnoses, and notify attending LIP  2. [place] high risk fall precautions, as indicated  3. Provide frequent short contacts to provide reality reorientation, refocusing and direction  4. Decrease environmental stimuli, including noise as appropriate  5. Monitor and intervene to maintain adequate nutrition, hydration, elimination, sleep and activity  6. If unable to ensure safety without constant attention obtain sitter and review sitter guidelines with assigned personnel  7.  Initiate Psychosocial CNS and Spiritual Care consult, as indicated  Outcome: Progressing  Note: Patient is alert and oriented, she did refuse to work with therapy today, however she states it is because her daughter was here     Problem: ABCDS Injury Assessment  Goal: Absence of physical injury  Outcome: Progressing     Problem: Safety - Adult  Goal: Free from fall injury  Outcome: Progressing     Problem: Pain  Goal: Verbalizes/displays adequate comfort level or baseline comfort level  Outcome: Progressing     Problem: Hematologic - Adult  Goal: Maintains hematologic stability  Outcome: Progressing     Problem: Metabolic/Fluid and Electrolytes - Adult  Goal: Hemodynamic stability and optimal renal function maintained  Outcome: Progressing     Problem: Gastrointestinal - Adult  Goal: Minimal or absence of nausea and vomiting  Outcome: Progressing  Goal: Maintains or returns to baseline bowel function  Outcome: Progressing     Problem: Respiratory - Adult  Goal: Achieves optimal ventilation and oxygenation  Outcome: Progressing

## 2022-10-16 NOTE — PROGRESS NOTES
Hospitalist Progress Note      PCP: No primary care provider on file. Date of Admission: 10/11/2022    Chief Complaint: AMS      Subjective:   Mental status at baseline. 8-9 BMs overnight  Hgb 7.5 in am  No gross bleeding       Medications:  Reviewed    Infusion Medications    sodium chloride       Scheduled Medications    lactulose  10 g Oral 4 times per day    lactulose  10 g Oral Once    sodium chloride flush  5-40 mL IntraVENous 2 times per day    nadolol  40 mg Oral Daily    furosemide  20 mg Oral Daily    spironolactone  100 mg Oral Daily    pantoprazole  40 mg Oral QAM AC    rifAXIMin  550 mg Oral BID    [Held by provider] enoxaparin  30 mg SubCUTAneous Daily    thiamine mononitrate  100 mg Oral Daily     PRN Meds: zolpidem, sodium chloride flush, sodium chloride, ondansetron **OR** ondansetron, polyethylene glycol, acetaminophen **OR** acetaminophen, LORazepam    No intake or output data in the 24 hours ending 10/16/22 1516    Exam:    /65   Pulse 82   Temp 98.5 °F (36.9 °C) (Oral)   Resp 16   Ht 5' 4\" (1.626 m)   Wt 108 lb 1.6 oz (49 kg)   SpO2 97%   BMI 18.56 kg/m²     Gen/overall appearance: Not in acute distress. Alert. Jaundiced  Head: Normocephalic, atraumatic  Eyes: EOMI, scleral icterus  CVS: regular rate and rhythm, Normal S1S2  Pulm: Clear to auscultation bilaterally. No crackles/wheezes  Extremities: No edema.  No erythema or warmth  Neuro: No gross focal deficits noted  Skin: Warm, dry    Labs:   Recent Labs     10/14/22  0505 10/14/22  1611 10/15/22  0438 10/16/22  0459   WBC 4.7  --  4.7 7.9   HGB 6.9* 8.3* 7.4* 7.5*   HCT 19.5* 23.9* 20.2* 21.0*   PLT 67*  --  61* 58*       Recent Labs     10/14/22  0505 10/15/22  0438 10/16/22  0459   * 133* 132*   K 4.2 4.7 4.4    100 99   CO2 22 24 24   BUN 25* 24* 27*   CREATININE 0.8 0.8 0.6   CALCIUM 9.2 9.0 9.2       Recent Labs     10/14/22  0505 10/15/22  0438 10/16/22  0459   AST 66* 72* 77*   ALT 36 38 42*   BILIDIR 4. 6* 4.6* 4.7*   BILITOT 16.3* 16.4* 17.1*   ALKPHOS 161* 185* 205*       No results for input(s): INR in the last 72 hours. No results for input(s): Rubina Earnest in the last 72 hours. Assessment/Plan:    Active Hospital Problems    Diagnosis Date Noted    Hepatic encephalopathy [K76.82] 10/11/2022     Priority: Medium    Severe anemia [D64.9] 06/13/2022     Priority: Medium    Elevated LFTs [R79.89] 04/26/2022     Priority: Medium    Esophageal varices ([facility] [address] [I85.00] 94/79/4608    Alcoholic liver disease ([facility] [address] [K70.9]        Hepatic encephalopathy; now resolved  Alcoholic liver disease   Esophageal varices s/p band ligation  Transaminitis  - lactulose titrate to 3 BMs daily  - monitor mental status  - trend ammonia  - c/w xifaxa, lasix, aldactone  - evaluated at [facility] for liver [facility]; declined given costs    Severe acute on chronic anemia  Hx of suspected spur cell anemia due to liver disease  Hx of esophageal varices  - trend H&H  - transfuse for Hgb <7  - FOBT neg  - hematology following  - possibly outpt capsule per GI    DVT Prophylaxis: scds  Diet: ADULT DIET; Regular;  Low Sodium (2 gm)  Code Status: Full Code      Martir Cagle MD

## 2022-10-16 NOTE — PROGRESS NOTES
Progress Note    Patient Micki Roth  MRN: [id number]  YOB: 1975 Age: 40 y.o. Sex: female  Room: [address] [place] [address]       Admitting Physician: Ramonita Villafuerte MD   Date of Admission: 10/11/2022  7:59 PM   Primary Care Physician: No primary care provider on file. Subjective:  Micik Roth was seen and examined. We are following for  cirrhosis/hepatic encephalopathy/thrombocytopenia anemia. Reports brown stools and denies being dark. Hemoglobin stable at 7.5/platelets 58  According to the nursing staff she has been refusing lactulose. The patient reports nausea and vomiting after taking lactulose. Denies any blood in the emesis. Ammonia still elevated but better than yesterday. ROS:  Constitutional: Denies fever, no change in appetite  Respiratory: Denies cough or shortness of breath  Cardiovascular: Denies chest pain or edema    Objective:  Vital Signs:   Vitals:    10/16/22 0915   BP: 108/65   Pulse: 80   Resp: 16   Temp: 98.3 °F (36.8 °C)   SpO2: 98%         Physical Exam:  Constitutional: Alert and oriented x 4. No acute distress. Respiratory: Respirations nonlabored, no crepitus  GI: Abdomen nondistended, soft, and nontender. Neurological: No focal deficits noted. No asterixis.     Intake/Output:  No intake or output data in the 24 hours ending 10/16/22 1252     Current Medications:  Current Facility-Administered Medications   Medication Dose Route Frequency Provider Last Rate Last Admin    lactulose (CHRONULAC) 10 GM/15ML solution 10 g  10 g Oral 4 times per day Siri Finch MD        zolpidem [facility]) tablet 5 mg  5 mg Oral Nightly PRN DARCY De La Rosa - CNP   5 mg at 10/16/22 0054    sodium chloride flush 0.9 % injection 5-40 mL  5-40 mL IntraVENous 2 times per day Ramonita Villafuerte MD   10 mL at 10/16/22 0924    sodium chloride flush 0.9 % injection 5-40 mL  5-40 mL IntraVENous PRN Ramonita Villafuerte MD   10 mL at 10/16/22 0055    0.9 % sodium chloride infusion IntraVENous PRN Maryann Montgomery MD        ondansetron (ZOFRAN-ODT) disintegrating tablet 4 mg  4 mg Oral Q8H PRN Maryann Montgomery MD        Or    ondansetron (ZOFRAN) injection 4 mg  4 mg IntraVENous Q6H PRN Maryann Montgomery MD   4 mg at 10/16/22 0054    polyethylene glycol (GLYCOLAX) packet 17 g  17 g Oral Daily PRN Maryann Montgomery MD        acetaminophen (TYLENOL) tablet 650 mg  650 mg Oral Q6H PRN Maryann Montgomery MD        Or    acetaminophen (TYLENOL) suppository 650 mg  650 mg Rectal Q6H PRN Maryann Montgomery MD        nadolol (CORGARD) tablet 40 mg  40 mg Oral Daily Maryann Montgomery MD   40 mg at 10/16/22 0875    furosemide (LASIX) tablet 20 mg  20 mg Oral Daily Maryann Montgomery MD   20 mg at 10/16/22 7075    spironolactone (ALDACTONE) tablet 100 mg  100 mg Oral Daily Maryann Montgomery MD   100 mg at 10/16/22 2943    pantoprazole (PROTONIX) tablet 40 mg  40 mg Oral QAM AC Maryann Montgomery MD   40 mg at 10/16/22 0511    rifAXIMin (XIFAXAN) tablet 550 mg  550 mg Oral BID Maryann Montgomery MD   550 mg at 10/16/22 8273    [Held by provider] enoxaparin Sodium (LOVENOX) injection 30 mg  30 mg SubCUTAneous Daily Maryann Montgomery MD   30 mg at 10/13/22 7576    thiamine mononitrate tablet 100 mg  100 mg Oral Daily Maryann Montgomery MD   100 mg at 10/16/22 2164    LORazepam (ATIVAN) tablet 0.5 mg  0.5 mg Oral Q8H PRN Rufus Romero MD   0.5 mg at 10/16/22 0065         Recent Imaging:   CT ABDOMEN PELVIS WO CONTRAST Additional Contrast? None  Narrative: EXAMINATION:  CT OF THE ABDOMEN AND PELVIS WITHOUT CONTRAST 10/11/2022 8:55 pm    TECHNIQUE:  CT of the abdomen and pelvis was performed without the administration of  intravenous contrast. Multiplanar reformatted images are provided for review. Automated exposure control, iterative reconstruction, and/or weight based  adjustment of the mA/kV was utilized to reduce the radiation dose to as low  as reasonably achievable.     COMPARISON:  08/25/2022    HISTORY:  ORDERING SYSTEM PROVIDED HISTORY: AMS, h/o cirrhosis  TECHNOLOGIST PROVIDED HISTORY:  Reason for exam:->AMS, h/o cirrhosis  Additional Contrast?->None  Decision Support Exception - unselect if not a suspected or confirmed  emergency medical condition->Emergency Medical Condition (MA)  Is the patient pregnant?->No  Reason for Exam: AMS, h/o cirrhosis. Altered Mental Status (Pt arrived from  home via ems with AMS Ems stated she is alert and oriented x2, wanted to sign  refusal but was unable to follow instructions to do so Pt when asked why she  was here, could not tell me, asked her where she was pt stated  Pt has hx  of liver failure, pt is visibly jaundiced. ). FINDINGS:  Lower Chest: Lung bases are grossly clear. The heart is mildly enlarged. No  pericardial effusion. Organs: There is unchanged heterogeneous appearance of the liver parenchyma  with areas of both increased and decreased attenuation. Areas of increased  attenuation are most pronounced in the left lobe and saulo hepatis. There is  a slightly nodular contour of the liver. There are calcified gallstones. The gallbladder wall is diffusely thickened. There is pericholecystic fluid  however there is also perihepatic ascites. Portal venous hypertension with  splenomegaly, recanalization of the umbilical vein and upper abdominal  varices. The pancreas, adrenal glands and kidneys are grossly with the  limitations of a noncontrast study. There is an unchanged calcification or  calculus in the lower pole of the right kidney. GI/Bowel: There is a moderate stool load particularly in the right,  transverse and proximal descending colon. There is diffuse bowel wall  thickening. Pelvis: Bilateral Essure tubal occlusion devices. Uterus is otherwise  unremarkable. The urinary bladder is normal.    Peritoneum/Retroperitoneum: There is small volume of low-attenuation ascites  in the abdomen and pelvis. There is diffuse infiltration of fat in the  abdomen. No free air. There are mildly prominent nonspecific  retroperitoneal lymph nodes. Bones/Soft Tissues: No acute bone finding. Impression: Unchanged diffuse hepatocellular disease with suspected cirrhosis. Portal venous hypertension with recanalized umbilical vein, ascites, upper  abdominal varices and splenomegaly. Cholelithiasis. There is diffuse gallbladder wall thickening. This finding  may be related to hypoproteinemia and third-spacing of fluid. Acute  cholecystitis cannot be excluded. Correlate clinically. Moderate stool load consistent with constipation. CT HEAD WO CONTRAST  Narrative: EXAMINATION:  CT OF THE HEAD WITHOUT CONTRAST  10/11/2022 8:54 pm    TECHNIQUE:  CT of the head was performed without the administration of intravenous  contrast. Automated exposure control, iterative reconstruction, and/or weight  based adjustment of the mA/kV was utilized to reduce the radiation dose to as  low as reasonably achievable. COMPARISON:  None. HISTORY:  ORDERING SYSTEM PROVIDED HISTORY: [facility]  TECHNOLOGIST PROVIDED HISTORY:  Reason for exam:->ams  Has a \"code stroke\" or \"stroke alert\" been called? ->No  Decision Support Exception - unselect if not a suspected or confirmed  emergency medical condition->Emergency Medical Condition (MA)  Is the patient pregnant?->No  Reason for Exam: AMS. Altered Mental Status (Pt arrived from home via ems  with AMS Ems stated she is alert and oriented x2, wanted to sign refusal but  was unable to follow instructions to do so Pt when asked why she was here,  could not tell me, asked her where she was pt stated  Pt has hx of liver  failure, pt is visibly jaundiced. ). FINDINGS:  BRAIN/VENTRICLES: There is no acute intracranial hemorrhage, mass effect or  midline shift. No abnormal extra-axial fluid collection. The gray-white  differentiation is maintained without evidence of an acute infarct. There is  no evidence of hydrocephalus.     ORBITS: The visualized portion of the orbits demonstrate no acute abnormality. SINUSES: The visualized paranasal sinuses and mastoid air cells demonstrate  no acute abnormality. SOFT TISSUES/SKULL:  No acute abnormality of the visualized skull or soft  tissues. There is a salt and pepper appearance of the skull. Impression: No acute intracranial abnormality. XR CHEST PORTABLE  Narrative: EXAMINATION:  ONE XRAY VIEW OF THE CHEST    10/11/2022 8:36 pm    COMPARISON:  08/25/2022    HISTORY:  ORDERING SYSTEM PROVIDED HISTORY: ams  TECHNOLOGIST PROVIDED HISTORY:  Reason for exam:->ams  Reason for Exam: AMS    FINDINGS:  The lungs are without acute focal process. There is no effusion or  pneumothorax. The cardiomediastinal silhouette is stable. The osseous  structures are stable. Impression: No acute process. Labs:   Recent Labs     10/14/22  0505 10/14/22  1611 10/15/22  0438 10/16/22  0459   HGB 6.9* 8.3* 7.4* 7.5*   WBC 4.7  --  4.7 7.9   PROT 5.9*  --  5.5* 5.8*   LABALBU 3.2*  --  3.1* 3.1*   ALKPHOS 161*  --  185* 205*   ALT 36  --  38 42*   AST 66*  --  72* 77*   BILITOT 16.3*  --  16.4* 17.1*   BILIDIR 4.6*  --  4.6* 4.7*   IBILI 11.7*  --  11.8* 12.4*        ASSESSMENT      Hepatic encephalopathy -likely from noncompliance with lactulose. Was on Xifaxan daily. Do need to rule out infection ->  CXR and UA were negative. Only small volume ascites on CT. Clinically improved as of yesterday. Drowsy today but did not sleep much overnight. Ammonia was higher today. Cirrhosis - due to alcohol abuse. No alcohol since April 2022. Has been referred to  liver transplant center but patient states never made an appointment as insurance does not cover any of the cost of transplant or her antirejection meds. Also states no social support. Les Pimple History of esophageal varices  - EGD 7/2022 with large esophageal varix treated with band ligation. No GI bleeding currently. Acute on chronic anemia -  heme following.   Hemoglobin incremented from 6.1-7.9 with 1 unit PRBC here. Noncompliance with lactulose    Patient had episode of nausea and emesis last night but no blood was noted this was after taking lactulose     PLAN    -Lactulose 20 g 3 times daily and titrate for goal of 3 bowel movements daily. Discussed with patient how to titrate this at home. - Low sodium diet   -[id number] [facility], [address] [facility].  Also available via [employer] [place]

## 2022-10-16 NOTE — PROGRESS NOTES
Assessment complete. VSS no SOB or any distress noted. Pt took her lactulose in [address] mist. Pt requested ativan for now and ambien when ready to go to bed. No c/o pain at this time. Pt aware stool sample is needed. Call light within reach, pt encouraged to call if any needs arise. No further requests at this time. Will continue to monitor.

## 2022-10-16 NOTE — PROGRESS NOTES
Occupational Therapy/ Physical Therapy  Camille Zendejas      Attempted to see this AM. Patient supine in bed. Patient declined to participate stating she feels blah and was vomiting last night. Educated patient on importance of therapy, continued to decline. Observed patient less than 10 minutes later sitting EOB and eating a donut. Patient stated would like to go to an [address] [place] [address] facility and stated will work with therapy tomorrow. Thank you,  Duy Monique.  [facility], OTR/L [address] [place] [address], DPT, ATC-R [id number]

## 2022-10-17 LAB
ABO/RH: NORMAL
ACANTHOCYTES: ABNORMAL
ALBUMIN SERPL-MCNC: 3.1 G/DL (ref 3.4–5)
ALP BLD-CCNC: 220 U/L (ref 40–129)
ALT SERPL-CCNC: 38 U/L (ref 10–40)
AMMONIA: 107 UMOL/L (ref 11–51)
ANION GAP SERPL CALCULATED.3IONS-SCNC: 12 MMOL/L (ref 3–16)
ANISOCYTOSIS: ABNORMAL
ANTIBODY SCREEN: NORMAL
AST SERPL-CCNC: 70 U/L (ref 15–37)
BANDED NEUTROPHILS RELATIVE PERCENT: 4 % (ref 0–7)
BASOPHILS ABSOLUTE: 0 K/UL (ref 0–0.2)
BASOPHILS RELATIVE PERCENT: 0 %
BILIRUB SERPL-MCNC: 14.9 MG/DL (ref 0–1)
BILIRUBIN DIRECT: 4.5 MG/DL (ref 0–0.3)
BILIRUBIN, INDIRECT: 10.4 MG/DL (ref 0–1)
BLOOD BANK DISPENSE STATUS: NORMAL
BLOOD BANK DISPENSE STATUS: NORMAL
BLOOD BANK PRODUCT CODE: NORMAL
BLOOD BANK PRODUCT CODE: NORMAL
BPU ID: NORMAL
BPU ID: NORMAL
BUN BLDV-MCNC: 27 MG/DL (ref 7–20)
BURR CELLS: ABNORMAL
CALCIUM SERPL-MCNC: 9.2 MG/DL (ref 8.3–10.6)
CHLORIDE BLD-SCNC: 101 MMOL/L (ref 99–110)
CO2: 22 MMOL/L (ref 21–32)
CREAT SERPL-MCNC: 0.8 MG/DL (ref 0.6–1.1)
DESCRIPTION BLOOD BANK: NORMAL
DESCRIPTION BLOOD BANK: NORMAL
EOSINOPHILS ABSOLUTE: 0.3 K/UL (ref 0–0.6)
EOSINOPHILS RELATIVE PERCENT: 5 %
GFR AFRICAN AMERICAN: >60
GFR NON-AFRICAN AMERICAN: >60
GLUCOSE BLD-MCNC: 147 MG/DL (ref 70–99)
HCT VFR BLD CALC: 19.7 % (ref 36–48)
HEMOGLOBIN: 7 G/DL (ref 12–16)
LYMPHOCYTES ABSOLUTE: 1.4 K/UL (ref 1–5.1)
LYMPHOCYTES RELATIVE PERCENT: 26 %
MCH RBC QN AUTO: 34.8 PG (ref 26–34)
MCHC RBC AUTO-ENTMCNC: 35.4 G/DL (ref 31–36)
MCV RBC AUTO: 98.4 FL (ref 80–100)
METAMYELOCYTES RELATIVE PERCENT: 1 %
MONOCYTES ABSOLUTE: 0.4 K/UL (ref 0–1.3)
MONOCYTES RELATIVE PERCENT: 7 %
NEUTROPHILS ABSOLUTE: 3.2 K/UL (ref 1.7–7.7)
NEUTROPHILS RELATIVE PERCENT: 57 %
PDW BLD-RTO: 20.2 % (ref 12.4–15.4)
PLATELET # BLD: 57 K/UL (ref 135–450)
PMV BLD AUTO: 9.6 FL (ref 5–10.5)
POIKILOCYTES: ABNORMAL
POLYCHROMASIA: ABNORMAL
POTASSIUM SERPL-SCNC: 4.2 MMOL/L (ref 3.5–5.1)
RBC # BLD: 2 M/UL (ref 4–5.2)
SCHISTOCYTES: ABNORMAL
SODIUM BLD-SCNC: 135 MMOL/L (ref 136–145)
TARGET CELLS: ABNORMAL
TOTAL PROTEIN: 5.9 G/DL (ref 6.4–8.2)
WBC # BLD: 5.2 K/UL (ref 4–11)

## 2022-10-17 PROCEDURE — 36415 COLL VENOUS BLD VENIPUNCTURE: CPT

## 2022-10-17 PROCEDURE — 97162 PT EVAL MOD COMPLEX 30 MIN: CPT

## 2022-10-17 PROCEDURE — 97535 SELF CARE MNGMENT TRAINING: CPT

## 2022-10-17 PROCEDURE — 86850 RBC ANTIBODY SCREEN: CPT

## 2022-10-17 PROCEDURE — 6370000000 HC RX 637 (ALT 250 FOR IP): Performed by: NURSE PRACTITIONER

## 2022-10-17 PROCEDURE — 86901 BLOOD TYPING SEROLOGIC RH(D): CPT

## 2022-10-17 PROCEDURE — P9016 RBC LEUKOCYTES REDUCED: HCPCS

## 2022-10-17 PROCEDURE — 85025 COMPLETE CBC W/AUTO DIFF WBC: CPT

## 2022-10-17 PROCEDURE — 97166 OT EVAL MOD COMPLEX 45 MIN: CPT

## 2022-10-17 PROCEDURE — 86923 COMPATIBILITY TEST ELECTRIC: CPT

## 2022-10-17 PROCEDURE — 94760 N-INVAS EAR/PLS OXIMETRY 1: CPT

## 2022-10-17 PROCEDURE — 97116 GAIT TRAINING THERAPY: CPT

## 2022-10-17 PROCEDURE — 82140 ASSAY OF AMMONIA: CPT

## 2022-10-17 PROCEDURE — 97530 THERAPEUTIC ACTIVITIES: CPT

## 2022-10-17 PROCEDURE — 2580000003 HC RX 258: Performed by: INTERNAL MEDICINE

## 2022-10-17 PROCEDURE — 36430 TRANSFUSION BLD/BLD COMPNT: CPT

## 2022-10-17 PROCEDURE — 86900 BLOOD TYPING SEROLOGIC ABO: CPT

## 2022-10-17 PROCEDURE — 6370000000 HC RX 637 (ALT 250 FOR IP): Performed by: INTERNAL MEDICINE

## 2022-10-17 PROCEDURE — 80076 HEPATIC FUNCTION PANEL: CPT

## 2022-10-17 PROCEDURE — 80048 BASIC METABOLIC PNL TOTAL CA: CPT

## 2022-10-17 PROCEDURE — 2060000000 HC ICU INTERMEDIATE R&B

## 2022-10-17 RX ORDER — SODIUM CHLORIDE 9 MG/ML
INJECTION, SOLUTION INTRAVENOUS PRN
Status: DISCONTINUED | OUTPATIENT
Start: 2022-10-17 | End: 2022-10-18 | Stop reason: HOSPADM

## 2022-10-17 RX ADMIN — Medication 100 MG: at 08:25

## 2022-10-17 RX ADMIN — FUROSEMIDE 20 MG: 20 TABLET ORAL at 08:25

## 2022-10-17 RX ADMIN — LACTULOSE 10 G: 20 SOLUTION ORAL at 19:20

## 2022-10-17 RX ADMIN — SODIUM CHLORIDE, PRESERVATIVE FREE 10 ML: 5 INJECTION INTRAVENOUS at 08:25

## 2022-10-17 RX ADMIN — SODIUM CHLORIDE, PRESERVATIVE FREE 10 ML: 5 INJECTION INTRAVENOUS at 22:58

## 2022-10-17 RX ADMIN — LACTULOSE 10 G: 20 SOLUTION ORAL at 12:31

## 2022-10-17 RX ADMIN — NADOLOL 40 MG: 40 TABLET ORAL at 08:25

## 2022-10-17 RX ADMIN — RIFAXIMIN 550 MG: 550 TABLET ORAL at 22:58

## 2022-10-17 RX ADMIN — LORAZEPAM 0.5 MG: 0.5 TABLET ORAL at 08:24

## 2022-10-17 RX ADMIN — SPIRONOLACTONE 100 MG: 25 TABLET ORAL at 08:25

## 2022-10-17 RX ADMIN — ZOLPIDEM TARTRATE 5 MG: 5 TABLET ORAL at 22:58

## 2022-10-17 RX ADMIN — RIFAXIMIN 550 MG: 550 TABLET ORAL at 12:30

## 2022-10-17 RX ADMIN — ZOLPIDEM TARTRATE 5 MG: 5 TABLET ORAL at 01:05

## 2022-10-17 RX ADMIN — PANTOPRAZOLE SODIUM 40 MG: 40 TABLET, DELAYED RELEASE ORAL at 08:24

## 2022-10-17 RX ADMIN — LACTULOSE 10 G: 20 SOLUTION ORAL at 08:25

## 2022-10-17 RX ADMIN — LORAZEPAM 0.5 MG: 0.5 TABLET ORAL at 19:20

## 2022-10-17 ASSESSMENT — PAIN SCALES - GENERAL
PAINLEVEL_OUTOF10: 0

## 2022-10-17 NOTE — PROGRESS NOTES
[facility],[address] Msb Department   Phone: [phone]    Occupational Therapy    [x] Initial Evaluation            [] Daily Treatment Note         [] Discharge Summary      Patient: Cisco Silverio   : 1975   MRN: [id number]   Date of Service:  10/17/2022    Admitting Diagnosis:  Hepatic encephalopathy  Current Admission Summary:    Altered Mental Status       Pt arrived from home via ems with AMS   Ems stated she is alert and oriented x2, wanted to sign refusal but was unable to follow instructions to do so  Pt when asked why she was here, could not tell me, asked her where she was pt stated   Pt has hx of liver failure, pt is visibly jaundiced. HISTORY OF PRESENT ILLNESS  Cisco Silverio is a 40 y.o. female  who presents to the ED complaining of general altered mental status. This is been getting worse over the past few days to weeks in particular. History is essentially obtained from the daughter exclusively who is a nurse, as the patient herself mostly just states \"no, I am fine\" and is clearly disoriented and confused and does not answer questions appropriately. She does follow some commands though. She is visibly jaundiced and has a history of alcoholic cirrhosis but has been sober for more than 5 months according to the daughter. Unknown if the patient has fallen, she denies this and the daughter saw no evidence of this but she does live by herself and so it is a possibility. She has apparently been noncompliant with some of her lactulose although has been supposedly on her Xifaxan and follows with GI. She frequently requires blood transfusions as well due to her liver disease. She denies any bloody stools when I asked her this. She has not had any vomiting. No fevers. The patient denies any acute abdominal pains. She does not have a headache when I ask.   That being said her history is fairly unreliable due to her mental status change. She does know that she is in the hospital but is not able to tell me why she is here. Daughter states that this is the most confused she has ever been. Past Medical History:  has a past medical history of Alcoholic liver disease ([address] Anemia, and History of blood transfusion. Past Surgical History:  has a past surgical history that includes Upper gastrointestinal endoscopy (N/A, 01/30/2021); Upper gastrointestinal endoscopy (N/A, 03/10/2021); Tubal ligation; Colonoscopy (N/A, 3/11/2021); Upper gastrointestinal endoscopy (N/A, 6/27/2021); Upper gastrointestinal endoscopy (N/A, 4/27/2022); Knee Arthroplasty; Upper gastrointestinal endoscopy (N/A, 7/1/2022); Upper gastrointestinal endoscopy (N/A, 7/1/2022); and CT BIOPSY BONE MARROW (7/5/2022). Discharge Recommendations: Tereza Diana scored a 21/24 on the AM-PAC ADL Inpatient form. Current research shows that an AM-PAC score of 17 or less is typically not associated with a discharge to the patient's home setting. Based on the patient's AM-PAC score and their current ADL deficits, it is recommended that the patient have 3-5 sessions per week of Occupational Therapy at d/c to increase the patient's independence. Please see assessment section for further patient specific details. If patient discharges prior to next session this note will serve as a discharge summary. Please see below for the latest assessment towards goals. If patient declines SNF placement, recommend patient go to daughter's home (NOT HOME ALONE), recommend 24/7care and assist and home OT.   HOME HEALTH CARE: LEVEL 3 SAFETY     - Initial home health evaluation to occur within 24-48 hours, in patient home   - Therapy evaluations in home within 24-48 hours of discharge; including DME and home safety   - Frontload therapy 5 days, then 3x a week   - Therapy to evaluate if patient has [address] needs for personal care   -  evaluation within 24-48 hours, includes evaluation of resources and insurance to determine AL, [place], LTC, and Medicaid options      DME Required For Discharge: rolling walker, shower chair with back, reacher, fall alert device    Precautions/Restrictions: high fall risk  Weight Bearing Restrictions: no restrictions  [] Right Upper Extremity  [] Left Upper Extremity [] Right Lower Extremity  [] Left Lower Extremity     Required Braces/Orthotics: no braces required   [] Right  [] Left  Positional Restrictions:no positional restrictions    Pre-Admission Information   Lives With: alone                     Type of Home:  duplex that she owns   Home Layout: two level  Home Access: level entry  Bathroom Layout: tub only  Bathroom Equipment:  none  Toilet Height: standard height  Home Equipment: no prior equipment  Transfer Assistance: Independent without use of device  Ambulation Assistance:Independent without use of device  ADL Assistance: independent with all ADL's  IADL Assistance: independent with homemaking tasks  Active :        [] Yes                 [x] No  Hand Dominance: [] Left                 [x] Right  Current Employment: unemployed -- was let go in April due to medical issues   Hobbies: spend time with daughters --21 and 21 y/o   Recent Falls: was found down-- reason for admit. Patient stated has palliative care at home, no other services.            Examination   Vision:   Vision Corrective Device: wears glasses at all times      Hearing:   [facility]  Perception:   WFL  Observation:   General Observation:  chronically ill young woman, jaundiced skin  Posture:   Fair, frail looking and chronically ill   Sensation:   denies numbness and tingling  Proprioception:    WFL  Tone:   Normotonic  Coordination Testing:   WFL    ROM:   (B) UE AROM WFL  Strength:   (B) UE strength grossly WFL    Decision Making: medium complexity  Clinical Presentation: evolving      Subjective  General: Patient supine in bed and eating breakfast. Patient tearful when stated that her daughters found her at home. Patient stated that she has been in the hospital at least 10 times since April and stated \"I will keep coming back\". Patient chronically ill. Patient stated wants to go to an AL facility due to \"not wanting my daughters to find me like they did last time\"   Pain: 0/10  Pain Interventions: not applicable        Activities of Daily Living  Basic Activities of Daily Living  Feeding: Independent  Grooming: Independent  Upper Extremity Dressing: Independent  Lower Extremity Dressing: setup assistance  Dressing Comments: dondoff socks and shoes, changed shirt  Toileting Comments: reports indep but did not observe  Instrumental Activities of Daily Living  No IADL completed on this date. Functional Mobility  Bed Mobility  Supine to Sit: modified independent  Sit to Supine: modified independent  Rolling Left: modified independent  Rolling Right: modified independent  Scooting: modified independent  Comments:  Transfers  Sit to stand transfer:supervision  Stand to sit transfer: supervision  Bed / Chair transfer: contact guard assistance. Bed / Chair comments: CGA WITHOUT RW, supervision with RW  Toilet transfer: supervision  Comments:  Functional Mobility:  Sitting Balance Comment: indep sitting EOB   Standing Balance Comment: CGA without RW x 40 feet, supervision with RW x 40 feet. See PT for gait analysis   Functional Mobility Comment: impaired endurance     Other Therapeutic Interventions    Functional Outcomes  AM-PAC Inpatient Daily Activity Raw Score: 21    Cognition  WFL  Comments: WFL but patient emotional about living alone.    Orientation:    alert and oriented x 4  Command Following:   [facility]     Education  Barriers To Learning: none  Patient Education: patient educated on goals, ADL adaptive strategies, transfer training, discharge recommendations  Learning Assessment:  patient verbalizes and demonstrates understanding    Assessment  Activity Tolerance: Limited tolerance, chronically ill   Impairments Requiring Therapeutic Intervention: decreased functional mobility, decreased ADL status, decreased strength, decreased endurance, decreased IADL  Prognosis: fair and guarded-- patient reports has Palliative care coming to her house  Clinical Assessment: Patient is very ill 40year old woman who lives alone, recommend SNF or home with 247 care and supervision.  Patient wants to move into an AL facility   Safety Interventions: patient left in bed, bed alarm in place, chair alarm in place, and nurse notified    Plan  Frequency: 3-5 x/per week  Current Treatment Recommendations: balance training, functional mobility training, transfer training, endurance training, ADL/self-care training, IADL training, home management training, and equipment evaluation/education    Goals  Patient Goals: eventually move into AL facility    Short Term Goals:  Time Frame: until discharge  Patient will complete lower body ADL at Independent   Patient will complete functional transfers at modified independent   Patient will complete functional mobility at modified independent   Patient to gather and transport IADL items at modified independent     Therapy Session Time     Individual Group Co-treatment   Time In    0902   Time Out    0940   Minutes    38        Timed Code Treatment Minutes: 23    Total Treatment Minutes:  38       Electronically Signed By: JULI Bullock/DIPAK Crawford

## 2022-10-17 NOTE — PROGRESS NOTES
[facility] [address]   Phone: [phone]    Physical Therapy    [x] Initial Evaluation            [] Daily Treatment Note         [] Discharge Summary      Patient: Sarai Henry   : 1975   MRN: [id number]   Date of Service:  10/17/2022  Admitting Diagnosis: Hepatic encephalopathy  Current Admission Summary: Sarai Henry is a 40 y.o. female  who presents to the ED complaining of general altered mental status. This is been getting worse over the past few days to weeks in particular. History is essentially obtained from the daughter exclusively who is a nurse, as the patient herself mostly just states \"no, I am fine\" and is clearly disoriented and confused and does not answer questions appropriately. She does follow some commands though. She is visibly jaundiced and has a history of alcoholic cirrhosis but has been sober for more than 5 months according to the daughter. Unknown if the patient has fallen, she denies this and the daughter saw no evidence of this but she does live by herself and so it is a possibility. She has apparently been noncompliant with some of her lactulose although has been supposedly on her Xifaxan and follows with GI. She frequently requires blood transfusions as well due to her liver disease. She denies any bloody stools when I asked her this. She has not had any vomiting. No fevers. The patient denies any acute abdominal pains. She does not have a headache when I ask. That being said her history is fairly unreliable due to her mental status change. She does know that she is in the hospital but is not able to tell me why she is here. Daughter states that this is the most confused she has ever been. Past Medical History:  has a past medical history of Alcoholic liver disease ([facility] [address] Anemia, and History of blood transfusion.   Past Surgical History:  has a past surgical history that includes Upper gastrointestinal endoscopy (N/A, 01/30/2021); Upper gastrointestinal endoscopy (N/A, 03/10/2021); Tubal ligation; Colonoscopy (N/A, 3/11/2021); Upper gastrointestinal endoscopy (N/A, 6/27/2021); Upper gastrointestinal endoscopy (N/A, 4/27/2022); Knee Arthroplasty; Upper gastrointestinal endoscopy (N/A, 7/1/2022); Upper gastrointestinal endoscopy (N/A, 7/1/2022); and CT BIOPSY BONE MARROW (7/5/2022). Discharge Recommendations: Mateus Quinones scored a 18/24 on the AM-PAC short mobility form. Current research shows that an AM-PAC score of 17 or less is typically not associated with a discharge to the patient's home setting. Based on the patient's AM-PAC score and their current functional mobility deficits, it is recommended that the patient have 3-5 sessions per week of Physical Therapy at d/c to increase the patient's independence. Please see assessment section for further patient specific details. While pt AMPAC >17, pt is unable to manage living IND at home due to chronically ill status and decreased endurance. If pt declines SNF, recommend HHPT:   Mateus Quinones scored a 18/24 on the AM-PAC short mobility form. Current research shows that an AM-PAC score of 18 or greater is typically associated with a discharge to the patient's home setting. Based on the patient's AM-PAC score and their current functional mobility deficits, it is recommended that the patient have 2-3 sessions per week of Physical Therapy at d/c to increase the patient's independence. At this time, this patient demonstrates the endurance and safety to discharge home with HHPT  (home vs OP services) and a follow up treatment frequency of 2-3x/wk. Please see assessment section for further patient specific details.     HOME HEALTH CARE: LEVEL 3 SAFETY     - Initial home health evaluation to occur within 24-48 hours, in patient home   - Therapy evaluations in home within 24-48 hours of discharge; including DME and home safety   - Frontload therapy 5 days, then 3x a week - Therapy to evaluate if patient has [address] needs for personal care   -  evaluation within 24-48 hours, includes evaluation of resources and insurance to determine [place], [place], LTC, and Medicaid options        If patient discharges prior to next session this note will serve as a discharge summary. Please see below for the latest assessment towards goals. DME Required For Discharge: rolling walker  Precautions/Restrictions: high fall risk  Weight Bearing Restrictions: no restrictions  [] Right Upper Extremity  [] Left Upper Extremity [] Right Lower Extremity  [] Left Lower Extremity     Required Braces/Orthotics: no braces required   [] Right  [] Left  Positional Restrictions:no positional restrictions    Pre-Admission Information   Lives With: alone    Type of Home:  duplex that she owns   Home Layout: two level, bedroom/bathroom upstairs  Home Access: level entry  Bathroom Layout: tub only  Bathroom Equipment:  none  Toilet Height: standard height  Home Equipment: no prior equipment  Transfer Assistance: Independent without use of device  Ambulation Assistance:Independent without use of device  ADL Assistance: independent with all ADL's  IADL Assistance: independent with homemaking tasks  Active :        [] Yes  [x] No  Hand Dominance: [] Left  [x] Right  Current Employment: unemployed -- was let go in April due to medical issues   Hobbies: spend time with daughters --21 and 23 y/o   Recent Falls: denies falls but reports being found down by daughters     Examination   Vision:   Vision Corrective Device: wears glasses at all times  Hearing:   [facility]  Observation:   General Observation:  pt in bed eating breakfast upon arrival. Appearing frail. Posture:    Forward head, rounded shoulders   Sensation:   denies numbness and tingling   ROM:   (B) LE AROM WFL  Strength:   (B) LE strength grossly -4  Decision Making: medium complexity  Clinical Presentation: evolving      Subjective  General: Pt in bed eating breakfast upon arrival   Pain: 0/10  Pain Interventions: not applicable       Functional Mobility  Bed Mobility  Supine to Sit: modified independent  Sit to Supine: modified independent  Comments: HOB elevated   Transfers  Sit to stand transfer: supervision  Stand to sit transfer: supervision  Comments:  Ambulation  Surface:level surface  Assistive Device: no device  Assistance: contact guard assistance  Distance: 40'  Gait Mechanics: decreased step length, B knee flexion throughout gait, appearing unsteady though no LOB   Comments:      Ambulation Trial 2  Surface:level surface  Assistive Device: rolling walker  Assistance: supervision  Distance: 40'  Gait Mechanics: increased step length, upright posture  Comments:   pt appearing more steady with use of RW-- recommending for amb   Stair Mobility  Stair mobility not completed on this date. Comments:  Wheelchair Mobility:  No w/c mobility completed on this date.   Comments:  Balance  Static Sitting Balance: good: independent with functional balance in unsupported position  Dynamic Sitting Balance: fair (+): maintains balance at SBA/supervision without use of UE support  Static Standing Balance: fair (+): maintains balance at SBA/supervision without use of UE support  Dynamic Standing Balance: fair: maintains balance at CGA without use of UE support  Comments:    Other Therapeutic Interventions    Functional Outcomes  AM-PAC Inpatient Mobility Raw Score : 18              Cognition  WFL  Orientation:    alert and oriented x 4  Command Following:   [facility]    Education  Barriers To Learning: none  Patient Education: patient educated on goals, PT role and benefits, plan of care, general safety, discharge recommendations  Learning Assessment:  patient verbalizes understanding, would benefit from continued reinforcement    Assessment  Activity Tolerance: limited by fatigue   Impairments Requiring Therapeutic Intervention: decreased functional mobility, decreased strength, decreased endurance, decreased balance  Prognosis: fair  Clinical Assessment: Pt presenting to PT/OT co-eval following admission for hepatic encephalopathy. Currently showing deficits in strength, functional mobility, endurance, balance. At baseline, pt lives alone and is IND with all mobility, though appearing chronically ill and stating she has been in the hospital at least 10 times since April. Pt requiring CGA/supervision for mobility this date and amb with use of RW. Pt will beenfit from skilled PT services to address deficits and promote safe mobility. Safety Interventions: patient left in bed, bed alarm in place, call light within reach, gait belt, and patient at risk for falls    Plan  Frequency: 3-5 x/per week  Current Treatment Recommendations: strengthening, ROM, balance training, functional mobility training, transfer training, gait training, stair training, endurance training, neuromuscular re-education, patient/caregiver education, and safety education    Goals  Patient Goals: to go to assisted living    Short Term Goals:  Time Frame: discharge   Patient will complete bed mobility at Independent   Patient will complete transfers at modified independent   Patient will ambulate 100 ft with use of LRAD at supervision  Patient will ascend/descend 5 stairs with (B) handrail at stand by assistance    Therapy Session Time      Individual Group Co-treatment   Time In      0902   Time Out      0940   Minutes      38     Timed Code Treatment Minutes:   23  Total Treatment Minutes:  38       Electronically Signed By: Margi Hansen, [facility]   Therapist observed and directed the above evaluation.  Thanks, [place], DPT [id number]

## 2022-10-17 NOTE — PROGRESS NOTES
Hospitalist Progress Note      PCP: No primary care provider on file. Date of Admission: 10/11/2022    Chief Complaint: AMS      Subjective:   Mental status at baseline. 7-8 BMs overnight  Hgb down to 7 in am pending pRBCs  No gross bleeding       Medications:  Reviewed    Infusion Medications    sodium chloride      sodium chloride       Scheduled Medications    lactulose  10 g Oral 4 times per day    sodium chloride flush  5-40 mL IntraVENous 2 times per day    nadolol  40 mg Oral Daily    furosemide  20 mg Oral Daily    spironolactone  100 mg Oral Daily    pantoprazole  40 mg Oral QAM AC    rifAXIMin  550 mg Oral BID    [Held by provider] enoxaparin  30 mg SubCUTAneous Daily    thiamine mononitrate  100 mg Oral Daily     PRN Meds: sodium chloride, zolpidem, sodium chloride flush, sodium chloride, ondansetron **OR** ondansetron, polyethylene glycol, acetaminophen **OR** acetaminophen, LORazepam    No intake or output data in the 24 hours ending 10/17/22 1523    Exam:    /64   Pulse 71   Temp 98.7 °F (37.1 °C) (Oral)   Resp 16   Ht 5' 4\" (1.626 m)   Wt 105 lb (47.6 kg)   SpO2 95%   BMI 18.02 kg/m²     Gen/overall appearance: Not in acute distress. Alert. Jaundiced  Head: Normocephalic, atraumatic  Eyes: EOMI, scleral icterus  CVS: regular rate and rhythm, Normal S1S2  Pulm: Clear to auscultation bilaterally. No crackles/wheezes  Extremities: No edema.  No erythema or warmth  Neuro: No gross focal deficits noted  Skin: Warm, dry    Labs:   Recent Labs     10/15/22  0438 10/16/22  0459 10/17/22  0505   WBC 4.7 7.9 5.2   HGB 7.4* 7.5* 7.0*   HCT 20.2* 21.0* 19.7*   PLT 61* 58* 57*       Recent Labs     10/15/22  0438 10/16/22  0459 10/17/22  0505   * 132* 135*   K 4.7 4.4 4.2    99 101   CO2 24 24 22   BUN 24* 27* 27*   CREATININE 0.8 0.6 0.8   CALCIUM 9.0 9.2 9.2       Recent Labs     10/15/22  0438 10/16/22  0459 10/17/22  0505   AST 72* 77* 70*   ALT 38 42* 38   BILIDIR 4.6* 4.7* 4.5*   BILITOT 16.4* 17.1* 14.9*   ALKPHOS 185* 205* 220*       No results for input(s): INR in the last 72 hours. No results for input(s): [place] Bounds in the last 72 hours. Assessment/Plan:    Active Hospital Problems    Diagnosis Date Noted    Hepatic encephalopathy [K76.82] 10/11/2022     Priority: Medium    Severe anemia [D64.9] 06/13/2022     Priority: Medium    Elevated LFTs [R79.89] 04/26/2022     Priority: Medium    Esophageal varices ([facility] [address] [I85.00] 81/43/8680    Alcoholic liver disease ([facility] [address] [K70.9]        Hepatic encephalopathy; now resolved  Alcoholic liver disease   Esophageal varices s/p band ligation  Transaminitis  - lactulose titrate to 3 BMs daily  - monitor mental status  - trend ammonia  - c/w xifaxa, lasix, aldactone  - evaluated at [facility] for liver transplant; declined given costs    Severe acute on chronic anemia  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   If hgb stable then ok for d/c    Duarte Rasheed MD

## 2022-10-17 NOTE — PROGRESS NOTES
Progress Note    Patient Jakob Ren  MRN: [id number]  YOB: 1975 Age: 40 y.o. Sex: female  Room: [address] [place] [address]       Admitting Physician: Gustabo Live MD   Date of Admission: 10/11/2022  7:59 PM   Primary Care Physician: No primary care provider on file. Subjective:  Jakob Ren was seen and examined. We are following for  cirrhosis/hepatic encephalopathy/thrombocytopenia anemia. Reports brown stools and denies being dark. Hemoglobin down  at 7.0    Now taking lactulose had >10 Bm yesterday, 2 so far today    Ammonia still elevated but better than yesterday. ROS:  Constitutional: Denies fever, no change in appetite  Respiratory: Denies cough or shortness of breath  Cardiovascular: Denies chest pain or edema    Objective:  Vital Signs:   Vitals:    10/17/22 0800   BP: (!) 108/59   Pulse: 82   Resp: 16   Temp: 98.2 °F (36.8 °C)   SpO2: 96%         Physical Exam:  Constitutional: Alert and oriented x 4. No acute distress. Respiratory: Respirations nonlabored, no crepitus  GI: Abdomen nondistended, soft, and nontender. Neurological: No focal deficits noted. No asterixis.     Intake/Output:  No intake or output data in the 24 hours ending 10/17/22 1023     Current Medications:  Current Facility-Administered Medications   Medication Dose Route Frequency Provider Last Rate Last Admin    0.9 % sodium chloride infusion   IntraVENous PRN Drew Galaviz MD        lactulose (CHRONULAC) 10 GM/15ML solution 10 g  10 g Oral 4 times per day Drew Galaviz MD   10 g at 10/17/22 0825    lactulose (CHRONULAC) 10 GM/15ML solution 10 g  10 g Oral Once Drew Galaviz MD        zolpidem [facility]) tablet 5 mg  5 mg Oral Nightly PRN DARCY Mehta - CNP   5 mg at 10/17/22 0105    sodium chloride flush 0.9 % injection 5-40 mL  5-40 mL IntraVENous 2 times per day Gustabo Live MD   10 mL at 10/17/22 0825    sodium chloride flush 0.9 % injection 5-40 mL  5-40 mL IntraVENous PRN Grey Valadez MD   10 mL at 10/16/22 0055    0.9 % sodium chloride infusion   IntraVENous PRN Grey Valadez MD        ondansetron (ZOFRAN-ODT) disintegrating tablet 4 mg  4 mg Oral Q8H PRN Grey Valadez MD        Or    ondansetron (ZOFRAN) injection 4 mg  4 mg IntraVENous Q6H PRN Grey Valadez MD   4 mg at 10/16/22 1411    polyethylene glycol (GLYCOLAX) packet 17 g  17 g Oral Daily PRN Grey Valadez MD        acetaminophen (TYLENOL) tablet 650 mg  650 mg Oral Q6H PRN Grey Valadez MD        Or    acetaminophen (TYLENOL) suppository 650 mg  650 mg Rectal Q6H PRN Grey Valadez MD        nadolol (CORGARD) tablet 40 mg  40 mg Oral Daily Grey Valadez MD   40 mg at 10/17/22 0825    furosemide (LASIX) tablet 20 mg  20 mg Oral Daily Grey Valadez MD   20 mg at 10/17/22 0825    spironolactone (ALDACTONE) tablet 100 mg  100 mg Oral Daily Grey Valadez MD   100 mg at 10/17/22 0825    pantoprazole (PROTONIX) tablet 40 mg  40 mg Oral QAM AC Grey Valadez MD   40 mg at 10/17/22 0824    rifAXIMin (XIFAXAN) tablet 550 mg  550 mg Oral BID Grey Valadez MD   550 mg at 10/16/22 2332    [Held by provider] enoxaparin Sodium (LOVENOX) injection 30 mg  30 mg SubCUTAneous Daily Grey Valadez MD   30 mg at 10/13/22 1500    thiamine mononitrate tablet 100 mg  100 mg Oral Daily Grey Valadez MD   100 mg at 10/17/22 0825    LORazepam (ATIVAN) tablet 0.5 mg  0.5 mg Oral Q8H PRN Preet Sanchez MD   0.5 mg at 10/17/22 8457         Recent Imaging:   CT ABDOMEN PELVIS WO CONTRAST Additional Contrast? None  Narrative: EXAMINATION:  CT OF THE ABDOMEN AND PELVIS WITHOUT CONTRAST 10/11/2022 8:55 pm    TECHNIQUE:  CT of the abdomen and pelvis was performed without the administration of  intravenous contrast. Multiplanar reformatted images are provided for review.   Automated exposure control, iterative reconstruction, and/or weight based  adjustment of the mA/kV was utilized to reduce the radiation dose to as low  as reasonably achievable. COMPARISON:  2022    HISTORY:  ORDERING SYSTEM PROVIDED HISTORY: AMS, h/o cirrhosis  TECHNOLOGIST PROVIDED HISTORY:  Reason for exam:->AMS, h/o cirrhosis  Additional Contrast?->None  Decision Support Exception - unselect if not a suspected or confirmed  emergency medical condition->Emergency Medical Condition (MA)  Is the patient pregnant?->No  Reason for Exam: AMS, h/o cirrhosis. Altered Mental Status (Pt arrived from  home via ems with AMS Ems stated she is alert and oriented x2, wanted to sign  refusal but was unable to follow instructions to do so Pt when asked why she  was here, could not tell me, asked her where she was pt stated  Pt has hx  of liver failure, pt is visibly jaundiced. ). FINDINGS:  Lower Chest: Lung bases are grossly clear. The heart is mildly enlarged. No  pericardial effusion. Organs: There is unchanged heterogeneous appearance of the liver parenchyma  with areas of both increased and decreased attenuation. Areas of increased  attenuation are most pronounced in the left lobe and saulo hepatis. There is  a slightly nodular contour of the liver. There are calcified gallstones. The gallbladder wall is diffusely thickened. There is pericholecystic fluid  however there is also perihepatic ascites. Portal venous hypertension with  splenomegaly, recanalization of the umbilical vein and upper abdominal  varices. The pancreas, adrenal glands and kidneys are grossly with the  limitations of a noncontrast study. There is an unchanged calcification or  calculus in the lower pole of the right kidney. GI/Bowel: There is a moderate stool load particularly in the right,  transverse and proximal descending colon. There is diffuse bowel wall  thickening. Pelvis: Bilateral Essure tubal occlusion devices. Uterus is otherwise  unremarkable.   The urinary bladder is normal.    Peritoneum/Retroperitoneum: There is small volume of low-attenuation ascites  in the abdomen and pelvis. There is diffuse infiltration of fat in the  abdomen. No free air. There are mildly prominent nonspecific  retroperitoneal lymph nodes. Bones/Soft Tissues: No acute bone finding. Impression: Unchanged diffuse hepatocellular disease with suspected cirrhosis. Portal venous hypertension with recanalized umbilical vein, ascites, upper  abdominal varices and splenomegaly. Cholelithiasis. There is diffuse gallbladder wall thickening. This finding  may be related to hypoproteinemia and third-spacing of fluid. Acute  cholecystitis cannot be excluded. Correlate clinically. Moderate stool load consistent with constipation. CT HEAD WO CONTRAST  Narrative: EXAMINATION:  CT OF THE HEAD WITHOUT CONTRAST  10/11/2022 8:54 pm    TECHNIQUE:  CT of the head was performed without the administration of intravenous  contrast. Automated exposure control, iterative reconstruction, and/or weight  based adjustment of the mA/kV was utilized to reduce the radiation dose to as  low as reasonably achievable. COMPARISON:  None. HISTORY:  ORDERING SYSTEM PROVIDED HISTORY: [facility]  TECHNOLOGIST PROVIDED HISTORY:  Reason for exam:->ams  Has a \"code stroke\" or \"stroke alert\" been called? ->No  Decision Support Exception - unselect if not a suspected or confirmed  emergency medical condition->Emergency Medical Condition (MA)  Is the patient pregnant?->No  Reason for Exam: AMS. Altered Mental Status (Pt arrived from home via ems  with AMS Ems stated she is alert and oriented x2, wanted to sign refusal but  was unable to follow instructions to do so Pt when asked why she was here,  could not tell me, asked her where she was pt stated  Pt has hx of liver  failure, pt is visibly jaundiced. ). FINDINGS:  BRAIN/VENTRICLES: There is no acute intracranial hemorrhage, mass effect or  midline shift. No abnormal extra-axial fluid collection.   The gray-white  differentiation is maintained without evidence of an acute infarct. There is  no evidence of hydrocephalus. ORBITS: The visualized portion of the orbits demonstrate no acute abnormality. SINUSES: The visualized paranasal sinuses and mastoid air cells demonstrate  no acute abnormality. SOFT TISSUES/SKULL:  No acute abnormality of the visualized skull or soft  tissues. There is a salt and pepper appearance of the skull. Impression: No acute intracranial abnormality. XR CHEST PORTABLE  Narrative: EXAMINATION:  ONE XRAY VIEW OF THE CHEST    10/11/2022 8:36 pm    COMPARISON:  08/25/2022    HISTORY:  ORDERING SYSTEM PROVIDED HISTORY: ams  TECHNOLOGIST PROVIDED HISTORY:  Reason for exam:->ams  Reason for Exam: AMS    FINDINGS:  The lungs are without acute focal process. There is no effusion or  pneumothorax. The cardiomediastinal silhouette is stable. The osseous  structures are stable. Impression: No acute process. Labs:   Recent Labs     10/14/22  1611 10/15/22  0438 10/16/22  0459 10/17/22  0505   HGB 8.3* 7.4* 7.5* 7.0*   WBC  --  4.7 7.9 5.2   PROT  --  5.5* 5.8* 5.9*   LABALBU  --  3.1* 3.1* 3.1*   ALKPHOS  --  185* 205* 220*   ALT  --  38 42* 38   AST  --  72* 77* 70*   BILITOT  --  16.4* 17.1* 14.9*   BILIDIR  --  4.6* 4.7* 4.5*   IBILI  --  11.8* 12.4* 10.4*          ASSESSMENT      Hepatic encephalopathy -likely from noncompliance with lactulose. Was on Xifaxan daily. Do need to rule out infection ->  CXR and UA were negative. Only small volume ascites on CT. Clinically improved today. Cirrhosis - due to alcohol abuse. No alcohol since April 2022. Has been referred to  liver transplant center but patient states never made an appointment as insurance does not cover any of the cost of transplant or her antirejection meds. Also states no social support. Arthur Blackwell History of esophageal varices  - EGD 7/2022 with large esophageal varix treated with band ligation. No GI bleeding currently. Acute on chronic anemia -  from spur cell anemia. heme following. Hemoglobin incremented from 6.1-7.9 with 1 unit PRBC here. 7.0 today     Noncompliance with lactulose    Now taking lactulose      PLAN    -Lactulose 20 g 3 times daily and titrate for goal of 3 bowel movements daily. Discussed with patient how to titrate this at home. - Low sodium diet   -Continue Xifaxan  - Will sign off plan short term f/u with Dr. Kennedy Pass   - Will need repeat EGD to f/u varices, can be done as an outpatient since no active GIB        Discussed with Dr. Florina Singleton, [address] [place] [address]    I have personally performed a face to face diagnostic evaluation on this patient. I have interviewed and examined the patient and I agree with the findings and recommended plan of care. In summary, my findings and plan are the following: hepatic enceph clinically resolved as pt conversant/no signif asterixis on exam, no gi bleeding , abd soft, no signif asterixis, indirect bili chronically elevated/transfusions/cirrhosis. Discussed importance continueing lactulose and how titrate for 3-4 soft bm/day, cont xifaxan, follow hbg per heme (requires prn transfusions for spur cell anemia), f/u dr walsh outpt/ repeat egd follow up varices, unfortunately apparently not transplant as above. Otherwise with enceph clinically resolved will sign off, call if needed.        Analilia Perez MD  [address]

## 2022-10-17 NOTE — PLAN OF CARE
Problem: Confusion  Goal: Confusion, delirium, dementia, or psychosis is improved or at baseline  Description: INTERVENTIONS:  1. Assess for possible contributors to thought disturbance, including medications, impaired vision or hearing, underlying metabolic abnormalities, dehydration, psychiatric diagnoses, and notify attending LIP  2. [place] high risk fall precautions, as indicated  3. Provide frequent short contacts to provide reality reorientation, refocusing and direction  4. Decrease environmental stimuli, including noise as appropriate  5. Monitor and intervene to maintain adequate nutrition, hydration, elimination, sleep and activity  6. If unable to ensure safety without constant attention obtain sitter and review sitter guidelines with assigned personnel  7.  Initiate Psychosocial CNS and Spiritual Care consult, as indicated  10/17/2022 0236 by Ida Alonso RN  Flowsheets (Taken 10/14/2022 2317)  Effect of thought disturbance (confusion, delirium, dementia, or psychosis) are managed with adequate functional status:   Assess for contributors to thought disturbance, including medications, impaired vision or hearing, underlying metabolic abnormalities, dehydration, psychiatric diagnoses, notify LIP   Decrease environmental stimuli, including noise as appropriate     Problem: ABCDS Injury Assessment  Goal: Absence of physical injury  10/17/2022 0236 by Ida Alonso RN  Outcome: Progressing  Flowsheets (Taken 10/13/2022 2213)  Absence of Physical Injury: Implement safety measures based on patient assessment     Problem: Safety - Adult  Goal: Free from fall injury  10/17/2022 0236 by Ida Alonso RN  Outcome: Progressing  Flowsheets (Taken 10/13/2022 2213)  Free From Fall Injury: Instruct family/caregiver on patient safety     Problem: Pain  Goal: Verbalizes/displays adequate comfort level or baseline comfort level  10/17/2022 0236 by Ida Alonso RN  Flowsheets  Taken 10/17/2022 0236  Verbalizes/displays adequate comfort level or baseline comfort level: Assess pain using appropriate pain scale  Taken 10/16/2022 2330  Verbalizes/displays adequate comfort level or baseline comfort level: Assess pain using appropriate pain scale  Taken 10/16/2022 2000  Verbalizes/displays adequate comfort level or baseline comfort level:   Encourage patient to monitor pain and request assistance   Assess pain using appropriate pain scale     Problem: Hematologic - Adult  Goal: Maintains hematologic stability  10/17/2022 0236 by Francisco J Nugent RN  Outcome: Progressing  Flowsheets (Taken 10/16/2022 2000)  Maintains hematologic stability:   Monitor labs for bleeding or clotting disorders   Assess for signs and symptoms of bleeding or hemorrhage   Administer blood products/factors as ordered     Problem: Metabolic/Fluid and Electrolytes - Adult  Goal: Hemodynamic stability and optimal renal function maintained  10/17/2022 0236 by Francisco J Nugent RN  Outcome: Progressing  Flowsheets (Taken 10/16/2022 2000)  Hemodynamic stability and optimal renal function maintained: Monitor labs and assess for signs and symptoms of volume excess or deficit     Problem: Gastrointestinal - Adult  Goal: Minimal or absence of nausea and vomiting  10/17/2022 0236 by Francisco J Nugent RN  Outcome: Progressing  Flowsheets  Taken 10/17/2022 0236  Minimal or absence of nausea and vomiting:   Administer ordered antiemetic medications as needed   Provide nonpharmacologic comfort measures as appropriate  Taken 10/16/2022 2000  Minimal or absence of nausea and vomiting: Administer ordered antiemetic medications as needed     Problem: Neurosensory - Adult  Goal: Achieves stable or improved neurological status  Outcome: Progressing  Flowsheets (Taken 10/17/2022 0236)  Achieves stable or improved neurological status: Assess for and report changes in neurological status     Problem: Cardiovascular - Adult  Goal: Absence of cardiac dysrhythmias or at baseline  Outcome: Progressing  Flowsheets (Taken 10/17/2022 0236)  Absence of cardiac dysrhythmias or at baseline: Monitor cardiac rate and rhythm

## 2022-10-17 NOTE — PLAN OF CARE
Problem: Discharge Planning  Goal: Discharge to home or other facility with appropriate resources  Outcome: Progressing     Problem: Confusion  Goal: Confusion, delirium, dementia, or psychosis is improved or at baseline  Description: INTERVENTIONS:  1. Assess for possible contributors to thought disturbance, including medications, impaired vision or hearing, underlying metabolic abnormalities, dehydration, psychiatric diagnoses, and notify attending LIP  2. [place] high risk fall precautions, as indicated  3. Provide frequent short contacts to provide reality reorientation, refocusing and direction  4. Decrease environmental stimuli, including noise as appropriate  5. Monitor and intervene to maintain adequate nutrition, hydration, elimination, sleep and activity  6. If unable to ensure safety without constant attention obtain sitter and review sitter guidelines with assigned personnel  7.  Initiate Psychosocial CNS and Spiritual Care consult, as indicated  Outcome: Progressing     Problem: ABCDS Injury Assessment  Goal: Absence of physical injury  Outcome: Progressing     Problem: Safety - Adult  Goal: Free from fall injury  Outcome: Progressing     Problem: Pain  Goal: Verbalizes/displays adequate comfort level or baseline comfort level  Outcome: Progressing  Note: No pain reported this shift     Problem: Hematologic - Adult  Goal: Maintains hematologic stability  Outcome: Progressing     Problem: Metabolic/Fluid and Electrolytes - Adult  Goal: Hemodynamic stability and optimal renal function maintained  Outcome: Progressing     Problem: Gastrointestinal - Adult  Goal: Minimal or absence of nausea and vomiting  Outcome: Progressing  Goal: Maintains or returns to baseline bowel function  Outcome: Progressing     Problem: Respiratory - Adult  Goal: Achieves optimal ventilation and oxygenation  Outcome: Progressing     Problem: Neurosensory - Adult  Goal: Achieves stable or improved neurological status  Outcome: Progressing     Problem: Cardiovascular - Adult  Goal: Absence of cardiac dysrhythmias or at baseline  Outcome: Progressing

## 2022-10-17 NOTE — PROGRESS NOTES
Physician Progress Note      Elias Melendez  CSN #:                  [id number]  :                       1975  ADMIT DATE:       10/11/2022 7:59 PM  [address] [place] DATE:  RESPONDING  PROVIDER #:        Lynn Downs MD          QUERY TEXT:    Pt admitted with Hepatic encephalopathy. Noted documentation of suspected   multifactorial encephalopathy related to hepatic and medications in ED   provider note 10/11/2022. If possible, please document in progress notes and   discharge summary:    The medical record reflects the following:    Risk Factors: alcoholic liver cirrhosis, alcohol abuse    Clinical Indicators:  take Ativan and morphine chronically and suspect   encephalopathy is multifactorial noted in ED provider note 10/11/22, Hepatic   encephalopathy progress note 10/13/2022    on arrival ammonia 98, down to 64 on 10/12 but uptrended and peaked at 128 on   10/15. Pt marked as improved mental status to baseline. Treatment: on Xifaxan daily, lactulose, home medications evaluated, ativan   continued prn, per MAR no opiates. Thank you,  [email or web address]  Options provided:  -- This patient has multifactorial encephalopathy due to hepatic cause and   medication on chronic Ativan and morphine. -- This patient has hepatic encephalopathy only, encephalopathy r/t   medications has been ruled out.  -- Other - I will add my own diagnosis  -- Disagree - Not applicable / Not valid  -- Disagree - Clinically unable to determine / Unknown  -- Refer to Clinical Documentation Reviewer    PROVIDER RESPONSE TEXT:    This patient has multifactorial encephalopathy due to hepatic cause and   medication on chronic Ativan and morphine. Query created by:  Leeann Islas on 10/17/2022 9:51 AM      Electronically signed by:  Lynn Downs MD 10/17/2022 3:45 PM

## 2022-10-17 NOTE — PROGRESS NOTES
Informed Consent for Blood Component Transfusion Note    I have discussed with the patient the rationale for blood component transfusion; its benefits in treating or preventing fatigue, organ damage, or death; and its risk which includes mild transfusion reactions, rare risk of blood borne infection, or more serious but rare reactions. I have discussed the alternatives to transfusion, including the risk and consequences of not receiving transfusion. The patient had an opportunity to ask questions and had agreed to proceed with transfusion of blood components.     Electronically signed by Ana Naylor MD on 10/17/22 at 9:36 AM EDT

## 2022-10-18 VITALS
TEMPERATURE: 98.4 F | RESPIRATION RATE: 16 BRPM | HEIGHT: 64 IN | DIASTOLIC BLOOD PRESSURE: 64 MMHG | HEART RATE: 64 BPM | SYSTOLIC BLOOD PRESSURE: 108 MMHG | WEIGHT: 105 LBS | OXYGEN SATURATION: 97 % | BODY MASS INDEX: 17.93 KG/M2

## 2022-10-18 LAB
ACANTHOCYTES: ABNORMAL
ALBUMIN SERPL-MCNC: 3.1 G/DL (ref 3.4–5)
ALP BLD-CCNC: 213 U/L (ref 40–129)
ALT SERPL-CCNC: 38 U/L (ref 10–40)
AMMONIA: 116 UMOL/L (ref 11–51)
ANION GAP SERPL CALCULATED.3IONS-SCNC: 10 MMOL/L (ref 3–16)
ANISOCYTOSIS: ABNORMAL
AST SERPL-CCNC: 70 U/L (ref 15–37)
BASOPHILS ABSOLUTE: 0 K/UL (ref 0–0.2)
BASOPHILS RELATIVE PERCENT: 0 %
BILIRUB SERPL-MCNC: 15.4 MG/DL (ref 0–1)
BILIRUBIN DIRECT: 4.9 MG/DL (ref 0–0.3)
BILIRUBIN, INDIRECT: 10.5 MG/DL (ref 0–1)
BUN BLDV-MCNC: 31 MG/DL (ref 7–20)
BURR CELLS: ABNORMAL
CALCIUM SERPL-MCNC: 9.1 MG/DL (ref 8.3–10.6)
CHLORIDE BLD-SCNC: 102 MMOL/L (ref 99–110)
CO2: 22 MMOL/L (ref 21–32)
CREAT SERPL-MCNC: 0.8 MG/DL (ref 0.6–1.1)
EOSINOPHILS ABSOLUTE: 0.2 K/UL (ref 0–0.6)
EOSINOPHILS RELATIVE PERCENT: 5 %
GFR SERPL CREATININE-BSD FRML MDRD: >60 ML/MIN/{1.73_M2}
GLUCOSE BLD-MCNC: 169 MG/DL (ref 70–99)
HCT VFR BLD CALC: 23.4 % (ref 36–48)
HCT VFR BLD CALC: 23.8 % (ref 36–48)
HEMATOLOGY PATH CONSULT: NO
HEMOGLOBIN: 8.2 G/DL (ref 12–16)
HEMOGLOBIN: 8.4 G/DL (ref 12–16)
LYMPHOCYTES ABSOLUTE: 1.5 K/UL (ref 1–5.1)
LYMPHOCYTES RELATIVE PERCENT: 31 %
MCH RBC QN AUTO: 33.2 PG (ref 26–34)
MCHC RBC AUTO-ENTMCNC: 35.2 G/DL (ref 31–36)
MCV RBC AUTO: 94.2 FL (ref 80–100)
MONOCYTES ABSOLUTE: 0.3 K/UL (ref 0–1.3)
MONOCYTES RELATIVE PERCENT: 7 %
NEUTROPHILS ABSOLUTE: 2.7 K/UL (ref 1.7–7.7)
NEUTROPHILS RELATIVE PERCENT: 57 %
PDW BLD-RTO: 23.1 % (ref 12.4–15.4)
PLATELET # BLD: 58 K/UL (ref 135–450)
PLATELET SLIDE REVIEW: ABNORMAL
PMV BLD AUTO: 9.8 FL (ref 5–10.5)
POIKILOCYTES: ABNORMAL
POLYCHROMASIA: ABNORMAL
POTASSIUM SERPL-SCNC: 4.6 MMOL/L (ref 3.5–5.1)
RBC # BLD: 2.49 M/UL (ref 4–5.2)
SLIDE REVIEW: ABNORMAL
SODIUM BLD-SCNC: 134 MMOL/L (ref 136–145)
TARGET CELLS: ABNORMAL
TOTAL PROTEIN: 5.5 G/DL (ref 6.4–8.2)
WBC # BLD: 4.7 K/UL (ref 4–11)

## 2022-10-18 PROCEDURE — 36415 COLL VENOUS BLD VENIPUNCTURE: CPT

## 2022-10-18 PROCEDURE — 85014 HEMATOCRIT: CPT

## 2022-10-18 PROCEDURE — 6370000000 HC RX 637 (ALT 250 FOR IP): Performed by: INTERNAL MEDICINE

## 2022-10-18 PROCEDURE — 82140 ASSAY OF AMMONIA: CPT

## 2022-10-18 PROCEDURE — 2580000003 HC RX 258: Performed by: INTERNAL MEDICINE

## 2022-10-18 PROCEDURE — 80076 HEPATIC FUNCTION PANEL: CPT

## 2022-10-18 PROCEDURE — 6370000000 HC RX 637 (ALT 250 FOR IP): Performed by: NURSE PRACTITIONER

## 2022-10-18 PROCEDURE — 85018 HEMOGLOBIN: CPT

## 2022-10-18 PROCEDURE — 85025 COMPLETE CBC W/AUTO DIFF WBC: CPT

## 2022-10-18 PROCEDURE — 80048 BASIC METABOLIC PNL TOTAL CA: CPT

## 2022-10-18 RX ORDER — GABAPENTIN 100 MG/1
100 CAPSULE ORAL ONCE
Status: COMPLETED | OUTPATIENT
Start: 2022-10-18 | End: 2022-10-18

## 2022-10-18 RX ORDER — LACTULOSE 10 G/15ML
20 SOLUTION ORAL 4 TIMES DAILY
Qty: 237 ML | Refills: 1 | Status: ON HOLD | COMMUNITY
Start: 2022-10-18 | End: 2022-10-27 | Stop reason: HOSPADM

## 2022-10-18 RX ORDER — GABAPENTIN 100 MG/1
100 CAPSULE ORAL PRN
Status: ON HOLD | COMMUNITY
End: 2022-10-27 | Stop reason: HOSPADM

## 2022-10-18 RX ADMIN — LACTULOSE 10 G: 20 SOLUTION ORAL at 14:38

## 2022-10-18 RX ADMIN — LORAZEPAM 0.5 MG: 0.5 TABLET ORAL at 14:38

## 2022-10-18 RX ADMIN — Medication 100 MG: at 08:40

## 2022-10-18 RX ADMIN — GABAPENTIN 100 MG: 100 CAPSULE ORAL at 15:55

## 2022-10-18 RX ADMIN — FUROSEMIDE 20 MG: 20 TABLET ORAL at 08:40

## 2022-10-18 RX ADMIN — NADOLOL 40 MG: 40 TABLET ORAL at 08:40

## 2022-10-18 RX ADMIN — PANTOPRAZOLE SODIUM 40 MG: 40 TABLET, DELAYED RELEASE ORAL at 08:40

## 2022-10-18 RX ADMIN — LORAZEPAM 0.5 MG: 0.5 TABLET ORAL at 02:29

## 2022-10-18 RX ADMIN — RIFAXIMIN 550 MG: 550 TABLET ORAL at 08:41

## 2022-10-18 RX ADMIN — SODIUM CHLORIDE, PRESERVATIVE FREE 10 ML: 5 INJECTION INTRAVENOUS at 08:40

## 2022-10-18 RX ADMIN — SPIRONOLACTONE 100 MG: 25 TABLET ORAL at 08:40

## 2022-10-18 ASSESSMENT — PAIN SCALES - GENERAL
PAINLEVEL_OUTOF10: 0
PAINLEVEL_OUTOF10: 10
PAINLEVEL_OUTOF10: 10

## 2022-10-18 ASSESSMENT — PAIN DESCRIPTION - DESCRIPTORS: DESCRIPTORS: NUMBNESS;TINGLING;OTHER (COMMENT)

## 2022-10-18 ASSESSMENT — PAIN DESCRIPTION - LOCATION
LOCATION: FOOT
LOCATION: FOOT

## 2022-10-18 NOTE — PROGRESS NOTES
CLINICAL PHARMACY NOTE: MEDS TO BEDS    Total # of Prescriptions Filled: 1   The following medications were delivered to the patient:  Xifaxan 550    Additional Documentation:  Ok to deliver per [facility], brought to patient bedside

## 2022-10-18 NOTE — PROGRESS NOTES
Physical Therapy  Attempted to see Pt. Who refused stating not feeling well. May try after gets \"my meds\". Will reattempt later if time and schedule allows. Second attempt, Pt. Continued to refuse.       [place], [place], [id number]

## 2022-10-18 NOTE — CARE COORDINATION
Patient will not qualify for SNF placement based on her therapy scores. Spoke with Kwaku Daily at [facility] who cannot admit patient directly because [facility] does not offer a LTC benefit. She  offered to call the daughter,   offer a tour for their assisted living and help with the [place] Waiver Program application for future eligibility. [email or web address],   [phone], fax [phone].   [facility] [place], [facility].  [facility]     Follow-up PCP   Dr Emma Evans [facility]  [phone]

## 2022-10-18 NOTE — PROGRESS NOTES
Nutrition Note    RECOMMENDATIONS  PO Diet: 2gm Na+  ONS: None ordered  Nutrition Support: N/a     NUTRITION ASSESSMENT   LOS. 2 gm Na+ diet with PO intakes recorded >50% of meals. Tray at bedside was only 25% complete, pt stating she does not have a good appetite d/t not feeling well. Wt hx shows 11% wt loss in two months. Pt denies any weight loss and reports she is at baseline. EMR hx depicts 148 lbs in May 2022, prior to paracentesis. Will continue to monitors weight shifts and PO intake this admission. Nutrition Related Findings: Hbg 8.2; Hct 23.4; glucose 169; jaundiced; last BM 10/17; no edema noted; on lasix  Wounds: None  Nutrition Education:  Education not indicated   Nutrition Goals: PO intake 50% or greater     MALNUTRITION ASSESSMENT   Chronic Illness  Malnutrition Status: At risk for malnutrition (Comment)      NUTRITION DIAGNOSIS   Inadequate oral intake related to inadequate protein-energy intake as evidenced by other (comment) (variable intakes)      CURRENT NUTRITION THERAPIES  ADULT DIET; Regular; Low Sodium (2 gm)     PO Intake: 1-25%, 51-75% (Variable)   PO Supplement Intake:None Ordered      ANTHROPOMETRICS  Current Height: 5' 4\" (162.6 cm)  Current Weight: 105 lb (47.6 kg)    Admission weight: 107 lb (48.5 kg)  Ideal Body Weight (IBW): 120 lbs  (55 kg)          BMI: 18      COMPARATIVE STANDARDS  Energy (kcal):  6197-9307     Protein (g):  72-96       Fluid (mL/day):  7734-4983    The patient will be monitored per nutrition standards of care. Consult dietitian if additional nutrition interventions are needed prior to RD reassessment.      Madelyn Nolan, [address], [place]    Contact: [phone]

## 2022-10-18 NOTE — PROGRESS NOTES
Data- discharge order received, pt verbalized agreement to discharge, disposition to previous residence, no needs for HHC/DME. Action- discharge instructions prepared and given to patient, pt verbalized understanding. Medication information packet given r/t NEW and/or CHANGED prescriptions emphasizing name/purpose/side effects, pt verbalized understanding. Discharge instruction summary: Diet- regular, Activity- as tolerated, Primary Care Physician as follows: No primary care provider on file. None f/u appointment to be scheduled by patient, immunizations reviewed and up to date, prescription medications filled and delivered to patient. Inpatient surgical procedure precautions reviewed. 1. WEIGHT: Admit Weight: 107 lb 11.2 oz (48.9 kg) (10/12/22 0015)        Today  Weight: 105 lb (47.6 kg) (10/17/22 0820)       2. O2 SAT.: SpO2: 97 % (10/18/22 1134)    Response- Pt belongings gathered, IV removed. Disposition is home (no HHC/DME needs), transported with belongings, taken to lobby via w/c w/ belongings and medications, no complications.

## 2022-10-18 NOTE — PLAN OF CARE
Problem: Discharge Planning  Goal: Discharge to home or other facility with appropriate resources  Outcome: Progressing     Problem: Confusion  Goal: Confusion, delirium, dementia, or psychosis is improved or at baseline  Description: INTERVENTIONS:  1. Assess for possible contributors to thought disturbance, including medications, impaired vision or hearing, underlying metabolic abnormalities, dehydration, psychiatric diagnoses, and notify attending LIP  2. [place] high risk fall precautions, as indicated  3. Provide frequent short contacts to provide reality reorientation, refocusing and direction  4. Decrease environmental stimuli, including noise as appropriate  5. Monitor and intervene to maintain adequate nutrition, hydration, elimination, sleep and activity  6. If unable to ensure safety without constant attention obtain sitter and review sitter guidelines with assigned personnel  7. Initiate Psychosocial CNS and Spiritual Care consult, as indicated  Outcome: Progressing  Note: Pt.  Alert and oriented, no signs of confusion     Problem: ABCDS Injury Assessment  Goal: Absence of physical injury  Outcome: Progressing     Problem: Safety - Adult  Goal: Free from fall injury  Outcome: Progressing     Problem: Pain  Goal: Verbalizes/displays adequate comfort level or baseline comfort level  Outcome: Progressing  Flowsheets (Taken 10/18/2022 0318 by Sophia Mccoy RN)  Verbalizes/displays adequate comfort level or baseline comfort level:   Assess pain using appropriate pain scale   Administer analgesics based on type and severity of pain and evaluate response  Note: Gabapentin ordered for patient's neuropathy     Problem: Hematologic - Adult  Goal: Maintains hematologic stability  Outcome: Progressing     Problem: Metabolic/Fluid and Electrolytes - Adult  Goal: Hemodynamic stability and optimal renal function maintained  Outcome: Progressing     Problem: Gastrointestinal - Adult  Goal: Minimal or absence of nausea and vomiting  Outcome: Progressing  Goal: Maintains or returns to baseline bowel function  Outcome: Progressing     Problem: Respiratory - Adult  Goal: Achieves optimal ventilation and oxygenation  Outcome: Progressing     Problem: Neurosensory - Adult  Goal: Achieves stable or improved neurological status  Outcome: Progressing     Problem: Cardiovascular - Adult  Goal: Absence of cardiac dysrhythmias or at baseline  Outcome: Progressing  Flowsheets (Taken 10/18/2022 0800)  Absence of cardiac dysrhythmias or at baseline: Monitor cardiac rate and rhythm     Problem: Nutrition Deficit:  Goal: Optimize nutritional status  Outcome: Progressing

## 2022-10-18 NOTE — DISCHARGE SUMMARY
[facility]    Patient Demographics    Patient. Marilynn Ortez  Date of Birth. 1975  MRN. [id number]     Primary care provider. No primary care provider on file. (Tel: None)    Admit date: 10/11/2022    Discharge date (blank if same as Note Date): Note Date: 10/18/2022     Reason for Hospitalization. Chief Complaint   Patient presents with    Altered Mental Status     Pt arrived from home via ems with AMS   Ems stated she is alert and oriented x2, wanted to sign refusal but was unable to follow instructions to do so  Pt when asked why she was here, could not tell me, asked her where she was pt stated   Pt has hx of liver failure, pt is visibly jaundiced. Significant Findings. Principal Problem:    Hepatic encephalopathy  Active Problems:    Elevated LFTs    Severe anemia    Alcoholic liver disease (HCC)    Esophageal varices (HCC)  Resolved Problems:    * No resolved hospital problems. *       Problems and results from this hospitalization that need follow up. Outpatient EGD to follow up varices. Spur cell anemia followed y hematology. Significant test results and incidental findings. CXR 10/11/2022:  No acute process. CT Head 10/11/2022:  No acute intracranial abnormality. CT Abd/Pelvis 10/11/2022: Unchanged diffuse hepatocellular disease with suspected cirrhosis. Portal venous hypertension with recanalized umbilical vein, ascites, upper   abdominal varices and splenomegaly. Cholelithiasis. There is diffuse gallbladder wall thickening. This finding   may be related to hypoproteinemia and third-spacing of fluid. Acute   cholecystitis cannot be excluded. Correlate clinically. Moderate stool load consistent with constipation. Invasive procedures and treatments. None     [facility] Course.   41 yo female with hx alcoholic liver disease, spur cell anemia. She presented to ER with malaise and confusion. Labs remarkable for ammonia level of 98 and Hgb 6.1. Admitted with hepatic encephalopathy and severe anemia. Hepatic encephalopathy / alcoholic liver disease / cirrhosis. Last ETOH 4/2022. Previously evaluated at [facility] for liver transplant but patient declined given costs. Presented with confusion. CXR and UA negative for infection. Only small volume ascites on CT scan. Ammonia level 98 on presentation. Likely from noncompliance with lactulose. Encephalopathy resolved with resumption of lactulose. Discussed variable ammonia level with GI, (ranged from  this hospitalization, 116 today). Per GI no need to monitor daily, only if mental status changes, lipemia and specimen icterus likely interfering factors with results. Recommend continue lactulose, titrate to 3 BMs daily and continue Xifaxan 550 mg BID, lasix and aldactone. Needs to follow up with Dr Yi William as outpatient, will need repeat EGD as outpatient to f/u varices. Severe acute on chronic anemia. 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 Hx esophageal varices which were banded 7/2022. Hgb 6.1 on presentation and received transfusion 10/11, 10/14. Iron studies consistent with anemia of chronic disease. FOBT negative. Main complaint today is fatigue. Remains alert and oriented, answers questions appropriately. Daughter reports patient will loose track during conversation. Evaluated by PT/OT and scored too high for SNF placement. Patient lives by self and daughter reports she often forgets to take meds. Consult placed for home health care, have requested visits be front loaded. Case management contacted Dr Juan Francisco Beltran [phone] who agreed to serve as attending. Reviewed DC plans, follow up and medications with patient and daughter. Expresses understanding. Questions answered. Consults.   IP CONSULT TO HOSPITALIST  IP CONSULT TO GI  IP CONSULT TO SOCIAL WORK  IP CONSULT TO HEM/ONC  IP CONSULT TO CASE MANAGEMENT    Physical examination on discharge day. /69   Pulse 71   Temp 98.6 °F (37 °C) (Oral)   Resp 16   Ht 5' 4\" (1.626 m)   Wt 105 lb (47.6 kg)   SpO2 96%   BMI 18.02 kg/m²   General appearance. Alert, appears chronically ill  HEENT: Scleral icterus  Cardiovascular. Regular rate and rhythm, normal S1, S2. No murmur. Respiratory. Not using accessory muscles. Clear to auscultation bilaterally, no wheeze. Gastrointestinal. Abdomen soft, non-tender, not distended, normal bowel sounds  Neurology. Facial symmetry. No speech deficits. Moving all extremities equally, no asterixis. Extremities. No edema in lower extremities. Skin. Warm, dry, normal turgor, jaundiced    Condition at time of discharge stable    Medication instructions provided to patient at discharge.      Medication List        START taking these medications      rifAXIMin 550 MG tablet  Commonly known as: XIFAXAN  Take 1 tablet by mouth 2 times daily            CHANGE how you take these medications      lactulose 10 GM/15ML solution  Commonly known as: [employer]  What changed:   when to take this  additional instructions            CONTINUE taking these medications      furosemide 20 MG tablet  Commonly known as: LASIX  Take 1 tablet by mouth in the morning.     gabapentin 100 MG capsule  Commonly known as: NEURONTIN     LORazepam 0.5 MG tablet  Commonly known as: ATIVAN     nadolol 40 MG tablet  Commonly known as: CORGARD  Take 1 tablet by mouth daily     ondansetron 8 MG tablet  Commonly known as: ZOFRAN     pantoprazole 40 MG tablet  Commonly known as: PROTONIX  Take 1 tablet by mouth every morning (before breakfast)     spironolactone 100 MG tablet  Commonly known as: ALDACTONE  Take 1 tablet by mouth daily     vitamin B-1 100 MG tablet  Commonly known as: THIAMINE  Take 1 tablet by mouth daily     zolpidem 5 MG tablet  Commonly known as: AMBIEN            STOP taking these medications      magnesium oxide 400 (240 Mg) MG tablet  Commonly known as: MAG-OX     thiamine 100 MG tablet               Where to Get Your Medications        These medications were sent to [facility], [address] [place] [address], [address] [place] [address] Road      Phone: [phone]   rifAXIMin 550 MG tablet         Discharge recommendations given to patient. Follow Up. Dr Les Reeves in 1 week   Disposition. Home with home care  Activity. activity as tolerated  Diet: ADULT DIET; Regular; Low Sodium (2 gm)      Spent > 30 minutes in discharge process.     Signed:  DARCY Chavis CNP     10/18/2022 2:45 PM

## 2022-10-18 NOTE — DISCHARGE INSTR - COC
Continuity of Care Form    Patient Name: Marilynn Ortez   :  1975  MRN:  [id number]    Admit date:  10/11/2022  Discharge date:  10/18/2022    Code Status Order: Full Code   Advance Directives:     Admitting Physician:  Marisol Fox MD  PCP: No primary care provider on file.     Discharging Nurse: [facility] Unit/Room#: 4HP-8912/3650-98  Discharging Unit Phone Number: [phone]    Emergency Contact:   Extended Emergency Contact Information  Primary Emergency Contact: Paola Barahona Phone: [phone]  Mobile Phone: [phone]  Relation: Child  Secondary Emergency Contact: [employer]  Home Phone: [phone]  Relation: Child  Preferred language: English    Past Surgical History:  Past Surgical History:   Procedure Laterality Date    COLONOSCOPY N/A 3/11/2021    COLONOSCOPY POLYPECTOMY SNARE/COLD BIOPSY performed by Genevieve Velasquez MD at [address] [facility] BONE MARROW BIOPSY  2022    CT BONE MARROW BIOPSY 2022 Haley Xiong MD [facility] CT SCAN    KNEE ARTHROPLASTY      TUBAL LIGATION      ESSURE    UPPER GASTROINTESTINAL ENDOSCOPY N/A 2021    EGD DIAGNOSTIC ONLY performed by Fadi Huston MD at [address] [place] [address] 03/10/2021    EGD BIOPSY performed by Genevieve Velasquez MD at [address] [place] [address] 2021    EGD BAND LIGATION performed by Lidia Montano MD at [address] [place] [address] N/A 2022    EGD DIAGNOSTIC ONLY performed by Saniya Valladares MD at [address] [place] [address] 2022    EGD ESOPHAGOGASTRODUODENOSCOPY ULTRASOUND performed by Hardy Cooley MD at [address] [place] [address] N/A 2022    EGD BAND LIGATION performed by Hardy Cooley MD at [address] [place] [address]       Immunization History:   Immunization History   Administered Date(s) Administered    COVID-19, PFIZER GRAY top, DO NOT Dilute, (age 15 y+), IM, 30 mcg/0.3 mL 05/10/2022    COVID-19, PFIZER PURPLE top, DILUTE for use, (age 15 y+), 30mcg/0.3mL 2021, 2021       Active Problems:  Patient Active Problem List   Diagnosis Code    GI bleed K92.2    Acute GI bleeding K92.2    Menorrhagia with regular cycle Y52.7    Alcoholic liver disease (HCC) K70.9    Acute cholecystitis K81.0    Nausea and vomiting R11.2    Acute blood loss anemia D62    Esophageal varices (HCC) S37.05    Alcoholic hepatitis R12.23    EZIO (acute kidney injury) ([facility] [address] N17.9    High anion gap metabolic acidosis G81.25    Elevated LFTs R79.89    Ascites due to alcoholic cirrhosis (HCC) U26.44    Acute respiratory failure with hypoxia (HCC) J96.01    Sinus tachycardia R00.0    Severe anemia D64.9    Symptomatic anemia D64.9    Anemia D64.9    Cirrhosis of liver with ascites, unspecified hepatic cirrhosis type (HCC) K74.60, R18.8    Hepatic encephalopathy K76.82       Isolation/Infection:   Isolation            No Isolation          Patient Infection Status       Infection Onset Added Last Indicated Last Indicated By Review Planned Expiration Resolved Resolved By    None active    Resolved    C-diff Rule Out 22 Clostridium difficile toxin/antigen (Ordered)   22             Nurse Assessment:  Last Vital Signs: /69   Pulse 71   Temp 98.6 °F (37 °C) (Oral)   Resp 16   Ht 5' 4\" (1.626 m)   Wt 105 lb (47.6 kg)   SpO2 96%   BMI 18.02 kg/m²     Last documented pain score (0-10 scale): Pain Level: 10  Last Weight:   Wt Readings from Last 1 Encounters:   10/17/22 105 lb (47.6 kg)     Mental Status:  oriented, alert, coherent, logical, thought processes intact, and able to concentrate and follow conversation    IV Access:  - None    Nursing Mobility/ADLs:  Walking   Independent  Transfer  Independent  Bathing  Independent  Dressing  Independent  [address]  Med Delivery whole    Wound Care Documentation and Therapy:        Elimination:  Continence: Bowel: Yes  Bladder: Yes  Urinary Catheter: None   Colostomy/Ileostomy/Ileal Conduit: No       Date of Last BM: 10/18/2022    Intake/Output Summary (Last 24 hours) at 10/18/2022 1514  Last data filed at 10/17/2022 1544  Gross per 24 hour   Intake --   Output 1 ml   Net -1 ml     I/O last 3 completed shifts:  In: -   Out: 1 [Urine:1]    Safety Concerns:     None    Impairments/Disabilities:      None    Nutrition Therapy:  Current Nutrition Therapy:   - Oral Diet:  General    Routes of Feeding: Oral  Liquids: Thin Liquids  Daily Fluid Restriction: no  Last Modified Barium Swallow with Video (Video Swallowing Test): not done    Treatments at the Time of Hospital Discharge:   Respiratory Treatments: n/a  Oxygen Therapy:  is not on home oxygen therapy.   Ventilator:    - No ventilator support    Rehab Therapies: Physical Therapy and Occupational Therapy  Weight Bearing Status/Restrictions: No weight bearing restrictions  Other Medical Equipment (for information only, NOT a DME order):  n/a  Other Treatments: n/a    Patient's personal belongings (please select all that are sent with patient):  Ragini    RN SIGNATURE:  Electronically signed by All Doan RN on 10/18/22 at 5:46 PM EDT    CASE MANAGEMENT/SOCIAL WORK SECTION    Inpatient Status Date: ***    Readmission Risk Assessment Score:  Readmission Risk              Risk of Unplanned Readmission:  29           Discharging to Facility/ Agency   Name:   Address:  Phone:  Fax:    Dialysis Facility (if applicable)   Name:  Address:  Dialysis Schedule:  Phone:  Fax:    / signature: {Esignature:[id number]}    PHYSICIAN SECTION    Prognosis: Guarded    Condition at Discharge: Stable    Rehab Potential (if transferring to Rehab): N/A    Recommended Labs or Other Treatments After Discharge: CBC, CMP in 1 week    Physician Certification: I certify the above information and transfer of Suzanne Quintana  is necessary for the continuing treatment of the diagnosis listed and that she requires Home Care for greater 30 days.      Update Admission H&P: No change in H&P    PHYSICIAN SIGNATURE:  Electronically signed by DARCY Torres CNP on 10/18/22 at 3:15 PM EDT

## 2022-10-18 NOTE — PLAN OF CARE
Problem: Discharge Planning  Goal: Discharge to home or other facility with appropriate resources  10/18/2022 1741 by Chelle Mendieta RN  Outcome: Completed  10/18/2022 1609 by Chelle Mendieta RN  Outcome: Progressing     Problem: Confusion  Goal: Confusion, delirium, dementia, or psychosis is improved or at baseline  Description: INTERVENTIONS:  1. Assess for possible contributors to thought disturbance, including medications, impaired vision or hearing, underlying metabolic abnormalities, dehydration, psychiatric diagnoses, and notify attending LIP  2. O'Brien high risk fall precautions, as indicated  3. Provide frequent short contacts to provide reality reorientation, refocusing and direction  4. Decrease environmental stimuli, including noise as appropriate  5. Monitor and intervene to maintain adequate nutrition, hydration, elimination, sleep and activity  6. If unable to ensure safety without constant attention obtain sitter and review sitter guidelines with assigned personnel  7. Initiate Psychosocial CNS and Spiritual Care consult, as indicated  10/18/2022 1741 by Chelle Mendeita RN  Outcome: Completed  10/18/2022 1609 by Chelle Mendieta RN  Outcome: Progressing  Note: Pt.  Alert and oriented, no signs of confusion     Problem: ABCDS Injury Assessment  Goal: Absence of physical injury  10/18/2022 1741 by Chelle Mendieta RN  Outcome: Completed  10/18/2022 1609 by Chelle Mendieta RN  Outcome: Progressing     Problem: Safety - Adult  Goal: Free from fall injury  10/18/2022 1741 by Chelle Mendieta RN  Outcome: Completed  10/18/2022 1609 by Chelle Mednieta RN  Outcome: Progressing     Problem: Pain  Goal: Verbalizes/displays adequate comfort level or baseline comfort level  10/18/2022 1741 by Chelle Mendieta RN  Outcome: Completed  10/18/2022 1609 by Chelle Mendieta RN  Outcome: Progressing  Flowsheets (Taken 10/18/2022 0318 by Angelica Guzman RN)  Verbalizes/displays adequate comfort level or baseline comfort level:   Assess pain using appropriate pain scale   Administer analgesics based on type and severity of pain and evaluate response  Note: Gabapentin ordered for patient's neuropathy     Problem: Hematologic - Adult  Goal: Maintains hematologic stability  10/18/2022 1741 by Anel Slater RN  Outcome: Completed  10/18/2022 1609 by Anel Slater RN  Outcome: Progressing     Problem: Metabolic/Fluid and Electrolytes - Adult  Goal: Hemodynamic stability and optimal renal function maintained  10/18/2022 1741 by Anel Slater RN  Outcome: Completed  10/18/2022 1609 by Anel Slater RN  Outcome: Progressing     Problem: Gastrointestinal - Adult  Goal: Minimal or absence of nausea and vomiting  10/18/2022 1741 by Anel Slater RN  Outcome: Completed  10/18/2022 1609 by Anel Slater RN  Outcome: Progressing  Goal: Maintains or returns to baseline bowel function  10/18/2022 1741 by Anel Slater RN  Outcome: Completed  10/18/2022 1609 by Anel Slater RN  Outcome: Progressing     Problem: Respiratory - Adult  Goal: Achieves optimal ventilation and oxygenation  10/18/2022 1741 by Anel Slater RN  Outcome: Completed  10/18/2022 1609 by Anel Slater RN  Outcome: Progressing     Problem: Neurosensory - Adult  Goal: Achieves stable or improved neurological status  10/18/2022 1741 by Anel Slater RN  Outcome: Completed  10/18/2022 1609 by Anel Slater RN  Outcome: Progressing     Problem: Cardiovascular - Adult  Goal: Absence of cardiac dysrhythmias or at baseline  10/18/2022 1741 by Anel Slater RN  Outcome: Completed  10/18/2022 1609 by Anel Slater RN  Outcome: Progressing  Flowsheets (Taken 10/18/2022 0800)  Absence of cardiac dysrhythmias or at baseline: Monitor cardiac rate and rhythm     Problem: Nutrition Deficit:  Goal: Optimize nutritional status  10/18/2022 1741 by Anand Hendrickson RN  Outcome: Completed  10/18/2022 1609 by Anand Hendrickson RN  Outcome: Progressing

## 2022-10-18 NOTE — PROGRESS NOTES
Went to check in on patient and blood was leaking from bag excessively, blood was stopped and line was flushed, blood bank informed, bag of blood and tubing sent down to blood bank and a new order for 1 unit of blood received to transfuse. Patient's VSS, will continue to monitor.

## 2022-10-18 NOTE — CARE COORDINATION
Abimbola Jason (Child)   [phone] states patient lives alone, and would most likely be suited for Assisted living if available under her medicaid. Will initiate referrals.

## 2022-10-21 ENCOUNTER — APPOINTMENT (OUTPATIENT)
Dept: GENERAL RADIOLOGY | Age: 47
DRG: 463 | End: 2022-10-21
Payer: COMMERCIAL

## 2022-10-21 ENCOUNTER — HOSPITAL ENCOUNTER (INPATIENT)
Age: 47
LOS: 8 days | Discharge: NURSING, REHAB OR LONG-TERM CARE | DRG: 463 | End: 2022-10-29
Attending: EMERGENCY MEDICINE | Admitting: INTERNAL MEDICINE
Payer: COMMERCIAL

## 2022-10-21 ENCOUNTER — APPOINTMENT (OUTPATIENT)
Dept: CT IMAGING | Age: 47
DRG: 463 | End: 2022-10-21
Payer: COMMERCIAL

## 2022-10-21 DIAGNOSIS — K70.30 ALCOHOLIC CIRRHOSIS, UNSPECIFIED WHETHER ASCITES PRESENT (HCC): ICD-10-CM

## 2022-10-21 DIAGNOSIS — N39.0 URINARY TRACT INFECTION IN FEMALE: ICD-10-CM

## 2022-10-21 DIAGNOSIS — G47.00 INSOMNIA, UNSPECIFIED TYPE: ICD-10-CM

## 2022-10-21 DIAGNOSIS — K76.82 HEPATIC ENCEPHALOPATHY: Primary | ICD-10-CM

## 2022-10-21 LAB
A/G RATIO: 1.3 (ref 1.1–2.2)
ALBUMIN SERPL-MCNC: 3.4 G/DL (ref 3.4–5)
ALP BLD-CCNC: 230 U/L (ref 40–129)
ALT SERPL-CCNC: 41 U/L (ref 10–40)
AMMONIA: 141 UMOL/L (ref 11–51)
AMORPHOUS: PRESENT
ANION GAP SERPL CALCULATED.3IONS-SCNC: 11 MMOL/L (ref 3–16)
AST SERPL-CCNC: 80 U/L (ref 15–37)
BACTERIA: ABNORMAL /HPF
BASOPHILS ABSOLUTE: 0 K/UL (ref 0–0.2)
BASOPHILS RELATIVE PERCENT: 0.8 %
BILIRUB SERPL-MCNC: 18.5 MG/DL (ref 0–1)
BILIRUBIN URINE: ABNORMAL
BLOOD, URINE: NEGATIVE
BUN BLDV-MCNC: 40 MG/DL (ref 7–20)
CALCIUM SERPL-MCNC: 9.5 MG/DL (ref 8.3–10.6)
CHLORIDE BLD-SCNC: 106 MMOL/L (ref 99–110)
CLARITY: CLEAR
CO2: 26 MMOL/L (ref 21–32)
COLOR: ABNORMAL
COMMENT UA: ABNORMAL
CREAT SERPL-MCNC: 1.1 MG/DL (ref 0.6–1.1)
EOSINOPHILS ABSOLUTE: 0.1 K/UL (ref 0–0.6)
EOSINOPHILS RELATIVE PERCENT: 3.5 %
EPITHELIAL CELLS, UA: 7 /HPF (ref 0–5)
ETHANOL: NORMAL MG/DL (ref 0–0.08)
GFR SERPL CREATININE-BSD FRML MDRD: >60 ML/MIN/{1.73_M2}
GLUCOSE BLD-MCNC: 163 MG/DL (ref 70–99)
GLUCOSE URINE: NEGATIVE MG/DL
HCG QUALITATIVE: NEGATIVE
HCT VFR BLD CALC: 23.3 % (ref 36–48)
HEMOGLOBIN: 8 G/DL (ref 12–16)
HYALINE CASTS: 2 /LPF (ref 0–8)
INR BLD: 2.5 (ref 0.87–1.14)
KETONES, URINE: NEGATIVE MG/DL
LACTIC ACID, SEPSIS: 1.7 MMOL/L (ref 0.4–1.9)
LEUKOCYTE ESTERASE, URINE: ABNORMAL
LYMPHOCYTES ABSOLUTE: 0.8 K/UL (ref 1–5.1)
LYMPHOCYTES RELATIVE PERCENT: 24.5 %
MCH RBC QN AUTO: 33.4 PG (ref 26–34)
MCHC RBC AUTO-ENTMCNC: 34.5 G/DL (ref 31–36)
MCV RBC AUTO: 96.8 FL (ref 80–100)
MICROSCOPIC EXAMINATION: YES
MONOCYTES ABSOLUTE: 0.5 K/UL (ref 0–1.3)
MONOCYTES RELATIVE PERCENT: 15.2 %
NEUTROPHILS ABSOLUTE: 1.9 K/UL (ref 1.7–7.7)
NEUTROPHILS RELATIVE PERCENT: 56 %
NITRITE, URINE: POSITIVE
PDW BLD-RTO: 23.2 % (ref 12.4–15.4)
PH UA: 6 (ref 5–8)
PLATELET # BLD: 61 K/UL (ref 135–450)
PMV BLD AUTO: 9.7 FL (ref 5–10.5)
POTASSIUM REFLEX MAGNESIUM: 4.7 MMOL/L (ref 3.5–5.1)
PROTEIN UA: NEGATIVE MG/DL
PROTHROMBIN TIME: 27.1 SEC (ref 11.7–14.5)
RBC # BLD: 2.41 M/UL (ref 4–5.2)
RBC UA: ABNORMAL /HPF (ref 0–4)
SODIUM BLD-SCNC: 143 MMOL/L (ref 136–145)
SPECIFIC GRAVITY UA: 1.02 (ref 1–1.03)
TOTAL PROTEIN: 6 G/DL (ref 6.4–8.2)
URINE REFLEX TO CULTURE: ABNORMAL
URINE TYPE: ABNORMAL
UROBILINOGEN, URINE: 2 E.U./DL
WBC # BLD: 3.3 K/UL (ref 4–11)
WBC UA: 7 /HPF (ref 0–5)

## 2022-10-21 PROCEDURE — 87077 CULTURE AEROBIC IDENTIFY: CPT

## 2022-10-21 PROCEDURE — 82977 ASSAY OF GGT: CPT

## 2022-10-21 PROCEDURE — 36415 COLL VENOUS BLD VENIPUNCTURE: CPT

## 2022-10-21 PROCEDURE — 51702 INSERT TEMP BLADDER CATH: CPT

## 2022-10-21 PROCEDURE — 80053 COMPREHEN METABOLIC PANEL: CPT

## 2022-10-21 PROCEDURE — 6370000000 HC RX 637 (ALT 250 FOR IP): Performed by: EMERGENCY MEDICINE

## 2022-10-21 PROCEDURE — 93005 ELECTROCARDIOGRAM TRACING: CPT | Performed by: EMERGENCY MEDICINE

## 2022-10-21 PROCEDURE — 82077 ASSAY SPEC XCP UR&BREATH IA: CPT

## 2022-10-21 PROCEDURE — 96365 THER/PROPH/DIAG IV INF INIT: CPT

## 2022-10-21 PROCEDURE — 84703 CHORIONIC GONADOTROPIN ASSAY: CPT

## 2022-10-21 PROCEDURE — 81001 URINALYSIS AUTO W/SCOPE: CPT

## 2022-10-21 PROCEDURE — 96367 TX/PROPH/DG ADDL SEQ IV INF: CPT

## 2022-10-21 PROCEDURE — 87086 URINE CULTURE/COLONY COUNT: CPT

## 2022-10-21 PROCEDURE — 71045 X-RAY EXAM CHEST 1 VIEW: CPT

## 2022-10-21 PROCEDURE — 87040 BLOOD CULTURE FOR BACTERIA: CPT

## 2022-10-21 PROCEDURE — 99285 EMERGENCY DEPT VISIT HI MDM: CPT

## 2022-10-21 PROCEDURE — 82140 ASSAY OF AMMONIA: CPT

## 2022-10-21 PROCEDURE — 6360000002 HC RX W HCPCS: Performed by: EMERGENCY MEDICINE

## 2022-10-21 PROCEDURE — 82247 BILIRUBIN TOTAL: CPT

## 2022-10-21 PROCEDURE — 87186 SC STD MICRODIL/AGAR DIL: CPT

## 2022-10-21 PROCEDURE — 83605 ASSAY OF LACTIC ACID: CPT

## 2022-10-21 PROCEDURE — 85025 COMPLETE CBC W/AUTO DIFF WBC: CPT

## 2022-10-21 PROCEDURE — 85610 PROTHROMBIN TIME: CPT

## 2022-10-21 PROCEDURE — 2580000003 HC RX 258: Performed by: EMERGENCY MEDICINE

## 2022-10-21 PROCEDURE — 70450 CT HEAD/BRAIN W/O DYE: CPT

## 2022-10-21 PROCEDURE — 2060000000 HC ICU INTERMEDIATE R&B

## 2022-10-21 PROCEDURE — 82248 BILIRUBIN DIRECT: CPT

## 2022-10-21 RX ORDER — SODIUM CHLORIDE, SODIUM LACTATE, POTASSIUM CHLORIDE, AND CALCIUM CHLORIDE .6; .31; .03; .02 G/100ML; G/100ML; G/100ML; G/100ML
500 INJECTION, SOLUTION INTRAVENOUS ONCE
Status: COMPLETED | OUTPATIENT
Start: 2022-10-21 | End: 2022-10-21

## 2022-10-21 RX ORDER — LACTULOSE 10 G/15ML
30 SOLUTION ORAL ONCE
Status: COMPLETED | OUTPATIENT
Start: 2022-10-21 | End: 2022-10-21

## 2022-10-21 RX ADMIN — VANCOMYCIN HYDROCHLORIDE 750 MG: 750 INJECTION, POWDER, LYOPHILIZED, FOR SOLUTION INTRAVENOUS at 20:52

## 2022-10-21 RX ADMIN — CEFEPIME 2000 MG: 2 INJECTION, POWDER, FOR SOLUTION INTRAVENOUS at 20:03

## 2022-10-21 RX ADMIN — LACTULOSE 30 G: 20 SOLUTION ORAL at 23:55

## 2022-10-21 RX ADMIN — SODIUM CHLORIDE, POTASSIUM CHLORIDE, SODIUM LACTATE AND CALCIUM CHLORIDE 500 ML: 600; 310; 30; 20 INJECTION, SOLUTION INTRAVENOUS at 18:40

## 2022-10-22 ENCOUNTER — APPOINTMENT (OUTPATIENT)
Dept: GENERAL RADIOLOGY | Age: 47
DRG: 463 | End: 2022-10-22
Payer: COMMERCIAL

## 2022-10-22 ENCOUNTER — APPOINTMENT (OUTPATIENT)
Dept: ULTRASOUND IMAGING | Age: 47
DRG: 463 | End: 2022-10-22
Payer: COMMERCIAL

## 2022-10-22 LAB
AMPHETAMINE SCREEN, URINE: NORMAL
BARBITURATE SCREEN URINE: NORMAL
BENZODIAZEPINE SCREEN, URINE: NORMAL
BILIRUBIN DIRECT: 4.9 MG/DL (ref 0–0.3)
BILIRUBIN, INDIRECT: 13.6 MG/DL (ref 0–1)
CANNABINOID SCREEN URINE: NORMAL
COCAINE METABOLITE SCREEN URINE: NORMAL
EKG ATRIAL RATE: 69 BPM
EKG DIAGNOSIS: NORMAL
EKG P AXIS: 45 DEGREES
EKG P-R INTERVAL: 170 MS
EKG Q-T INTERVAL: 454 MS
EKG QRS DURATION: 86 MS
EKG QTC CALCULATION (BAZETT): 486 MS
EKG R AXIS: 28 DEGREES
EKG T AXIS: 47 DEGREES
EKG VENTRICULAR RATE: 69 BPM
ETHANOL: NORMAL MG/DL (ref 0–0.08)
FENTANYL SCREEN, URINE: NORMAL
GAMMA GLUTAMYL TRANSFERASE: 34 U/L (ref 5–36)
Lab: NORMAL
METHADONE SCREEN, URINE: NORMAL
OPIATE SCREEN URINE: NORMAL
OXYCODONE URINE: NORMAL
PH UA: 6.5
PHENCYCLIDINE SCREEN URINE: NORMAL

## 2022-10-22 PROCEDURE — P9047 ALBUMIN (HUMAN), 25%, 50ML: HCPCS | Performed by: INTERNAL MEDICINE

## 2022-10-22 PROCEDURE — 6360000002 HC RX W HCPCS: Performed by: INTERNAL MEDICINE

## 2022-10-22 PROCEDURE — 2580000003 HC RX 258: Performed by: INTERNAL MEDICINE

## 2022-10-22 PROCEDURE — 82077 ASSAY SPEC XCP UR&BREATH IA: CPT

## 2022-10-22 PROCEDURE — 93010 ELECTROCARDIOGRAM REPORT: CPT | Performed by: INTERNAL MEDICINE

## 2022-10-22 PROCEDURE — 6370000000 HC RX 637 (ALT 250 FOR IP): Performed by: INTERNAL MEDICINE

## 2022-10-22 PROCEDURE — 36415 COLL VENOUS BLD VENIPUNCTURE: CPT

## 2022-10-22 PROCEDURE — 80307 DRUG TEST PRSMV CHEM ANLYZR: CPT

## 2022-10-22 PROCEDURE — 76705 ECHO EXAM OF ABDOMEN: CPT

## 2022-10-22 PROCEDURE — 2060000000 HC ICU INTERMEDIATE R&B

## 2022-10-22 PROCEDURE — 71045 X-RAY EXAM CHEST 1 VIEW: CPT

## 2022-10-22 PROCEDURE — 6370000000 HC RX 637 (ALT 250 FOR IP): Performed by: PHYSICIAN ASSISTANT

## 2022-10-22 RX ORDER — SODIUM CHLORIDE 0.9 % (FLUSH) 0.9 %
5-40 SYRINGE (ML) INJECTION PRN
Status: DISCONTINUED | OUTPATIENT
Start: 2022-10-22 | End: 2022-10-29 | Stop reason: HOSPADM

## 2022-10-22 RX ORDER — ONDANSETRON 4 MG/1
4 TABLET, ORALLY DISINTEGRATING ORAL EVERY 8 HOURS PRN
Status: DISCONTINUED | OUTPATIENT
Start: 2022-10-22 | End: 2022-10-29 | Stop reason: HOSPADM

## 2022-10-22 RX ORDER — LACTULOSE 10 G/15ML
20 SOLUTION ORAL 4 TIMES DAILY
Status: DISCONTINUED | OUTPATIENT
Start: 2022-10-22 | End: 2022-10-25

## 2022-10-22 RX ORDER — ONDANSETRON 2 MG/ML
4 INJECTION INTRAMUSCULAR; INTRAVENOUS EVERY 6 HOURS PRN
Status: DISCONTINUED | OUTPATIENT
Start: 2022-10-22 | End: 2022-10-29 | Stop reason: HOSPADM

## 2022-10-22 RX ORDER — SODIUM CHLORIDE 0.9 % (FLUSH) 0.9 %
5-40 SYRINGE (ML) INJECTION EVERY 12 HOURS SCHEDULED
Status: DISCONTINUED | OUTPATIENT
Start: 2022-10-22 | End: 2022-10-29 | Stop reason: HOSPADM

## 2022-10-22 RX ORDER — SPIRONOLACTONE 25 MG/1
100 TABLET ORAL DAILY
Status: DISCONTINUED | OUTPATIENT
Start: 2022-10-22 | End: 2022-10-29 | Stop reason: HOSPADM

## 2022-10-22 RX ORDER — NADOLOL 40 MG/1
40 TABLET ORAL DAILY
Status: DISCONTINUED | OUTPATIENT
Start: 2022-10-22 | End: 2022-10-29 | Stop reason: HOSPADM

## 2022-10-22 RX ORDER — SODIUM CHLORIDE 9 MG/ML
INJECTION, SOLUTION INTRAVENOUS PRN
Status: DISCONTINUED | OUTPATIENT
Start: 2022-10-22 | End: 2022-10-29 | Stop reason: HOSPADM

## 2022-10-22 RX ORDER — FUROSEMIDE 20 MG/1
20 TABLET ORAL DAILY
Status: DISCONTINUED | OUTPATIENT
Start: 2022-10-22 | End: 2022-10-29 | Stop reason: HOSPADM

## 2022-10-22 RX ORDER — PANTOPRAZOLE SODIUM 40 MG/1
40 TABLET, DELAYED RELEASE ORAL
Status: DISCONTINUED | OUTPATIENT
Start: 2022-10-22 | End: 2022-10-29 | Stop reason: HOSPADM

## 2022-10-22 RX ORDER — MIDODRINE HYDROCHLORIDE 5 MG/1
5 TABLET ORAL 3 TIMES DAILY PRN
Status: DISCONTINUED | OUTPATIENT
Start: 2022-10-22 | End: 2022-10-29 | Stop reason: HOSPADM

## 2022-10-22 RX ORDER — OXYCODONE HYDROCHLORIDE AND ACETAMINOPHEN 5; 325 MG/1; MG/1
1 TABLET ORAL ONCE
Status: COMPLETED | OUTPATIENT
Start: 2022-10-22 | End: 2022-10-22

## 2022-10-22 RX ORDER — ALBUMIN (HUMAN) 12.5 G/50ML
25 SOLUTION INTRAVENOUS ONCE
Status: COMPLETED | OUTPATIENT
Start: 2022-10-22 | End: 2022-10-22

## 2022-10-22 RX ORDER — ZOLPIDEM TARTRATE 5 MG/1
5 TABLET ORAL NIGHTLY PRN
Status: DISCONTINUED | OUTPATIENT
Start: 2022-10-22 | End: 2022-10-29 | Stop reason: HOSPADM

## 2022-10-22 RX ADMIN — NADOLOL 40 MG: 40 TABLET ORAL at 09:34

## 2022-10-22 RX ADMIN — PIPERACILLIN AND TAZOBACTAM 3375 MG: 3; .375 INJECTION, POWDER, FOR SOLUTION INTRAVENOUS at 11:47

## 2022-10-22 RX ADMIN — SPIRONOLACTONE 100 MG: 25 TABLET ORAL at 09:34

## 2022-10-22 RX ADMIN — LACTULOSE 20 G: 20 SOLUTION ORAL at 17:01

## 2022-10-22 RX ADMIN — LACTULOSE 20 G: 20 SOLUTION ORAL at 20:49

## 2022-10-22 RX ADMIN — RIFAXIMIN 550 MG: 550 TABLET ORAL at 01:29

## 2022-10-22 RX ADMIN — ZOLPIDEM TARTRATE 5 MG: 5 TABLET ORAL at 22:44

## 2022-10-22 RX ADMIN — PIPERACILLIN AND TAZOBACTAM 3375 MG: 3; .375 INJECTION, POWDER, FOR SOLUTION INTRAVENOUS at 23:49

## 2022-10-22 RX ADMIN — LACTULOSE 20 G: 20 SOLUTION ORAL at 14:00

## 2022-10-22 RX ADMIN — RIFAXIMIN 550 MG: 550 TABLET ORAL at 11:15

## 2022-10-22 RX ADMIN — PIPERACILLIN AND TAZOBACTAM 3375 MG: 3; .375 INJECTION, POWDER, FOR SOLUTION INTRAVENOUS at 01:34

## 2022-10-22 RX ADMIN — Medication 10 ML: at 09:34

## 2022-10-22 RX ADMIN — OXYCODONE AND ACETAMINOPHEN 1 TABLET: 5; 325 TABLET ORAL at 22:44

## 2022-10-22 RX ADMIN — RIFAXIMIN 550 MG: 550 TABLET ORAL at 20:49

## 2022-10-22 RX ADMIN — PANTOPRAZOLE SODIUM 40 MG: 40 TABLET, DELAYED RELEASE ORAL at 06:19

## 2022-10-22 RX ADMIN — ALBUMIN (HUMAN) 25 G: 0.25 INJECTION, SOLUTION INTRAVENOUS at 10:43

## 2022-10-22 RX ADMIN — PIPERACILLIN AND TAZOBACTAM 3375 MG: 3; .375 INJECTION, POWDER, FOR SOLUTION INTRAVENOUS at 16:03

## 2022-10-22 RX ADMIN — Medication 10 ML: at 20:49

## 2022-10-22 RX ADMIN — LACTULOSE 20 G: 20 SOLUTION ORAL at 09:33

## 2022-10-22 RX ADMIN — FUROSEMIDE 20 MG: 20 TABLET ORAL at 09:33

## 2022-10-22 ASSESSMENT — PAIN SCALES - GENERAL
PAINLEVEL_OUTOF10: 8
PAINLEVEL_OUTOF10: 0
PAINLEVEL_OUTOF10: 3
PAINLEVEL_OUTOF10: 0
PAINLEVEL_OUTOF10: 0
PAINLEVEL_OUTOF10: 8

## 2022-10-22 NOTE — CONSULTS
[address] [facility]  GI Consult Note    Patient: Bello Méndez  : 1975  Acct#:      Date:  10/22/2022    Subjective:       History of Present Illness    Patient is a 40 y.o.  female admitted with Hepatic encephalopathy [K76.82]  Brought to er yesterday for confusion; was just d/c a few days ago s/p hepatic enceph episode when wasn't taking lactulose as prescribed at home; in hospital resumed lactulose and sx resolved. This am she was awake, oriented, conversant, and at baseline mental status upon my interview. 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 She last drank alcohol in 2022. Her cirrhosis is decompensated by ascites which has been managed per Renal due to recent EZIO. History of hepatic encephalopathy on xifaxan. Has been prescribed lactulose, prior not compliant but now states she was taking. She did have bleeding esophageal varices which were banded in 2021. Last EGD was 22 with nonbleeding large esophageal varix which was then banded. She is on nadolol. 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 She requires frequent blood transfusions. She was referred to  for liver transplant eval.  States she never did make an appointment because insurance will not cover any of it and she has no way of paying for it. Also has no one to stay with her after transplant/lack of social support.          Past Medical History        Past Medical History:   Diagnosis Date    Alcoholic liver disease ([facility] [address]      Anemia      History of blood transfusion           Past Surgical History         Past Surgical History:   Procedure Laterality Date    COLONOSCOPY N/A 3/11/2021     COLONOSCOPY POLYPECTOMY SNARE/COLD BIOPSY performed by Benjamin Acevedo MD at [address]    CT BONE MARROW BIOPSY   2022     CT BONE MARROW BIOPSY 2022 Susan Hunter MD [facility] CT SCAN    KNEE ARTHROPLASTY        TUBAL LIGATION         ESSURE    UPPER GASTROINTESTINAL ENDOSCOPY N/A 01/30/2021     EGD DIAGNOSTIC ONLY performed by Allison Bocanegra MD at [address] [place] [address] N/A 03/10/2021     EGD BIOPSY performed by Paty Boothe MD at [address] [place] [address] 6/27/2021     EGD BAND LIGATION performed by Narda Connelly MD at [address] [facility] N/A 4/27/2022     EGD DIAGNOSTIC ONLY performed by Deyanira Barfield MD at [address] [place] [address] N/A 7/1/2022     EGD ESOPHAGOGASTRODUODENOSCOPY ULTRASOUND performed by Balta Kennedy MD at [address] [facility] N/A 7/1/2022     EGD BAND LIGATION performed by Balta Kennedy MD at [address]         Past Endoscopic History  EGD and EUS with Dr Cruzito Cochran 7/1/22  Postoperative Diagnosis: #1 large varix noted in the esophagus banded  #2 large gallstones and gall  #3 mildly dilated common bile duct at 7 mm        Colonoscopy 3/2021 with Dr Aly for anemia     Findings:   Two small polyps, removed with cold snare. s/p 3 clips total. Patchy diverticulosis (right and left colon). Hemorrhoids. Plans:  Await pathology. Recall colonoscopy in 5 years. Admission Meds  No current facility-administered medications on file prior to encounter. Current Outpatient Medications on File Prior to Encounter   Medication Sig Dispense Refill    lactulose (CHRONULAC) 10 GM/15ML solution Take 30 mLs by mouth every 3 hours (Patient not taking: Reported on 10/12/2022) 237 mL 1    furosemide (LASIX) 20 MG tablet Take 1 tablet by mouth in the morning. 60 tablet 3    LORazepam (ATIVAN) 0.5 MG tablet Take 0.5 mg by mouth every 8 hours as needed for Anxiety. ondansetron (ZOFRAN) 8 MG tablet Take 1 tablet by mouth        zolpidem (AMBIEN) 5 MG tablet Take 1 tablet by mouth.         [DISCONTINUED] thiamine 100 MG tablet Take 1 tablet by mouth daily (Patient not taking: Reported on 8/25/2022) 30 tablet 3    nadolol (CORGARD) 40 MG tablet Take 1 tablet by mouth daily 30 tablet 0    spironolactone (ALDACTONE) 100 MG tablet Take 1 tablet by mouth daily 30 tablet 1    magnesium oxide (MAG-OX) 400 (240 Mg) MG tablet Take 1 tablet by mouth 2 times daily 60 tablet 0    pantoprazole (PROTONIX) 40 MG tablet Take 1 tablet by mouth every morning (before breakfast) 30 tablet 1    thiamine mononitrate (THIAMINE) 100 MG tablet Take 1 tablet by mouth daily 30 tablet 0         Allergies       Allergies   Allergen Reactions    Oxycodone Nausea And Vomiting      Social   Social History           Tobacco Use    Smoking status: Former    Smokeless tobacco: Never   Substance Use Topics    Alcohol use: Not Currently         Family History         Family History   Problem Relation Age of Onset    Alcohol Abuse Father                  Review of Systems    Constitutional: negative  Respiratory: negative  Cardiovascular: negative  Gastrointestinal: no bleeding. Musculoskeletal:negative  Neurological: as above         Physical Exam  Blood pressure (!) 93/51, pulse 73, temperature 98.3 °F (36.8 °C), temperature source Oral, resp. rate 18, height 5' 4\" (1.626 m), weight 104 lb 9.6 oz (47.4 kg), SpO2 98 %, not currently breastfeeding. General appearance: alert, cooperative, jaundiced. Skin jaundiced  Head: Normocephalic, without obvious abnormality  Lungs: clear to auscultation bilaterally  Heart: regular rate and rhythm  Abdomen: soft, non-tender, non-distended, Bowel sounds normal.    Extremities: no edema  Skin: warm and dry  Neuro: alert and oriented, only trace asterixis.       Data Review:    Recent Labs     10/21/22  1829   WBC 3.3*   HGB 8.0*   HCT 23.3*   MCV 96.8   PLT 61*     Recent Labs     10/21/22  1829      K 4.7      CO2 26   BUN 40*   CREATININE 1.1     Recent Labs     10/21/22  1829   AST 80*   ALT 41*   BILIDIR 4.9* BILITOT 18.5*   ALKPHOS 230*     No results for input(s): LIPASE, AMYLASE in the last 72 hours. Recent Labs     10/21/22  1829   PROTIME 27.1*   INR 2.50*     No results for input(s): PTT in the last 72 hours. No results for input(s): OCCULTBLD in the last 72 hours. Assessment / Plan :    Hepatic encephalopathy - recent dc, now readmit. However 10/22/22 my evaluation mental status at baseline, only trace asterixis. No bleeding. Need r/o infection. Rec  F/u urine culture (pending) per primary   u/s eval ascities/paracentesis if present   Cxr findings per primary team.  Home xifaxan  increase lactulose. Cirrhosis - due to alcohol abuse. No alcohol since April 2022. Has been referred to  liver transplant center but patient states never made an appointment as insurance does not cover any of the cost of transplant or her antirejection meds. Also states no social support. lft near baseline from cirrhosis and prbc transfusions. Rec  D/c mrcp  Unfortunately not transplant option per notes. History of esophageal varices  - EGD 7/2022 with large esophageal varix treated with band ligation. No GI bleeding currently. Rec:  Outpt surveillance egd dr walsh     Acute on chronic anemia -  from spur cell anemia. heme following. Hemoglobin incremented from 6.1-7.9 with 1 unit PRBC here. Inr at baseline. Rec  Prbc prn  F/u hematology     Hx ascities:  Rec  2 gm na diet  U/s eval  Diuretics managed by renal     Social: readmit within same week. Rec  Social work consult, ? Ecf placement, ?  Hospice consideration          Anamaria Fisher MD , MD  [facility] [place] [address]

## 2022-10-22 NOTE — H&P
[facility] MEDICINE  HISTORY & PHYSICAL        Date of Admission: 10/21/2022  MRN: [id number]  Date of Service: 10/21/22       CHIEF COMPLAINT:    Chief Complaint   Patient presents with    Altered Mental Status     Pt brought in by [facility] EMT from home. Pt unable to speak, per emt, daughter reports AMS started 2 days ago and progressed. Pt jaundice and has HX of liver disease. HISTORY OF PRESENT ILLNESS:     Cisco Silverio is a 40 y.o. female with a PMHx listed below who presented to the ED for evaluation of altered mental status. Pt just discharged on 10/18/22 after treatment for acute hepatic encephalopathy. While in the ER, her daughter explained that she started becoming altered nearly after she left. Her confusion has worsened since then. She was reportedly found wandering outside when EMS was called by bystanders. In the ED:   - BP 100s/60s, O2 98% on RA  - ammonia 141, Tbili 18.5, ALT 41, AST 80, Hgb 8, PLT 61  - CT head negative  - CXR possible opacity to LLL    On my evaluation:   - keeps repeating, \"I'm ok, I'm fine, thanks. \"  No further hx.   - daughter no longer at bedside  - responds minimally to voice before falling asleep        PAST MEDICAL HX:  Past Medical History:   Diagnosis Date    Alcoholic liver disease ([facility] [address]     Anemia     History of blood transfusion        SURGICAL HX:  Past Surgical History:   Procedure Laterality Date    COLONOSCOPY N/A 3/11/2021    COLONOSCOPY POLYPECTOMY SNARE/COLD BIOPSY performed by Addy Aponte MD at [address] [place] [address]    CT BONE MARROW BIOPSY  7/5/2022    CT BONE MARROW BIOPSY 7/5/2022 Tasha Hay MD [facility] CT SCAN    KNEE ARTHROPLASTY      TUBAL LIGATION      ESSURE    UPPER GASTROINTESTINAL ENDOSCOPY N/A 01/30/2021    EGD DIAGNOSTIC ONLY performed by Anselmo Lozano MD at [facility] N/A 03/10/2021    EGD BIOPSY performed by Addy Aponte MD at [address] [place] [address] 6/27/2021    EGD BAND LIGATION performed by Jami Valdez MD at [address] [place] [address] ENDOSCOPY N/A 4/27/2022    EGD DIAGNOSTIC ONLY performed by Kanu Alba MD at [address] [place] [address] ENDOSCOPY N/A 7/1/2022    EGD ESOPHAGOGASTRODUODENOSCOPY ULTRASOUND performed by Raf Lagunas MD at [address] [place] [address] ENDOSCOPY N/A 7/1/2022    EGD BAND LIGATION performed by Raf Lagunas MD at [address] [facility] HX:  Family History   Problem Relation Age of Onset    Alcohol Abuse Father        SOCIAL HX:  Social History     Tobacco Use    Smoking status: Former    Smokeless tobacco: Never   Vaping Use    Vaping Use: Never used   Substance Use Topics    Alcohol use: Not Currently    Drug use: Never        ALLERGIES:  Allergies   Allergen Reactions    Oxycodone Nausea And Vomiting         MEDICATIONS:     Current Facility-Administered Medications:     lactulose (CHRONULAC) 10 GM/15ML solution 30 g, 30 g, Oral, Once, Sandy ADLER Pendl, DO    Current Outpatient Medications:     gabapentin (NEURONTIN) 100 MG capsule, Take 100 mg by mouth as needed. , Disp: , Rfl:     lactulose (CHRONULAC) 10 GM/15ML solution, Take 30 mLs by mouth 4 times daily Titrate for 3 bowel movements a day., Disp: 237 mL, Rfl: 1    rifAXIMin (XIFAXAN) 550 MG tablet, Take 1 tablet by mouth 2 times daily, Disp: 60 tablet, Rfl: 1    furosemide (LASIX) 20 MG tablet, Take 1 tablet by mouth in the morning., Disp: 60 tablet, Rfl: 3    LORazepam (ATIVAN) 0.5 MG tablet, Take 0.5 mg by mouth every 8 hours as needed for Anxiety. , Disp: , Rfl:     ondansetron (ZOFRAN) 8 MG tablet, Take 1 tablet by mouth, Disp: , Rfl:     zolpidem (AMBIEN) 5 MG tablet, Take 1 tablet by mouth., Disp: , Rfl:     nadolol (CORGARD) 40 MG tablet, Take 1 tablet by mouth daily, Disp: 30 tablet, Rfl: 0    spironolactone (ALDACTONE) 100 MG tablet, Take 1 tablet by mouth daily, Disp: 30 tablet, Rfl: 1 pantoprazole (PROTONIX) 40 MG tablet, Take 1 tablet by mouth every morning (before breakfast), Disp: 30 tablet, Rfl: 1    thiamine mononitrate (THIAMINE) 100 MG tablet, Take 1 tablet by mouth daily, Disp: 30 tablet, Rfl: 0     REVIEW OF SYSTEMS:   Review of Systems   Unable to perform ROS: Acuity of condition     PHYSICAL EXAM:   /83   Pulse 77   Resp 16   SpO2 97%     Physical Exam  Vitals reviewed. Constitutional:       General: She is not in acute distress. Appearance: Normal appearance. She is well-developed. She is ill-appearing. She is not diaphoretic. HENT:      Head: Normocephalic and atraumatic. Mouth/Throat:      Mouth: Mucous membranes are moist.   Eyes:      Extraocular Movements: Extraocular movements intact. Pupils: Pupils are equal, round, and reactive to light. Cardiovascular:      Rate and Rhythm: Normal rate and regular rhythm. Pulses: Normal pulses. Heart sounds: Normal heart sounds. No murmur heard. No friction rub. No gallop. Pulmonary:      Effort: Pulmonary effort is normal. No respiratory distress. Breath sounds: Normal breath sounds. No wheezing or rales. Chest:      Chest wall: No tenderness. Abdominal:      General: Bowel sounds are normal. There is no distension. Palpations: There is no mass. Tenderness: There is abdominal tenderness. There is no guarding. Comments: RUQ tenderness, gutierrez sign+   Genitourinary:     Comments: Urinary bates catheter in place, draining serosanguinous appearing urine  Musculoskeletal:         General: No tenderness or deformity. Cervical back: Normal range of motion and neck supple. Right lower leg: No edema. Left lower leg: No edema. Skin:     General: Skin is warm and dry. Capillary Refill: Capillary refill takes less than 2 seconds. Coloration: Skin is jaundiced. Neurological:      General: No focal deficit present. Mental Status: She is alert.  She is disoriented. Comments: lethargic   Psychiatric:      Comments: Somnolent         LABS / IMAGING:     Recent Labs     10/21/22  1829   WBC 3.3*   HGB 8.0*   HCT 23.3*   PLT 61*     Recent Labs     10/21/22  1829      K 4.7      CO2 26   BUN 40*   CREATININE 1.1   CALCIUM 9.5     Recent Labs     10/21/22  1829   AST 80*   ALT 41*   BILITOT 18.5*   ALKPHOS 230*     Recent Labs     10/21/22  1829   INR 2.50*     No results for input(s): CKTOTAL, TROPONINI in the last 72 hours. CT Head W/O Contrast   Final Result   No acute intracranial findings. XR CHEST PORTABLE   Final Result   1. Technically suboptimal, expiratory phase respiration performed supine. 2. Cannot exclude pneumonia left lower lung. 3.  Follow-up full inspiration PA and lateral chest may be useful for better   characterization of pulmonary findings once the patient's condition allows. ASSESSMENT / PLAN:     Hepatic encephalopathy   Jaundice in setting of alcohol-induced liver cirrhosis   UTI   Transaminitis  Chronic anemia   Thrombocytopenia    Ammonia 141, tbili 18.5 worsening from discharge  PLT 61  UA +LE, +NIT    - check ggt, UDS, and urine cx  - daily LFTs, CBC (monitor PLTs)  - MRCP in AM to r/o stone, GB involvement  - empiric IV Zosyn   - lactulose and rifaximin   - continue lasix, nadolol, protonix, and aldactone  - social work consult       Dispo: Admit to Inpatient med/surg. Expected LOS greater less than 2 midnights.   PPx:     SCDs   PT/OT:    Not indicated   Code status:  Full         Fanta Ruiz MD  Hospitalist

## 2022-10-22 NOTE — ED PROVIDER NOTES
EMERGENCY DEPARTMENT PROVIDER NOTE    Patient Identification  Pt Name: Ramana Ma  MRN: [id number]  Tom 1975  Date of evaluation: 10/21/2022  Provider: Art Palencia DO  PCP: No primary care provider on file. Chief Complaint  Altered Mental Status (Pt brought in by [facility] EMT from home. Pt unable to speak, per emt, daughter reports AMS started 2 days ago and progressed. Pt jaundice and has HX of liver disease. )      HPI  (History provided by patient, EMS, daughter)  This is a 40 y.o. female with pertinent past medical history of alcoholic liver cirrhosis who was brought in by EMS transportation for altered mental status. Patient arrives encephalopathic and is unable to contribute much meaningfully to history, will only repeat her name to most questions, although she denies any pain. Further history is obtained from patient's daughter, patient discharged from this facility on 10/18/2022 after admission for hepatic encephalopathy, daughter reports the patient still seemed confused at that time. Over the past 2 days the confusion has steadily worsened. Patient was found wandering outside her home by bystanders and EMS called. Daughter states the patient has no transportation and no money and therefore she does not believe she has had access to alcohol. Patient has been unable to take her lactulose at home due to her confusion. ROS    Unable to obtain due to encephalopathy.     I have reviewed the following nursing documentation:  Allergies: Oxycodone    Past medical history:   Past Medical History:   Diagnosis Date    Alcoholic liver disease ([facility] [address]     Anemia     History of blood transfusion      Past surgical history:   Past Surgical History:   Procedure Laterality Date    COLONOSCOPY N/A 3/11/2021    COLONOSCOPY POLYPECTOMY SNARE/COLD BIOPSY performed by Eduard Tran MD at [address] [place] [address]    CT BONE MARROW BIOPSY  7/5/2022    CT BONE MARROW BIOPSY 7/5/2022 Kris Wright MD [facility] CT SCAN KNEE ARTHROPLASTY      TUBAL LIGATION      ESSURE    UPPER GASTROINTESTINAL ENDOSCOPY N/A 01/30/2021    EGD DIAGNOSTIC ONLY performed by Orville Epley, MD at [address] [place] [address] N/A 03/10/2021    EGD BIOPSY performed by Jcarlos Rodriguez MD at [address] [place] [address] 6/27/2021    EGD BAND LIGATION performed by Jesse Amaya MD at [address] [place] [address] N/A 4/27/2022    EGD DIAGNOSTIC ONLY performed by Lakshmi Miranda MD at [address] [place] [address] N/A 7/1/2022    EGD ESOPHAGOGASTRODUODENOSCOPY ULTRASOUND performed by Makayla Figueroa MD at [address] [place] [address] N/A 7/1/2022    EGD BAND LIGATION performed by Makayla Figueroa MD at [facility] medications:   Previous Medications    FUROSEMIDE (LASIX) 20 MG TABLET    Take 1 tablet by mouth in the morning. GABAPENTIN (NEURONTIN) 100 MG CAPSULE    Take 100 mg by mouth as needed. LACTULOSE (CHRONULAC) 10 GM/15ML SOLUTION    Take 30 mLs by mouth 4 times daily Titrate for 3 bowel movements a day. LORAZEPAM (ATIVAN) 0.5 MG TABLET    Take 0.5 mg by mouth every 8 hours as needed for Anxiety. NADOLOL (CORGARD) 40 MG TABLET    Take 1 tablet by mouth daily    ONDANSETRON (ZOFRAN) 8 MG TABLET    Take 1 tablet by mouth    PANTOPRAZOLE (PROTONIX) 40 MG TABLET    Take 1 tablet by mouth every morning (before breakfast)    RIFAXIMIN (XIFAXAN) 550 MG TABLET    Take 1 tablet by mouth 2 times daily    SPIRONOLACTONE (ALDACTONE) 100 MG TABLET    Take 1 tablet by mouth daily    THIAMINE MONONITRATE (THIAMINE) 100 MG TABLET    Take 1 tablet by mouth daily    ZOLPIDEM (AMBIEN) 5 MG TABLET    Take 1 tablet by mouth. Social history:  reports that she has quit smoking. She has never used smokeless tobacco. She reports that she does not currently use alcohol. She reports that she does not use drugs.     Family history:    Family History   Problem Relation Age of Onset    Alcohol Abuse Father          Exam  ED Triage Vitals [10/21/22 1811]   BP Temp Temp src Heart Rate Resp SpO2 Height Weight   (!) 102/47 -- -- 67 16 100 % -- --     Nursing note and vitals reviewed. Constitutional: Thin. Ill-appearing. HENT:      Head: Normocephalic and atraumatic. Ears: External ears normal.      Nose: Nose normal.     Mouth: Membrane mucosa tacky and pink. Eyes: Sclera icteric. No discharge. PERRL, no nystagmus. Neck: Supple. Trachea midline. No meningismus. Cardiovascular: RRR; no murmurs, rubs, or gallops. Pulmonary/Chest: Effort normal. No respiratory distress. CTAB. No stridor. No wheezes. No rales. Abdominal: Soft. No distension. No tenderness elicited with deep palpation all quadrants. Musculoskeletal: Moves all extremities. No gross deformity. Neurological: Drowsy, opens eyes to voice and light touch. Oriented to person, able to states she is in the hospital after repeated prompting. Answers most questions by again repeating her name. . Face symmetric. Speech is clear. Briskly withdraws all extremities from pressure and moves her extremities spontaneously. Gait not tested. Skin: Warm and dry. No rash. Pronounced jaundice. Procedures      EKG    EKG was reviewed by emergency department physician in the absence of a cardiologist    Narrow complex sinus rhythm, rate 69, normal axis, normal WV and QRS intervals, normal Qtc, no specific ST elevations or depressions, normal t-wave morphology, impression NSR, no STEMI, interpretation limited by baseline artifact unable to resolve with repeated attempt      Radiology  CT Head W/O Contrast   Final Result   No acute intracranial findings. XR CHEST PORTABLE   Final Result   1. Technically suboptimal, expiratory phase respiration performed supine. 2. Cannot exclude pneumonia left lower lung.    3.  Follow-up full inspiration PA and lateral chest may be useful for better   characterization of pulmonary findings once the patient's condition allows.              Labs  Results for orders placed or performed during the hospital encounter of 10/21/22   CBC with Auto Differential   Result Value Ref Range    WBC 3.3 (L) 4.0 - 11.0 K/uL    RBC 2.41 (L) 4.00 - 5.20 M/uL    Hemoglobin 8.0 (L) 12.0 - 16.0 g/dL    Hematocrit 23.3 (L) 36.0 - 48.0 %    MCV 96.8 80.0 - 100.0 fL    MCH 33.4 26.0 - 34.0 pg    MCHC 34.5 31.0 - 36.0 g/dL    RDW 23.2 (H) 12.4 - 15.4 %    Platelets 61 (L) 566 - 450 K/uL    MPV 9.7 5.0 - 10.5 fL    Neutrophils % 56.0 %    Lymphocytes % 24.5 %    Monocytes % 15.2 %    Eosinophils % 3.5 %    Basophils % 0.8 %    Neutrophils Absolute 1.9 1.7 - 7.7 K/uL    Lymphocytes Absolute 0.8 (L) 1.0 - 5.1 K/uL    Monocytes Absolute 0.5 0.0 - 1.3 K/uL    Eosinophils Absolute 0.1 0.0 - 0.6 K/uL    Basophils Absolute 0.0 0.0 - 0.2 K/uL   CMP w/ Reflex to MG   Result Value Ref Range    Sodium 143 136 - 145 mmol/L    Potassium reflex Magnesium 4.7 3.5 - 5.1 mmol/L    Chloride 106 99 - 110 mmol/L    CO2 26 21 - 32 mmol/L    Anion Gap 11 3 - 16    Glucose 163 (H) 70 - 99 mg/dL    BUN 40 (H) 7 - 20 mg/dL    Creatinine 1.1 0.6 - 1.1 mg/dL    Est, Glom Filt Rate >60 >60    Calcium 9.5 8.3 - 10.6 mg/dL    Total Protein 6.0 (L) 6.4 - 8.2 g/dL    Albumin 3.4 3.4 - 5.0 g/dL    Albumin/Globulin Ratio 1.3 1.1 - 2.2    Total Bilirubin 18.5 (H) 0.0 - 1.0 mg/dL    Alkaline Phosphatase 230 (H) 40 - 129 U/L    ALT 41 (H) 10 - 40 U/L    AST 80 (H) 15 - 37 U/L   Protime-INR   Result Value Ref Range    Protime 27.1 (H) 11.7 - 14.5 sec    INR 2.50 (H) 0.87 - 1.14   Ammonia   Result Value Ref Range    Ammonia 141 (HH) 11 - 51 umol/L   Lactate, Sepsis   Result Value Ref Range    Lactic Acid, Sepsis 1.7 0.4 - 1.9 mmol/L   Urinalysis with Reflex to Culture    Specimen: Urine   Result Value Ref Range    Color, UA SHELLEY (A) Straw/Yellow    Clarity, UA Clear Clear    Glucose, Ur Negative Negative mg/dL    Bilirubin Urine MODERATE (A) Negative    Ketones, Urine Negative Negative mg/dL    Specific Gravity, UA 1.020 1.005 - 1.030    Blood, Urine Negative Negative    pH, UA 6.0 5.0 - 8.0    Protein, UA Negative Negative mg/dL    Urobilinogen, Urine 2.0 (A) <2.0 E.U./dL    Nitrite, Urine POSITIVE (A) Negative    Leukocyte Esterase, Urine SMALL (A) Negative    Microscopic Examination YES     Urine Type NotGiven     Urine Reflex to Culture Not Indicated    HCG Qualitative, Serum   Result Value Ref Range    hCG Qual Negative Detects HCG level >10 MIU/mL   Ethanol   Result Value Ref Range    Ethanol Lvl None Detected mg/dL   Microscopic Urinalysis   Result Value Ref Range    RBC, UA 0-2 0 - 4 /HPF    Bacteria, UA Rare (A) None Seen /HPF    Urinalysis Comments see below     Hyaline Casts, UA 2 0 - 8 /LPF    WBC, UA 7 (H) 0 - 5 /HPF    Epithelial Cells, UA 7 (H) 0 - 5 /HPF    Amorphous, UA Present (A) None Seen   EKG 12 Lead   Result Value Ref Range    Ventricular Rate 69 BPM    Atrial Rate 69 BPM    P-R Interval 170 ms    QRS Duration 86 ms    Q-T Interval 454 ms    QTc Calculation (Bazett) 486 ms    P Axis 45 degrees    R Axis 28 degrees    T Axis 47 degrees    Diagnosis       Normal sinus rhythmST & T wave abnormality, consider lateral ischemiaProlonged QTAbnormal ECG       Screenings           Is this patient to be included in the SEP-1 Core Measure due to severe sepsis or septic shock? No   Exclusion criteria - the patient is NOT to be included for SEP-1 Core Measure due to:  May have criteria for sepsis, but does not meet criteria for severe sepsis or septic shock      MDM and ED Course    Patient afebrile however ill-appearing. She appears in no overt painful or respiratory distress. No hypoxia or increased work of breathing. Drowsy, however able to protect her airway, no indication for emergent intubation. No hypoglycemia. EKG no STEMI, no malignant dysrhythmia.   Neuro exam without obvious focal deficits, doubt CVA/TIA, regardless patient would not be an appropriate candidate for consideration of thrombolytic therapy given the time window of symptoms and her underlying coagulopathy due to liver cirrhosis. Abdomen is benign on my exam, doubt SBP. Urinalysis is without evidence of infection, CXR also with potential left basilar pneumonia. Will initiate broad-spectrum antibiotics. CT head without evidence of hemorrhage or mass-effect. Ammonia level returns elevated at 141, suspect patient's confusion is due to hepatic encephalopathy. We will also order lactulose. Discriminant function calculated at 88, I discussed case with Dr. Marilyn Douglas for gastroenterology, given patient's ongoing infection recommends against starting steroids at this time. I held lengthy discussion with patient's daughter regarding plan of care, patient is currently full code however daughter is interested in palliative care consultation. Case discussed with internal medicine team who will admit for further evaluation and care. Patient hemodynamically stable and in no distress at time of admission. I Dr. Wing Moreno am the primary clinician of record. Critical Care Time    Upon my evaluation, this patient had a high probability of imminent or life-threatening deterioration due to acute encephalopathy which required my direct attention, intervention, and personal management. Specialist consultation with gastroenterology was obtained. The total critical care time personally spent while evaluating and treating this patient was 34 minutes exclusive of any time spent doing separately billable procedures. This includes time at the bedside, data interpretation, medication management, monitoring for potential decompensation and physician consultation. Specifics of interventions taken and potentially life-threatening diagnostic considerations are listed above in the medical decision making. Final Impression  1.  Hepatic encephalopathy 2. Alcoholic cirrhosis, unspecified whether ascites present ([facility] [address]    3. Urinary tract infection in female        Blood pressure (!) 102/47, pulse 67, resp. rate 16, SpO2 100 %, not currently breastfeeding. Disposition:  DISPOSITION Decision To Admit 10/21/2022 08:37:42 PM      Patient Referrals:  No follow-up provider specified. Discharge Medications:  New Prescriptions    No medications on file       Discontinued Medications:  Discontinued Medications    No medications on file       This chart was generated using the Trooval dictation system. I created this record but it may contain dictation errors given the limitations of this technology.     Jean Ross DO (electronically signed)  Attending Emergency Physician       Jean Ross DO  10/22/22 2029

## 2022-10-22 NOTE — PROGRESS NOTES
[facility] PROGRESS NOTE    10/22/2022 9:31 AM        Name: Steph Palacio . Admitted: 10/21/2022  Primary Care Provider: No primary care provider on file. (Tel: None)                        Subjective:  . No acute events overnight. Resting well. Pain control. Diet ok. Labs reviewed    Patient still drowsy. Reviewed interval ancillary notes    Current Medications  furosemide (LASIX) tablet 20 mg, Daily  lactulose (CHRONULAC) 10 GM/15ML solution 20 g, 4x Daily  nadolol (CORGARD) tablet 40 mg, Daily  pantoprazole (PROTONIX) tablet 40 mg, QAM AC  rifAXIMin (XIFAXAN) tablet 550 mg, BID  spironolactone (ALDACTONE) tablet 100 mg, Daily  sodium chloride flush 0.9 % injection 5-40 mL, 2 times per day  sodium chloride flush 0.9 % injection 5-40 mL, PRN  0.9 % sodium chloride infusion, PRN  ondansetron (ZOFRAN-ODT) disintegrating tablet 4 mg, Q8H PRN   Or  ondansetron (ZOFRAN) injection 4 mg, Q6H PRN  midodrine (PROAMATINE) tablet 5 mg, TID PRN  piperacillin-tazobactam (ZOSYN) 3,375 mg in dextrose 5 % 50 mL IVPB extended infusion (mini-bag), Q8H        Objective:  BP (!) 93/51   Pulse 73   Temp 98.3 °F (36.8 °C) (Oral)   Resp 18   Ht 5' 4\" (1.626 m)   Wt 104 lb 9.6 oz (47.4 kg)   SpO2 98%   BMI 17.95 kg/m²   No intake or output data in the 24 hours ending 10/22/22 0931   Wt Readings from Last 3 Encounters:   10/22/22 104 lb 9.6 oz (47.4 kg)   10/17/22 105 lb (47.6 kg)   08/26/22 118 lb 8 oz (53.8 kg)       General appearance:  Appears comfortable  Eyes: Sclera clear. Pupils equal.  ENT: Moist oral mucosa. Trachea midline, no adenopathy. Cardiovascular: Regular rhythm, normal S1, S2. No murmur. No edema in lower extremities  Respiratory: Not using accessory muscles. Good inspiratory effort. Clear to auscultation bilaterally, no wheeze or crackles.    GI: Abdomen soft, no tenderness, not distended, normal bowel sounds  Musculoskeletal: No cyanosis in digits, neck supple  Neurology: Limited exam due to patient mental  Psych: Normal affect. A drowsy confused s  kin: Warm, dry, normal turgor    Labs and Tests:  CBC:   Recent Labs     10/21/22  1829   WBC 3.3*   HGB 8.0*   PLT 61*     BMP:    Recent Labs     10/21/22  1829      K 4.7      CO2 26   BUN 40*   CREATININE 1.1   GLUCOSE 163*     Hepatic:   Recent Labs     10/21/22  1829   AST 80*   ALT 41*   BILITOT 18.5*   ALKPHOS 230*       Discussed care with patient             Problem List  Principal Problem:    Hepatic encephalopathy  Resolved Problems:    * No resolved hospital problems. *       Assessment & Plan:   Acute hepatic encephalopathy  0-with ammonia level still high. Likely probably compliance a bigger issue  -New lactulose rifaximin appreciate GI recommendation      Cirrhosis  Acute on chronic anemia follow-up on hemoglobin    Will repeat checks x-ray ol to rule out any pneumonia    Diet: ADULT DIET;  Regular  Code:Full Code  DVT PPX lovenox       Lonnie Sin MD   10/22/2022 9:31 AM

## 2022-10-22 NOTE — PLAN OF CARE
Contacted by GI regarding diagnostic paracentesis to rule out SBP. Advised limited abdominal US to evaluate for the presence of ascites. US images demonstrated trace RUQ peritoneal fluid, deep to the liver parenchyma with no safe percutaneous window for fluid sampling at this time given the overlying liver and bowel.     Radha Pritchett DO  10/22/2022, 12:58 PM  Interventional Radiology  [phone])

## 2022-10-22 NOTE — PROGRESS NOTES
Pt daughter states she knows that pt takes at least two doses of Lactulose daily because she gives it to her. Pt self reports 3 times a day.

## 2022-10-23 LAB
A/G RATIO: 1.2 (ref 1.1–2.2)
ALBUMIN SERPL-MCNC: 3.2 G/DL (ref 3.4–5)
ALP BLD-CCNC: 118 U/L (ref 40–129)
ALT SERPL-CCNC: 34 U/L (ref 10–40)
AMMONIA: 59 UMOL/L (ref 11–51)
ANION GAP SERPL CALCULATED.3IONS-SCNC: 13 MMOL/L (ref 3–16)
AST SERPL-CCNC: 64 U/L (ref 15–37)
BILIRUB SERPL-MCNC: 21.2 MG/DL (ref 0–1)
BUN BLDV-MCNC: 34 MG/DL (ref 7–20)
CALCIUM SERPL-MCNC: 9.7 MG/DL (ref 8.3–10.6)
CHLORIDE BLD-SCNC: 103 MMOL/L (ref 99–110)
CO2: 23 MMOL/L (ref 21–32)
CREAT SERPL-MCNC: <0.5 MG/DL (ref 0.6–1.1)
GFR SERPL CREATININE-BSD FRML MDRD: >60 ML/MIN/{1.73_M2}
GLUCOSE BLD-MCNC: 179 MG/DL (ref 70–99)
HCT VFR BLD CALC: 19.6 % (ref 36–48)
HEMATOLOGY PATH CONSULT: NO
HEMOGLOBIN: 7 G/DL (ref 12–16)
MCH RBC QN AUTO: 34.3 PG (ref 26–34)
MCHC RBC AUTO-ENTMCNC: 35.9 G/DL (ref 31–36)
MCV RBC AUTO: 95.6 FL (ref 80–100)
PDW BLD-RTO: 23.3 % (ref 12.4–15.4)
PLATELET # BLD: 59 K/UL (ref 135–450)
PLATELET SLIDE REVIEW: ABNORMAL
PMV BLD AUTO: 10.3 FL (ref 5–10.5)
POTASSIUM SERPL-SCNC: 4 MMOL/L (ref 3.5–5.1)
RBC # BLD: 2.05 M/UL (ref 4–5.2)
SLIDE REVIEW: ABNORMAL
SODIUM BLD-SCNC: 139 MMOL/L (ref 136–145)
TOTAL PROTEIN: 5.9 G/DL (ref 6.4–8.2)
WBC # BLD: 3.7 K/UL (ref 4–11)

## 2022-10-23 PROCEDURE — 36415 COLL VENOUS BLD VENIPUNCTURE: CPT

## 2022-10-23 PROCEDURE — 2580000003 HC RX 258: Performed by: INTERNAL MEDICINE

## 2022-10-23 PROCEDURE — 2060000000 HC ICU INTERMEDIATE R&B

## 2022-10-23 PROCEDURE — 2580000003 HC RX 258: Performed by: HOSPITALIST

## 2022-10-23 PROCEDURE — 6370000000 HC RX 637 (ALT 250 FOR IP): Performed by: INTERNAL MEDICINE

## 2022-10-23 PROCEDURE — 6360000002 HC RX W HCPCS: Performed by: INTERNAL MEDICINE

## 2022-10-23 PROCEDURE — 80053 COMPREHEN METABOLIC PANEL: CPT

## 2022-10-23 PROCEDURE — 6370000000 HC RX 637 (ALT 250 FOR IP): Performed by: PHYSICIAN ASSISTANT

## 2022-10-23 PROCEDURE — 82140 ASSAY OF AMMONIA: CPT

## 2022-10-23 PROCEDURE — 85027 COMPLETE CBC AUTOMATED: CPT

## 2022-10-23 PROCEDURE — 6360000002 HC RX W HCPCS: Performed by: HOSPITALIST

## 2022-10-23 RX ADMIN — RIFAXIMIN 550 MG: 550 TABLET ORAL at 09:03

## 2022-10-23 RX ADMIN — Medication 10 ML: at 09:07

## 2022-10-23 RX ADMIN — RIFAXIMIN 550 MG: 550 TABLET ORAL at 21:19

## 2022-10-23 RX ADMIN — PANTOPRAZOLE SODIUM 40 MG: 40 TABLET, DELAYED RELEASE ORAL at 06:08

## 2022-10-23 RX ADMIN — SPIRONOLACTONE 100 MG: 25 TABLET ORAL at 09:03

## 2022-10-23 RX ADMIN — NADOLOL 40 MG: 40 TABLET ORAL at 09:03

## 2022-10-23 RX ADMIN — PIPERACILLIN AND TAZOBACTAM 3375 MG: 3; .375 INJECTION, POWDER, FOR SOLUTION INTRAVENOUS at 16:14

## 2022-10-23 RX ADMIN — SODIUM CHLORIDE, PRESERVATIVE FREE 10 ML: 5 INJECTION INTRAVENOUS at 22:54

## 2022-10-23 RX ADMIN — LACTULOSE 20 G: 20 SOLUTION ORAL at 20:23

## 2022-10-23 RX ADMIN — LACTULOSE 20 G: 20 SOLUTION ORAL at 09:03

## 2022-10-23 RX ADMIN — Medication 10 ML: at 20:29

## 2022-10-23 RX ADMIN — ONDANSETRON 4 MG: 2 INJECTION INTRAMUSCULAR; INTRAVENOUS at 22:54

## 2022-10-23 RX ADMIN — PIPERACILLIN AND TAZOBACTAM 3375 MG: 3; .375 INJECTION, POWDER, FOR SOLUTION INTRAVENOUS at 09:07

## 2022-10-23 RX ADMIN — MIDODRINE HYDROCHLORIDE 5 MG: 5 TABLET ORAL at 20:23

## 2022-10-23 RX ADMIN — LACTULOSE 20 G: 20 SOLUTION ORAL at 14:15

## 2022-10-23 RX ADMIN — ZOLPIDEM TARTRATE 5 MG: 5 TABLET ORAL at 21:19

## 2022-10-23 RX ADMIN — LACTULOSE 20 G: 20 SOLUTION ORAL at 16:12

## 2022-10-23 RX ADMIN — FUROSEMIDE 20 MG: 20 TABLET ORAL at 09:03

## 2022-10-23 ASSESSMENT — PAIN SCALES - GENERAL
PAINLEVEL_OUTOF10: 8
PAINLEVEL_OUTOF10: 6
PAINLEVEL_OUTOF10: 3
PAINLEVEL_OUTOF10: 8

## 2022-10-23 NOTE — PROGRESS NOTES
Clinical Pharmacy Note: Pharmacy to Dose Vancomycin    Jolene Bowie is a 40 y.o. female started on Vancomycin for UTI; consult received from Dr. Sierra Zamora to manage therapy. Also receiving the following antibiotics: none. Goal AUC: 400-600 mg/L*hr  Goal Trough Level: 10-15 mcg/mL    Assessment/Plan:  A 0 mg loading dose was given on 0 at 0  Initiate vancomycin 1000 mg IV every 12 hours. Bayesian modeling predicts an AUC of 487 mg/L*hr and a trough of 13.2 mcg/mL at steady state concentration. A vancomycin random level has been ordered for 10/24 at 0600  Changes in regimen will be determined based on culture results, renal function, and clinical response. Pharmacy will continue to monitor and adjust regimen as necessary. Allergies:  Oxycodone     Recent Labs     10/21/22  1829 10/23/22  0903   CREATININE 1.1 <0.5*       Recent Labs     10/21/22  1829 10/23/22  0903   WBC 3.3* 3.7*       Ht Readings from Last 1 Encounters:   10/22/22 5' 4\" (1.626 m)        Wt Readings from Last 1 Encounters:   10/22/22 104 lb 9.6 oz (47.4 kg)         Estimated Creatinine Clearance: 106 mL/min (based on SCr of 0.5 mg/dL).       Thank you for the consult,    Josh Bower [facility]

## 2022-10-23 NOTE — PROGRESS NOTES
inspiratory effort. Clear to auscultation bilaterally, no wheeze or crackles. GI: Abdomen soft, no tenderness, not distended, normal bowel sounds  Musculoskeletal: No cyanosis in digits, neck supple  Neurology: CN 2-12 grossly intact. No speech or motor deficits  Psych: Normal affect. Alert and oriented in time, place and person  Skin: Warm, dry, normal turgor    Labs and Tests:  CBC:   Recent Labs     10/21/22  1829 10/23/22  0903   WBC 3.3* 3.7*   HGB 8.0* 7.0*   PLT 61* 59*     BMP:    Recent Labs     10/21/22  1829 10/23/22  0903    139   K 4.7 4.0    103   CO2 26 23   BUN 40* 34*   CREATININE 1.1 <0.5*   GLUCOSE 163* 179*     Hepatic:   Recent Labs     10/21/22  1829 10/23/22  0903   AST 80* 64*   ALT 41* 34   BILITOT 18.5* 21.2*   ALKPHOS 230* 118       Discussed care with patient             Problem List  Principal Problem:    Hepatic encephalopathy  Resolved Problems:    * No resolved hospital problems. *       Assessment & Plan:   Hepatic encephalopathy  = In patient with history of cirrhosis  -Improving ammonia level is trending down continue lactulose appreciate GI recommendation  -Has been followed at [facility]. Enterococcus faecalis UTI  -We will adjust antibiotic s started vancomycin    Acute on chronic anemia  -Hemoglobin is at 7 trended down from 8 continue to monitor transfuse if drops below 7    History of o ascites. Under ultrasound did not show enough fluid for paracentesis          Diet: ADULT DIET;  Regular; Low Fat/Low Chol/High Fiber/2 gm Na  Code:Full Code  DVT PPX lovenox       Drew Coronado MD   10/23/2022 3:57 PM

## 2022-10-23 NOTE — PROGRESS NOTES
[facility] [address] GI  Gastroenterology Progress Note    Edgardo Avendano is a 40 y.o. female patient. 1. Hepatic encephalopathy    2. Alcoholic cirrhosis, unspecified whether ascites present ([address]    3. Urinary tract infection in female        SUBJECTIVE:  alert and oriented and conversant today, at baseline, no encephalopathy. ROS:  Cardiovascular ROS: no chest  pain  Gastrointestinal ROS: see above  Respiratory ROS: no sob    Physical    VITALS:  /60   Pulse 57   Temp 98.2 °F (36.8 °C) (Oral)   Resp 18   Ht 5' 4\" (1.626 m)   Wt 104 lb 9.6 oz (47.4 kg)   SpO2 95%   BMI 17.95 kg/m²   TEMPERATURE:  Current - Temp: 98.2 °F (36.8 °C); Max - Temp  Av.2 °F (36.8 °C)  Min: 97.8 °F (36.6 °C)  Max: 98.5 °F (36.9 °C)    NAD  Regular rate  Lungs CTA Bilaterally  Abdomen soft, ND, NT, Bowel sounds normal  Alert and oriented, No asterixis today  Skin jaundiced    Data    CBC:   Lab Results   Component Value Date/Time    WBC 3.7 10/23/2022 09:03 AM    RBC 2.05 10/23/2022 09:03 AM    HGB 7.0 10/23/2022 09:03 AM    HCT 19.6 10/23/2022 09:03 AM    MCV 95.6 10/23/2022 09:03 AM    MCH 34.3 10/23/2022 09:03 AM    MCHC 35.9 10/23/2022 09:03 AM    RDW 23.3 10/23/2022 09:03 AM    PLT 59 10/23/2022 09:03 AM    MPV 10.3 10/23/2022 09:03 AM     Hepatic Function Panel:    Lab Results   Component Value Date/Time    ALKPHOS 118 10/23/2022 09:03 AM    ALT 34 10/23/2022 09:03 AM    AST 64 10/23/2022 09:03 AM    PROT 5.9 10/23/2022 09:03 AM    BILITOT 21.2 10/23/2022 09:03 AM    BILIDIR 4.9 10/21/2022 06:29 PM    IBILI 13.6 10/21/2022 06:29 PM           ASSESSMENT / PLAN :    Hepatic encephalopathy - recent dc, now readmit. However 10/22/22 my evaluation mental status at baseline, only trace asterixis. No bleeding. U/s not enough ascities for paracentesis: 10/23/22 on lactulose and xifaxan and no enceph, no asterixis, nh3 near nl.    Rec  F/u urine culture (pending) per primary   Cxr findings per primary team.  Home xifaxan  Lactulose titrated to 3-4 bm/day; last 2 admits with increase lactulose enceph has resolved, ? Home compliance, agree ecf (per pt looking at ecf's tomorrow with social work). Cirrhosis - due to alcohol abuse. No alcohol since April 2022. Has been referred to  liver transplant center but patient states never made an appointment as insurance does not cover any of the cost of transplant or her antirejection meds. Also states no social support. lft near baseline from cirrhosis and prbc transfusions. Rec  Unfortunately not transplant option per notes. History of esophageal varices  - EGD 7/2022 with large esophageal varix treated with band ligation. No GI bleeding currently. Rec:  Outpt surveillance egd with dr walsh     Acute on chronic anemia -  from spur cell anemia. heme following. Hemoglobin incremented from 6.1-7.9 with 1 unit PRBC here. admit Inr at baseline. Rec  Follow [facility] per primary team.  Prbc prn  F/u hematology      Hx ascities: u/s minimal ascitiies/not enough for paracentesis. Rec  2 gm na diet  Diuretics managed by renal     Social: readmit within same week. Rec  Social work consult, per pt agreable to ecf to help manage lactulose and mutliple med issues. At this point hepatic enceph resolved, will sign off, call if needed.            Nikki Guzman MD , MD  [facility] Gastroenterology and [address]

## 2022-10-24 ENCOUNTER — APPOINTMENT (OUTPATIENT)
Dept: CT IMAGING | Age: 47
DRG: 463 | End: 2022-10-24
Payer: COMMERCIAL

## 2022-10-24 PROBLEM — K70.30 ALCOHOLIC CIRRHOSIS (HCC): Status: ACTIVE | Noted: 2022-10-24

## 2022-10-24 LAB
ABO/RH: NORMAL
ANION GAP SERPL CALCULATED.3IONS-SCNC: 12 MMOL/L (ref 3–16)
ANISOCYTOSIS: ABNORMAL
ANTIBODY SCREEN: NORMAL
ANTIBODY SCREEN: NORMAL
BANDED NEUTROPHILS RELATIVE PERCENT: 6 % (ref 0–7)
BASOPHILS ABSOLUTE: 0 K/UL (ref 0–0.2)
BASOPHILS RELATIVE PERCENT: 1 %
BUN BLDV-MCNC: 35 MG/DL (ref 7–20)
BURR CELLS: ABNORMAL
CALCIUM SERPL-MCNC: 9.8 MG/DL (ref 8.3–10.6)
CHLORIDE BLD-SCNC: 100 MMOL/L (ref 99–110)
CO2: 26 MMOL/L (ref 21–32)
CREAT SERPL-MCNC: <0.5 MG/DL (ref 0.6–1.1)
EOSINOPHILS ABSOLUTE: 0.2 K/UL (ref 0–0.6)
EOSINOPHILS RELATIVE PERCENT: 5 %
GFR SERPL CREATININE-BSD FRML MDRD: >60 ML/MIN/{1.73_M2}
GLUCOSE BLD-MCNC: 189 MG/DL (ref 70–99)
HCT VFR BLD CALC: 17.7 % (ref 36–48)
HCT VFR BLD CALC: 22.5 % (ref 36–48)
HEMATOLOGY PATH CONSULT: NO
HEMOGLOBIN: 6.4 G/DL (ref 12–16)
HEMOGLOBIN: 8 G/DL (ref 12–16)
LYMPHOCYTES ABSOLUTE: 0.9 K/UL (ref 1–5.1)
LYMPHOCYTES RELATIVE PERCENT: 21 %
MACROCYTES: ABNORMAL
MCH RBC QN AUTO: 34.4 PG (ref 26–34)
MCHC RBC AUTO-ENTMCNC: 36 G/DL (ref 31–36)
MCV RBC AUTO: 95.6 FL (ref 80–100)
MICROCYTES: ABNORMAL
MONOCYTES ABSOLUTE: 0.3 K/UL (ref 0–1.3)
MONOCYTES RELATIVE PERCENT: 7 %
NEUTROPHILS ABSOLUTE: 2.9 K/UL (ref 1.7–7.7)
NEUTROPHILS RELATIVE PERCENT: 60 %
ORGANISM: ABNORMAL
OVALOCYTES: ABNORMAL
PDW BLD-RTO: 23.8 % (ref 12.4–15.4)
PLATELET # BLD: 62 K/UL (ref 135–450)
PLATELET SLIDE REVIEW: ABNORMAL
PMV BLD AUTO: 10.2 FL (ref 5–10.5)
POIKILOCYTES: ABNORMAL
POLYCHROMASIA: ABNORMAL
POTASSIUM SERPL-SCNC: 3.8 MMOL/L (ref 3.5–5.1)
RBC # BLD: 1.85 M/UL (ref 4–5.2)
SCHISTOCYTES: ABNORMAL
SLIDE REVIEW: ABNORMAL
SODIUM BLD-SCNC: 138 MMOL/L (ref 136–145)
TARGET CELLS: ABNORMAL
TEAR DROP CELLS: ABNORMAL
URINE CULTURE, ROUTINE: ABNORMAL
WBC # BLD: 4.4 K/UL (ref 4–11)

## 2022-10-24 PROCEDURE — 86901 BLOOD TYPING SEROLOGIC RH(D): CPT

## 2022-10-24 PROCEDURE — 86850 RBC ANTIBODY SCREEN: CPT

## 2022-10-24 PROCEDURE — 6360000004 HC RX CONTRAST MEDICATION

## 2022-10-24 PROCEDURE — 6360000002 HC RX W HCPCS: Performed by: HOSPITALIST

## 2022-10-24 PROCEDURE — 85025 COMPLETE CBC W/AUTO DIFF WBC: CPT

## 2022-10-24 PROCEDURE — 36415 COLL VENOUS BLD VENIPUNCTURE: CPT

## 2022-10-24 PROCEDURE — 2580000003 HC RX 258: Performed by: INTERNAL MEDICINE

## 2022-10-24 PROCEDURE — 36430 TRANSFUSION BLD/BLD COMPNT: CPT

## 2022-10-24 PROCEDURE — 85018 HEMOGLOBIN: CPT

## 2022-10-24 PROCEDURE — 85014 HEMATOCRIT: CPT

## 2022-10-24 PROCEDURE — 86900 BLOOD TYPING SEROLOGIC ABO: CPT

## 2022-10-24 PROCEDURE — 86923 COMPATIBILITY TEST ELECTRIC: CPT

## 2022-10-24 PROCEDURE — 2580000003 HC RX 258: Performed by: HOSPITALIST

## 2022-10-24 PROCEDURE — 6370000000 HC RX 637 (ALT 250 FOR IP): Performed by: INTERNAL MEDICINE

## 2022-10-24 PROCEDURE — 80048 BASIC METABOLIC PNL TOTAL CA: CPT

## 2022-10-24 PROCEDURE — 74176 CT ABD & PELVIS W/O CONTRAST: CPT

## 2022-10-24 PROCEDURE — 6360000002 HC RX W HCPCS: Performed by: INTERNAL MEDICINE

## 2022-10-24 PROCEDURE — P9016 RBC LEUKOCYTES REDUCED: HCPCS

## 2022-10-24 PROCEDURE — 2060000000 HC ICU INTERMEDIATE R&B

## 2022-10-24 PROCEDURE — 6370000000 HC RX 637 (ALT 250 FOR IP): Performed by: PHYSICIAN ASSISTANT

## 2022-10-24 RX ORDER — SODIUM CHLORIDE 9 MG/ML
INJECTION, SOLUTION INTRAVENOUS PRN
Status: DISCONTINUED | OUTPATIENT
Start: 2022-10-24 | End: 2022-10-29 | Stop reason: HOSPADM

## 2022-10-24 RX ADMIN — PANTOPRAZOLE SODIUM 40 MG: 40 TABLET, DELAYED RELEASE ORAL at 07:11

## 2022-10-24 RX ADMIN — ONDANSETRON 4 MG: 2 INJECTION INTRAMUSCULAR; INTRAVENOUS at 13:26

## 2022-10-24 RX ADMIN — LACTULOSE 20 G: 20 SOLUTION ORAL at 09:10

## 2022-10-24 RX ADMIN — Medication 10 ML: at 09:25

## 2022-10-24 RX ADMIN — LACTULOSE 20 G: 20 SOLUTION ORAL at 21:03

## 2022-10-24 RX ADMIN — PIPERACILLIN AND TAZOBACTAM 3375 MG: 3; .375 INJECTION, POWDER, FOR SOLUTION INTRAVENOUS at 09:23

## 2022-10-24 RX ADMIN — RIFAXIMIN 550 MG: 550 TABLET ORAL at 09:11

## 2022-10-24 RX ADMIN — DIATRIZOATE MEGLUMINE AND DIATRIZOATE SODIUM 20 ML: 660; 100 LIQUID ORAL; RECTAL at 13:20

## 2022-10-24 RX ADMIN — SPIRONOLACTONE 100 MG: 25 TABLET ORAL at 09:11

## 2022-10-24 RX ADMIN — NADOLOL 40 MG: 40 TABLET ORAL at 09:25

## 2022-10-24 RX ADMIN — PIPERACILLIN AND TAZOBACTAM 3375 MG: 3; .375 INJECTION, POWDER, FOR SOLUTION INTRAVENOUS at 17:44

## 2022-10-24 RX ADMIN — ZOLPIDEM TARTRATE 5 MG: 5 TABLET ORAL at 21:03

## 2022-10-24 RX ADMIN — LACTULOSE 20 G: 20 SOLUTION ORAL at 18:19

## 2022-10-24 RX ADMIN — PIPERACILLIN AND TAZOBACTAM 3375 MG: 3; .375 INJECTION, POWDER, FOR SOLUTION INTRAVENOUS at 01:31

## 2022-10-24 RX ADMIN — FUROSEMIDE 20 MG: 20 TABLET ORAL at 09:11

## 2022-10-24 RX ADMIN — Medication 10 ML: at 21:02

## 2022-10-24 RX ADMIN — LACTULOSE 20 G: 20 SOLUTION ORAL at 12:31

## 2022-10-24 RX ADMIN — RIFAXIMIN 550 MG: 550 TABLET ORAL at 21:48

## 2022-10-24 ASSESSMENT — PAIN SCALES - GENERAL
PAINLEVEL_OUTOF10: 8
PAINLEVEL_OUTOF10: 10
PAINLEVEL_OUTOF10: 8
PAINLEVEL_OUTOF10: 6

## 2022-10-24 NOTE — PROGRESS NOTES
Hospitalist Progress Note      PCP: No primary care provider on file. Date of Admission: 10/21/2022    Chief Complaint: AMS    Hospital Course: 41 yo F with history of alcoholic cirrhosis, hepatic encephalopathy, severe anemia, h/o esophageal varices came to ER with AMS. Admitted as inpatient for hepatic encephalopathy, E faecalis UTI and severe anemia. Transfused 1 U PRBC on 10/24. Seen by GI. Started on IV Zosyn for E faecalis UTI. Started on Lactulose. CT Ab/P on 10/24:  1. New air in the bladder and right intrarenal collecting system suggesting a   urinary tract infection with a gas-forming organism. Air in the bladder   could also be iatrogenic. A fistula associated with bowel is considered less   likely. 2. Hepatic cirrhosis and portal hypertension with splenomegaly,   recanalization of the umbilical vein and varices in the upper abdomen. 3. Cholelithiasis and thickening of the wall of the gallbladder with no   adjacent inflammatory changes. 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 Cholecystitis   is considered less likely. Urology consulted to evaluate. Subjective: Patient is awake. No CP, SOB, HA or fevers. Has R sided abdominal pain.        Medications:  Reviewed    Infusion Medications    sodium chloride      sodium chloride       Scheduled Medications    piperacillin-tazobactam  3,375 mg IntraVENous Q8H    furosemide  20 mg Oral Daily    lactulose  20 g Oral 4x Daily    nadolol  40 mg Oral Daily    pantoprazole  40 mg Oral QAM AC    rifAXIMin  550 mg Oral BID    spironolactone  100 mg Oral Daily    sodium chloride flush  5-40 mL IntraVENous 2 times per day     PRN Meds: sodium chloride, sodium chloride flush, sodium chloride, ondansetron **OR** ondansetron, midodrine, zolpidem      Intake/Output Summary (Last 24 hours) at 10/24/2022 1947  Last data filed at 10/24/2022 1856  Gross per 24 hour   Intake 646.39 ml Output 1000 ml   Net -353.61 ml       Physical Exam Performed:    BP (!) 110/57   Pulse 77   Temp 98.5 °F (36.9 °C) (Oral)   Resp 18   Ht 5' 4\" (1.626 m)   Wt 102 lb 12.8 oz (46.6 kg)   SpO2 100%   BMI 17.65 kg/m²     General appearance: Jaundice, awake  HEENT: Pupils equal, round, and reactive to light. Conjunctivae/corneas clear. Neck: Supple, with full range of motion. No jugular venous distention. Trachea midline. Respiratory:  Normal respiratory effort. Clear to auscultation, bilaterally without Rales/Wheezes/Rhonchi. Cardiovascular: Regular rate and rhythm with normal S1/S2 without murmurs, rubs or gallops. Abdomen: Soft, RLQ tender, [id number] with normal bowel sounds. Musculoskeletal: No clubbing, cyanosis or edema bilaterally. Full range of motion without deformity. Skin: Skin color, texture, turgor normal.  No rashes or lesions. Neurologic:  Neurovascularly intact without any focal sensory/motor deficits. Cranial nerves: II-XII intact, grossly non-focal.  Psychiatric: Alert and oriented, thought content appropriate, normal insight  Capillary Refill: Brisk, 3 seconds, normal   Peripheral Pulses: +2 palpable, equal bilaterally       Labs:   Recent Labs     10/23/22  0903 10/24/22  1312   WBC 3.7* 4.4   HGB 7.0* 6.4*   HCT 19.6* 17.7*   PLT 59* 62*     Recent Labs     10/23/22  0903 10/24/22  1312    138   K 4.0 3.8    100   CO2 23 26   BUN 34* 35*   CREATININE <0.5* <0.5*   CALCIUM 9.7 9.8     Recent Labs     10/23/22  0903   AST 64*   ALT 34   BILITOT 21.2*   ALKPHOS 118     No results for input(s): INR in the last 72 hours. No results for input(s): Aram Ebbs in the last 72 hours.     Urinalysis:      Lab Results   Component Value Date/Time    NITRU POSITIVE 10/21/2022 06:00 PM    WBCUA 7 10/21/2022 06:00 PM    BACTERIA Rare 10/21/2022 06:00 PM    RBCUA 0-2 10/21/2022 06:00 PM    BLOODU Negative 10/21/2022 06:00 PM    SPECGRAV 1.020 10/21/2022 06:00 PM    GLUCOSEU Negative 10/21/2022 06:00 PM       Radiology:  CT ABDOMEN PELVIS WO CONTRAST Additional Contrast? Oral   Final Result   1. New air in the bladder and right intrarenal collecting system suggesting a   urinary tract infection with a gas-forming organism. Air in the bladder   could also be iatrogenic. A fistula associated with bowel is considered less   likely. 2. Hepatic cirrhosis and portal hypertension with splenomegaly,   recanalization of the umbilical vein and varices in the upper abdomen. 3. Cholelithiasis and thickening of the wall of the gallbladder with no   adjacent inflammatory changes. 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 Cholecystitis   is considered less likely. US ABDOMEN LIMITED Specify organ? LIVER   Final Result   Trace peritoneal fluid in the right upper quadrant, underlying the liver   parenchyma. No safe percutaneous window for fluid sampling identified at this time. XR CHEST PORTABLE   Final Result   No acute cardiopulmonary disease. CT Head W/O Contrast   Final Result   No acute intracranial findings. XR CHEST PORTABLE   Final Result   1. Technically suboptimal, expiratory phase respiration performed supine. 2. Cannot exclude pneumonia left lower lung. 3.  Follow-up full inspiration PA and lateral chest may be useful for better   characterization of pulmonary findings once the patient's condition allows.          IR US GUIDED PARACENTESIS    (Results Pending)           Assessment/Plan:    Active Hospital Problems    Diagnosis     Alcoholic cirrhosis ([address] [D59.88]      Priority: Medium    Hepatic encephalopathy [K76.82]      Priority: Medium    Severe anemia [D64.9]      Priority: Medium     Check CT Ab/P with PO contrast for abdominal pain  Urology consult for complicated UTI  Continue IV Zosyn for UTI  Transfuse 1 U PRBC  Continue Lactulose and Rifaximin  PT/OT eval    DVT Prophylaxis: SCD  Diet: ADULT DIET; Regular; Low Fat/Low Chol/High Fiber/2 gm Na  Code Status: Full Code  PT/OT Eval Status: Requested    Dispo - Home    Discussed with patient, nursing and CM. Will ask Urology to see and GI to evaluate CT findings. I have discussed with the patient the rationale for blood component transfusion; its benefits in treating or preventing fatigue, organ damage, or death; and its risk which includes mild transfusion reactions, rare risk of blood borne infection, or more serious but rare reactions. I have discussed the alternatives to transfusion, including the risk and consequences of not receiving transfusion. The patient had an opportunity to ask questions and had agreed to proceed with transfusion of blood components.         Mercedez Salinas MD

## 2022-10-24 NOTE — ACP (ADVANCE CARE PLANNING)
Advanced Care Planning Note. Purpose of Encounter: Advanced care planning in light of alcoholic cirrhosis  Parties In Attendance: Patient  Decisional Capacity: Yes  Subjective: Patient with abdominal pain  Objective: Cr < 0.5  Goals of Care Determination: Patient wants full support (CPR, vent, surgery, HD, trach, PEG)  Plan:  IV Abx, CT Ab/P, GI and Urology consults  Code Status: Full code   Time spent on Advanced care Plannin minutes  Advanced Care Planning Documents: Completed advanced directives on chart, daughter is the POA.     Ivonne Hansen MD  10/24/2022 7:55 PM

## 2022-10-25 PROBLEM — N39.0 COMPLICATED UTI (URINARY TRACT INFECTION): Status: ACTIVE | Noted: 2022-10-25

## 2022-10-25 LAB
ACANTHOCYTES: ABNORMAL
ANION GAP SERPL CALCULATED.3IONS-SCNC: 8 MMOL/L (ref 3–16)
ANISOCYTOSIS: ABNORMAL
BANDED NEUTROPHILS RELATIVE PERCENT: 4 % (ref 0–7)
BASOPHILS ABSOLUTE: 0 K/UL (ref 0–0.2)
BASOPHILS RELATIVE PERCENT: 0 %
BLOOD BANK DISPENSE STATUS: NORMAL
BLOOD BANK DISPENSE STATUS: NORMAL
BLOOD BANK PRODUCT CODE: NORMAL
BLOOD BANK PRODUCT CODE: NORMAL
BLOOD CULTURE, ROUTINE: NORMAL
BPU ID: NORMAL
BPU ID: NORMAL
BUN BLDV-MCNC: 32 MG/DL (ref 7–20)
BURR CELLS: ABNORMAL
CALCIUM SERPL-MCNC: 9.7 MG/DL (ref 8.3–10.6)
CHLORIDE BLD-SCNC: 100 MMOL/L (ref 99–110)
CO2: 28 MMOL/L (ref 21–32)
CREAT SERPL-MCNC: <0.5 MG/DL (ref 0.6–1.1)
CULTURE, BLOOD 2: NORMAL
DESCRIPTION BLOOD BANK: NORMAL
DESCRIPTION BLOOD BANK: NORMAL
EOSINOPHILS ABSOLUTE: 0.1 K/UL (ref 0–0.6)
EOSINOPHILS RELATIVE PERCENT: 3 %
GFR SERPL CREATININE-BSD FRML MDRD: >60 ML/MIN/{1.73_M2}
GLUCOSE BLD-MCNC: 155 MG/DL (ref 70–99)
HCT VFR BLD CALC: 19.2 % (ref 36–48)
HCT VFR BLD CALC: 20.1 % (ref 36–48)
HCT VFR BLD CALC: 22.6 % (ref 36–48)
HEMOGLOBIN: 6.9 G/DL (ref 12–16)
HEMOGLOBIN: 7.2 G/DL (ref 12–16)
HEMOGLOBIN: 8.1 G/DL (ref 12–16)
LYMPHOCYTES ABSOLUTE: 0.9 K/UL (ref 1–5.1)
LYMPHOCYTES RELATIVE PERCENT: 22 %
MCH RBC QN AUTO: 33.7 PG (ref 26–34)
MCHC RBC AUTO-ENTMCNC: 36 G/DL (ref 31–36)
MCV RBC AUTO: 93.4 FL (ref 80–100)
METAMYELOCYTES RELATIVE PERCENT: 1 %
MONOCYTES ABSOLUTE: 0.4 K/UL (ref 0–1.3)
MONOCYTES RELATIVE PERCENT: 9 %
NEUTROPHILS ABSOLUTE: 2.8 K/UL (ref 1.7–7.7)
NEUTROPHILS RELATIVE PERCENT: 61 %
PDW BLD-RTO: 23.3 % (ref 12.4–15.4)
PLATELET # BLD: 58 K/UL (ref 135–450)
PMV BLD AUTO: 10.4 FL (ref 5–10.5)
POIKILOCYTES: ABNORMAL
POTASSIUM SERPL-SCNC: 4.3 MMOL/L (ref 3.5–5.1)
RBC # BLD: 2.15 M/UL (ref 4–5.2)
SCHISTOCYTES: ABNORMAL
SODIUM BLD-SCNC: 136 MMOL/L (ref 136–145)
TEAR DROP CELLS: ABNORMAL
WBC # BLD: 4.3 K/UL (ref 4–11)

## 2022-10-25 PROCEDURE — 97535 SELF CARE MNGMENT TRAINING: CPT

## 2022-10-25 PROCEDURE — 6360000002 HC RX W HCPCS: Performed by: HOSPITALIST

## 2022-10-25 PROCEDURE — 97166 OT EVAL MOD COMPLEX 45 MIN: CPT

## 2022-10-25 PROCEDURE — 6370000000 HC RX 637 (ALT 250 FOR IP): Performed by: INTERNAL MEDICINE

## 2022-10-25 PROCEDURE — 2580000003 HC RX 258: Performed by: INTERNAL MEDICINE

## 2022-10-25 PROCEDURE — 85014 HEMATOCRIT: CPT

## 2022-10-25 PROCEDURE — 97162 PT EVAL MOD COMPLEX 30 MIN: CPT

## 2022-10-25 PROCEDURE — 85018 HEMOGLOBIN: CPT

## 2022-10-25 PROCEDURE — 6370000000 HC RX 637 (ALT 250 FOR IP): Performed by: PHYSICIAN ASSISTANT

## 2022-10-25 PROCEDURE — 80048 BASIC METABOLIC PNL TOTAL CA: CPT

## 2022-10-25 PROCEDURE — 36430 TRANSFUSION BLD/BLD COMPNT: CPT

## 2022-10-25 PROCEDURE — 2060000000 HC ICU INTERMEDIATE R&B

## 2022-10-25 PROCEDURE — 97116 GAIT TRAINING THERAPY: CPT

## 2022-10-25 PROCEDURE — 2580000003 HC RX 258: Performed by: HOSPITALIST

## 2022-10-25 PROCEDURE — 36415 COLL VENOUS BLD VENIPUNCTURE: CPT

## 2022-10-25 PROCEDURE — 85025 COMPLETE CBC W/AUTO DIFF WBC: CPT

## 2022-10-25 PROCEDURE — 97530 THERAPEUTIC ACTIVITIES: CPT

## 2022-10-25 PROCEDURE — 6360000002 HC RX W HCPCS: Performed by: INTERNAL MEDICINE

## 2022-10-25 RX ORDER — SODIUM CHLORIDE 9 MG/ML
INJECTION, SOLUTION INTRAVENOUS PRN
Status: COMPLETED | OUTPATIENT
Start: 2022-10-25 | End: 2022-10-25

## 2022-10-25 RX ORDER — LACTULOSE 10 G/15ML
20 SOLUTION ORAL 2 TIMES DAILY
Status: DISCONTINUED | OUTPATIENT
Start: 2022-10-25 | End: 2022-10-25

## 2022-10-25 RX ORDER — LACTULOSE 10 G/15ML
20 SOLUTION ORAL 3 TIMES DAILY
Status: DISCONTINUED | OUTPATIENT
Start: 2022-10-25 | End: 2022-10-27

## 2022-10-25 RX ADMIN — SODIUM CHLORIDE: 9 INJECTION, SOLUTION INTRAVENOUS at 20:50

## 2022-10-25 RX ADMIN — PIPERACILLIN AND TAZOBACTAM 3375 MG: 3; .375 INJECTION, POWDER, FOR SOLUTION INTRAVENOUS at 16:51

## 2022-10-25 RX ADMIN — SODIUM CHLORIDE, PRESERVATIVE FREE 10 ML: 5 INJECTION INTRAVENOUS at 21:06

## 2022-10-25 RX ADMIN — RIFAXIMIN 550 MG: 550 TABLET ORAL at 09:00

## 2022-10-25 RX ADMIN — ZOLPIDEM TARTRATE 5 MG: 5 TABLET ORAL at 20:56

## 2022-10-25 RX ADMIN — PANTOPRAZOLE SODIUM 40 MG: 40 TABLET, DELAYED RELEASE ORAL at 05:34

## 2022-10-25 RX ADMIN — NADOLOL 40 MG: 40 TABLET ORAL at 20:56

## 2022-10-25 RX ADMIN — FUROSEMIDE 20 MG: 20 TABLET ORAL at 08:42

## 2022-10-25 RX ADMIN — ONDANSETRON 4 MG: 2 INJECTION INTRAMUSCULAR; INTRAVENOUS at 21:06

## 2022-10-25 RX ADMIN — LACTULOSE 20 G: 20 SOLUTION ORAL at 09:00

## 2022-10-25 RX ADMIN — SPIRONOLACTONE 100 MG: 25 TABLET ORAL at 08:42

## 2022-10-25 RX ADMIN — SODIUM CHLORIDE, PRESERVATIVE FREE 10 ML: 5 INJECTION INTRAVENOUS at 01:30

## 2022-10-25 RX ADMIN — Medication 10 ML: at 20:56

## 2022-10-25 RX ADMIN — PIPERACILLIN AND TAZOBACTAM 3375 MG: 3; .375 INJECTION, POWDER, FOR SOLUTION INTRAVENOUS at 08:49

## 2022-10-25 RX ADMIN — PIPERACILLIN AND TAZOBACTAM 3375 MG: 3; .375 INJECTION, POWDER, FOR SOLUTION INTRAVENOUS at 01:34

## 2022-10-25 RX ADMIN — LACTULOSE 20 G: 20 SOLUTION ORAL at 20:56

## 2022-10-25 RX ADMIN — RIFAXIMIN 550 MG: 550 TABLET ORAL at 20:56

## 2022-10-25 RX ADMIN — SODIUM CHLORIDE 25 ML: 9 INJECTION, SOLUTION INTRAVENOUS at 16:50

## 2022-10-25 RX ADMIN — LACTULOSE 20 G: 20 SOLUTION ORAL at 13:52

## 2022-10-25 RX ADMIN — SODIUM CHLORIDE 25 ML: 9 INJECTION, SOLUTION INTRAVENOUS at 08:48

## 2022-10-25 ASSESSMENT — PAIN SCALES - GENERAL
PAINLEVEL_OUTOF10: 8
PAINLEVEL_OUTOF10: 6
PAINLEVEL_OUTOF10: 8
PAINLEVEL_OUTOF10: 7

## 2022-10-25 ASSESSMENT — PAIN DESCRIPTION - LOCATION: LOCATION: GENERALIZED

## 2022-10-25 ASSESSMENT — PAIN DESCRIPTION - DESCRIPTORS: DESCRIPTORS: OTHER (COMMENT)

## 2022-10-25 NOTE — CONSULTS
Urology Consult Note  [facility]     Patient: Ban Dangelo MRN: [id number]  Room/Bed: [facility]5500/1537-27   YOB: 1975  Age/Sex: 40 y. o.female  Admission Date: 10/21/2022     Date of Service:  10/25/2022    Consulting Provider: Richard Logan APRN - CNP  Admitting/Requesting Physician: Arpit Ayala MD  Primary Care Physician: No primary care provider on file. Reason for Consult: Complicated UTI    ASSESSMENT/PLAN     39 yo F with history of alcoholic cirrhosis, hepatic encephalopathy, severe anemia, h/o esophageal varices came to ER with AMS. Admitted as inpatient for hepatic encephalopathy, E faecalis UTI and severe anemia. CT a/p in the ED with new air in the bladder and right intrarenal collecting system suggesting a UTI. On zosyn for e. Faecalis uti. Recommendations:  No urgent  intervention  Follow up outpatient x2 weeks for repeat culture consideration of cystoscopy to evaluate etiology of air in the bladder, although likely just 2/2 to UTI. All the patients questions were answered. She understands the plan as listed above. HISTORY     Chief Complaint:   Chief Complaint   Patient presents with    Altered Mental Status     Pt brought in by [facility] EMT from home. Pt unable to speak, per emt, daughter reports AMS started 2 days ago and progressed. Pt jaundice and has HX of liver disease. History of Present Illness: Ban Dangelo is a 40 y.o. female with uti. Onset of symptoms was days ago with improving course since that time. Symptoms are aggravated by UTI. Symptoms improved with abx. Associated symptoms include pelvic pain. Patient also reports improved pelvic pain with bates rmeoval. She has tried the following treatments: abx. Past Medical History:  She has a past medical history of Alcoholic liver disease ([address] Anemia, and History of blood transfusion.      Past Surgical History:  She has a past surgical history that includes Upper gastrointestinal endoscopy (N/A, 01/30/2021); Upper gastrointestinal endoscopy (N/A, 03/10/2021); Tubal ligation; Colonoscopy (N/A, 3/11/2021); Upper gastrointestinal endoscopy (N/A, 6/27/2021); Upper gastrointestinal endoscopy (N/A, 4/27/2022); Knee Arthroplasty; Upper gastrointestinal endoscopy (N/A, 7/1/2022); Upper gastrointestinal endoscopy (N/A, 7/1/2022); and CT BIOPSY BONE MARROW (7/5/2022). Allergies: Allergies   Allergen Reactions    Oxycodone Nausea And Vomiting        Social History:  She reports that she has quit smoking. She has never used smokeless tobacco. She reports that she does not currently use alcohol. She reports that she does not use drugs. Family History:  family history includes Alcohol Abuse in her father. Medications:  Scheduled Meds:   lactulose  20 g Oral BID    piperacillin-tazobactam  3,375 mg IntraVENous Q8H    furosemide  20 mg Oral Daily    nadolol  40 mg Oral Daily    pantoprazole  40 mg Oral QAM AC    rifAXIMin  550 mg Oral BID    spironolactone  100 mg Oral Daily    sodium chloride flush  5-40 mL IntraVENous 2 times per day     Continuous Infusions:   sodium chloride      sodium chloride 25 mL (10/25/22 0848)     PRN Meds:sodium chloride, sodium chloride flush, sodium chloride, ondansetron **OR** ondansetron, midodrine, zolpidem    Review of Systems:  Pertinent positives/negatives reviewed in HPI. All other systems reviewed and negative, unless noted below. Constitutional: Negative  Genitourinary:  Negative  HEENT: Negative   Cardiovascular: Negative   Respiratory: Negative   Gastrointestinal: Negative   Musculoskeletal: Negative   Neurological: Negative   Psychiatric: Negative   Integumentary: Negative     PHYSICAL EXAM     Vitals:    10/25/22 1045   BP: (!) 102/59   Pulse: 64   Resp: 14   Temp: 98.5 °F (36.9 °C)   SpO2:      Constitutional: NAD, well-developed, well-nourished. HEENT: MMM. Hearing intact. Neck: no thyroid masses appreciated. Trachea is midline.  Neck appears unremarkable. Lymph: no palpable adenopathy in supraclavicular, or axillary lymph nodes. Cardiovascular: Regular rate. No peripheral edema. ABDOMEN: Abdomen soft, non-tender, non-distended. No enlarged liver or spleen. No hernias noted. Stool occult blood not indicated. Respiratory: Respirations are even and non-labored. No audible breath sounds. Genitourinary: no CVAT, pelvic deferred. Psychiatric: A + O x 3, normal affect. Insight appears intact. Muskuloskeletal: CITLALI x 4  Skin: Jaundice, warm and dry. No rashes on face and arms. Intake/Output Summary (Last 24 hours) at 10/25/2022 1109  Last data filed at 10/25/2022 1012  Gross per 24 hour   Intake 542.5 ml   Output 550 ml   Net -7.5 ml       LABS     CBC:   Lab Results   Component Value Date/Time    WBC 4.3 10/25/2022 06:09 AM    RBC 2.15 10/25/2022 06:09 AM    HGB 7.2 10/25/2022 06:09 AM    HCT 20.1 10/25/2022 06:09 AM    MCV 93.4 10/25/2022 06:09 AM    MCH 33.7 10/25/2022 06:09 AM    MCHC 36.0 10/25/2022 06:09 AM    RDW 23.3 10/25/2022 06:09 AM    PLT 58 10/25/2022 06:09 AM    MPV 10.4 10/25/2022 06:09 AM     BMP:   Lab Results   Component Value Date/Time     10/25/2022 06:09 AM    K 4.3 10/25/2022 06:09 AM    K 4.7 10/21/2022 06:29 PM     10/25/2022 06:09 AM    CO2 28 10/25/2022 06:09 AM    BUN 32 10/25/2022 06:09 AM    CREATININE <0.5 10/25/2022 06:09 AM    GLUCOSE 155 10/25/2022 06:09 AM    CALCIUM 9.7 10/25/2022 06:09 AM     Urinalysis:   Lab Results   Component Value Date/Time    COLORU SHELLEY 10/21/2022 06:00 PM    GLUCOSEU Negative 10/21/2022 06:00 PM    BLOODU Negative 10/21/2022 06:00 PM    NITRU POSITIVE 10/21/2022 06:00 PM    LEUKOCYTESUR SMALL 10/21/2022 06:00 PM     Urine culture: No results for input(s): LABURIN in the last 72 hours.      IMAGING     CT ABDOMEN PELVIS WO CONTRAST Additional Contrast? Oral    Result Date: 10/24/2022  EXAMINATION: CT OF THE ABDOMEN AND PELVIS WITHOUT CONTRAST 10/24/2022 2:00 pm TECHNIQUE: CT of the abdomen and pelvis was performed without the administration of intravenous contrast. Multiplanar reformatted images are provided for review. Automated exposure control, iterative reconstruction, and/or weight based adjustment of the mA/kV was utilized to reduce the radiation dose to as low as reasonably achievable. COMPARISON: 10/11/2022. HISTORY: ORDERING SYSTEM PROVIDED HISTORY: Abdominal pain TECHNOLOGIST PROVIDED HISTORY: Reason for exam:->Abdominal pain Additional Contrast?->Oral Is the patient pregnant?->No Reason for Exam: Abdominal pain FINDINGS: Lower Chest: Linear atelectasis or scarring involves the lower lobes. There is no pleural effusion. Organs: The liver is heterogeneous and has a nodular contour. There are multiple stones in the gallbladder. The gallbladder again appears thickened. Recanalization of the umbilical vein and varices in the upper abdomen are again noted. The spleen is enlarged measuring up to 15 cm. The adrenal glands and pancreas are unremarkable. There is a new small amount of air in the right intrarenal collecting system. Stones at the lower pole the right kidney measure up to 5 mm. There is no ureteral stone or hydronephrosis. GI/Bowel: There is no bowel dilatation or bowel wall thickening. The stomach is unremarkable. The appendix appears normal. Pelvis: There is air in the bladder. Bladder is otherwise unremarkable. There is no abnormal adnexal mass. Peritoneum/Retroperitoneum: The abdominal aorta is normal in caliber. No enlarged lymph nodes are identified. No fat stranding, free fluid, free air or focal fluid collection is identified. Bones/Soft Tissues: No fracture or destructive bone lesion is identified. 1. New air in the bladder and right intrarenal collecting system suggesting a urinary tract infection with a gas-forming organism. Air in the bladder could also be iatrogenic. A fistula associated with bowel is considered less likely.  2. Hepatic cirrhosis and portal hypertension with splenomegaly, recanalization of the umbilical vein and varices in the upper abdomen. 3. Cholelithiasis and thickening of the wall of the gallbladder with no adjacent inflammatory changes. 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 Cholecystitis is considered less likely. CT ABDOMEN PELVIS WO CONTRAST Additional Contrast? None    Result Date: 10/11/2022  EXAMINATION: CT OF THE ABDOMEN AND PELVIS WITHOUT CONTRAST 10/11/2022 8:55 pm TECHNIQUE: CT of the abdomen and pelvis was performed without the administration of intravenous contrast. Multiplanar reformatted images are provided for review. Automated exposure control, iterative reconstruction, and/or weight based adjustment of the mA/kV was utilized to reduce the radiation dose to as low as reasonably achievable. COMPARISON: 2022 HISTORY: ORDERING SYSTEM PROVIDED HISTORY: AMS, h/o cirrhosis TECHNOLOGIST PROVIDED HISTORY: Reason for exam:->AMS, h/o cirrhosis Additional Contrast?->None Decision Support Exception - unselect if not a suspected or confirmed emergency medical condition->Emergency Medical Condition (MA) Is the patient pregnant?->No Reason for Exam: AMS, h/o cirrhosis. Altered Mental Status (Pt arrived from home via ems with AMS Ems stated she is alert and oriented x2, wanted to sign refusal but was unable to follow instructions to do so Pt when asked why she was here, could not tell me, asked her where she was pt stated  Pt has hx of liver failure, pt is visibly jaundiced. ). FINDINGS: Lower Chest: Lung bases are grossly clear. The heart is mildly enlarged. No pericardial effusion. Organs: There is unchanged heterogeneous appearance of the liver parenchyma with areas of both increased and decreased attenuation. Areas of increased attenuation are most pronounced in the left lobe and saulo hepatis.   There is a slightly nodular contour of the liver.  There are calcified gallstones. The gallbladder wall is diffusely thickened. There is pericholecystic fluid however there is also perihepatic ascites. Portal venous hypertension with splenomegaly, recanalization of the umbilical vein and upper abdominal varices. The pancreas, adrenal glands and kidneys are grossly with the limitations of a noncontrast study. There is an unchanged calcification or calculus in the lower pole of the right kidney. GI/Bowel: There is a moderate stool load particularly in the right, transverse and proximal descending colon. There is diffuse bowel wall thickening. Pelvis: Bilateral Essure tubal occlusion devices. Uterus is otherwise unremarkable. The urinary bladder is normal. Peritoneum/Retroperitoneum: There is small volume of low-attenuation ascites in the abdomen and pelvis. There is diffuse infiltration of fat in the abdomen. No free air. There are mildly prominent nonspecific retroperitoneal lymph nodes. Bones/Soft Tissues: No acute bone finding. Unchanged diffuse hepatocellular disease with suspected cirrhosis. Portal venous hypertension with recanalized umbilical vein, ascites, upper abdominal varices and splenomegaly. Cholelithiasis. There is diffuse gallbladder wall thickening. This finding may be related to hypoproteinemia and third-spacing of fluid. Acute cholecystitis cannot be excluded. Correlate clinically. Moderate stool load consistent with constipation. CT Head W/O Contrast    Result Date: 10/21/2022  EXAMINATION: CT OF THE HEAD WITHOUT CONTRAST  10/21/2022 6:06 pm TECHNIQUE: CT of the head was performed without the administration of intravenous contrast. Automated exposure control, iterative reconstruction, and/or weight based adjustment of the mA/kV was utilized to reduce the radiation dose to as low as reasonably achievable. COMPARISON: 10/11/2022.  HISTORY: ORDERING SYSTEM PROVIDED HISTORY: altered mental status TECHNOLOGIST PROVIDED HISTORY: Reason for exam:->altered mental status Has a \"code stroke\" or \"stroke alert\" been called? ->No Decision Support Exception - unselect if not a suspected or confirmed emergency medical condition->Emergency Medical Condition (MA) Is the patient pregnant?->No Reason for Exam: ams FINDINGS: BRAIN/VENTRICLES: There is no acute intracranial hemorrhage, mass effect or midline shift. No abnormal extra-axial fluid collection. The gray-white differentiation is maintained without evidence of an acute infarct. There is no evidence of hydrocephalus. ORBITS: The visualized portion of the orbits demonstrate no acute abnormality. SINUSES: The visualized paranasal sinuses and mastoid air cells demonstrate no acute abnormality. SOFT TISSUES/SKULL:  No acute abnormality of the visualized skull or soft tissues. No acute intracranial findings. CT HEAD WO CONTRAST    Result Date: 10/11/2022  EXAMINATION: CT OF THE HEAD WITHOUT CONTRAST  10/11/2022 8:54 pm TECHNIQUE: CT of the head was performed without the administration of intravenous contrast. Automated exposure control, iterative reconstruction, and/or weight based adjustment of the mA/kV was utilized to reduce the radiation dose to as low as reasonably achievable. COMPARISON: None. HISTORY: ORDERING SYSTEM PROVIDED HISTORY: [facility] TECHNOLOGIST PROVIDED HISTORY: Reason for exam:->ams Has a \"code stroke\" or \"stroke alert\" been called? ->No Decision Support Exception - unselect if not a suspected or confirmed emergency medical condition->Emergency Medical Condition (MA) Is the patient pregnant?->No Reason for Exam: AMS. Altered Mental Status (Pt arrived from home via ems with AMS Ems stated she is alert and oriented x2, wanted to sign refusal but was unable to follow instructions to do so Pt when asked why she was here, could not tell me, asked her where she was pt stated  Pt has hx of liver failure, pt is visibly jaundiced. ).  FINDINGS: BRAIN/VENTRICLES: There is no acute intracranial hemorrhage, mass effect or midline shift. No abnormal extra-axial fluid collection. The gray-white differentiation is maintained without evidence of an acute infarct. There is no evidence of hydrocephalus. ORBITS: The visualized portion of the orbits demonstrate no acute abnormality. SINUSES: The visualized paranasal sinuses and mastoid air cells demonstrate no acute abnormality. SOFT TISSUES/SKULL:  No acute abnormality of the visualized skull or soft tissues. There is a salt and pepper appearance of the skull. No acute intracranial abnormality. XR CHEST PORTABLE    Result Date: 10/22/2022  EXAMINATION: ONE XRAY VIEW OF THE CHEST 10/22/2022 9:41 am COMPARISON: 10/21/2022 HISTORY: ORDERING SYSTEM PROVIDED HISTORY: follow up sob TECHNOLOGIST PROVIDED HISTORY: Reason for exam:->follow up sob Reason for Exam: follow up sob FINDINGS: The cardiac silhouette, mediastinal and hilar contours are normal.  The lungs are clear. There are no pleural effusions. No acute osseous abnormalities are identified. No acute cardiopulmonary disease. XR CHEST PORTABLE    Result Date: 10/21/2022  EXAMINATION: ONE XRAY VIEW OF THE CHEST 10/21/2022 6:45 pm COMPARISON: Chest 10/11/2022 HISTORY: ORDERING SYSTEM PROVIDED HISTORY: altered mental status FINDINGS: The cardiomediastinal and hilar silhouettes appear unremarkable. Low lung volumes result in vascular crowding underaeration particular parahilar regions and medial both lung bases. Possible abnormal pulmonary opacity medial lower left lung. The lungs appear otherwise clear. No pleural effusion evident. No pneumothorax is seen. No acute osseous abnormality is identified. 1. Technically suboptimal, expiratory phase respiration performed supine. 2. Cannot exclude pneumonia left lower lung. 3.  Follow-up full inspiration PA and lateral chest may be useful for better characterization of pulmonary findings once the patient's condition allows. XR CHEST PORTABLE    Result Date: 10/11/2022  EXAMINATION: ONE XRAY VIEW OF THE CHEST 10/11/2022 8:36 pm COMPARISON: 08/25/2022 HISTORY: ORDERING SYSTEM PROVIDED HISTORY: ams TECHNOLOGIST PROVIDED HISTORY: Reason for exam:->ams Reason for Exam: AMS FINDINGS: The lungs are without acute focal process. There is no effusion or pneumothorax. The cardiomediastinal silhouette is stable. The osseous structures are stable. No acute process. US ABDOMEN LIMITED Specify organ? LIVER    Result Date: 10/22/2022  EXAMINATION: RIGHT UPPER QUADRANT ULTRASOUND 10/22/2022 11:29 am COMPARISON: CT abdomen pelvis dated 10/11/2022 HISTORY: ORDERING SYSTEM PROVIDED HISTORY: ultrasound eval ? enough ascities for paracentesis per d/w dr cavanaugh on call Interventional radiologits. TECHNOLOGIST PROVIDED HISTORY: Reason for exam:->ultrasound eval ? enough ascities for paracentesis per d/w dr cavanaugh on call Interventional radiologist Specify organ?->LIVER FINDINGS: 4 quadrant ultrasound evaluation of the peritoneal cavity demonstrates trace peritoneal fluid deep to the inferior edge of the liver. Percutaneous windows for sampling is extremely limited by bowel along the anterior abdominal wall as well as the liver parenchyma. Trace peritoneal fluid in the right upper quadrant, underlying the liver parenchyma. No safe percutaneous window for fluid sampling identified at this time.             Electronically signed by: DARCY Ross CNP, 10/25/2022   [facility]  Office contact: [phone]

## 2022-10-25 NOTE — DISCHARGE INSTR - COC
Continuity of Care Form    Patient Name: Nohelia Cintron   :  1975  MRN:  [id number]    Admit date:  10/21/2022  Discharge date:  10/29/2022    Code Status Order: Full Code   Advance Directives:   [facility] Documentation       Date/Time Healthcare Directive Type of Healthcare Directive Copy in [facility] [address] Agent's Name Healthcare Agent's Phone Number    10/26/22 1130 No, patient does not have an advance directive for healthcare treatment -- -- -- -- --            Admitting Physician:  Agustín Presley MD  PCP: No primary care provider on file.     Discharging Nurse: [facility] Unit/Room#: 0OF-6101/3738-17  Discharging Unit Phone Number: [phone]    Emergency Contact:   Extended Emergency Contact Information  Primary Emergency Contact: [facility] Phone: [phone]  Mobile Phone: [phone]  Relation: Child  Secondary Emergency Contact: [employer]  Home Phone: [phone]  Relation: Child  Preferred language: English    Past Surgical History:  Past Surgical History:   Procedure Laterality Date    COLONOSCOPY N/A 3/11/2021    COLONOSCOPY POLYPECTOMY SNARE/COLD BIOPSY [facility]  2022    CT BONE MARROW BIOPSY 2022 Juliano Lacy MD [facility] CT SCAN    KNEE ARTHROPLASTY      TUBAL LIGATION      ESSURE    UPPER GASTROINTESTINAL ENDOSCOPY N/A 2021    EGD DIAGNOSTIC ONLY performed by Ann Medina MD at [address] [place] [address] 03/10/2021    EGD BIOPSY performed by Abdullahi Dickerson MD at [address] [place] [address] 2021    EGD BAND LIGATION performed by Malcom Parra MD at [address] [place] [address] N/A 2022    EGD DIAGNOSTIC ONLY performed by Jennifer Goodwin MD at [address] [place] [address] N/A 2022    EGD ESOPHAGOGASTRODUODENOSCOPY ULTRASOUND performed by Roger Fisher MD at [address] [facility] ENDOSCOPY N/A 2022    EGD BAND LIGATION performed by Roger Fisher MD at [address] [facility] ENDOSCOPY N/A 10/26/2022    EGD DIAGNOSTIC ONLY performed by Malia Guidry MD at [address] [facility] ENDOSCOPY       Immunization History:   Immunization History   Administered Date(s) Administered    COVID-19, PFIZER GRAY top, DO NOT Dilute, (age 15 y+), IM, 30 mcg/0.3 mL 05/10/2022    COVID-19, PFIZER PURPLE top, DILUTE for use, (age 15 y+), 30mcg/0.3mL 2021, 2021       Active Problems:  Patient Active Problem List   Diagnosis Code    GI bleed K92.2    Acute GI bleeding K92.2    Menorrhagia with regular cycle W08.6    Alcoholic liver disease (HCC) K70.9    Acute cholecystitis K81.0    Nausea and vomiting R11.2    Acute blood loss anemia D62    Esophageal varices (HCC) K73.57    Alcoholic hepatitis L04.59    EZIO (acute kidney injury) ([facility] [address] N17.9    High anion gap metabolic acidosis F83.70    Elevated LFTs R79.89    Ascites due to alcoholic cirrhosis (HCC) V38.31    Acute respiratory failure with hypoxia (HCC) J96.01    Sinus tachycardia R00.0    Severe anemia D64.9    Symptomatic anemia D64.9    Anemia D64.9    Cirrhosis of liver with ascites, unspecified hepatic cirrhosis type ([facility] K74.60, R18.8    Hepatic encephalopathy B76.38    Alcoholic cirrhosis (HCC) S87.49    Complicated UTI (urinary tract infection) N39.0       Isolation/Infection:   Isolation            No Isolation          Patient Infection Status       Infection Onset Added Last Indicated Last Indicated By Review Planned Expiration Resolved Resolved By    None active    Resolved    C-diff Rule Out 22 Clostridium difficile toxin/antigen (Ordered)   22             Nurse Assessment:  Last Vital Signs: BP (!) 94/55   Pulse 58   Temp 98 °F (36.7 °C) (Oral)   Resp 16   Ht 5' 4\" (1.626 m)   Wt 104 lb (47.2 kg) LMP 05/06/2021 Comment: Essure  SpO2 99%   BMI 17.85 kg/m²     Last documented pain score (0-10 scale): Pain Level: 0  Last Weight:   Wt Readings from Last 1 Encounters:   10/26/22 104 lb (47.2 kg)     Mental Status:  oriented and alert    IV Access:  - None    Nursing Mobility/ADLs:  Walking   Assisted  Transfer  Assisted  Bathing  Assisted  Dressing  Assisted  Toileting  Assisted  Feeding  Assisted  Med Admin  Assisted  Med Delivery   whole    Wound Care Documentation and Therapy:        Elimination:  Continence: Bowel: Yes  Bladder: Yes  Urinary Catheter: None   Colostomy/Ileostomy/Ileal Conduit: No       Date of Last BM: 10/29/55207    Intake/Output Summary (Last 24 hours) at 10/27/2022 1630  Last data filed at 10/27/2022 0830  Gross per 24 hour   Intake 245 ml   Output --   Net 245 ml     I/O last 3 completed shifts: In: 547.9 [I.V.:205; Blood:342.9]  Out: -     Safety Concerns: At Risk for Falls    Impairments/Disabilities:      None    Nutrition Therapy:  Current Nutrition Therapy:   - Oral Diet:  General    Routes of Feeding: Oral  Liquids: No Restrictions  Daily Fluid Restriction: no  Last Modified Barium Swallow with Video (Video Swallowing Test): not done    Treatments at the Time of Hospital Discharge:   Respiratory Treatments: none  Oxygen Therapy:  is not on home oxygen therapy.   Ventilator:    - No ventilator support    Rehab Therapies: Physical Therapy and Occupational Therapy  Weight Bearing Status/Restrictions: No weight bearing restrictions  Other Medical Equipment (for information only, NOT a DME order):  walker  Other Treatments: ***    Patient's personal belongings (please select all that are sent with patient):  Glasses    RN SIGNATURE:  Electronically signed by Jens Lamar RN on 10/29/22 at 1:05 PM EDT    CASE MANAGEMENT/SOCIAL WORK SECTION    Inpatient Status Date: 10/21/2022    Readmission Risk Assessment Score:  Readmission Risk              Risk of Unplanned Readmission:  30           Discharging to Facility/ Agency   Name: [facility] [address] [place] [address], [address] [place] [address] Drive  Phone: [phone]  Fax: [phone]     / signature: Electronically signed by REAL Hale on 10/26/22 at 2:27 PM EDT    PHYSICIAN SECTION    Prognosis: Fair    Condition at Discharge: Stable    Rehab Potential (if transferring to Rehab): Fair    Recommended Labs or Other Treatments After Discharge:  Check CBC weekly starting on 11/2/22 and Fax results to Dr Noemi Franklin (Hematology). If Hg < 6.5, contact Dr Derek Beltran (Hematology) to arrange for PRBC transfusion at [facility]. [phone]  OFFICE [phone]  FAX       Physician Certification: I certify the above information and transfer of Cisco Silverio  is necessary for the continuing treatment of the diagnosis listed and that she requires [place] for less 30 days.      Update Admission H&P: No change in H&P    PHYSICIAN SIGNATURE:  Electronically signed by Edwardo Marcos MD on 10/25/22 at 4:41 PM EDT

## 2022-10-25 NOTE — PROGRESS NOTES
Hospitalist Progress Note      PCP: No primary care provider on file. Date of Admission: 10/21/2022    Chief Complaint: AMS    Hospital Course: 39 yo F with history of alcoholic cirrhosis, hepatic encephalopathy, severe anemia, h/o esophageal varices came to ER with AMS. Admitted as inpatient for hepatic encephalopathy, E faecalis UTI and severe anemia. Transfused 1 U PRBC on 10/24. Seen by GI. Started on IV Zosyn for E faecalis UTI. Started on Lactulose. CT Ab/P on 10/24:  1. New air in the bladder and right intrarenal collecting system suggesting a   urinary tract infection with a gas-forming organism. Air in the bladder   could also be iatrogenic. A fistula associated with bowel is considered less   likely. 2. Hepatic cirrhosis and portal hypertension with splenomegaly,   recanalization of the umbilical vein and varices in the upper abdomen. 3. Cholelithiasis and thickening of the wall of the gallbladder with no   adjacent inflammatory changes. 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 Cholecystitis   is considered less likely. Urology consulted to evaluate. Saleem removed. Subjective: Patient is awake. No CP, SOB, HA or fevers. Says abdominal pain better after Saleem removed.       Medications:  Reviewed    Infusion Medications    sodium chloride      sodium chloride 25 mL/hr (10/25/22 0131)     Scheduled Medications    lactulose  20 g Oral BID    piperacillin-tazobactam  3,375 mg IntraVENous Q8H    furosemide  20 mg Oral Daily    nadolol  40 mg Oral Daily    pantoprazole  40 mg Oral QAM AC    rifAXIMin  550 mg Oral BID    spironolactone  100 mg Oral Daily    sodium chloride flush  5-40 mL IntraVENous 2 times per day     PRN Meds: sodium chloride, sodium chloride flush, sodium chloride, ondansetron **OR** ondansetron, midodrine, zolpidem      Intake/Output Summary (Last 24 hours) at 10/25/2022 0818  Last data filed at 10/24/2022 1856  Gross per 24 hour   Intake 302.5 ml   Output 550 ml   Net -247.5 ml         Physical Exam Performed:    /69   Pulse 63   Temp 98.3 °F (36.8 °C) (Oral)   Resp 17   Ht 5' 4\" (1.626 m)   Wt 102 lb 11.2 oz (46.6 kg)   SpO2 100%   BMI 17.63 kg/m²     General appearance: Jaundice, awake  HEENT: Pupils equal, round, and reactive to light. Conjunctivae/corneas clear. Neck: Supple, with full range of motion. No jugular venous distention. Trachea midline. Respiratory:  Normal respiratory effort. Clear to auscultation, bilaterally without Rales/Wheezes/Rhonchi. Cardiovascular: Regular rate and rhythm with normal S1/S2 without murmurs, rubs or gallops. Abdomen: Soft, not tender, distended with normal bowel sounds. Musculoskeletal: No clubbing, cyanosis or edema bilaterally. Full range of motion without deformity. Skin: Skin color, texture, turgor normal.  No rashes or lesions. Neurologic:  Neurovascularly intact without any focal sensory/motor deficits. Cranial nerves: II-XII intact, grossly non-focal.  Psychiatric: Alert and oriented, thought content appropriate, normal insight  Capillary Refill: Brisk, 3 seconds, normal   Peripheral Pulses: +2 palpable, equal bilaterally       Labs:   Recent Labs     10/23/22  0903 10/24/22  1312 10/24/22  1937 10/25/22  0609   WBC 3.7* 4.4  --  4.3   HGB 7.0* 6.4* 8.0* 7.2*   HCT 19.6* 17.7* 22.5* 20.1*   PLT 59* 62*  --  58*       Recent Labs     10/23/22  0903 10/24/22  1312 10/25/22  0609    138 136   K 4.0 3.8 4.3    100 100   CO2 23 26 28   BUN 34* 35* 32*   CREATININE <0.5* <0.5* <0.5*   CALCIUM 9.7 9.8 9.7       Recent Labs     10/23/22  0903   AST 64*   ALT 34   BILITOT 21.2*   ALKPHOS 118       No results for input(s): INR in the last 72 hours. No results for input(s): [address] in the last 72 hours.     Urinalysis:      Lab Results   Component Value Date/Time    NITRU POSITIVE 10/21/2022 06:00 PM    WBCUA 7 10/21/2022 06:00 PM    BACTERIA Rare 10/21/2022 06:00 PM    RBCUA 0-2 10/21/2022 06:00 PM    BLOODU Negative 10/21/2022 06:00 PM    SPECGRAV 1.020 10/21/2022 06:00 PM    GLUCOSEU Negative 10/21/2022 06:00 PM       Radiology:  CT ABDOMEN PELVIS WO CONTRAST Additional Contrast? Oral   Final Result   1. New air in the bladder and right intrarenal collecting system suggesting a   urinary tract infection with a gas-forming organism. Air in the bladder   could also be iatrogenic. A fistula associated with bowel is considered less   likely. 2. Hepatic cirrhosis and portal hypertension with splenomegaly,   recanalization of the umbilical vein and varices in the upper abdomen. 3. Cholelithiasis and thickening of the wall of the gallbladder with no   adjacent inflammatory changes. 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 Cholecystitis   is considered less likely. US ABDOMEN LIMITED Specify organ? LIVER   Final Result   Trace peritoneal fluid in the right upper quadrant, underlying the liver   parenchyma. No safe percutaneous window for fluid sampling identified at this time. XR CHEST PORTABLE   Final Result   No acute cardiopulmonary disease. CT Head W/O Contrast   Final Result   No acute intracranial findings. XR CHEST PORTABLE   Final Result   1. Technically suboptimal, expiratory phase respiration performed supine. 2. Cannot exclude pneumonia left lower lung. 3.  Follow-up full inspiration PA and lateral chest may be useful for better   characterization of pulmonary findings once the patient's condition allows.          IR US GUIDED PARACENTESIS    (Results Pending)           Assessment/Plan:    Active Hospital Problems    Diagnosis     Complicated UTI (urinary tract infection) [N39.0]      Priority: Medium    Alcoholic cirrhosis ([address] [G13.25]      Priority: Medium    Hepatic encephalopathy [K76.82]      Priority: Medium Severe anemia [D64.9]      Priority: Medium     Urology consult for complicated UTI pending  Continue IV Zosyn for UTI  Transfused 1 U PRBC  Continue Lactulose and Rifaximin  PT/OT eval    DVT Prophylaxis: SCD  Diet: ADULT DIET; Regular; Low Fat/Low Chol/High Fiber/2 gm Na  Code Status: Full Code  PT/OT Eval Status: Requested    Dispo - Home vs SNF    Discussed with patient and nursing. Patient would go to SNF if offered.     Grant Vaca MD

## 2022-10-25 NOTE — PROGRESS NOTES
[facility]   Phone: [phone]    Physical Therapy    [x] Initial Evaluation            [] Daily Treatment Note         [] Discharge Summary      Patient: Sommer Willis   : 1975   MRN: [id number]   Date of Service:  10/25/2022  Admitting Diagnosis: Hepatic encephalopathy  Current Admission Summary: The pt was admitted with altered mental status due to cirrhosis and hepatic encephalopathy. Pt was discharged from the hospital last week. Pt has a UTI and was anemic as well. Past Medical History:  has a past medical history of Alcoholic liver disease ([facility] [address] Anemia, and History of blood transfusion. Past Surgical History:  has a past surgical history that includes Upper gastrointestinal endoscopy (N/A, 2021); Upper gastrointestinal endoscopy (N/A, 03/10/2021); Tubal ligation; Colonoscopy (N/A, 3/11/2021); Upper gastrointestinal endoscopy (N/A, 2021); Upper gastrointestinal endoscopy (N/A, 2022); Knee Arthroplasty; Upper gastrointestinal endoscopy (N/A, 2022); Upper gastrointestinal endoscopy (N/A, 2022); and CT BIOPSY BONE MARROW (2022). Discharge Recommendations: Sommer Willis scored a 16/24 on the AM-PAC short mobility form. Current research shows that an AM-PAC score of 17 or less is typically not associated with a discharge to the patient's home setting. Based on the patient's AM-PAC score and their current functional mobility deficits, it is recommended that the patient have 3-5 sessions per week of Physical Therapy at d/c to increase the patient's independence. Please see assessment section for further patient specific details. If patient discharges prior to next session this note will serve as a discharge summary. Please see below for the latest assessment towards goals.     DME Required For Discharge: rolling walker  Precautions/Restrictions: medium fall risk, pt has been ambulating to the  independently due to pt's preference and urgency with toileting, RN in room and PT encouraged pt to call so RN knows she is in the bathroom  Weight Bearing Restrictions: no restrictions  [] Right Upper Extremity  [] Left Upper Extremity [] Right Lower Extremity  [] Left Lower Extremity     Required Braces/Orthotics: no braces required   [] Right  [] Left  Positional Restrictions:no positional restrictions    Pre-Admission Information   Lives With: alone                     Type of Home:  duplex  Home Layout: one level  Home Access: level entry  Bathroom Layout: tub/shower unit  Bathroom Equipment:  none  Toilet Height: standard height  Home Equipment: no prior equipment  Transfer Assistance: Independent without use of device  Ambulation Assistance:Independent without use of device  ADL Assistance: independent with all ADL's  IADL Assistance: independent with homemaking tasks  Active :        [] Yes                 [x] No  Hand Dominance: [] Left                 [x] Right  Current Employment: unemployed, lost job in April, due to medical issues  Hobbies: spending time with daughters  Recent Falls: found down from last admission, third admission this fall    Examination   Vision:   Vision Corrective Device: wears glasses at all times  Hearing:   [facility]  Observation:   General Observation:  yellowish tint to skin color, pt stated she has been unintentionally losing weight  Posture:   Good  Sensation:   reports numbness and tingling in (B) LE -pt stated she has neuropathy with symptoms intermittently  Proprioception:    WFL  Tone:   Normotonic  Coordination Testing:   Coordination and Movement Description: decreased speed, decreased accuracy    ROM:   (B) LE AROM WFL  Strength:   (B) LE strength grossly +3  Decision Making: medium complexity  Clinical Presentation: evolving      Subjective  General: pt was supine in bed upon PT arrival, stated she feels unsafe to return home alone, feels fatigued today  Pain: 0/10  Pain Interventions: not applicable       Functional Mobility  Bed Mobility  Supine to Sit: stand by assistance  Sit to Supine: stand by assistance  Scooting: stand by assistance  Comments:  Transfers  Sit to stand transfer: contact guard assistance  Stand to sit transfer: contact guard assistance  Comments:  Ambulation  Assistive Device: no device  Assistance: minimal assistance, furniture crawling  Distance: 10' x 2  Gait Mechanics: decreased step length, unsteady, reaching for objects in the room for balance, stated R knee felt weak  Comments:  Pt toileted and then required min A to navigate in the bathroom to the sink for ADLs, seated on BSC intermittently due to decreased standing tolerance, CGA for static balance, min a for dynamic balance   Ambulation Trial 2  Assistive Device: rolling walker  Assistance: contact guard assistance  Distance: 20'  Gait Mechanics: slow adrian, not a functional gait speed, improved steadiness compared to no AD  Comments:  pt fatigued quickly with gait      Stair Mobility  Stair mobility not completed on this date. Comments:  Wheelchair Mobility:  No w/c mobility completed on this date.   Comments:  Balance  Static Sitting Balance: good: independent with functional balance in unsupported position  Dynamic Sitting Balance: good: independent with functional balance in unsupported position  Static Standing Balance: fair: maintains balance at CGA without use of UE support  Dynamic Standing Balance: fair (-): maintains balance at CGA with use of UE support  Comments:    Other Therapeutic Interventions    Functional Outcomes  AM-PAC Inpatient Mobility Raw Score : 16              Cognition  Overall Cognitive Status: Impaired  Comments: pt had some word finding issues and mild confusion while answering questions or sequencing tasks in the bathroom  Orientation:    alert and oriented x 4  Command Following:   accurately follows one step commands    Education  Barriers To Learning: cognition  Patient Education: patient educated on PT role and benefits, plan of care, discharge recommendations  Learning Assessment:  patient will require reinforcement due to cognitive deficits    Assessment  Activity Tolerance: Decreased due to fatigue and dizziness, /66 after toileting  Impairments Requiring Therapeutic Intervention: decreased strength, decreased cognition, decreased endurance, decreased balance  Prognosis: good  Clinical Assessment: The pt presents with weight loss, decreased balance, decreased strength and decreased activity tolerance. She has confusion and was readmitted to the hospital within one week of discharging home. She does not appear to be able to manage her own medications at this time and would benefit from strengthening to decrease her fall risk. She is unsafe to be home alone at this time. Safety Interventions: patient left in chair, call light within reach, nurse notified, and RN in room    Plan  Frequency: 3-5 x/per week  Current Treatment Recommendations: strengthening, balance training, functional mobility training, transfer training, gait training, stair training, endurance training, neuromuscular re-education, and safety education    Goals  Patient Goals: regain strength before DC home, sell her home to downsize   Short Term Goals:  Time Frame:  To be met prior to DC  Patient will complete bed mobility at Independent   Patient will complete transfers at supervision   Patient will ambulate 100 ft with use of LRAD at stand by assistance  Patient will ascend/descend 12 stairs with (R) ascending handrail at minimal assistance    Therapy Session Time      Individual Group Co-treatment   Time In     1313   Time Out     1356   Minutes     43     Timed Code Treatment Minutes:  28 Minutes  Total Treatment Minutes:  43       Electronically Signed By: Adilia Madison PT DPT [id number]

## 2022-10-25 NOTE — PROGRESS NOTES
[facility] GI  Gastroenterology Progress Note    Doug Milton is a 40 y.o. female patient. 1. Hepatic encephalopathy    2. Alcoholic cirrhosis, unspecified whether ascites present ([address]    3. Urinary tract infection in female        SUBJECTIVE: Called back to see patient given right lower quadrant pain and CT findings. Patient states her pain feels much better after Saleem catheter was removed. ROS:  Cardiovascular ROS: no chest  pain  Gastrointestinal ROS: see above  Respiratory ROS: no sob    Physical    VITALS:  BP (!) 102/59   Pulse 64   Temp 98.5 °F (36.9 °C) (Oral)   Resp 14   Ht 5' 4\" (1.626 m)   Wt 102 lb 11.2 oz (46.6 kg)   SpO2 100%   BMI 17.63 kg/m²   TEMPERATURE:  Current - Temp: 98.5 °F (36.9 °C); Max - Temp  Av.3 °F (36.8 °C)  Min: 97.8 °F (36.6 °C)  Max: 98.7 °F (37.1 °C)    NAD  Regular rate  Abdomen soft, ND, mild tenderness across upper abdomen, Bowel sounds normal  Alert and oriented, No asterixis  Skin jaundiced    Data    CBC:   Lab Results   Component Value Date/Time    WBC 4.3 10/25/2022 06:09 AM    RBC 2.15 10/25/2022 06:09 AM    HGB 7.2 10/25/2022 06:09 AM    HCT 20.1 10/25/2022 06:09 AM    MCV 93.4 10/25/2022 06:09 AM    MCH 33.7 10/25/2022 06:09 AM    MCHC 36.0 10/25/2022 06:09 AM    RDW 23.3 10/25/2022 06:09 AM    PLT 58 10/25/2022 06:09 AM    MPV 10.4 10/25/2022 06:09 AM     Hepatic Function Panel:    Lab Results   Component Value Date/Time    ALKPHOS 118 10/23/2022 09:03 AM    ALT 34 10/23/2022 09:03 AM    AST 64 10/23/2022 09:03 AM    PROT 5.9 10/23/2022 09:03 AM    BILITOT 21.2 10/23/2022 09:03 AM    BILIDIR 4.9 10/21/2022 06:29 PM    IBILI 13.6 10/21/2022 06:29 PM           ASSESSMENT / PLAN :    Abdominal pain -patient had reported right lower quadrant pain so CT abdomen pelvis was done. CT showed gallstones and thickening of gallbladder wall. There is also air in the bladder but patient did have a Saleem catheter in this admission.   Patient states pain is now nearly resolved after Saleem catheter was removed. Some mild tenderness across upper abdomen. Suspect gallbladder findings are more from portal hypertension than true gallbladder disease. Hepatic encephalopathy - recent dc, now readmit. Was dx with UTI. No bleeding. U/s not enough ascities for paracentesis: 10/23/22 on lactulose and xifaxan and no enceph, no asterixis, nh3 near nl. Rec  - xifaxan  - Lactulose titrated to 3-4 bm/day; last 2 admits with increase lactulose enceph has resolved, ? Home compliance. Cirrhosis - due to alcohol abuse. No alcohol since April 2022. Has been referred to  liver transplant center but patient states never made an appointment as insurance does not cover any of the cost of transplant or her antirejection meds. Also states no social support. lft near baseline from cirrhosis and prbc transfusions. Rec  -Unfortunately not transplant option per notes. History of esophageal varices  - EGD 7/2022 with large esophageal varix treated with band ligation. No GI bleeding currently. Rec:  -Outpt surveillance egd with dr walsh     Acute on chronic anemia -  from spur cell anemia. heme following. Hemoglobin incremented from 6.1-7.9 with 1 unit PRBC here. admit Inr at baseline. Rec  -Follow [facility] per primary team.  -Prbc prn  -F/u hematology      Hx ascities: u/s minimal ascitiies/not enough for paracentesis. Rec  -2 gm na diet  -Diuretics managed by renal    Discussed with Dr. Monisha Kramer, PA-C  [address]     I have personally performed a face to face diagnostic evaluation on this patient. I have interviewed and examined the patient and I agree with the findings and recommended plan of care. In summary, my findings and plan are the following:  some RUQ and LUQ ttp but pt denies RUQ pain. Thickening of GB likely from underlying cirrhosis/portal hyerptension. If develops RUQ pain would consult surgery.        Armand Marie MD  [address]

## 2022-10-25 NOTE — PROGRESS NOTES
[facility] [facility]   Phone: [phone]    Occupational Therapy    [x] Initial Evaluation            [] Daily Treatment Note         [] Discharge Summary      Patient: Mary Prather   : 1975   MRN: [id number]   Date of Service:  10/25/2022    Admitting Diagnosis:  Hepatic encephalopathy  Current Admission Summary: 40 y.o. female with a PMHx listed below who presented to the ED for evaluation of altered mental status. Pt just discharged on 10/18/22 after treatment for acute hepatic encephalopathy. While in the ER, her daughter explained that she started becoming altered nearly after she left. Her confusion has worsened since then. She was reportedly found wandering outside when EMS was called by bystanders. Past Medical History:  has a past medical history of Alcoholic liver disease ([address] Anemia, and History of blood transfusion. Past Surgical History:  has a past surgical history that includes Upper gastrointestinal endoscopy (N/A, 2021); Upper gastrointestinal endoscopy (N/A, 03/10/2021); Tubal ligation; Colonoscopy (N/A, 3/11/2021); Upper gastrointestinal endoscopy (N/A, 2021); Upper gastrointestinal endoscopy (N/A, 2022); Knee Arthroplasty; Upper gastrointestinal endoscopy (N/A, 2022); Upper gastrointestinal endoscopy (N/A, 2022); and CT BIOPSY BONE MARROW (2022). Discharge Recommendations: Mary Prather scored a 17/24 on the AM-PAC ADL Inpatient form. Current research shows that an AM-PAC score of 17 or less is typically not associated with a discharge to the patient's home setting. Based on the patient's AM-PAC score and their current ADL deficits, it is recommended that the patient have 3-5 sessions per week of Occupational Therapy at d/c to increase the patient's independence. Please see assessment section for further patient specific details.     If patient discharges prior to next session this note will serve as a discharge summary. Please see below for the latest assessment towards goals. DME Required For Discharge: DME to be determined at next level of care    Precautions/Restrictions: high fall risk, up as tolerated  Weight Bearing Restrictions: no restrictions  [] Right Upper Extremity  [] Left Upper Extremity [] Right Lower Extremity  [] Left Lower Extremity     Required Braces/Orthotics: no braces required   [] Right  [] Left  Positional Restrictions:no positional restrictions    Pre-Admission Information   Lives With: alone    Type of Home:  duplex  Home Layout: one level  Home Access: level entry  Bathroom Layout: tub/shower unit  Bathroom Equipment:  none  Toilet Height: standard height  Home Equipment: no prior equipment  Transfer Assistance: Independent without use of device  Ambulation Assistance:Independent without use of device  ADL Assistance: independent with all ADL's  IADL Assistance: independent with homemaking tasks  Active :        [] Yes  [x] No  Hand Dominance: [] Left  [x] Right  Current Employment: unemployed, lost job in April, due to medical issues  Hobbies: spending time with daughters  Recent Falls: found down from last admission    Examination   Vision:   Vision Gross Assessment: Impaired and Vision Corrective Device: wears glasses at all times  Hearing:   [email or web address] [facility]  Perception:   WFL  Observation:   General Observation:  Patient with jaundiced skin, slow to respond, appears fatigued.  Confusion intermittently throughout session-redirection and cues required to maintain safety  Coordination Testing:   WFL    ROM:   (B) UE AROM WFL  Strength:   (B) UE strength grossly WFL    Decision Making: medium complexity  Clinical Presentation: evolving      Subjective  General: Patient supine in bed, agreeable to evaluation  Pain: 0/10  Pain Interventions: not applicable and repositioned         Activities of Daily Living  Basic Activities of Daily Living  Feeding: setup assistance  Feeding Comments: beverage  Grooming: setup assistance stand by assistance requires verbal cueing Comment: Patient placed soap on face, dried with towel (did not rinse face)  Grooming Comments: sitting sink level on bsc (patient unable to tolerate stance due to fatigue, weakness, lightheaded)  Toileting: minimal assistance moderate assistance. Toileting Equipment: none  Toileting Comments: Patient required assist to doff depends, able to thread new depends, assist to pull over hips and CGA to maintain balance in stance  General Comments: Patient declined further ADLs due to fatigue  Instrumental Activities of Daily Living  No IADL completed on this date. Functional Mobility  Bed Mobility  Supine to Sit: stand by assistance  Sit to Supine: stand by assistance  Scooting: supervision  Comments:  Transfers  Sit to stand transfer:contact guard assistance  Stand to sit transfer: contact guard assistance  Toilet transfer: contact guard assistance, minimal assistance, Patient reaching for walls entering bathroom, slow, cautious adrian.  Trialed RW exiting RR and within room (CGA to close SBA)  Toilet transfer equipment: standard toilet  Toilet transfer comments: grab bar used L  Comments:  Functional Mobility:  Functional Mobility: .  contact guard assistance, minimal assistance, with/without AD  Functional Mobility Activity: to/from bathroom, within room (~25 feet)  Functional Mobility Device Use: rolling walker  Functional Mobility Comment: without AD to bathroom, RW exiting RR and within room    Other Therapeutic Interventions    Functional Outcomes  AM-PAC Inpatient Daily Activity Raw Score: 17    Cognition  Overall Cognitive Status: Impaired  Safety Judgement: decreased awareness of need for assistance, decreased awareness of need for safety  Problem Solving: assistance required to identify errors made  Sequencing: requires cues for some  Orientation:    alert and oriented x 4  Command Following:   accurately follows one step commands     Education  Barriers To Learning: cognition  Patient Education: patient educated on goals, OT role and benefits, plan of care, discharge recommendations  Learning Assessment:  patient verbalizes understanding, would benefit from continued reinforcement    Assessment  Activity Tolerance: Tolerated fair (limited by fatigue, general weakness)  Impairments Requiring Therapeutic Intervention: decreased functional mobility, decreased ADL status, decreased endurance, decreased balance, decreased IADL, decreased posture  Prognosis: fair  Clinical Assessment: Patient presents with the above deficits, which are below baseline, and would benefit from continued skilled OT to address.  Patient lives alone, verbalizes limited to no support, recurrent hospitalizations  Safety Interventions: patient left in bed, call light within reach, patient at risk for falls, nurse notified, and encouraged patient to call for assist to bathroom with RN present in the room    Plan  Frequency: 3-5 x/per week  Current Treatment Recommendations: strengthening, balance training, functional mobility training, transfer training, endurance training, patient/caregiver education, ADL/self-care training, cognitive reorientation, safety education, and equipment evaluation/education    Goals  Patient Goals: Home   Short Term Goals:  Time Frame: Upon discharge  Patient will complete lower body ADL at modified independent   Patient will complete toileting at modified independent   Patient will complete functional transfers at stand by assistance   Patient will complete functional mobility at stand by assistance   Patient will increase functional standing balance to 5-7 minutes supervision for improved ADL completion  Patient will complete bed mobility at modified independent     Therapy Session Time     Individual Group Co-treatment   Time In    1313   Time Out    1356   Minutes    43        Timed Code Treatment Minutes:   28  Total Treatment Minutes:  37       Electronically Signed By: Galindo Duval OTR/L [id number]

## 2022-10-26 ENCOUNTER — ANESTHESIA (OUTPATIENT)
Dept: ENDOSCOPY | Age: 47
DRG: 463 | End: 2022-10-26
Payer: COMMERCIAL

## 2022-10-26 ENCOUNTER — ANESTHESIA EVENT (OUTPATIENT)
Dept: ENDOSCOPY | Age: 47
DRG: 463 | End: 2022-10-26
Payer: COMMERCIAL

## 2022-10-26 LAB
ACANTHOCYTES: ABNORMAL
ANION GAP SERPL CALCULATED.3IONS-SCNC: 10 MMOL/L (ref 3–16)
ANISOCYTOSIS: ABNORMAL
BANDED NEUTROPHILS RELATIVE PERCENT: 1 % (ref 0–7)
BASOPHILS ABSOLUTE: 0 K/UL (ref 0–0.2)
BASOPHILS RELATIVE PERCENT: 0 %
BUN BLDV-MCNC: 28 MG/DL (ref 7–20)
BURR CELLS: ABNORMAL
CALCIUM SERPL-MCNC: 9.2 MG/DL (ref 8.3–10.6)
CHLORIDE BLD-SCNC: 100 MMOL/L (ref 99–110)
CO2: 26 MMOL/L (ref 21–32)
CREAT SERPL-MCNC: 0.5 MG/DL (ref 0.6–1.1)
EOSINOPHILS ABSOLUTE: 0.1 K/UL (ref 0–0.6)
EOSINOPHILS RELATIVE PERCENT: 3 %
GFR SERPL CREATININE-BSD FRML MDRD: >60 ML/MIN/{1.73_M2}
GLUCOSE BLD-MCNC: 171 MG/DL (ref 70–99)
HCT VFR BLD CALC: 21 % (ref 36–48)
HCT VFR BLD CALC: 24.4 % (ref 36–48)
HEMOGLOBIN: 7.5 G/DL (ref 12–16)
HEMOGLOBIN: 8.7 G/DL (ref 12–16)
LYMPHOCYTES ABSOLUTE: 0.5 K/UL (ref 1–5.1)
LYMPHOCYTES RELATIVE PERCENT: 11 %
MCH RBC QN AUTO: 33.1 PG (ref 26–34)
MCHC RBC AUTO-ENTMCNC: 35.8 G/DL (ref 31–36)
MCV RBC AUTO: 92.5 FL (ref 80–100)
MONOCYTES ABSOLUTE: 0.5 K/UL (ref 0–1.3)
MONOCYTES RELATIVE PERCENT: 12 %
NEUTROPHILS ABSOLUTE: 3.3 K/UL (ref 1.7–7.7)
NEUTROPHILS RELATIVE PERCENT: 73 %
PDW BLD-RTO: 21.7 % (ref 12.4–15.4)
PLATELET # BLD: 58 K/UL (ref 135–450)
PLATELET SLIDE REVIEW: ABNORMAL
PMV BLD AUTO: 10.5 FL (ref 5–10.5)
POIKILOCYTES: ABNORMAL
POLYCHROMASIA: ABNORMAL
POTASSIUM SERPL-SCNC: 4.3 MMOL/L (ref 3.5–5.1)
RBC # BLD: 2.27 M/UL (ref 4–5.2)
SCHISTOCYTES: ABNORMAL
SLIDE REVIEW: ABNORMAL
SODIUM BLD-SCNC: 136 MMOL/L (ref 136–145)
WBC # BLD: 4.4 K/UL (ref 4–11)

## 2022-10-26 PROCEDURE — 3700000000 HC ANESTHESIA ATTENDED CARE: Performed by: INTERNAL MEDICINE

## 2022-10-26 PROCEDURE — 2709999900 HC NON-CHARGEABLE SUPPLY: Performed by: INTERNAL MEDICINE

## 2022-10-26 PROCEDURE — 6360000002 HC RX W HCPCS: Performed by: INTERNAL MEDICINE

## 2022-10-26 PROCEDURE — 85025 COMPLETE CBC W/AUTO DIFF WBC: CPT

## 2022-10-26 PROCEDURE — 36415 COLL VENOUS BLD VENIPUNCTURE: CPT

## 2022-10-26 PROCEDURE — 85014 HEMATOCRIT: CPT

## 2022-10-26 PROCEDURE — 2060000000 HC ICU INTERMEDIATE R&B

## 2022-10-26 PROCEDURE — 0DJ08ZZ INSPECTION OF UPPER INTESTINAL TRACT, VIA NATURAL OR ARTIFICIAL OPENING ENDOSCOPIC: ICD-10-PCS | Performed by: INTERNAL MEDICINE

## 2022-10-26 PROCEDURE — 2580000003 HC RX 258: Performed by: INTERNAL MEDICINE

## 2022-10-26 PROCEDURE — 97530 THERAPEUTIC ACTIVITIES: CPT

## 2022-10-26 PROCEDURE — 6360000002 HC RX W HCPCS: Performed by: HOSPITALIST

## 2022-10-26 PROCEDURE — 6370000000 HC RX 637 (ALT 250 FOR IP): Performed by: INTERNAL MEDICINE

## 2022-10-26 PROCEDURE — 2580000003 HC RX 258: Performed by: NURSE ANESTHETIST, CERTIFIED REGISTERED

## 2022-10-26 PROCEDURE — 7100000000 HC PACU RECOVERY - FIRST 15 MIN: Performed by: INTERNAL MEDICINE

## 2022-10-26 PROCEDURE — 80048 BASIC METABOLIC PNL TOTAL CA: CPT

## 2022-10-26 PROCEDURE — 2580000003 HC RX 258: Performed by: ANESTHESIOLOGY

## 2022-10-26 PROCEDURE — 2500000003 HC RX 250 WO HCPCS: Performed by: INTERNAL MEDICINE

## 2022-10-26 PROCEDURE — 2580000003 HC RX 258: Performed by: HOSPITALIST

## 2022-10-26 PROCEDURE — 97110 THERAPEUTIC EXERCISES: CPT

## 2022-10-26 PROCEDURE — 85018 HEMOGLOBIN: CPT

## 2022-10-26 PROCEDURE — 7100000001 HC PACU RECOVERY - ADDTL 15 MIN: Performed by: INTERNAL MEDICINE

## 2022-10-26 PROCEDURE — 97535 SELF CARE MNGMENT TRAINING: CPT

## 2022-10-26 PROCEDURE — 2500000003 HC RX 250 WO HCPCS: Performed by: NURSE ANESTHETIST, CERTIFIED REGISTERED

## 2022-10-26 PROCEDURE — 6360000002 HC RX W HCPCS: Performed by: NURSE ANESTHETIST, CERTIFIED REGISTERED

## 2022-10-26 PROCEDURE — 3609017100 HC EGD: Performed by: INTERNAL MEDICINE

## 2022-10-26 RX ORDER — PROPOFOL 10 MG/ML
INJECTION, EMULSION INTRAVENOUS PRN
Status: DISCONTINUED | OUTPATIENT
Start: 2022-10-26 | End: 2022-10-26 | Stop reason: SDUPTHER

## 2022-10-26 RX ORDER — KETAMINE HCL IN NACL, ISO-OSM 100MG/10ML
SYRINGE (ML) INJECTION PRN
Status: DISCONTINUED | OUTPATIENT
Start: 2022-10-26 | End: 2022-10-26 | Stop reason: SDUPTHER

## 2022-10-26 RX ORDER — LIDOCAINE HYDROCHLORIDE 20 MG/ML
INJECTION, SOLUTION EPIDURAL; INFILTRATION; INTRACAUDAL; PERINEURAL PRN
Status: DISCONTINUED | OUTPATIENT
Start: 2022-10-26 | End: 2022-10-26 | Stop reason: SDUPTHER

## 2022-10-26 RX ORDER — FOLIC ACID 5 MG/ML
1 INJECTION, SOLUTION INTRAMUSCULAR; INTRAVENOUS; SUBCUTANEOUS DAILY
Status: DISCONTINUED | OUTPATIENT
Start: 2022-10-26 | End: 2022-10-29 | Stop reason: HOSPADM

## 2022-10-26 RX ORDER — SODIUM CHLORIDE 9 MG/ML
INJECTION, SOLUTION INTRAVENOUS CONTINUOUS PRN
Status: DISCONTINUED | OUTPATIENT
Start: 2022-10-26 | End: 2022-10-26 | Stop reason: SDUPTHER

## 2022-10-26 RX ORDER — SODIUM CHLORIDE 9 MG/ML
INJECTION, SOLUTION INTRAVENOUS CONTINUOUS
Status: DISCONTINUED | OUTPATIENT
Start: 2022-10-26 | End: 2022-10-26 | Stop reason: HOSPADM

## 2022-10-26 RX ADMIN — LIDOCAINE HYDROCHLORIDE 100 MG: 20 INJECTION, SOLUTION EPIDURAL; INFILTRATION; INTRACAUDAL; PERINEURAL at 13:30

## 2022-10-26 RX ADMIN — Medication 10 MG: at 13:33

## 2022-10-26 RX ADMIN — PROPOFOL 50 MG: 10 INJECTION, EMULSION INTRAVENOUS at 13:30

## 2022-10-26 RX ADMIN — SPIRONOLACTONE 100 MG: 25 TABLET ORAL at 09:53

## 2022-10-26 RX ADMIN — PIPERACILLIN AND TAZOBACTAM 3375 MG: 3; .375 INJECTION, POWDER, FOR SOLUTION INTRAVENOUS at 01:09

## 2022-10-26 RX ADMIN — RIFAXIMIN 550 MG: 550 TABLET ORAL at 20:08

## 2022-10-26 RX ADMIN — Medication 10 ML: at 12:21

## 2022-10-26 RX ADMIN — NADOLOL 40 MG: 40 TABLET ORAL at 20:08

## 2022-10-26 RX ADMIN — PIPERACILLIN AND TAZOBACTAM 3375 MG: 3; .375 INJECTION, POWDER, FOR SOLUTION INTRAVENOUS at 16:44

## 2022-10-26 RX ADMIN — RIFAXIMIN 550 MG: 550 TABLET ORAL at 14:18

## 2022-10-26 RX ADMIN — LACTULOSE 20 G: 20 SOLUTION ORAL at 14:18

## 2022-10-26 RX ADMIN — ONDANSETRON 4 MG: 2 INJECTION INTRAMUSCULAR; INTRAVENOUS at 09:56

## 2022-10-26 RX ADMIN — PANTOPRAZOLE SODIUM 40 MG: 40 TABLET, DELAYED RELEASE ORAL at 05:25

## 2022-10-26 RX ADMIN — SODIUM CHLORIDE, PRESERVATIVE FREE 10 ML: 5 INJECTION INTRAVENOUS at 01:08

## 2022-10-26 RX ADMIN — Medication 20 MG: at 13:31

## 2022-10-26 RX ADMIN — SODIUM CHLORIDE: 9 INJECTION, SOLUTION INTRAVENOUS at 12:00

## 2022-10-26 RX ADMIN — SODIUM CHLORIDE: 9 INJECTION, SOLUTION INTRAVENOUS at 16:42

## 2022-10-26 RX ADMIN — FOLIC ACID 1 MG: 5 INJECTION, SOLUTION INTRAMUSCULAR; INTRAVENOUS; SUBCUTANEOUS at 10:03

## 2022-10-26 RX ADMIN — LACTULOSE 20 G: 20 SOLUTION ORAL at 20:08

## 2022-10-26 RX ADMIN — Medication 20 MG: at 13:34

## 2022-10-26 RX ADMIN — ZOLPIDEM TARTRATE 5 MG: 5 TABLET ORAL at 23:04

## 2022-10-26 RX ADMIN — PIPERACILLIN AND TAZOBACTAM 3375 MG: 3; .375 INJECTION, POWDER, FOR SOLUTION INTRAVENOUS at 10:09

## 2022-10-26 RX ADMIN — ONDANSETRON 4 MG: 2 INJECTION INTRAMUSCULAR; INTRAVENOUS at 18:05

## 2022-10-26 RX ADMIN — FUROSEMIDE 20 MG: 20 TABLET ORAL at 09:52

## 2022-10-26 RX ADMIN — Medication 5 ML: at 20:09

## 2022-10-26 RX ADMIN — SODIUM CHLORIDE: 9 INJECTION, SOLUTION INTRAVENOUS at 13:25

## 2022-10-26 ASSESSMENT — PAIN SCALES - GENERAL
PAINLEVEL_OUTOF10: 0
PAINLEVEL_OUTOF10: 6
PAINLEVEL_OUTOF10: 3
PAINLEVEL_OUTOF10: 9
PAINLEVEL_OUTOF10: 9
PAINLEVEL_OUTOF10: 8

## 2022-10-26 ASSESSMENT — PAIN DESCRIPTION - DESCRIPTORS: DESCRIPTORS: DISCOMFORT

## 2022-10-26 ASSESSMENT — PAIN DESCRIPTION - LOCATION
LOCATION: ABDOMEN;BACK
LOCATION: THROAT

## 2022-10-26 ASSESSMENT — ENCOUNTER SYMPTOMS: SHORTNESS OF BREATH: 0

## 2022-10-26 NOTE — PROGRESS NOTES
[facility]   Phone: [phone]    Occupational Therapy    [] Initial Evaluation            [x] Daily Treatment Note         [] Discharge Summary      Patient: Jd Salazar   : 1975   MRN: [id number]   Date of Service:  10/26/2022    Admitting Diagnosis:  Hepatic encephalopathy  Current Admission Summary: 40 y.o. female with a PMHx listed below who presented to the ED for evaluation of altered mental status. Pt just discharged on 10/18/22 after treatment for acute hepatic encephalopathy. While in the ER, her daughter explained that she started becoming altered nearly after she left. Her confusion has worsened since then. She was reportedly found wandering outside when EMS was called by bystanders. Past Medical History:  has a past medical history of Alcoholic liver disease ([address] Anemia, and History of blood transfusion. Past Surgical History:  has a past surgical history that includes Upper gastrointestinal endoscopy (N/A, 2021); Upper gastrointestinal endoscopy (N/A, 03/10/2021); Tubal ligation; Colonoscopy (N/A, 3/11/2021); Upper gastrointestinal endoscopy (N/A, 2021); Upper gastrointestinal endoscopy (N/A, 2022); Knee Arthroplasty; Upper gastrointestinal endoscopy (N/A, 2022); Upper gastrointestinal endoscopy (N/A, 2022); CT BIOPSY BONE MARROW (2022); and Upper gastrointestinal endoscopy (N/A, 10/26/2022). Discharge Recommendations: Jd Salazar scored a 18/24 on the AM-PAC ADL Inpatient form. Current research shows that an AM-PAC score of 17 or less is typically not associated with a discharge to the patient's home setting. Based on the patient's AM-PAC score and their current ADL deficits, it is recommended that the patient have 3-5 sessions per week of Occupational Therapy at d/c to increase the patient's independence. Please see assessment section for further patient specific details.     If patient discharges prior to next session this note will serve as a discharge summary. Please see below for the latest assessment towards goals. DME Required For Discharge: DME to be determined at next level of care      Precautions/Restrictions: high fall risk, up as tolerated  Weight Bearing Restrictions: no restrictions  [] Right Upper Extremity  [] Left Upper Extremity [] Right Lower Extremity  [] Left Lower Extremity     Required Braces/Orthotics: no braces required   [] Right  [] Left  Positional Restrictions:no positional restrictions    Pre-Admission Information   Lives With: alone    Type of Home:  duplex  Home Layout: one level  Home Access: level entry  Bathroom Layout: tub/shower unit  Bathroom Equipment:  none  Toilet Height: standard height  Home Equipment: no prior equipment  Transfer Assistance: Independent without use of device  Ambulation Assistance:Independent without use of device  ADL Assistance: independent with all ADL's  IADL Assistance: independent with homemaking tasks  Active :        [] Yes  [x] No  Hand Dominance: [] Left  [x] Right  Current Employment: unemployed, lost job in April, due to medical issues  Hobbies: spending time with daughters  Recent Falls: found down from last admission        Subjective  General: Patient supine in bed- RN approved for therapy- Pt recently back from procedure requiring anesthesia, cotx completed for safety; pt with some confusion in thought and word finding throughout session  Pain: 0/10  Pain Interventions: not applicable and repositioned         Activities of Daily Living  Basic Activities of Daily Living  Grooming Comments: hand hygiene in stance at sink  Lower Extremity Dressing: contact guard assistance  Dressing Comments: seated EOB- CGA for stability in stance for clothing over hips  Toileting: contact guard assistance.     Toileting Comments: voided urine on commode, grab bar on L, CGA for clothing over hips  General Comments: Pt ambulated to/from bathroom for toileting needs- LB dressing completed post toileting   Instrumental Activities of Daily Living  No IADL completed on this date. Functional Mobility  Bed Mobility  Supine to Sit: stand by assistance  Sit to Supine: stand by assistance  Scooting: stand by assistance  Comments:  Transfers  Sit to stand transfer:CGA>> SBA  Stand to sit transfer: stand by assistance, contact guard assistance  Toilet transfer: contact guard assistance  Toilet transfer equipment: standard toilet, grab bars  Toilet transfer comments: VC for proximity to commode for safety   Comments:  Functional Mobility:  Sitting Balance: SBA with periods fo CGA d/t posterior lean. Standing Balance: CGA to SBA . Functional Mobility: . CGA to SBA  Functional Mobility Activity: to/from bathroom, 20ft + 20ft within room  Functional Mobility Device Use: no device  Functional Mobility Comment: RW for ~10ft only to bathroom, No AD for remainder of mobility- pt intermittently reaching for furniture; slight narrowing and cross of LE at times     Other Therapeutic Interventions   EOB TE for strengthening: marches x10 and 6 reps x1 of (B) shoulder flexion and abductions     Functional Outcomes  AM-PAC Inpatient Daily Activity Raw Score: 18    Cognition  Overall Cognitive Status: Impaired  Safety Judgement: decreased awareness of need for assistance, decreased awareness of need for safety  Problem Solving: assistance required to identify errors made  Sequencing: requires cues for some  Orientation:    alert and oriented x 4  Command Following:   accurately follows one step commands     Education  Barriers To Learning: cognition  Patient Education: patient educated on goals, OT role and benefits, plan of care, precautions, discharge recommendations  Learning Assessment:  patient verbalizes understanding, would benefit from continued reinforcement    Assessment  Activity Tolerance:  Tolerated fair (limited by fatigue, general weakness)  Impairments Requiring Therapeutic Intervention: decreased functional mobility, decreased ADL status, decreased endurance, decreased balance, decreased IADL, decreased posture  Prognosis: fair  Clinical Assessment: Patient presents with the above deficits, which are below baseline; pt is requiring increased assist with mobility secondary to decreased weakness and balance. Increased assist required for BADL completion- CGA d/t balance and safety. Pt would benefit from continued skilled OT to address.  Patient lives alone, verbalizes limited to no support, recurrent hospitalizations  Safety Interventions: patient left in bed, bed alarm in place, call light within reach, gait belt, patient at risk for falls, telesitter in use, nurse notified, and encouraged patient to call for assist to bathroom with RN present in the room    Plan  Frequency: 3-5 x/per week  Current Treatment Recommendations: strengthening, balance training, functional mobility training, transfer training, endurance training, patient/caregiver education, ADL/self-care training, cognitive reorientation, safety education, and equipment evaluation/education    Goals  Patient Goals: Home   Short Term Goals:  Time Frame: Upon discharge  Patient will complete lower body ADL at modified independent   Patient will complete toileting at modified independent   Patient will complete functional transfers at stand by assistance   Patient will complete functional mobility at stand by assistance   Patient will increase functional standing balance to 5-7 minutes supervision for improved ADL completion  Patient will complete bed mobility at modified independent     -progressing towards goals, no goals met 10/26    Therapy Session Time     Individual Group Co-treatment   Time In    1418   Time Out    1442   Minutes    24        Timed Code Treatment Minutes:   24  Total Treatment Minutes:  24       Electronically Signed By: JULI Collado/DIPAK

## 2022-10-26 NOTE — ANESTHESIA PRE PROCEDURE
Department of Anesthesiology  Preprocedure Note       Name:  Jolene Bowie   Age:  40 y.o.  :  1975                                          MRN:  [id number]         Date:  10/26/2022      Surgeon: Bharath Jackman):  Jacob Stone MD    Procedure: Procedure(s):  EGD DIAGNOSTIC ONLY    Medications prior to admission:   Prior to Admission medications    Medication Sig Start Date End Date Taking? Authorizing Provider   gabapentin (NEURONTIN) 100 MG capsule Take 100 mg by mouth as needed. Historical Provider, MD   lactulose (CHRONULAC) 10 GM/15ML solution Take 30 mLs by mouth 4 times daily Titrate for 3 bowel movements a day. 10/18/22   Alpha Distel, APRN - CNP   rifAXIMin (XIFAXAN) 550 MG tablet Take 1 tablet by mouth 2 times daily 10/18/22   DARCY Brooks - CNP   furosemide (LASIX) 20 MG tablet Take 1 tablet by mouth in the morning. 22   Freya Caldwell MD   LORazepam (ATIVAN) 0.5 MG tablet Take 0.5 mg by mouth every 8 hours as needed for Anxiety. Historical Provider, MD   ondansetron (ZOFRAN) 8 MG tablet Take 1 tablet by mouth 22   Historical Provider, MD   zolpidem (AMBIEN) 5 MG tablet Take 1 tablet by mouth. 22   Historical Provider, MD   thiamine 100 MG tablet Take 1 tablet by mouth daily  Patient not taking: Reported on 2022  Richy Hwang MD   nadolol (CORGARD) 40 MG tablet Take 1 tablet by mouth daily 5/3/22   Jeri Mane MD   spironolactone (ALDACTONE) 100 MG tablet Take 1 tablet by mouth daily 5/3/22   Jeri Mane MD   pantoprazole (PROTONIX) 40 MG tablet Take 1 tablet by mouth every morning (before breakfast) 22   Jeri Mane MD   thiamine mononitrate (THIAMINE) 100 MG tablet Take 1 tablet by mouth daily 5/3/22 6/2/22  Jeri Mane MD       Current medications:    No current facility-administered medications for this visit. No current outpatient medications on file.      Facility-Administered Medications Ordered in Other Visits   Medication Dose Route Frequency Provider Last Rate Last Admin    folic acid injection 1 mg  1 mg IntraVENous Daily Julianna Velarde MD   1 mg at 10/26/22 1003    lactulose (CHRONULAC) 10 GM/15ML solution 20 g  20 g Oral TID Julianna Velarde MD   20 g at 10/25/22 2056    0.9 % sodium chloride infusion   IntraVENous PRN Julianna Velarde MD        piperacillin-tazobactam (ZOSYN) 3,375 mg in dextrose 5 % 50 mL IVPB extended infusion (mini-bag)  3,375 mg IntraVENous Dhara García MD 12.5 mL/hr at 10/26/22 1009 3,375 mg at 10/26/22 1009    furosemide (LASIX) tablet 20 mg  20 mg Oral Daily Henry Spray, MD   20 mg at 10/26/22 9148    nadolol (CORGARD) tablet 40 mg  40 mg Oral Daily Henry Spray, MD   40 mg at 10/25/22 2056    pantoprazole (PROTONIX) tablet 40 mg  40 mg Oral QAM AC Henry Spray, MD   40 mg at 10/26/22 0525    rifAXIMin (XIFAXAN) tablet 550 mg  550 mg Oral BID Henry Richwood, MD   550 mg at 10/25/22 2056    spironolactone (ALDACTONE) tablet 100 mg  100 mg Oral Daily Henry Spray, MD   100 mg at 10/26/22 0953    sodium chloride flush 0.9 % injection 5-40 mL  5-40 mL IntraVENous 2 times per day Henry MD Rajeev   10 mL at 10/25/22 2056    sodium chloride flush 0.9 % injection 5-40 mL  5-40 mL IntraVENous PRN Henry Boo MD   10 mL at 10/26/22 0108    0.9 % sodium chloride infusion   IntraVENous PRN Henry Boo MD 25 mL/hr at 10/26/22 0109 25 mL/hr at 10/26/22 0109    ondansetron (ZOFRAN-ODT) disintegrating tablet 4 mg  4 mg Oral Q8H PRN Henry Boo MD        Or    ondansetron [facility]) injection 4 mg  4 mg IntraVENous Q6H PRN Henry Boo MD   4 mg at 10/26/22 0956    midodrine (PROAMATINE) tablet 5 mg  5 mg Oral TID PRN Henry Boo MD   5 mg at 10/23/22 2023    zolpidem (AMBIEN) tablet 5 mg  5 mg Oral Nightly PRN Jesse Del Angel PA-C   5 mg at 10/25/22 2056       Allergies:     Allergies   Allergen Reactions    Oxycodone Nausea And Vomiting       Problem List:    Patient Active Problem List   Diagnosis Code    GI bleed K92.2    Acute GI bleeding K92.2    Menorrhagia with regular cycle K72.6    Alcoholic liver disease (HCC) K70.9    Acute cholecystitis K81.0    Nausea and vomiting R11.2    Acute blood loss anemia D62    Esophageal varices (HCC) A22.72    Alcoholic hepatitis F87.80    EZIO (acute kidney injury) ([facility] N17.9    High anion gap metabolic acidosis R91.35    Elevated LFTs R79.89    Ascites due to alcoholic cirrhosis (HCC) C42.34    Acute respiratory failure with hypoxia (HCC) J96.01    Sinus tachycardia R00.0    Severe anemia D64.9    Symptomatic anemia D64.9    Anemia D64.9    Cirrhosis of liver with ascites, unspecified hepatic cirrhosis type (HCC) K74.60, R18.8    Hepatic encephalopathy S98.12    Alcoholic cirrhosis (HCC) S11.03    Complicated UTI (urinary tract infection) N39.0       Past Medical History:        Diagnosis Date    Alcoholic liver disease ([facility]     Anemia     History of blood transfusion        Past Surgical History:        Procedure Laterality Date    COLONOSCOPY N/A 3/11/2021    COLONOSCOPY POLYPECTOMY SNARE/COLD BIOPSY performed by Gurinder Lagunas MD at [address] [facility] [address] BONE MARROW BIOPSY  7/5/2022    CT BONE MARROW BIOPSY 7/5/2022 Francisco Caldwell MD [facility] CT SCAN    KNEE ARTHROPLASTY      TUBAL LIGATION      ESSURE    UPPER GASTROINTESTINAL ENDOSCOPY N/A 01/30/2021    EGD DIAGNOSTIC ONLY performed by Jordin García MD at [address] [place] [address] N/A 03/10/2021    EGD BIOPSY performed by Gurinder Lagunas MD at [address] [place] [address] N/A 6/27/2021    EGD BAND LIGATION performed by Maddy Carlos MD at [address] [place] [address] N/A 4/27/2022    EGD DIAGNOSTIC ONLY performed by Ema Hernandez MD at [address] [place] [address] N/A 7/1/2022    EGD ESOPHAGOGASTRODUODENOSCOPY ULTRASOUND performed by Clifton Morejon MD at [facility] N/A 7/1/2022    EGD BAND LIGATION performed by Clifton Morejon MD at [address] History:    Social History     Tobacco Use    Smoking status: Former    Smokeless tobacco: Never   Substance Use Topics    Alcohol use: Not Currently                                Counseling given: Not Answered      Vital Signs (Current): There were no vitals filed for this visit. BP Readings from Last 3 Encounters:   10/26/22 (!) 101/47   10/18/22 108/64   09/23/22 (!) 105/44       NPO Status:                                                                                 BMI:   Wt Readings from Last 3 Encounters:   10/26/22 104 lb (47.2 kg)   10/17/22 105 lb (47.6 kg)   08/26/22 118 lb 8 oz (53.8 kg)     There is no height or weight on file to calculate BMI.    CBC:   Lab Results   Component Value Date/Time    WBC 4.4 10/26/2022 05:02 AM    RBC 2.27 10/26/2022 05:02 AM    HGB 7.5 10/26/2022 05:02 AM    HCT 21.0 10/26/2022 05:02 AM    MCV 92.5 10/26/2022 05:02 AM    RDW 21.7 10/26/2022 05:02 AM    PLT 58 10/26/2022 05:02 AM       CMP:   Lab Results   Component Value Date/Time     10/26/2022 05:02 AM    K 4.3 10/26/2022 05:02 AM    K 4.7 10/21/2022 06:29 PM     10/26/2022 05:02 AM    CO2 26 10/26/2022 05:02 AM    BUN 28 10/26/2022 05:02 AM    CREATININE 0.5 10/26/2022 05:02 AM    GFRAA >60 10/17/2022 05:05 AM    AGRATIO 1.2 10/23/2022 09:03 AM    LABGLOM >60 10/26/2022 05:02 AM    GLUCOSE 171 10/26/2022 05:02 AM    PROT 5.9 10/23/2022 09:03 AM    CALCIUM 9.2 10/26/2022 05:02 AM    BILITOT 21.2 10/23/2022 09:03 AM    ALKPHOS 118 10/23/2022 09:03 AM    AST 64 10/23/2022 09:03 AM    ALT 34 10/23/2022 09:03 AM       POC Tests: No results for input(s): POCGLU, POCNA, POCK, POCCL, POCBUN, POCHEMO, POCHCT in the last 72 hours.     Coags:   Lab Results   Component Value Date/Time    PROTIME 27.1 10/21/2022 06:29 PM    INR 2.50 10/21/2022 06:29 PM    APTT 50.0 09/23/2022 10:30 AM       HCG (If Applicable):   Lab Results   Component Value Date    PREGTESTUR Negative 07/01/2022        ABGs:   Lab Results   Component Value Date/Time    PHART 7.473 04/26/2022 08:35 AM    PO2ART 273.0 04/26/2022 08:35 AM    MTN0YPE 26.9 04/26/2022 08:35 AM    HWV2TBD 19.7 04/26/2022 08:35 AM    BEART -3.7 04/26/2022 08:35 AM    B3MYTYCY >100.0 04/26/2022 08:35 AM        Type & Screen (If Applicable):  No results found for: LABABO, LABRH    Drug/Infectious Status (If Applicable):  No results found for: HIV, HEPCAB    COVID-19 Screening (If Applicable):   Lab Results   Component Value Date/Time    COVID19 Not Detected 07/01/2022 06:40 AM           Anesthesia Evaluation  Patient summary reviewed and Nursing notes reviewed no history of anesthetic complications:   Airway: Mallampati: II  TM distance: >3 FB   Neck ROM: full  Mouth opening: > = 3 FB   Dental: normal exam         Pulmonary:       (-) asthma and shortness of breath                           Cardiovascular:        (-) hypertension and  angina                Neuro/Psych:      (-) CVA           GI/Hepatic/Renal:   (+) liver disease (cirrhosis):,      (-) GERD       Endo/Other:    (+) blood dyscrasia: anemia and thrombocytopenia:., .    (-) diabetes mellitus, hypothyroidism               Abdominal:             Vascular:     - PVD. Other Findings:             Anesthesia Plan      MAC     ASA 3       Induction: intravenous. Anesthetic plan and risks discussed with patient. Plan discussed with CRNA.                     Zaria Hernandez MD   10/26/2022

## 2022-10-26 NOTE — PROGRESS NOTES
Physical Therapy  Mateus Quinones  PT reviewed patient's chart. Patient is currently STACY at ENDO. Will attempt to see patient later this date as the schedule allows. Thank you.   Osvaldo Burgess PT, DPT, [phone]

## 2022-10-26 NOTE — PROGRESS NOTES
Reviewed patient's medical and surgical history in electronic record and with patient at the bedside. All questions regarding procedure answered. Scope verified using 2 person system. Family in waiting room.      Electronically signed by Frandy Jerry RN on 10/26/2022 at 1:15 PM

## 2022-10-26 NOTE — CONSULTS
[facility]   CONSULT NOTE    10/26/2022 9:13 AM    Patient Information: Rashawn Peace   Date of Admit:  10/21/2022  Primary Care Physician:  No primary care provider on file. Requesting Physician:  Don Samaniego MD    Reason for consult:   Evaluation and recommendations for anemia. Chief complaint:    Chief Complaint   Patient presents with    Altered Mental Status     Pt brought in by [facility] EMT from home. Pt unable to speak, per emt, daughter reports AMS started 2 days ago and progressed. Pt jaundice and has HX of liver disease. History of Present Illness:  Rashawn Peace is a 40 y.o. female on Don Samaniego MD service who was admitted on 10/21/2022 for hepatic encephalopathy. The patient has a history of liver failure. She is jaundiced. She has history of variceal bleeding. She sees Dr. Dinorah Roberto for anemia and thrombocytopenia due to end stage liver disease. She is transfusion dependent. Previous bone marrow biopsy was negative. Sonja negative. Multiple circulating spur cells were noted on prior peripheral smear which raises the possibility of spur cell anemia, a rare case hemolytic anemia in patient's alcoholic liver cirrhosis, only treatment is liver transplant. Patient not ready for hospice and wishes to continue palliative transfusions. Past Medical History:     has a past medical history of Alcoholic liver disease ([address] Anemia, and History of blood transfusion.      Past Surgical History:    Past Surgical History:   Procedure Laterality Date    COLONOSCOPY N/A 3/11/2021    COLONOSCOPY POLYPECTOMY SNARE/COLD BIOPSY performed by Neal Frey MD at [facility]    CT BONE MARROW BIOPSY  7/5/2022    CT BONE [facility]ARROW [facility]    KNEE ARTHROPLASTY      TUBAL LIGATION      ESSURE    UPPER GASTROINTESTINAL ENDOSCOPY N/A 01/30/2021    EGD DIAGNOSTIC ONLY performed by Kenya Holden MD at [facility] N/A 03/10/2021 EGD BIOPSY performed by Brody Gould MD at [address] [place] [address] 6/27/2021    EGD BAND LIGATION performed by Charles Knox MD at [facility] N/A 4/27/2022    EGD DIAGNOSTIC ONLY performed by Hardik Kc MD at [address] [place] [address] 7/1/2022    EGD ESOPHAGOGASTRODUODENOSCOPY ULTRASOUND performed by Carolann Morales MD at [facility] N/A 7/1/2022    EGD BAND LIGATION performed by Carolann Morales MD at [address]        Current Medications:     folic acid  1 mg IntraVENous Daily    lactulose  20 g Oral TID    piperacillin-tazobactam  3,375 mg IntraVENous Q8H    furosemide  20 mg Oral Daily    nadolol  40 mg Oral Daily    pantoprazole  40 mg Oral QAM AC    rifAXIMin  550 mg Oral BID    spironolactone  100 mg Oral Daily    sodium chloride flush  5-40 mL IntraVENous 2 times per day       Allergies: Allergies   Allergen Reactions    Oxycodone Nausea And Vomiting        Social History:    reports that she has quit smoking. She has never used smokeless tobacco. She reports that she does not currently use alcohol. She reports that she does not use drugs. Family History:     family history includes Alcohol Abuse in her father. ROS:      Per HPI; otherwise 10 point ROS is negative    PHYSICAL EXAM:    Vitals:  Vitals:    10/26/22 0800   BP: 109/66   Pulse: 61   Resp: 16   Temp: 98 °F (36.7 °C)   SpO2: 100%        Intake/Output Summary (Last 24 hours) at 10/26/2022 0913  Last data filed at 10/25/2022 2105  Gross per 24 hour   Intake 582.92 ml   Output --   Net 582.92 ml      Wt Readings from Last 3 Encounters:   10/26/22 104 lb (47.2 kg)   10/17/22 105 lb (47.6 kg)   08/26/22 118 lb 8 oz (53.8 kg)        General appearance: Appears comfortable.  Well nourished  Eyes: Sclera clear, pupils equal  ENT: Moist mucus membranes, no thrush  Neck: Trachea midline, symmetrical  Cardiovascular: Regular rhythm, normal S1, S2. No murmur, gallop, rub. No edema in  lower extremities  Respiratory: Clear to auscultation bilaterally. No wheeze. Good inspiratory effort  Gastrointestinal: Abdomen soft, not tender, not distended, normal bowel sounds  Musculoskeletal: No cyanosis in digits, warm extremities  Neurologic: Cranial nerves grossly intact, no motor or speech deficits. Psychiatric: Normal affect. Alert and oriented to time, place and person. Skin: Warm, dry, normal turgor, no rash    DATA:  CBC:   Lab Results   Component Value Date/Time    WBC 4.4 10/26/2022 05:02 AM    RBC 2.27 10/26/2022 05:02 AM    HGB 7.5 10/26/2022 05:02 AM    HCT 21.0 10/26/2022 05:02 AM    MCV 92.5 10/26/2022 05:02 AM    MCH 33.1 10/26/2022 05:02 AM    MCHC 35.8 10/26/2022 05:02 AM    RDW 21.7 10/26/2022 05:02 AM    PLT 58 10/26/2022 05:02 AM    MPV 10.5 10/26/2022 05:02 AM     BMP:  Lab Results   Component Value Date/Time     10/26/2022 05:02 AM    K 4.3 10/26/2022 05:02 AM    K 4.7 10/21/2022 06:29 PM     10/26/2022 05:02 AM    CO2 26 10/26/2022 05:02 AM    BUN 28 10/26/2022 05:02 AM    CREATININE 0.5 10/26/2022 05:02 AM    CALCIUM 9.2 10/26/2022 05:02 AM    GFRAA >60 10/17/2022 05:05 AM    LABGLOM >60 10/26/2022 05:02 AM    GLUCOSE 171 10/26/2022 05:02 AM     Magnesium:   Lab Results   Component Value Date/Time    MG 1.80 06/15/2022 05:00 AM    MG 2.10 06/14/2022 06:06 AM    MG 1.90 06/13/2022 08:22 AM     LIVER PROFILE: No results for input(s): AST, ALT, LIPASE, BILIDIR, BILITOT, ALKPHOS in the last 72 hours. Invalid input(s):   AMYLASE,  ALB  PT/INR:    Lab Results   Component Value Date/Time    PROTIME 27.1 10/21/2022 06:29 PM    PROTIME 25.8 10/11/2022 08:28 PM    PROTIME 27.1 09/23/2022 10:30 AM    INR 2.50 10/21/2022 06:29 PM    INR 2.35 10/11/2022 08:28 PM    INR 2.50 09/23/2022 10:30 AM       IMPRESSION/RECOMMENDATIONS:    Principal Problem:    Hepatic encephalopathy  Active Problems:    Severe anemia    Alcoholic cirrhosis (HCC)    Complicated UTI (urinary tract infection)  Resolved Problems:    * No resolved hospital problems. *       38 year old female with a history of alcoholic liver cirrhosis, esophageal varices and portal hypertension. She has:     1. Anemia and thrombocytopenia  -Due to liver cirrhosis. -History of variceal bleeding. Last EGD 7/2022 with large esophageal varix treated with band ligation.   -Previous bone marrow biopsy was negative.   -Sonja negative. 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   -Transfusion dependent.  -Monitor platelet count, no indication to transfuse unless plt <10k or 50k with active bleeding.   -Transfuse PRBC if hgb <7.  - GI following, plans for repeat EGD    2. Hepatic encephalopathy   -She's on aldactone, lactulose and rifaximin. -GI following    3. UTI  - urology following  - on zosyn      This plan was discussed with the patient and he/she verbalized understanding. Thank you for allowing us to participate in the care of this patient. DARCY [facility]  [facility], [facility]  [phone]     Patient was seen and examined. Agree with above. She is concerned about dropping hemoglobin. Spoke to Dr. Laya Bocanegra who will be doing EGD later on today.   Continue Folic acid    Vance Fitzgerald MD

## 2022-10-26 NOTE — PROGRESS NOTES
Pt arrived from ENDO to PACU bay 6. Reported received from ENDO staff. Pt alert. Pt on RA, NSR, and VSS. Will continue to monitor.

## 2022-10-26 NOTE — PROGRESS NOTES
Pt meets criteria to be transferred back to -pt transferred back via stretcher/portable tele monitor/RN/transport

## 2022-10-26 NOTE — ANESTHESIA POSTPROCEDURE EVALUATION
Department of Anesthesiology  Postprocedure Note    Patient: Jakob Ren  MRN: [id number]  YOB: 1975  Date of evaluation: 10/26/2022      Procedure Summary     Date: 10/26/22 Room / Location: [address]    Anesthesia Start: 8008 Anesthesia Stop: 1718    Procedure: EGD DIAGNOSTIC ONLY (Abdomen) Diagnosis:       Anemia, unspecified type      (Anemia, unspecified type [D64.9])    Surgeons: Eulogio Hedrick MD Responsible Provider: Zenobia Spurling, MD    Anesthesia Type: MAC ASA Status: 3          Anesthesia Type: No value filed. Estiven Phase I: Estiven Score: 10    Estiven Phase II:        Anesthesia Post Evaluation    Patient location during evaluation: PACU  Level of consciousness: awake  Airway patency: patent  Complications: no  Cardiovascular status: hemodynamically stable  Respiratory status: acceptable  There was medical reason for not using a multimodal analgesia pain management approach.

## 2022-10-26 NOTE — PROGRESS NOTES
Pt arrived to endo preop. Skin Color jaundice with jaundice sclera. Teaching / education initiated regarding perioperative experience, expectations, and pain management during stay. Patient verbalized understanding.

## 2022-10-26 NOTE — BRIEF OP NOTE
Minimal/trace esoph varices. Scarring from previous bands  Portal hypertensive gastropathy. No hematin.        Rec:   EGD in 6 mo    Nathalie Patel MD  [address]

## 2022-10-26 NOTE — PROGRESS NOTES
Hospitalist Progress Note      PCP: No primary care provider on file. Date of Admission: 10/21/2022    Chief Complaint: AMS    Hospital Course: 39 yo F with history of alcoholic cirrhosis, hepatic encephalopathy, severe anemia, h/o esophageal varices came to ER with AMS. Admitted as inpatient for hepatic encephalopathy, E faecalis UTI and severe anemia. Transfused 1 U PRBC on 10/24. Seen by GI. Started on IV Zosyn for E faecalis UTI. Started on Lactulose. CT Ab/P on 10/24:  1. New air in the bladder and right intrarenal collecting system suggesting a   urinary tract infection with a gas-forming organism. Air in the bladder   could also be iatrogenic. A fistula associated with bowel is considered less   likely. 2. Hepatic cirrhosis and portal hypertension with splenomegaly,   recanalization of the umbilical vein and varices in the upper abdomen. 3. Cholelithiasis and thickening of the wall of the gallbladder with no   adjacent inflammatory changes. 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 Cholecystitis   is considered less likely. Urology consulted to evaluate. Saleem removed. Transfused 1 U PRBC on 10/25. Started on Folic Acid IV. Oncology following patient. For EGD on 10/26. Subjective: Patient denies abdominal pain. No CP, SOB, HA or fevers. Wants EGD.       Medications:  Reviewed    Infusion Medications    sodium chloride      sodium chloride 25 mL/hr (10/26/22 0109)     Scheduled Medications    folic acid  1 mg IntraVENous Daily    lactulose  20 g Oral TID    piperacillin-tazobactam  3,375 mg IntraVENous Q8H    furosemide  20 mg Oral Daily    nadolol  40 mg Oral Daily    pantoprazole  40 mg Oral QAM AC    rifAXIMin  550 mg Oral BID    spironolactone  100 mg Oral Daily    sodium chloride flush  5-40 mL IntraVENous 2 times per day     PRN Meds: sodium chloride, sodium chloride flush, sodium chloride, ondansetron **OR** ondansetron, midodrine, zolpidem      Intake/Output Summary (Last 24 hours) at 10/26/2022 1125  Last data filed at 10/25/2022 2105  Gross per 24 hour   Intake 342.92 ml   Output --   Net 342.92 ml         Physical Exam Performed:    BP (!) 101/47 Comment: Nurse Notified  Pulse 59   Temp 98.3 °F (36.8 °C) (Oral)   Resp 16   Ht 5' 4\" (1.626 m)   Wt 104 lb (47.2 kg)   SpO2 100%   BMI 17.85 kg/m²     General appearance: Jaundice, awake  HEENT: Pupils equal, round, and reactive to light. Conjunctivae/corneas clear. Neck: Supple, with full range of motion. No jugular venous distention. Trachea midline. Respiratory:  Normal respiratory effort. Clear to auscultation, bilaterally without Rales/Wheezes/Rhonchi. Cardiovascular: Regular rate and rhythm with normal S1/S2 without murmurs, rubs or gallops. Abdomen: Soft, not tender, distended with normal bowel sounds. Musculoskeletal: No clubbing, cyanosis or edema bilaterally. Full range of motion without deformity. Skin: Skin color, texture, turgor normal.  No rashes or lesions. Neurologic:  Neurovascularly intact without any focal sensory/motor deficits. Cranial nerves: II-XII intact, grossly non-focal.  Psychiatric: Alert and oriented, thought content appropriate, normal insight  Capillary Refill: Brisk, 3 seconds, normal   Peripheral Pulses: +2 palpable, equal bilaterally       Labs:   Recent Labs     10/24/22  1312 10/24/22  1937 10/25/22  0609 10/25/22  1650 10/25/22  2317 10/26/22  0502   WBC 4.4  --  4.3  --   --  4.4   HGB 6.4*   < > 7.2* 6.9* 8.1* 7.5*   HCT 17.7*   < > 20.1* 19.2* 22.6* 21.0*   PLT 62*  --  58*  --   --  58*    < > = values in this interval not displayed.        Recent Labs     10/24/22  1312 10/25/22  0609 10/26/22  0502    136 136   K 3.8 4.3 4.3    100 100   CO2 26 28 26   BUN 35* 32* 28*   CREATININE <0.5* <0.5* 0.5*   CALCIUM 9.8 9.7 9.2       No results for input(s): AST, ALT, BILIDIR, BILITOT, ALKPHOS in the last 72 hours. No results for input(s): INR in the last 72 hours. No results for input(s): Estle Carrow in the last 72 hours. Urinalysis:      Lab Results   Component Value Date/Time    NITRU POSITIVE 10/21/2022 06:00 PM    WBCUA 7 10/21/2022 06:00 PM    BACTERIA Rare 10/21/2022 06:00 PM    RBCUA 0-2 10/21/2022 06:00 PM    BLOODU Negative 10/21/2022 06:00 PM    SPECGRAV 1.020 10/21/2022 06:00 PM    GLUCOSEU Negative 10/21/2022 06:00 PM       Radiology:  CT ABDOMEN PELVIS WO CONTRAST Additional Contrast? Oral   Final Result   1. New air in the bladder and right intrarenal collecting system suggesting a   urinary tract infection with a gas-forming organism. Air in the bladder   could also be iatrogenic. A fistula associated with bowel is considered less   likely. 2. Hepatic cirrhosis and portal hypertension with splenomegaly,   recanalization of the umbilical vein and varices in the upper abdomen. 3. Cholelithiasis and thickening of the wall of the gallbladder with no   adjacent inflammatory changes. 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 Cholecystitis   is considered less likely. US ABDOMEN LIMITED Specify organ? LIVER   Final Result   Trace peritoneal fluid in the right upper quadrant, underlying the liver   parenchyma. No safe percutaneous window for fluid sampling identified at this time. XR CHEST PORTABLE   Final Result   No acute cardiopulmonary disease. CT Head W/O Contrast   Final Result   No acute intracranial findings. XR CHEST PORTABLE   Final Result   1. Technically suboptimal, expiratory phase respiration performed supine. 2. Cannot exclude pneumonia left lower lung. 3.  Follow-up full inspiration PA and lateral chest may be useful for better   characterization of pulmonary findings once the patient's condition allows.          IR US GUIDED PARACENTESIS    (Results Pending)           Assessment/Plan:    Active Hospital Problems    Diagnosis     Complicated UTI (urinary tract infection) [N39.0]      Priority: Medium    Alcoholic cirrhosis ([address] [W64.01]      Priority: Medium    Hepatic encephalopathy [K76.82]      Priority: Medium    Severe anemia [D64.9]      Priority: Medium     Urology consult for complicated UTI appreciated  EGD today  NPO  Start IV Folic acid while here  Continue IV Zosyn for UTI  Transfused 2 U PRBC since admission  Continue Lactulose and Rifaximin  PT/OT eval    DVT Prophylaxis: SCD  Diet: Diet NPO Exceptions are: Sips of Water with Meds  Code Status: Full Code  PT/OT Eval Status: Following    Dispo - SNF    Discussed with patient, Dr Errol Paula (Onc), Tin Liu (GI PA), nursing and CM. EGD today. SNF tomorrow.     Scot Aguilar MD

## 2022-10-26 NOTE — PROGRESS NOTES
[facility]   Phone: [phone]    Physical Therapy    [] Initial Evaluation            [x] Daily Treatment Note         [] Discharge Summary      Patient: Elvin Calixto   : 1975   MRN: [id number]   Date of Service:  10/26/2022  Admitting Diagnosis: Hepatic encephalopathy  Current Admission Summary: The pt was admitted with altered mental status due to cirrhosis and hepatic encephalopathy. Pt was discharged from the hospital last week. Pt has a UTI and was anemic as well. Past Medical History:  has a past medical history of Alcoholic liver disease ([facility] [address] Anemia, and History of blood transfusion. Past Surgical History:  has a past surgical history that includes Upper gastrointestinal endoscopy (N/A, 2021); Upper gastrointestinal endoscopy (N/A, 03/10/2021); Tubal ligation; Colonoscopy (N/A, 3/11/2021); Upper gastrointestinal endoscopy (N/A, 2021); Upper gastrointestinal endoscopy (N/A, 2022); Knee Arthroplasty; Upper gastrointestinal endoscopy (N/A, 2022); Upper gastrointestinal endoscopy (N/A, 2022); CT BIOPSY BONE MARROW (2022); and Upper gastrointestinal endoscopy (N/A, 10/26/2022). Discharge Recommendations: Elvin Calixto scored a 16/24 on the AM-PAC short mobility form. Current research shows that an AM-PAC score of 17 or less is typically not associated with a discharge to the patient's home setting. Based on the patient's AM-PAC score and their current functional mobility deficits, it is recommended that the patient have 3-5 sessions per week of Physical Therapy at d/c to increase the patient's independence. Please see assessment section for further patient specific details. If patient discharges prior to next session this note will serve as a discharge summary. Please see below for the latest assessment towards goals.     DME Required For Discharge: rolling walker  Precautions/Restrictions: medium fall risk, pt has been ambulating to the BR independently due to pt's preference and urgency with toileting, RN in room and PT encouraged pt to call so RN knows she is in the bathroom  Weight Bearing Restrictions: no restrictions  [] Right Upper Extremity  [] Left Upper Extremity [] Right Lower Extremity  [] Left Lower Extremity     Required Braces/Orthotics: no braces required   [] Right  [] Left  Positional Restrictions:no positional restrictions    Pre-Admission Information   Lives With: alone                     Type of Home:  duplex  Home Layout: one level  Home Access: level entry  Bathroom Layout: tub/shower unit  Bathroom Equipment:  none  Toilet Height: standard height  Home Equipment: no prior equipment  Transfer Assistance: Independent without use of device  Ambulation Assistance:Independent without use of device  ADL Assistance: independent with all ADL's  IADL Assistance: independent with homemaking tasks  Active :        [] Yes                 [x] No  Hand Dominance: [] Left                 [x] Right  Current Employment: unemployed, lost job in April, due to medical issues  Hobbies: spending time with daughters  Recent Falls: found down from last admission, third admission this fall      Subjective  General: Patient lying supine in bed with RN providing medication upon arrival. Patient weaning off anesthesia, agreeable to PT/OT. Pain: 0/10  Pain Interventions: not applicable       Functional Mobility  Bed Mobility  Supine to Sit: stand by assistance  Sit to Supine: stand by assistance  Scooting: stand by assistance  Comments: Patient performed with HOB elevated and use of R handrail. Transfers  Sit to stand transfer: stand by assistance  Stand to sit transfer: stand by assistance  Toilet transfer: stand by assistance  Comments: Patient performed STS from EOB, room chair, and toilet with RW placed in front from toilet and EOB 1x.    Ambulation  Surface:level surface  Assistive Device: rolling walker  Assistance: contact guard assistance  Distance: 20 feet  Gait Mechanics: Decreased step height/length, decreased adrian  Comments:  Patient ambulated from EOB to toilet with RW and CGA. Patient demonstrates decreased adrian with ambulation and slight unsteadiness. Ambulation Trial 2  Surface:level surface  Assistive Device: no device  Assistance: stand by assistance, contact guard assistance  Distance: 20 x2 feet  Gait Mechanics: Decreased step height/length, decreased adrian  Comments: Patient ambulated within room without AD requiring CGA-SBA with one seated rest break for ~1 minute. Stair Mobility  Stair mobility not completed on this date. Comments:  Wheelchair Mobility:  No w/c mobility completed on this date.   Comments:  Balance  Static Sitting Balance: good: independent with functional balance in unsupported position  Dynamic Sitting Balance: good: independent with functional balance in unsupported position  Static Standing Balance: fair: maintains balance at CGA without use of UE support  Dynamic Standing Balance: fair (-): maintains balance at CGA with use of UE support  Comments:    Other Therapeutic Interventions  See OT note for ADLs    Therapeutic Exercises:  Seated B marching 10x  Seated shoulder flexion/abduction 10x each    Functional Outcomes  AM-PAC Inpatient Mobility Raw Score : 16              Cognition  Overall Cognitive Status: Impaired  Comments: pt had some word finding issues and mild confusion while answering questions or sequencing tasks in the bathroom  Orientation:    alert and oriented x 4  Command Following:   accurately follows one step commands    Education  Barriers To Learning: cognition  Patient Education: patient educated on PT role and benefits, plan of care, discharge recommendations  Learning Assessment:  patient will require reinforcement due to cognitive deficits    Assessment  Activity Tolerance: Decreased due to fatigue and dizziness, /66 after toileting  Impairments Requiring Therapeutic Intervention: decreased strength, decreased cognition, decreased endurance, decreased balance  Prognosis: good  Clinical Assessment: Patient limited due to recent procedure affecting her from anesthesia. Patient demonstrates slight unsteadiness with initial ambulation, patient progressed demonstrating good balance and no signs of LOB following ambulation within room without AD. Patient requires CGA-SBA with all ambulation this visit. Patient demonstrated exercises within bed, limited by fatigue. Patient will continue to benefit from skilled PT in order to return to [facility] prior to d/c from hospital.   Safety Interventions: patient left in bed, bed alarm in place, call light within reach, gait belt, patient at risk for falls, and nurse notified    Plan  Frequency: 3-5 x/per week  Current Treatment Recommendations: strengthening, balance training, functional mobility training, transfer training, gait training, stair training, endurance training, neuromuscular re-education, and safety education    Goals  Patient Goals: regain strength before DC home, sell her home to downsize   Short Term Goals:  Time Frame:  To be met prior to DC  Patient will complete bed mobility at Independent   Patient will complete transfers at supervision   Patient will ambulate 100 ft with use of LRAD at stand by assistance  Patient will ascend/descend 12 stairs with (R) ascending handrail at minimal assistance  10/26: NO GOALS MET THIS VISIT    Therapy Session Time      Individual Group Co-treatment   Time In     1418   Time Out     1442   Minutes     24     Timed Code Treatment Minutes:  24 Minutes  Total Treatment Minutes: 24 Minutes       Electronically Signed By: Isabelajuana Sahu, PT   Isabela Leaks PT, DPT, [id number]

## 2022-10-27 LAB
ACANTHOCYTES: ABNORMAL
ANION GAP SERPL CALCULATED.3IONS-SCNC: 10 MMOL/L (ref 3–16)
ANISOCYTOSIS: ABNORMAL
BANDED NEUTROPHILS RELATIVE PERCENT: 2 % (ref 0–7)
BASOPHILS ABSOLUTE: 0 K/UL (ref 0–0.2)
BASOPHILS RELATIVE PERCENT: 1 %
BUN BLDV-MCNC: 29 MG/DL (ref 7–20)
BURR CELLS: ABNORMAL
CALCIUM SERPL-MCNC: 9.4 MG/DL (ref 8.3–10.6)
CHLORIDE BLD-SCNC: 102 MMOL/L (ref 99–110)
CO2: 26 MMOL/L (ref 21–32)
CREAT SERPL-MCNC: <0.5 MG/DL (ref 0.6–1.1)
EOSINOPHILS ABSOLUTE: 0.1 K/UL (ref 0–0.6)
EOSINOPHILS RELATIVE PERCENT: 3 %
GFR SERPL CREATININE-BSD FRML MDRD: >60 ML/MIN/{1.73_M2}
GLUCOSE BLD-MCNC: 92 MG/DL (ref 70–99)
HCT VFR BLD CALC: 22.1 % (ref 36–48)
HEMATOLOGY PATH CONSULT: NO
HEMOGLOBIN: 7.8 G/DL (ref 12–16)
LYMPHOCYTES ABSOLUTE: 0.7 K/UL (ref 1–5.1)
LYMPHOCYTES RELATIVE PERCENT: 18 %
MCH RBC QN AUTO: 33.2 PG (ref 26–34)
MCHC RBC AUTO-ENTMCNC: 35.5 G/DL (ref 31–36)
MCV RBC AUTO: 93.6 FL (ref 80–100)
MONOCYTES ABSOLUTE: 0.4 K/UL (ref 0–1.3)
MONOCYTES RELATIVE PERCENT: 10 %
NEUTROPHILS ABSOLUTE: 2.8 K/UL (ref 1.7–7.7)
NEUTROPHILS RELATIVE PERCENT: 66 %
PDW BLD-RTO: 22.5 % (ref 12.4–15.4)
PLATELET # BLD: 65 K/UL (ref 135–450)
PLATELET SLIDE REVIEW: ABNORMAL
PMV BLD AUTO: 10.5 FL (ref 5–10.5)
POLYCHROMASIA: ABNORMAL
POTASSIUM SERPL-SCNC: 4.5 MMOL/L (ref 3.5–5.1)
RBC # BLD: 2.36 M/UL (ref 4–5.2)
SCHISTOCYTES: ABNORMAL
SLIDE REVIEW: ABNORMAL
SODIUM BLD-SCNC: 138 MMOL/L (ref 136–145)
TARGET CELLS: ABNORMAL
WBC # BLD: 4.1 K/UL (ref 4–11)

## 2022-10-27 PROCEDURE — 97530 THERAPEUTIC ACTIVITIES: CPT

## 2022-10-27 PROCEDURE — 97116 GAIT TRAINING THERAPY: CPT

## 2022-10-27 PROCEDURE — 2060000000 HC ICU INTERMEDIATE R&B

## 2022-10-27 PROCEDURE — 85025 COMPLETE CBC W/AUTO DIFF WBC: CPT

## 2022-10-27 PROCEDURE — 2580000003 HC RX 258: Performed by: INTERNAL MEDICINE

## 2022-10-27 PROCEDURE — 36415 COLL VENOUS BLD VENIPUNCTURE: CPT

## 2022-10-27 PROCEDURE — 6370000000 HC RX 637 (ALT 250 FOR IP): Performed by: INTERNAL MEDICINE

## 2022-10-27 PROCEDURE — 97110 THERAPEUTIC EXERCISES: CPT

## 2022-10-27 PROCEDURE — 2500000003 HC RX 250 WO HCPCS: Performed by: INTERNAL MEDICINE

## 2022-10-27 PROCEDURE — 80048 BASIC METABOLIC PNL TOTAL CA: CPT

## 2022-10-27 PROCEDURE — 97535 SELF CARE MNGMENT TRAINING: CPT

## 2022-10-27 PROCEDURE — 6360000002 HC RX W HCPCS: Performed by: INTERNAL MEDICINE

## 2022-10-27 RX ORDER — LACTULOSE 10 G/15ML
20 SOLUTION ORAL 2 TIMES DAILY
Qty: 1 EACH | Refills: 0 | Status: ON HOLD
Start: 2022-10-27 | End: 2022-11-28 | Stop reason: HOSPADM

## 2022-10-27 RX ORDER — LACTULOSE 10 G/15ML
20 SOLUTION ORAL 2 TIMES DAILY
Status: DISCONTINUED | OUTPATIENT
Start: 2022-10-27 | End: 2022-10-29 | Stop reason: HOSPADM

## 2022-10-27 RX ORDER — FOLIC ACID 1 MG/1
1 TABLET ORAL DAILY
Qty: 30 TABLET | Refills: 0 | Status: ON HOLD
Start: 2022-10-27 | End: 2022-11-28 | Stop reason: SDUPTHER

## 2022-10-27 RX ADMIN — SODIUM CHLORIDE: 9 INJECTION, SOLUTION INTRAVENOUS at 18:50

## 2022-10-27 RX ADMIN — SPIRONOLACTONE 100 MG: 25 TABLET ORAL at 08:47

## 2022-10-27 RX ADMIN — FUROSEMIDE 20 MG: 20 TABLET ORAL at 08:47

## 2022-10-27 RX ADMIN — LACTULOSE 20 G: 20 SOLUTION ORAL at 19:28

## 2022-10-27 RX ADMIN — SODIUM CHLORIDE 25 ML: 9 INJECTION, SOLUTION INTRAVENOUS at 03:20

## 2022-10-27 RX ADMIN — SODIUM CHLORIDE: 9 INJECTION, SOLUTION INTRAVENOUS at 11:27

## 2022-10-27 RX ADMIN — RIFAXIMIN 550 MG: 550 TABLET ORAL at 08:48

## 2022-10-27 RX ADMIN — Medication 10 ML: at 08:48

## 2022-10-27 RX ADMIN — RIFAXIMIN 550 MG: 550 TABLET ORAL at 19:28

## 2022-10-27 RX ADMIN — ZOLPIDEM TARTRATE 5 MG: 5 TABLET ORAL at 22:26

## 2022-10-27 RX ADMIN — PANTOPRAZOLE SODIUM 40 MG: 40 TABLET, DELAYED RELEASE ORAL at 07:03

## 2022-10-27 RX ADMIN — FOLIC ACID 1 MG: 5 INJECTION, SOLUTION INTRAMUSCULAR; INTRAVENOUS; SUBCUTANEOUS at 08:48

## 2022-10-27 RX ADMIN — PIPERACILLIN AND TAZOBACTAM 3375 MG: 3; .375 INJECTION, POWDER, FOR SOLUTION INTRAVENOUS at 11:28

## 2022-10-27 RX ADMIN — PIPERACILLIN AND TAZOBACTAM 3375 MG: 3; .375 INJECTION, POWDER, FOR SOLUTION INTRAVENOUS at 18:51

## 2022-10-27 RX ADMIN — PIPERACILLIN AND TAZOBACTAM 3375 MG: 3; .375 INJECTION, POWDER, FOR SOLUTION INTRAVENOUS at 03:22

## 2022-10-27 RX ADMIN — Medication 10 ML: at 19:29

## 2022-10-27 ASSESSMENT — PAIN SCALES - GENERAL
PAINLEVEL_OUTOF10: 0

## 2022-10-27 NOTE — PROGRESS NOTES
[facility]   Phone: [phone]    Physical Therapy    [] Initial Evaluation            [x] Daily Treatment Note         [] Discharge Summary      Patient: Sommer Willis   : 1975   MRN: [id number]   Date of Service:  10/27/2022  Admitting Diagnosis: Hepatic encephalopathy  Current Admission Summary: The pt was admitted with altered mental status due to cirrhosis and hepatic encephalopathy. Pt was discharged from the hospital last week. Pt has a UTI and was anemic as well. Past Medical History:  has a past medical history of Alcoholic liver disease ([facility] [address] Anemia, and History of blood transfusion. Past Surgical History:  has a past surgical history that includes Upper gastrointestinal endoscopy (N/A, 2021); Upper gastrointestinal endoscopy (N/A, 03/10/2021); Tubal ligation; Colonoscopy (N/A, 3/11/2021); Upper gastrointestinal endoscopy (N/A, 2021); Upper gastrointestinal endoscopy (N/A, 2022); Knee Arthroplasty; Upper gastrointestinal endoscopy (N/A, 2022); Upper gastrointestinal endoscopy (N/A, 2022); CT BIOPSY BONE MARROW (2022); and Upper gastrointestinal endoscopy (N/A, 10/26/2022). Discharge Recommendations: Sommer Willis scored a 19/24 on the AM-PAC short mobility form. Current research shows that an AM-PAC score of 17 or less is typically not associated with a discharge to the patient's home setting. Based on the patient's AM-PAC score and their current functional mobility deficits, it is recommended that the patient have 3-5 sessions per week of Physical Therapy at d/c to increase the patient's independence. Please see assessment section for further patient specific details. Pt very weak, poor endurance, complex medical issues, frequent recent hospitalizations, needing 24 hr assist.    If patient discharges prior to next session this note will serve as a discharge summary.   Please see below for the latest assessment towards goals. DME Required For Discharge: rolling walker  Precautions/Restrictions: medium fall risk, pt has been ambulating to the BR independently due to pt's preference and urgency with toileting, RN in room and PT encouraged pt to call so RN knows she is in the bathroom  Weight Bearing Restrictions: no restrictions  [] Right Upper Extremity  [] Left Upper Extremity [] Right Lower Extremity  [] Left Lower Extremity     Required Braces/Orthotics: no braces required   [] Right  [] Left  Positional Restrictions:no positional restrictions    Pre-Admission Information   Lives With: alone                     Type of Home:  duplex  Home Layout: one level  Home Access: level entry  Bathroom Layout: tub/shower unit  Bathroom Equipment:  none  Toilet Height: standard height  Home Equipment: no prior equipment  Transfer Assistance: Independent without use of device  Ambulation Assistance:Independent without use of device  ADL Assistance: independent with all ADL's  IADL Assistance: independent with homemaking tasks  Active :        [] Yes                 [x] No  Hand Dominance: [] Left                 [x] Right  Current Employment: unemployed, lost job in April, due to medical issues  Hobbies: spending time with daughters  Recent Falls: found down from last admission, third admission this fall      Subjective  General: Patient up in bathroom upon arrival. Pt with tangled IV cord and lines. Pt agreeable to working with PT. Nursing reporting no alarm needed for pt. To go to bathroom. Pain: 0/10, but reports stiffness from being in bed so long. Pain Interventions: not applicable       Functional Mobility  Bed Mobility  Bed mobility not completed on this date. Comments: Pt up in room upon arrival and remained up in chair at end of session.   Transfers  Sit to stand transfer: supervision  Stand to sit transfer: supervision  Toilet transfer: supervision  Comments: Patient performed transfers from toilet and chair 4 times. VCs for hand placement and fully backing up to chair before attempting to stand. Ambulation  Surface:level surface  Assistive Device: rolling walker  Assistance: stand by assistance  Distance: 10 feet, 30 ft x 2  Gait Mechanics: Decreased step height/length, decreased adrian, narrow CHAYO  Comments:  Patient ambulated from toilet with RW to chair. Pt then amb in room with RW, reporting fatigue bilat UEs. Pt needing seated rest between walks. Fatigued and trying to sit quickly. Ambulation Trial 2  Surface:level surface  Assistive Device: no device  Other Appliance: IV pole  Assistance: stand by assistance  Distance: 20 ft  Gait Mechanics: Decreased step height/length, decreased adrian  Comments: Patient pushing IV, amb in room and sat in chair, needs placed in reach. Fatigued. Stair Mobility  Stair mobility not completed on this date. Comments:  Wheelchair Mobility:  No w/c mobility completed on this date. Comments:  Balance  Static Sitting Balance: good: independent with functional balance in unsupported position  Dynamic Sitting Balance: good: independent with functional balance in unsupported position  Static Standing Balance: fair (+): maintains balance at SBA/supervision without use of UE support  Dynamic Standing Balance: fair (+): maintains balance at SBA/supervision without use of UE support  Comments: RW for distance amb, short distances without need of AD. Other Therapeutic Interventions  Pt toileting independently. Assisted with line management. Discussed DC plan. In seated performed there ex including HR/TR x 10 bilat, LAQs 2x10 bilat, marching x 10 reps. Fatigue and needing frequent rest breaks, progressive fatgue during session.        Functional Outcomes  AM-PAC Inpatient Mobility Raw Score : 19              Cognition  Overall Cognitive Status: Impaired  Comments: pt had some word finding issues and mild confusion while answering questions or sequencing tasks in the bathroom  Orientation:    alert and oriented x 4  Command Following:   accurately follows one step commands    Education  Barriers To Learning: cognition  Patient Education: patient educated on PT role and benefits, plan of care, discharge recommendations  Learning Assessment:  patient will require reinforcement due to cognitive deficits    Assessment  Activity Tolerance: Decreased due to fatigue and endurance. Impairments Requiring Therapeutic Intervention: decreased strength, decreased cognition, decreased endurance, decreased balance  Prognosis: good  Clinical Assessment: Patient SBA amb. Needing RW for longer distance amb and short distances without AD use. Patient demonstrates progressive fatigue and UEs/LEs weak and shaky at end of walking. Patient will continue to benefit from skilled PT in order to return to [facility] prior to d/c from hospital. Pt very weak, poor endurance, complex medical issues, frequent recent hospitalizations, needing 24 hr assist.  Safety Interventions: patient left in bed, bed alarm in place, call light within reach, gait belt, patient at risk for falls, and nurse notified    Plan  Frequency: 3-5 x/per week  Current Treatment Recommendations: strengthening, balance training, functional mobility training, transfer training, gait training, stair training, endurance training, neuromuscular re-education, and safety education    Goals  Patient Goals: regain strength before DC home, sell her home to downsize   Short Term Goals:  Time Frame:  To be met prior to DC  Patient will complete bed mobility at Independent   Patient will complete transfers at supervision -Goal met 1027  Patient will ambulate 100 ft with use of LRAD at stand by assistance  Patient will ascend/descend 12 stairs with (R) ascending handrail at minimal assistance  10/26: NO GOALS MET THIS VISIT    Therapy Session Time      Individual Group Co-treatment   Time In 1509       Time Out 1543       Minutes 34         Total Treatment Minutes: 34 Minutes       Electronically Signed By: Becca Cohen, [address] [facility]

## 2022-10-27 NOTE — PROGRESS NOTES
[facility] [facility]   Phone: [phone]    Occupational Therapy    [] Initial Evaluation            [x] Daily Treatment Note         [] Discharge Summary      Patient: Jolene Bowie   : 1975   MRN: [id number]   Date of Service:  10/27/2022    Admitting Diagnosis:  Hepatic encephalopathy    Current Admission Summary: 40 y.o. female with a PMHx listed below who presented to the ED for evaluation of altered mental status. Pt just discharged on 10/18/22 after treatment for acute hepatic encephalopathy. While in the ER, her daughter explained that she started becoming altered nearly after she left. Her confusion has worsened since then. She was reportedly found wandering outside when EMS was called by bystanders. Past Medical History:  has a past medical history of Alcoholic liver disease ([facility] [address] Anemia, and History of blood transfusion. Past Surgical History:  has a past surgical history that includes Upper gastrointestinal endoscopy (N/A, 2021); Upper gastrointestinal endoscopy (N/A, 03/10/2021); Tubal ligation; Colonoscopy (N/A, 3/11/2021); Upper gastrointestinal endoscopy (N/A, 2021); Upper gastrointestinal endoscopy (N/A, 2022); Knee Arthroplasty; Upper gastrointestinal endoscopy (N/A, 2022); Upper gastrointestinal endoscopy (N/A, 2022); CT BIOPSY BONE MARROW (2022); and Upper gastrointestinal endoscopy (N/A, 10/26/2022). Discharge Recommendations: Jolene Bowie scored a 22/24 on the AM-PAC ADL Inpatient form. Current research shows that an AM-PAC score of 17 or less is typically not associated with a discharge to the patient's home setting. Based on the patient's AM-PAC score and their current ADL deficits, it is recommended that the patient have 3-5 sessions per week of Occupational Therapy at d/c to increase the patient's independence. Please see assessment section for further patient specific details.     Continue to recommend a short SNF stay to increase endurance for ADLs and IADLs  with goal of patient being able to live in AL with very little assistance. Patient currently has an ampac score of 22/24, however needs increased endurance for homemaking and ADL performance due to was living alone and now wanting to move to an AL facility. If patient discharges prior to next session this note will serve as a discharge summary. Please see below for the latest assessment towards goals. DME Required For Discharge: DME to be determined at next level of care      Precautions/Restrictions: high fall risk, up as tolerated  Weight Bearing Restrictions: no restrictions  [] Right Upper Extremity  [] Left Upper Extremity [] Right Lower Extremity  [] Left Lower Extremity     Required Braces/Orthotics: no braces required   [] Right  [] Left  Positional Restrictions:no positional restrictions    Pre-Admission Information   Lives With: alone    Type of Home:  duplex  Home Layout: one level  Home Access: level entry  Bathroom Layout: tub/shower unit  Bathroom Equipment:  none  Toilet Height: standard height  Home Equipment: no prior equipment  Transfer Assistance: Independent without use of device  Ambulation Assistance:Independent without use of device  ADL Assistance: independent with all ADL's  IADL Assistance: independent with homemaking tasks  Active :        [] Yes  [x] No  Hand Dominance: [] Left  [x] Right  Current Employment: unemployed, lost job in April, due to medical issues  Hobbies: spending time with daughters  Recent Falls: found down from last admission        Subjective  General: Patient sitting EOB, very pleasant and cooperative however very concerned about missing a phone call regarding her disability.   Pain: 0/10-- stated \"discomfort in abdominal area, no pain\"   Pain Interventions: not applicable and repositioned      While taking off tape when had IV wrapped in plastic for shower, cut skin in small area around right elbow, small amount of bleeding. Covered in band-aid and notified nurse. Activities of Daily Living  Basic Activities of Daily Living  Feeding: Independent  Grooming: Independent  Grooming Comments: brushing hair, washing face  Upper Extremity Bathing: Independent  Lower Extremity Bathing: contact guard assistance   Bathing Comments: CGA provided when standing to wash buttocks/periarea in shower stall   Upper Extremity Dressing: setup assistance  Lower Extremity Dressing: setup assistance  Dressing Comments: setup to don pants, underwear and socks   Toileting: Independent. Toileting Comments: patient indep going to bathroom on own. General Comments: performed full shower in shower stall, washed hair, fully dressed      Instrumental Activities of Daily Living  Light Housekeeping Comments: patient stripped sheets off of bed and put pillowcase on. OT sanitized bed and replaced fitted sheet. Functional Mobility  Bed Mobility  Supine to Sit: Independent  Sit to Supine: Independent  Rolling Left: Independent  Rolling Right: Independent  Scooting: Independent  Comments:  Transfers  Sit to stand transfer:Independent  Stand to sit transfer: Independent  Bed / Chair transfer: Independent. Toilet transfer: Independent  Toilet transfer comments: patient reports is ambulating indep in room   Shower transfer: contact guard assistance  Shower transfer comments: CGA with use of grab bar  Comments:  Functional Mobility:  Sitting Balance: Independent. Standing Balance: supervision. Functional Mobility: . Independent  Functional Mobility Activity: retrieve items, to/from bathroom  Functional Mobility Device Use: no device  Functional Mobility Comment: ambulated in room without device, in/out of bathroom indep, provided CGA into and out of shower stall and use of grab bar.      Other Therapeutic Interventions     Functional Outcomes  AM-PAC Inpatient Daily Activity Raw Score: 22    Cognition  WFL-- appeared functional this date. Orientation:    alert and oriented x 4  Command Following:   accurately follows one step commands     Education  Barriers To Learning: none  Patient Education: patient educated on goals, OT role and benefits, plan of care, precautions, discharge recommendations  Learning Assessment:  patient verbalizes understanding, would benefit from continued reinforcement    Assessment  Activity Tolerance: Tolerated fair (limited by fatigue, general weakness), very medically ill. Impairments Requiring Therapeutic Intervention: decreased functional mobility, decreased ADL status, decreased endurance, decreased balance, decreased IADL, decreased posture  Prognosis: fair      Clinical Assessment: Patient presents with the above deficits, which are below baseline; pt is requiring increased assist with mobility secondary to decreased weakness and balance. Pt would benefit from continued skilled OT to address.  Patient lives alone, verbalizes limited to no support, recurrent hospitalizations      Safety Interventions: patient left in bed, call light within reach, patient at risk for falls, telesitter in use, nurse notified, and patient declined bed alarm     Plan  Frequency: 3-5 x/per week  Current Treatment Recommendations: strengthening, balance training, functional mobility training, transfer training, endurance training, patient/caregiver education, ADL/self-care training, cognitive reorientation, safety education, and equipment evaluation/education    Goals  Patient Goals: Home   Short Term Goals:  Time Frame: Upon discharge  Patient will complete lower body ADL at modified independent --setup 10/27  Patient will complete toileting at modified independent -- goal met 10/27  Patient will complete functional transfers at stand by assistance -- CGA into and out of shower stall, indep toilet and bed transfers 10/27  Patient will complete functional mobility at stand by assistance -- goal met 10/27  Patient will increase functional standing balance to 5-7 minutes supervision for improved ADL completion-- continue goal 10/27  Patient will complete bed mobility at modified independent -- goal met 10/27  New goal 10/27/22-- indep simple homemaking tasks   -progressing towards goals, continue POC    Therapy Session Time     Individual Group Co-treatment   Time In 0854     Time Out 0955     Minutes 61          Timed Code Treatment Minutes:   61  Total Treatment Minutes:  61       Electronically Signed By: Gladis Perez, OTR/L [phone]

## 2022-10-27 NOTE — ACP (ADVANCE CARE PLANNING)
Advanced Care Planning Note. Purpose of Encounter: Advanced care planning in light of alcoholic cirrhosis  Parties In Attendance: Patient  Decisional Capacity: Yes  Subjective: Patient is weak and tired  Objective: Cr < 0.5  Goals of Care Determination: Patient wants full support (CPR, vent, surgery, HD, trach, PEG)  Plan:  IV Abx, CT Ab/P, GI and Urology consults, PT/OT, SNF placement  Code Status: Full code   Time spent on Advanced care Plannin minutes  Advanced Care Planning Documents: Completed advanced directives on chart, daughter is the POA.     Ji Arauz MD  10/27/2022 4:23 PM

## 2022-10-27 NOTE — PROGRESS NOTES
Hospitalist Progress Note      PCP: No primary care provider on file. Date of Admission: 10/21/2022    Chief Complaint: AMS    Hospital Course: 41 yo F with history of alcoholic cirrhosis, hepatic encephalopathy, severe anemia, h/o esophageal varices came to ER with AMS. Admitted as inpatient for hepatic encephalopathy, E faecalis UTI and severe anemia. Transfused 1 U PRBC on 10/24. Seen by GI. Started on IV Zosyn for E faecalis UTI. Started on Lactulose. CT Ab/P on 10/24:  1. New air in the bladder and right intrarenal collecting system suggesting a   urinary tract infection with a gas-forming organism. Air in the bladder   could also be iatrogenic. A fistula associated with bowel is considered less   likely. 2. Hepatic cirrhosis and portal hypertension with splenomegaly,   recanalization of the umbilical vein and varices in the upper abdomen. 3. Cholelithiasis and thickening of the wall of the gallbladder with no   adjacent inflammatory changes. 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 Cholecystitis   is considered less likely. Urology consulted to evaluate. Saleem removed. Transfused 1 U PRBC on 10/25. Started on Folic Acid IV. Oncology following patient. EGD on 10/26:  Minimal/trace esoph varices. Scarring from previous bands  Portal hypertensive gastropathy. No hematin. Rec:   EGD in 6 mo    Subjective: Patient denies abdominal pain. No CP, SOB, HA or fevers.         Medications:  Reviewed    Infusion Medications    sodium chloride      sodium chloride 25 mL/hr at 10/27/22 1127     Scheduled Medications    lactulose  20 g Oral BID    folic acid  1 mg IntraVENous Daily    piperacillin-tazobactam  3,375 mg IntraVENous Q8H    furosemide  20 mg Oral Daily    nadolol  40 mg Oral Daily    pantoprazole  40 mg Oral QAM AC    rifAXIMin  550 mg Oral BID    spironolactone  100 mg Oral Daily    sodium chloride flush  5-40 mL IntraVENous 2 times per day     PRN Meds: sodium chloride, sodium chloride flush, sodium chloride, ondansetron **OR** ondansetron, midodrine, zolpidem      Intake/Output Summary (Last 24 hours) at 10/27/2022 1621  Last data filed at 10/27/2022 0830  Gross per 24 hour   Intake 245 ml   Output --   Net 245 ml         Physical Exam Performed:    /62   Pulse 61   Temp 98 °F (36.7 °C) (Oral)   Resp 16   Ht 5' 4\" (1.626 m)   Wt 104 lb (47.2 kg)   LMP 05/06/2021 Comment: Essure  SpO2 100%   BMI 17.85 kg/m²     General appearance: Jaundice, awake  HEENT: Pupils equal, round, and reactive to light. Conjunctivae/corneas clear. Neck: Supple, with full range of motion. No jugular venous distention. Trachea midline. Respiratory:  Normal respiratory effort. Clear to auscultation, bilaterally without Rales/Wheezes/Rhonchi. Cardiovascular: Regular rate and rhythm with normal S1/S2 without murmurs, rubs or gallops. Abdomen: Soft, not tender, distended with normal bowel sounds. Musculoskeletal: No clubbing, cyanosis or edema bilaterally. Full range of motion without deformity. Skin: Skin color, texture, turgor normal.  No rashes or lesions. Neurologic:  Neurovascularly intact without any focal sensory/motor deficits. Cranial nerves: II-XII intact, grossly non-focal.  Psychiatric: Alert and oriented, thought content appropriate, normal insight  Capillary Refill: Brisk, 3 seconds, normal   Peripheral Pulses: +2 palpable, equal bilaterally       Labs:   Recent Labs     10/25/22  0609 10/25/22  1650 10/26/22  0502 10/26/22  1845 10/27/22  0529   WBC 4.3  --  4.4  --  4.1   HGB 7.2*   < > 7.5* 8.7* 7.8*   HCT 20.1*   < > 21.0* 24.4* 22.1*   PLT 58*  --  58*  --  65*    < > = values in this interval not displayed.        Recent Labs     10/25/22  0609 10/26/22  0502 10/27/22  0529    136 138   K 4.3 4.3 4.5    100 102   CO2 28 26 26   BUN 32* 28* 29*   CREATININE <0.5* 0.5* <0.5*   CALCIUM 9.7 9.2 9.4       No results for input(s): AST, ALT, BILIDIR, BILITOT, ALKPHOS in the last 72 hours. No results for input(s): INR in the last 72 hours. No results for input(s): Norm[place]ean [place] in the last 72 hours. Urinalysis:      Lab Results   Component Value Date/Time    NITRU POSITIVE 10/21/2022 06:00 PM    WBCUA 7 10/21/2022 06:00 PM    BACTERIA Rare 10/21/2022 06:00 PM    RBCUA 0-2 10/21/2022 06:00 PM    BLOODU Negative 10/21/2022 06:00 PM    SPECGRAV 1.020 10/21/2022 06:00 PM    GLUCOSEU Negative 10/21/2022 06:00 PM       Radiology:  CT ABDOMEN PELVIS WO CONTRAST Additional Contrast? Oral   Final Result   1. New air in the bladder and right intrarenal collecting system suggesting a   urinary tract infection with a gas-forming organism. Air in the bladder   could also be iatrogenic. A fistula associated with bowel is considered less   likely. 2. Hepatic cirrhosis and portal hypertension with splenomegaly,   recanalization of the umbilical vein and varices in the upper abdomen. 3. Cholelithiasis and thickening of the wall of the gallbladder with no   adjacent inflammatory changes. 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 Cholecystitis   is considered less likely. US ABDOMEN LIMITED Specify organ? LIVER   Final Result   Trace peritoneal fluid in the right upper quadrant, underlying the liver   parenchyma. No safe percutaneous window for fluid sampling identified at this time. XR CHEST PORTABLE   Final Result   No acute cardiopulmonary disease. CT Head W/O Contrast   Final Result   No acute intracranial findings. XR CHEST PORTABLE   Final Result   1. Technically suboptimal, expiratory phase respiration performed supine. 2. Cannot exclude pneumonia left lower lung.    3.  Follow-up full inspiration PA and lateral chest may be useful for better   characterization of pulmonary findings once the patient's condition allows. IR US GUIDED PARACENTESIS    (Results Pending)           Assessment/Plan:    Active Hospital Problems    Diagnosis     Complicated UTI (urinary tract infection) [N39.0]      Priority: Medium    Alcoholic cirrhosis ([address] [H43.11]      Priority: Medium    Hepatic encephalopathy [K76.82]      Priority: Medium    Severe anemia [D64.9]      Priority: Medium     Urology consult for complicated UTI appreciated  Regular diet  Counseled extensively on EtOH and mental/psychosocial barriers in regards to her disease process  Continue IV Folic acid while here  Finish 7 days IV Zosyn for UTI  Transfused 2 U PRBC since admission  Continue Lactulose and Rifaximin  PT/OT eval    DVT Prophylaxis: SCD  Diet: ADULT DIET; Regular  Code Status: Full Code  PT/OT Eval Status: Following    Dispo - SNF    Discussed with patient, nursing and CM. Medically stable for DC to SNF.     Abimbola Hernandez MD

## 2022-10-27 NOTE — PLAN OF CARE
Problem: Safety - Adult  Goal: Free from fall injury  Outcome: Progressing  Note: Safety precautions in place. Bed locked, lowest position, call loght in reach, gripper socks. Educated on asking for help when needed. No falls or physical injury noted.      Problem: ABCDS Injury Assessment  Goal: Absence of physical injury  Outcome: Progressing

## 2022-10-27 NOTE — PROGRESS NOTES
[facility]   Progress Note      10/27/2022 8:37 AM        Name: Leah Garcia . Admitted: 10/21/2022    SUBJECTIVE:  she is feeling better, denies bleeding, no SOB, denies pain. Reviewed interval ancillary notes    Current Medications  folic acid injection 1 mg, Daily  lactulose (CHRONULAC) 10 GM/15ML solution 20 g, TID  0.9 % sodium chloride infusion, PRN  piperacillin-tazobactam (ZOSYN) 3,375 mg in dextrose 5 % 50 mL IVPB extended infusion (mini-bag), Q8H  furosemide (LASIX) tablet 20 mg, Daily  nadolol (CORGARD) tablet 40 mg, Daily  pantoprazole (PROTONIX) tablet 40 mg, QAM AC  rifAXIMin (XIFAXAN) tablet 550 mg, BID  spironolactone (ALDACTONE) tablet 100 mg, Daily  sodium chloride flush 0.9 % injection 5-40 mL, 2 times per day  sodium chloride flush 0.9 % injection 5-40 mL, PRN  0.9 % sodium chloride infusion, PRN  ondansetron (ZOFRAN-ODT) disintegrating tablet 4 mg, Q8H PRN   Or  ondansetron (ZOFRAN) injection 4 mg, Q6H PRN  midodrine (PROAMATINE) tablet 5 mg, TID PRN  zolpidem (AMBIEN) tablet 5 mg, Nightly PRN        Objective:  /60   Pulse 58   Temp 98.2 °F (36.8 °C) (Oral)   Resp 16   Ht 5' 4\" (1.626 m)   Wt 104 lb (47.2 kg)   LMP 05/06/2021 Comment: Essure  SpO2 100%   BMI 17.85 kg/m²     Intake/Output Summary (Last 24 hours) at 10/27/2022 0837  Last data filed at 10/26/2022 2009  Gross per 24 hour   Intake 205 ml   Output --   Net 205 ml      Wt Readings from Last 3 Encounters:   10/26/22 104 lb (47.2 kg)   10/17/22 105 lb (47.6 kg)   08/26/22 118 lb 8 oz (53.8 kg)       General appearance:  Appears comfortable  Eyes: Sclera clear. Pupils equal.  ENT: Moist oral mucosa. Trachea midline, no adenopathy. Cardiovascular: Regular rhythm, normal S1, S2. No murmur. No edema in lower extremities  Respiratory: Not using accessory muscles. Good inspiratory effort. Clear to auscultation bilaterally, no wheeze or crackles.    GI: Abdomen soft, no tenderness, not distended  Musculoskeletal: No cyanosis in digits, neck supple  Neurology: CN 2-12 grossly intact. No speech or motor deficits  Psych: Normal affect. Alert and oriented in time, place and person  Skin: Warm, dry, normal turgor    Labs and Tests:  CBC:   Recent Labs     10/25/22  0609 10/25/22  1650 10/26/22  0502 10/26/22  1845 10/27/22  0529   WBC 4.3  --  4.4  --  4.1   HGB 7.2*   < > 7.5* 8.7* 7.8*   PLT 58*  --  58*  --  65*    < > = values in this interval not displayed. BMP:    Recent Labs     10/25/22  0609 10/26/22  0502 10/27/22  0529    136 138   K 4.3 4.3 4.5    100 102   CO2 28 26 26   BUN 32* 28* 29*   CREATININE <0.5* 0.5* <0.5*   GLUCOSE 155* 171* 92     Hepatic: No results for input(s): AST, ALT, ALB, BILITOT, ALKPHOS in the last 72 hours. ASSESSMENT AND PLAN    Principal Problem:    Hepatic encephalopathy  Active Problems:    Severe anemia    Alcoholic cirrhosis (HCC)    Complicated UTI (urinary tract infection)  Resolved Problems:    * No resolved hospital problems. *      1. Anemia and thrombocytopenia  -Due to liver cirrhosis. -History of variceal bleeding. Last EGD 7/2022 with large esophageal varix treated with band ligation.   -Previous bone marrow biopsy was negative.   -Sonja negative. 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   -Bone marrow biopsy negative   -Transfusion dependent.  -Monitor platelet count, no indication to transfuse unless plt <10k or 50k with active bleeding.   -Transfuse PRBC if hgb <7.  - EGD on 10/26/22 showed Minimal/trace esoph varices. Scarring from previous bands. Portal hypertensive gastropathy. - continue supportive care     2. Hepatic encephalopathy   -She's on aldactone, lactulose and rifaximin. -GI following     3.  UTI  - urology following  - on zosyn Evia Severs, [address] [facility]  [facility]         Agree with the above note. Continue supportive care.  No indication for any transfusions today    Maame Alvarado MD  [facility]

## 2022-10-28 PROBLEM — E44.0 MODERATE MALNUTRITION (HCC): Chronic | Status: ACTIVE | Noted: 2022-10-28

## 2022-10-28 LAB
ABO/RH: NORMAL
ANION GAP SERPL CALCULATED.3IONS-SCNC: 8 MMOL/L (ref 3–16)
ANTIBODY SCREEN: NORMAL
BASOPHILS ABSOLUTE: 0.1 K/UL (ref 0–0.2)
BASOPHILS RELATIVE PERCENT: 1.4 %
BLOOD BANK DISPENSE STATUS: NORMAL
BLOOD BANK PRODUCT CODE: NORMAL
BLOOD SMEAR REVIEW: NORMAL
BPU ID: NORMAL
BUN BLDV-MCNC: 31 MG/DL (ref 7–20)
CALCIUM SERPL-MCNC: 9.3 MG/DL (ref 8.3–10.6)
CHLORIDE BLD-SCNC: 98 MMOL/L (ref 99–110)
CO2: 26 MMOL/L (ref 21–32)
CREAT SERPL-MCNC: 0.8 MG/DL (ref 0.6–1.1)
DESCRIPTION BLOOD BANK: NORMAL
EOSINOPHILS ABSOLUTE: 0.1 K/UL (ref 0–0.6)
EOSINOPHILS RELATIVE PERCENT: 2.3 %
GFR SERPL CREATININE-BSD FRML MDRD: >60 ML/MIN/{1.73_M2}
GLUCOSE BLD-MCNC: 138 MG/DL (ref 70–99)
HCT VFR BLD CALC: 19.4 % (ref 36–48)
HCT VFR BLD CALC: 23.4 % (ref 36–48)
HEMATOLOGY PATH CONSULT: NO
HEMOGLOBIN: 6.8 G/DL (ref 12–16)
HEMOGLOBIN: 8.2 G/DL (ref 12–16)
LV EF: 63 %
LVEF MODALITY: NORMAL
LYMPHOCYTES ABSOLUTE: 0.5 K/UL (ref 1–5.1)
LYMPHOCYTES RELATIVE PERCENT: 12.9 %
MCH RBC QN AUTO: 32.9 PG (ref 26–34)
MCHC RBC AUTO-ENTMCNC: 35.2 G/DL (ref 31–36)
MCV RBC AUTO: 93.4 FL (ref 80–100)
MONOCYTES ABSOLUTE: 0.7 K/UL (ref 0–1.3)
MONOCYTES RELATIVE PERCENT: 15.9 %
NEUTROPHILS ABSOLUTE: 2.8 K/UL (ref 1.7–7.7)
NEUTROPHILS RELATIVE PERCENT: 67.5 %
PDW BLD-RTO: 22.6 % (ref 12.4–15.4)
PLATELET # BLD: 53 K/UL (ref 135–450)
PLATELET SLIDE REVIEW: ABNORMAL
PMV BLD AUTO: 9.3 FL (ref 5–10.5)
POTASSIUM SERPL-SCNC: 4.6 MMOL/L (ref 3.5–5.1)
RBC # BLD: 2.07 M/UL (ref 4–5.2)
SLIDE REVIEW: ABNORMAL
SODIUM BLD-SCNC: 132 MMOL/L (ref 136–145)
WBC # BLD: 4.1 K/UL (ref 4–11)

## 2022-10-28 PROCEDURE — 6370000000 HC RX 637 (ALT 250 FOR IP): Performed by: INTERNAL MEDICINE

## 2022-10-28 PROCEDURE — 6360000002 HC RX W HCPCS: Performed by: INTERNAL MEDICINE

## 2022-10-28 PROCEDURE — 86850 RBC ANTIBODY SCREEN: CPT

## 2022-10-28 PROCEDURE — 86901 BLOOD TYPING SEROLOGIC RH(D): CPT

## 2022-10-28 PROCEDURE — P9016 RBC LEUKOCYTES REDUCED: HCPCS

## 2022-10-28 PROCEDURE — 2500000003 HC RX 250 WO HCPCS: Performed by: INTERNAL MEDICINE

## 2022-10-28 PROCEDURE — 93308 TTE F-UP OR LMTD: CPT

## 2022-10-28 PROCEDURE — 2580000003 HC RX 258: Performed by: INTERNAL MEDICINE

## 2022-10-28 PROCEDURE — 85014 HEMATOCRIT: CPT

## 2022-10-28 PROCEDURE — 85025 COMPLETE CBC W/AUTO DIFF WBC: CPT

## 2022-10-28 PROCEDURE — 86900 BLOOD TYPING SEROLOGIC ABO: CPT

## 2022-10-28 PROCEDURE — 80048 BASIC METABOLIC PNL TOTAL CA: CPT

## 2022-10-28 PROCEDURE — 36430 TRANSFUSION BLD/BLD COMPNT: CPT

## 2022-10-28 PROCEDURE — 36415 COLL VENOUS BLD VENIPUNCTURE: CPT

## 2022-10-28 PROCEDURE — 93325 DOPPLER ECHO COLOR FLOW MAPG: CPT

## 2022-10-28 PROCEDURE — 97530 THERAPEUTIC ACTIVITIES: CPT

## 2022-10-28 PROCEDURE — 86923 COMPATIBILITY TEST ELECTRIC: CPT

## 2022-10-28 PROCEDURE — 2060000000 HC ICU INTERMEDIATE R&B

## 2022-10-28 PROCEDURE — 85018 HEMOGLOBIN: CPT

## 2022-10-28 PROCEDURE — 93321 DOPPLER ECHO F-UP/LMTD STD: CPT

## 2022-10-28 PROCEDURE — 94760 N-INVAS EAR/PLS OXIMETRY 1: CPT

## 2022-10-28 RX ORDER — SODIUM CHLORIDE 9 MG/ML
INJECTION, SOLUTION INTRAVENOUS PRN
Status: DISCONTINUED | OUTPATIENT
Start: 2022-10-28 | End: 2022-10-29 | Stop reason: HOSPADM

## 2022-10-28 RX ORDER — SODIUM CHLORIDE 9 MG/ML
INJECTION, SOLUTION INTRAVENOUS
Status: DISCONTINUED
Start: 2022-10-28 | End: 2022-10-29

## 2022-10-28 RX ADMIN — RIFAXIMIN 550 MG: 550 TABLET ORAL at 09:05

## 2022-10-28 RX ADMIN — PIPERACILLIN AND TAZOBACTAM 3375 MG: 3; .375 INJECTION, POWDER, FOR SOLUTION INTRAVENOUS at 12:52

## 2022-10-28 RX ADMIN — RIFAXIMIN 550 MG: 550 TABLET ORAL at 19:30

## 2022-10-28 RX ADMIN — LACTULOSE 20 G: 20 SOLUTION ORAL at 19:30

## 2022-10-28 RX ADMIN — Medication 10 ML: at 19:31

## 2022-10-28 RX ADMIN — FUROSEMIDE 20 MG: 20 TABLET ORAL at 09:05

## 2022-10-28 RX ADMIN — ZOLPIDEM TARTRATE 5 MG: 5 TABLET ORAL at 22:40

## 2022-10-28 RX ADMIN — SPIRONOLACTONE 100 MG: 25 TABLET ORAL at 09:05

## 2022-10-28 RX ADMIN — LACTULOSE 20 G: 20 SOLUTION ORAL at 09:05

## 2022-10-28 RX ADMIN — FOLIC ACID 1 MG: 5 INJECTION, SOLUTION INTRAMUSCULAR; INTRAVENOUS; SUBCUTANEOUS at 14:27

## 2022-10-28 RX ADMIN — PIPERACILLIN AND TAZOBACTAM 3375 MG: 3; .375 INJECTION, POWDER, FOR SOLUTION INTRAVENOUS at 03:02

## 2022-10-28 RX ADMIN — PANTOPRAZOLE SODIUM 40 MG: 40 TABLET, DELAYED RELEASE ORAL at 05:58

## 2022-10-28 ASSESSMENT — PAIN SCALES - GENERAL
PAINLEVEL_OUTOF10: 0

## 2022-10-28 NOTE — DISCHARGE INSTR - DIET

## 2022-10-28 NOTE — PROGRESS NOTES
Nutrition Note    RECOMMENDATIONS  PO Diet: Pt may benefit from low sodium diet  ONS: Ordered Ensure Clear BID    NUTRITION ASSESSMENT   Pt triggered for LOS assessment. On regular diet with minimal documented intakes averaging 51-75% throughout admission. Upon visiting, reported eating okay despite one day d/t effects from lactulose. Eats okay at home but has had 10 lb unintentional wt loss since the \"summer\". Hard to accurately assess wt loss given wt fluctuations documented in wt hx in EMR and hx of cirrhosis. Drinks protein smoothies at home. Avoids dairy, willing to try Ensure Clear to offer additional nutrition. RD will order and continue to monitor for adequate po intake. Nutrition Related Findings: -2.7L. Na 132, Cl 98. LBM 10/27, BS+. BLE edema. Wounds: None  Nutrition Education:  Education not indicated   Nutrition Goals: PO intake 50% or greater, by next RD assessment     MALNUTRITION ASSESSMENT   Chronic Illness  Malnutrition Status: Moderate malnutrition  Findings of the 6 clinical characteristics of malnutrition:  Energy Intake:  Mild decrease in energy intake (Comment)  Weight Loss:  7.5% over 3 months (wt fluctuations in chart, pt reports 10 lb wt loss since summer)     Body Fat Loss:  Mild body fat loss Triceps   Muscle Mass Loss:  Mild muscle mass loss Clavicles (pectoralis & deltoids)  Fluid Accumulation:  No significant fluid accumulation     Strength:  Not Performed    NUTRITION DIAGNOSIS   Inadequate oral intake related to inadequate protein-energy intake as evidenced by weight loss, BMI, mild muscle loss, mild loss of subcutaneous fat    CURRENT NUTRITION THERAPIES  ADULT DIET;  Regular     PO Intake: 51-75%   PO Supplement Intake:None Ordered    ANTHROPOMETRICS  Current Height: 5' 4\" (162.6 cm)  Current Weight: 104 lb (47.2 kg)    Admission weight: 102 lb (46.3 kg) (standing wt)  Ideal Body Weight (IBW): 120 lbs  (55 kg)        BMI: 17.8    COMPARATIVE STANDARDS  Energy (kcal): 2464-1684     Protein (g):  94       Fluid (mL/day):  1338-4071    The patient will be monitored per nutrition standards of care. Consult dietitian if additional nutrition interventions are needed prior to RD reassessment.      Xuan Taylor MS, RD, LD    Contact: [phone]

## 2022-10-28 NOTE — PLAN OF CARE
Problem: Discharge Planning  Goal: Discharge to home or other facility with appropriate resources  Outcome: Progressing     Problem: Pain  Goal: Verbalizes/displays adequate comfort level or baseline comfort level  Outcome: Progressing     Problem: Safety - Adult  Goal: Free from fall injury  Outcome: Progressing     Problem: ABCDS Injury Assessment  Goal: Absence of physical injury  Outcome: Progressing     Problem: Skin/Tissue Integrity  Goal: Absence of new skin breakdown  Description: 1. Monitor for areas of redness and/or skin breakdown  2. Assess vascular access sites hourly  3. Every 4-6 hours minimum:  Change oxygen saturation probe site  4. Every 4-6 hours:  If on nasal continuous positive airway pressure, respiratory therapy assess nares and determine need for appliance change or resting period. Outcome: Progressing     Problem: Skin/Tissue Integrity  Goal: Absence of new skin breakdown  Description: 1. Monitor for areas of redness and/or skin breakdown  2. Assess vascular access sites hourly  3. Every 4-6 hours minimum:  Change oxygen saturation probe site  4. Every 4-6 hours:  If on nasal continuous positive airway pressure, respiratory therapy assess nares and determine need for appliance change or resting period.   Outcome: Progressing     Problem: Nutrition Deficit:  Goal: Optimize nutritional status  Outcome: Progressing

## 2022-10-28 NOTE — PROGRESS NOTES
[facility] [address]   Phone: [phone]    Occupational Therapy    [] Initial Evaluation            [x] Daily Treatment Note         [] Discharge Summary      Patient: Sommer Willis   : 1975   MRN: [id number]   Date of Service:  10/28/2022    Admitting Diagnosis:  Hepatic encephalopathy    Current Admission Summary: 40 y.o. female with a PMHx listed below who presented to the ED for evaluation of altered mental status. Pt just discharged on 10/18/22 after treatment for acute hepatic encephalopathy. While in the ER, her daughter explained that she started becoming altered nearly after she left. Her confusion has worsened since then. She was reportedly found wandering outside when EMS was called by bystanders. Past Medical History:  has a past medical history of Alcoholic liver disease ([address] Anemia, and History of blood transfusion. Past Surgical History:  has a past surgical history that includes Upper gastrointestinal endoscopy (N/A, 2021); Upper gastrointestinal endoscopy (N/A, 03/10/2021); Tubal ligation; Colonoscopy (N/A, 3/11/2021); Upper gastrointestinal endoscopy (N/A, 2021); Upper gastrointestinal endoscopy (N/A, 2022); Knee Arthroplasty; Upper gastrointestinal endoscopy (N/A, 2022); Upper gastrointestinal endoscopy (N/A, 2022); CT BIOPSY BONE MARROW (2022); and Upper gastrointestinal endoscopy (N/A, 10/26/2022). Discharge Recommendations: Sommer Willis scored a 22/24 on the AM-PAC ADL Inpatient form. Current research shows that an AM-PAC score of 17 or less is typically not associated with a discharge to the patient's home setting. Based on the patient's AM-PAC score and their current ADL deficits, it is recommended that the patient have 3-5 sessions per week of Occupational Therapy at d/c to increase the patient's independence. Please see assessment section for further patient specific details.     Continue to recommend a short SNF stay to increase endurance for ADLs and IADLs  with goal of patient being able to live in AL with very little assistance. Patient currently has an ampac score of 22/24, however needs increased endurance for homemaking and ADL performance due to was living alone and now wanting to move to an AL facility. If patient discharges prior to next session this note will serve as a discharge summary. Please see below for the latest assessment towards goals. DME Required For Discharge: DME to be determined at next level of care      Precautions/Restrictions: no restrictions, up as tolerated  Weight Bearing Restrictions: no restrictions  [] Right Upper Extremity  [] Left Upper Extremity [] Right Lower Extremity  [] Left Lower Extremity     Required Braces/Orthotics: no braces required   [] Right  [] Left  Positional Restrictions:no positional restrictions    Pre-Admission Information   Lives With: alone    Type of Home:  duplex  Home Layout: one level  Home Access: level entry  Bathroom Layout: tub/shower unit  Bathroom Equipment:  none  Toilet Height: standard height  Home Equipment: reacher, sock aide  Transfer Assistance: Independent without use of device  Ambulation Assistance:Independent without use of device  ADL Assistance: independent with all ADL's  IADL Assistance: independent with homemaking tasks  Active :        [] Yes  [x] No  Hand Dominance: [] Left  [x] Right  Current Employment: unemployed, lost job in April, due to medical issues  Hobbies: spending time with daughters  Recent Falls: found down from last admission        Subjective  General: Patient sitting in window seat, very pleasant and cooperative, agreeable to OT tx. Pt expressed difficulty of having anxiety and panic attacks. Pain: 8/10.   Location: abdominal area  Pain Interventions: RN notified of patient request for pain medication and repositioned        Activities of Daily Living  Basic Activities of Daily Living  General Comments: Pt declined need for ADLs. Pt reported that she can get pants an underwear on, doesn't wear socks. Reports that she doesn't wear socks, but has a sock aid and reacher as needed. Instrumental Activities of Daily Living  No IADL completed on this date. Functional Mobility  Bed Mobility  Bed mobility not completed on this date. Comments:  Transfers  Sit to stand transfer:Independent  Stand to sit transfer: Independent  Comments: From window seat, to EOB  Functional Mobility:  Sitting Balance: Independent. Standing Balance: modified independent, supervision, with and without RW. Functional Mobility: .  modified independent, supervision,    Functional Mobility Activity: functional mobility ~300 ft in read X 2 trials  Functional Mobility Device Use: rolling walker  Functional Mobility Comment: ambulated in room without device, requested to use RW in jeanna    Other Therapeutic Interventions  Educated pt re: meditation and calming music programs available on You Tube that are helpful with anxiety and panic attacks. Pt listened to a 10 min meditation, which involved focusing on the breath, and she stated that it was helpful and something she would use. Educated pt about yoga classes such as chair yoga that might also be helpful. Pt expressed frustration regarding recent events, such as finding out that she has macular degeneration and then her cancer diagnosis and surgery, as well as dialysis. OT offered support.      Functional Outcomes  AM-PAC Inpatient Daily Activity Raw Score: 22    Cognition  WFL  Orientation:    alert and oriented x 4  Command Following:   accurately follows one step commands     Education  Barriers To Learning: none  Patient Education: patient educated on goals, OT role and benefits, plan of care, precautions, ADL adaptive strategies, discharge recommendations, calming techniques for anxiety, monitoring SPO2 on pulse oximeter  Learning Assessment:  patient verbalizes understanding, would benefit from continued reinforcement    Assessment  Activity Tolerance: Tolerated well. PO2 97% at end of first walk (~4 min). Pt felt slightly SOB upon entering room. At end of session, RN requested to determine O2 level while ambulating. Pt agreeable to 2nd walk: SPO2 98% on RA prior to ambulation. During ambulation, SPO2 maintained 92-93%. SPO2 dropped to 88% after pt entered her room (after 5 min ambulating). Impairments Requiring Therapeutic Intervention: decreased functional mobility, decreased ADL status, decreased endurance, decreased balance, decreased IADL, decreased posture, anxiety management. Prognosis: fair      Clinical Assessment: Patient presents with the above deficits, which are below baseline; Pt tolerated session well and was able to tolerate standing 4 min and 5 min for functional mobility. Session focused on calming techniques involving breathing, as pt expressed that she has difficulty with anxieety and panic attacks. Pt would benefit from continued skilled OT to address the above deficis.  Patient lives alone, verbalizes limited to no support, recurrent hospitalizations      Safety Interventions: patient left in bed, call light within reach, patient at risk for falls, telesitter in use, nurse notified, and patient declined bed alarm     Plan  Frequency: 3-5 x/per week  Current Treatment Recommendations: strengthening, balance training, functional mobility training, transfer training, endurance training, patient/caregiver education, ADL/self-care training, cognitive reorientation, safety education, and equipment evaluation/education    Goals  Patient Goals: Home   Short Term Goals:  Time Frame: Upon discharge  Patient will complete lower body ADL at modified independent --setup 10/27--not addressed 10/28  Patient will complete toileting at modified independent -- goal met 10/27  Patient will complete functional transfers at stand by assistance -- CGA into and out of shower stall, indep toilet and bed transfers 10/27--not addressed 10/28  Patient will complete functional mobility at stand by assistance -- goal met 10/27  Patient will increase functional standing balance to 5-7 minutes supervision for improved ADL completion-- 4 min and 5 min for functional mobility 10/28; ongoing  Patient will complete bed mobility at modified independent -- goal met 10/27  New goal 10/27/22-- indep simple homemaking tasks --Not addressed 10/28; ongoing  -progressing towards goals, continue POC    Therapy Session Time     Individual Group Co-treatment   Time In 1424     Time Out 1525     Minutes 61          Timed Code Treatment Minutes:   61  Total Treatment Minutes:  61       Electronically Signed By: Kaelyn Gray, BREANNA Augustin., OTR/L, [id number]

## 2022-10-29 VITALS
HEIGHT: 64 IN | HEART RATE: 64 BPM | WEIGHT: 107.3 LBS | OXYGEN SATURATION: 95 % | RESPIRATION RATE: 16 BRPM | SYSTOLIC BLOOD PRESSURE: 100 MMHG | DIASTOLIC BLOOD PRESSURE: 60 MMHG | BODY MASS INDEX: 18.32 KG/M2 | TEMPERATURE: 98.2 F

## 2022-10-29 LAB
ANION GAP SERPL CALCULATED.3IONS-SCNC: 9 MMOL/L (ref 3–16)
BASOPHILS ABSOLUTE: 0.1 K/UL (ref 0–0.2)
BASOPHILS RELATIVE PERCENT: 1.6 %
BUN BLDV-MCNC: 27 MG/DL (ref 7–20)
CALCIUM SERPL-MCNC: 9.4 MG/DL (ref 8.3–10.6)
CHLORIDE BLD-SCNC: 104 MMOL/L (ref 99–110)
CO2: 25 MMOL/L (ref 21–32)
CREAT SERPL-MCNC: <0.5 MG/DL (ref 0.6–1.1)
EOSINOPHILS ABSOLUTE: 0.1 K/UL (ref 0–0.6)
EOSINOPHILS RELATIVE PERCENT: 2.5 %
GFR SERPL CREATININE-BSD FRML MDRD: >60 ML/MIN/{1.73_M2}
GLUCOSE BLD-MCNC: 162 MG/DL (ref 70–99)
HCT VFR BLD CALC: 22.5 % (ref 36–48)
HEMOGLOBIN: 7.8 G/DL (ref 12–16)
LYMPHOCYTES ABSOLUTE: 0.4 K/UL (ref 1–5.1)
LYMPHOCYTES RELATIVE PERCENT: 9.2 %
MCH RBC QN AUTO: 32.4 PG (ref 26–34)
MCHC RBC AUTO-ENTMCNC: 34.8 G/DL (ref 31–36)
MCV RBC AUTO: 93 FL (ref 80–100)
MONOCYTES ABSOLUTE: 0.7 K/UL (ref 0–1.3)
MONOCYTES RELATIVE PERCENT: 15.8 %
NEUTROPHILS ABSOLUTE: 3.2 K/UL (ref 1.7–7.7)
NEUTROPHILS RELATIVE PERCENT: 70.9 %
PDW BLD-RTO: 22.9 % (ref 12.4–15.4)
PLATELET # BLD: 57 K/UL (ref 135–450)
PMV BLD AUTO: 10.4 FL (ref 5–10.5)
POTASSIUM SERPL-SCNC: 4.5 MMOL/L (ref 3.5–5.1)
RBC # BLD: 2.42 M/UL (ref 4–5.2)
SODIUM BLD-SCNC: 138 MMOL/L (ref 136–145)
WBC # BLD: 4.5 K/UL (ref 4–11)

## 2022-10-29 PROCEDURE — 6360000002 HC RX W HCPCS: Performed by: INTERNAL MEDICINE

## 2022-10-29 PROCEDURE — 2580000003 HC RX 258: Performed by: INTERNAL MEDICINE

## 2022-10-29 PROCEDURE — 2500000003 HC RX 250 WO HCPCS: Performed by: INTERNAL MEDICINE

## 2022-10-29 PROCEDURE — 90686 IIV4 VACC NO PRSV 0.5 ML IM: CPT | Performed by: INTERNAL MEDICINE

## 2022-10-29 PROCEDURE — 6370000000 HC RX 637 (ALT 250 FOR IP): Performed by: INTERNAL MEDICINE

## 2022-10-29 PROCEDURE — 85025 COMPLETE CBC W/AUTO DIFF WBC: CPT

## 2022-10-29 PROCEDURE — 80048 BASIC METABOLIC PNL TOTAL CA: CPT

## 2022-10-29 PROCEDURE — G0008 ADMIN INFLUENZA VIRUS VAC: HCPCS | Performed by: INTERNAL MEDICINE

## 2022-10-29 PROCEDURE — 36415 COLL VENOUS BLD VENIPUNCTURE: CPT

## 2022-10-29 RX ORDER — ZOLPIDEM TARTRATE 5 MG/1
5 TABLET ORAL NIGHTLY PRN
Qty: 3 TABLET | Refills: 0 | Status: SHIPPED | OUTPATIENT
Start: 2022-10-29 | End: 2022-11-01

## 2022-10-29 RX ADMIN — SPIRONOLACTONE 100 MG: 25 TABLET ORAL at 08:46

## 2022-10-29 RX ADMIN — FUROSEMIDE 20 MG: 20 TABLET ORAL at 08:46

## 2022-10-29 RX ADMIN — NADOLOL 40 MG: 40 TABLET ORAL at 08:46

## 2022-10-29 RX ADMIN — RIFAXIMIN 550 MG: 550 TABLET ORAL at 08:46

## 2022-10-29 RX ADMIN — Medication 10 ML: at 08:48

## 2022-10-29 RX ADMIN — PANTOPRAZOLE SODIUM 40 MG: 40 TABLET, DELAYED RELEASE ORAL at 05:12

## 2022-10-29 RX ADMIN — FOLIC ACID 1 MG: 5 INJECTION, SOLUTION INTRAMUSCULAR; INTRAVENOUS; SUBCUTANEOUS at 08:48

## 2022-10-29 RX ADMIN — ONDANSETRON 4 MG: 2 INJECTION INTRAMUSCULAR; INTRAVENOUS at 04:56

## 2022-10-29 RX ADMIN — LACTULOSE 20 G: 20 SOLUTION ORAL at 08:48

## 2022-10-29 RX ADMIN — INFLUENZA A VIRUS A/VICTORIA/2570/2019 IVR-215 (H1N1) ANTIGEN (PROPIOLACTONE INACTIVATED), INFLUENZA A VIRUS A/DARWIN/6/2021 IVR-227 (H3N2) ANTIGEN (PROPIOLACTONE INACTIVATED), INFLUENZA B VIRUS B/AUSTRIA/1359417/2021 BVR-26 ANTIGEN (PROPIOLACTONE INACTIVATED), INFLUENZA B VIRUS B/PHUKET/3073/2013 BVR-1B ANTIGEN (PROPIOLACTONE INACTIVATED) 0.5 ML: 15; 15; 15; 15 INJECTION, SOLUTION INTRAMUSCULAR at 15:04

## 2022-10-29 ASSESSMENT — PAIN SCALES - GENERAL: PAINLEVEL_OUTOF10: 0

## 2022-10-29 NOTE — CARE COORDINATION
Attempted to meet with patient but patient is currently unavailable. Will attempt to revisit as schedule allows.   Patient discharged a few days ago and [facility] [place]. had accepted the home care referral  [facility] [address], [address]  Phone: [phone]  Fax: [phone]
CM received call from Nichole ([phone]) at [facility] stating that the precert has been approved. Nichole states that patient can be transported to facility over the weekend, if she is discharged. ELIZ may call Nikole's phone number for assistance if needed and it will be forwarded to her coverage on the weekend.     Electronically signed by Gianna Partida RN on 10/28/2022 at 2:52 PM
Discharge: HENS completed online. Package assembled with TAZ, AVS and H+P. Placed all in envelope for patient/family to deliver to SNF upon arrival.  Sandro Draper, MSN, RN, CCM
Jordyn Boyd [phone] @ [facility] initiated [facility] yesterday. Patient/Family aware of and agreeable to the discharge plan. Lino Medina [phone],   Alejandro Scott  [address] [place] [address], [address]  Phone: [phone]  Fax: [phone]     Notified patient's daughter that patient is discharged   pending caresohugo singh. Hens submitted.
Patient's daughter Aayush Shah, is agreeable with a referral to [facility] with the future goal of admitting to their Assisted Living on a [employer]. Jose Cruz Avila will initiate andre singh.
The SW received a call from [facility] who stated they have been Active with the patient in the community before the patient's recent admission and can continue to  follow once the patient has been discharged.     [address] [place] [address]. [address]  Phone [phone]  Fax [phone]       Electronically signed by ABY Chang on 10/25/2022 at 3:21 PM
Lightheadedness

## 2022-10-29 NOTE — DISCHARGE SUMMARY
Saleem removed. Transfused 1 U PRBC on 10/24 and 1 U on 10/25. Started on Folic Acid IV. Oncology following patient. EGD on 10/26:  Minimal/trace esoph varices. Scarring from previous bands  Portal hypertensive gastropathy. No hematin. Rec:   EGD in 6 mo     Limited echo on 10/28:   -Limited exam per Dr. Eloy Ochoa.   -Normal left ventricle size, wall thickness, and systolic function with an   estimated ejection fraction of 60-65%. -No regional wall motion abnormalities are seen. -The left atrium is severely dilated. -The right atrium is mildly dilated. -The aortic valve is structurally normal. Increased velocities through the   aortic valve, most likely due to high cardiac output. There is no   significant aortic valve stenosis. Trivial aortic regurgitation is present. -Mild mitral regurgitation is present. -Mild tricuspid regurgitation. Transfused 1 U PRBC on 10/28. Patient will be continued on PO Folic acid. 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 She will transfused PRBC as outpatient in infusion center or ED if Hg < 6.5. Her only treatment is liver transplant. Will discharge to SNF. F/U with PCP, Hematology, [address] [facility]. Discharge Medications:   Discharge Medication List as of 10/29/2022  3:27 PM        START taking these medications    Details   folic acid (FOLVITE) 1 MG tablet Take 1 tablet by mouth daily, Disp-30 tablet, R-0NO PRINT           Discharge Medication List as of 10/29/2022  3:27 PM        CONTINUE these medications which have CHANGED    Details   zolpidem (AMBIEN) 5 MG tablet Take 1 tablet by mouth nightly as needed for Sleep for up to 3 days. , Disp-3 tablet, R-0Print      lactulose (CHRONULAC) 10 GM/15ML solution Take 30 mLs by mouth 2 times daily, Disp-1 each, R-0NO PRINT           Discharge Medication List as of 10/29/2022  3:27 PM        CONTINUE these medications which have NOT CHANGED    Details   rifAXIMin (XIFAXAN) 550 MG tablet Take 1 tablet by mouth 2 times daily, Disp-60 tablet, R-1Normal      furosemide (LASIX) 20 MG tablet Take 1 tablet by mouth in the morning., Disp-60 tablet, R-3Normal      ondansetron (ZOFRAN) 8 MG tablet Take 1 tablet by mouthHistorical Med      nadolol (CORGARD) 40 MG tablet Take 1 tablet by mouth daily, Disp-30 tablet, R-0Normal      spironolactone (ALDACTONE) 100 MG tablet Take 1 tablet by mouth daily, Disp-30 tablet, R-1Normal      pantoprazole (PROTONIX) 40 MG tablet Take 1 tablet by mouth every morning (before breakfast), Disp-30 tablet, R-1Normal      thiamine mononitrate (THIAMINE) 100 MG tablet Take 1 tablet by mouth daily, Disp-30 tablet, R-0Normal           Discharge Medication List as of 10/29/2022  3:27 PM        STOP taking these medications       gabapentin (NEURONTIN) 100 MG capsule Comments:   Reason for Stopping:         LORazepam (ATIVAN) 0.5 MG tablet Comments:   Reason for Stopping:         thiamine 100 MG tablet Comments:   Reason for Stopping:               Discharge Exam:    /60   Pulse 64   Temp 98.2 °F (36.8 °C) (Oral)   Resp 16   Ht 5' 4\" (1.626 m)   Wt 107 lb 4.8 oz (48.7 kg)   LMP 05/06/2021 Comment: Essure  SpO2 95%   BMI 18.42 kg/m²   General appearance: Jaundice, awake  HEENT: Pupils equal, round, and reactive to light. Conjunctivae/corneas clear. Neck: Supple, with full range of motion. No jugular venous distention. Trachea midline. Respiratory:  Normal respiratory effort. Clear to auscultation, bilaterally without Rales/Wheezes/Rhonchi. Cardiovascular: RRR, 4/6 LELE in aortic area, no rubs or gallops. Abdomen: Soft, not tender, distended with normal bowel sounds. Musculoskeletal: No clubbing, cyanosis or edema bilaterally. Full range of motion without deformity. Skin: Skin color, texture, turgor normal.  No rashes or lesions. Neurologic:  Neurovascularly intact without any focal sensory/motor deficits.  Cranial nerves: II-XII intact, grossly non-focal.  Psychiatric: Alert and oriented, thought content appropriate, normal insight  Capillary Refill: Brisk, 3 seconds, normal   Peripheral Pulses: +2 palpable, equal bilaterally     Labs: For convenience and continuity at follow-up the following most recent labs are provided:    Lab Results   Component Value Date/Time    WBC 4.5 10/29/2022 04:42 AM    HGB 7.8 10/29/2022 04:42 AM    HCT 22.5 10/29/2022 04:42 AM    MCV 93.0 10/29/2022 04:42 AM    PLT 57 10/29/2022 04:42 AM     10/29/2022 04:42 AM    K 4.5 10/29/2022 04:42 AM    K 4.7 10/21/2022 06:29 PM     10/29/2022 04:42 AM    CO2 25 10/29/2022 04:42 AM    BUN 27 10/29/2022 04:42 AM    CREATININE <0.5 10/29/2022 04:42 AM    CALCIUM 9.4 10/29/2022 04:42 AM    PHOS 3.9 06/15/2022 05:00 AM    ALKPHOS 118 10/23/2022 09:03 AM    ALT 34 10/23/2022 09:03 AM    AST 64 10/23/2022 09:03 AM    BILITOT 21.2 10/23/2022 09:03 AM    BILIDIR 4.9 10/21/2022 06:29 PM    LABALBU 3.2 10/23/2022 09:03 AM     Lab Results   Component Value Date    INR 2.50 (H) 10/21/2022    INR 2.35 (H) 10/11/2022    INR 2.50 (H) 09/23/2022       Radiology:  CT ABDOMEN PELVIS WO CONTRAST Additional Contrast? Oral    Result Date: 10/24/2022  EXAMINATION: CT OF THE ABDOMEN AND PELVIS WITHOUT CONTRAST 10/24/2022 2:00 pm TECHNIQUE: CT of the abdomen and pelvis was performed without the administration of intravenous contrast. Multiplanar reformatted images are provided for review. Automated exposure control, iterative reconstruction, and/or weight based adjustment of the mA/kV was utilized to reduce the radiation dose to as low as reasonably achievable. COMPARISON: 10/11/2022. HISTORY: ORDERING SYSTEM PROVIDED HISTORY: Abdominal pain TECHNOLOGIST PROVIDED HISTORY: Reason for exam:->Abdominal pain Additional Contrast?->Oral Is the patient pregnant?->No Reason for Exam: Abdominal pain FINDINGS: Lower Chest: Linear atelectasis or scarring involves the lower lobes.   There is no pleural effusion. Organs: The liver is heterogeneous and has a nodular contour. There are multiple stones in the gallbladder. The gallbladder again appears thickened. Recanalization of the umbilical vein and varices in the upper abdomen are again noted. The spleen is enlarged measuring up to 15 cm. The adrenal glands and pancreas are unremarkable. There is a new small amount of air in the right intrarenal collecting system. Stones at the lower pole the right kidney measure up to 5 mm. There is no ureteral stone or hydronephrosis. GI/Bowel: There is no bowel dilatation or bowel wall thickening. The stomach is unremarkable. The appendix appears normal. Pelvis: There is air in the bladder. Bladder is otherwise unremarkable. There is no abnormal adnexal mass. Peritoneum/Retroperitoneum: The abdominal aorta is normal in caliber. No enlarged lymph nodes are identified. No fat stranding, free fluid, free air or focal fluid collection is identified. Bones/Soft Tissues: No fracture or destructive bone lesion is identified. 1. New air in the bladder and right intrarenal collecting system suggesting a urinary tract infection with a gas-forming organism. Air in the bladder could also be iatrogenic. A fistula associated with bowel is considered less likely. 2. Hepatic cirrhosis and portal hypertension with splenomegaly, recanalization of the umbilical vein and varices in the upper abdomen. 3. Cholelithiasis and thickening of the wall of the gallbladder with no adjacent inflammatory changes. 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 Cholecystitis is considered less likely.      CT ABDOMEN PELVIS WO CONTRAST Additional Contrast? None    Result Date: 10/11/2022  EXAMINATION: CT OF THE ABDOMEN AND PELVIS WITHOUT CONTRAST 10/11/2022 8:55 pm TECHNIQUE: CT of the abdomen and pelvis was performed without the administration of intravenous contrast. Multiplanar reformatted images are provided for review. Automated exposure control, iterative reconstruction, and/or weight based adjustment of the mA/kV was utilized to reduce the radiation dose to as low as reasonably achievable. COMPARISON: 2022 HISTORY: ORDERING SYSTEM PROVIDED HISTORY: AMS, h/o cirrhosis TECHNOLOGIST PROVIDED HISTORY: Reason for exam:->AMS, h/o cirrhosis Additional Contrast?->None Decision Support Exception - unselect if not a suspected or confirmed emergency medical condition->Emergency Medical Condition (MA) Is the patient pregnant?->No Reason for Exam: AMS, h/o cirrhosis. Altered Mental Status (Pt arrived from home via ems with AMS Ems stated she is alert and oriented x2, wanted to sign refusal but was unable to follow instructions to do so Pt when asked why she was here, could not tell me, asked her where she was pt stated  Pt has hx of liver failure, pt is visibly jaundiced. ). FINDINGS: Lower Chest: Lung bases are grossly clear. The heart is mildly enlarged. No pericardial effusion. Organs: There is unchanged heterogeneous appearance of the liver parenchyma with areas of both increased and decreased attenuation. Areas of increased attenuation are most pronounced in the left lobe and saulo hepatis. There is a slightly nodular contour of the liver. There are calcified gallstones. The gallbladder wall is diffusely thickened. There is pericholecystic fluid however there is also perihepatic ascites. Portal venous hypertension with splenomegaly, recanalization of the umbilical vein and upper abdominal varices. The pancreas, adrenal glands and kidneys are grossly with the limitations of a noncontrast study. There is an unchanged calcification or calculus in the lower pole of the right kidney. GI/Bowel: There is a moderate stool load particularly in the right, transverse and proximal descending colon. There is diffuse bowel wall thickening. Pelvis: Bilateral Essure tubal occlusion devices. Uterus is otherwise unremarkable. The urinary bladder is normal. Peritoneum/Retroperitoneum: There is small volume of low-attenuation ascites in the abdomen and pelvis. There is diffuse infiltration of fat in the abdomen. No free air. There are mildly prominent nonspecific retroperitoneal lymph nodes. Bones/Soft Tissues: No acute bone finding. Unchanged diffuse hepatocellular disease with suspected cirrhosis. Portal venous hypertension with recanalized umbilical vein, ascites, upper abdominal varices and splenomegaly. Cholelithiasis. There is diffuse gallbladder wall thickening. This finding may be related to hypoproteinemia and third-spacing of fluid. Acute cholecystitis cannot be excluded. Correlate clinically. Moderate stool load consistent with constipation. CT Head W/O Contrast    Result Date: 10/21/2022  EXAMINATION: CT OF THE HEAD WITHOUT CONTRAST  10/21/2022 6:06 pm TECHNIQUE: CT of the head was performed without the administration of intravenous contrast. Automated exposure control, iterative reconstruction, and/or weight based adjustment of the mA/kV was utilized to reduce the radiation dose to as low as reasonably achievable. COMPARISON: 10/11/2022. HISTORY: ORDERING SYSTEM PROVIDED HISTORY: altered mental status TECHNOLOGIST PROVIDED HISTORY: Reason for exam:->altered mental status Has a \"code stroke\" or \"stroke alert\" been called? ->No Decision Support Exception - unselect if not a suspected or confirmed emergency medical condition->Emergency Medical Condition (MA) Is the patient pregnant?->No Reason for Exam: ams FINDINGS: BRAIN/VENTRICLES: There is no acute intracranial hemorrhage, mass effect or midline shift. No abnormal extra-axial fluid collection. The gray-white differentiation is maintained without evidence of an acute infarct. There is no evidence of hydrocephalus. ORBITS: The visualized portion of the orbits demonstrate no acute abnormality.  SINUSES: The visualized paranasal sinuses and mastoid air cells demonstrate no acute abnormality. SOFT TISSUES/SKULL:  No acute abnormality of the visualized skull or soft tissues. No acute intracranial findings. CT HEAD WO CONTRAST    Result Date: 10/11/2022  EXAMINATION: CT OF THE HEAD WITHOUT CONTRAST  10/11/2022 8:54 pm TECHNIQUE: CT of the head was performed without the administration of intravenous contrast. Automated exposure control, iterative reconstruction, and/or weight based adjustment of the mA/kV was utilized to reduce the radiation dose to as low as reasonably achievable. COMPARISON: None. HISTORY: ORDERING SYSTEM PROVIDED HISTORY: [facility] TECHNOLOGIST PROVIDED HISTORY: Reason for exam:->ams Has a \"code stroke\" or \"stroke alert\" been called? ->No Decision Support Exception - unselect if not a suspected or confirmed emergency medical condition->Emergency Medical Condition (MA) Is the patient pregnant?->No Reason for Exam: AMS. Altered Mental Status (Pt arrived from home via ems with AMS Ems stated she is alert and oriented x2, wanted to sign refusal but was unable to follow instructions to do so Pt when asked why she was here, could not tell me, asked her where she was pt stated  Pt has hx of liver failure, pt is visibly jaundiced. ). FINDINGS: BRAIN/VENTRICLES: There is no acute intracranial hemorrhage, mass effect or midline shift. No abnormal extra-axial fluid collection. The gray-white differentiation is maintained without evidence of an acute infarct. There is no evidence of hydrocephalus. ORBITS: The visualized portion of the orbits demonstrate no acute abnormality. SINUSES: The visualized paranasal sinuses and mastoid air cells demonstrate no acute abnormality. SOFT TISSUES/SKULL:  No acute abnormality of the visualized skull or soft tissues. There is a salt and pepper appearance of the skull. No acute intracranial abnormality.      XR CHEST PORTABLE    Result Date: 10/22/2022  EXAMINATION: ONE XRAY VIEW OF THE CHEST 10/22/2022 9:41 am COMPARISON: 10/21/2022 HISTORY: ORDERING SYSTEM PROVIDED HISTORY: follow up sob TECHNOLOGIST PROVIDED HISTORY: Reason for exam:->follow up sob Reason for Exam: follow up sob FINDINGS: The cardiac silhouette, mediastinal and hilar contours are normal.  The lungs are clear. There are no pleural effusions. No acute osseous abnormalities are identified. No acute cardiopulmonary disease. XR CHEST PORTABLE    Result Date: 10/21/2022  EXAMINATION: ONE XRAY VIEW OF THE CHEST 10/21/2022 6:45 pm COMPARISON: Chest 10/11/2022 HISTORY: ORDERING SYSTEM PROVIDED HISTORY: altered mental status FINDINGS: The cardiomediastinal and hilar silhouettes appear unremarkable. Low lung volumes result in vascular crowding underaeration particular parahilar regions and medial both lung bases. Possible abnormal pulmonary opacity medial lower left lung. The lungs appear otherwise clear. No pleural effusion evident. No pneumothorax is seen. No acute osseous abnormality is identified. 1. Technically suboptimal, expiratory phase respiration performed supine. 2. Cannot exclude pneumonia left lower lung. 3.  Follow-up full inspiration PA and lateral chest may be useful for better characterization of pulmonary findings once the patient's condition allows. XR CHEST PORTABLE    Result Date: 10/11/2022  EXAMINATION: ONE XRAY VIEW OF THE CHEST 10/11/2022 8:36 pm COMPARISON: 08/25/2022 HISTORY: ORDERING SYSTEM PROVIDED HISTORY: ams TECHNOLOGIST PROVIDED HISTORY: Reason for exam:->ams Reason for Exam: AMS FINDINGS: The lungs are without acute focal process. There is no effusion or pneumothorax. The cardiomediastinal silhouette is stable. The osseous structures are stable. No acute process. US ABDOMEN LIMITED Specify organ?  LIVER    Result Date: 10/22/2022  EXAMINATION: RIGHT UPPER QUADRANT ULTRASOUND 10/22/2022 11:29 am COMPARISON: CT abdomen pelvis dated 10/11/2022 [facility] HISTORY: ultrasound eval ? enough ascities for paracentesis per d/w dr cavanaugh on call Interventional radiologits. TECHNOLOGIST PROVIDED HISTORY: Reason for exam:->ultrasound eval ? enough ascities for paracentesis per d/w dr cavanaugh on call Interventional radiologist Specify organ?->LIVER FINDINGS: 4 quadrant ultrasound evaluation of the peritoneal cavity demonstrates trace peritoneal fluid deep to the inferior edge of the liver. Percutaneous windows for sampling is extremely limited by bowel along the anterior abdominal wall as well as the liver parenchyma. Trace peritoneal fluid in the right upper quadrant, underlying the liver parenchyma. No safe percutaneous window for fluid sampling identified at this time. The patient was seen and examined on day of discharge and this discharge summary is in conjunction with any daily progress note from day of discharge. Time Spent on discharge is 45 minutes  in the examination, evaluation, counseling and review of medications and discharge plan. Note that more than 30 minutes was spent in preparing discharge papers, discussing discharge with patient, medication review, etc.       Signed:    Cait Hilliard MD   10/29/2022      Thank you No primary care provider on file. for the opportunity to be involved in this patient's care.  If you have any questions or concerns please feel free to contact me at [address] [place] [address]

## 2022-10-29 NOTE — CONSULTS
[facility] Consult Note  Inpatient Setting - [facility]    Provider: Jayce Carbajal MD Patient ID:  Admission Date: 10/21/2022 Name: Brodie Crest Date: n/a MRN: [id number]   Patient Location: [id number] : [id number]  Attending: Jl Driscoll MD Date of Service: 10/29/2022  PCP: No primary care provider on file. Diagnoses:  1. Hepatic encephalopathy    2. Alcoholic cirrhosis, unspecified whether ascites present ([facility]    3. Urinary tract infection in female      Assessment/Plan:  No interventions planned for the UTI. She is symptomatically much better. Small focus in the bladder and the right kidney. She had a catheter at that time. Continue treatment for UTI. No surgery planned. Please call urology if further questions. All the patients questions were answered in detail. She understands the plan as listed above. CC:   Chief Complaint   Patient presents with    Altered Mental Status     Pt brought in by [facility] EMT from home. Pt unable to speak, per emt, daughter reports AMS started 2 days ago and progressed. Pt jaundice and has HX of liver disease. HPI: Octavio Monique is a 40 y.o. female. I saw the patient today for UTI with complication of air within the urinary tract. She also had a Saleem catheter prior to this time. She is feeling better from a UTI perspective. She does have anemia of chronic disease related to etoh abuse and liver disease. Past Medical History:   She has a past medical history of Alcoholic liver disease ([facility] Anemia, and History of blood transfusion. Past Surgical History:  She has a past surgical history that includes Upper gastrointestinal endoscopy (N/A, 2021); Upper gastrointestinal endoscopy (N/A, 03/10/2021); Tubal ligation; Colonoscopy (N/A, 3/11/2021);  Upper gastrointestinal endoscopy (N/A, 6/27/2021); Upper gastrointestinal endoscopy (N/A, 4/27/2022); Knee Arthroplasty; Upper gastrointestinal endoscopy (N/A, 7/1/2022); Upper gastrointestinal endoscopy (N/A, 7/1/2022); CT BIOPSY BONE MARROW (7/5/2022); and Upper gastrointestinal endoscopy (N/A, 10/26/2022). Allergies: Allergies   Allergen Reactions    Oxycodone Nausea And Vomiting       Social History:  She reports that she has quit smoking. She has never used smokeless tobacco. She reports that she does not currently use alcohol. She reports that she does not use drugs. Family History:  family history includes Alcohol Abuse in her father. Medications:   Scheduled Meds:   lactulose  20 g Oral BID    folic acid  1 mg IntraVENous Daily    furosemide  20 mg Oral Daily    nadolol  40 mg Oral Daily    pantoprazole  40 mg Oral QAM AC    rifAXIMin  550 mg Oral BID    spironolactone  100 mg Oral Daily    sodium chloride flush  5-40 mL IntraVENous 2 times per day     Continuous Infusions:   sodium chloride      sodium chloride      sodium chloride 25 mL/hr at 10/27/22 1850     PRN Meds:sodium chloride, perflutren lipid microspheres, sodium chloride, sodium chloride flush, sodium chloride, ondansetron **OR** ondansetron, midodrine, zolpidem    Review of Systems:  Constitutional: Negative for fever    Genitourinary: see HPI  Eyes: negative for sudden change in vision  EENT: no complaints  Cardiovascular: Negative for chest pain  Respiratory: Negative for shortness of breath  Gastrointestinal: Negative for nausea  Musculoskeletal: Negative for back pain   Neurological: Negative for weakness  Psychiatric: Negative for anxiety  Integumentary: Negative for rashes or adenopathy     Physical Exam:  Vitals:    10/29/22 0712   BP: (!) 104/55   Pulse: 66   Resp: 18   Temp: 98.5 °F (36.9 °C)   SpO2: 99%     Constitutional: NAD, well-developed, well-nourished. she is jaundiced appearing. HEENT: MMM. Hearing intact. PERRL. Icteric.   Neck: no thyroid masses appreciated. Trachea is midline. Neck appears unremarkable   Lymph: no palpable adenopathy in supraclavicular, or axillary lymph nodes  Cardiovascular: Regular rate. No peripheral edema  Respiratory: Respirations are even and non-labored. No audible breath sounds. Genitourinary: external genitals show no irritation or erythema, urethral meatus appears normal in size and location, urethra is without tenderness or masses, bladder is non-tender, vagina is without masses or discharge, adnexa is without masses or tenderness. Anus and Perineum are unremarkable, no external lesions appreciated. Abdomen: Soft. No distension, tenderness, hernias, masses or guarding. No CVA tenderness. No hernias appreciated. Liver and spleen appear normal  Psychiatric: A + O x 3, normal affect. Insight appears intact. Muskuloskeletal: CITLALI x 4   Skin: Pink, warm and dry. No rashes on face and arms. Labs:  Lab Results   Component Value Date    WBC 4.5 10/29/2022    HGB 7.8 (L) 10/29/2022    HCT 22.5 (L) 10/29/2022    MCV 93.0 10/29/2022    PLT 57 (L) 10/29/2022     Lab Results   Component Value Date    CREATININE <0.5 (L) 10/29/2022    BUN 27 (H) 10/29/2022     10/29/2022    K 4.5 10/29/2022     10/29/2022    CO2 25 10/29/2022       Imaging:   Impression   1. New air in the bladder and right intrarenal collecting system suggesting a   urinary tract infection with a gas-forming organism. Air in the bladder   could also be iatrogenic. A fistula associated with bowel is considered less   likely. 2. Hepatic cirrhosis and portal hypertension with splenomegaly,   recanalization of the umbilical vein and varices in the upper abdomen. 3. Cholelithiasis and thickening of the wall of the gallbladder with no   adjacent inflammatory changes. 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 Cholecystitis   is considered less likely.        Avis Rodriguez MD [facility]  10/29/2022

## 2022-10-29 NOTE — PROGRESS NOTES
Attempted to call nurse to nurse report to [place], [place], on hold several minutes with no answer. Will attempt to call again later.

## 2022-10-29 NOTE — PROGRESS NOTES
Hospitalist Progress Note      PCP: No primary care provider on file. Date of Admission: 10/21/2022    Chief Complaint: AMS    Hospital Course: 39 yo F with history of alcoholic cirrhosis, hepatic encephalopathy, severe anemia, h/o esophageal varices came to ER with AMS. Admitted as inpatient for hepatic encephalopathy, E faecalis UTI and severe anemia. Transfused 1 U PRBC on 10/24. Seen by GI. Started on IV Zosyn for E faecalis UTI. Started on Lactulose. CT Ab/P on 10/24:  1. New air in the bladder and right intrarenal collecting system suggesting a   urinary tract infection with a gas-forming organism. Air in the bladder   could also be iatrogenic. A fistula associated with bowel is considered less   likely. 2. Hepatic cirrhosis and portal hypertension with splenomegaly,   recanalization of the umbilical vein and varices in the upper abdomen. 3. Cholelithiasis and thickening of the wall of the gallbladder with no   adjacent inflammatory changes. 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 Cholecystitis   is considered less likely. Urology consulted to evaluate. Saleem removed. Transfused 1 U PRBC on 10/24 and 1 U on 10/25. Started on Folic Acid IV. Oncology following patient. EGD on 10/26:  Minimal/trace esoph varices. Scarring from previous bands  Portal hypertensive gastropathy. No hematin. Rec:   EGD in 6 mo    Limited echo on 10/28:   -Limited exam per Dr. Sushil Espinoza.   -Normal left ventricle size, wall thickness, and systolic function with an   estimated ejection fraction of 60-65%. -No regional wall motion abnormalities are seen. -The left atrium is severely dilated. -The right atrium is mildly dilated. -The aortic valve is structurally normal. Increased velocities through the   aortic valve, most likely due to high cardiac output. There is no   significant aortic valve stenosis.  Trivial aortic regurgitation is present. -Mild mitral regurgitation is present. -Mild tricuspid regurgitation. Transfused 1 U PRBC on 10/28. Blood smear requested on 10/28 to evaluate for Spur cells. Subjective: Patient denies abdominal pain. No CP, SOB, HA or fevers. Medications:  Reviewed    Infusion Medications    sodium chloride      sodium chloride      sodium chloride      sodium chloride 25 mL/hr at 10/27/22 1850     Scheduled Medications    lactulose  20 g Oral BID    folic acid  1 mg IntraVENous Daily    furosemide  20 mg Oral Daily    nadolol  40 mg Oral Daily    pantoprazole  40 mg Oral QAM AC    rifAXIMin  550 mg Oral BID    spironolactone  100 mg Oral Daily    sodium chloride flush  5-40 mL IntraVENous 2 times per day     PRN Meds: sodium chloride, perflutren lipid microspheres, sodium chloride, sodium chloride flush, sodium chloride, ondansetron **OR** ondansetron, midodrine, zolpidem      Intake/Output Summary (Last 24 hours) at 10/28/2022 2006  Last data filed at 10/28/2022 1445  Gross per 24 hour   Intake 406.67 ml   Output --   Net 406.67 ml         Physical Exam Performed:    BP (!) 103/59   Pulse 68   Temp 98.2 °F (36.8 °C) (Oral)   Resp 18   Ht 5' 4\" (1.626 m)   Wt 104 lb (47.2 kg)   LMP 05/06/2021 Comment: Essure  SpO2 97%   BMI 17.85 kg/m²     General appearance: Jaundice, awake  HEENT: Pupils equal, round, and reactive to light. Conjunctivae/corneas clear. Neck: Supple, with full range of motion. No jugular venous distention. Trachea midline. Respiratory:  Normal respiratory effort. Clear to auscultation, bilaterally without Rales/Wheezes/Rhonchi. Cardiovascular: RRR, 4/6 LELE in aortic area, no rubs or gallops. Abdomen: Soft, not tender, distended with normal bowel sounds. Musculoskeletal: No clubbing, cyanosis or edema bilaterally. Full range of motion without deformity. Skin: Skin color, texture, turgor normal.  No rashes or lesions.   Neurologic:  Neurovascularly intact without any focal sensory/motor deficits. Cranial nerves: II-XII intact, grossly non-focal.  Psychiatric: Alert and oriented, thought content appropriate, normal insight  Capillary Refill: Brisk, 3 seconds, normal   Peripheral Pulses: +2 palpable, equal bilaterally       Labs:   Recent Labs     10/26/22  0502 10/26/22  1845 10/27/22  0529 10/28/22  0553 10/28/22  1648   WBC 4.4  --  4.1 4.1  --    HGB 7.5*   < > 7.8* 6.8* 8.2*   HCT 21.0*   < > 22.1* 19.4* 23.4*   PLT 58*  --  65* 53*  --     < > = values in this interval not displayed. Recent Labs     10/26/22  0502 10/27/22  0529 10/28/22  0553    138 132*   K 4.3 4.5 4.6    102 98*   CO2 26 26 26   BUN 28* 29* 31*   CREATININE 0.5* <0.5* 0.8   CALCIUM 9.2 9.4 9.3       No results for input(s): AST, ALT, BILIDIR, BILITOT, ALKPHOS in the last 72 hours. No results for input(s): INR in the last 72 hours. No results for input(s): Regina An in the last 72 hours. Urinalysis:      Lab Results   Component Value Date/Time    NITRU POSITIVE 10/21/2022 06:00 PM    WBCUA 7 10/21/2022 06:00 PM    BACTERIA Rare 10/21/2022 06:00 PM    RBCUA 0-2 10/21/2022 06:00 PM    BLOODU Negative 10/21/2022 06:00 PM    SPECGRAV 1.020 10/21/2022 06:00 PM    GLUCOSEU Negative 10/21/2022 06:00 PM       Radiology:  CT ABDOMEN PELVIS WO CONTRAST Additional Contrast? Oral   Final Result   1. New air in the bladder and right intrarenal collecting system suggesting a   urinary tract infection with a gas-forming organism. Air in the bladder   could also be iatrogenic. A fistula associated with bowel is considered less   likely. 2. Hepatic cirrhosis and portal hypertension with splenomegaly,   recanalization of the umbilical vein and varices in the upper abdomen. 3. Cholelithiasis and thickening of the wall of the gallbladder with no   adjacent inflammatory changes.   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 Cholecystitis   is considered less likely. US ABDOMEN LIMITED Specify organ? LIVER   Final Result   Trace peritoneal fluid in the right upper quadrant, underlying the liver   parenchyma. No safe percutaneous window for fluid sampling identified at this time. XR CHEST PORTABLE   Final Result   No acute cardiopulmonary disease. CT Head W/O Contrast   Final Result   No acute intracranial findings. XR CHEST PORTABLE   Final Result   1. Technically suboptimal, expiratory phase respiration performed supine. 2. Cannot exclude pneumonia left lower lung. 3.  Follow-up full inspiration PA and lateral chest may be useful for better   characterization of pulmonary findings once the patient's condition allows. Assessment/Plan:    Active Hospital Problems    Diagnosis     Moderate malnutrition ([facility] [address] [E44.0]      Priority: Medium    Complicated UTI (urinary tract infection) [N39.0]      Priority: Medium    Alcoholic cirrhosis ([facility] [address] [M79.21]      Priority: Medium    Hepatic encephalopathy [K76.82]      Priority: Medium    Severe anemia [D64.9]      Priority: Medium     Urology consult for complicated UTI appreciated  Transfuse 1 U PRBC today  Limited echo given murmur  Check peripheral smear again for Onc to review  Regular diet  Counseled extensively on EtOH and mental/psychosocial barriers in regards to her disease process  Continue IV Folic acid while here  Finished 7 days IV Zosyn for UTI  Transfused 3 U PRBC since admission  Continue Lactulose and Rifaximin  PT/OT eval    DVT Prophylaxis: SCD  Diet: ADULT DIET; Regular  ADULT ORAL NUTRITION SUPPLEMENT; Breakfast, Dinner; Clear Liquid Oral Supplement  Code Status: Full Code  PT/OT Eval Status: Following    Dispo - SNF    Discussed with patient, Jun Babcock (Onc NP), nursing and CM. Awaiting plan from Oncology upon DC for anemia follow up and managment.     Celsa Nugent MD

## 2022-10-29 NOTE — DISCHARGE INSTRUCTIONS
Your information:  Name: Kory Rodríguez  : 1975  2  Your Discharge Instructions    What to do after you leave the hospital:    Read, review and familiarize yourself with the information provided below and in a separate packet on   Cirrhosis; Hypervolemia; Anemia    Diet: Regular    Recommended activity:   as tolerated  Avoid strenuous activity until instructed by your physician to resume; balance rest with periods of light to normal activity. If you experience any of the following: Unusual or inadequately controlled pain; unusual transient shortness of breath; recurrent or persistent nausea, heartburn, palpitations or lightheadedness; increased swelling; increased fatigue; fever >100; please follow up with @[email or web address]@ [unfilled] or go to the Emergency Room. Home Health/ Outpatient Services: ***      Information obtained by:  By signing below, I understand and acknowledge receipt of the instructions indicated above, and I understand that if any problems occur once I leave the hospital I am to contact [email or web address].

## 2022-10-29 NOTE — PROGRESS NOTES
Daughter here to  patient and transport her to [facility]. Unit phone number given to patient to give to nurse at [facility] to call here for report due to not being able to get ahold of [facility] in order to do nurse to nurse report.  stated that he spoke with [facility] earlier and they are expecting patient and are okay with Daughter transporting patient.

## 2022-11-08 ENCOUNTER — APPOINTMENT (OUTPATIENT)
Dept: GENERAL RADIOLOGY | Age: 47
DRG: 280 | End: 2022-11-08
Payer: COMMERCIAL

## 2022-11-08 ENCOUNTER — HOSPITAL ENCOUNTER (INPATIENT)
Age: 47
LOS: 10 days | Discharge: NURSING, REHAB OR LONG-TERM CARE | DRG: 280 | End: 2022-11-18
Attending: EMERGENCY MEDICINE | Admitting: INTERNAL MEDICINE
Payer: COMMERCIAL

## 2022-11-08 ENCOUNTER — APPOINTMENT (OUTPATIENT)
Dept: CT IMAGING | Age: 47
DRG: 280 | End: 2022-11-08
Payer: COMMERCIAL

## 2022-11-08 DIAGNOSIS — E80.6 HYPERBILIRUBINEMIA: Primary | ICD-10-CM

## 2022-11-08 DIAGNOSIS — D68.9 COAGULOPATHY (HCC): ICD-10-CM

## 2022-11-08 DIAGNOSIS — D64.9 ANEMIA, UNSPECIFIED TYPE: ICD-10-CM

## 2022-11-08 LAB
A/G RATIO: 1.1 (ref 1.1–2.2)
ABO/RH: NORMAL
ALBUMIN SERPL-MCNC: 3.1 G/DL (ref 3.4–5)
ALP BLD-CCNC: 117 U/L (ref 40–129)
ALT SERPL-CCNC: 30 U/L (ref 10–40)
AMMONIA: 37 UMOL/L (ref 11–51)
ANION GAP SERPL CALCULATED.3IONS-SCNC: 9 MMOL/L (ref 3–16)
ANISOCYTOSIS: ABNORMAL
ANTIBODY SCREEN: NORMAL
AST SERPL-CCNC: 57 U/L (ref 15–37)
BASOPHILS ABSOLUTE: 0 K/UL (ref 0–0.2)
BASOPHILS RELATIVE PERCENT: 0 %
BILIRUB SERPL-MCNC: 19.9 MG/DL (ref 0–1)
BUN BLDV-MCNC: 28 MG/DL (ref 7–20)
BURR CELLS: ABNORMAL
CALCIUM SERPL-MCNC: 9.1 MG/DL (ref 8.3–10.6)
CHLORIDE BLD-SCNC: 99 MMOL/L (ref 99–110)
CO2: 26 MMOL/L (ref 21–32)
CREAT SERPL-MCNC: 0.7 MG/DL (ref 0.6–1.1)
EOSINOPHILS ABSOLUTE: 0 K/UL (ref 0–0.6)
EOSINOPHILS RELATIVE PERCENT: 0 %
GFR SERPL CREATININE-BSD FRML MDRD: >60 ML/MIN/{1.73_M2}
GLUCOSE BLD-MCNC: 186 MG/DL (ref 70–99)
HCT VFR BLD CALC: 15.8 % (ref 36–48)
HEMATOLOGY PATH CONSULT: NO
HEMOGLOBIN: 5.6 G/DL (ref 12–16)
HYPOCHROMIA: ABNORMAL
INR BLD: 2.89 (ref 0.87–1.14)
LACTIC ACID, SEPSIS: 2.1 MMOL/L (ref 0.4–1.9)
LIPASE: 39 U/L (ref 13–60)
LYMPHOCYTES ABSOLUTE: 0.4 K/UL (ref 1–5.1)
LYMPHOCYTES RELATIVE PERCENT: 7 %
MCH RBC QN AUTO: 35.1 PG (ref 26–34)
MCHC RBC AUTO-ENTMCNC: 35.6 G/DL (ref 31–36)
MCV RBC AUTO: 98.6 FL (ref 80–100)
METAMYELOCYTES RELATIVE PERCENT: 1 %
MICROCYTES: ABNORMAL
MONOCYTES ABSOLUTE: 0.7 K/UL (ref 0–1.3)
MONOCYTES RELATIVE PERCENT: 12 %
NEUTROPHILS ABSOLUTE: 4.5 K/UL (ref 1.7–7.7)
NEUTROPHILS RELATIVE PERCENT: 80 %
PDW BLD-RTO: 22.8 % (ref 12.4–15.4)
PLATELET # BLD: 68 K/UL (ref 135–450)
PLATELET SLIDE REVIEW: ABNORMAL
PMV BLD AUTO: 9.7 FL (ref 5–10.5)
POIKILOCYTES: ABNORMAL
POLYCHROMASIA: ABNORMAL
POTASSIUM REFLEX MAGNESIUM: 4.4 MMOL/L (ref 3.5–5.1)
PRO-BNP: 653 PG/ML (ref 0–124)
PROCALCITONIN: 0.51 NG/ML (ref 0–0.15)
PROTHROMBIN TIME: 30.4 SEC (ref 11.7–14.5)
RBC # BLD: 1.6 M/UL (ref 4–5.2)
SCHISTOCYTES: ABNORMAL
SLIDE REVIEW: ABNORMAL
SODIUM BLD-SCNC: 134 MMOL/L (ref 136–145)
TOTAL PROTEIN: 5.8 G/DL (ref 6.4–8.2)
TROPONIN: <0.01 NG/ML
WBC # BLD: 5.6 K/UL (ref 4–11)

## 2022-11-08 PROCEDURE — 71045 X-RAY EXAM CHEST 1 VIEW: CPT

## 2022-11-08 PROCEDURE — 85610 PROTHROMBIN TIME: CPT

## 2022-11-08 PROCEDURE — 86850 RBC ANTIBODY SCREEN: CPT

## 2022-11-08 PROCEDURE — 85025 COMPLETE CBC W/AUTO DIFF WBC: CPT

## 2022-11-08 PROCEDURE — 84484 ASSAY OF TROPONIN QUANT: CPT

## 2022-11-08 PROCEDURE — 83690 ASSAY OF LIPASE: CPT

## 2022-11-08 PROCEDURE — 2580000003 HC RX 258: Performed by: EMERGENCY MEDICINE

## 2022-11-08 PROCEDURE — 2000000000 HC ICU R&B

## 2022-11-08 PROCEDURE — 1200000000 HC SEMI PRIVATE

## 2022-11-08 PROCEDURE — 80053 COMPREHEN METABOLIC PANEL: CPT

## 2022-11-08 PROCEDURE — 74176 CT ABD & PELVIS W/O CONTRAST: CPT

## 2022-11-08 PROCEDURE — 96361 HYDRATE IV INFUSION ADD-ON: CPT

## 2022-11-08 PROCEDURE — 84145 PROCALCITONIN (PCT): CPT

## 2022-11-08 PROCEDURE — 86900 BLOOD TYPING SEROLOGIC ABO: CPT

## 2022-11-08 PROCEDURE — 93005 ELECTROCARDIOGRAM TRACING: CPT | Performed by: EMERGENCY MEDICINE

## 2022-11-08 PROCEDURE — 36415 COLL VENOUS BLD VENIPUNCTURE: CPT

## 2022-11-08 PROCEDURE — 86901 BLOOD TYPING SEROLOGIC RH(D): CPT

## 2022-11-08 PROCEDURE — 87040 BLOOD CULTURE FOR BACTERIA: CPT

## 2022-11-08 PROCEDURE — 96360 HYDRATION IV INFUSION INIT: CPT

## 2022-11-08 PROCEDURE — 83605 ASSAY OF LACTIC ACID: CPT

## 2022-11-08 PROCEDURE — 83880 ASSAY OF NATRIURETIC PEPTIDE: CPT

## 2022-11-08 PROCEDURE — 99285 EMERGENCY DEPT VISIT HI MDM: CPT

## 2022-11-08 PROCEDURE — 82140 ASSAY OF AMMONIA: CPT

## 2022-11-08 PROCEDURE — 86923 COMPATIBILITY TEST ELECTRIC: CPT

## 2022-11-08 PROCEDURE — P9016 RBC LEUKOCYTES REDUCED: HCPCS

## 2022-11-08 RX ORDER — SODIUM CHLORIDE 9 MG/ML
INJECTION, SOLUTION INTRAVENOUS PRN
Status: DISCONTINUED | OUTPATIENT
Start: 2022-11-08 | End: 2022-11-09 | Stop reason: HOSPADM

## 2022-11-08 RX ORDER — SODIUM CHLORIDE 9 MG/ML
1000 INJECTION, SOLUTION INTRAVENOUS ONCE
Status: COMPLETED | OUTPATIENT
Start: 2022-11-08 | End: 2022-11-09

## 2022-11-08 RX ADMIN — SODIUM CHLORIDE 1000 ML: 9 INJECTION, SOLUTION INTRAVENOUS at 21:36

## 2022-11-08 ASSESSMENT — ENCOUNTER SYMPTOMS
SHORTNESS OF BREATH: 0
CHEST TIGHTNESS: 0
NAUSEA: 0
VOMITING: 0
ABDOMINAL PAIN: 1
DIARRHEA: 0
COLOR CHANGE: 1

## 2022-11-08 ASSESSMENT — PAIN - FUNCTIONAL ASSESSMENT: PAIN_FUNCTIONAL_ASSESSMENT: 0-10

## 2022-11-08 ASSESSMENT — PAIN SCALES - GENERAL: PAINLEVEL_OUTOF10: 3

## 2022-11-09 LAB
ALBUMIN SERPL-MCNC: 3 G/DL (ref 3.4–5)
ALP BLD-CCNC: 111 U/L (ref 40–129)
ALT SERPL-CCNC: 29 U/L (ref 10–40)
AMMONIA: 35 UMOL/L (ref 11–51)
ANION GAP SERPL CALCULATED.3IONS-SCNC: 7 MMOL/L (ref 3–16)
ANISOCYTOSIS: ABNORMAL
AST SERPL-CCNC: 56 U/L (ref 15–37)
BACTERIA: ABNORMAL /HPF
BASOPHILS ABSOLUTE: 0.1 K/UL (ref 0–0.2)
BASOPHILS RELATIVE PERCENT: 1 %
BILIRUB SERPL-MCNC: 20 MG/DL (ref 0–1)
BILIRUBIN DIRECT: 6.5 MG/DL (ref 0–0.3)
BILIRUBIN URINE: ABNORMAL
BILIRUBIN, INDIRECT: 13.5 MG/DL (ref 0–1)
BLOOD BANK DISPENSE STATUS: NORMAL
BLOOD BANK DISPENSE STATUS: NORMAL
BLOOD BANK PRODUCT CODE: NORMAL
BLOOD BANK PRODUCT CODE: NORMAL
BLOOD, URINE: ABNORMAL
BPU ID: NORMAL
BPU ID: NORMAL
BUN BLDV-MCNC: 27 MG/DL (ref 7–20)
BURR CELLS: ABNORMAL
CALCIUM SERPL-MCNC: 9 MG/DL (ref 8.3–10.6)
CHLORIDE BLD-SCNC: 102 MMOL/L (ref 99–110)
CLARITY: ABNORMAL
CO2: 25 MMOL/L (ref 21–32)
COLOR: ABNORMAL
CREAT SERPL-MCNC: 0.6 MG/DL (ref 0.6–1.1)
DESCRIPTION BLOOD BANK: NORMAL
DESCRIPTION BLOOD BANK: NORMAL
EKG ATRIAL RATE: 73 BPM
EKG DIAGNOSIS: NORMAL
EKG P AXIS: 38 DEGREES
EKG P-R INTERVAL: 136 MS
EKG Q-T INTERVAL: 406 MS
EKG QRS DURATION: 90 MS
EKG QTC CALCULATION (BAZETT): 447 MS
EKG R AXIS: 25 DEGREES
EKG T AXIS: 35 DEGREES
EKG VENTRICULAR RATE: 73 BPM
EOSINOPHILS ABSOLUTE: 0.1 K/UL (ref 0–0.6)
EOSINOPHILS RELATIVE PERCENT: 0.9 %
EPITHELIAL CELLS, UA: 11 /HPF (ref 0–5)
EPITHELIALS (RENAL), 013149: PRESENT
GFR SERPL CREATININE-BSD FRML MDRD: >60 ML/MIN/{1.73_M2}
GLUCOSE BLD-MCNC: 120 MG/DL (ref 70–99)
GLUCOSE URINE: NEGATIVE MG/DL
HCT VFR BLD CALC: 19.7 % (ref 36–48)
HCT VFR BLD CALC: 24.8 % (ref 36–48)
HEMATOLOGY PATH CONSULT: NO
HEMOGLOBIN: 6.9 G/DL (ref 12–16)
HEMOGLOBIN: 8.6 G/DL (ref 12–16)
HYALINE CASTS: 7 /LPF (ref 0–8)
INR BLD: 2.81 (ref 0.87–1.14)
KETONES, URINE: NEGATIVE MG/DL
LACTIC ACID: 2 MMOL/L (ref 0.4–2)
LEUKOCYTE ESTERASE, URINE: ABNORMAL
LYMPHOCYTES ABSOLUTE: 0.5 K/UL (ref 1–5.1)
LYMPHOCYTES RELATIVE PERCENT: 9.3 %
MACROCYTES: ABNORMAL
MCH RBC QN AUTO: 33.3 PG (ref 26–34)
MCHC RBC AUTO-ENTMCNC: 35 G/DL (ref 31–36)
MCV RBC AUTO: 95 FL (ref 80–100)
MICROCYTES: ABNORMAL
MICROSCOPIC EXAMINATION: YES
MONOCYTES ABSOLUTE: 0.9 K/UL (ref 0–1.3)
MONOCYTES RELATIVE PERCENT: 15.8 %
NEUTROPHILS ABSOLUTE: 4.1 K/UL (ref 1.7–7.7)
NEUTROPHILS RELATIVE PERCENT: 73 %
NITRITE, URINE: POSITIVE
OVALOCYTES: ABNORMAL
PDW BLD-RTO: 22.4 % (ref 12.4–15.4)
PH UA: 5.5 (ref 5–8)
PLATELET # BLD: 66 K/UL (ref 135–450)
PLATELET SLIDE REVIEW: ABNORMAL
PMV BLD AUTO: 9.6 FL (ref 5–10.5)
POIKILOCYTES: ABNORMAL
POLYCHROMASIA: ABNORMAL
POTASSIUM SERPL-SCNC: 4.7 MMOL/L (ref 3.5–5.1)
PROCALCITONIN: 0.5 NG/ML (ref 0–0.15)
PROTEIN UA: NEGATIVE MG/DL
PROTHROMBIN TIME: 29.7 SEC (ref 11.7–14.5)
RBC # BLD: 2.07 M/UL (ref 4–5.2)
RBC UA: 2 /HPF (ref 0–4)
SCHISTOCYTES: ABNORMAL
SLIDE REVIEW: ABNORMAL
SODIUM BLD-SCNC: 134 MMOL/L (ref 136–145)
SPECIFIC GRAVITY UA: 1.01 (ref 1–1.03)
TOTAL PROTEIN: 5.7 G/DL (ref 6.4–8.2)
URINE REFLEX TO CULTURE: YES
URINE TYPE: ABNORMAL
UROBILINOGEN, URINE: 1 E.U./DL
WBC # BLD: 5.6 K/UL (ref 4–11)
WBC UA: 18 /HPF (ref 0–5)

## 2022-11-09 PROCEDURE — 1200000000 HC SEMI PRIVATE

## 2022-11-09 PROCEDURE — 2580000003 HC RX 258: Performed by: STUDENT IN AN ORGANIZED HEALTH CARE EDUCATION/TRAINING PROGRAM

## 2022-11-09 PROCEDURE — 87086 URINE CULTURE/COLONY COUNT: CPT

## 2022-11-09 PROCEDURE — 81001 URINALYSIS AUTO W/SCOPE: CPT

## 2022-11-09 PROCEDURE — 6360000002 HC RX W HCPCS: Performed by: INTERNAL MEDICINE

## 2022-11-09 PROCEDURE — 2580000003 HC RX 258: Performed by: INTERNAL MEDICINE

## 2022-11-09 PROCEDURE — 84145 PROCALCITONIN (PCT): CPT

## 2022-11-09 PROCEDURE — 85014 HEMATOCRIT: CPT

## 2022-11-09 PROCEDURE — 83605 ASSAY OF LACTIC ACID: CPT

## 2022-11-09 PROCEDURE — 85610 PROTHROMBIN TIME: CPT

## 2022-11-09 PROCEDURE — 6370000000 HC RX 637 (ALT 250 FOR IP): Performed by: INTERNAL MEDICINE

## 2022-11-09 PROCEDURE — 93010 ELECTROCARDIOGRAM REPORT: CPT | Performed by: INTERNAL MEDICINE

## 2022-11-09 PROCEDURE — 80048 BASIC METABOLIC PNL TOTAL CA: CPT

## 2022-11-09 PROCEDURE — 82140 ASSAY OF AMMONIA: CPT

## 2022-11-09 PROCEDURE — 87077 CULTURE AEROBIC IDENTIFY: CPT

## 2022-11-09 PROCEDURE — 80076 HEPATIC FUNCTION PANEL: CPT

## 2022-11-09 PROCEDURE — 85018 HEMOGLOBIN: CPT

## 2022-11-09 PROCEDURE — 36415 COLL VENOUS BLD VENIPUNCTURE: CPT

## 2022-11-09 PROCEDURE — 87186 SC STD MICRODIL/AGAR DIL: CPT

## 2022-11-09 PROCEDURE — 85025 COMPLETE CBC W/AUTO DIFF WBC: CPT

## 2022-11-09 PROCEDURE — 36430 TRANSFUSION BLD/BLD COMPNT: CPT

## 2022-11-09 PROCEDURE — 6360000002 HC RX W HCPCS: Performed by: STUDENT IN AN ORGANIZED HEALTH CARE EDUCATION/TRAINING PROGRAM

## 2022-11-09 RX ORDER — SPIRONOLACTONE 25 MG/1
100 TABLET ORAL DAILY
Status: DISCONTINUED | OUTPATIENT
Start: 2022-11-09 | End: 2022-11-18 | Stop reason: HOSPADM

## 2022-11-09 RX ORDER — ACETAMINOPHEN 325 MG/1
650 TABLET ORAL EVERY 6 HOURS PRN
Status: DISCONTINUED | OUTPATIENT
Start: 2022-11-09 | End: 2022-11-09

## 2022-11-09 RX ORDER — SODIUM CHLORIDE 9 MG/ML
INJECTION, SOLUTION INTRAVENOUS PRN
Status: DISCONTINUED | OUTPATIENT
Start: 2022-11-09 | End: 2022-11-18 | Stop reason: HOSPADM

## 2022-11-09 RX ORDER — FUROSEMIDE 20 MG/1
20 TABLET ORAL DAILY
Status: DISCONTINUED | OUTPATIENT
Start: 2022-11-09 | End: 2022-11-18 | Stop reason: HOSPADM

## 2022-11-09 RX ORDER — LACTULOSE 10 G/15ML
20 SOLUTION ORAL 2 TIMES DAILY
Status: DISCONTINUED | OUTPATIENT
Start: 2022-11-09 | End: 2022-11-18 | Stop reason: HOSPADM

## 2022-11-09 RX ORDER — ENOXAPARIN SODIUM 100 MG/ML
40 INJECTION SUBCUTANEOUS DAILY
Status: DISCONTINUED | OUTPATIENT
Start: 2022-11-09 | End: 2022-11-18 | Stop reason: HOSPADM

## 2022-11-09 RX ORDER — ZOLPIDEM TARTRATE 5 MG/1
5 TABLET ORAL NIGHTLY PRN
Status: DISCONTINUED | OUTPATIENT
Start: 2022-11-09 | End: 2022-11-18 | Stop reason: HOSPADM

## 2022-11-09 RX ORDER — OXYCODONE HYDROCHLORIDE 5 MG/1
5 TABLET ORAL EVERY 6 HOURS PRN
Status: DISCONTINUED | OUTPATIENT
Start: 2022-11-09 | End: 2022-11-18 | Stop reason: HOSPADM

## 2022-11-09 RX ORDER — NADOLOL 20 MG/1
40 TABLET ORAL NIGHTLY
Status: DISCONTINUED | OUTPATIENT
Start: 2022-11-09 | End: 2022-11-14

## 2022-11-09 RX ORDER — ONDANSETRON 2 MG/ML
4 INJECTION INTRAMUSCULAR; INTRAVENOUS EVERY 6 HOURS PRN
Status: DISCONTINUED | OUTPATIENT
Start: 2022-11-09 | End: 2022-11-18 | Stop reason: HOSPADM

## 2022-11-09 RX ORDER — ACETAMINOPHEN 650 MG/1
650 SUPPOSITORY RECTAL EVERY 6 HOURS PRN
Status: DISCONTINUED | OUTPATIENT
Start: 2022-11-09 | End: 2022-11-09

## 2022-11-09 RX ORDER — ONDANSETRON 4 MG/1
4 TABLET, ORALLY DISINTEGRATING ORAL EVERY 8 HOURS PRN
Status: DISCONTINUED | OUTPATIENT
Start: 2022-11-09 | End: 2022-11-18 | Stop reason: HOSPADM

## 2022-11-09 RX ORDER — SODIUM CHLORIDE 0.9 % (FLUSH) 0.9 %
5-40 SYRINGE (ML) INJECTION EVERY 12 HOURS SCHEDULED
Status: DISCONTINUED | OUTPATIENT
Start: 2022-11-09 | End: 2022-11-18 | Stop reason: HOSPADM

## 2022-11-09 RX ORDER — POLYETHYLENE GLYCOL 3350 17 G/17G
17 POWDER, FOR SOLUTION ORAL DAILY PRN
Status: DISCONTINUED | OUTPATIENT
Start: 2022-11-09 | End: 2022-11-18 | Stop reason: HOSPADM

## 2022-11-09 RX ORDER — SODIUM CHLORIDE 0.9 % (FLUSH) 0.9 %
5-40 SYRINGE (ML) INJECTION PRN
Status: DISCONTINUED | OUTPATIENT
Start: 2022-11-09 | End: 2022-11-18 | Stop reason: HOSPADM

## 2022-11-09 RX ORDER — ZOLPIDEM TARTRATE 5 MG/1
5 TABLET ORAL NIGHTLY PRN
COMMUNITY

## 2022-11-09 RX ORDER — PANTOPRAZOLE SODIUM 40 MG/1
40 TABLET, DELAYED RELEASE ORAL
Status: DISCONTINUED | OUTPATIENT
Start: 2022-11-09 | End: 2022-11-18 | Stop reason: HOSPADM

## 2022-11-09 RX ORDER — FOLIC ACID 1 MG/1
1 TABLET ORAL DAILY
Status: DISCONTINUED | OUTPATIENT
Start: 2022-11-09 | End: 2022-11-18 | Stop reason: HOSPADM

## 2022-11-09 RX ADMIN — OXYCODONE 5 MG: 5 TABLET ORAL at 01:58

## 2022-11-09 RX ADMIN — FUROSEMIDE 20 MG: 20 TABLET ORAL at 08:29

## 2022-11-09 RX ADMIN — ZOLPIDEM TARTRATE 5 MG: 5 TABLET ORAL at 21:51

## 2022-11-09 RX ADMIN — ONDANSETRON 4 MG: 2 INJECTION INTRAMUSCULAR; INTRAVENOUS at 08:30

## 2022-11-09 RX ADMIN — OXYCODONE 5 MG: 5 TABLET ORAL at 17:55

## 2022-11-09 RX ADMIN — CEFTRIAXONE SODIUM 1000 MG: 1 INJECTION, POWDER, FOR SOLUTION INTRAMUSCULAR; INTRAVENOUS at 13:03

## 2022-11-09 RX ADMIN — LACTULOSE 20 G: 20 SOLUTION ORAL at 08:30

## 2022-11-09 RX ADMIN — ONDANSETRON 4 MG: 2 INJECTION INTRAMUSCULAR; INTRAVENOUS at 01:58

## 2022-11-09 RX ADMIN — LACTULOSE 20 G: 20 SOLUTION ORAL at 20:38

## 2022-11-09 RX ADMIN — PHYTONADIONE 5 MG: 10 INJECTION, EMULSION INTRAMUSCULAR; INTRAVENOUS; SUBCUTANEOUS at 20:45

## 2022-11-09 RX ADMIN — RIFAXIMIN 550 MG: 550 TABLET ORAL at 20:38

## 2022-11-09 RX ADMIN — RIFAXIMIN 550 MG: 550 TABLET ORAL at 08:43

## 2022-11-09 RX ADMIN — FOLIC ACID 1 MG: 1 TABLET ORAL at 08:29

## 2022-11-09 RX ADMIN — Medication 10 ML: at 08:31

## 2022-11-09 RX ADMIN — ONDANSETRON 4 MG: 4 TABLET, ORALLY DISINTEGRATING ORAL at 18:00

## 2022-11-09 RX ADMIN — Medication 10 ML: at 20:38

## 2022-11-09 RX ADMIN — OXYCODONE 5 MG: 5 TABLET ORAL at 08:29

## 2022-11-09 RX ADMIN — SPIRONOLACTONE 100 MG: 25 TABLET ORAL at 08:29

## 2022-11-09 ASSESSMENT — PAIN SCALES - GENERAL
PAINLEVEL_OUTOF10: 0
PAINLEVEL_OUTOF10: 0
PAINLEVEL_OUTOF10: 9
PAINLEVEL_OUTOF10: 9
PAINLEVEL_OUTOF10: 8
PAINLEVEL_OUTOF10: 8

## 2022-11-09 ASSESSMENT — PAIN DESCRIPTION - LOCATION
LOCATION: GENERALIZED
LOCATION: HEAD
LOCATION: HEAD
LOCATION: GENERALIZED

## 2022-11-09 ASSESSMENT — PAIN DESCRIPTION - DESCRIPTORS: DESCRIPTORS: SHOOTING;ACHING

## 2022-11-09 NOTE — ED NOTES
Patient transported to ICU by this RN and ED RUST with blood transfusing at 75 mL/hr. Patient on portable tele monitor. Patient stable condition. Blood handoff completed with [facility] ICU RN. Blood continues to transfuse at 75 mL/hr.       Luciana Angulo RN  11/09/22 1221

## 2022-11-09 NOTE — ED PROVIDER NOTES
I independently saw performed a substantive portion of the visit (history, physical, and MDM) on Carmine De Leon. All diagnostic, treatment, and disposition decisions were made by myself in conjunction with the advanced practice provider. I have participated in the medical decision making and directed the treatment plan and disposition of the patient. For further details of [facility] emergency department encounter, please see the advanced practice provider's documentation. Angel Hussein MD, am the primary physician provider of record. CHIEF COMPLAINT  Chief Complaint   Patient presents with    Hypotension     Pt brought by  EMS from [place]. Nursing home called for high ammonia 131, low hgb 5.9. pt BP 93/36 when EMS took it. Pt was given 200 ML of NS. Pt states has been feeling weak since Sunday. Pt hx liver failure      Briefly, Carmine De Leon is a 40 y.o. female  who presents to the ED complaining of being sent here for hyperammonemia with anemia and has a history of liver failure from prior alcohol abuse. She feels malaise and fatigue as her main complaint as well as some minimal abdominal discomfort that is more of a chronic issue for her. She has been having general fatigue worsening since Sunday in particular. She does not drink alcohol anymore. She has not felt confused and is chronically jaundiced and reports no worsening discoloration than baseline for her. FOCUSED PHYSICAL EXAMINATION  BP (!) 103/47   Pulse 72   Temp 98.8 °F (37.1 °C)   Resp 16   SpO2 97%    Focused physical examination notable for chronically ill-appearing, pale and jaundiced, abdomen is soft, minimally distended and mildly tender but no peritonitis or Armenta sign, no acute distress, pale, dry mucous membranes, normal speech and mentation, no asterixis. No cranial nerve deficits on exam, normal strength and sensation in the arms and legs.     The 12 lead EKG was interpreted by me as follows:  Rate: normal with a rate of 73  Rhythm: sinus  Axis: normal  Intervals: normal NC, narrow QRS, normal QTc  ST segments: no ST elevations or depressions  T waves: no abnormal inversions  Non-specific T wave changes: not present  Prior EKG comparison: EKG dated 10/21/22 is not significantly different      MDM:  ED course was notable for chronic liver failure, with profound anemia today, acute on chronic. No symptoms of active bleeding at this time however. She is mildly hypotensive which improved with fluids and I suspect is related to anemia and not any sort of an infection or sepsis. Similarly her lactic is mildly elevated because of anemia and other chronic medical conditions and not related to any sort of infection, has no infection is found or suspected today. I do not suspect cholecystitis based on her CT report today of her abdomen and no other acute findings are noted. Her ammonia here is only 37 and she is not encephalopathic. Her INR is 2.9 but this is related to liver coagulopathy. She will be transfused packed red blood cells and admitted to the hospital for further treatment and evaluation. Her bilirubin is 19.9 however her LFTs are not notably elevated. Is this patient to be included in the SEP-1 Core Measure? No   Exclusion criteria - the patient is NOT to be included for SEP-1 Core Measure due to: Alternative explanation for abnormal labs/vitals that do not relate to sepsis, see MDM for further explanation      During the patient's ED course, the patient was given:  Medications   0.9 % sodium chloride infusion (has no administration in time range)   0.9 % sodium chloride infusion (1,000 mLs IntraVENous New Bag 11/8/22 0863)        CLINICAL IMPRESSION  1. Hyperbilirubinemia    2. Anemia, unspecified type    3. Coagulopathy ([address]        [address] [facility] was admitted in fair condition.     The total critical care time I independently spent while evaluating and treating this patient was 40 minutes. This excludes time spent doing separately billable procedures. This includes time at the bedside, data interpretation, medication management, obtaining critical history from collateral sources if the patient is unable to provide it directly, and physician consultation. Specifics of interventions taken and potentially life-threatening diagnostic considerations are listed above in the medical decision making. If this was a shared visit with an BRIAN, the time in this attestation is non-concurrent critical care time out of the total shared critical care time provided by the BRIAN and myself. This chart was created using Dragon dictation software. Efforts were made by me to ensure accuracy, however some errors may be present due to limitations of this technology.             Craig Gosselin, MD  11/08/22 0899

## 2022-11-09 NOTE — ED PROVIDER NOTES
[address] [facility]        Pt Name: Shayna Vail  MRN: [id number]  [place] 1975  Date of evaluation: 11/8/2022  Provider: DARCY Rubalcava - CNP  PCP: Dimitry Adams MD  Note Started: 11:22 PM EST 11/9/2022         I have seen and evaluated this patient with my supervising physician Ketan Guzmán MD.    [address] [place] [address]       Chief Complaint   Patient presents with    Hypotension     Pt brought by  EMS from [place] [address]. Nursing home called for high ammonia 131, low hgb 5.9. pt BP 93/36 when EMS took it. Pt was given 200 ML of NS. Pt states has been feeling weak since Sunday. Pt hx liver failure        HISTORY OF PRESENT ILLNESS   (Location, Timing/Onset, Context/Setting, Quality, Duration, Modifying Factors, Severity, Associated Signs and Symptoms)  Note limiting factors. Chief Complaint: abnormal labs     Shayna Vail is a 40 y.o. female who presents to the emergency department from [address] [place] [address] with abnormal labs including hyperammonemia with anemia and history of liver failure from previous alcohol abuse, no longer drinking. Patient complaining of fatigue/generalized weakness with minimal abdominal pain. Generalized fatigue since Sunday. Patient is not confused, chronically jaundiced. Nursing Notes were all reviewed and agreed with or any disagreements were addressed in the HPI. REVIEW OF SYSTEMS    (2-9 systems for level 4, 10 or more for level 5)     Review of Systems   Constitutional:  Positive for fatigue. Negative for activity change, chills and fever. Respiratory:  Negative for chest tightness and shortness of breath. Cardiovascular:  Negative for chest pain. Gastrointestinal:  Positive for abdominal pain. Negative for diarrhea, nausea and vomiting. Genitourinary:  Negative for dysuria. Skin:  Positive for color change. All other systems reviewed and are negative.     Positives and Pertinent negatives as per HPI. Except as noted above in the ROS, all other systems were reviewed and negative. PAST MEDICAL HISTORY     Past Medical History:   Diagnosis Date    Alcoholic liver disease ([address]     Anemia     History of blood transfusion          SURGICAL HISTORY     Past Surgical History:   Procedure Laterality Date    COLONOSCOPY N/A 3/11/2021    COLONOSCOPY POLYPECTOMY SNARE/COLD BIOPSY performed by Jeancarlos Thibodeaux MD at [address]    CT BONE MARROW BIOPSY  7/5/2022    CT BONE MARROW BIOPSY 7/5/2022 Armando Delgadillo MD [facility] CT SCAN    KNEE ARTHROPLASTY      TUBAL LIGATION      ESSURE    UPPER GASTROINTESTINAL ENDOSCOPY N/A 01/30/2021    EGD DIAGNOSTIC ONLY performed by Arthur Saunders MD at [facility] 03/10/2021    EGD BIOPSY performed by Jeancarlos Thibodeaux MD at [facility] 6/27/2021    EGD BAND LIGATION performed by Braden Sandhoff, MD at [facility] N/A 4/27/2022    EGD DIAGNOSTIC ONLY performed by Terra Colón MD at [facility] N/A 7/1/2022    EGD ESOPHAGOGASTRODUODENOSCOPY ULTRASOUND performed by Lupe Wright MD at [facility] N/A 7/1/2022    EGD BAND LIGATION performed by Lupe Wright MD at [facility] 10/26/2022    EGD DIAGNOSTIC ONLY performed by Severo Pavon MD at [address]       Previous Medications    FOLIC ACID (FOLVITE) 1 MG TABLET    Take 1 tablet by mouth daily    FUROSEMIDE (LASIX) 20 MG TABLET    Take 1 tablet by mouth in the morning.     LACTULOSE (CHRONULAC) 10 GM/15ML SOLUTION    Take 30 mLs by mouth 2 times daily    NADOLOL (CORGARD) 40 MG TABLET    Take 1 tablet by mouth daily    ONDANSETRON (ZOFRAN) 8 MG TABLET    Take 1 tablet by mouth    PANTOPRAZOLE (PROTONIX) 40 MG TABLET    Take 1 tablet by mouth every morning (before breakfast)    RIFAXIMIN (XIFAXAN) 550 MG TABLET    Take 1 tablet by mouth 2 times daily    SPIRONOLACTONE (ALDACTONE) 100 MG TABLET    Take 1 tablet by mouth daily    THIAMINE MONONITRATE (THIAMINE) 100 MG TABLET    Take 1 tablet by mouth daily         ALLERGIES     Oxycodone    FAMILYHISTORY       Family History   Problem Relation Age of Onset    Alcohol Abuse Father           SOCIAL HISTORY       Social History     Tobacco Use    Smoking status: Former    Smokeless tobacco: Never   Vaping Use    Vaping Use: Never used   Substance Use Topics    Alcohol use: Not Currently    Drug use: Never       SCREENINGS    Mirela Coma Scale  Eye Opening: Spontaneous  Best Verbal Response: Oriented  Best Motor Response: Obeys commands  Mirela Coma Scale Score: 15        PHYSICAL EXAM    (up to 7 for level 4, 8 or more for level 5)     ED Triage Vitals   BP Temp Temp Source Heart Rate Resp SpO2 Height Weight   11/08/22 2054 11/08/22 2054 11/08/22 2050 11/08/22 2054 11/08/22 2054 11/08/22 2054 -- --   (!) 92/43 98.8 °F (37.1 °C) Oral 75 16 94 %         Physical Exam  Vitals and nursing note reviewed. Constitutional:       Appearance: She is well-developed. She is not diaphoretic. HENT:      Head: Normocephalic and atraumatic. Right Ear: External ear normal.      Left Ear: External ear normal.   Eyes:      General: Scleral icterus present. Right eye: No discharge. Left eye: No discharge. Neck:      Vascular: No JVD. Cardiovascular:      Rate and Rhythm: Normal rate. Pulses: Normal pulses. Pulmonary:      Effort: Pulmonary effort is normal. No respiratory distress. Musculoskeletal:         General: Normal range of motion. Skin:     General: Skin is warm and dry. Coloration: Skin is jaundiced. Skin is not pale. Neurological:      Mental Status: She is alert.    Psychiatric:         Behavior: Behavior normal.       DIAGNOSTIC RESULTS LABS:    Labs Reviewed   CBC WITH AUTO DIFFERENTIAL - Abnormal; Notable for the following components:       Result Value    RBC 1.60 (*)     Hemoglobin 5.6 (*)     Hematocrit 15.8 (*)     MCH 35.1 (*)     RDW 22.8 (*)     Platelets 68 (*)     Lymphocytes Absolute 0.4 (*)     Metamyelocytes Relative 1 (*)     Anisocytosis Occasional (*)     Microcytes Occasional (*)     Polychromasia Occasional (*)     Hypochromia Occasional (*)     Poikilocytes Occasional (*)     Schistocytes Occasional (*)     Abdoul Cells 1+ (*)     All other components within normal limits    Narrative:     CALL  [facility] tel. [phone],  Hematology results called to and read back by Ethel Cantor, 11/08/2022 22:09,  by [address] [place] [address] W/ REFLEX TO MG FOR LOW K - Abnormal; Notable for the following components:    Sodium 134 (*)     Glucose 186 (*)     BUN 28 (*)     Total Protein 5.8 (*)     Albumin 3.1 (*)     Total Bilirubin 19.9 (*)     AST 57 (*)     All other components within normal limits   PROTIME-INR - Abnormal; Notable for the following components:    Protime 30.4 (*)     INR 2.89 (*)     All other components within normal limits   LACTATE, SEPSIS - Abnormal; Notable for the following components:    Lactic Acid, Sepsis 2.1 (*)     All other components within normal limits   BRAIN NATRIURETIC PEPTIDE - Abnormal; Notable for the following components:    Pro- (*)     All other components within normal limits   PROCALCITONIN - Abnormal; Notable for the following components:    Procalcitonin 0.51 (*)     All other components within normal limits   CULTURE, BLOOD 1   CULTURE, BLOOD 2   AMMONIA   LIPASE   TROPONIN   LACTATE, SEPSIS   URINALYSIS WITH REFLEX TO CULTURE   TYPE AND SCREEN   PREPARE RBC (CROSSMATCH)       When ordered only abnormal lab results are displayed. All other labs were within normal range or not returned as of this dictation. EKG:  When ordered, EKG's are interpreted by the Emergency Department Physician in the absence of a cardiologist.  Please see their note for interpretation of EKG. RADIOLOGY:   Non-plain film images such as CT, Ultrasound and MRI are read by the radiologist. Plain radiographic images are visualized and preliminarily interpreted by the ED Provider with the below findings:        Interpretation per the Radiologist below, if available at the time of this note:    CT ABDOMEN PELVIS WO CONTRAST Additional Contrast? None   Final Result   1. Very heterogenous appearance of the liver with sequela of portal   hypertension, varices, splenomegaly, and ascites. The amount of ascites and   general edema of the intra-fat are increased from the recent exam.  The   actual volume of ascites is still small. 2.  Cholelithiasis with thickened wall of the gallbladder. However   thickening could relate to the general edematous state and surrounding   ascites. Can consider gallbladder ultrasound if symptomatic. 3.  No bowel obstruction or pneumoperitoneum. There is no hydronephrosis   with a stable calculus at the right kidney. 4.  Mild compression deformity upper endplate of T8 without a significant   loss of vertebral body height. Features suggest this is chronic. XR CHEST PORTABLE   Final Result   Mild cardiomegaly      Hypoinflation with mild bibasilar atelectasis or early infiltrates which is   more apparent. CT ABDOMEN PELVIS WO CONTRAST Additional Contrast? None    Result Date: 11/8/2022  EXAMINATION: CT OF THE ABDOMEN AND PELVIS WITHOUT CONTRAST 11/8/2022 10:00 pm TECHNIQUE: CT of the abdomen and pelvis was performed without the administration of intravenous contrast. Multiplanar reformatted images are provided for review. Automated exposure control, iterative reconstruction, and/or weight based adjustment of the mA/kV was utilized to reduce the radiation dose to as low as reasonably achievable.  COMPARISON: 10/24/2022 HISTORY: [address] PROVIDED HISTORY: hypotension anemia liver failure TECHNOLOGIST PROVIDED HISTORY: Reason for exam:->hypotension anemia liver failure Additional Contrast?->None Decision Support Exception - unselect if not a suspected or confirmed emergency medical condition->Emergency Medical Condition (MA) Is the patient pregnant?->No Reason for Exam: hypotension anemia liver failure Relevant Medical/Surgical History: Hypotension (Pt brought by [facility] from [place]. Nursing home called for high ammonia 131, low hgb 5.9. pt BP 93/36 when EMS took it. Pt was given 200 ML of NS. Pt states has been feeling weak since Sunday. Pt hx liver failure ) FINDINGS: Lower Chest: The cardiac chambers are mildly enlarged. Dependent atelectatic changes and linear atelectasis in the lower lungs. There is no pleural effusion or pneumothorax. Organs: Lack of IV contrast reduces evaluation of the organs and vasculature. Very heterogenous appearance of the liver is again noted mineralized stones are present in the gallbladder and thickening of the gallbladder wall. However there is also ascites around the margin of the liver and gallbladder fossa, combined with general edema of the intra-abdominal fat. Splenomegaly is redemonstrated and appears stable. Small amount of ascites along the margin of the spleen. Limited evaluation of the pancreas with a general edema and may be atrophic. Engorged vessels and likely lymph nodes in the upper abdomen use the sensitivity of the pancreas. No obvious masses are seen at the adrenal fossa. No renal calculi or hydronephrosis the left kidney. Nonobstructing calculi and some mild scarring at the right kidney are stable. The ureters are not dilated. GI/Bowel: Stomach, small bowel, and colon are not dilated. Scattered ascites in the mesenteric folds, pericolonic gutters, and collecting in the pelvis. General edema of the intra-fat and mesentery.   Engorged vessels and likely multiple lymph nodes scattered in the mesentery. There is no sign of pneumoperitoneum. The appendix is identified with small amounts of gas in the lumen and is not dilated. Mild fecal load within the colon from the cecum through the rectum without a large focal bolus. Pelvis: The urinary bladder is under distended reducing evaluation of the wall thickness. Uterus is still present. Essure type tubal devices are noted. There is free fluid in the pelvis. Peritoneum/Retroperitoneum: No abdominal aortic aneurysm or high density hematoma. Multiple small lymph nodes down the retroperitoneum are again noted. Bones/Soft Tissues: Mild edema at the body wall. Recannulized vein at the umbilicus. Mild degenerative changes in the lumbar spine. There is a mild compression deformity of the upper endplate of T8 without a significant loss of vertebral body height. 1.  Very heterogenous appearance of the liver with sequela of portal hypertension, varices, splenomegaly, and ascites. The amount of ascites and general edema of the intra-fat are increased from the recent exam.  The actual volume of ascites is still small. 2.  Cholelithiasis with thickened wall of the gallbladder. However thickening could relate to the general edematous state and surrounding ascites. Can consider gallbladder ultrasound if symptomatic. 3.  No bowel obstruction or pneumoperitoneum. There is no hydronephrosis with a stable calculus at the right kidney. 4.  Mild compression deformity upper endplate of T8 without a significant loss of vertebral body height. Features suggest this is chronic. XR CHEST PORTABLE    Result Date: 11/8/2022  EXAMINATION: ONE XRAY VIEW OF THE CHEST 11/8/2022 9:10 pm COMPARISON: None. HISTORY: ORDERING SYSTEM PROVIDED HISTORY: hypotension TECHNOLOGIST PROVIDED HISTORY: Reason for exam:->hypotension Reason for Exam: hypotension FINDINGS: The heart is borderline enlarged and prominent.   The pulmonary vessels are top-normal.  The lungs are hypoinflated with mild bibasilar interstitial linear densities which is more prominent. No effusion is seen. Mild cardiomegaly Hypoinflation with mild bibasilar atelectasis or early infiltrates which is more apparent. PROCEDURES   Unless otherwise noted below, none     Procedures    CRITICAL CARE TIME   There was a high probability of life-threatening clinical deterioration in the patient's condition requiring my urgent intervention. I personally saw the patient and independently provided 40 minutes of non-concurrent critical care out of the total shared critical care time provided, excluding separately reportable procedures. CONSULTS:  IP CONSULT TO GI      EMERGENCY DEPARTMENT COURSE and DIFFERENTIAL DIAGNOSIS/MDM:   Vitals:    Vitals:    11/08/22 2200 11/08/22 2223 11/08/22 2230 11/08/22 2245   BP: (!) 105/51 (!) 103/42 (!) 102/47 (!) 103/47   Pulse: 72  72 72   Resp: 15  14 16   Temp:       TempSrc:       SpO2: 98%   97%       Patient was given the following medications:  Medications   0.9 % sodium chloride infusion (has no administration in time range)   0.9 % sodium chloride infusion (1,000 mLs IntraVENous New Bag 11/8/22 2136)         Is this patient to be included in the SEP-1 Core Measure due to severe sepsis or septic shock? No   Exclusion criteria - the patient is NOT to be included for SEP-1 Core Measure due to: Alternative explanation for abnormal labs/vitals that do not relate to sepsis, see MDM for further explanation    Briefly, this is a 56-year-old previous alcohol abuser with history of chronic liver failure that arrives from [facility] today with anemia, acute on chronic. Patient does not have any symptoms of active bleeding at this time. Initially she was mildly hypotensive which improved with IV fluids, likely related to anemia. Patient given a unit of packed red blood cells for hemoglobin less than 6. Ammonia here today is only 37.     Patient will be admitted to hospitalist services. FINAL IMPRESSION      1. Hyperbilirubinemia    2. Anemia, unspecified type    3. Coagulopathy ([facility] [address]          DISPOSITION/PLAN   DISPOSITION Admitted 11/08/2022 11:52:27 PM      PATIENT REFERRED TO:  No follow-up provider specified.     DISCHARGE MEDICATIONS:  New Prescriptions    No medications on file       DISCONTINUED MEDICATIONS:  Discontinued Medications    No medications on file              (Please note that portions of this note were completed with a voice recognition program.  Efforts were made to edit the dictations but occasionally words are mis-transcribed.)    DARCY Seo CNP (electronically signed)           DARCY Seo CNP  11/08/22 [address] DARCY Davis CNP  11/09/22 0010

## 2022-11-09 NOTE — PLAN OF CARE
Problem: Discharge Planning  Goal: Discharge to home or other facility with appropriate resources  Outcome: Progressing  Flowsheets (Taken 11/9/2022 0221)  Discharge to home or other facility with appropriate resources:   Identify barriers to discharge with patient and caregiver   Identify discharge learning needs (meds, wound care, etc)   Refer to discharge planning if patient needs post-hospital services based on physician order or complex needs related to functional status, cognitive ability or social support system   Arrange for needed discharge resources and transportation as appropriate     Problem: Pain  Goal: Verbalizes/displays adequate comfort level or baseline comfort level  Outcome: Progressing     Problem: Safety - Adult  Goal: Free from fall injury  Outcome: Progressing     Problem: ABCDS Injury Assessment  Goal: Absence of physical injury  Outcome: Progressing  Flowsheets (Taken 11/9/2022 0215)  Absence of Physical Injury: Implement safety measures based on patient assessment

## 2022-11-09 NOTE — PROGRESS NOTES
Transfusion completed 0411. Vitals completed with end of transfusion. No reported symptoms of reaction. MD notified d/t diastolic pressures remaining <50. Call light in reach and bed alarm engaged.

## 2022-11-09 NOTE — ED NOTES
Per Jarrod TAVERAS, Dr. Felecia Grissom said it was okay to continue with transfusion and to continue to monitor patient.      Alisson Law RN  11/09/22 0662

## 2022-11-09 NOTE — ED NOTES
Patient temperature at start of transfusion 99.9, at end of 15 minute observation temperature 100.6. Anitha TAVERAS notified. Transfusion paused until [address] to bedside. Patient only complaint is headache which she reports started prior to transfusion.       Quincy Olszewski, RN  11/09/22 4238

## 2022-11-09 NOTE — PROGRESS NOTES
4 Eyes Skin Assessment     NAME:  Mary Prather  YOB: 1975  MEDICAL RECORD NUMBER:  [id number]    The patient is being assess for  Admission    I agree that 2 RN's have performed a thorough Head to Toe Skin Assessment on the patient. ALL assessment sites listed below have been assessed. Areas assessed by both nurses:    Head, Face, Ears, Shoulders, Back, Chest, Arms, Elbows, Hands, Sacrum. Buttock, Coccyx, Ischium, and Legs. Feet and Heels        Does the Patient have a Wound?  No noted wound(s)       Maurisio Prevention initiated:  Yes   Wound Care Orders initiated:  NA    Pressure Injury (Stage 3,4, Unstageable, DTI, NWPT, and Complex wounds) if present place referral/consult order under [de-identified] No    New and Established Ostomies if present place consult order under : NA      Nurse 1 eSignature: Electronically signed by Lino Guevara RN on 11/9/22 at 3:20 AM EST    **SHARE this note so that the co-signing nurse is able to place an eSignature**    Nurse 2 eSignature: Electronically signed by Prince Vargas RN on 11/9/22 at 4:20 AM EST

## 2022-11-09 NOTE — CARE COORDINATION
Discharge Planning Note:    ELIZ spoke to [employer] at [facility] to conform pt is there for skilled rehab only and will need a pre-cert to return.      Electronically signed by Esa Ferrer RN on 11/9/2022 at 9:19 AM

## 2022-11-09 NOTE — CONSENT
Informed Consent for Blood Component Transfusion Note    I have discussed with the patient the rationale for blood component transfusion; its benefits in treating or preventing fatigue, organ damage, or death; and its risk which includes mild transfusion reactions, rare risk of blood borne infection, or more serious but rare reactions. I have discussed the alternatives to transfusion, including the risk and consequences of not receiving transfusion. The patient had an opportunity to ask questions and had agreed to proceed with transfusion of blood components.     Electronically signed by Cesia Latham DO on 11/9/22 at 11:55 AM EST

## 2022-11-09 NOTE — PROGRESS NOTES
Hospitalist Progress Note    Name:  Nohelia Cintron    /Age/Sex: 1975  (40 y.o. female)  MRN & CSN:  [id number] & [id number]    PCP: Bear Valiente MD    Date of Admission: 2022    Patient Status:  Inpatient     Chief Complaint: Generalized fatigue    Hospital Course:   Nohelia Cintron is 40 y.o. female with history of alcoholic cirrhosis of the liver and chronically jaundiced. She is currently staying at [address] [place] [address]. She was most recently hospitalized here 10/21-10/29 for severe anemia, hepatic encephalopathy and complicated UTI. In the ED her hemoglobin was 5.6. 2U PRBC given. Subjective: Today is:  Hospital Day: 2. Patient seen and examined in ICU-. Still complains of fatigue. Denies any chest pain or shortness of breath. Wanting to eat and drink if possible. Medications:  Reviewed    Infusion Medications    sodium chloride      sodium chloride       Scheduled Medications    sodium chloride flush  5-40 mL IntraVENous 2 times per day    [Held by provider] enoxaparin  40 mg SubCUTAneous Daily    nadolol  40 mg Oral Nightly    furosemide  20 mg Oral Daily    spironolactone  100 mg Oral Daily    folic acid  1 mg Oral Daily    lactulose  20 g Oral BID    rifAXIMin  550 mg Oral BID    pantoprazole  40 mg Oral QAM AC    cefTRIAXone (ROCEPHIN) IV  1,000 mg IntraVENous Q24H     PRN Meds: sodium chloride flush, sodium chloride, ondansetron **OR** ondansetron, polyethylene glycol, oxyCODONE, zolpidem, sodium chloride      Intake/Output Summary (Last 24 hours) at 2022 1644  Last data filed at 2022 1518  Gross per 24 hour   Intake 1749.5 ml   Output --   Net 1749.5 ml       Physical Exam Performed:    BP (!) 94/52   Pulse 68   Temp 100.3 °F (37.9 °C) (Oral)   Resp 16   Ht 5' 4\" (1.626 m)   Wt 105 lb 13.1 oz (48 kg)   SpO2 95%   BMI 18.16 kg/m²     General appearance: No apparent distress, appears stated age and cooperative.   Chronically ill-appearing. HEENT: Pupils equal, round, and reactive to light. Scleral icterus. Neck: Supple, with full range of motion. No jugular venous distention. Trachea midline. Respiratory:  Normal respiratory effort. Clear to auscultation, bilaterally without Rales/Wheezes/Rhonchi. Cardiovascular: Regular rate and rhythm with normal S1/S2 without murmurs, rubs or gallops. No peripheral edema. Abdomen: Soft, non-tender, non-distended with normal bowel sounds. : No CVA tenderness. Musculoskeletal: No clubbing or cyanosis. Full range of motion without deformity. Skin: Skin texture and turgor normal.  Severely jaundiced. No rashes or lesions. Neurologic:  Neurovascularly intact without any focal sensory/motor deficits. Cranial nerves: II-XII intact, grossly non-focal.  Psychiatric: Alert and oriented, thought content appropriate, normal insight  Peripheral Pulses: +2 palpable, equal bilaterally       Labs:   Recent Labs     11/08/22 2125 11/09/22 0515   WBC 5.6 5.6   HGB 5.6* 6.9*   HCT 15.8* 19.7*   PLT 68* 66*     Recent Labs     11/08/22 2125 11/09/22  0515   * 134*   K 4.4 4.7   CL 99 102   CO2 26 25   BUN 28* 27*   CREATININE 0.7 0.6   CALCIUM 9.1 9.0     Recent Labs     11/08/22 2125 11/09/22  0515   AST 57* 56*   ALT 30 29   BILIDIR  --  6.5*   BILITOT 19.9* 20.0*   ALKPHOS 117 111     Recent Labs     11/08/22 2125 11/09/22  0515   INR 2.89* 2.81*     Recent Labs     11/08/22 2125   TROPONINI <0.01       Urinalysis:      Lab Results   Component Value Date/Time    NITRU POSITIVE 11/09/2022 01:40 AM    WBCUA 18 11/09/2022 01:40 AM    BACTERIA 4+ 11/09/2022 01:40 AM    RBCUA 2 11/09/2022 01:40 AM    BLOODU TRACE 11/09/2022 01:40 AM    SPECGRAV 1.015 11/09/2022 01:40 AM    GLUCOSEU Negative 11/09/2022 01:40 AM       Radiology:  CT ABDOMEN PELVIS WO CONTRAST Additional Contrast? None   Final Result   1.   Very heterogenous appearance of the liver with sequela of portal   hypertension, varices, splenomegaly, and ascites. The amount of ascites and   general edema of the intra-fat are increased from the recent exam.  The   actual volume of ascites is still small. 2.  Cholelithiasis with thickened wall of the gallbladder. However   thickening could relate to the general edematous state and surrounding   ascites. Can consider gallbladder ultrasound if symptomatic. 3.  No bowel obstruction or pneumoperitoneum. There is no hydronephrosis   with a stable calculus at the right kidney. 4.  Mild compression deformity upper endplate of T8 without a significant   loss of vertebral body height. Features suggest this is chronic. XR CHEST PORTABLE   Final Result   Mild cardiomegaly      Hypoinflation with mild bibasilar atelectasis or early infiltrates which is   more apparent.                  Assessment/Plan:    Active Hospital Problems    Diagnosis     Acute on chronic anemia [D64.9]      Priority: Medium    Moderate malnutrition ([address] [E44.0]      Priority: Medium    Alcoholic cirrhosis ([address] [G71.97]      Priority: Medium    Severe anemia [D64.9]      Priority: Medium    Elevated LFTs [R79.89]      Priority: Medium    Ascites due to alcoholic cirrhosis ([address] [J02.41]      Priority: Medium    Sinus tachycardia [R00.0]      Priority: Medium    Esophageal varices ([address] [I85.00]          Hospital Day: 2    This is a 40 y.o. 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  -Monitor daily LFTs  -Continue Lasix, Aldactone, rifaximin, lactulose, and nadolol for cirrhosis    Acute anemia  -Hemoglobin 5.6 on admission  -Received 2 units PRBC  -Daily labs; monitor hemoglobin  -Transfuse to keep hemoglobin greater than 7  -GI consulted; appreciate recommendations    UTI  -UA indicative of UTI, though did have a lot of epithelial cells  -Urine culture sent; awaiting results  -Start Rocephin      DVT ppx: Lovenox  GI ppx: Diet/Tube Feeds  Diet: ADULT DIET; Full Liquid  Code Status: Full Code    Disposition:  Back to nursing facility after hemoglobin stabilization and when medically able. PT/OT Eval Status: Ordered      Valarie Roper DO  11/9/2022  4:44 PM      Please note that some part of this chart was generated using Dragon dictation software. Although every effort was made to ensure the accuracy of this automated transcription, some errors in transcription may have occurred inadvertently. If you may need any clarification, please do not hesitate to contact me through [facility].

## 2022-11-09 NOTE — PROGRESS NOTES
Pt received to [id number]. Pt found to be alert and oriented x4. Transfusion in process, temp reported to be elevated from ED RN post start of transfusion and given the ok to continue per hospitalist. Temp found to be 99.6 Oral, 70 HR and 95% RA on arrival. Pt reports pain 9/10- headache, PRN oxycodone and Zofran given d/t nausea with past medication per pt. Orientation to room provided. 1x urine occurrence, sent to lab for UA. Bed alarm engaged and education provided, no needs at this time noted.

## 2022-11-09 NOTE — CONSENT
Informed Consent for Blood Component Transfusion Note    I have discussed with the patient the rationale for blood component transfusion; its benefits in treating or preventing fatigue, organ damage, or death; and its risk which includes mild transfusion reactions, rare risk of blood borne infection, or more serious but rare reactions. I have discussed the alternatives to transfusion, including the risk and consequences of not receiving transfusion. The patient had an opportunity to ask questions and had agreed to proceed with transfusion of blood components. Indication: packed red blood cells for critical low hgb and symptomatic anemia/hypotension.     Electronically signed by Craig Gosselin, MD on 11/8/22 at 9:52 PM EST

## 2022-11-09 NOTE — H&P
Hospital Medicine History & Physical      PCP: Alvarez Zamora MD    Date of Admission: 11/8/2022    Date of Service: Pt seen/examined on 11/09/22 and Admitted to Inpatient with expected LOS greater than two midnights due to medical therapy.      Chief Complaint: Generalized fatigue      History Of Present Illness:      Aarti Hampton is 40 y.o. female with history of alcoholic cirrhosis of the liver and chronically jaundiced  She lives she is currently staying at [address] [place] [address]  She was most recently hospitalized here 10/21-10/29 for severe anemia, hepatic encephalopathy and complicated UTI  She now presents to the ER with complaint of generalized weakness for the past 2 to 3 days  She went to participate with physical therapy today but she was profoundly fatigued she realized that something was wrong  She is now subsequently, she was sent from [address] [place] [address] where she is staying with abnormal labs  Here in the ED her hemoglobin was 5.6 therefore 2 units of PRBC was ordered  On interview, patient appears chronically ill but she is not encephalopathy and able to provide history  She complains of headache but has no other complaints or pain elsewhere on the body      Past Medical History:          Diagnosis Date    Alcoholic liver disease ([facility]     Anemia     History of blood transfusion        Past Surgical History:          Procedure Laterality Date    COLONOSCOPY N/A 3/11/2021    COLONOSCOPY POLYPECTOMY SNARE/COLD BIOPSY performed by Keisha White MD at [address] BIOPSY  7/5/2022    CT BONE MARROW BIOPSY 7/5/2022 Augusto Pinto MD [facility] CT SCAN    KNEE ARTHROPLASTY      TUBAL LIGATION      ESSURE    UPPER GASTROINTESTINAL ENDOSCOPY N/A 01/30/2021    EGD DIAGNOSTIC ONLY performed by Nelson Stark MD at [address] [place] [address] 03/10/2021    EGD BIOPSY performed by Keisha White MD at [address] [place] [address] 6/27/2021    EGD BAND LIGATION performed by Josefina Rodriguez MD at [address] N/A 4/27/2022    EGD DIAGNOSTIC ONLY performed by Emmanuelle Hu MD at [address] N/A 7/1/2022    EGD ESOPHAGOGASTRODUODENOSCOPY ULTRASOUND performed by Norris Lazar MD at [address] 7/1/2022    EGD BAND LIGATION performed by Norris Lazar MD at [address] N/A 10/26/2022    EGD DIAGNOSTIC ONLY performed by Jeimy Crowley MD at [address]       Medications Prior to Admission:      Prior to Admission medications    Medication Sig Start Date End Date Taking? Authorizing Provider   zolpidem (AMBIEN) 5 MG tablet Take 5 mg by mouth nightly as needed for Sleep (every night at bedtmes as needed for insomnia). Yes Historical Provider, MD   lactulose (CHRONULAC) 10 GM/15ML solution Take 30 mLs by mouth 2 times daily 10/27/22   Juan Torres MD   folic acid (FOLVITE) 1 MG tablet Take 1 tablet by mouth daily 10/27/22   Juan Torres MD   rifAXIMin [place]) 550 MG tablet Take 1 tablet by mouth 2 times daily 10/18/22   DARCY Lehman CNP   furosemide (LASIX) 20 MG tablet Take 1 tablet by mouth in the morning.  7/30/22   Freya Caldwell MD   ondansetron (ZOFRAN) 8 MG tablet Take 1 tablet by mouth 7/14/22   Historical Provider, MD   thiamine 100 MG tablet Take 1 tablet by mouth daily  Patient not taking: Reported on 8/25/2022 7/1/22 8/28/22  Tima Mas MD   nadolol (CORGARD) 40 MG tablet Take 1 tablet by mouth daily  Patient taking differently: Take 40 mg by mouth daily At bedtime 5/3/22   Consuelo De Leon MD   spironolactone (ALDACTONE) 100 MG tablet Take 1 tablet by mouth daily 5/3/22   Consuelo De Leon MD   pantoprazole (PROTONIX) 40 MG tablet Take 1 tablet by mouth every morning (before breakfast) 5/4/22   Consuelo De Leon MD   thiamine mononitrate (THIAMINE) 100 MG tablet Take 1 tablet by mouth daily 5/3/22 6/2/22  Ja Scott MD       Allergies:  Oxycodone    Social History:      The patient currently lives at [facility]:   reports that she has quit smoking. She has never used smokeless tobacco.  ETOH:   reports that she does not currently use alcohol. E-cigarette/Vaping       Questions Responses    E-cigarette/Vaping Use Never User    Start Date     Passive Exposure     Quit Date     Counseling Given     Comments               Family History:      Reviewed and negative in regards to presenting illness/complaint. Problem Relation Age of Onset    Alcohol Abuse Father        REVIEW OF SYSTEMS COMPLETED:   Pertinent positives as noted in the HPI. All other systems reviewed and negative. PHYSICAL EXAM PERFORMED:    BP (!) 100/45   Pulse 71   Temp (!) 100.6 °F (38.1 °C) (Oral)   Resp 14   SpO2 100%     General appearance: Ill-appearing. Chronically jaundiced. Fatigued. Not in acute distress  HEENT:  Normal cephalic, atraumatic without obvious deformity. Pupils equal, round, and reactive to light. Extra ocular muscles intact. Conjunctivae/corneas clear. Neck: Supple, with full range of motion. No jugular venous distention. Trachea midline. Respiratory:  Normal respiratory effort. Clear to auscultation, bilaterally without Rales/Wheezes/Rhonchi. Cardiovascular:  Regular rate and rhythm with normal S1/S2 without murmurs, rubs or gallops. Abdomen: Soft, non-tender, non-distended with normal bowel sounds. Musculoskeletal:  No clubbing, cyanosis or edema bilaterally. Full range of motion without deformity. Skin: Skin color, texture, turgor normal.  No rashes or lesions. Neurologic:  Neurovascularly intact without any focal sensory/motor deficits.  Cranial nerves: II-XII intact, grossly non-focal.  Psychiatric:  Alert and oriented, thought content appropriate, normal insight  Capillary Refill: Brisk,3 seconds, normal  Peripheral Pulses: +2 palpable, equal bilaterally       Labs:     Recent Labs     11/08/22 2125   WBC 5.6   HGB 5.6*   HCT 15.8*   PLT 68*     Recent Labs     11/08/22 2125   *   K 4.4   CL 99   CO2 26   BUN 28*   CREATININE 0.7   CALCIUM 9.1     Recent Labs     11/08/22 2125   AST 57*   ALT 30   BILITOT 19.9*   ALKPHOS 117     Recent Labs     11/08/22 2125   INR 2.89*     Recent Labs     11/08/22 2125   TROPONINI <0.01       Urinalysis:      Lab Results   Component Value Date/Time    NITRU POSITIVE 10/21/2022 06:00 PM    WBCUA 7 10/21/2022 06:00 PM    BACTERIA Rare 10/21/2022 06:00 PM    RBCUA 0-2 10/21/2022 06:00 PM    BLOODU Negative 10/21/2022 06:00 PM    SPECGRAV 1.020 10/21/2022 06:00 PM    GLUCOSEU Negative 10/21/2022 06:00 PM       Radiology:     CXR: I have reviewed the CXR with the following interpretation:   EKG:  I have reviewed the EKG with the following interpretation:     CT ABDOMEN PELVIS WO CONTRAST Additional Contrast? None   Final Result   1. Very heterogenous appearance of the liver with sequela of portal   hypertension, varices, splenomegaly, and ascites. The amount of ascites and   general edema of the intra-fat are increased from the recent exam.  The   actual volume of ascites is still small. 2.  Cholelithiasis with thickened wall of the gallbladder. However   thickening could relate to the general edematous state and surrounding   ascites. Can consider gallbladder ultrasound if symptomatic. 3.  No bowel obstruction or pneumoperitoneum. There is no hydronephrosis   with a stable calculus at the right kidney. 4.  Mild compression deformity upper endplate of T8 without a significant   loss of vertebral body height. Features suggest this is chronic. XR CHEST PORTABLE   Final Result   Mild cardiomegaly      Hypoinflation with mild bibasilar atelectasis or early infiltrates which is   more apparent.              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 Check morning CBC  I have ordered direct and indirect bilirubin in the morning  Continue diuretics (Lasix and Aldactone) which is her prior to admission meds  Continue lactulose and rifaximin as well as nadolol for cirrhosis      DVT Prophylaxis: Lovenox  Diet: Diet NPO  Code Status: Full Code    PT/OT Eval Status: PT/OT consult is ordered    Dispo -admit as inpatient       Mony Whitten MD    Thank you Selina Tracy MD for the opportunity to be involved in this patient's care. If you have any questions or concerns please feel free to contact me at [phone].

## 2022-11-10 ENCOUNTER — ANESTHESIA EVENT (OUTPATIENT)
Dept: ENDOSCOPY | Age: 47
DRG: 280 | End: 2022-11-10
Payer: COMMERCIAL

## 2022-11-10 ENCOUNTER — ANESTHESIA (OUTPATIENT)
Dept: ENDOSCOPY | Age: 47
DRG: 280 | End: 2022-11-10
Payer: COMMERCIAL

## 2022-11-10 LAB
ACANTHOCYTES: ABNORMAL
ALBUMIN SERPL-MCNC: 2.9 G/DL (ref 3.4–5)
ALP BLD-CCNC: 84 U/L (ref 40–129)
ALT SERPL-CCNC: 26 U/L (ref 10–40)
AMMONIA: 23 UMOL/L (ref 11–51)
ANION GAP SERPL CALCULATED.3IONS-SCNC: 8 MMOL/L (ref 3–16)
ANISOCYTOSIS: ABNORMAL
AST SERPL-CCNC: 59 U/L (ref 15–37)
BASOPHILS ABSOLUTE: 0.1 K/UL (ref 0–0.2)
BASOPHILS RELATIVE PERCENT: 1.3 %
BILIRUB SERPL-MCNC: 21.1 MG/DL (ref 0–1)
BILIRUBIN DIRECT: 8 MG/DL (ref 0–0.3)
BILIRUBIN, INDIRECT: 13.1 MG/DL (ref 0–1)
BUN BLDV-MCNC: 33 MG/DL (ref 7–20)
BURR CELLS: ABNORMAL
CALCIUM SERPL-MCNC: 9.3 MG/DL (ref 8.3–10.6)
CHLORIDE BLD-SCNC: 100 MMOL/L (ref 99–110)
CO2: 26 MMOL/L (ref 21–32)
CREAT SERPL-MCNC: 0.7 MG/DL (ref 0.6–1.1)
EOSINOPHILS ABSOLUTE: 0.1 K/UL (ref 0–0.6)
EOSINOPHILS RELATIVE PERCENT: 1.2 %
GFR SERPL CREATININE-BSD FRML MDRD: >60 ML/MIN/{1.73_M2}
GLUCOSE BLD-MCNC: 115 MG/DL (ref 70–99)
HCG QUALITATIVE: NEGATIVE
HCT VFR BLD CALC: 22.5 % (ref 36–48)
HEMATOLOGY PATH CONSULT: NO
HEMOGLOBIN: 8.1 G/DL (ref 12–16)
HYPOCHROMIA: ABNORMAL
INR BLD: 2.47 (ref 0.87–1.14)
INR BLD: 2.62 (ref 0.87–1.14)
LYMPHOCYTES ABSOLUTE: 0.3 K/UL (ref 1–5.1)
LYMPHOCYTES RELATIVE PERCENT: 7 %
MCH RBC QN AUTO: 34 PG (ref 26–34)
MCHC RBC AUTO-ENTMCNC: 36.1 G/DL (ref 31–36)
MCV RBC AUTO: 94.3 FL (ref 80–100)
MONOCYTES ABSOLUTE: 0.9 K/UL (ref 0–1.3)
MONOCYTES RELATIVE PERCENT: 17.4 %
NEUTROPHILS ABSOLUTE: 3.6 K/UL (ref 1.7–7.7)
NEUTROPHILS RELATIVE PERCENT: 73.1 %
OVALOCYTES: ABNORMAL
PDW BLD-RTO: 22 % (ref 12.4–15.4)
PHOSPHORUS: 3.3 MG/DL (ref 2.5–4.9)
PLATELET # BLD: 61 K/UL (ref 135–450)
PLATELET SLIDE REVIEW: ABNORMAL
PMV BLD AUTO: 9.6 FL (ref 5–10.5)
POLYCHROMASIA: ABNORMAL
POTASSIUM SERPL-SCNC: 4.9 MMOL/L (ref 3.5–5.1)
PROCALCITONIN: 0.86 NG/ML (ref 0–0.15)
PROTHROMBIN TIME: 26.9 SEC (ref 11.7–14.5)
PROTHROMBIN TIME: 28.1 SEC (ref 11.7–14.5)
RBC # BLD: 2.38 M/UL (ref 4–5.2)
SCHISTOCYTES: ABNORMAL
SLIDE REVIEW: ABNORMAL
SODIUM BLD-SCNC: 134 MMOL/L (ref 136–145)
TARGET CELLS: ABNORMAL
TEAR DROP CELLS: ABNORMAL
TOTAL PROTEIN: 5.4 G/DL (ref 6.4–8.2)
WBC # BLD: 5 K/UL (ref 4–11)

## 2022-11-10 PROCEDURE — 06L38CZ OCCLUSION OF ESOPHAGEAL VEIN WITH EXTRALUMINAL DEVICE, VIA NATURAL OR ARTIFICIAL OPENING ENDOSCOPIC: ICD-10-PCS | Performed by: INTERNAL MEDICINE

## 2022-11-10 PROCEDURE — 3700000000 HC ANESTHESIA ATTENDED CARE: Performed by: INTERNAL MEDICINE

## 2022-11-10 PROCEDURE — 97530 THERAPEUTIC ACTIVITIES: CPT

## 2022-11-10 PROCEDURE — 97535 SELF CARE MNGMENT TRAINING: CPT

## 2022-11-10 PROCEDURE — 2580000003 HC RX 258: Performed by: INTERNAL MEDICINE

## 2022-11-10 PROCEDURE — 97162 PT EVAL MOD COMPLEX 30 MIN: CPT

## 2022-11-10 PROCEDURE — 80069 RENAL FUNCTION PANEL: CPT

## 2022-11-10 PROCEDURE — 2720000010 HC SURG SUPPLY STERILE: Performed by: INTERNAL MEDICINE

## 2022-11-10 PROCEDURE — 6360000002 HC RX W HCPCS: Performed by: STUDENT IN AN ORGANIZED HEALTH CARE EDUCATION/TRAINING PROGRAM

## 2022-11-10 PROCEDURE — 6370000000 HC RX 637 (ALT 250 FOR IP): Performed by: INTERNAL MEDICINE

## 2022-11-10 PROCEDURE — 2709999900 HC NON-CHARGEABLE SUPPLY: Performed by: INTERNAL MEDICINE

## 2022-11-10 PROCEDURE — 6360000002 HC RX W HCPCS: Performed by: INTERNAL MEDICINE

## 2022-11-10 PROCEDURE — 2500000003 HC RX 250 WO HCPCS: Performed by: NURSE ANESTHETIST, CERTIFIED REGISTERED

## 2022-11-10 PROCEDURE — 2580000003 HC RX 258: Performed by: STUDENT IN AN ORGANIZED HEALTH CARE EDUCATION/TRAINING PROGRAM

## 2022-11-10 PROCEDURE — 1200000000 HC SEMI PRIVATE

## 2022-11-10 PROCEDURE — 84703 CHORIONIC GONADOTROPIN ASSAY: CPT

## 2022-11-10 PROCEDURE — 85025 COMPLETE CBC W/AUTO DIFF WBC: CPT

## 2022-11-10 PROCEDURE — 36415 COLL VENOUS BLD VENIPUNCTURE: CPT

## 2022-11-10 PROCEDURE — 7100000001 HC PACU RECOVERY - ADDTL 15 MIN: Performed by: INTERNAL MEDICINE

## 2022-11-10 PROCEDURE — 6370000000 HC RX 637 (ALT 250 FOR IP): Performed by: STUDENT IN AN ORGANIZED HEALTH CARE EDUCATION/TRAINING PROGRAM

## 2022-11-10 PROCEDURE — 3700000001 HC ADD 15 MINUTES (ANESTHESIA): Performed by: INTERNAL MEDICINE

## 2022-11-10 PROCEDURE — 80076 HEPATIC FUNCTION PANEL: CPT

## 2022-11-10 PROCEDURE — 84145 PROCALCITONIN (PCT): CPT

## 2022-11-10 PROCEDURE — 2580000003 HC RX 258: Performed by: ANESTHESIOLOGY

## 2022-11-10 PROCEDURE — 82140 ASSAY OF AMMONIA: CPT

## 2022-11-10 PROCEDURE — 97166 OT EVAL MOD COMPLEX 45 MIN: CPT

## 2022-11-10 PROCEDURE — 6360000002 HC RX W HCPCS: Performed by: NURSE ANESTHETIST, CERTIFIED REGISTERED

## 2022-11-10 PROCEDURE — 3609012300 HC EGD BAND LIGATION ESOPHGEAL/GASTRIC VARICES: Performed by: INTERNAL MEDICINE

## 2022-11-10 PROCEDURE — 7100000000 HC PACU RECOVERY - FIRST 15 MIN: Performed by: INTERNAL MEDICINE

## 2022-11-10 PROCEDURE — 85610 PROTHROMBIN TIME: CPT

## 2022-11-10 RX ORDER — CIPROFLOXACIN 2 MG/ML
400 INJECTION, SOLUTION INTRAVENOUS ONCE
Status: DISCONTINUED | OUTPATIENT
Start: 2022-11-10 | End: 2022-11-10 | Stop reason: SDUPTHER

## 2022-11-10 RX ORDER — LIDOCAINE HYDROCHLORIDE 20 MG/ML
INJECTION, SOLUTION EPIDURAL; INFILTRATION; INTRACAUDAL; PERINEURAL PRN
Status: DISCONTINUED | OUTPATIENT
Start: 2022-11-10 | End: 2022-11-10 | Stop reason: SDUPTHER

## 2022-11-10 RX ORDER — MIDODRINE HYDROCHLORIDE 5 MG/1
5 TABLET ORAL 3 TIMES DAILY
Status: DISCONTINUED | OUTPATIENT
Start: 2022-11-10 | End: 2022-11-18 | Stop reason: HOSPADM

## 2022-11-10 RX ORDER — ONDANSETRON 2 MG/ML
INJECTION INTRAMUSCULAR; INTRAVENOUS PRN
Status: DISCONTINUED | OUTPATIENT
Start: 2022-11-10 | End: 2022-11-10 | Stop reason: SDUPTHER

## 2022-11-10 RX ORDER — PROPOFOL 10 MG/ML
INJECTION, EMULSION INTRAVENOUS PRN
Status: DISCONTINUED | OUTPATIENT
Start: 2022-11-10 | End: 2022-11-10 | Stop reason: SDUPTHER

## 2022-11-10 RX ORDER — SODIUM CHLORIDE 9 MG/ML
INJECTION, SOLUTION INTRAVENOUS CONTINUOUS
Status: DISCONTINUED | OUTPATIENT
Start: 2022-11-10 | End: 2022-11-15

## 2022-11-10 RX ORDER — CIPROFLOXACIN 2 MG/ML
400 INJECTION, SOLUTION INTRAVENOUS ONCE
Status: COMPLETED | OUTPATIENT
Start: 2022-11-10 | End: 2022-11-10

## 2022-11-10 RX ADMIN — PROPOFOL 20 MG: 10 INJECTION, EMULSION INTRAVENOUS at 14:45

## 2022-11-10 RX ADMIN — MIDODRINE HYDROCHLORIDE 5 MG: 5 TABLET ORAL at 21:08

## 2022-11-10 RX ADMIN — PANTOPRAZOLE SODIUM 40 MG: 40 TABLET, DELAYED RELEASE ORAL at 06:36

## 2022-11-10 RX ADMIN — SODIUM CHLORIDE: 9 INJECTION, SOLUTION INTRAVENOUS at 14:38

## 2022-11-10 RX ADMIN — SPIRONOLACTONE 100 MG: 25 TABLET ORAL at 09:44

## 2022-11-10 RX ADMIN — MIDODRINE HYDROCHLORIDE 5 MG: 5 TABLET ORAL at 16:04

## 2022-11-10 RX ADMIN — PROPOFOL 80 MG: 10 INJECTION, EMULSION INTRAVENOUS at 14:43

## 2022-11-10 RX ADMIN — Medication 10 ML: at 21:09

## 2022-11-10 RX ADMIN — RIFAXIMIN 550 MG: 550 TABLET ORAL at 09:44

## 2022-11-10 RX ADMIN — LACTULOSE 20 G: 20 SOLUTION ORAL at 21:08

## 2022-11-10 RX ADMIN — ONDANSETRON 4 MG: 2 INJECTION INTRAMUSCULAR; INTRAVENOUS at 14:49

## 2022-11-10 RX ADMIN — PROPOFOL 20 MG: 10 INJECTION, EMULSION INTRAVENOUS at 14:47

## 2022-11-10 RX ADMIN — PROPOFOL 20 MG: 10 INJECTION, EMULSION INTRAVENOUS at 14:44

## 2022-11-10 RX ADMIN — FUROSEMIDE 20 MG: 20 TABLET ORAL at 09:44

## 2022-11-10 RX ADMIN — OCTREOTIDE ACETATE 50 MCG/HR: 500 INJECTION, SOLUTION INTRAVENOUS; SUBCUTANEOUS at 17:26

## 2022-11-10 RX ADMIN — OXYCODONE 5 MG: 5 TABLET ORAL at 16:04

## 2022-11-10 RX ADMIN — CIPROFLOXACIN 400 MG: 2 INJECTION, SOLUTION INTRAVENOUS at 16:02

## 2022-11-10 RX ADMIN — CEFTRIAXONE SODIUM 1000 MG: 1 INJECTION, POWDER, FOR SOLUTION INTRAMUSCULAR; INTRAVENOUS at 16:07

## 2022-11-10 RX ADMIN — LIDOCAINE HYDROCHLORIDE 60 MG: 20 INJECTION, SOLUTION EPIDURAL; INFILTRATION; INTRACAUDAL; PERINEURAL at 14:43

## 2022-11-10 RX ADMIN — MIDODRINE HYDROCHLORIDE 5 MG: 5 TABLET ORAL at 09:44

## 2022-11-10 RX ADMIN — RIFAXIMIN 550 MG: 550 TABLET ORAL at 21:08

## 2022-11-10 RX ADMIN — SODIUM CHLORIDE: 9 INJECTION, SOLUTION INTRAVENOUS at 16:00

## 2022-11-10 RX ADMIN — PHYTONADIONE 5 MG: 10 INJECTION, EMULSION INTRAMUSCULAR; INTRAVENOUS; SUBCUTANEOUS at 06:44

## 2022-11-10 RX ADMIN — PROPOFOL 20 MG: 10 INJECTION, EMULSION INTRAVENOUS at 14:46

## 2022-11-10 RX ADMIN — PROPOFOL 100 MCG/KG/MIN: 10 INJECTION, EMULSION INTRAVENOUS at 14:48

## 2022-11-10 RX ADMIN — OXYCODONE 5 MG: 5 TABLET ORAL at 08:03

## 2022-11-10 RX ADMIN — ZOLPIDEM TARTRATE 5 MG: 5 TABLET ORAL at 21:08

## 2022-11-10 ASSESSMENT — PAIN SCALES - GENERAL
PAINLEVEL_OUTOF10: 10
PAINLEVEL_OUTOF10: 0
PAINLEVEL_OUTOF10: 0
PAINLEVEL_OUTOF10: 9
PAINLEVEL_OUTOF10: 0
PAINLEVEL_OUTOF10: 0
PAINLEVEL_OUTOF10: 9
PAINLEVEL_OUTOF10: 8
PAINLEVEL_OUTOF10: 0

## 2022-11-10 ASSESSMENT — PAIN DESCRIPTION - LOCATION
LOCATION: HEAD
LOCATION: HEAD
LOCATION: HEAD;TOE (COMMENT WHICH ONE)

## 2022-11-10 ASSESSMENT — PAIN DESCRIPTION - DESCRIPTORS
DESCRIPTORS: ACHING;SHARP
DESCRIPTORS: ACHING
DESCRIPTORS: ACHING

## 2022-11-10 ASSESSMENT — PAIN - FUNCTIONAL ASSESSMENT: PAIN_FUNCTIONAL_ASSESSMENT: NONE - DENIES PAIN

## 2022-11-10 ASSESSMENT — PAIN DESCRIPTION - ORIENTATION: ORIENTATION: MID;LEFT

## 2022-11-10 NOTE — ANESTHESIA POSTPROCEDURE EVALUATION
Department of Anesthesiology  Postprocedure Note    Patient: Aarti Hampton  MRN: [id number]  YOB: 1975  Date of evaluation: 11/10/2022      Procedure Summary     Date: 11/10/22 Room / Location: [address] [place] [address]    Anesthesia Start: 6782 Anesthesia Stop: 3698    Procedure: EGD BAND LIGATION (Abdomen) Diagnosis:       Anemia, unspecified type      (Anemia, unspecified type [D64.9])    Surgeons: Megan Quinteros MD Responsible Provider: Siria Briggs MD    Anesthesia Type: MAC ASA Status: 4          Anesthesia Type: No value filed. Estiven Phase I: Estiven Score: 10    Estiven Phase II:        Anesthesia Post Evaluation    Patient location during evaluation: PACU  Airway patency: patent  Nausea & Vomiting: no vomiting  Complications: no  Cardiovascular status: hemodynamically stable  Respiratory status: acceptable  Hydration status: euvolemic  There was medical reason for not using a multimodal analgesia pain management approach.

## 2022-11-10 NOTE — PROGRESS NOTES
Pt. To ENDO per outpatient transporter. Pt awake, alert, and oriented X4. Pt. Belongings purse, cell phone and  placed in top drawer of dresser.

## 2022-11-10 NOTE — PROGRESS NOTES
[facility] [address]   Phone: [phone]    Occupational Therapy    [x] Initial Evaluation            [] Daily Treatment Note         [] Discharge Summary      Patient: Mateus Quinones   : 1975   MRN: [id number]   Date of Service:  11/10/2022    Admitting Diagnosis:  Acute on chronic anemia  Current Admission Summary: 40 y.o. female with history of alcoholic cirrhosis of the liver and chronically jaundiced  She lives she is currently staying at [address] [place] [address]  She was most recently hospitalized here 10/21-10/29 for severe anemia, hepatic encephalopathy and complicated UTI  She now presents to the ER with complaint of generalized weakness for the past 2 to 3 days  She went to participate with physical therapy today but she was profoundly fatigued she realized that something was wrong  She is now subsequently, she was sent from [address] [place] [address] where she is staying with abnormal labs  Here in the ED her hemoglobin was 5.6 therefore 2 units of PRBC was ordered  On interview, patient appears chronically ill but she is not encephalopathy and able to provide history  She complains of headache but has no other complaints or pain elsewhere on the body  Past Medical History:  has a past medical history of Alcoholic liver disease ([address] Anemia, and History of blood transfusion. Past Surgical History:  has a past surgical history that includes Upper gastrointestinal endoscopy (N/A, 2021); Upper gastrointestinal endoscopy (N/A, 03/10/2021); Tubal ligation; Colonoscopy (N/A, 3/11/2021); Upper gastrointestinal endoscopy (N/A, 2021); Upper gastrointestinal endoscopy (N/A, 2022); Knee Arthroplasty; Upper gastrointestinal endoscopy (N/A, 2022); Upper gastrointestinal endoscopy (N/A, 2022); CT BIOPSY BONE MARROW (2022); and Upper gastrointestinal endoscopy (N/A, 10/26/2022).     Discharge Recommendations: Mateus Quinones scored a 17/24 on the AM-PAC ADL Inpatient form. Current research shows that an AM-PAC score of 17 or less is typically not associated with a discharge to the patient's home setting. Based on the patient's AM-PAC score and their current ADL deficits, it is recommended that the patient have 3-5 sessions per week of Occupational Therapy at d/c to increase the patient's independence. Please see assessment section for further patient specific details. If patient discharges prior to next session this note will serve as a discharge summary. Please see below for the latest assessment towards goals.        DME Required For Discharge: DME to be determined at next level of care    Precautions/Restrictions: high fall risk, up as tolerated  Weight Bearing Restrictions: no restrictions  [] Right Upper Extremity  [] Left Upper Extremity [] Right Lower Extremity  [] Left Lower Extremity     Required Braces/Orthotics: no braces required   [] Right  [] Left  Positional Restrictions:no positional restrictions    Pre-Admission Information   Lives With: alone                     Type of Home:  duplex  Home Layout: one level  Home Access: level entry  Bathroom Layout: tub/shower unit  Bathroom Equipment:  none  Toilet Height: standard height  Home Equipment: no prior equipment  Transfer Assistance: Independent without use of device  Ambulation Assistance:Independent without use of device  ADL Assistance: independent with all ADL's  IADL Assistance: independent with homemaking tasks  Active :        [] Yes                 [x] No  Hand Dominance: [] Left                 [x] Right  Current Employment: unemployed, lost job in April, due to medical issues  Hobbies: spending time with daughters  Recent Falls: found down from last admission     Examination   Vision:   Vision Gross Assessment: Impaired and Vision Corrective Device: wears glasses at all times  Hearing:   [facility]  Perception:   WFL  Observation:   General Observation:  Patient with jaundiced skin, slow to respond, appears fatigued. Confusion intermittently throughout session-mild word finding difficulty (which patient self corrected) and timeline of hospitalizations   Coordination Testing:   WFL    ROM:   (B) UE AROM WFL  Strength:   (B) UE strength grossly WFL    Decision Making: medium complexity  Clinical Presentation: unstable      Subjective  General: Patient supine in bed upon arrival, agreeable to evaluation. Patient appears fatigued, jaundice, flat affect  Pain: 8/10. Location: HA  Pain Interventions: pain medication in place prior to arrival and repositioned         Activities of Daily Living  Basic Activities of Daily Living  Grooming: contact guard assistance  Grooming Comments: 3-4 minutes stance at sink, patient became light headed and requesting seated rest break and use of toile to urinate  Toileting: stand by assistance contact guard assistance. Toileting Comments: urinated on toilet, completed hygiene CGA  Instrumental Activities of Daily Living  No IADL completed on this date.     Functional Mobility  Bed Mobility  Supine to Sit: stand by assistance  Sit to Supine: stand by assistance  Scooting: stand by assistance  Comments:  Transfers  Sit to stand transfer:contact guard assistance  Stand to sit transfer: contact guard assistance  Toilet transfer: contact guard assistance  Toilet transfer comments: ~10' +10' to/from bathroom (patient fatigued, declined remainder of ADLs at bed level) BP 97/49 upon initial stance, 103/47 upon return from bathroom, 98/41 upon return to supine  Comments:  Functional Mobility:  Functional Mobility: .  contact guard assistance  Functional Mobility Device Use: no device  Functional Mobility Comment: 10' x 2 (patient unable to tolerate     Other Therapeutic Interventions    Functional Outcomes  AM-PAC Inpatient Daily Activity Raw Score: 17    Cognition  Overall Cognitive Status: Impaired  Arousal/Alterness: delayed responses to stimuli  Memory: decreased recall of recent events  Safety Judgement: decreased awareness of need for assistance  Problem Solving: decreased awareness of errors  Initiation: does not require cues  Sequencing: does not require cues  Orientation:    alert and oriented x 4  Command Following:   [facility]  Barriers To Learning: emotional and physical  Patient Education: patient educated on goals, OT role and benefits, plan of care, discharge recommendations  Learning Assessment:  patient verbalizes understanding, would benefit from continued reinforcement, patient will require reinforcement due to cognitive deficits    Assessment  Activity Tolerance: Fair (limited by fatigue)  Impairments Requiring Therapeutic Intervention: decreased functional mobility, decreased ADL status, decreased cognition, decreased endurance, decreased balance, decreased posture  Prognosis: fair  Clinical Assessment: Patient present with the above deficits, which are below baseline, and would benefit from continued skilled OT to address  Safety Interventions: patient left in bed, call light within reach, and nurse notified    Plan  Frequency: 3-5 x/per week  Current Treatment Recommendations: strengthening, balance training, functional mobility training, transfer training, endurance training, patient/caregiver education, ADL/self-care training, IADL training, and safety education    Goals  Patient Goals:  To return home   Short Term Goals:  Time Frame: Upon discharge  Patient will complete lower body ADL at modified independent   Patient will complete toileting at modified independent   Patient will complete grooming at supervision   Patient will complete functional transfers at supervision   Patient will complete functional mobility at supervision   Patient will increase functional standing balance to 5 minutes supervision for improved ADL completion    Therapy Session Time     Individual Group Co-treatment   Time In    0854   Time Out    0934   Minutes    40 Timed Code Treatment Minutes:   25  Total Treatment Minutes:  40       Electronically Signed By: Jaxon Thompson OTR/DIPAK [id number]

## 2022-11-10 NOTE — PROGRESS NOTES
Pt meets clinical criteria to be transferred to - pt transferred via stretcher with RN/portable tele monitor

## 2022-11-10 NOTE — CARE COORDINATION
11/10/22 1434   Readmission Assessment   Number of Days since last admission? 8-30 days   Previous Disposition SNF   Who is being Interviewed Caregiver   What was the patient's/caregiver's perception as to why they think they needed to return back to the hospital? Other (Comment)  (acute health change)   Did you visit your Primary Care Physician after you left the hospital, before you returned this time? No   Why weren't you able to visit your PCP? Other (Comment)  (at SNF)   Did you see a specialist, such as Cardiac, Pulmonary, Orthopedic Physician, etc. after you left the hospital? No   Who advised the patient to return to the hospital? Caregiver   Does the patient report anything that got in the way of taking their medications? No  (administered by SNF nurses)   In our efforts to provide the best possible care to you and others like you, can you think of anything that we could have done to help you after you left the hospital the first time, so that you might not have needed to return so soon? Other (Comment); Identify patient's health literacy needs; Improved written discharge instructions; Discharge instructions that are concise, clear, and non contradictory;Education on how to continue taking medications upon discharge; Teaching during hospitalization regarding your illness

## 2022-11-10 NOTE — CARE COORDINATION
Case Management Assessment  Initial Evaluation    Date/Time of Evaluation: 11/10/2022 2:40 PM  Assessment Completed by: Chao Lee RN    If patient is discharged prior to next notation, then this note serves as note for discharge by case management. Patient Name: Aarti Hampton                   YOB: 1975  Diagnosis: Hyperbilirubinemia [E80.6]  Coagulopathy ([address] [D68.9]  Anemia, unspecified type [D64.9]  Acute on chronic anemia [D64.9]                   Date / Time: 11/8/2022  8:46 PM    Patient Admission Status: Inpatient   Readmission Risk (Low < 19, Mod (19-27), High > 27): Readmission Risk Score: 31.3    Current PCP: Alvarez Zamora MD  PCP verified by CM? Yes    Chart Reviewed: Yes      History Provided by: Patient, Medical Record, Other (see comment) (SNF staff)  Patient Orientation: Other (see comment) (lethargic, slow to respond)    Patient Cognition: Other (see comment) (undetermined)    Hospitalization in the last 30 days (Readmission):  Yes    If yes, Readmission Assessment in CM Navigator will be completed. Advance Directives:      Code Status: Full Code   Patient's Primary Decision Maker is: Legal Next of Kin      Discharge Planning:    Patient lives with: Alone Type of Home: [place]  Primary Care Giver: Other (Comment) (SNF)  Patient Support Systems include: Children, Family Members   Current Financial resources: Medicaid  Current community resources:    Current services prior to admission: [facility]            Current DME:              Type of Home Care services:  None    ADLS  Prior functional level: Assistance with the following:, Bathing, Dressing, Toileting, Feeding, Cooking, Housework, Shopping, Mobility  Current functional level: Assistance with the following:, Bathing, Dressing, Toileting, Feeding, Cooking, Housework, Shopping, Mobility    PT AM-PAC: 19 /24  OT AM-PAC: 17 /24    Family can provide assistance at DC:  Other (comment) (needs SNF)  Would you like Case Management to discuss the discharge plan with any other family members/significant others, and if so, who? Yes (family and SNF staff)  Plans to Return to Present Housing: Yes  Other Identified Issues/Barriers to RETURNING to current housing: Cognitive changes and safety awareness altered  Potential Assistance needed at discharge: [facility]            Patient expects to discharge to: [facility] Negrete  for transportation at discharge: Family    Financial    Payor: Kiki Xie / Plan: Odella Lorin / Product Type: *No Product type* /     Does insurance require precert for SNF: Yes    Potential assistance Purchasing Medications: No  Meds-to-Beds request:        [facility], [address] [facility]  [address] [facility] [address]  Phone: [phone] Fax: [phone]    [facility]/pharmacy [address] [place] [address], [address] [place] [address] [phone] - F [phone]  [address] [place] [address] [address] [place] [address]  Phone: [phone] Fax: [phone]      Notes:    Factors facilitating achievement of predicted outcomes: Family support and Caregiver support    Barriers to discharge: Cognitive deficit, Limited safety awareness, Limited participation, Decreased endurance, and Long standing deficits    Additional Case Management Notes:     Current Nursing Home Information:  Patient admitted from:    [address]  Phone: [phone]  Fax: [phone]      Call to Nichole, in admissions, at [email or web address] who confirmed the patient is: [address] required for return.       Patient Covid vaccination status:    Internal Administration   First Dose COVID-19, PFIZER PURPLE top, DILUTE for use, (age 15 y+), 30mcg/0.3mL  04/01/2021   Second Dose COVID-19, PFIZER PURPLE top, DILUTE for use, (age 15 y+), 30mcg/0.3mL   04/22/2021       Last COVID Lab SARS-CoV-2, NAAT (no units)   Date Value   07/01/2022 Not Detected            Covid Test requirement for return: No Rapid/PCR Covid test needed before return      The Plan for Transition of Care is related to the following treatment goals of Hyperbilirubinemia [E80.6]  Coagulopathy ([address] [D68.9]  Anemia, unspecified type [D64.9]  Acute on chronic anemia [G96.8]    IF APPLICABLE: The Patient and/or patient representative Dane Jin and her family were provided with a choice of provider and agrees with the discharge plan. Freedom of choice list with basic dialogue that supports the patient's individualized plan of care/goals and shares the quality data associated with the providers was provided to:     Patient Representative Name:       The Patient and/or Patient Representative Agree with the Discharge Plan?   Yes      Electronically signed by Dev Conte RN on 11/10/2022 at 2:43 PM

## 2022-11-10 NOTE — ANESTHESIA PRE PROCEDURE
Department of Anesthesiology  Preprocedure Note       Name:  Ricki Salazar   Age:  40 y.o.  :  1975                                          MRN:  [id number]         Date:  11/10/2022      Surgeon: Melva Fitzgerald):  Reji Donovan MD    Procedure: Procedure(s):  EGD DIAGNOSTIC ONLY    Medications prior to admission:   Prior to Admission medications    Medication Sig Start Date End Date Taking? Authorizing Provider   zolpidem (AMBIEN) 5 MG tablet Take 5 mg by mouth nightly as needed for Sleep (every night at bedtmes as needed for insomnia). Yes Historical Provider, MD   lactulose (CHRONULAC) 10 GM/15ML solution Take 30 mLs by mouth 2 times daily 10/27/22   Scot Aguilar MD   folic acid (FOLVITE) 1 MG tablet Take 1 tablet by mouth daily 10/27/22   Scot Aguilar MD   rifAXIMin [place]) 550 MG tablet Take 1 tablet by mouth 2 times daily 10/18/22   Chris Moore, APRN - CNP   furosemide (LASIX) 20 MG tablet Take 1 tablet by mouth in the morning.  22   Freya Caldwell MD   ondansetron (ZOFRAN) 8 MG tablet Take 1 tablet by mouth 22   Historical Provider, MD   thiamine 100 MG tablet Take 1 tablet by mouth daily  Patient not taking: Reported on 2022  Christen Roland MD   nadolol (CORGARD) 40 MG tablet Take 1 tablet by mouth daily  Patient taking differently: Take 40 mg by mouth daily At bedtime 5/3/22   Marcos Torrez MD   spironolactone (ALDACTONE) 100 MG tablet Take 1 tablet by mouth daily 5/3/22   Marcos Torrez MD   pantoprazole (PROTONIX) 40 MG tablet Take 1 tablet by mouth every morning (before breakfast) 22   Marcos Torrez MD   thiamine mononitrate (THIAMINE) 100 MG tablet Take 1 tablet by mouth daily 5/3/22 6/2/22  Marcos Torrez MD       Current medications:    Current Facility-Administered Medications   Medication Dose Route Frequency Provider Last Rate Last Admin    midodrine (PROAMATINE) tablet 5 mg  5 mg Oral TID Anitha Sandoval DO   5 mg at 11/10/22 7755    sodium chloride flush 0.9 % injection 5-40 mL  5-40 mL IntraVENous 2 times per day Alexis Christian MD   10 mL at 11/09/22 2038    sodium chloride flush 0.9 % injection 5-40 mL  5-40 mL IntraVENous PRN Alexis Christian MD        0.9 % sodium chloride infusion   IntraVENous PRN Alexis Christian MD        [Held by provider] enoxaparin (LOVENOX) injection 40 mg  40 mg SubCUTAneous Daily Alexis Christian MD        ondansetron (ZOFRAN-ODT) disintegrating tablet 4 mg  4 mg Oral Q8H PRN Alexis Christian MD   4 mg at 11/09/22 1800    Or    ondansetron (ZOFRAN) injection 4 mg  4 mg IntraVENous Q6H PRN Alexis Christian MD   4 mg at 11/09/22 0830    polyethylene glycol (GLYCOLAX) packet 17 g  17 g Oral Daily PRN Alexis Christian MD        oxyCODONE (ROXICODONE) immediate release tablet 5 mg  5 mg Oral Q6H PRN Alexis Christian MD   5 mg at 11/10/22 0803    nadolol (CORGARD) tablet 40 mg  40 mg Oral Nightly Alexis Christian MD        furosemide (LASIX) tablet 20 mg  20 mg Oral Daily Alexis Christian MD   20 mg at 11/10/22 0944    spironolactone (ALDACTONE) tablet 100 mg  100 mg Oral Daily Alexis Christian MD   100 mg at 86/91/09 3221    folic acid (FOLVITE) tablet 1 mg  1 mg Oral Daily Alexis Christian MD   1 mg at 11/09/22 0829    zolpidem (AMBIEN) tablet 5 mg  5 mg Oral Nightly PRN Alexis Christian MD   5 mg at 11/09/22 2151    lactulose (CHRONULAC) 10 GM/15ML solution 20 g  20 g Oral BID Alexis Christian MD   20 g at 11/09/22 2038    rifAXIMin (XIFAXAN) tablet 550 mg  550 mg Oral BID Alexis Christian MD   550 mg at 11/10/22 0944    pantoprazole (PROTONIX) tablet 40 mg  40 mg Oral QAM AC Alexis Christian MD   40 mg at 11/10/22 0636    cefTRIAXone (ROCEPHIN) 1,000 mg in dextrose 5 % 50 mL IVPB mini-bag  1,000 mg IntraVENous Q24H Loretta Olmstead DO   Stopped at 11/09/22 1336    0.9 % sodium chloride infusion   IntraVENous PRN Loretta Olmstead DO           Allergies:     Allergies   Allergen Reactions    Oxycodone Nausea And Vomiting Problem List:    Patient Active Problem List   Diagnosis Code    GI bleed K92.2    Acute GI bleeding K92.2    Menorrhagia with regular cycle O87.2    Alcoholic liver disease (HCC) K70.9    Acute cholecystitis K81.0    Nausea and vomiting R11.2    Acute blood loss anemia D62    Esophageal varices (HCC) W33.59    Alcoholic hepatitis B48.39    EZIO (acute kidney injury) ([facility] N17.9    High anion gap metabolic acidosis W16.15    Elevated LFTs R79.89    Ascites due to alcoholic cirrhosis (HCC) Y22.86    Acute respiratory failure with hypoxia (HCC) J96.01    Sinus tachycardia R00.0    Severe anemia D64.9    Symptomatic anemia D64.9    Anemia D64.9    Cirrhosis of liver with ascites, unspecified hepatic cirrhosis type (HCC) K74.60, R18.8    Hepatic encephalopathy C59.19    Alcoholic cirrhosis (HCC) G76.30    UTI (urinary tract infection) N39.0    Moderate malnutrition (HCC) E44.0    Acute on chronic anemia D64.9       Past Medical History:        Diagnosis Date    Alcoholic liver disease ([facility]     Anemia     History of blood transfusion        Past Surgical History:        Procedure Laterality Date    COLONOSCOPY N/A 3/11/2021    COLONOSCOPY POLYPECTOMY SNARE/COLD BIOPSY performed by Addy Aponte MD at [address] [facility] [address] BONE MARROW BIOPSY  7/5/2022    CT BONE MARROW BIOPSY 7/5/2022 Tasha Hay [facility] CT SCAN    KNEE ARTHROPLASTY      TUBAL LIGATION      ESSURE    UPPER GASTROINTESTINAL ENDOSCOPY N/A 01/30/2021    EGD DIAGNOSTIC ONLY performed by Anselmo Lozano MD at [address] [place] [address] N/A 03/10/2021    EGD BIOPSY performed by Addy Aponte MD at [address] [place] [address] N/A 6/27/2021    EGD BAND LIGATION performed by Chantelle Alcaraz MD at [address] [place] [address] N/A 4/27/2022    EGD DIAGNOSTIC ONLY performed by Dianne Ruiz MD at [address] [place] [address] ENDOSCOPY N/A 7/1/2022    EGD ESOPHAGOGASTRODUODENOSCOPY ULTRASOUND performed by Claudette Kalata, MD at [facility] [address] 7/1/2022    EGD BAND LIGATION performed by Claudette Kalata, MD at [facility] [address] N/A 10/26/2022    EGD DIAGNOSTIC ONLY performed by Wilmer Gray MD at [address] History:    Social History     Tobacco Use    Smoking status: Former    Smokeless tobacco: Never   Substance Use Topics    Alcohol use: Not Currently                                Counseling given: Not Answered      Vital Signs (Current):   Vitals:    11/10/22 0803 11/10/22 1045 11/10/22 1100 11/10/22 1152   BP:  (!) 95/44 (!) 90/38 (!) 108/51   Pulse:  65 65 66   Resp: 14 10 (!) 9 10   Temp:    97.8 °F (36.6 °C)   TempSrc:    Temporal   SpO2:       Weight:       Height:                                                  BP Readings from Last 3 Encounters:   11/10/22 (!) 108/51   10/29/22 100/60   10/18/22 108/64       NPO Status:                                                                                 BMI:   Wt Readings from Last 3 Encounters:   11/10/22 117 lb 15.1 oz (53.5 kg)   10/29/22 107 lb 4.8 oz (48.7 kg)   10/17/22 105 lb (47.6 kg)     Body mass index is 20.25 kg/m².     CBC:   Lab Results   Component Value Date/Time    WBC 5.0 11/10/2022 04:37 AM    RBC 2.38 11/10/2022 04:37 AM    HGB 8.1 11/10/2022 04:37 AM    HCT 22.5 11/10/2022 04:37 AM    MCV 94.3 11/10/2022 04:37 AM    RDW 22.0 11/10/2022 04:37 AM    PLT 61 11/10/2022 04:37 AM       CMP:   Lab Results   Component Value Date/Time     11/10/2022 04:37 AM    K 4.9 11/10/2022 04:37 AM    K 4.4 11/08/2022 09:25 PM     11/10/2022 04:37 AM    CO2 26 11/10/2022 04:37 AM    BUN 33 11/10/2022 04:37 AM    CREATININE 0.7 11/10/2022 04:37 AM    GFRAA >60 10/17/2022 05:05 AM    AGRATIO 1.1 11/08/2022 09:25 PM    LABGLOM >60 11/10/2022 04:37 AM    GLUCOSE 115 11/10/2022 04:37 AM    PROT 5.4 11/10/2022 04:37 AM    CALCIUM 9.3 11/10/2022 04:37 AM    BILITOT 21.1 11/10/2022 04:37 AM    ALKPHOS 84 11/10/2022 04:37 AM    AST 59 11/10/2022 04:37 AM    ALT 26 11/10/2022 04:37 AM       POC Tests: No results for input(s): POCGLU, POCNA, POCK, POCCL, POCBUN, POCHEMO, POCHCT in the last 72 hours. Coags:   Lab Results   Component Value Date/Time    PROTIME 26.9 11/10/2022 11:46 AM    INR 2.47 11/10/2022 11:46 AM    APTT 50.0 09/23/2022 10:30 AM       HCG (If Applicable):   Lab Results   Component Value Date    PREGTESTUR Negative 07/01/2022        ABGs:   Lab Results   Component Value Date/Time    PHART 7.473 04/26/2022 08:35 AM    PO2ART 273.0 04/26/2022 08:35 AM    NUG2WLL 26.9 04/26/2022 08:35 AM    SEI1IAV 19.7 04/26/2022 08:35 AM    BEART -3.7 04/26/2022 08:35 AM    K6KRSOLC >100.0 04/26/2022 08:35 AM        Type & Screen (If Applicable):  No results found for: LABABO, LABRH    Drug/Infectious Status (If Applicable):  No results found for: HIV, HEPCAB    COVID-19 Screening (If Applicable):   Lab Results   Component Value Date/Time    COVID19 Not Detected 07/01/2022 06:40 AM           Anesthesia Evaluation  Patient summary reviewed and Nursing notes reviewed   history of anesthetic complications: PONV.   Airway: Mallampati: II  TM distance: >3 FB   Neck ROM: full  Mouth opening: > = 3 FB   Dental:      Comment: Small chip top right tooth    Pulmonary:                             ROS comment: Former smoker   Cardiovascular:        (-) CABG/stent and  angina      Rhythm: regular  Rate: normal                 ROS comment: Narrative & Impression    Transthoracic Echocardiography Report (TTE)      Demographics      Patient Name       Ramona Live      Date of Study      10/28/2022        Gender              Female      Patient Number     [id number]        Date of Birth       1975      Visit Number       [id number]         Age                 40 year(s)      Accession Number   [id number] Room Number         1912      Corporate ID       [id number]          GERHARD Ngo[facility], [facility]      Ordering Physician Mira Bess MD  Interpreting        Hari Quinonez MD,                                        Physician           [facility]     Procedure     Type of Study      TTE procedure:ECHOCARDIOGRAM LIMITED. Procedure Date  Date: 10/28/2022 Start: 02:30 PM     Study Location: Portable  Technical Quality: Adequate visualization     Indications:Heart murmur.     Additional Indications:Anemia, R/O stenosis.     Patient Status: Routine     Height: 64 inches Weight: 104 pounds BSA: 1.48 m2 BMI: 17.85 kg/m2     BP: 102/58 mmHg      Conclusions      Summary   -Limited exam per Dr. Delmy Fishman.   -Normal left ventricle size, wall thickness, and systolic function with an   estimated ejection fraction of 60-65%. -No regional wall motion abnormalities are seen. -The left atrium is severely dilated. -The right atrium is mildly dilated. -The aortic valve is structurally normal. Increased velocities through the   aortic valve, most likely due to high cardiac output. There is no   significant aortic valve stenosis. Trivial aortic regurgitation is present. -Mild mitral regurgitation is present. -Mild tricuspid regurgitation. Signature      ------------------------------------------------------------------   Electronically signed by Hari Quinonez MD, [facility] (Interpreting   physician) on 10/28/2022 at 04:00 PM   ------------------------------------------------------------------         Neuro/Psych:      (-) seizures, TIA and CVA           GI/Hepatic/Renal:   (+) liver disease (cirrhosis):,          ROS comment: anemia. Endo/Other:    (+) blood dyscrasia: thrombocytopenia:., .                 Abdominal:             Vascular:     - DVT and PE.       Other Findings:           Anesthesia Plan      MAC     ASA 4       Induction: intravenous. Anesthetic plan and risks discussed with patient. Use of blood products discussed with patient whom consented to blood products. Plan discussed with CRNA.                     Mikki Clifford MD   11/10/2022

## 2022-11-10 NOTE — PROGRESS NOTES
Message to provider regarding Nadolol 40 mg scheduled for 2100 and patient's soft BP's. Does provider want medication to be administered still.

## 2022-11-10 NOTE — PROGRESS NOTES
Hospitalist Progress Note    Name:  Edgardo Avendaon    /Age/Sex: 1975  (40 y.o. female)  MRN & CSN:  [id number] & [id number]    PCP: Cheryl Penn MD    Date of Admission: 2022    Patient Status:  Inpatient     Chief Complaint: Generalized fatigue    Hospital Course:   Edgardo Avendano is 40 y.o. female with history of alcoholic cirrhosis of the liver and chronically jaundiced. She is currently staying at [address] [place] [address]. She was most recently hospitalized here 10/21-10/29 for severe anemia, hepatic encephalopathy and complicated UTI. In the ED her hemoglobin was 5.6. 2U PRBC given. Subjective: Today is:  Hospital Day: 3. Patient seen and examined in ICU-/391. States she has some abdominal pain today, but otherwise no acute complaints. Awaiting EGD today. Medications:  Reviewed    Infusion Medications    sodium chloride      sodium chloride       Scheduled Medications    midodrine  5 mg Oral TID    sodium chloride flush  5-40 mL IntraVENous 2 times per day    [Held by provider] enoxaparin  40 mg SubCUTAneous Daily    nadolol  40 mg Oral Nightly    furosemide  20 mg Oral Daily    spironolactone  100 mg Oral Daily    folic acid  1 mg Oral Daily    lactulose  20 g Oral BID    rifAXIMin  550 mg Oral BID    pantoprazole  40 mg Oral QAM AC    cefTRIAXone (ROCEPHIN) IV  1,000 mg IntraVENous Q24H     PRN Meds: sodium chloride flush, sodium chloride, ondansetron **OR** ondansetron, polyethylene glycol, oxyCODONE, zolpidem, sodium chloride      Intake/Output Summary (Last 24 hours) at 11/10/2022 0935  Last data filed at 11/10/2022 9942  Gross per 24 hour   Intake 750 ml   Output 0 ml   Net 750 ml         Physical Exam Performed:    BP (!) 103/49   Pulse 74   Temp 97.6 °F (36.4 °C) (Temporal)   Resp 14   Ht 5' 4\" (1.626 m)   Wt 117 lb 15.1 oz (53.5 kg)   SpO2 96%   BMI 20.25 kg/m²     General appearance: No apparent distress, appears stated age and cooperative. Chronically ill-appearing. HEENT: Pupils equal, round, and reactive to light. Scleral icterus. Neck: Supple, with full range of motion. No jugular venous distention. Trachea midline. Respiratory:  Normal respiratory effort. Clear to auscultation, bilaterally without Rales/Wheezes/Rhonchi. Cardiovascular: Regular rate and rhythm with normal S1/S2 without murmurs, rubs or gallops. No peripheral edema. Abdomen: Soft, non-tender, non-distended with normal bowel sounds. : No CVA tenderness. Musculoskeletal: No clubbing or cyanosis. Full range of motion without deformity. Skin: Skin texture and turgor normal.  Severely jaundiced. No rashes or lesions. Neurologic:  Neurovascularly intact without any focal sensory/motor deficits.  Cranial nerves: II-XII intact, grossly non-focal.  Psychiatric: Alert and oriented, thought content appropriate, normal insight  Peripheral Pulses: +2 palpable, equal bilaterally       Labs:   Recent Labs     11/08/22 2125 11/09/22 0515 11/09/22  1643 11/10/22  0437   WBC 5.6 5.6  --  5.0   HGB 5.6* 6.9* 8.6* 8.1*   HCT 15.8* 19.7* 24.8* 22.5*   PLT 68* 66*  --  61*       Recent Labs     11/08/22 2125 11/09/22 0515 11/10/22  0437   * 134* 134*   K 4.4 4.7 4.9   CL 99 102 100   CO2 26 25 26   BUN 28* 27* 33*   CREATININE 0.7 0.6 0.7   CALCIUM 9.1 9.0 9.3   PHOS  --   --  3.3       Recent Labs     11/08/22 2125 11/09/22 0515 11/10/22  0437   AST 57* 56* 59*   ALT 30 29 26   BILIDIR  --  6.5* 8.0*   BILITOT 19.9* 20.0* 21.1*   ALKPHOS 117 111 84       Recent Labs     11/08/22 2125 11/09/22 0515 11/10/22  0437   INR 2.89* 2.81* 2.62*       Recent Labs     11/08/22 2125   TROPONINI <0.01         Urinalysis:      Lab Results   Component Value Date/Time    NITRU POSITIVE 11/09/2022 01:40 AM    WBCUA 18 11/09/2022 01:40 AM    BACTERIA 4+ 11/09/2022 01:40 AM    RBCUA 2 11/09/2022 01:40 AM    BLOODU TRACE 11/09/2022 01:40 AM    SPECGRAV 1.015 11/09/2022 01:40 AM    GLUCOSEU Negative 11/09/2022 01:40 AM       Radiology:  CT ABDOMEN PELVIS WO CONTRAST Additional Contrast? None   Final Result   1. Very heterogenous appearance of the liver with sequela of portal   hypertension, varices, splenomegaly, and ascites. The amount of ascites and   general edema of the intra-fat are increased from the recent exam.  The   actual volume of ascites is still small. 2.  Cholelithiasis with thickened wall of the gallbladder. However   thickening could relate to the general edematous state and surrounding   ascites. Can consider gallbladder ultrasound if symptomatic. 3.  No bowel obstruction or pneumoperitoneum. There is no hydronephrosis   with a stable calculus at the right kidney. 4.  Mild compression deformity upper endplate of T8 without a significant   loss of vertebral body height. Features suggest this is chronic. XR CHEST PORTABLE   Final Result   Mild cardiomegaly      Hypoinflation with mild bibasilar atelectasis or early infiltrates which is   more apparent.                  Assessment/Plan:    Active Hospital Problems    Diagnosis     Acute on chronic anemia [D64.9]      Priority: Medium    Moderate malnutrition ([address] [E44.0]      Priority: Medium    UTI (urinary tract infection) [N39.0]      Priority: Medium    Alcoholic cirrhosis ([address] [U33.16]      Priority: Medium    Severe anemia [D64.9]      Priority: Medium    Elevated LFTs [R79.89]      Priority: Medium    Ascites due to alcoholic cirrhosis ([address] [O90.98]      Priority: Medium    Sinus tachycardia [R00.0]      Priority: Medium    Esophageal varices ([address] [I85.00]          Hospital Day: 3    This is a 40 y.o. female who presented to [facility] on 11/8/2022 and is being treated for:    End-stage liver disease due to alcoholic cirrhosis  -Monitor daily LFTs  -Continue Lasix, Aldactone, rifaximin, lactulose, and nadolol for cirrhosis  -Midodrine for BP support - needs nadolol for cirrhosis    Acute anemia  -Hemoglobin 5.6 on admission  -Received 2 units PRBC  -Daily labs; monitor hemoglobin  -Transfuse to keep hemoglobin greater than 7  -EGD planned for today  -GI consulted; appreciate recommendations    UTI  -UA indicative of UTI, though did have a lot of epithelial cells  -Urine culture sent; awaiting results  -Continue Rocephin; de-escalate when able      DVT ppx: Lovenox  GI ppx: Diet/Tube Feeds  Diet: Diet NPO  Code Status: Full Code    Disposition:  Back to nursing facility after hemoglobin stabilization and when medically able. PT/OT Eval Status: Ordered      Jacob Ibarra DO  11/10/2022  9:35 AM      Please note that some part of this chart was generated using Dragon dictation software. Although every effort was made to ensure the accuracy of this automated transcription, some errors in transcription may have occurred inadvertently. If you may need any clarification, please do not hesitate to contact me through [facility].

## 2022-11-10 NOTE — H&P
Gastroenterology Note             Pre-operative History and Physical    Patient: Sina Kc  : 1975  CSN:     History Obtained From:  patient and/or guardian. HISTORY OF PRESENT ILLNESS:    The patient is a 40 y.o. female  here for EGD  Very pleasant 81-year-old female who has severe liver disease comes in with a very low hemoglobin we are doing an upper endoscopy to evaluate to see if she has bleeding esophageal varix    Past Medical History:    Past Medical History:   Diagnosis Date    Alcoholic liver disease ([address]     Anemia     History of blood transfusion      Past Surgical History:    Past Surgical History:   Procedure Laterality Date    COLONOSCOPY N/A 3/11/2021    COLONOSCOPY POLYPECTOMY SNARE/COLD BIOPSY performed by Africa Moore MD at [address] [facility] BONE MARROW BIOPSY  2022    CT BONE MARROW BIOPSY 2022 Miguel Childress MD [facility] CT SCAN    KNEE ARTHROPLASTY      TUBAL LIGATION      ESSURE    UPPER GASTROINTESTINAL ENDOSCOPY N/A 2021    EGD DIAGNOSTIC ONLY performed by Lizette Diane MD at [address] [place] [address] N/A 03/10/2021    EGD BIOPSY performed by Africa Moore MD at [address] [place] [address] 2021    EGD BAND LIGATION performed by Lisbet Weir MD at [address] [place] [address] N/A 2022    EGD DIAGNOSTIC ONLY performed by Sima Bills MD at [address] [place] [address] 2022    EGD ESOPHAGOGASTRODUODENOSCOPY ULTRASOUND performed by Fermín Barfield MD at [address] [place] [address] 2022    EGD BAND LIGATION performed by Fermín Barfield MD at [address] [place] [address] 10/26/2022    EGD DIAGNOSTIC ONLY performed by Gaylia Goodpasture, MD at [address] [place] [address]     Medications Prior to Admission:   No current facility-administered medications on file prior to encounter. Current Outpatient Medications on File Prior to Encounter   Medication Sig Dispense Refill    zolpidem (AMBIEN) 5 MG tablet Take 5 mg by mouth nightly as needed for Sleep (every night at bedtmes as needed for insomnia). lactulose (CHRONULAC) 10 GM/15ML solution Take 30 mLs by mouth 2 times daily 1 each 0    folic acid (FOLVITE) 1 MG tablet Take 1 tablet by mouth daily 30 tablet 0    rifAXIMin (XIFAXAN) 550 MG tablet Take 1 tablet by mouth 2 times daily 60 tablet 1    furosemide (LASIX) 20 MG tablet Take 1 tablet by mouth in the morning. 60 tablet 3    ondansetron (ZOFRAN) 8 MG tablet Take 1 tablet by mouth      [DISCONTINUED] thiamine 100 MG tablet Take 1 tablet by mouth daily (Patient not taking: Reported on 8/25/2022) 30 tablet 3    nadolol (CORGARD) 40 MG tablet Take 1 tablet by mouth daily (Patient taking differently: Take 40 mg by mouth daily At bedtime) 30 tablet 0    spironolactone (ALDACTONE) 100 MG tablet Take 1 tablet by mouth daily 30 tablet 1    pantoprazole (PROTONIX) 40 MG tablet Take 1 tablet by mouth every morning (before breakfast) 30 tablet 1    thiamine mononitrate (THIAMINE) 100 MG tablet Take 1 tablet by mouth daily 30 tablet 0        Allergies:  Oxycodone      Social History:   Social History     Tobacco Use    Smoking status: Former    Smokeless tobacco: Never   Substance Use Topics    Alcohol use: Not Currently     Family History:   Family History   Problem Relation Age of Onset    Alcohol Abuse Father        PHYSICAL EXAM:      BP (!) 105/46   Pulse 60   Temp 97.6 °F (36.4 °C) (Temporal)   Resp 14   Ht 5' 4\" (1.626 m)   Wt 117 lb 15.1 oz (53.5 kg)   SpO2 97%   BMI 20.25 kg/m²  I        Heart:   RRR, normal s1s2    Lungs:  CTA bilat,  Normal effort    Abdomen:   NT, ND      ASA Grade:  ASA 4 - Patient with severe systemic disease that is a constant threat to life    Mallampati Class: 2          ASSESSMENT AND PLAN:    1.   Patient is a 40 y.o. female here for EGD with MAC.   2. Procedure options, risks and benefits reviewed with patient. Patient expresses understanding.     Red Matamoros MD,   [address]  11/10/2022

## 2022-11-10 NOTE — PROGRESS NOTES
Shift assessment completed. Routine vitals completed. Scheduled medications given. Patient is awake, alert and oriented. Respirations are unlabored. Patient does not appear to be in distress, resting at this time. Call light within reach.

## 2022-11-10 NOTE — PROGRESS NOTES
[facility] Department   Phone: [phone]    Physical Therapy    [x] Initial Evaluation            [] Daily Treatment Note         [] Discharge Summary      Patient: Ban Dangelo   : 1975   MRN: [id number]   Date of Service:  11/10/2022  Admitting Diagnosis: Acute on chronic anemia  Current Admission Summary: 40 y.o. female with history of alcoholic cirrhosis of the liver and chronically jaundiced  She lives she is currently staying at [facility]  She was most recently hospitalized here 10/21-10/29 for severe anemia, hepatic encephalopathy and complicated UTI  She now presents to the ER with complaint of generalized weakness for the past 2 to 3 days  Past Medical History:  has a past medical history of Alcoholic liver disease ([address] Anemia, and History of blood transfusion. Past Surgical History:  has a past surgical history that includes Upper gastrointestinal endoscopy (N/A, 2021); Upper gastrointestinal endoscopy (N/A, 03/10/2021); Tubal ligation; Colonoscopy (N/A, 3/11/2021); Upper gastrointestinal endoscopy (N/A, 2021); Upper gastrointestinal endoscopy (N/A, 2022); Knee Arthroplasty; Upper gastrointestinal endoscopy (N/A, 2022); Upper gastrointestinal endoscopy (N/A, 2022); CT BIOPSY BONE MARROW (2022); and Upper gastrointestinal endoscopy (N/A, 10/26/2022). Discharge Recommendations: Ban Dangelo scored a 19/24 on the AM-PAC short mobility form. Current research shows that an AM-PAC score of 17 or less is typically not associated with a discharge to the patient's home setting. Based on the patient's AM-PAC score and their current functional mobility deficits, it is recommended that the patient have 3-5 sessions per week of Physical Therapy at d/c to increase the patient's independence. Please see assessment section for further patient specific details.     While pt AMPAC >17, pt is unable to manage living IND at home due to chronically ill status and decreased endurance. If patient discharges prior to next session this note will serve as a discharge summary. Please see below for the latest assessment towards goals. DME Required For Discharge: DME to be determined at next level of care  Precautions/Restrictions: high fall risk, up as tolerated (bed alarm not on upon arrival)  Weight Bearing Restrictions: no restrictions  [] Right Upper Extremity  [] Left Upper Extremity [] Right Lower Extremity  [] Left Lower Extremity     Required Braces/Orthotics: no braces required   [] Right  [] Left  Positional Restrictions:no positional restrictions    Pre-Admission Information   Lives With: alone                     Type of Home:  duplex  Home Layout: one level  Home Access: level entry  Bathroom Layout: tub/shower unit  Bathroom Equipment:  none  Toilet Height: standard height  Home Equipment: no prior equipment  Transfer Assistance: Independent without use of device  Ambulation Assistance:Independent without use of device  ADL Assistance: independent with all ADL's  IADL Assistance: independent with homemaking tasks  Active :        [] Yes                 [x] No  Hand Dominance: [] Left                 [x] Right  Current Employment: unemployed, lost job in April, due to medical issues  Hobbies: spending time with daughters  Recent Falls: found down from last admission    Examination   Vision:   Vision Gross Assessment: Impaired and Vision Corrective Device: wears glasses at all times  Hearing:   [facility]  Observation:   General Observation:  Pt with jaundiced skin, slow to respond throughout session, appears fatigued. Confusion noted intermittently throughout session-mild word finding difficulty (which patient self corrected) and timeline of hospitalizations.   ROM:   (B) LE AROM WFL  Strength:   (B) LE strength grossly +3  Decision Making: medium complexity  Clinical Presentation: evolving      Subjective  General: Pt supine in bed upon arrival. Agreeable to PT/OT eval. Pt appearing fatigued, flat affect noted throughout session. Pain: 8/10. Location: headache and stomach  Pain Interventions: RN notified of patient request for pain medication       Functional Mobility  Bed Mobility  Supine to Sit: stand by assistance  Sit to Supine: stand by assistance  Scooting: stand by assistance  Comments: Pt reporting lightheadedness and dizziness once seated EOB. Transfers  Sit to stand transfer: contact guard assistance  Stand to sit transfer: contact guard assistance  Toilet transfer: contact guard assistance  Comments: Pt required increased time to perform transfers, pt reporting increasing lightheadedness and dizziness in upright position. Standing BP at EOB prior to ambulation to bathroom: 97/49. Ambulation  Surface:level surface  Assistive Device: no device  Assistance: contact guard assistance  Distance: 10'+10'  Gait Mechanics: wide Pancho, increased medial-lateral sway, decreased adrian, mild path deviation noted  Comments: Pt required increased time to perform and required CGA to maintain balance, especially as pt reporting increased lightheadedness and dizziness while in prolonged upright position. Stair Mobility  Stair mobility not completed on this date. Comments:  Wheelchair Mobility:  No w/c mobility completed on this date. Comments:  Balance  Static Sitting Balance: good: independent with functional balance in unsupported position  Dynamic Sitting Balance: fair (+): maintains balance at SBA/supervision without use of UE support  Static Standing Balance: fair: maintains balance at CGA without use of UE support  Dynamic Standing Balance: fair: maintains balance at CGA without use of UE support  Comments: Pt stood at EOB for BP reading ~2-3 minutes with CGA for balance. Pt sat at toilet for ADLs ~2-3 minutes total with SBA for balance.      Other Therapeutic Interventions  Pt reporting increased lightheadedness and dizziness with prolonged standing/ambulation. Standing BP prior to ambulation noted above. BP taken once pt supine in bed: 103/47 upon return from bathroom, 98/41 after ~5 minutes supine in bed. Functional Outcomes  AM-PAC Inpatient Mobility Raw Score : 19              Cognition  Overall Cognitive Status: Impaired  Arousal/Alterness: delayed responses to stimuli  Following Commands: follows one step commands consistently  Memory: decreased recall of recent events  Safety Judgement: decreased awareness of need for assistance  Problem Solving: decreased awareness of errors  Initiation: does not require cues  Sequencing: does not require cues  Orientation:    alert and oriented x 4  Command Following:   [facility]    Education  Barriers To Learning: emotional and physical  Patient Education: patient educated on goals, PT role and benefits, plan of care, general safety, functional mobility training, transfer training, discharge recommendations  Learning Assessment:  patient verbalizes understanding, would benefit from continued reinforcement    Assessment  Activity Tolerance: fair, limited due to fatigue and endurance and low BP readings throughout session  Impairments Requiring Therapeutic Intervention: decreased functional mobility, decreased strength, decreased safety awareness, decreased cognition, decreased endurance, decreased balance  Prognosis: good  Clinical Assessment: Pt presents with the above deficits impairing her ability to perform functional mobility safely and independently. Pt with PLOF of independence and currently requires CGA for limited mobility without AD. Pt limited this date by hypotensive BP readings and noted fatigue throughout session. Pt would benefit from acute PT services to address deficits and facilitate return to PLOF.     Safety Interventions: patient left in bed, call light within reach, gait belt, and nurse notified    Plan  Frequency: 3-5 x/per week  Current Treatment Recommendations: strengthening, balance training, functional mobility training, transfer training, gait training, endurance training, neuromuscular re-education, modalities, patient/caregiver education, home exercise program, safety education, equipment evaluation/education, and positioning    Goals  Patient Goals: pt did not state   Short Term Goals:  Time Frame: upon d/c  Short term goal 1: Pt will perform bed mobility MOD I   Short term goal 2: Pt will perform transfers with LRAD MOD I   Short term goal 3: Pt will ambulate 48' with LRAD and SBA    Therapy Session Time      Individual Group Co-treatment   Time In     0854   Time Out     0934   Minutes     40     Timed Code Treatment Minutes:   25  Total Treatment Minutes:  40       Electronically Signed By: Dany Kingsley, [id number] [facility],[address], [address] [place] [address]

## 2022-11-10 NOTE — PROCEDURES
Endoscopy Note    Patient: Ramana Ma  : 1975  CSN:     Procedure: Esophagogastroduodenoscopy with variceal band ligation    Date:  11/10/2022     Surgeon:  Raf Lagunas MD     Referring Physician:  Willis Beltran    Preoperative Diagnosis: Acute anemia possible varices in a patient with cirrhosis    Postoperative Diagnosis: #1 1 varix with a cherry red spot banded  #2 severe portal hypertensive gastropathy    Anesthesia: Monitored anesthesia care    EBL: <5 mL    Indications: This is a 40y.o. year old female who to the hospital yesterday she has known severe alcohol alcoholic liver disease and her hemoglobin had dropped into the sixes she got transfused but her INR was elevated I gave her 10 mg and 2 5 mg doses of vitamin K her INR came down to 2.47 we felt it was safe to go ahead and do an EGD     Description of Procedure:  Informed consent was obtained from the patient after explanation of indications, benefits and possible risks and complications of the procedure. The patient was then taken to the endoscopy suite, placed in the left lateral decubitus position and the above IV sedation was administrered.     The Olympus videoendoscope was passed through the hypopharynx     Posterior pharynx was normal but yellow    For us in the proximal middle esophagus you can see that there was 1 small varix as it went down to the distal esophagus there was 1 cherry red spot with us much a medium size varix we were able to go ahead and placed 1 band on that    Hiatal hernia 1 cm    Stomach the patient had severe portal hypertensive gastropathy but there was no bleeding source found    Duodenum was normal there was no blood in the duodenum    Retroflexion showed the severe portal hypertensive gastropathy and the hiatal  Did not note any gastric varix    Gastric or Duodenal ulcer present: No      The patient tolerated the procedure well and was taken to the post anesthesia care unit in good condition.       Impression: 1 esophageal varix with a cherry red spot banded #2 portal hypertensive gastropathy      Recommendations: Patient should be on a octreotide drip for the next 72 hours  Patient can be on a soft diet today  Team to follow the hemoglobin    Heath Brumfield MD, MD  [facility]  [phone]

## 2022-11-10 NOTE — PROGRESS NOTES
Patient resting in bed. Aox4. Speech clear and coherent. Denies pain at this time. Reviewed evening medications with patient. Verbalized understanding. Bed in lowest position, call light within reach.

## 2022-11-10 NOTE — PLAN OF CARE
Problem: Discharge Planning  Goal: Discharge to home or other facility with appropriate resources  Outcome: Progressing     Problem: Pain  Goal: Verbalizes/displays adequate comfort level or baseline comfort level  Outcome: Progressing     Problem: Safety - Adult  Goal: Free from fall injury  Outcome: Progressing     Problem: ABCDS Injury Assessment  Goal: Absence of physical injury  Outcome: Progressing     Problem: Skin/Tissue Integrity - Adult  Goal: Skin integrity remains intact  Outcome: Progressing     Problem: Hematologic - Adult  Goal: Maintains hematologic stability  Outcome: Progressing     Problem: Musculoskeletal - Adult  Goal: Return mobility to safest level of function  Outcome: Progressing     Problem: Skin/Tissue Integrity  Goal: Absence of new skin breakdown  Description: 1. Monitor for areas of redness and/or skin breakdown  2. Assess vascular access sites hourly  3. Every 4-6 hours minimum:  Change oxygen saturation probe site  4. Every 4-6 hours:  If on nasal continuous positive airway pressure, respiratory therapy assess nares and determine need for appliance change or resting period.   Outcome: Progressing

## 2022-11-10 NOTE — CONSULTS
Gastrointestinal Consult Note    Patient: Leah Garcia CSN: [id number]     YOB: 1975  Age: 40 y.o. Sex: female    Unit: [address] ICU Room/Bed: [id number]/2058-81 Location: [address] [place] [address]     Admitting Physician: Natacha Olson    Date of  Admission: 11/8/2022   Admission type: Emergency  Primary Care Physician: Ekaterina Purvis MD          Referring Physician: [unfilled]    Chief Complaint: Acute anemia  Consult Date: 11/9/2022     Subjective:     History of Present Illness: Leah Garcia is a 40 y.o. female who is seen at the request of Natacha Olson for anemia    This a very pleasant 80-year-old female who has known liver disease    Was in earlier in the month of October she had an upper endoscopy which did not reveal any evidence of large esophageal varix    She now gets readmitted to the ICU because she had a admitting hemoglobin of 5.6  He was weak and tired    She also has a total bilirubin up to 20    Meld 30     .     Past Medical History:   Diagnosis Date    Alcoholic liver disease ([address]     Anemia     History of blood transfusion      Past Surgical History:   Procedure Laterality Date    COLONOSCOPY N/A 3/11/2021    COLONOSCOPY POLYPECTOMY SNARE/COLD BIOPSY performed by Stephen Villegas MD at [address] [place] [address]    CT BONE MARROW BIOPSY  7/5/2022    CT BONE MARROW BIOPSY 7/5/2022 Homero Little MD [facility] CT SCAN    KNEE ARTHROPLASTY      TUBAL LIGATION      ESSURE    UPPER GASTROINTESTINAL ENDOSCOPY N/A 01/30/2021    EGD DIAGNOSTIC ONLY performed by Janine Carrillo MD at [facility] N/A 03/10/2021    EGD BIOPSY performed by Stephen Villegas MD at [facility] 6/27/2021    EGD BAND LIGATION performed by Aparna Méndez MD at [facility] N/A 4/27/2022    EGD DIAGNOSTIC ONLY performed by Azra Mcnulty MD at [facility] N/A 7/1/2022    EGD ESOPHAGOGASTRODUODENOSCOPY ULTRASOUND performed by Mere Hernandez MD at [address] N/A 7/1/2022    EGD BAND LIGATION performed by Mere Hernandez MD at [facility] N/A 10/26/2022    EGD DIAGNOSTIC ONLY performed by Apollo Rm MD at [address]      Family History   Problem Relation Age of Onset    Alcohol Abuse Father      Social History     Tobacco Use    Smoking status: Former    Smokeless tobacco: Never   Substance Use Topics    Alcohol use: Not Currently      Allergies   Allergen Reactions    Oxycodone Nausea And Vomiting     Prior to Admission medications    Medication Sig Start Date End Date Taking? Authorizing Provider   zolpidem (AMBIEN) 5 MG tablet Take 5 mg by mouth nightly as needed for Sleep (every night at bedtmes as needed for insomnia). Yes Historical Provider, MD   lactulose (CHRONULAC) 10 GM/15ML solution Take 30 mLs by mouth 2 times daily 10/27/22   Cait Hilliard MD   folic acid (FOLVITE) 1 MG tablet Take 1 tablet by mouth daily 10/27/22   Cait Hilliard MD   rifAXIMin Merary Honour) 550 MG tablet Take 1 tablet by mouth 2 times daily 10/18/22   DARCY Jean - CNP   furosemide (LASIX) 20 MG tablet Take 1 tablet by mouth in the morning.  7/30/22   Freya Caldwell MD   ondansetron (ZOFRAN) 8 MG tablet Take 1 tablet by mouth 7/14/22   Historical Provider, MD   thiamine 100 MG tablet Take 1 tablet by mouth daily  Patient not taking: Reported on 8/25/2022 7/1/22 8/28/22  Sammi Reno MD   nadolol (CORGARD) 40 MG tablet Take 1 tablet by mouth daily  Patient taking differently: Take 40 mg by mouth daily At bedtime 5/3/22   Sarah Ballesteros MD   spironolactone (ALDACTONE) 100 MG tablet Take 1 tablet by mouth daily 5/3/22   Sarah Ballesteros MD   pantoprazole (PROTONIX) 40 MG tablet Take 1 tablet by mouth every morning (before breakfast) 5/4/22   Sarah Ballesteros MD   thiamine mononitrate (THIAMINE) 100 MG tablet Take 1 tablet by mouth daily 5/3/22 6/2/22  Cynthia Lawrence MD        Review of Systems:    10 point done and negative     Objective:     Physical Exam:  BP (!) 94/52   Pulse 68   Temp 100.3 °F (37.9 °C) (Oral)   Resp 16   Ht 5' 4\" (1.626 m)   Wt 105 lb 13.1 oz (48 kg)   SpO2 95%   BMI 18.16 kg/m²      General: thin and jaundiced White (non-), female  HEENT: Normocephalic, atraumatic, sclera anicteric, mucosal membranes moist  Cardiovascular: Regular rate and rhythm. Respiratory:  Clear to auscultation in the anterior lung fields bilaterally  GI:  Abdomen nondistended, soft, and nontender. Normal active bowel sounds. No enlargement of the liver or spleen. No masses palpable. Rectal:  Deferred  Musculoskeletal:  No pitting edema of the lower legs. Neurological:  Gross memory appears intact. Patient is alert and oriented. Scheduled Meds:   sodium chloride flush  5-40 mL IntraVENous 2 times per day    [Held by provider] enoxaparin  40 mg SubCUTAneous Daily    nadolol  40 mg Oral Nightly    furosemide  20 mg Oral Daily    spironolactone  100 mg Oral Daily    folic acid  1 mg Oral Daily    lactulose  20 g Oral BID    rifAXIMin  550 mg Oral BID    pantoprazole  40 mg Oral QAM AC    cefTRIAXone (ROCEPHIN) IV  1,000 mg IntraVENous Q24H     Continuous Infusions:   sodium chloride      sodium chloride       PRN Meds:sodium chloride flush, sodium chloride, ondansetron **OR** ondansetron, polyethylene glycol, oxyCODONE, zolpidem, sodium chloride    Imaging:  CT ABDOMEN PELVIS WO CONTRAST Additional Contrast? None    Result Date: 11/8/2022  EXAMINATION: CT OF THE ABDOMEN AND PELVIS WITHOUT CONTRAST 11/8/2022 10:00 pm TECHNIQUE: CT of the abdomen and pelvis was performed without the administration of intravenous contrast. Multiplanar reformatted images are provided for review.  Automated exposure control, iterative reconstruction, and/or weight based adjustment of the mA/kV was utilized to reduce the radiation dose to as low as reasonably achievable. COMPARISON: 10/24/2022 HISTORY: ORDERING SYSTEM PROVIDED HISTORY: hypotension anemia liver failure TECHNOLOGIST PROVIDED HISTORY: Reason for exam:->hypotension anemia liver failure Additional Contrast?->None Decision Support Exception - unselect if not a suspected or confirmed emergency medical condition->Emergency Medical Condition (MA) Is the patient pregnant?->No Reason for Exam: hypotension anemia liver failure Relevant Medical/Surgical History: Hypotension (Pt brought by [facility] from [place]. Nursing home called for high ammonia 131, low hgb 5.9. pt BP 93/36 when EMS took it. Pt was given 200 ML of NS. Pt states has been feeling weak since Sunday. Pt hx liver failure ) FINDINGS: Lower Chest: The cardiac chambers are mildly enlarged. Dependent atelectatic changes and linear atelectasis in the lower lungs. There is no pleural effusion or pneumothorax. Organs: Lack of IV contrast reduces evaluation of the organs and vasculature. Very heterogenous appearance of the liver is again noted mineralized stones are present in the gallbladder and thickening of the gallbladder wall. However there is also ascites around the margin of the liver and gallbladder fossa, combined with general edema of the intra-abdominal fat. Splenomegaly is redemonstrated and appears stable. Small amount of ascites along the margin of the spleen. Limited evaluation of the pancreas with a general edema and may be atrophic. Engorged vessels and likely lymph nodes in the upper abdomen use the sensitivity of the pancreas. No obvious masses are seen at the adrenal fossa. No renal calculi or hydronephrosis the left kidney. Nonobstructing calculi and some mild scarring at the right kidney are stable. The ureters are not dilated. GI/Bowel: Stomach, small bowel, and colon are not dilated.   Scattered ascites in the mesenteric folds, pericolonic gutters, and collecting in the pelvis. General edema of the intra-fat and mesentery. Engorged vessels and likely multiple lymph nodes scattered in the mesentery. There is no sign of pneumoperitoneum. The appendix is identified with small amounts of gas in the lumen and is not dilated. Mild fecal load within the colon from the cecum through the rectum without a large focal bolus. Pelvis: The urinary bladder is under distended reducing evaluation of the wall thickness. Uterus is still present. Essure type tubal devices are noted. There is free fluid in the pelvis. Peritoneum/Retroperitoneum: No abdominal aortic aneurysm or high density hematoma. Multiple small lymph nodes down the retroperitoneum are again noted. Bones/Soft Tissues: Mild edema at the body wall. Recannulized vein at the umbilicus. Mild degenerative changes in the lumbar spine. There is a mild compression deformity of the upper endplate of T8 without a significant loss of vertebral body height. 1.  Very heterogenous appearance of the liver with sequela of portal hypertension, varices, splenomegaly, and ascites. The amount of ascites and general edema of the intra-fat are increased from the recent exam.  The actual volume of ascites is still small. 2.  Cholelithiasis with thickened wall of the gallbladder. However thickening could relate to the general edematous state and surrounding ascites. Can consider gallbladder ultrasound if symptomatic. 3.  No bowel obstruction or pneumoperitoneum. There is no hydronephrosis with a stable calculus at the right kidney. 4.  Mild compression deformity upper endplate of T8 without a significant loss of vertebral body height. Features suggest this is chronic.      CT ABDOMEN PELVIS WO CONTRAST Additional Contrast? Oral    Result Date: 10/24/2022  EXAMINATION: CT OF THE ABDOMEN AND PELVIS WITHOUT CONTRAST 10/24/2022 2:00 pm TECHNIQUE: CT of the abdomen and pelvis was performed without the administration of intravenous contrast. Multiplanar reformatted images are provided for review. Automated exposure control, iterative reconstruction, and/or weight based adjustment of the mA/kV was utilized to reduce the radiation dose to as low as reasonably achievable. COMPARISON: 10/11/2022. HISTORY: ORDERING SYSTEM PROVIDED HISTORY: Abdominal pain TECHNOLOGIST PROVIDED HISTORY: Reason for exam:->Abdominal pain Additional Contrast?->Oral Is the patient pregnant?->No Reason for Exam: Abdominal pain FINDINGS: Lower Chest: Linear atelectasis or scarring involves the lower lobes. There is no pleural effusion. Organs: The liver is heterogeneous and has a nodular contour. There are multiple stones in the gallbladder. The gallbladder again appears thickened. Recanalization of the umbilical vein and varices in the upper abdomen are again noted. The spleen is enlarged measuring up to 15 cm. The adrenal glands and pancreas are unremarkable. There is a new small amount of air in the right intrarenal collecting system. Stones at the lower pole the right kidney measure up to 5 mm. There is no ureteral stone or hydronephrosis. GI/Bowel: There is no bowel dilatation or bowel wall thickening. The stomach is unremarkable. The appendix appears normal. Pelvis: There is air in the bladder. Bladder is otherwise unremarkable. There is no abnormal adnexal mass. Peritoneum/Retroperitoneum: The abdominal aorta is normal in caliber. No enlarged lymph nodes are identified. No fat stranding, free fluid, free air or focal fluid collection is identified. Bones/Soft Tissues: No fracture or destructive bone lesion is identified. 1. New air in the bladder and right intrarenal collecting system suggesting a urinary tract infection with a gas-forming organism. Air in the bladder could also be iatrogenic. A fistula associated with bowel is considered less likely.  2. Hepatic cirrhosis and portal hypertension with splenomegaly, recanalization of the umbilical There is pericholecystic fluid however there is also perihepatic ascites. Portal venous hypertension with splenomegaly, recanalization of the umbilical vein and upper abdominal varices. The pancreas, adrenal glands and kidneys are grossly with the limitations of a noncontrast study. There is an unchanged calcification or calculus in the lower pole of the right kidney. GI/Bowel: There is a moderate stool load particularly in the right, transverse and proximal descending colon. There is diffuse bowel wall thickening. Pelvis: Bilateral Essure tubal occlusion devices. Uterus is otherwise unremarkable. The urinary bladder is normal. Peritoneum/Retroperitoneum: There is small volume of low-attenuation ascites in the abdomen and pelvis. There is diffuse infiltration of fat in the abdomen. No free air. There are mildly prominent nonspecific retroperitoneal lymph nodes. Bones/Soft Tissues: No acute bone finding. Unchanged diffuse hepatocellular disease with suspected cirrhosis. Portal venous hypertension with recanalized umbilical vein, ascites, upper abdominal varices and splenomegaly. Cholelithiasis. There is diffuse gallbladder wall thickening. This finding may be related to hypoproteinemia and third-spacing of fluid. Acute cholecystitis cannot be excluded. Correlate clinically. Moderate stool load consistent with constipation. CT Head W/O Contrast    Result Date: 10/21/2022  EXAMINATION: CT OF THE HEAD WITHOUT CONTRAST  10/21/2022 6:06 pm TECHNIQUE: CT of the head was performed without the administration of intravenous contrast. Automated exposure control, iterative reconstruction, and/or weight based adjustment of the mA/kV was utilized to reduce the radiation dose to as low as reasonably achievable. COMPARISON: 10/11/2022.  HISTORY: ORDERING SYSTEM PROVIDED HISTORY: altered mental status TECHNOLOGIST PROVIDED HISTORY: Reason for exam:->altered mental status Has a \"code stroke\" or \"stroke alert\" been called? ->No Decision Support Exception - unselect if not a suspected or confirmed emergency medical condition->Emergency Medical Condition (MA) Is the patient pregnant?->No Reason for Exam: ams FINDINGS: BRAIN/VENTRICLES: There is no acute intracranial hemorrhage, mass effect or midline shift. No abnormal extra-axial fluid collection. The gray-white differentiation is maintained without evidence of an acute infarct. There is no evidence of hydrocephalus. ORBITS: The visualized portion of the orbits demonstrate no acute abnormality. SINUSES: The visualized paranasal sinuses and mastoid air cells demonstrate no acute abnormality. SOFT TISSUES/SKULL:  No acute abnormality of the visualized skull or soft tissues. No acute intracranial findings. CT HEAD WO CONTRAST    Result Date: 10/11/2022  EXAMINATION: CT OF THE HEAD WITHOUT CONTRAST  10/11/2022 8:54 pm TECHNIQUE: CT of the head was performed without the administration of intravenous contrast. Automated exposure control, iterative reconstruction, and/or weight based adjustment of the mA/kV was utilized to reduce the radiation dose to as low as reasonably achievable. COMPARISON: None. HISTORY: ORDERING SYSTEM PROVIDED HISTORY: [facility] TECHNOLOGIST PROVIDED HISTORY: Reason for exam:->ams Has a \"code stroke\" or \"stroke alert\" been called? ->No Decision Support Exception - unselect if not a suspected or confirmed emergency medical condition->Emergency Medical Condition (MA) Is the patient pregnant?->No Reason for Exam: AMS. Altered Mental Status (Pt arrived from home via ems with AMS Ems stated she is alert and oriented x2, wanted to sign refusal but was unable to follow instructions to do so Pt when asked why she was here, could not tell me, asked her where she was pt stated  Pt has hx of liver failure, pt is visibly jaundiced. ). FINDINGS: BRAIN/VENTRICLES: There is no acute intracranial hemorrhage, mass effect or midline shift.   No abnormal extra-axial fluid collection. The gray-white differentiation is maintained without evidence of an acute infarct. There is no evidence of hydrocephalus. ORBITS: The visualized portion of the orbits demonstrate no acute abnormality. SINUSES: The visualized paranasal sinuses and mastoid air cells demonstrate no acute abnormality. SOFT TISSUES/SKULL:  No acute abnormality of the visualized skull or soft tissues. There is a salt and pepper appearance of the skull. No acute intracranial abnormality. XR CHEST PORTABLE    Result Date: 11/8/2022  EXAMINATION: ONE XRAY VIEW OF THE CHEST 11/8/2022 9:10 pm COMPARISON: None. HISTORY: ORDERING SYSTEM PROVIDED HISTORY: hypotension TECHNOLOGIST PROVIDED HISTORY: Reason for exam:->hypotension Reason for Exam: hypotension FINDINGS: The heart is borderline enlarged and prominent. The pulmonary vessels are top-normal.  The lungs are hypoinflated with mild bibasilar interstitial linear densities which is more prominent. No effusion is seen. Mild cardiomegaly Hypoinflation with mild bibasilar atelectasis or early infiltrates which is more apparent. XR CHEST PORTABLE    Result Date: 10/22/2022  EXAMINATION: ONE XRAY VIEW OF THE CHEST 10/22/2022 9:41 am COMPARISON: 10/21/2022 HISTORY: ORDERING SYSTEM PROVIDED HISTORY: follow up sob TECHNOLOGIST PROVIDED HISTORY: Reason for exam:->follow up sob Reason for Exam: follow up sob FINDINGS: The cardiac silhouette, mediastinal and hilar contours are normal.  The lungs are clear. There are no pleural effusions. No acute osseous abnormalities are identified. No acute cardiopulmonary disease. XR CHEST PORTABLE    Result Date: 10/21/2022  EXAMINATION: ONE XRAY VIEW OF THE CHEST 10/21/2022 6:45 pm COMPARISON: Chest 10/11/2022 HISTORY: ORDERING SYSTEM PROVIDED HISTORY: altered mental status FINDINGS: The cardiomediastinal and hilar silhouettes appear unremarkable.   Low lung volumes result in vascular crowding underaeration particular parahilar regions and medial both lung bases. Possible abnormal pulmonary opacity medial lower left lung. The lungs appear otherwise clear. No pleural effusion evident. No pneumothorax is seen. No acute osseous abnormality is identified. 1. Technically suboptimal, expiratory phase respiration performed supine. 2. Cannot exclude pneumonia left lower lung. 3.  Follow-up full inspiration PA and lateral chest may be useful for better characterization of pulmonary findings once the patient's condition allows. XR CHEST PORTABLE    Result Date: 10/11/2022  EXAMINATION: ONE XRAY VIEW OF THE CHEST 10/11/2022 8:36 pm COMPARISON: 08/25/2022 HISTORY: ORDERING SYSTEM PROVIDED HISTORY: ams TECHNOLOGIST PROVIDED HISTORY: Reason for exam:->ams Reason for Exam: AMS FINDINGS: The lungs are without acute focal process. There is no effusion or pneumothorax. The cardiomediastinal silhouette is stable. The osseous structures are stable. No acute process. US ABDOMEN LIMITED Specify organ? LIVER    Result Date: 10/22/2022  EXAMINATION: RIGHT UPPER QUADRANT ULTRASOUND 10/22/2022 11:29 am COMPARISON: CT abdomen pelvis dated 10/11/2022 HISTORY: ORDERING SYSTEM PROVIDED HISTORY: ultrasound eval ? enough ascities for paracentesis per d/w dr cavanaugh on call Interventional radiologits. TECHNOLOGIST PROVIDED HISTORY: Reason for exam:->ultrasound eval ? enough ascities for paracentesis per d/w dr cavanaugh on call Interventional radiologist Specify organ?->LIVER FINDINGS: 4 quadrant ultrasound evaluation of the peritoneal cavity demonstrates trace peritoneal fluid deep to the inferior edge of the liver. Percutaneous windows for sampling is extremely limited by bowel along the anterior abdominal wall as well as the liver parenchyma. Trace peritoneal fluid in the right upper quadrant, underlying the liver parenchyma. No safe percutaneous window for fluid sampling identified at this time.        Labs Reviewed: review  @OGWHBXVI27(wbc,hgb,hct,plt)@  @QGFUPRYI47(na,k,cl,co2,agap,glu,bun,cret,GFRAA,GFRNA)@  @FVRTVIYV96(PTT1,PT1,INR)@  @IKUBIRMB75(ALB,cbil,tbil,TP,GLOB,AGRAT,sgot,astpoc,altpoc,ALT,gpt,AP)@  @WIAAOTMS72(amyl,lipa)@        Assessment/Plan:     Principal Problem:    Acute on chronic anemia  Active Problems:    Elevated LFTs    Ascites due to alcoholic cirrhosis (HCC)    Sinus tachycardia    Severe anemia    Alcoholic cirrhosis (HCC)    UTI (urinary tract infection)    Moderate malnutrition (HCC)    Esophageal varices (HCC)  Resolved Problems:    * No resolved hospital problems. *   Anemia . .. acute question of varcies  Needs vit k   Needs egd       Signed By: Pippa Laguna MD     November 9, 2022      Please note that some or all of this record was generated using voice recognition software. If there are any questions about the content of this document, please contact the author as some errors in translation may have occurred.

## 2022-11-11 LAB — ORGANISM: ABNORMAL

## 2022-11-11 PROCEDURE — 6370000000 HC RX 637 (ALT 250 FOR IP): Performed by: PHYSICIAN ASSISTANT

## 2022-11-11 PROCEDURE — 2580000003 HC RX 258: Performed by: ANESTHESIOLOGY

## 2022-11-11 PROCEDURE — 2580000003 HC RX 258: Performed by: STUDENT IN AN ORGANIZED HEALTH CARE EDUCATION/TRAINING PROGRAM

## 2022-11-11 PROCEDURE — 6370000000 HC RX 637 (ALT 250 FOR IP): Performed by: STUDENT IN AN ORGANIZED HEALTH CARE EDUCATION/TRAINING PROGRAM

## 2022-11-11 PROCEDURE — 6370000000 HC RX 637 (ALT 250 FOR IP): Performed by: INTERNAL MEDICINE

## 2022-11-11 PROCEDURE — 1200000000 HC SEMI PRIVATE

## 2022-11-11 PROCEDURE — 6360000002 HC RX W HCPCS: Performed by: INTERNAL MEDICINE

## 2022-11-11 PROCEDURE — 6360000002 HC RX W HCPCS: Performed by: STUDENT IN AN ORGANIZED HEALTH CARE EDUCATION/TRAINING PROGRAM

## 2022-11-11 PROCEDURE — 2580000003 HC RX 258: Performed by: INTERNAL MEDICINE

## 2022-11-11 RX ADMIN — MIDODRINE HYDROCHLORIDE 5 MG: 5 TABLET ORAL at 21:05

## 2022-11-11 RX ADMIN — OXYCODONE 5 MG: 5 TABLET ORAL at 00:05

## 2022-11-11 RX ADMIN — LACTULOSE 20 G: 20 SOLUTION ORAL at 21:19

## 2022-11-11 RX ADMIN — Medication 10 ML: at 09:18

## 2022-11-11 RX ADMIN — SODIUM CHLORIDE: 9 INJECTION, SOLUTION INTRAVENOUS at 23:54

## 2022-11-11 RX ADMIN — RIFAXIMIN 550 MG: 550 TABLET ORAL at 09:57

## 2022-11-11 RX ADMIN — CEFTRIAXONE SODIUM 1000 MG: 1 INJECTION, POWDER, FOR SOLUTION INTRAMUSCULAR; INTRAVENOUS at 12:48

## 2022-11-11 RX ADMIN — OXYCODONE 5 MG: 5 TABLET ORAL at 17:06

## 2022-11-11 RX ADMIN — OXYCODONE 5 MG: 5 TABLET ORAL at 09:22

## 2022-11-11 RX ADMIN — PANTOPRAZOLE SODIUM 40 MG: 40 TABLET, DELAYED RELEASE ORAL at 05:55

## 2022-11-11 RX ADMIN — OCTREOTIDE ACETATE 50 MCG/HR: 500 INJECTION, SOLUTION INTRAVENOUS; SUBCUTANEOUS at 03:52

## 2022-11-11 RX ADMIN — SODIUM CHLORIDE: 9 INJECTION, SOLUTION INTRAVENOUS at 12:47

## 2022-11-11 RX ADMIN — FOLIC ACID 1 MG: 1 TABLET ORAL at 09:16

## 2022-11-11 RX ADMIN — DIPHENHYDRAMINE HYDROCHLORIDE 5 ML: 12.5 LIQUID ORAL at 12:49

## 2022-11-11 RX ADMIN — SPIRONOLACTONE 100 MG: 25 TABLET ORAL at 09:15

## 2022-11-11 RX ADMIN — OCTREOTIDE ACETATE 50 MCG/HR: 500 INJECTION, SOLUTION INTRAVENOUS; SUBCUTANEOUS at 15:25

## 2022-11-11 RX ADMIN — DIPHENHYDRAMINE HYDROCHLORIDE 5 ML: 12.5 LIQUID ORAL at 15:26

## 2022-11-11 RX ADMIN — ZOLPIDEM TARTRATE 5 MG: 5 TABLET ORAL at 21:05

## 2022-11-11 RX ADMIN — NADOLOL 40 MG: 20 TABLET ORAL at 21:04

## 2022-11-11 RX ADMIN — FUROSEMIDE 20 MG: 20 TABLET ORAL at 09:15

## 2022-11-11 RX ADMIN — MIDODRINE HYDROCHLORIDE 5 MG: 5 TABLET ORAL at 09:16

## 2022-11-11 RX ADMIN — MIDODRINE HYDROCHLORIDE 5 MG: 5 TABLET ORAL at 15:03

## 2022-11-11 ASSESSMENT — PAIN SCALES - GENERAL
PAINLEVEL_OUTOF10: 7
PAINLEVEL_OUTOF10: 8
PAINLEVEL_OUTOF10: 9
PAINLEVEL_OUTOF10: 0

## 2022-11-11 ASSESSMENT — PAIN DESCRIPTION - LOCATION
LOCATION: BACK;HEAD
LOCATION: HEAD
LOCATION: GENERALIZED
LOCATION: HEAD

## 2022-11-11 ASSESSMENT — PAIN DESCRIPTION - DESCRIPTORS
DESCRIPTORS: ACHING
DESCRIPTORS: ACHING;SHARP;SHOOTING;THROBBING
DESCRIPTORS: ACHING

## 2022-11-11 ASSESSMENT — PAIN DESCRIPTION - ORIENTATION: ORIENTATION: RIGHT;LEFT;ANTERIOR;POSTERIOR

## 2022-11-11 ASSESSMENT — PAIN DESCRIPTION - PAIN TYPE: TYPE: ACUTE PAIN

## 2022-11-11 ASSESSMENT — PAIN - FUNCTIONAL ASSESSMENT: PAIN_FUNCTIONAL_ASSESSMENT: ACTIVITIES ARE NOT PREVENTED

## 2022-11-11 NOTE — PROGRESS NOTES
Hospitalist Progress Note    Name:  Sarai Henry    /Age/Sex: 1975  (40 y.o. female)  MRN & CSN:  [id number] & [id number]    PCP: Hector Cueva MD    Date of Admission: 2022    Patient Status:  Inpatient     Chief Complaint: Generalized fatigue    Hospital Course:   Sarai Henry is 40 y.o. female with history of alcoholic cirrhosis of the liver and chronically jaundiced. She is currently staying at [facility]. She was most recently hospitalized here 10/21-10/29 for severe anemia, hepatic encephalopathy and complicated UTI. In the ED her hemoglobin was 5.6. 2U PRBC given. Subjective: Today is:  Hospital Day: 4. Patient seen and examined in [facility]-[id number]. The patient had EGD done yesterday and was noted to have 1 varix with cherry red spot s/p banded. Severe portal hypertensive gastropathy. The patient denied any active bleeding at this time.   Lying comfortably in the bed      Medications:  Reviewed    Infusion Medications    sodium chloride Stopped (11/10/22 1603)    octreotide (SandoSTATIN) infusion 50 mcg/hr (22 0352)    sodium chloride      sodium chloride       Scheduled Medications    midodrine  5 mg Oral TID    sodium chloride flush  5-40 mL IntraVENous 2 times per day    [Held by provider] enoxaparin  40 mg SubCUTAneous Daily    nadolol  40 mg Oral Nightly    furosemide  20 mg Oral Daily    spironolactone  100 mg Oral Daily    folic acid  1 mg Oral Daily    lactulose  20 g Oral BID    rifAXIMin  550 mg Oral BID    pantoprazole  40 mg Oral QAM AC    cefTRIAXone (ROCEPHIN) IV  1,000 mg IntraVENous Q24H     PRN Meds: sodium chloride flush, sodium chloride, ondansetron **OR** ondansetron, polyethylene glycol, oxyCODONE, zolpidem, sodium chloride      Intake/Output Summary (Last 24 hours) at 2022 1043  Last data filed at 2022 0820  Gross per 24 hour   Intake 942.44 ml   Output --   Net 942.44 ml       Physical Exam Performed:    BP (!) 108/57 Pulse 59   Temp 97.8 °F (36.6 °C) (Oral)   Resp 16   Ht 5' 4\" (1.626 m)   Wt 117 lb 15.1 oz (53.5 kg)   SpO2 97%   BMI 20.25 kg/m²     GEN No acute distress. Chronically ill-appearing  HEENT moist mucous membranes, strong icterus  RESP CTA B  CVS:   RRR  GI/ S/NT/ND BS+   MSK No gross joint deformities. SKIN warm, dry. Icterus noted  NEURO no focal deficit  PSYCH Awake, alert, oriented x3. Labs:   Recent Labs     11/08/22 2125 11/09/22 0515 11/09/22  1643 11/10/22  0437   WBC 5.6 5.6  --  5.0   HGB 5.6* 6.9* 8.6* 8.1*   HCT 15.8* 19.7* 24.8* 22.5*   PLT 68* 66*  --  61*     Recent Labs     11/08/22 2125 11/09/22 0515 11/10/22  0437   * 134* 134*   K 4.4 4.7 4.9   CL 99 102 100   CO2 26 25 26   BUN 28* 27* 33*   CREATININE 0.7 0.6 0.7   CALCIUM 9.1 9.0 9.3   PHOS  --   --  3.3     Recent Labs     11/08/22 2125 11/09/22 0515 11/10/22  0437   AST 57* 56* 59*   ALT 30 29 26   BILIDIR  --  6.5* 8.0*   BILITOT 19.9* 20.0* 21.1*   ALKPHOS 117 111 84     Recent Labs     11/09/22  0515 11/10/22  0437 11/10/22  1146   INR 2.81* 2.62* 2.47*     Recent Labs     11/08/22 2125   Antonio Sinning <0.01       Urinalysis:      Lab Results   Component Value Date/Time    NITRU POSITIVE 11/09/2022 01:40 AM    WBCUA 18 11/09/2022 01:40 AM    BACTERIA 4+ 11/09/2022 01:40 AM    RBCUA 2 11/09/2022 01:40 AM    BLOODU TRACE 11/09/2022 01:40 AM    SPECGRAV 1.015 11/09/2022 01:40 AM    GLUCOSEU Negative 11/09/2022 01:40 AM       Radiology:  CT ABDOMEN PELVIS WO CONTRAST Additional Contrast? None   Final Result   1. Very heterogenous appearance of the liver with sequela of portal   hypertension, varices, splenomegaly, and ascites. The amount of ascites and   general edema of the intra-fat are increased from the recent exam.  The   actual volume of ascites is still small. 2.  Cholelithiasis with thickened wall of the gallbladder.   However   thickening could relate to the general edematous state and surrounding ascites. Can consider gallbladder ultrasound if symptomatic. 3.  No bowel obstruction or pneumoperitoneum. There is no hydronephrosis   with a stable calculus at the right kidney. 4.  Mild compression deformity upper endplate of T8 without a significant   loss of vertebral body height. Features suggest this is chronic. XR CHEST PORTABLE   Final Result   Mild cardiomegaly      Hypoinflation with mild bibasilar atelectasis or early infiltrates which is   more apparent. Assessment/Plan:    Active Hospital Problems    Diagnosis     Acute on chronic anemia [D64.9]      Priority: Medium    Moderate malnutrition ([address] [E44.0]      Priority: Medium    UTI (urinary tract infection) [N39.0]      Priority: Medium    Alcoholic cirrhosis ([address] [D55.23]      Priority: Medium    Severe anemia [D64.9]      Priority: Medium    Elevated LFTs [R79.89]      Priority: Medium    Ascites due to alcoholic cirrhosis ([address] [N87.10]      Priority: Medium    Sinus tachycardia [R00.0]      Priority: Medium    Esophageal varices ([address] [I85.00]          Hospital Day: 4    This is a 40 y.o. female who presented to [facility] on 11/8/2022 and is being treated for:    End-stage liver disease due to alcoholic cirrhosis  -Monitor daily LFTs  -Continue Lasix, Aldactone, rifaximin, lactulose, and nadolol for cirrhosis  -Midodrine for BP support - needs nadolol for cirrhosis    Acute anemia  -Hemoglobin 5.6 on admission  -Received 2 units PRBC  -Daily labs; monitor hemoglobin  -Transfuse to keep hemoglobin greater than 7  -S/p EGD with finding as above. No labs available today. We will get CBC and BMP    UTI  -UA indicative of UTI, though did have a lot of epithelial cells  -Urine culture sent; awaiting results  -Continue Rocephin. Growing gram-negative. De-escalate antibiotics in a.m.       DVT ppx: Lovenox  GI ppx: Diet/Tube Feeds  Diet: ADULT DIET; Easy to Chew  Code Status: Full Code    Disposition:  Back to nursing facility after hemoglobin stabilization and when medically able.       PT/OT Eval Status: Ordered      Rubina Novak MD, MD  11/11/2022  10:43 AM

## 2022-11-11 NOTE — PROGRESS NOTES
Physical Therapy  Heather Cobos  Pt offered PT and declined due to pain and feeling poorly. Pt educated on importance of OOB activity and up to chair. Pt continued to decline. Will try back at later date as pt able to participate.  Cuco Sutherland [id number]

## 2022-11-11 NOTE — CARE COORDINATION
Discharge Planning. Pre-cert started for [facility] skilled nursing facility on 11/11/2022.     Electronically signed by ABY Pretty on 11/11/2022 at 2:24 PM

## 2022-11-11 NOTE — PROGRESS NOTES
Gastroenterology Progress Note      Carmine De Leon is a 40 y.o. female patient. 1. Hyperbilirubinemia    2. Anemia, unspecified type    3. Coagulopathy (HCC)        SUBJECTIVE:  no odynophagia. No abdominal pain, N/V. Had a couple brown stools. ROS:  Cardiovascular ROS: no chest pain or dyspnea on exertion  Gastrointestinal ROS: see hpi  Respiratory ROS: no cough, shortness of breath, or wheezing    Physical    VITALS:  BP (!) 108/57   Pulse 59   Temp 97.8 °F (36.6 °C) (Oral)   Resp 16   Ht 5' 4\" (1.626 m)   Wt 117 lb 15.1 oz (53.5 kg)   SpO2 97%   BMI 20.25 kg/m²   TEMPERATURE:  Current - Temp: 97.8 °F (36.6 °C); Max - Temp  Av.7 °F (36.5 °C)  Min: 96.9 °F (36.1 °C)  Max: 98.1 °F (36.7 °C)    NAD  Jaundice  RRR, Nl s1s2  Lungs CTA Bilaterally, normal effort  Abdomen soft, ND, NT, no HSM, Bowel sounds normal  AAOx3, No asterixis     Data    Data Review:    Recent Labs     22  1643 11/10/22  0437   WBC 5.6 5.6  --  5.0   HGB 5.6* 6.9* 8.6* 8.1*   HCT 15.8* 19.7* 24.8* 22.5*   MCV 98.6 95.0  --  94.3   PLT 68* 66*  --  61*     Recent Labs     11/08/22  2125 11/09/22  0515 11/10/22  0437   * 134* 134*   K 4.4 4.7 4.9   CL 99 102 100   CO2 26 25 26   PHOS  --   --  3.3   BUN 28* 27* 33*   CREATININE 0.7 0.6 0.7     Recent Labs     11/08/22  2125 11/09/22  0515 11/10/22  0437   AST 57* 56* 59*   ALT 30 29 26   BILIDIR  --  6.5* 8.0*   BILITOT 19.9* 20.0* 21.1*   ALKPHOS 117 111 84     Recent Labs     11/08/22  2125   LIPASE 39.0     Recent Labs     22  0515 11/10/22  0437 11/10/22  1146   PROTIME 29.7* 28.1* 26.9*   INR 2.81* 2.62* 2.47*     No results for input(s): PTT in the last 72 hours. 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 Did have esophageal varix with cherry red spot on EGD 11/10 which was banded.      Esophageal varix -EGD 11/10 with 1 esophageal varix with cherry red spot which was banded and portal hypertensive gastropathy. Cirrhosis - due to alcohol abuse. No alcohol since April 2022. Has been referred to  liver transplant center but patient states never made an appointment as insurance does not cover any of the cost of transplant or her antirejection meds. Also states no social support. lft near baseline. No HE.      PLAN    -Continue octreotide for 72 hours after EGD  -f/u labs today  - continue lactulose and xifaxan    Discussed with Dr. Jadon Reynoso PA-C  [facility]  This a very pleasant 50-year-old female who was seen today at the bedside with our certified physicians assistant    Patient underwent upper endoscopy yesterday was found to have 1 varices with a cherry red spot that we did put a band    No hemoglobin was repeated today    The patient is otherwise continuing to be jaundiced  And she is on her octreotide drip  I do believe though that this patient has a very serious liver issue she has previously been referred to [facility] transplant services but she states that she never made the appointment  Her young age and her meld score which is quite elevated  I am quite concerned that if she does not get a liver transplant she will not do well long-term    [employer] no

## 2022-11-12 LAB
A/G RATIO: 1.1 (ref 1.1–2.2)
ALBUMIN SERPL-MCNC: 2.9 G/DL (ref 3.4–5)
ALP BLD-CCNC: 82 U/L (ref 40–129)
ALT SERPL-CCNC: 30 U/L (ref 10–40)
AMMONIA: 31 UMOL/L (ref 11–51)
ANION GAP SERPL CALCULATED.3IONS-SCNC: 9 MMOL/L (ref 3–16)
AST SERPL-CCNC: 83 U/L (ref 15–37)
BILIRUB SERPL-MCNC: 15.5 MG/DL (ref 0–1)
BLOOD CULTURE, ROUTINE: NORMAL
BUN BLDV-MCNC: 29 MG/DL (ref 7–20)
CALCIUM SERPL-MCNC: 9.3 MG/DL (ref 8.3–10.6)
CHLORIDE BLD-SCNC: 101 MMOL/L (ref 99–110)
CO2: 26 MMOL/L (ref 21–32)
CREAT SERPL-MCNC: 0.6 MG/DL (ref 0.6–1.1)
CULTURE, BLOOD 2: NORMAL
GFR SERPL CREATININE-BSD FRML MDRD: >60 ML/MIN/{1.73_M2}
GLUCOSE BLD-MCNC: 150 MG/DL (ref 70–99)
HCT VFR BLD CALC: 20.5 % (ref 36–48)
HEMATOLOGY PATH CONSULT: NO
HEMOGLOBIN: 7.5 G/DL (ref 12–16)
INR BLD: 2.3 (ref 0.87–1.14)
MCH RBC QN AUTO: 34.6 PG (ref 26–34)
MCHC RBC AUTO-ENTMCNC: 36.5 G/DL (ref 31–36)
MCV RBC AUTO: 94.6 FL (ref 80–100)
PDW BLD-RTO: 22.8 % (ref 12.4–15.4)
PHOSPHORUS: 3.7 MG/DL (ref 2.5–4.9)
PLATELET # BLD: 74 K/UL (ref 135–450)
PLATELET SLIDE REVIEW: ABNORMAL
PMV BLD AUTO: 9.6 FL (ref 5–10.5)
POTASSIUM SERPL-SCNC: 4.9 MMOL/L (ref 3.5–5.1)
PROCALCITONIN: 0.7 NG/ML (ref 0–0.15)
PROTHROMBIN TIME: 25.3 SEC (ref 11.7–14.5)
RBC # BLD: 2.16 M/UL (ref 4–5.2)
SLIDE REVIEW: ABNORMAL
SODIUM BLD-SCNC: 136 MMOL/L (ref 136–145)
TOTAL PROTEIN: 5.6 G/DL (ref 6.4–8.2)
WBC # BLD: 4 K/UL (ref 4–11)

## 2022-11-12 PROCEDURE — 80053 COMPREHEN METABOLIC PANEL: CPT

## 2022-11-12 PROCEDURE — 6370000000 HC RX 637 (ALT 250 FOR IP): Performed by: STUDENT IN AN ORGANIZED HEALTH CARE EDUCATION/TRAINING PROGRAM

## 2022-11-12 PROCEDURE — 6360000002 HC RX W HCPCS: Performed by: INTERNAL MEDICINE

## 2022-11-12 PROCEDURE — 82140 ASSAY OF AMMONIA: CPT

## 2022-11-12 PROCEDURE — 84145 PROCALCITONIN (PCT): CPT

## 2022-11-12 PROCEDURE — 85610 PROTHROMBIN TIME: CPT

## 2022-11-12 PROCEDURE — 6360000002 HC RX W HCPCS: Performed by: STUDENT IN AN ORGANIZED HEALTH CARE EDUCATION/TRAINING PROGRAM

## 2022-11-12 PROCEDURE — 2580000003 HC RX 258: Performed by: INTERNAL MEDICINE

## 2022-11-12 PROCEDURE — 6370000000 HC RX 637 (ALT 250 FOR IP): Performed by: INTERNAL MEDICINE

## 2022-11-12 PROCEDURE — 2580000003 HC RX 258: Performed by: STUDENT IN AN ORGANIZED HEALTH CARE EDUCATION/TRAINING PROGRAM

## 2022-11-12 PROCEDURE — 84100 ASSAY OF PHOSPHORUS: CPT

## 2022-11-12 PROCEDURE — 36415 COLL VENOUS BLD VENIPUNCTURE: CPT

## 2022-11-12 PROCEDURE — 6370000000 HC RX 637 (ALT 250 FOR IP): Performed by: PHYSICIAN ASSISTANT

## 2022-11-12 PROCEDURE — 85027 COMPLETE CBC AUTOMATED: CPT

## 2022-11-12 PROCEDURE — 2580000003 HC RX 258: Performed by: ANESTHESIOLOGY

## 2022-11-12 PROCEDURE — 1200000000 HC SEMI PRIVATE

## 2022-11-12 RX ADMIN — SODIUM CHLORIDE: 9 INJECTION, SOLUTION INTRAVENOUS at 09:16

## 2022-11-12 RX ADMIN — DIPHENHYDRAMINE HYDROCHLORIDE 5 ML: 12.5 LIQUID ORAL at 16:06

## 2022-11-12 RX ADMIN — SODIUM CHLORIDE: 9 INJECTION, SOLUTION INTRAVENOUS at 18:07

## 2022-11-12 RX ADMIN — FOLIC ACID 1 MG: 1 TABLET ORAL at 09:19

## 2022-11-12 RX ADMIN — OXYCODONE 5 MG: 5 TABLET ORAL at 09:18

## 2022-11-12 RX ADMIN — SPIRONOLACTONE 100 MG: 25 TABLET ORAL at 09:19

## 2022-11-12 RX ADMIN — OCTREOTIDE ACETATE 50 MCG/HR: 500 INJECTION, SOLUTION INTRAVENOUS; SUBCUTANEOUS at 21:47

## 2022-11-12 RX ADMIN — RIFAXIMIN 550 MG: 550 TABLET ORAL at 09:19

## 2022-11-12 RX ADMIN — CEFTRIAXONE SODIUM 1000 MG: 1 INJECTION, POWDER, FOR SOLUTION INTRAMUSCULAR; INTRAVENOUS at 11:47

## 2022-11-12 RX ADMIN — MIDODRINE HYDROCHLORIDE 5 MG: 5 TABLET ORAL at 16:05

## 2022-11-12 RX ADMIN — OCTREOTIDE ACETATE 50 MCG/HR: 500 INJECTION, SOLUTION INTRAVENOUS; SUBCUTANEOUS at 00:13

## 2022-11-12 RX ADMIN — DIPHENHYDRAMINE HYDROCHLORIDE 5 ML: 12.5 LIQUID ORAL at 11:47

## 2022-11-12 RX ADMIN — Medication 10 ML: at 09:16

## 2022-11-12 RX ADMIN — OCTREOTIDE ACETATE 50 MCG/HR: 500 INJECTION, SOLUTION INTRAVENOUS; SUBCUTANEOUS at 11:45

## 2022-11-12 RX ADMIN — ZOLPIDEM TARTRATE 5 MG: 5 TABLET ORAL at 23:32

## 2022-11-12 RX ADMIN — MIDODRINE HYDROCHLORIDE 5 MG: 5 TABLET ORAL at 09:19

## 2022-11-12 RX ADMIN — FUROSEMIDE 20 MG: 20 TABLET ORAL at 09:19

## 2022-11-12 RX ADMIN — LACTULOSE 20 G: 20 SOLUTION ORAL at 09:15

## 2022-11-12 RX ADMIN — NADOLOL 40 MG: 20 TABLET ORAL at 21:12

## 2022-11-12 RX ADMIN — DIPHENHYDRAMINE HYDROCHLORIDE 5 ML: 12.5 LIQUID ORAL at 21:12

## 2022-11-12 RX ADMIN — LACTULOSE 20 G: 20 SOLUTION ORAL at 21:12

## 2022-11-12 RX ADMIN — RIFAXIMIN 550 MG: 550 TABLET ORAL at 21:12

## 2022-11-12 ASSESSMENT — PAIN DESCRIPTION - ORIENTATION: ORIENTATION: RIGHT;LEFT

## 2022-11-12 ASSESSMENT — PAIN SCALES - GENERAL
PAINLEVEL_OUTOF10: 9
PAINLEVEL_OUTOF10: 2

## 2022-11-12 ASSESSMENT — PAIN DESCRIPTION - LOCATION: LOCATION: HEAD;SHOULDER

## 2022-11-12 ASSESSMENT — PAIN DESCRIPTION - DESCRIPTORS: DESCRIPTORS: ACHING

## 2022-11-12 NOTE — FLOWSHEET NOTE
11/11/22 2100   Assessment   Charting Type Shift assessment   Psychosocial   Psychosocial (WDL) X   Patient Behaviors Flat affect   Neurological   Neuro (WDL) X   Level of Consciousness 0   Orientation Level Oriented to person;Oriented to place;Oriented to situation;Disoriented to time  (intermitent confusion)   Cognition Appropriate judgement; Appropriate safety awareness; Follows commands; Appropriate for developmental age; Appropriate attention/concentration   Speech Clear; Appropriate for developmental age   R Hand  Strong   L Hand  Strong   [place] Coma Scale   Eye Opening 4   Best Verbal Response 5   Best Motor Response 6   Mirela Coma Scale Score 15   HEENT (Head, Ears, Eyes, Nose, & Throat)   HEENT (WDL) X   Right Eye Glasses; Impaired vision   Left Eye Impaired vision;Glasses   Respiratory   Respiratory (WDL) WDL   Respiratory Pattern Regular   Respiratory Depth Normal   Respiratory Quality/Effort Unlabored   Chest Assessment Chest expansion symmetrical   L Breath Sounds Clear   R Breath Sounds Clear   Breath Sounds   Right Upper Lobe Clear   Right Middle Lobe Clear   Right Lower Lobe Clear   Left Upper Lobe Clear   Left Lower Lobe Clear   Cardiac   Cardiac (WDL) WDL   Cardiac Regularity Regular   Heart Sounds S1, S2   Gastrointestinal   Abdominal (WDL) X   RUQ Bowel Sounds Active   LUQ Bowel Sounds Active   RLQ Bowel Sounds Active   LLQ Bowel Sounds Active   Abdomen Inspection Soft;Rounded   Tenderness No guarding;Nontender   Genitourinary   Genitourinary (WDL) WDL   Flank Tenderness Right   Suprapubic Tenderness No   Dysuria (Pain/Burning w/Urination) No   Urine Assessment   Urine Color DAYANA   Urine Appearance DAYANA   Urine Odor DAYANA   Peripheral Vascular   Peripheral Vascular (WDL) WDL   Edema None   Skin Integumentary    Skin Integumentary (WDL) X   Skin Color Jaundice   Skin Condition/Temp Warm;Dry   Musculoskeletal   Musculoskeletal (WDL) X   RUE Weakness   LUE Weakness   RL Extremity Weakness   LL Extremity Weakness

## 2022-11-12 NOTE — PROGRESS NOTES
Shift assessment completed and charted. VSS. A/o x3 w/ intermittent confusion. Standard safety measures in place. Bed locked and in lowest position, call light within reach. SpO2 > 90% on RA. Family updated on plan of care - denied additional questions. Pt w/ chronic jaundice. Given PRN for pain in shoulders and head - pt satisfied with current pain level with no adverse side effects. Pt stable and denied needs when writer left room.

## 2022-11-12 NOTE — PROGRESS NOTES
Gastroenterology Progress Note      Sommer Willis is a 40 y.o. female patient. Principal Problem:    Acute on chronic anemia  Active Problems:    Elevated LFTs    Ascites due to alcoholic cirrhosis (HCC)    Sinus tachycardia    Severe anemia    Alcoholic cirrhosis (HCC)    UTI (urinary tract infection)    Moderate malnutrition (HCC)    Esophageal varices (HCC)  Resolved Problems:    * No resolved hospital problems. *      SUBJECTIVE: Is doing well there is no evidence of active bleeding  To do an EGD and banding  Has had no hemoglobin checked since the 10th    ROS:  No fever, chills  No chest pain, palpitations  No SOB, cough  Gastrointestinal ROS: no abdominal pain, nausea, vomiting     Physical    VITALS:  BP (!) 104/52   Pulse 68   Temp 98.3 °F (36.8 °C) (Oral)   Resp 16   Ht 5' 4\" (1.626 m)   Wt 117 lb 15.1 oz (53.5 kg)   SpO2 98%   BMI 20.25 kg/m²   TEMPERATURE:  Current - Temp: 98.3 °F (36.8 °C); Max - Temp  Av.2 °F (36.8 °C)  Min: 98 °F (36.7 °C)  Max: 98.4 °F (36.9 °C)    NAD  RRR, Nl s1s2  Lungs CTA Bilaterally, normal effort  Abdomen soft, ND, NT, no HSM, Bowel sounds normal.    Data    Data Review:    Recent Labs     22  1643 11/10/22  0437   WBC  --  5.0   HGB 8.6* 8.1*   HCT 24.8* 22.5*   MCV  --  94.3   PLT  --  61*     Recent Labs     11/10/22  0437 11/12/22  0727   * 136   K 4.9 4.9    101   CO2 26 26   PHOS 3.3 3.7   BUN 33* 29*   CREATININE 0.7 0.6     Recent Labs     11/10/22  0437 11/12/22  0727   AST 59* 83*   ALT 26 30   BILIDIR 8.0*  --    BILITOT 21.1* 15.5*   ALKPHOS 84 82     No results for input(s): LIPASE, AMYLASE in the last 72 hours. Recent Labs     11/10/22  0437 11/10/22  1146 22  0727   PROTIME 28.1* 26.9* 25.3*   INR 2.62* 2.47* 2.30*     No results for input(s): PTT in the last 72 hours.     Radiology Review:        ASSESSMENT jaundice due to severe alcoholic liver disease  Anemia possibly due to esophageal varices post EGD and band  We need to check a CBC today    Dorene Valladares MD

## 2022-11-12 NOTE — PLAN OF CARE
Problem: Discharge Planning  Goal: Discharge to home or other facility with appropriate resources  Outcome: Progressing     Problem: Pain  Goal: Verbalizes/displays adequate comfort level or baseline comfort level  Outcome: Progressing     Problem: ABCDS Injury Assessment  Goal: Absence of physical injury  Outcome: Progressing     Problem: Skin/Tissue Integrity - Adult  Goal: Skin integrity remains intact  Outcome: Progressing     Problem: Hematologic - Adult  Goal: Maintains hematologic stability  Outcome: Progressing     Problem: Musculoskeletal - Adult  Goal: Return mobility to safest level of function  Outcome: Progressing     Problem: Skin/Tissue Integrity  Goal: Absence of new skin breakdown  Description: 1. Monitor for areas of redness and/or skin breakdown  2. Assess vascular access sites hourly  3. Every 4-6 hours minimum:  Change oxygen saturation probe site  4. Every 4-6 hours:  If on nasal continuous positive airway pressure, respiratory therapy assess nares and determine need for appliance change or resting period.   Outcome: Progressing

## 2022-11-12 NOTE — PROGRESS NOTES
Hospitalist Progress Note    Name:  Mary Prather    /Age/Sex: 1975  (40 y.o. female)  MRN & CSN:  [id number] & [id number]    PCP: Lucho Waddell MD    Date of Admission: 2022    Patient Status:  Inpatient     Chief Complaint: Generalized fatigue    Hospital Course:   Mary Prather is 40 y.o. female with history of alcoholic cirrhosis of the liver and chronically jaundiced. She is currently staying at [facility]. She was most recently hospitalized here 10/21-10/29 for severe anemia, hepatic encephalopathy and complicated UTI. In the ED her hemoglobin was 5.6. 2U PRBC given. Subjective: Today is:  Hospital Day: 5. Patient seen and examined in /. She reports fatigue. She experiences lightheadedness sometimes with standing, walking. She denies shortness of breath, chest pain,palpitations. She denies abdominal pain, dysuria. She has diarrhea with lactulose. Her stools are nonbloody. Patient also denies history of hematemesis, hematuria, melena, rectal bleeding.       Medications:      Infusion Medications    sodium chloride 100 mL/hr at 22 0916    octreotide (SandoSTATIN) infusion 50 mcg/hr (22 1145)    sodium chloride      sodium chloride       Scheduled Medications    magic (miracle) mouthwash with nystatin  5 mL Swish & Spit 4x daily    midodrine  5 mg Oral TID    sodium chloride flush  5-40 mL IntraVENous 2 times per day    [Held by provider] enoxaparin  40 mg SubCUTAneous Daily    nadolol  40 mg Oral Nightly    furosemide  20 mg Oral Daily    spironolactone  100 mg Oral Daily    folic acid  1 mg Oral Daily    lactulose  20 g Oral BID    rifAXIMin  550 mg Oral BID    pantoprazole  40 mg Oral QAM AC    cefTRIAXone (ROCEPHIN) IV  1,000 mg IntraVENous Q24H     PRN Meds: sodium chloride flush, sodium chloride, ondansetron **OR** ondansetron, polyethylene glycol, oxyCODONE, zolpidem, sodium chloride      Intake/Output Summary (Last 24 hours) at 11/12/2022 1305  Last data filed at 11/12/2022 0914  Gross per 24 hour   Intake 1843.95 ml   Output --   Net 1843.95 ml         Physical Exam Performed:    /63   Pulse 61   Temp 98.8 °F (37.1 °C) (Oral)   Resp 16   Ht 5' 4\" (1.626 m)   Wt 117 lb 15.1 oz (53.5 kg)   SpO2 96%   BMI 20.25 kg/m²     GEN chronically ill appearing, jaundiced, in no distress  HEENT normocephalic, icteric sclera, EOMI, mucosa moist, no stridor  NECK supple, trachea midline  RESP on RA, in no distress, clear to auscultation  CARDS RRR, S1, S2, no murmurs, no edema, radial pulse 2+, DP pulse  ABD some fluids, +BS, soft nontender  MSK no cyanosis, no clubbing  SKIN jaundiced, warm, dry  NEURO alert, oriented x 3, no facial asymmetry, no dysarthria, moving spontaneously; no asterixis  PSYCH normal mood    Labs:   Recent Labs     11/09/22  1643 11/10/22  0437   WBC  --  5.0   HGB 8.6* 8.1*   HCT 24.8* 22.5*   PLT  --  61*       Recent Labs     11/10/22  0437 11/12/22  0727   * 136   K 4.9 4.9    101   CO2 26 26   BUN 33* 29*   CREATININE 0.7 0.6   CALCIUM 9.3 9.3   PHOS 3.3 3.7       Recent Labs     11/10/22  0437 11/12/22  0727   AST 59* 83*   ALT 26 30   BILIDIR 8.0*  --    BILITOT 21.1* 15.5*   ALKPHOS 84 82       Recent Labs     11/10/22  0437 11/10/22  1146 11/12/22  0727   INR 2.62* 2.47* 2.30*       No results for input(s): CKTOTAL, TROPONINI in the last 72 hours. Urinalysis:      Lab Results   Component Value Date/Time    NITRU POSITIVE 11/09/2022 01:40 AM    WBCUA 18 11/09/2022 01:40 AM    BACTERIA 4+ 11/09/2022 01:40 AM    RBCUA 2 11/09/2022 01:40 AM    BLOODU TRACE 11/09/2022 01:40 AM    SPECGRAV 1.015 11/09/2022 01:40 AM    GLUCOSEU Negative 11/09/2022 01:40 AM       Radiology:  CT ABDOMEN PELVIS WO CONTRAST Additional Contrast? None   Final Result   1. Very heterogenous appearance of the liver with sequela of portal   hypertension, varices, splenomegaly, and ascites.   The amount of ascites and general edema of the intra-fat are increased from the recent exam.  The   actual volume of ascites is still small. 2.  Cholelithiasis with thickened wall of the gallbladder. However   thickening could relate to the general edematous state and surrounding   ascites. Can consider gallbladder ultrasound if symptomatic. 3.  No bowel obstruction or pneumoperitoneum. There is no hydronephrosis   with a stable calculus at the right kidney. 4.  Mild compression deformity upper endplate of T8 without a significant   loss of vertebral body height. Features suggest this is chronic. XR CHEST PORTABLE   Final Result   Mild cardiomegaly      Hypoinflation with mild bibasilar atelectasis or early infiltrates which is   more apparent. Assessment/Plan:    Active Hospital Problems    Diagnosis     Acute on chronic anemia [D64.9]      Priority: Medium    Moderate malnutrition ([address] [E44.0]      Priority: Medium    UTI (urinary tract infection) [N39.0]      Priority: Medium    Alcoholic cirrhosis ([address] [D25.21]      Priority: Medium    Severe anemia [D64.9]      Priority: Medium    Elevated LFTs [R79.89]      Priority: Medium    Ascites due to alcoholic cirrhosis ([address] [O32.77]      Priority: Medium    Sinus tachycardia [R00.0]      Priority: Medium    Esophageal varices ([address] [I85.00]          Hospital Day: 5    This is a 40 y.o. female with alcohol liver cirrhosis, who presented to [facility] on 11/8/2022 for evaluation of acute anemia. Decompensated Alcoholic cirrhosis  Esophageal Varices  Portal Hypertensive Gastropathy  History of Hepatic Encephalopathy  Ascites  MELD   - Continue lasix 20 mg daily, aldactone 100 mg daily. - Continue rifaximin 550 mg BID, lactulose 20 g BID (titrate to 3-5 Bms daily). - Continue nadolol 40 mg daily. - Continue midodrine 5 mg TID. Acute on Chronic Normocytic Anemia  - Hemoglobin 5.6 on admission.   Recent baseline HgB 7.8-8.1  - She denies history of hematemesis, melena, and rectal bleeding.  - Received 2 units PRBC 11/8/22.  - CT A/P wo contrast showed splenomegaly.  - EGD 11/10/22 showed 1 esophageal varix with a cherry red spot that was banded. Portal hypertensive gastropathy. - iron 190, ferritin 2293 on 10/13/22. Reticulocyte 3.39 on 10/13/22.  - B12 >2000, folate >20 on 6/15/22.  - TSH 1.65 on 5/26/21.  - Vitamin D25OH 8.5 on 4/26/22.  - Transfuse if hemoglobin < 7.    UTI  Nephrolithiasis  - CT A/P wo contrast showed splenomegaly. Stable calculus at the right kidney. No hydronephrosis. - UA positive. UC 11/9 grew enterobacter cloacae resistant to unasyn and cefuroxime, sensitive to ceftriaxone.  - Continue ceftriaxone daily (11/9-). Treat for 5-7 days in the setting of history of cirrhosis. Protein Calorie Malnutrition  - supplements TID between meals. Vitamin D Deficiency  - start vitamin D3 5000 units daily. Cholelithiasis  - CT A/P wo contrast showed cholelithiasis with thickened wall of the gallbladder. However thickening could relate to the general edematous state and surrounding ascites. Can consider gallbladder ultrasound if symptomatic. DVT ppx: Lovenox  GI ppx: Diet/Tube Feeds  Diet: ADULT DIET; Easy to Chew  Code Status: Full Code    Disposition:  Back to nursing facility after hemoglobin stabilization and when medically able.       PT/OT Eval Status: Shahla Jenkins MD  11/12/2022  1:05 PM

## 2022-11-13 LAB
A/G RATIO: 1 (ref 1.1–2.2)
ACANTHOCYTES: ABNORMAL
ALBUMIN SERPL-MCNC: 3 G/DL (ref 3.4–5)
ALP BLD-CCNC: 100 U/L (ref 40–129)
ALT SERPL-CCNC: 29 U/L (ref 10–40)
AMMONIA: 43 UMOL/L (ref 11–51)
ANION GAP SERPL CALCULATED.3IONS-SCNC: 11 MMOL/L (ref 3–16)
ANISOCYTOSIS: ABNORMAL
AST SERPL-CCNC: 79 U/L (ref 15–37)
BANDED NEUTROPHILS RELATIVE PERCENT: 1 % (ref 0–7)
BASOPHILS ABSOLUTE: 0 K/UL (ref 0–0.2)
BASOPHILS RELATIVE PERCENT: 1 %
BILIRUB SERPL-MCNC: 15.4 MG/DL (ref 0–1)
BUN BLDV-MCNC: 20 MG/DL (ref 7–20)
BURR CELLS: ABNORMAL
CALCIUM SERPL-MCNC: 9.1 MG/DL (ref 8.3–10.6)
CHLORIDE BLD-SCNC: 101 MMOL/L (ref 99–110)
CO2: 24 MMOL/L (ref 21–32)
CREAT SERPL-MCNC: <0.5 MG/DL (ref 0.6–1.1)
EOSINOPHILS ABSOLUTE: 0.1 K/UL (ref 0–0.6)
EOSINOPHILS RELATIVE PERCENT: 3 %
GFR SERPL CREATININE-BSD FRML MDRD: >60 ML/MIN/{1.73_M2}
GLUCOSE BLD-MCNC: 176 MG/DL (ref 70–99)
HCT VFR BLD CALC: 21 % (ref 36–48)
HEMATOLOGY PATH CONSULT: NO
HEMOGLOBIN: 7.5 G/DL (ref 12–16)
INR BLD: 2.49 (ref 0.87–1.14)
LYMPHOCYTES ABSOLUTE: 0.3 K/UL (ref 1–5.1)
LYMPHOCYTES RELATIVE PERCENT: 9 %
MACROCYTES: ABNORMAL
MCH RBC QN AUTO: 34.8 PG (ref 26–34)
MCHC RBC AUTO-ENTMCNC: 35.9 G/DL (ref 31–36)
MCV RBC AUTO: 96.9 FL (ref 80–100)
MICROCYTES: ABNORMAL
MONOCYTES ABSOLUTE: 0.4 K/UL (ref 0–1.3)
MONOCYTES RELATIVE PERCENT: 11 %
NEUTROPHILS ABSOLUTE: 2.7 K/UL (ref 1.7–7.7)
NEUTROPHILS RELATIVE PERCENT: 75 %
OVALOCYTES: ABNORMAL
PDW BLD-RTO: 22.6 % (ref 12.4–15.4)
PHOSPHORUS: 3.1 MG/DL (ref 2.5–4.9)
PLATELET # BLD: 70 K/UL (ref 135–450)
PLATELET SLIDE REVIEW: ABNORMAL
PMV BLD AUTO: 9.2 FL (ref 5–10.5)
POIKILOCYTES: ABNORMAL
POLYCHROMASIA: ABNORMAL
POTASSIUM SERPL-SCNC: 4.3 MMOL/L (ref 3.5–5.1)
PROCALCITONIN: 0.41 NG/ML (ref 0–0.15)
PROTHROMBIN TIME: 27 SEC (ref 11.7–14.5)
RBC # BLD: 2.17 M/UL (ref 4–5.2)
SCHISTOCYTES: ABNORMAL
SLIDE REVIEW: ABNORMAL
SODIUM BLD-SCNC: 136 MMOL/L (ref 136–145)
TARGET CELLS: ABNORMAL
TEAR DROP CELLS: ABNORMAL
TOTAL PROTEIN: 5.9 G/DL (ref 6.4–8.2)
WBC # BLD: 3.6 K/UL (ref 4–11)

## 2022-11-13 PROCEDURE — 84145 PROCALCITONIN (PCT): CPT

## 2022-11-13 PROCEDURE — 84100 ASSAY OF PHOSPHORUS: CPT

## 2022-11-13 PROCEDURE — 84134 ASSAY OF PREALBUMIN: CPT

## 2022-11-13 PROCEDURE — 82140 ASSAY OF AMMONIA: CPT

## 2022-11-13 PROCEDURE — 6370000000 HC RX 637 (ALT 250 FOR IP): Performed by: INTERNAL MEDICINE

## 2022-11-13 PROCEDURE — 85610 PROTHROMBIN TIME: CPT

## 2022-11-13 PROCEDURE — 6360000002 HC RX W HCPCS: Performed by: STUDENT IN AN ORGANIZED HEALTH CARE EDUCATION/TRAINING PROGRAM

## 2022-11-13 PROCEDURE — 2580000003 HC RX 258: Performed by: INTERNAL MEDICINE

## 2022-11-13 PROCEDURE — 80053 COMPREHEN METABOLIC PANEL: CPT

## 2022-11-13 PROCEDURE — 6360000002 HC RX W HCPCS: Performed by: INTERNAL MEDICINE

## 2022-11-13 PROCEDURE — 6370000000 HC RX 637 (ALT 250 FOR IP): Performed by: PHYSICIAN ASSISTANT

## 2022-11-13 PROCEDURE — 36415 COLL VENOUS BLD VENIPUNCTURE: CPT

## 2022-11-13 PROCEDURE — 6370000000 HC RX 637 (ALT 250 FOR IP): Performed by: STUDENT IN AN ORGANIZED HEALTH CARE EDUCATION/TRAINING PROGRAM

## 2022-11-13 PROCEDURE — 2580000003 HC RX 258: Performed by: STUDENT IN AN ORGANIZED HEALTH CARE EDUCATION/TRAINING PROGRAM

## 2022-11-13 PROCEDURE — 97535 SELF CARE MNGMENT TRAINING: CPT

## 2022-11-13 PROCEDURE — 1200000000 HC SEMI PRIVATE

## 2022-11-13 PROCEDURE — 82306 VITAMIN D 25 HYDROXY: CPT

## 2022-11-13 PROCEDURE — 2580000003 HC RX 258: Performed by: ANESTHESIOLOGY

## 2022-11-13 PROCEDURE — 85025 COMPLETE CBC W/AUTO DIFF WBC: CPT

## 2022-11-13 RX ADMIN — FUROSEMIDE 20 MG: 20 TABLET ORAL at 07:50

## 2022-11-13 RX ADMIN — FOLIC ACID 1 MG: 1 TABLET ORAL at 07:53

## 2022-11-13 RX ADMIN — OCTREOTIDE ACETATE 50 MCG/HR: 500 INJECTION, SOLUTION INTRAVENOUS; SUBCUTANEOUS at 07:58

## 2022-11-13 RX ADMIN — OXYCODONE 5 MG: 5 TABLET ORAL at 20:04

## 2022-11-13 RX ADMIN — DIPHENHYDRAMINE HYDROCHLORIDE 5 ML: 12.5 LIQUID ORAL at 17:05

## 2022-11-13 RX ADMIN — OXYCODONE 5 MG: 5 TABLET ORAL at 08:04

## 2022-11-13 RX ADMIN — CEFTRIAXONE SODIUM 1000 MG: 1 INJECTION, POWDER, FOR SOLUTION INTRAMUSCULAR; INTRAVENOUS at 10:59

## 2022-11-13 RX ADMIN — DIPHENHYDRAMINE HYDROCHLORIDE 5 ML: 12.5 LIQUID ORAL at 11:00

## 2022-11-13 RX ADMIN — MIDODRINE HYDROCHLORIDE 5 MG: 5 TABLET ORAL at 07:49

## 2022-11-13 RX ADMIN — SODIUM CHLORIDE: 9 INJECTION, SOLUTION INTRAVENOUS at 03:28

## 2022-11-13 RX ADMIN — OXYCODONE 5 MG: 5 TABLET ORAL at 13:49

## 2022-11-13 RX ADMIN — LACTULOSE 20 G: 20 SOLUTION ORAL at 07:54

## 2022-11-13 RX ADMIN — OCTREOTIDE ACETATE 50 MCG/HR: 500 INJECTION, SOLUTION INTRAVENOUS; SUBCUTANEOUS at 19:19

## 2022-11-13 RX ADMIN — DIPHENHYDRAMINE HYDROCHLORIDE 5 ML: 12.5 LIQUID ORAL at 07:53

## 2022-11-13 RX ADMIN — RIFAXIMIN 550 MG: 550 TABLET ORAL at 07:51

## 2022-11-13 RX ADMIN — DIPHENHYDRAMINE HYDROCHLORIDE 5 ML: 12.5 LIQUID ORAL at 20:04

## 2022-11-13 RX ADMIN — MIDODRINE HYDROCHLORIDE 5 MG: 5 TABLET ORAL at 10:56

## 2022-11-13 RX ADMIN — FOLIC ACID 1 MG: 1 TABLET ORAL at 07:50

## 2022-11-13 RX ADMIN — SPIRONOLACTONE 100 MG: 25 TABLET ORAL at 07:49

## 2022-11-13 RX ADMIN — LACTULOSE 20 G: 20 SOLUTION ORAL at 20:04

## 2022-11-13 RX ADMIN — RIFAXIMIN 550 MG: 550 TABLET ORAL at 20:04

## 2022-11-13 RX ADMIN — LACTULOSE 20 G: 20 SOLUTION ORAL at 07:49

## 2022-11-13 RX ADMIN — MIDODRINE HYDROCHLORIDE 5 MG: 5 TABLET ORAL at 17:04

## 2022-11-13 RX ADMIN — Medication 10 ML: at 07:53

## 2022-11-13 RX ADMIN — Medication 10 ML: at 07:52

## 2022-11-13 RX ADMIN — PANTOPRAZOLE SODIUM 40 MG: 40 TABLET, DELAYED RELEASE ORAL at 05:18

## 2022-11-13 ASSESSMENT — PAIN SCALES - GENERAL
PAINLEVEL_OUTOF10: 0
PAINLEVEL_OUTOF10: 9
PAINLEVEL_OUTOF10: 8
PAINLEVEL_OUTOF10: 9

## 2022-11-13 ASSESSMENT — PAIN DESCRIPTION - DESCRIPTORS
DESCRIPTORS: ACHING;DISCOMFORT
DESCRIPTORS: ACHING;DISCOMFORT

## 2022-11-13 ASSESSMENT — PAIN DESCRIPTION - LOCATION
LOCATION: BACK;SHOULDER;HEAD
LOCATION: SHOULDER;BACK

## 2022-11-13 ASSESSMENT — PAIN DESCRIPTION - ORIENTATION
ORIENTATION: RIGHT;LEFT;MID
ORIENTATION: RIGHT;LEFT

## 2022-11-13 NOTE — PROGRESS NOTES
Shift assessment completed and charted. VSS. A/o x3 w/ intermittent confusion. Standard safety measures in place. Bed locked and in lowest position, call light within reach. SpO2 > 90% on RA. Family updated on plan of care and are at bedside - denied additional questions. Pt w/ chronic jaundice. Given PRN x2 for pain in shoulders and head - pt satisfied with current pain level with no adverse side effects. H&H stable, sandostatin gtt continued. Pt stable and denied needs when writer left room.

## 2022-11-13 NOTE — PROGRESS NOTES
Assessment complete and charted. Pt denies needs overnight beyond sleep aid- medicated with PRN ambien. Pt remains borderline hypotensive. Rx messaged to request third midodrine dose be retimed to match ordered parameters. No signs of active bleeding, sandostatin gtt continued.  Call light within reach, will continue to monitor,

## 2022-11-13 NOTE — PLAN OF CARE
Problem: Pain  Goal: Verbalizes/displays adequate comfort level or baseline comfort level  Outcome: Progressing     Problem: Safety - Adult  Goal: Free from fall injury  Outcome: Progressing     Problem: ABCDS Injury Assessment  Goal: Absence of physical injury  Outcome: Progressing     Problem: Skin/Tissue Integrity - Adult  Goal: Skin integrity remains intact  Outcome: Progressing

## 2022-11-13 NOTE — PROGRESS NOTES
Gastroenterology Progress Note      Micki Roth is a 40 y.o. female patient. Principal Problem:    Acute on chronic anemia  Active Problems:    Elevated LFTs    Ascites due to alcoholic cirrhosis (HCC)    Sinus tachycardia    Severe anemia    Alcoholic cirrhosis (HCC)    UTI (urinary tract infection)    Moderate malnutrition (HCC)    Esophageal varices (HCC)  Resolved Problems:    * No resolved hospital problems. *      SUBJECTIVE: Has no new complaints today    ROS:  No fever, chills  No chest pain, palpitations  No SOB, cough  Gastrointestinal ROS: no abdominal pain, nausea, vomiting     Physical    VITALS:  /68   Pulse 64   Temp 98.7 °F (37.1 °C) (Oral)   Resp 17   Ht 5' 4\" (1.626 m)   Wt 117 lb 15.1 oz (53.5 kg)   SpO2 92%   BMI 20.25 kg/m²   TEMPERATURE:  Current - Temp: 98.7 °F (37.1 °C); Max - Temp  Av.6 °F (37 °C)  Min: 98.3 °F (36.8 °C)  Max: 98.8 °F (37.1 °C)    NAD  RRR, Nl s1s2  Lungs CTA Bilaterally, normal effort  Abdomen soft, ND, NT, no HSM, Bowel sounds normal.    Data    Data Review:    Recent Labs     22  1229   WBC 4.0   HGB 7.5*   HCT 20.5*   MCV 94.6   PLT 74*     Recent Labs     22  0727      K 4.9      CO2 26   PHOS 3.7   BUN 29*   CREATININE 0.6     Recent Labs     22  0727   AST 83*   ALT 30   BILITOT 15.5*   ALKPHOS 82     No results for input(s): LIPASE, AMYLASE in the last 72 hours. Recent Labs     11/10/22  1146 22  0727   PROTIME 26.9* 25.3*   INR 2.47* 2.30*     No results for input(s): PTT in the last 72 hours.     Radiology Review:        ASSESSMENT alcoholic liver disease  -I spoke with the patient today about transplant  -She has told me that she is declined transplant  -Continued aggressive supportive care  -Continue to transfuse liberally  Ozell Divers

## 2022-11-13 NOTE — PROGRESS NOTES
[facility]   Phone: [phone]    Occupational Therapy    [] Initial Evaluation            [x] Daily Treatment Note         [] Discharge Summary      Patient: Ramonita Alejandro   : 1975   MRN: [id number]   Date of Service:  2022    Admitting Diagnosis:  Acute on chronic anemia  Current Admission Summary: 40 y.o. female with history of alcoholic cirrhosis of the liver and chronically jaundiced  She lives she is currently staying at [address] [place] [address]  She was most recently hospitalized here 10/21-10/29 for severe anemia, hepatic encephalopathy and complicated UTI  She now presents to the ER with complaint of generalized weakness for the past 2 to 3 days  She went to participate with physical therapy today but she was profoundly fatigued she realized that something was wrong  She is now subsequently, she was sent from [address] [place] [address] where she is staying with abnormal labs  Here in the ED her hemoglobin was 5.6 therefore 2 units of PRBC was ordered  On interview, patient appears chronically ill but she is not encephalopathy and able to provide history  She complains of headache but has no other complaints or pain elsewhere on the body  Past Medical History:  has a past medical history of Alcoholic liver disease ([address] Anemia, and History of blood transfusion. Past Surgical History:  has a past surgical history that includes Upper gastrointestinal endoscopy (N/A, 2021); Upper gastrointestinal endoscopy (N/A, 03/10/2021); Tubal ligation; Colonoscopy (N/A, 3/11/2021); Upper gastrointestinal endoscopy (N/A, 2021); Upper gastrointestinal endoscopy (N/A, 2022); Knee Arthroplasty; Upper gastrointestinal endoscopy (N/A, 2022); Upper gastrointestinal endoscopy (N/A, 2022); CT BIOPSY BONE MARROW (2022); Upper gastrointestinal endoscopy (N/A, 10/26/2022); and Upper gastrointestinal endoscopy (N/A, 11/10/2022).     Discharge Recommendations: Rian Veloz scored a 19/24 on the AM-PAC ADL Inpatient form. Current research shows that an AM-PAC score of 17 or less is typically not associated with a discharge to the patient's home setting. Based on the patient's AM-PAC score and their current ADL deficits, it is recommended that the patient have 3-5 sessions per week of Occupational Therapy at d/c to increase the patient's independence. Please see assessment section for further patient specific details. If patient discharges prior to next session this note will serve as a discharge summary. Please see below for the latest assessment towards goals. DME Required For Discharge: DME to be determined at next level of care    Precautions/Restrictions: high fall risk, up as tolerated  Weight Bearing Restrictions: no restrictions  [] Right Upper Extremity  [] Left Upper Extremity [] Right Lower Extremity  [] Left Lower Extremity     Required Braces/Orthotics: no braces required   [] Right  [] Left  Positional Restrictions:no positional restrictions    Pre-Admission Information   Lives With: alone                     Type of Home:  duplex  Home Layout: one level  Home Access: level entry  Bathroom Layout: tub/shower unit  Bathroom Equipment:  none  Toilet Height: standard height  Home Equipment: no prior equipment  Transfer Assistance: Independent without use of device  Ambulation Assistance:Independent without use of device  ADL Assistance: independent with all ADL's  IADL Assistance: independent with homemaking tasks  Active :        [] Yes                 [x] No  Hand Dominance: [] Left                 [x] Right  Current Employment: unemployed, lost job in April, due to medical issues  Hobbies: spending time with daughters  Recent Falls: found down from last admission         Subjective  General: Patient supine in bed upon arrival, agreeable to OT session. Patient appears fatigued, jaundice, flat affect throughout.  RN arrived mid-session, daughter arrived at [address] [place] [address]. Pain: 7/10. Location: low back, ribs, head  Pain Interventions: pain medication in place prior to arrival and repositioned         Activities of Daily Living  Basic Activities of Daily Living  Grooming: setup assistance  Grooming Comments: cleaned eyeglasses tailor sitting in bed  Upper Extremity Bathing: minimal assistance  Bathing Comments: Sponge bath seated EOB, assist for posterior, slow pacing  Upper Extremity Dressing: minimal assistance  Lower Extremity Dressing: setup assistance  Dressing Comments: Chana for gown, Setup to lachelle socks using figure-four technique  General Comments: Patient required increased time for all ADL completion d/t slow pacing. Patient reporting fatigue following pain medication administered prior to therapist arrival. Patient declined all further offered ADLs this date. Instrumental Activities of Daily Living  No IADL completed on this date. Functional Mobility  Bed Mobility  Supine to Sit: stand by assistance  Scooting: stand by assistance  Comments: Slow, guarded pacing  Transfers  Sit to stand transfer:stand by assistance  Stand to sit transfer: stand by assistance  Stand step transfer: contact guard assistance  Comments: No device    Functional Mobility:  Sitting Balance: stand by assistance. Sitting Balance Comment: Seated EOB for ADL completion  Standing Balance: contact guard assistance. Functional Mobility: .  contact guard assistance  Functional Mobility Device Use: no device  Functional Mobility Comment: in room ambulation, ~25 feet total. Patient declined further functional mobility offered d/t increased fatigue and generalized weakness. Patient educated on importance of OOB activity.      Other Therapeutic Interventions    Functional Outcomes  -PAC Inpatient Daily Activity Raw Score: 19    Cognition  Overall Cognitive Status: Impaired  Arousal/Alterness: delayed responses to stimuli  Memory: decreased recall of recent events  Safety Judgement: decreased awareness of need for assistance  Problem Solving: decreased awareness of errors  Initiation: does not require cues  Sequencing: does not require cues  Orientation:    alert and oriented x 4  Command Following:   [facility]  Barriers To Learning: emotional and physical  Patient Education: patient educated on goals, OT role and benefits, plan of care, discharge recommendations  Learning Assessment:  patient verbalizes understanding, would benefit from continued reinforcement, patient will require reinforcement due to cognitive deficits    Assessment  Activity Tolerance: Fair (limited by fatigue)  Impairments Requiring Therapeutic Intervention: decreased functional mobility, decreased ADL status, decreased cognition, decreased endurance, decreased balance, decreased posture  Prognosis: fair  Clinical Assessment: Patient present with the above deficits, which are below baseline, and would benefit from continued skilled OT to address  Safety Interventions: patient left in chair, chair alarm in place, call light within reach, gait belt, nurse notified, and family/caregiver present    Plan  Frequency: 3-5 x/per week  Current Treatment Recommendations: strengthening, balance training, functional mobility training, transfer training, endurance training, patient/caregiver education, ADL/self-care training, IADL training, and safety education    Goals  Patient Goals:  To return home   Short Term Goals:  Time Frame: Upon discharge  Patient will complete lower body ADL at modified independent   Patient will complete toileting at modified independent   Patient will complete grooming at supervision   Patient will complete functional transfers at supervision   Patient will complete functional mobility at supervision   Patient will increase functional standing balance to 5 minutes supervision for improved ADL completion    Continue Goals 11/13  Therapy Session Time     Individual Group Co-treatment   Time In 1037     Time Out 1109     Minutes 32          Timed Code Treatment Minutes:   32 Minutes  Total Treatment Minutes:  32 Minutes       Electronically Signed By: TYLER [id number]

## 2022-11-13 NOTE — PLAN OF CARE
Problem: Discharge Planning  Goal: Discharge to home or other facility with appropriate resources  Outcome: Progressing     Problem: Pain  Goal: Verbalizes/displays adequate comfort level or baseline comfort level  11/13/2022 0801 by Ventura Gray RN  Outcome: Progressing  11/13/2022 0511 by tSacy Edgar RN  Outcome: Progressing     Problem: Safety - Adult  Goal: Free from fall injury  11/13/2022 0801 by Ventura Gray RN  Outcome: Progressing  11/13/2022 0511 by Stacy Edgar RN  Outcome: Progressing     Problem: ABCDS Injury Assessment  Goal: Absence of physical injury  11/13/2022 0801 by Ventura Gray RN  Outcome: Progressing  11/13/2022 0511 by Stacy Edgar RN  Outcome: Progressing     Problem: Skin/Tissue Integrity - Adult  Goal: Skin integrity remains intact  11/13/2022 0801 by Ventura Gray RN  Outcome: Progressing  11/13/2022 0511 by Stacy Edgar RN  Outcome: Progressing       Problem: Skin/Tissue Integrity  Goal: Absence of new skin breakdown  Description: 1. Monitor for areas of redness and/or skin breakdown  2. Assess vascular access sites hourly  3. Every 4-6 hours minimum:  Change oxygen saturation probe site  4. Every 4-6 hours:  If on nasal continuous positive airway pressure, respiratory therapy assess nares and determine need for appliance change or resting period.   Outcome: Progressing

## 2022-11-14 LAB
A/G RATIO: 1.2 (ref 1.1–2.2)
ACANTHOCYTES: ABNORMAL
ALBUMIN SERPL-MCNC: 3 G/DL (ref 3.4–5)
ALP BLD-CCNC: 90 U/L (ref 40–129)
ALT SERPL-CCNC: 30 U/L (ref 10–40)
AMMONIA: 42 UMOL/L (ref 11–51)
ANION GAP SERPL CALCULATED.3IONS-SCNC: 7 MMOL/L (ref 3–16)
ANISOCYTOSIS: ABNORMAL
AST SERPL-CCNC: 72 U/L (ref 15–37)
BASOPHILS ABSOLUTE: 0 K/UL (ref 0–0.2)
BASOPHILS RELATIVE PERCENT: 0 %
BILIRUB SERPL-MCNC: 16.3 MG/DL (ref 0–1)
BUN BLDV-MCNC: 16 MG/DL (ref 7–20)
BURR CELLS: ABNORMAL
CALCIUM SERPL-MCNC: 9.3 MG/DL (ref 8.3–10.6)
CHLORIDE BLD-SCNC: 102 MMOL/L (ref 99–110)
CO2: 29 MMOL/L (ref 21–32)
CREAT SERPL-MCNC: <0.5 MG/DL (ref 0.6–1.1)
EOSINOPHILS ABSOLUTE: 0.1 K/UL (ref 0–0.6)
EOSINOPHILS RELATIVE PERCENT: 3 %
GFR SERPL CREATININE-BSD FRML MDRD: >60 ML/MIN/{1.73_M2}
GLUCOSE BLD-MCNC: 136 MG/DL (ref 70–99)
HCT VFR BLD CALC: 21 % (ref 36–48)
HEMATOLOGY PATH CONSULT: NO
HEMOGLOBIN: 7.4 G/DL (ref 12–16)
HYPOCHROMIA: ABNORMAL
INR BLD: 2.49 (ref 0.87–1.14)
LYMPHOCYTES ABSOLUTE: 0.5 K/UL (ref 1–5.1)
LYMPHOCYTES RELATIVE PERCENT: 15 %
MACROCYTES: ABNORMAL
MAGNESIUM: 0.9 MG/DL (ref 1.8–2.4)
MCH RBC QN AUTO: 34.8 PG (ref 26–34)
MCHC RBC AUTO-ENTMCNC: 35.3 G/DL (ref 31–36)
MCV RBC AUTO: 98.5 FL (ref 80–100)
METAMYELOCYTES RELATIVE PERCENT: 2 %
MICROCYTES: ABNORMAL
MONOCYTES ABSOLUTE: 0.1 K/UL (ref 0–1.3)
MONOCYTES RELATIVE PERCENT: 3 %
NEUTROPHILS ABSOLUTE: 2.8 K/UL (ref 1.7–7.7)
NEUTROPHILS RELATIVE PERCENT: 77 %
OVALOCYTES: ABNORMAL
PDW BLD-RTO: 22.4 % (ref 12.4–15.4)
PHOSPHORUS: 3 MG/DL (ref 2.5–4.9)
PLATELET # BLD: 76 K/UL (ref 135–450)
PLATELET SLIDE REVIEW: ABNORMAL
PMV BLD AUTO: 9.4 FL (ref 5–10.5)
POLYCHROMASIA: ABNORMAL
POTASSIUM SERPL-SCNC: 3.8 MMOL/L (ref 3.5–5.1)
PREALBUMIN: 10.7 MG/DL (ref 20–40)
PROCALCITONIN: 0.37 NG/ML (ref 0–0.15)
PROTHROMBIN TIME: 27 SEC (ref 11.7–14.5)
RBC # BLD: 2.14 M/UL (ref 4–5.2)
SCHISTOCYTES: ABNORMAL
SLIDE REVIEW: ABNORMAL
SODIUM BLD-SCNC: 138 MMOL/L (ref 136–145)
TOTAL PROTEIN: 5.5 G/DL (ref 6.4–8.2)
VITAMIN D 25-HYDROXY: 14.6 NG/ML
WBC # BLD: 3.6 K/UL (ref 4–11)

## 2022-11-14 PROCEDURE — 97116 GAIT TRAINING THERAPY: CPT

## 2022-11-14 PROCEDURE — 82140 ASSAY OF AMMONIA: CPT

## 2022-11-14 PROCEDURE — 6360000002 HC RX W HCPCS: Performed by: INTERNAL MEDICINE

## 2022-11-14 PROCEDURE — 6370000000 HC RX 637 (ALT 250 FOR IP): Performed by: INTERNAL MEDICINE

## 2022-11-14 PROCEDURE — 6370000000 HC RX 637 (ALT 250 FOR IP): Performed by: PHYSICIAN ASSISTANT

## 2022-11-14 PROCEDURE — 2580000003 HC RX 258: Performed by: INTERNAL MEDICINE

## 2022-11-14 PROCEDURE — 85610 PROTHROMBIN TIME: CPT

## 2022-11-14 PROCEDURE — 94760 N-INVAS EAR/PLS OXIMETRY 1: CPT

## 2022-11-14 PROCEDURE — 84145 PROCALCITONIN (PCT): CPT

## 2022-11-14 PROCEDURE — 6360000002 HC RX W HCPCS: Performed by: STUDENT IN AN ORGANIZED HEALTH CARE EDUCATION/TRAINING PROGRAM

## 2022-11-14 PROCEDURE — 83735 ASSAY OF MAGNESIUM: CPT

## 2022-11-14 PROCEDURE — 36415 COLL VENOUS BLD VENIPUNCTURE: CPT

## 2022-11-14 PROCEDURE — 6370000000 HC RX 637 (ALT 250 FOR IP): Performed by: STUDENT IN AN ORGANIZED HEALTH CARE EDUCATION/TRAINING PROGRAM

## 2022-11-14 PROCEDURE — 84100 ASSAY OF PHOSPHORUS: CPT

## 2022-11-14 PROCEDURE — 80053 COMPREHEN METABOLIC PANEL: CPT

## 2022-11-14 PROCEDURE — 85025 COMPLETE CBC W/AUTO DIFF WBC: CPT

## 2022-11-14 PROCEDURE — 2580000003 HC RX 258: Performed by: STUDENT IN AN ORGANIZED HEALTH CARE EDUCATION/TRAINING PROGRAM

## 2022-11-14 PROCEDURE — 1200000000 HC SEMI PRIVATE

## 2022-11-14 PROCEDURE — 2580000003 HC RX 258: Performed by: ANESTHESIOLOGY

## 2022-11-14 PROCEDURE — 97530 THERAPEUTIC ACTIVITIES: CPT

## 2022-11-14 RX ORDER — NADOLOL 20 MG/1
20 TABLET ORAL NIGHTLY
Status: DISCONTINUED | OUTPATIENT
Start: 2022-11-14 | End: 2022-11-18 | Stop reason: HOSPADM

## 2022-11-14 RX ORDER — VITAMIN B COMPLEX
5000 TABLET ORAL DAILY
Status: DISCONTINUED | OUTPATIENT
Start: 2022-11-14 | End: 2022-11-18 | Stop reason: HOSPADM

## 2022-11-14 RX ORDER — MAGNESIUM SULFATE IN WATER 40 MG/ML
4000 INJECTION, SOLUTION INTRAVENOUS ONCE
Status: COMPLETED | OUTPATIENT
Start: 2022-11-14 | End: 2022-11-14

## 2022-11-14 RX ADMIN — LACTULOSE 20 G: 20 SOLUTION ORAL at 20:52

## 2022-11-14 RX ADMIN — SODIUM CHLORIDE: 9 INJECTION, SOLUTION INTRAVENOUS at 03:11

## 2022-11-14 RX ADMIN — FOLIC ACID 1 MG: 1 TABLET ORAL at 09:16

## 2022-11-14 RX ADMIN — CEFTRIAXONE SODIUM 1000 MG: 1 INJECTION, POWDER, FOR SOLUTION INTRAMUSCULAR; INTRAVENOUS at 13:47

## 2022-11-14 RX ADMIN — OCTREOTIDE ACETATE 50 MCG/HR: 500 INJECTION, SOLUTION INTRAVENOUS; SUBCUTANEOUS at 05:23

## 2022-11-14 RX ADMIN — ZOLPIDEM TARTRATE 5 MG: 5 TABLET ORAL at 22:43

## 2022-11-14 RX ADMIN — DIPHENHYDRAMINE HYDROCHLORIDE 5 ML: 12.5 LIQUID ORAL at 23:01

## 2022-11-14 RX ADMIN — DIPHENHYDRAMINE HYDROCHLORIDE 5 ML: 12.5 LIQUID ORAL at 12:39

## 2022-11-14 RX ADMIN — Medication 10 ML: at 23:02

## 2022-11-14 RX ADMIN — OXYCODONE 5 MG: 5 TABLET ORAL at 18:15

## 2022-11-14 RX ADMIN — NADOLOL 20 MG: 20 TABLET ORAL at 20:52

## 2022-11-14 RX ADMIN — RIFAXIMIN 550 MG: 550 TABLET ORAL at 20:52

## 2022-11-14 RX ADMIN — MAGNESIUM SULFATE HEPTAHYDRATE 4000 MG: 40 INJECTION, SOLUTION INTRAVENOUS at 09:31

## 2022-11-14 RX ADMIN — Medication 10 ML: at 09:21

## 2022-11-14 RX ADMIN — MIDODRINE HYDROCHLORIDE 5 MG: 5 TABLET ORAL at 12:38

## 2022-11-14 RX ADMIN — DIPHENHYDRAMINE HYDROCHLORIDE 5 ML: 12.5 LIQUID ORAL at 17:37

## 2022-11-14 RX ADMIN — MIDODRINE HYDROCHLORIDE 5 MG: 5 TABLET ORAL at 09:33

## 2022-11-14 RX ADMIN — DIPHENHYDRAMINE HYDROCHLORIDE 5 ML: 12.5 LIQUID ORAL at 09:19

## 2022-11-14 RX ADMIN — OXYCODONE 5 MG: 5 TABLET ORAL at 09:24

## 2022-11-14 RX ADMIN — MIDODRINE HYDROCHLORIDE 5 MG: 5 TABLET ORAL at 16:56

## 2022-11-14 RX ADMIN — PANTOPRAZOLE SODIUM 40 MG: 40 TABLET, DELAYED RELEASE ORAL at 05:29

## 2022-11-14 RX ADMIN — RIFAXIMIN 550 MG: 550 TABLET ORAL at 09:26

## 2022-11-14 RX ADMIN — FUROSEMIDE 20 MG: 20 TABLET ORAL at 09:17

## 2022-11-14 RX ADMIN — Medication 5000 UNITS: at 09:17

## 2022-11-14 ASSESSMENT — PAIN SCALES - GENERAL
PAINLEVEL_OUTOF10: 9
PAINLEVEL_OUTOF10: 9
PAINLEVEL_OUTOF10: 8
PAINLEVEL_OUTOF10: 8

## 2022-11-14 ASSESSMENT — PAIN DESCRIPTION - LOCATION
LOCATION: HEAD
LOCATION: GENERALIZED

## 2022-11-14 NOTE — PROGRESS NOTES
[facility] PROGRESS NOTE    11/14/2022 11:57 AM        Name: Luis Meraz .               Admitted: 11/8/2022  Primary Care Provider: Ely Grier MD (Tel: [phone])        Subjective:    Lying in bed having body aches no nausea vomiting no fevers or chills    Reviewed interval ancillary notes    Current Medications  nadolol (CORGARD) tablet 20 mg, Nightly  Vitamin D (CHOLECALCIFEROL) tablet 5,000 Units, Daily  magnesium sulfate 4000 mg in 100 mL IVPB premix, Once  magic (miracle) mouthwash with nystatin, 4x daily  midodrine (PROAMATINE) tablet 5 mg, TID  0.9 % sodium chloride infusion, Continuous  octreotide (SANDOSTATIN) 500 mcg in sodium chloride 0.9 % 100 mL infusion, Continuous  sodium chloride flush 0.9 % injection 5-40 mL, 2 times per day  sodium chloride flush 0.9 % injection 5-40 mL, PRN  0.9 % sodium chloride infusion, PRN  [Held by provider] enoxaparin (LOVENOX) injection 40 mg, Daily  ondansetron (ZOFRAN-ODT) disintegrating tablet 4 mg, Q8H PRN   Or  ondansetron (ZOFRAN) injection 4 mg, Q6H PRN  polyethylene glycol (GLYCOLAX) packet 17 g, Daily PRN  oxyCODONE (ROXICODONE) immediate release tablet 5 mg, Q6H PRN  furosemide (LASIX) tablet 20 mg, Daily  spironolactone (ALDACTONE) tablet 257 mg, Daily  folic acid (FOLVITE) tablet 1 mg, Daily  zolpidem (AMBIEN) tablet 5 mg, Nightly PRN  lactulose (CHRONULAC) 10 GM/15ML solution 20 g, BID  rifAXIMin (XIFAXAN) tablet 550 mg, BID  pantoprazole (PROTONIX) tablet 40 mg, QAM AC  cefTRIAXone (ROCEPHIN) 1,000 mg in dextrose 5 % 50 mL IVPB mini-bag, Q24H  0.9 % sodium chloride infusion, PRN        Objective:  BP (!) 102/57   Pulse 57   Temp 98.3 °F (36.8 °C) (Oral)   Resp 16   Ht 5' 4\" (1.626 m)   Wt 117 lb 15.1 oz (53.5 kg)   SpO2 93%   BMI 20.25 kg/m²     Intake/Output Summary (Last 24 hours) at 11/14/2022 1157  Last data filed at 11/14/2022 0310  Gross per 24 hour Intake 3099.92 ml   Output --   Net 3099.92 ml      Wt Readings from Last 3 Encounters:   11/10/22 117 lb 15.1 oz (53.5 kg)   10/29/22 107 lb 4.8 oz (48.7 kg)   10/17/22 105 lb (47.6 kg)       General appearance:  Appears comfortable. AAOx3  HEENT: atraumatic, Pupils equal, muscous membranes moist, no masses appreciated  Cardiovascular: Regular rate and rhythm no murmurs appreciated  Respiratory: CTAB no wheezing  Gastrointestinal: Abdomen soft, non-tender, BS+  EXT: no edema  Neurology: no gross focal deficts  Psychiatry: Appropriate affect. Not agitated  Skin: Warm, dry, no rashes appreciated    Labs and Tests:  CBC:   Recent Labs     11/12/22  1229 11/13/22  1019 11/14/22  0508   WBC 4.0 3.6* 3.6*   HGB 7.5* 7.5* 7.4*   PLT 74* 70* 76*     BMP:    Recent Labs     11/12/22  0727 11/13/22  1019 11/14/22  0508    136 138   K 4.9 4.3 3.8    101 102   CO2 26 24 29   BUN 29* 20 16   CREATININE 0.6 <0.5* <0.5*   GLUCOSE 150* 176* 136*     Hepatic:   Recent Labs     11/12/22  0727 11/13/22  1019 11/14/22  0508   AST 83* 79* 72*   ALT 30 29 30   BILITOT 15.5* 15.4* 16.3*   ALKPHOS 82 100 90     CT ABDOMEN PELVIS WO CONTRAST Additional Contrast? None   Final Result   1. Very heterogenous appearance of the liver with sequela of portal   hypertension, varices, splenomegaly, and ascites. The amount of ascites and   general edema of the intra-fat are increased from the recent exam.  The   actual volume of ascites is still small. 2.  Cholelithiasis with thickened wall of the gallbladder. However   thickening could relate to the general edematous state and surrounding   ascites. Can consider gallbladder ultrasound if symptomatic. 3.  No bowel obstruction or pneumoperitoneum. There is no hydronephrosis   with a stable calculus at the right kidney. 4.  Mild compression deformity upper endplate of T8 without a significant   loss of vertebral body height. Features suggest this is chronic. XR CHEST PORTABLE   Final Result   Mild cardiomegaly      Hypoinflation with mild bibasilar atelectasis or early infiltrates which is   more apparent. Recent imaging reviewed    Problem List  Principal Problem:    Acute on chronic anemia  Active Problems:    Elevated LFTs    Ascites due to alcoholic cirrhosis (HCC)    Sinus tachycardia    Severe anemia    Alcoholic cirrhosis (HCC)    UTI (urinary tract infection)    Moderate malnutrition (HCC)    Esophageal varices (HCC)  Resolved Problems:    * No resolved hospital problems. *       Assessment & Plan:   Acute on chronic anemia secondary to blood loss anemia secondary to acute gi bleed with esophageal varix, s/p egd with banding  - s/p 2 units  -  monitor transfuse to keep? 7    Decompensated Alcoholic cirrhosis  - continue meds    UTI:   - iv atbx    Hypomagnesemia: give 4 grams iv      Diet: ADULT DIET; Easy to Chew  ADULT ORAL NUTRITION SUPPLEMENT; AM Snack, PM Snack, HS Snack; Standard 4 oz Oral Supplement  Code:Full Code        Natalie Lema MD   11/14/2022 11:57 AM

## 2022-11-14 NOTE — PROGRESS NOTES
Gastroenterology Progress Note    Tereza Diana is a 40 y.o. female patient. Principal Problem:    Acute on chronic anemia  Active Problems:    Elevated LFTs    Ascites due to alcoholic cirrhosis (HCC)    Sinus tachycardia    Severe anemia    Alcoholic cirrhosis (HCC)    UTI (urinary tract infection)    Moderate malnutrition (HCC)    Esophageal varices (HCC)  Resolved Problems:    * No resolved hospital problems. *      SUBJECTIVE: No black or bloody stools. ROS:  No fever, chills  No chest pain, palpitations  No SOB, cough  Gastrointestinal ROS: see above    Physical    VITALS:  BP (!) 102/57   Pulse 57   Temp 98.3 °F (36.8 °C) (Oral)   Resp 16   Ht 5' 4\" (1.626 m)   Wt 117 lb 15.1 oz (53.5 kg)   SpO2 93%   BMI 20.25 kg/m²   TEMPERATURE:  Current - Temp: 98.3 °F (36.8 °C); Max - Temp  Av.3 °F (36.8 °C)  Min: 98.1 °F (36.7 °C)  Max: 98.6 °F (37 °C)    NAD  Regular rate   Lungs CTA Bilaterally  Abdomen soft, ND, NT,  Bowel sounds normal.  Skin jaundiced    Data    Data Review:    Recent Labs     22  1229 22  1019 22  0508   WBC 4.0 3.6* 3.6*   HGB 7.5* 7.5* 7.4*   HCT 20.5* 21.0* 21.0*   MCV 94.6 96.9 98.5   PLT 74* 70* 76*     Recent Labs     22  0722  1019 22  0508    136 138   K 4.9 4.3 3.8    101 102   CO2 26 24 29   PHOS 3.7 3.1 3.0   BUN 29* 20 16   CREATININE 0.6 <0.5* <0.5*     Recent Labs     22  0722  1019 22  0508   AST 83* 79* 72*   ALT 30 29 30   BILITOT 15.5* 15.4* 16.3*   ALKPHOS 82 100 90     No results for input(s): LIPASE, AMYLASE in the last 72 hours. Recent Labs     22  0722  1019 22  0508   PROTIME 25.3* 27.0* 27.0*   INR 2.30* 2.49* 2.49*     No results for input(s): PTT in the last 72 hours. 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 Did have esophageal varix with cherry red spot on EGD 11/10 which was banded. Esophageal varix -EGD 11/10 with 1 esophageal varix with cherry red spot which was banded and portal hypertensive gastropathy. Cirrhosis - due to alcohol abuse. No alcohol since April 2022. Has been referred to  liver transplant center but patient states never made an appointment as insurance does not cover any of the cost of transplant or her antirejection meds. Also states no social support. lft near baseline. No HE. PLAN    - continue lactulose and xifaxan  -Follow-up with either Dr. Alayna Mckenna or  for repeat EGD in 2 to 3 weeks given recent banding. Will sign off. Please call if questions. Discussed with Dr. Liz Martinez PA-C  [facility]    I have personally performed a face to face diagnostic evaluation on this patient. I have interviewed and examined the patient and I agree with the findings and recommended plan of care. In summary, my findings and plan are the following: no bleeding s/p banding varix, mentating baseline, abd soft, skin jaundiced, no asterixis, hbg stable, agree f/u dr Royal Navas for serial banding evaluation, otherwise will sign off, call if needed.        Everardo Hightower MD  [address] [facility] [address]

## 2022-11-14 NOTE — PROGRESS NOTES
Hospitalist Progress Note    Name:  Marilynn Ortez    /Age/Sex: 1975  (40 y.o. female)  MRN & CSN:  [id number] & [id number]    PCP: Gaby Martinez MD    Date of Admission: 2022    Patient Status:  Inpatient     Chief Complaint: Generalized fatigue    Hospital Course:   Marilynn Ortez is 40 y.o. female with history of alcoholic cirrhosis of the liver and chronically jaundiced. She is currently staying at [address] [place] [address]. She was most recently hospitalized here 10/21-10/29 for severe anemia, hepatic encephalopathy and complicated UTI. In the ED her hemoglobin was 5.6. 2U PRBC given. Subjective: Today is:  Hospital Day: 7. Patient seen and examined in [facility]-[id number]. She feels improved. She continues to feel some fatigue and generalized weakness. She reports intermittent lightheadedness with standing, walking. She denies shortness of breath, chest pain. She reports nausea with taking lactulose. She denies abdominal pain, dysuria. She has diarrhea with lactulose. She denies history of hematemesis and rectal bleeding.       Medications:  Reviewed    Infusion Medications    sodium chloride 100 mL/hr at 22    octreotide (SandoSTATIN) infusion 50 mcg/hr (22)    sodium chloride      sodium chloride       Scheduled Medications    magic (miracle) mouthwash with nystatin  5 mL Swish & Spit 4x daily    midodrine  5 mg Oral TID    sodium chloride flush  5-40 mL IntraVENous 2 times per day    [Held by provider] enoxaparin  40 mg SubCUTAneous Daily    nadolol  40 mg Oral Nightly    furosemide  20 mg Oral Daily    spironolactone  100 mg Oral Daily    folic acid  1 mg Oral Daily    lactulose  20 g Oral BID    rifAXIMin  550 mg Oral BID    pantoprazole  40 mg Oral QAM AC    cefTRIAXone (ROCEPHIN) IV  1,000 mg IntraVENous Q24H     PRN Meds: sodium chloride flush, sodium chloride, ondansetron **OR** ondansetron, polyethylene glycol, oxyCODONE, zolpidem, sodium chloride      Intake/Output Summary (Last 24 hours) at 11/14/2022 0204  Last data filed at 11/13/2022 2002  Gross per 24 hour   Intake 3767.1 ml   Output --   Net 3767.1 ml         Physical Exam Performed:    BP (!) 106/54   Pulse 59   Temp 98.6 °F (37 °C) (Oral)   Resp 16   Ht 5' 4\" (1.626 m)   Wt 117 lb 15.1 oz (53.5 kg)   SpO2 93%   BMI 20.25 kg/m²       GEN chronically ill appearing, jaundiced, in no distress  HEENT normocephalic, icteric sclera, EOMI, mucosa moist, no stridor  NECK supple, trachea midline  RESP on RA, in no distress, clear to auscultation  CARDS RRR, S1, S2, no murmurs, no edema, radial pulse 2+, DP pulse  ABD some fluids, +BS, soft nontender  MSK no cyanosis, no clubbing  SKIN jaundiced, warm, dry  NEURO alert, oriented x 3, no facial asymmetry, no dysarthria, moving spontaneously; no asterixis  PSYCH normal mood      Labs:   Recent Labs     11/12/22  1229 11/13/22  1019   WBC 4.0 3.6*   HGB 7.5* 7.5*   HCT 20.5* 21.0*   PLT 74* 70*       Recent Labs     11/12/22  0727 11/13/22  1019    136   K 4.9 4.3    101   CO2 26 24   BUN 29* 20   CREATININE 0.6 <0.5*   CALCIUM 9.3 9.1   PHOS 3.7 3.1       Recent Labs     11/12/22  0727 11/13/22  1019   AST 83* 79*   ALT 30 29   BILITOT 15.5* 15.4*   ALKPHOS 82 100       Recent Labs     11/12/22  0727 11/13/22  1019   INR 2.30* 2.49*       No results for input(s): CKTOTAL, TROPONINI in the last 72 hours. Urinalysis:      Lab Results   Component Value Date/Time    NITRU POSITIVE 11/09/2022 01:40 AM    WBCUA 18 11/09/2022 01:40 AM    BACTERIA 4+ 11/09/2022 01:40 AM    RBCUA 2 11/09/2022 01:40 AM    BLOODU TRACE 11/09/2022 01:40 AM    SPECGRAV 1.015 11/09/2022 01:40 AM    GLUCOSEU Negative 11/09/2022 01:40 AM       Radiology:  CT ABDOMEN PELVIS WO CONTRAST Additional Contrast? None   Final Result   1. Very heterogenous appearance of the liver with sequela of portal   hypertension, varices, splenomegaly, and ascites.   The amount of ascites and   general edema of the intra-fat are increased from the recent exam.  The   actual volume of ascites is still small. 2.  Cholelithiasis with thickened wall of the gallbladder. However   thickening could relate to the general edematous state and surrounding   ascites. Can consider gallbladder ultrasound if symptomatic. 3.  No bowel obstruction or pneumoperitoneum. There is no hydronephrosis   with a stable calculus at the right kidney. 4.  Mild compression deformity upper endplate of T8 without a significant   loss of vertebral body height. Features suggest this is chronic. XR CHEST PORTABLE   Final Result   Mild cardiomegaly      Hypoinflation with mild bibasilar atelectasis or early infiltrates which is   more apparent. Assessment/Plan:    Active Hospital Problems    Diagnosis     Acute on chronic anemia [D64.9]      Priority: Medium    Moderate malnutrition ([address] [E44.0]      Priority: Medium    UTI (urinary tract infection) [N39.0]      Priority: Medium    Alcoholic cirrhosis ([address] [H56.43]      Priority: Medium    Severe anemia [D64.9]      Priority: Medium    Elevated LFTs [R79.89]      Priority: Medium    Ascites due to alcoholic cirrhosis ([address] [F54.91]      Priority: Medium    Sinus tachycardia [R00.0]      Priority: Medium    Esophageal varices ([address] [I85.00]          Hospital Day: 7    This is a 40 y.o. female with alcohol liver cirrhosis, who presented [facility] for evaluation of acute anemia. Decompensated Alcoholic cirrhosis  Esophageal Varices  Portal Hypertensive Gastropathy  History of Hepatic Encephalopathy  Ascites  MELD   - CT A/P wo contrast showed very heterogeneous appearance of the liver with sequelae of portal hypertension, varices, splenomegaly, and ascites.   The amount of ascites and general edema of the intra fat are increased from the recent exam.  Ascites is still small.   - Continue lasix 20 mg daily, aldactone 100 mg daily. - Continue rifaximin 550 mg BID, lactulose 20 g BID (titrate to 3-5 Bms daily). - Continue nadolol 40 mg daily. Decrease nadolol to 20 mg daily (11/14-) due to lightheadedness.  - Continue midodrine 5 mg TID. Acute on Chronic Normocytic Anemia  - Hemoglobin 5.6 on admission. Recent baseline HgB 7.8-8.1  - She denies history of hematemesis, melena, and rectal bleeding.  - Received 2 units PRBC 11/8/22.  - CT A/P wo contrast showed splenomegaly.  - EGD 11/10/22 showed 1 esophageal varix with a cherry red spot that was banded. Portal hypertensive gastropathy. - iron 190, ferritin 2293 on 10/13/22. Reticulocyte 3.39 on 10/13/22.  - B12 >2000, folate >20 on 6/15/22.  - TSH 1.65 on 5/26/21.  - Vitamin D25OH 8.5 on 4/26/22.  - Transfuse if hemoglobin < 7.    UTI  Nephrolithiasis  - CT A/P wo contrast showed splenomegaly. Stable calculus at the right kidney. No hydronephrosis. - UA positive. UC 11/9 grew enterobacter cloacae resistant to unasyn and cefuroxime, sensitive to ceftriaxone.  - Continue ceftriaxone daily (11/9-). Treat for 5-7 days in the setting of history of cirrhosis. Protein Calorie Malnutrition  - supplements TID between meals. Vitamin D Deficiency  - start vitamin D3 5000 units daily. Cholelithiasis  - CT A/P wo contrast showed cholelithiasis with thickened wall of the gallbladder. However thickening could relate to the general edematous state and surrounding ascites. Can consider gallbladder ultrasound if symptomatic.         DVT ppx: Lovenox  GI ppx: Diet/Tube Feeds  Diet: ADULT DIET; Easy to Chew  Code Status: Full Code    Disposition:  SNF    PT/OT Eval Status: Artist Gaucher, MD  11/13/2022

## 2022-11-14 NOTE — PROGRESS NOTES
Assessment complete and charted. Pt denies needs overnight. PM nadolol held d/t bradycardia in 50s, asymptomatic. Pt noted to be slightly bradycardic and hypotensive this AM but remains asymptomatic without active signs of bleeding. Pt continues to deny symptoms of UTI and remains afebrile. Call light within reach, will continue to monitor.

## 2022-11-14 NOTE — ACP (ADVANCE CARE PLANNING)
Advanced Care Planning Note. Purpose of Encounter: Advanced care planning in light of hospitalization  Parties In Attendance: Patient,    Decisional Capacity: Yes  Subjective: Patient  understand that this conversation is to address long term care goal  Objective: Admitted to hospital with acute GI bleed secondary to esophageal bleed  Goals of Care Determination: Patient would pursue CPR and Intubation if required.  Unsure about long term ventilation/tracheostomy, would want family to make the decision at the time if required  Code Status: full code  Time spent on Advanced care Plannin minutes  Advanced Care Planning Documents: documented patient's wishes, would like Daughter Griffin Lovett to make medical decisions if unable to make decisions    Ananias Lombard, MD  2022 12:00 PM

## 2022-11-14 NOTE — PROGRESS NOTES
PM hospitalist notified of 90/43 BP with minor dizziness. AM blood work was sent, results pending. Awaiting call back. NS infusing at 100 mL/hr in addition to sandostatin.

## 2022-11-14 NOTE — PROGRESS NOTES
[facility]   Phone: [phone]    Physical Therapy    [] Initial Evaluation            [x] Daily Treatment Note         [] Discharge Summary      Patient: Sarai Henry   : 1975   MRN: [id number]   Date of Service:  2022  Admitting Diagnosis: Acute on chronic anemia  Current Admission Summary: per ED : 40 y.o. female with history of alcoholic cirrhosis of the liver and chronically jaundiced; She lives she is currently staying at [address] [place] [address]  She was most recently hospitalized here 10/21-10/29 for severe anemia, hepatic encephalopathy and complicated UTI. She now presents to the ER with complaint of generalized weakness for the past 2 to 3 days    Updated : acute GI bleed with esophageal varix s/p EGD with banding 11/10; acute on chronic anemia, decompensated alcoholic cirrhosis, chronically jaundice    Past Medical History:  has a past medical history of Alcoholic liver disease ([address] Anemia, and History of blood transfusion. Past Surgical History:  has a past surgical history that includes Upper gastrointestinal endoscopy (N/A, 2021); Upper gastrointestinal endoscopy (N/A, 03/10/2021); Tubal ligation; Colonoscopy (N/A, 3/11/2021); Upper gastrointestinal endoscopy (N/A, 2021); Upper gastrointestinal endoscopy (N/A, 2022); Knee Arthroplasty; Upper gastrointestinal endoscopy (N/A, 2022); Upper gastrointestinal endoscopy (N/A, 2022); CT BIOPSY BONE MARROW (2022); Upper gastrointestinal endoscopy (N/A, 10/26/2022); and Upper gastrointestinal endoscopy (N/A, 11/10/2022). Discharge Recommendations: Sarai Henry scored a 19/24 on the AM-PAC short mobility form. Current research shows that an AM-PAC score of 17 or less is typically not associated with a discharge to the patient's home setting.  Based on the patient's AM-PAC score and their current functional mobility deficits, it is recommended that the patient have 3-5 sessions per week of Physical Therapy at d/c to increase the patient's independence. Please see assessment section for further patient specific details. While pt AMPAC >17, pt is unable to manage living IND at home due to chronically ill status and decreased endurance. If patient discharges prior to next session this note will serve as a discharge summary. Please see below for the latest assessment towards goals. DME Required For Discharge: DME to be determined at next level of care  Precautions/Restrictions: high fall risk, up as tolerated, adult diet - easy to chew  Weight Bearing Restrictions: no restrictions    Required Braces/Orthotics: no braces required    Positional Restrictions:no positional restrictions    Pre-Admission Information per Initial Evaluation  Lives With: alone                     Type of Home:  duplex  Home Layout: one level  Home Access: level entry  Bathroom Layout: tub/shower unit  Bathroom Equipment:  none  Toilet Height: standard height  Home Equipment: no prior equipment  Transfer Assistance: Independent without use of device  Ambulation Assistance:Independent without use of device  ADL Assistance: independent with all ADL's  IADL Assistance: independent with homemaking tasks  Active :        [] Yes                 [x] No  Hand Dominance: [] Left                 [x] Right  Current Employment: unemployed, lost job in April, due to medical issues  Hobbies: spending time with daughters  Recent Falls: found down from last admission      Subjective  General: Patient supine in bed upon arrival - agreeable to PT. Noted brief to be saturated in urine, assisted patient to bathroom for hygiene and to don a clean dry brief.   Pain: does not rate, abdominal pain  Pain Interventions: patient able to make needs known, RN aware     Functional Mobility  Bed Mobility  Supine to Sit: (S) with elevated head of bed  Sit to Supine: (S) with elevated head of bed   Scooting: modified (I) seated at edge of bed     Transfers  Sit to stand transfer: SBA  Stand to sit transfer: SBA  Stand pivot transfer: SBA  Toilet transfer: SBA  Comments: increased time to allow for processing and self sequencing during mobility, including LE clothing management; no assistive device used for standing mobility this date  Ambulation  Surface:level surface  Assistive Device: no device  Assistance: SBA  Distance: 15ft x 2, 35ft   Gait Mechanics: wide Pancho, increased medial-lateral sway, decreased adrian, mild path deviation noted, decreased (B) step length, foot clearance, and heel strike  Comments: no loss of balance, no c/o lightheaded or dizziness this session    Stair Mobility  Stair mobility not completed on this date.     Balance  Static Sitting Balance: good: independent with functional balance in unsupported position  Dynamic Sitting Balance: good: independent with functional balance in unsupported position  Static Standing Balance: fair (+): maintains balance at SBA/supervision without use of UE support  Dynamic Standing Balance: fair: maintains balance at CGA without use of UE support  Comments: SBA to stand at sink for hand hygiene without an assistive device      Functional Outcomes  -PAC Inpatient Mobility Raw Score : 19              Cognition  Overall Cognitive Status: Impaired  Arousal/Alterness: appropriate responses to stimuli  Following Commands: follows one step commands consistently  Memory: decreased recall of recent events  Safety Judgement: decreased awareness of need for assistance  Problem Solving: decreased awareness of errors  Initiation: requires cues for some, does not require cues  Sequencing: requires cues for some, does not require cues  Orientation:    alert and oriented x 4  Command Following:   [facility]    Education  Barriers To Learning: emotional, decreased recall of recent events, decreased insight to current medical status and physical limitations  Patient Education: patient educated on goals, PT role and benefits, plan of care, general safety, functional mobility training, transfer training, PT recommendations, use of call light  Learning Assessment:  patient verbalizes understanding, would benefit from continued reinforcement    Assessment  Activity Tolerance: limited by low endurance and generalized weakness    Impairments Requiring Therapeutic Intervention: decreased functional mobility, decreased ADL status, decreased strength, decreased safety awareness, decreased cognition, decreased endurance, decreased balance    Prognosis: good    Clinical Assessment:  Patient demonstrates impaired functional mobility, requiring SBA for all gait and standing mobility trials this date without an assistive device. Patient was (I) prior to initial admission, now with several admissions and hospitalizations with complicated medical status. Patient limited by low endurance, decreased strength, decreased balance, and decreased insight/safety awareness. Patient does not have 24 hour (A) at home. Patient will continue to benefit from additional skilled PT intervention to facilitate safe mobility and to optimize (I) to promote return to prior level of function.     Safety Interventions: patient left in bed, bed alarm in place, call light within reach, gait belt, and nurse notified    Plan  Frequency: 3-5 x/per week  Current Treatment Recommendations: strengthening, balance training, functional mobility training, transfer training, gait training, endurance training, neuromuscular re-education, modalities, patient/caregiver education, home exercise program, safety education, equipment evaluation/education, and positioning    Goals  Patient Goals: \"I don't know - I know I can't go home\"     Short Term Goals:  Time Frame: upon d/c - all goals ongoing 11/14    Short term goal 1: Pt will perform bed mobility MOD I   Short term goal 2: Pt will perform transfers with LRAD MOD I   Short term goal 3: Pt will ambulate 48' with LRAD and SBA    Therapy Session Time      Individual Group Co-treatment   Time In 1320       Time Out 1344       Minutes 24           Timed Code Treatment Minutes: 24 minutes    Total Treatment Minutes: 24 Minutes    If patient discharges prior to next treatment, this note will serve as discharge summary.     Buddy Zarate PT, DPT #[id number]

## 2022-11-15 LAB
A/G RATIO: 1.3 (ref 1.1–2.2)
ABO/RH: NORMAL
ALBUMIN SERPL-MCNC: 2.8 G/DL (ref 3.4–5)
ALP BLD-CCNC: 88 U/L (ref 40–129)
ALT SERPL-CCNC: 27 U/L (ref 10–40)
ANION GAP SERPL CALCULATED.3IONS-SCNC: 8 MMOL/L (ref 3–16)
ANTIBODY SCREEN: NORMAL
AST SERPL-CCNC: 68 U/L (ref 15–37)
BASOPHILS ABSOLUTE: 0.1 K/UL (ref 0–0.2)
BASOPHILS RELATIVE PERCENT: 2.1 %
BILIRUB SERPL-MCNC: 14.5 MG/DL (ref 0–1)
BLOOD BANK DISPENSE STATUS: NORMAL
BLOOD BANK PRODUCT CODE: NORMAL
BPU ID: NORMAL
BUN BLDV-MCNC: 14 MG/DL (ref 7–20)
CALCIUM SERPL-MCNC: 9.2 MG/DL (ref 8.3–10.6)
CHLORIDE BLD-SCNC: 100 MMOL/L (ref 99–110)
CO2: 28 MMOL/L (ref 21–32)
CREAT SERPL-MCNC: <0.5 MG/DL (ref 0.6–1.1)
DESCRIPTION BLOOD BANK: NORMAL
EOSINOPHILS ABSOLUTE: 0.1 K/UL (ref 0–0.6)
EOSINOPHILS RELATIVE PERCENT: 1.8 %
GFR SERPL CREATININE-BSD FRML MDRD: >60 ML/MIN/{1.73_M2}
GLUCOSE BLD-MCNC: 136 MG/DL (ref 70–99)
HCT VFR BLD CALC: 18 % (ref 36–48)
HCT VFR BLD CALC: 22.7 % (ref 36–48)
HEMOGLOBIN: 6.6 G/DL (ref 12–16)
HEMOGLOBIN: 8.1 G/DL (ref 12–16)
INR BLD: 2.51 (ref 0.87–1.14)
LYMPHOCYTES ABSOLUTE: 0.5 K/UL (ref 1–5.1)
LYMPHOCYTES RELATIVE PERCENT: 16.5 %
MAGNESIUM: 1.3 MG/DL (ref 1.8–2.4)
MCH RBC QN AUTO: 35.3 PG (ref 26–34)
MCHC RBC AUTO-ENTMCNC: 36.5 G/DL (ref 31–36)
MCV RBC AUTO: 96.9 FL (ref 80–100)
MONOCYTES ABSOLUTE: 0.4 K/UL (ref 0–1.3)
MONOCYTES RELATIVE PERCENT: 11.3 %
NEUTROPHILS ABSOLUTE: 2.2 K/UL (ref 1.7–7.7)
NEUTROPHILS RELATIVE PERCENT: 68.3 %
PDW BLD-RTO: 23.1 % (ref 12.4–15.4)
PLATELET # BLD: 73 K/UL (ref 135–450)
PMV BLD AUTO: 9 FL (ref 5–10.5)
POTASSIUM SERPL-SCNC: 4.3 MMOL/L (ref 3.5–5.1)
PROTHROMBIN TIME: 27.2 SEC (ref 11.7–14.5)
RBC # BLD: 1.85 M/UL (ref 4–5.2)
SODIUM BLD-SCNC: 136 MMOL/L (ref 136–145)
TOTAL PROTEIN: 5 G/DL (ref 6.4–8.2)
WBC # BLD: 3.3 K/UL (ref 4–11)

## 2022-11-15 PROCEDURE — 6370000000 HC RX 637 (ALT 250 FOR IP): Performed by: STUDENT IN AN ORGANIZED HEALTH CARE EDUCATION/TRAINING PROGRAM

## 2022-11-15 PROCEDURE — 6370000000 HC RX 637 (ALT 250 FOR IP): Performed by: INTERNAL MEDICINE

## 2022-11-15 PROCEDURE — 86850 RBC ANTIBODY SCREEN: CPT

## 2022-11-15 PROCEDURE — 36430 TRANSFUSION BLD/BLD COMPNT: CPT

## 2022-11-15 PROCEDURE — 94760 N-INVAS EAR/PLS OXIMETRY 1: CPT

## 2022-11-15 PROCEDURE — 2580000003 HC RX 258: Performed by: STUDENT IN AN ORGANIZED HEALTH CARE EDUCATION/TRAINING PROGRAM

## 2022-11-15 PROCEDURE — 85014 HEMATOCRIT: CPT

## 2022-11-15 PROCEDURE — 85610 PROTHROMBIN TIME: CPT

## 2022-11-15 PROCEDURE — 6360000002 HC RX W HCPCS: Performed by: STUDENT IN AN ORGANIZED HEALTH CARE EDUCATION/TRAINING PROGRAM

## 2022-11-15 PROCEDURE — 86923 COMPATIBILITY TEST ELECTRIC: CPT

## 2022-11-15 PROCEDURE — 85025 COMPLETE CBC W/AUTO DIFF WBC: CPT

## 2022-11-15 PROCEDURE — 80053 COMPREHEN METABOLIC PANEL: CPT

## 2022-11-15 PROCEDURE — 6360000002 HC RX W HCPCS: Performed by: INTERNAL MEDICINE

## 2022-11-15 PROCEDURE — 1200000000 HC SEMI PRIVATE

## 2022-11-15 PROCEDURE — 83735 ASSAY OF MAGNESIUM: CPT

## 2022-11-15 PROCEDURE — 6370000000 HC RX 637 (ALT 250 FOR IP): Performed by: PHYSICIAN ASSISTANT

## 2022-11-15 PROCEDURE — 2580000003 HC RX 258: Performed by: INTERNAL MEDICINE

## 2022-11-15 PROCEDURE — 36415 COLL VENOUS BLD VENIPUNCTURE: CPT

## 2022-11-15 PROCEDURE — 86900 BLOOD TYPING SEROLOGIC ABO: CPT

## 2022-11-15 PROCEDURE — 86901 BLOOD TYPING SEROLOGIC RH(D): CPT

## 2022-11-15 PROCEDURE — P9016 RBC LEUKOCYTES REDUCED: HCPCS

## 2022-11-15 PROCEDURE — 85018 HEMOGLOBIN: CPT

## 2022-11-15 RX ORDER — MAGNESIUM SULFATE IN WATER 40 MG/ML
4000 INJECTION, SOLUTION INTRAVENOUS ONCE
Status: COMPLETED | OUTPATIENT
Start: 2022-11-15 | End: 2022-11-15

## 2022-11-15 RX ORDER — SODIUM CHLORIDE 9 MG/ML
INJECTION, SOLUTION INTRAVENOUS PRN
Status: DISCONTINUED | OUTPATIENT
Start: 2022-11-15 | End: 2022-11-18 | Stop reason: HOSPADM

## 2022-11-15 RX ADMIN — DIPHENHYDRAMINE HYDROCHLORIDE 5 ML: 12.5 LIQUID ORAL at 16:57

## 2022-11-15 RX ADMIN — PANTOPRAZOLE SODIUM 40 MG: 40 TABLET, DELAYED RELEASE ORAL at 08:08

## 2022-11-15 RX ADMIN — FUROSEMIDE 20 MG: 20 TABLET ORAL at 08:08

## 2022-11-15 RX ADMIN — OXYCODONE 5 MG: 5 TABLET ORAL at 16:29

## 2022-11-15 RX ADMIN — ZOLPIDEM TARTRATE 5 MG: 5 TABLET ORAL at 22:30

## 2022-11-15 RX ADMIN — FOLIC ACID 1 MG: 1 TABLET ORAL at 08:08

## 2022-11-15 RX ADMIN — RIFAXIMIN 550 MG: 550 TABLET ORAL at 08:11

## 2022-11-15 RX ADMIN — RIFAXIMIN 550 MG: 550 TABLET ORAL at 22:29

## 2022-11-15 RX ADMIN — MIDODRINE HYDROCHLORIDE 5 MG: 5 TABLET ORAL at 16:57

## 2022-11-15 RX ADMIN — MIDODRINE HYDROCHLORIDE 5 MG: 5 TABLET ORAL at 12:51

## 2022-11-15 RX ADMIN — Medication 10 ML: at 22:31

## 2022-11-15 RX ADMIN — MIDODRINE HYDROCHLORIDE 5 MG: 5 TABLET ORAL at 08:08

## 2022-11-15 RX ADMIN — OXYCODONE 5 MG: 5 TABLET ORAL at 22:29

## 2022-11-15 RX ADMIN — DIPHENHYDRAMINE HYDROCHLORIDE 5 ML: 12.5 LIQUID ORAL at 22:28

## 2022-11-15 RX ADMIN — CEFTRIAXONE SODIUM 1000 MG: 1 INJECTION, POWDER, FOR SOLUTION INTRAMUSCULAR; INTRAVENOUS at 13:39

## 2022-11-15 RX ADMIN — NADOLOL 20 MG: 20 TABLET ORAL at 22:35

## 2022-11-15 RX ADMIN — DIPHENHYDRAMINE HYDROCHLORIDE 5 ML: 12.5 LIQUID ORAL at 08:13

## 2022-11-15 RX ADMIN — Medication 10 ML: at 08:11

## 2022-11-15 RX ADMIN — MAGNESIUM SULFATE HEPTAHYDRATE 4000 MG: 40 INJECTION, SOLUTION INTRAVENOUS at 09:47

## 2022-11-15 RX ADMIN — SPIRONOLACTONE 100 MG: 25 TABLET ORAL at 08:08

## 2022-11-15 RX ADMIN — LACTULOSE 20 G: 20 SOLUTION ORAL at 22:29

## 2022-11-15 RX ADMIN — Medication 5000 UNITS: at 08:08

## 2022-11-15 RX ADMIN — DIPHENHYDRAMINE HYDROCHLORIDE 5 ML: 12.5 LIQUID ORAL at 12:51

## 2022-11-15 RX ADMIN — OXYCODONE 5 MG: 5 TABLET ORAL at 08:15

## 2022-11-15 ASSESSMENT — PAIN SCALES - GENERAL
PAINLEVEL_OUTOF10: 6
PAINLEVEL_OUTOF10: 9
PAINLEVEL_OUTOF10: 7
PAINLEVEL_OUTOF10: 9

## 2022-11-15 ASSESSMENT — PAIN DESCRIPTION - LOCATION
LOCATION: HEAD
LOCATION: HEAD;GENERALIZED

## 2022-11-15 NOTE — PLAN OF CARE
Problem: Discharge Planning  Goal: Discharge to home or other facility with appropriate resources  11/15/2022 1011 by Ian Nuñez RN  Outcome: Progressing  11/14/2022 2349 by Arin Yarbrough RN  Outcome: Progressing     Problem: Pain  Goal: Verbalizes/displays adequate comfort level or baseline comfort level  11/15/2022 1011 by Ian Nuñez RN  Outcome: Progressing  11/14/2022 2349 by Arin Yarbrough RN  Outcome: Progressing     Problem: Safety - Adult  Goal: Free from fall injury  11/15/2022 1011 by Ian Nuñez RN  Outcome: Progressing  11/14/2022 2349 by Arin Yarbrough RN  Outcome: Progressing     Problem: ABCDS Injury Assessment  Goal: Absence of physical injury  11/15/2022 1011 by Ian Nuñez RN  Outcome: Progressing  11/14/2022 2349 by Arin Yarbrough RN  Outcome: Progressing     Problem: Skin/Tissue Integrity - Adult  Goal: Skin integrity remains intact  11/15/2022 1011 by Ian Nuñez RN  Outcome: Progressing  Flowsheets (Taken 11/15/2022 0800)  Skin Integrity Remains Intact: Monitor for areas of redness and/or skin breakdown  11/14/2022 2349 by Arin Yarbrough RN  Outcome: Progressing  Flowsheets (Taken 11/14/2022 1527 by Ian Nuñez RN)  Skin Integrity Remains Intact: Monitor for areas of redness and/or skin breakdown     Problem: Hematologic - Adult  Goal: Maintains hematologic stability  11/15/2022 1011 by Ian Nuñez RN  Outcome: Progressing  11/14/2022 2349 by Arin Yarbrough RN  Outcome: Progressing     Problem: Musculoskeletal - Adult  Goal: Return mobility to safest level of function  11/15/2022 1011 by Ian Nuñez RN  Outcome: Progressing  11/14/2022 2349 by Arin Yarbrough RN  Outcome: Progressing     Problem: Skin/Tissue Integrity  Goal: Absence of new skin breakdown  Description: 1. Monitor for areas of redness and/or skin breakdown  2. Assess vascular access sites hourly  3. Every 4-6 hours minimum:  Change oxygen saturation probe site  4. Every 4-6 hours:  If on nasal continuous positive airway pressure, respiratory therapy assess nares and determine need for appliance change or resting period.   11/15/2022 1011 by Rohith Farfan RN  Outcome: Progressing  11/14/2022 2349 by Jake Doan RN  Outcome: Progressing     Problem: Nutrition Deficit:  Goal: Optimize nutritional status  Outcome: Progressing

## 2022-11-15 NOTE — PROGRESS NOTES
[facility] PROGRESS NOTE    11/15/2022 9:30 AM        Name: Shayna Vail .               Admitted: 11/8/2022  Primary Care Provider: Dimitry Adams MD (Tel: [phone])        Subjective:    Deneis any bleeding or black stool no chest pain fever or chills    Reviewed interval ancillary notes    Current Medications  0.9 % sodium chloride infusion, PRN  magnesium sulfate 4000 mg in 100 mL IVPB premix, Once  nadolol (CORGARD) tablet 20 mg, Nightly  Vitamin D (CHOLECALCIFEROL) tablet 5,000 Units, Daily  magic (miracle) mouthwash with nystatin, 4x daily  midodrine (PROAMATINE) tablet 5 mg, TID  sodium chloride flush 0.9 % injection 5-40 mL, 2 times per day  sodium chloride flush 0.9 % injection 5-40 mL, PRN  0.9 % sodium chloride infusion, PRN  [Held by provider] enoxaparin (LOVENOX) injection 40 mg, Daily  ondansetron (ZOFRAN-ODT) disintegrating tablet 4 mg, Q8H PRN   Or  ondansetron (ZOFRAN) injection 4 mg, Q6H PRN  polyethylene glycol (GLYCOLAX) packet 17 g, Daily PRN  oxyCODONE (ROXICODONE) immediate release tablet 5 mg, Q6H PRN  furosemide (LASIX) tablet 20 mg, Daily  spironolactone (ALDACTONE) tablet 056 mg, Daily  folic acid (FOLVITE) tablet 1 mg, Daily  zolpidem (AMBIEN) tablet 5 mg, Nightly PRN  lactulose (CHRONULAC) 10 GM/15ML solution 20 g, BID  rifAXIMin (XIFAXAN) tablet 550 mg, BID  pantoprazole (PROTONIX) tablet 40 mg, QAM AC  cefTRIAXone (ROCEPHIN) 1,000 mg in dextrose 5 % 50 mL IVPB mini-bag, Q24H  0.9 % sodium chloride infusion, PRN      Objective:  /64   Pulse 64   Temp 98.7 °F (37.1 °C) (Oral)   Resp 16   Ht 5' 4\" (1.626 m)   Wt 117 lb 15.1 oz (53.5 kg)   SpO2 96%   BMI 20.25 kg/m²     Intake/Output Summary (Last 24 hours) at 11/15/2022 0930  Last data filed at 11/14/2022 2045  Gross per 24 hour   Intake 120 ml   Output 750 ml   Net -630 ml        Wt Readings from Last 3 Encounters:   11/10/22 117 lb 15.1 oz (53.5 kg)   10/29/22 107 lb 4.8 oz (48.7 kg)   10/17/22 105 lb (47.6 kg)       General appearance:  Appears comfortable. AAOx3  HEENT: atraumatic, Pupils equal, muscous membranes moist, no masses appreciated  Cardiovascular: Regular rate and rhythm no murmurs appreciated  Respiratory: CTAB no wheezing  Gastrointestinal: Abdomen soft, non-tender, BS+  EXT: no edema  Neurology: no gross focal deficts  Psychiatry: Appropriate affect. Not agitated  Skin: Warm, dry, no rashes appreciated    Labs and Tests:  CBC:   Recent Labs     11/13/22  1019 11/14/22  0508 11/15/22  0649   WBC 3.6* 3.6* 3.3*   HGB 7.5* 7.4* 6.6*   PLT 70* 76* 73*       BMP:    Recent Labs     11/13/22  1019 11/14/22  0508 11/15/22  0649    138 136   K 4.3 3.8 4.3    102 100   CO2 24 29 28   BUN 20 16 14   CREATININE <0.5* <0.5* <0.5*   GLUCOSE 176* 136* 136*       Hepatic:   Recent Labs     11/13/22  1019 11/14/22  0508 11/15/22  0649   AST 79* 72* 68*   ALT 29 30 27   BILITOT 15.4* 16.3* 14.5*   ALKPHOS 100 90 88       CT ABDOMEN PELVIS WO CONTRAST Additional Contrast? None   Final Result   1. Very heterogenous appearance of the liver with sequela of portal   hypertension, varices, splenomegaly, and ascites. The amount of ascites and   general edema of the intra-fat are increased from the recent exam.  The   actual volume of ascites is still small. 2.  Cholelithiasis with thickened wall of the gallbladder. However   thickening could relate to the general edematous state and surrounding   ascites. Can consider gallbladder ultrasound if symptomatic. 3.  No bowel obstruction or pneumoperitoneum. There is no hydronephrosis   with a stable calculus at the right kidney. 4.  Mild compression deformity upper endplate of T8 without a significant   loss of vertebral body height. Features suggest this is chronic.          XR CHEST PORTABLE   Final Result   Mild cardiomegaly      Hypoinflation with mild bibasilar atelectasis or early infiltrates which is   more apparent. Recent imaging reviewed    Problem List  Principal Problem:    Acute on chronic anemia  Active Problems:    Elevated LFTs    Ascites due to alcoholic cirrhosis (HCC)    Sinus tachycardia    Severe anemia    Alcoholic cirrhosis (HCC)    UTI (urinary tract infection)    Moderate malnutrition (HCC)    Esophageal varices (HCC)  Resolved Problems:    * No resolved hospital problems. *       Assessment & Plan:   Acute on chronic anemia secondary to blood loss anemia secondary to acute gi bleed with esophageal varix, s/p egd with banding  - s/p 2 units  - transfuse 1 unit prbcs today  -  monitor transfuse to keep? 7    Decompensated Alcoholic cirrhosis  - continue meds    UTI:   - iv atbx    Hypomagnesemia: give 4 grams iv again today      Diet: ADULT DIET; Easy to [address]; Breakfast, Lunch, Dinner; Clear Liquid Oral Supplement  Code:Full Code        Melvin Bryant MD   11/15/2022 9:30 AM

## 2022-11-15 NOTE — PROGRESS NOTES
Physician Progress Note      Gregorio Acosta  CSN #:                  [id number]  :                       1975  ADMIT DATE:       2022 8:46 PM  [address] [place] DATE:  RESPONDING  PROVIDER #:        Hilary Pryor MD          QUERY TEXT:    Patient admitted with anemia and alcoholic cirrhosis, noted to have low WBC,   H&H and platelet count. If possible, please document in progress notes and/or   discharge summary if you are evaluating and/or treating any of the following: The medical record reflects the following:  Risk Factors: Hx of Cirrhosis and Pancytopenia  Clinical Indicators: Labs on 11/15 show WBC 3.3, H&H 6.6/18.0, platelets 73  Treatment: Monitoring of repeated labs, transfusions  Options provided:  -- Pancytopenia due to cirrhosis  -- Pancytopenia unspecified  -- Other - I will add my own diagnosis  -- Disagree - Not applicable / Not valid  -- Disagree - Clinically unable to determine / Unknown  -- Refer to Clinical Documentation Reviewer    PROVIDER RESPONSE TEXT:    Patient has pancytopenia due to cirrhosis.     Query created by: Geo Beverly on 11/15/2022 11:14 AM      Electronically signed by:  Hilary Pryor MD 11/15/2022 11:38 AM

## 2022-11-15 NOTE — PROGRESS NOTES
Nutrition Note    RECOMMENDATIONS  PO Diet: Easy to chew  ONS: Ensure Clear TID    NUTRITION ASSESSMENT   Pt recieving easy to chew diet with intakes documented >50%. Pt reports good intake prior to admission but has recently lost her appetite. Observed 25% of breakfast consumed at bedisde, states she does not feel up to eating this morning. MD ordered Ensure Compact TID. Will modify to Ensure Clear as pt states she is more willing to drink Ensure Clear. No recent weight changes. Will continue to monitor intakes. Nutrition Related Findings: NS @ 100 mL/hr. Mg 0.9 (11/14). +11.5 L since admit. LBM 11/14. Jaundice. +BS  Wounds: None  Nutrition Education:  Education not indicated   Nutrition Goals: PO intake 75% or greater     MALNUTRITION ASSESSMENT   Chronic Illness  Malnutrition Status: At risk for malnutrition (Comment)      NUTRITION DIAGNOSIS   Increased nutrient needs related to catabolic illness as evidenced by GI abnormality (alcoholic liver disease)      CURRENT NUTRITION THERAPIES  ADULT DIET; Easy to Chew  ADULT ORAL NUTRITION SUPPLEMENT; Breakfast, Lunch, Dinner; Clear Liquid Oral Supplement     PO Intake: 51-75%, %   PO Supplement Intake:None Ordered      ANTHROPOMETRICS  Current Height: 5' 4\" (162.6 cm)  Current Weight: 117 lb 15.1 oz (53.5 kg)    Ideal Body Weight (IBW): 120 lbs  (55 kg)        BMI: 20.1      COMPARATIVE STANDARDS  Energy (kcal):  3207-5778     Protein (g):  65-81       Fluid (mL/day):  5914-7596    The patient will be monitored per nutrition standards of care. Consult dietitian if additional nutrition interventions are needed prior to RD reassessment.      Beatriz Paiz, [address] [place] [address],     Contact: [phone]

## 2022-11-15 NOTE — PLAN OF CARE
Problem: Discharge Planning  Goal: Discharge to home or other facility with appropriate resources  11/14/2022 2349 by John Hernandez RN  Outcome: Progressing  11/14/2022 1524 by Brendon Perez RN  Outcome: Progressing     Problem: Pain  Goal: Verbalizes/displays adequate comfort level or baseline comfort level  11/14/2022 2349 by John Hernandez RN  Outcome: Progressing  11/14/2022 1524 by Brendon Perez RN  Outcome: Progressing     Problem: Safety - Adult  Goal: Free from fall injury  11/14/2022 2349 by John Hernandez RN  Outcome: Progressing  11/14/2022 1524 by Brendon Perez RN  Outcome: Progressing     Problem: ABCDS Injury Assessment  Goal: Absence of physical injury  11/14/2022 2349 by John Hernandez RN  Outcome: Progressing  11/14/2022 1524 by Brendon Perez RN  Outcome: Progressing     Problem: Skin/Tissue Integrity - Adult  Goal: Skin integrity remains intact  11/14/2022 2349 by John Hernandez RN  Outcome: Progressing  Flowsheets (Taken 11/14/2022 1527 by Brendon Perez RN)  Skin Integrity Remains Intact: Monitor for areas of redness and/or skin breakdown  11/14/2022 1524 by Brendon Perez RN  Outcome: Progressing     Problem: Hematologic - Adult  Goal: Maintains hematologic stability  11/14/2022 2349 by John Hernandez RN  Outcome: Progressing  11/14/2022 1524 by Brendon Perez RN  Outcome: Progressing     Problem: Musculoskeletal - Adult  Goal: Return mobility to safest level of function  11/14/2022 2349 by John Hernandez RN  Outcome: Progressing  11/14/2022 1524 by rBendon Perez RN  Outcome: Progressing     Problem: Skin/Tissue Integrity  Goal: Absence of new skin breakdown  Description: 1. Monitor for areas of redness and/or skin breakdown  2. Assess vascular access sites hourly  3. Every 4-6 hours minimum:  Change oxygen saturation probe site  4.   Every 4-6 hours:  If on nasal continuous positive airway pressure, respiratory therapy assess nares and determine need for appliance change or resting period.   11/14/2022 2349 by John Hernandez, RN  Outcome: Progressing  11/14/2022 1524 by Brendon Perez RN  Outcome: Progressing

## 2022-11-15 NOTE — PROGRESS NOTES
Report received from day shift RN. Patient resting comfortably in bed. No signs of discomfort or distress. Bed is in lowest position, wheels locked, 2/2 side rails up. Bedside table and call light within reach. White board updated. Will continue to monitor patient. Lincoln Anglin RN    9:39 PM  Shift assessment complete. (See findings in flowsheet). Med pass complete. (See MAR). VSS. Patient with no complaints at this time. Lincoln Anglin RN    10:44 PM  PRN Lexx Setters given. Lincoln Anglin RN    2:10 AM  Patient resting quietly in bed with eyes closed. No apparent needs at this time. Lincoln Anglin RN    5:48 AM  No new events overnight. Patient resting quietly in bed.  Lincoln Anglin RN

## 2022-11-15 NOTE — CARE COORDINATION
CLAYTON called Roosevelt Nobles in admissions at [facility] and left a message asking for an update on pt's precertification. Awaiting a call back.     Electronically signed by ABY Rose, REAL on 11/15/2022 at 5:42 PM

## 2022-11-16 LAB
A/G RATIO: 1.1 (ref 1.1–2.2)
ALBUMIN SERPL-MCNC: 3 G/DL (ref 3.4–5)
ALP BLD-CCNC: 99 U/L (ref 40–129)
ALT SERPL-CCNC: 28 U/L (ref 10–40)
ANION GAP SERPL CALCULATED.3IONS-SCNC: 12 MMOL/L (ref 3–16)
ANION GAP SERPL CALCULATED.3IONS-SCNC: 9 MMOL/L (ref 3–16)
AST SERPL-CCNC: 71 U/L (ref 15–37)
BASOPHILS ABSOLUTE: 0 K/UL (ref 0–0.2)
BASOPHILS RELATIVE PERCENT: 1.1 %
BILIRUB SERPL-MCNC: 16.2 MG/DL (ref 0–1)
BUN BLDV-MCNC: 13 MG/DL (ref 7–20)
BUN BLDV-MCNC: 13 MG/DL (ref 7–20)
CALCIUM SERPL-MCNC: 9.6 MG/DL (ref 8.3–10.6)
CALCIUM SERPL-MCNC: 9.8 MG/DL (ref 8.3–10.6)
CHLORIDE BLD-SCNC: 100 MMOL/L (ref 99–110)
CHLORIDE BLD-SCNC: 99 MMOL/L (ref 99–110)
CO2: 24 MMOL/L (ref 21–32)
CO2: 27 MMOL/L (ref 21–32)
CREAT SERPL-MCNC: <0.5 MG/DL (ref 0.6–1.1)
CREAT SERPL-MCNC: <0.5 MG/DL (ref 0.6–1.1)
EOSINOPHILS ABSOLUTE: 0.1 K/UL (ref 0–0.6)
EOSINOPHILS RELATIVE PERCENT: 1.6 %
GFR SERPL CREATININE-BSD FRML MDRD: >60 ML/MIN/{1.73_M2}
GFR SERPL CREATININE-BSD FRML MDRD: >60 ML/MIN/{1.73_M2}
GLUCOSE BLD-MCNC: 130 MG/DL (ref 70–99)
GLUCOSE BLD-MCNC: 137 MG/DL (ref 70–99)
HCT VFR BLD CALC: 22.3 % (ref 36–48)
HEMOGLOBIN: 8.1 G/DL (ref 12–16)
INR BLD: 2.33 (ref 0.87–1.14)
LYMPHOCYTES ABSOLUTE: 0.6 K/UL (ref 1–5.1)
LYMPHOCYTES RELATIVE PERCENT: 15.1 %
MAGNESIUM: 1.4 MG/DL (ref 1.8–2.4)
MAGNESIUM: 1.4 MG/DL (ref 1.8–2.4)
MCH RBC QN AUTO: 33.5 PG (ref 26–34)
MCHC RBC AUTO-ENTMCNC: 36.2 G/DL (ref 31–36)
MCV RBC AUTO: 92.7 FL (ref 80–100)
MONOCYTES ABSOLUTE: 0.5 K/UL (ref 0–1.3)
MONOCYTES RELATIVE PERCENT: 12.4 %
NEUTROPHILS ABSOLUTE: 3 K/UL (ref 1.7–7.7)
NEUTROPHILS RELATIVE PERCENT: 69.8 %
PDW BLD-RTO: 26.6 % (ref 12.4–15.4)
PLATELET # BLD: 81 K/UL (ref 135–450)
PMV BLD AUTO: 8.4 FL (ref 5–10.5)
POTASSIUM REFLEX MAGNESIUM: 4.2 MMOL/L (ref 3.5–5.1)
POTASSIUM SERPL-SCNC: 4.2 MMOL/L (ref 3.5–5.1)
PROTHROMBIN TIME: 25.7 SEC (ref 11.7–14.5)
RBC # BLD: 2.41 M/UL (ref 4–5.2)
SODIUM BLD-SCNC: 135 MMOL/L (ref 136–145)
SODIUM BLD-SCNC: 136 MMOL/L (ref 136–145)
TOTAL PROTEIN: 5.7 G/DL (ref 6.4–8.2)
WBC # BLD: 4.2 K/UL (ref 4–11)

## 2022-11-16 PROCEDURE — 80053 COMPREHEN METABOLIC PANEL: CPT

## 2022-11-16 PROCEDURE — 85025 COMPLETE CBC W/AUTO DIFF WBC: CPT

## 2022-11-16 PROCEDURE — 6370000000 HC RX 637 (ALT 250 FOR IP): Performed by: STUDENT IN AN ORGANIZED HEALTH CARE EDUCATION/TRAINING PROGRAM

## 2022-11-16 PROCEDURE — 6370000000 HC RX 637 (ALT 250 FOR IP): Performed by: INTERNAL MEDICINE

## 2022-11-16 PROCEDURE — 6360000002 HC RX W HCPCS: Performed by: STUDENT IN AN ORGANIZED HEALTH CARE EDUCATION/TRAINING PROGRAM

## 2022-11-16 PROCEDURE — 2580000003 HC RX 258: Performed by: STUDENT IN AN ORGANIZED HEALTH CARE EDUCATION/TRAINING PROGRAM

## 2022-11-16 PROCEDURE — 36415 COLL VENOUS BLD VENIPUNCTURE: CPT

## 2022-11-16 PROCEDURE — 6370000000 HC RX 637 (ALT 250 FOR IP): Performed by: PHYSICIAN ASSISTANT

## 2022-11-16 PROCEDURE — 85610 PROTHROMBIN TIME: CPT

## 2022-11-16 PROCEDURE — 97535 SELF CARE MNGMENT TRAINING: CPT

## 2022-11-16 PROCEDURE — 82570 ASSAY OF URINE CREATININE: CPT

## 2022-11-16 PROCEDURE — 6360000002 HC RX W HCPCS: Performed by: INTERNAL MEDICINE

## 2022-11-16 PROCEDURE — 2580000003 HC RX 258: Performed by: INTERNAL MEDICINE

## 2022-11-16 PROCEDURE — 94760 N-INVAS EAR/PLS OXIMETRY 1: CPT

## 2022-11-16 PROCEDURE — 97530 THERAPEUTIC ACTIVITIES: CPT

## 2022-11-16 PROCEDURE — 1200000000 HC SEMI PRIVATE

## 2022-11-16 PROCEDURE — 99223 1ST HOSP IP/OBS HIGH 75: CPT | Performed by: INTERNAL MEDICINE

## 2022-11-16 PROCEDURE — 83735 ASSAY OF MAGNESIUM: CPT

## 2022-11-16 RX ORDER — MAGNESIUM SULFATE IN WATER 40 MG/ML
4000 INJECTION, SOLUTION INTRAVENOUS ONCE
Status: COMPLETED | OUTPATIENT
Start: 2022-11-16 | End: 2022-11-16

## 2022-11-16 RX ADMIN — PANTOPRAZOLE SODIUM 40 MG: 40 TABLET, DELAYED RELEASE ORAL at 05:24

## 2022-11-16 RX ADMIN — DIPHENHYDRAMINE HYDROCHLORIDE 5 ML: 12.5 LIQUID ORAL at 13:19

## 2022-11-16 RX ADMIN — DIPHENHYDRAMINE HYDROCHLORIDE 5 ML: 12.5 LIQUID ORAL at 19:34

## 2022-11-16 RX ADMIN — ONDANSETRON 4 MG: 2 INJECTION INTRAMUSCULAR; INTRAVENOUS at 09:40

## 2022-11-16 RX ADMIN — FUROSEMIDE 20 MG: 20 TABLET ORAL at 08:07

## 2022-11-16 RX ADMIN — OXYCODONE 5 MG: 5 TABLET ORAL at 19:38

## 2022-11-16 RX ADMIN — OXYCODONE 5 MG: 5 TABLET ORAL at 13:21

## 2022-11-16 RX ADMIN — MIDODRINE HYDROCHLORIDE 5 MG: 5 TABLET ORAL at 08:07

## 2022-11-16 RX ADMIN — LACTULOSE 20 G: 20 SOLUTION ORAL at 08:08

## 2022-11-16 RX ADMIN — ZOLPIDEM TARTRATE 5 MG: 5 TABLET ORAL at 22:09

## 2022-11-16 RX ADMIN — FOLIC ACID 1 MG: 1 TABLET ORAL at 08:07

## 2022-11-16 RX ADMIN — Medication 5000 UNITS: at 08:06

## 2022-11-16 RX ADMIN — MAGNESIUM SULFATE HEPTAHYDRATE 4000 MG: 40 INJECTION, SOLUTION INTRAVENOUS at 09:42

## 2022-11-16 RX ADMIN — Medication 10 ML: at 22:10

## 2022-11-16 RX ADMIN — MIDODRINE HYDROCHLORIDE 5 MG: 5 TABLET ORAL at 19:35

## 2022-11-16 RX ADMIN — SPIRONOLACTONE 100 MG: 25 TABLET ORAL at 08:07

## 2022-11-16 RX ADMIN — Medication 10 ML: at 08:08

## 2022-11-16 RX ADMIN — MIDODRINE HYDROCHLORIDE 5 MG: 5 TABLET ORAL at 13:19

## 2022-11-16 RX ADMIN — RIFAXIMIN 550 MG: 550 TABLET ORAL at 22:09

## 2022-11-16 RX ADMIN — RIFAXIMIN 550 MG: 550 TABLET ORAL at 08:07

## 2022-11-16 RX ADMIN — OXYCODONE 5 MG: 5 TABLET ORAL at 05:24

## 2022-11-16 RX ADMIN — CEFTRIAXONE SODIUM 1000 MG: 1 INJECTION, POWDER, FOR SOLUTION INTRAMUSCULAR; INTRAVENOUS at 16:01

## 2022-11-16 RX ADMIN — DIPHENHYDRAMINE HYDROCHLORIDE 5 ML: 12.5 LIQUID ORAL at 08:07

## 2022-11-16 RX ADMIN — NADOLOL 20 MG: 20 TABLET ORAL at 22:09

## 2022-11-16 ASSESSMENT — PAIN DESCRIPTION - LOCATION
LOCATION: BACK
LOCATION: BACK;HEAD
LOCATION: HEAD;GENERALIZED

## 2022-11-16 ASSESSMENT — PAIN SCALES - GENERAL
PAINLEVEL_OUTOF10: 8
PAINLEVEL_OUTOF10: 6
PAINLEVEL_OUTOF10: 7
PAINLEVEL_OUTOF10: 6
PAINLEVEL_OUTOF10: 9
PAINLEVEL_OUTOF10: 6
PAINLEVEL_OUTOF10: 4

## 2022-11-16 ASSESSMENT — PAIN DESCRIPTION - DESCRIPTORS
DESCRIPTORS: ACHING
DESCRIPTORS: ACHING
DESCRIPTORS: DISCOMFORT
DESCRIPTORS: ACHING
DESCRIPTORS: ACHING

## 2022-11-16 ASSESSMENT — PAIN DESCRIPTION - PAIN TYPE: TYPE: ACUTE PAIN

## 2022-11-16 NOTE — CARE COORDINATION
SW received a call back from Nichole at [facility] stating that pt's precert was approved and is goo through the end of the day 11/18. Met with patient who is agreeable with discharging back to [facility]. Stated she would like to speak with someone in management there stating that she felt she was not gets her meds daily or sometimes not at all. CLAYTON informed Nichole this information who will have someone contact her regarding this. Patient requested a physical copy of the AVS/TAZ when she discharges. She will not qualify for stretcher transport. Stated her dtr, Annamaria, can pick her up and take her to the facility. Discharge Plan:  [facility] when medically stable. Precert approved. Dtr Annamaria will transport here there.     Electronically signed by ABY Aguilar, KATYAW on 11/16/2022 at 12:39 PM

## 2022-11-16 NOTE — PROGRESS NOTES
[facility]   Phone: [phone]    Occupational Therapy    [] Initial Evaluation            [x] Daily Treatment Note         [] Discharge Summary      Patient: Jennifer Hdez   : 1975   MRN: [id number]   Date of Service:  2022    Admitting Diagnosis:  Acute on chronic anemia  Current Admission Summary: 40 y.o. female with history of alcoholic cirrhosis of the liver and chronically jaundiced  She lives she is currently staying at [address] [place] [address]  She was most recently hospitalized here 10/21-10/29 for severe anemia, hepatic encephalopathy and complicated UTI  She now presents to the ER with complaint of generalized weakness for the past 2 to 3 days  She went to participate with physical therapy today but she was profoundly fatigued she realized that something was wrong  She is now subsequently, she was sent from [address] [place] [address] where she is staying with abnormal labs  Here in the ED her hemoglobin was 5.6 therefore 2 units of PRBC was ordered  On interview, patient appears chronically ill but she is not encephalopathy and able to provide history  She complains of headache but has no other complaints or pain elsewhere on the body  Past Medical History:  has a past medical history of Alcoholic liver disease ([address] Anemia, and History of blood transfusion. Past Surgical History:  has a past surgical history that includes Upper gastrointestinal endoscopy (N/A, 2021); Upper gastrointestinal endoscopy (N/A, 03/10/2021); Tubal ligation; Colonoscopy (N/A, 3/11/2021); Upper gastrointestinal endoscopy (N/A, 2021); Upper gastrointestinal endoscopy (N/A, 2022); Knee Arthroplasty; Upper gastrointestinal endoscopy (N/A, 2022); Upper gastrointestinal endoscopy (N/A, 2022); CT BIOPSY BONE MARROW (2022); Upper gastrointestinal endoscopy (N/A, 10/26/2022); and Upper gastrointestinal endoscopy (N/A, 11/10/2022).     Discharge Recommendations: Sanju Rashid scored a 19/24 on the AM-PAC ADL Inpatient form. Current research shows that an AM-PAC score of 17 or less is typically not associated with a discharge to the patient's home setting. Based on the patient's AM-PAC score and their current ADL deficits, it is recommended that the patient have 3-5 sessions per week of Occupational Therapy at d/c to increase the patient's independence. Please see assessment section for further patient specific details. If patient discharges prior to next session this note will serve as a discharge summary. Please see below for the latest assessment towards goals. DME Required For Discharge: DME to be determined at next level of care    Precautions/Restrictions: high fall risk, up as tolerated  Weight Bearing Restrictions: no restrictions  [] Right Upper Extremity  [] Left Upper Extremity [] Right Lower Extremity  [] Left Lower Extremity     Required Braces/Orthotics: no braces required   [] Right  [] Left  Positional Restrictions:no positional restrictions    Pre-Admission Information   Lives With: alone                     Type of Home:  duplex  Home Layout: one level  Home Access: level entry  Bathroom Layout: tub/shower unit  Bathroom Equipment:  none  Toilet Height: standard height  Home Equipment: no prior equipment  Transfer Assistance: Independent without use of device  Ambulation Assistance:Independent without use of device  ADL Assistance: independent with all ADL's  IADL Assistance: independent with homemaking tasks  Active :        [] Yes                 [x] No  Hand Dominance: [] Left                 [x] Right  Current Employment: unemployed, lost job in April, due to medical issues  Hobbies: spending time with daughters  Recent Falls: found down from last admission         Subjective  General: Patient supine in bed upon arrival, agreeable to OT.   Pain: 0/10  Pain Interventions: not applicable        Activities of Daily Living  Basic Activities of Daily Living  Grooming: stand by assistance contact guard assistance  Grooming Comments: pt washed hands/face standing at sink  Upper Extremity Dressing: stand by assistance contact guard assistance  Lower Extremity Dressing: stand by assistance contact guard assistance  Toileting: stand by assistance. General Comments: Patient required increased time and SBA with clothing management after toileting. Pt encouraged to self pace and complete ADLs seated when possible, pt with complaints of fatigue with minimal exertion      Instrumental Activities of Daily Living  No IADL completed on this date. Functional Mobility  Bed Mobility  Supine to Sit: stand by assistance  Scooting: stand by assistance  Comments: Slow, guarded pacing  Transfers  Sit to stand transfer:stand by assistance  Stand to sit transfer: stand by assistance  Stand step transfer: contact guard assistance  Comments: No device    Functional Mobility:  Sitting Balance: supervision, stand by assistance. Standing Balance: contact guard assistance.     Functional Mobility: .  contact guard assistance  Functional Mobility Activity: to/from bathroom, Pt walked in hallway, 50ft and 30 ft without AD  Functional Mobility Device Use: no device    Other Therapeutic Interventions    Functional Outcomes  AM-PAC Inpatient Daily Activity Raw Score: 19    Cognition  Overall Cognitive Status: Impaired  Arousal/Alterness: delayed responses to stimuli  Memory: decreased recall of recent events  Safety Judgement: decreased awareness of need for assistance  Problem Solving: decreased awareness of errors  Initiation: does not require cues  Sequencing: does not require cues  Orientation:    alert and oriented x 4  Command Following:   [facility]     Education  Barriers To Learning: emotional and physical  Patient Education: patient educated on goals, OT role and benefits, plan of care, discharge recommendations  Learning Assessment:  patient verbalizes understanding, would benefit from continued reinforcement, patient will require reinforcement due to cognitive deficits    Assessment  Activity Tolerance: Poor activity tolerance with one step ADLs and functional mobility. Pt with complaints of fatigue with minimal exertion ie walking into bathroom. Required rest breaks and prompting for task completion. Impairments Requiring Therapeutic Intervention: decreased functional mobility, decreased ADL status, decreased cognition, decreased endurance, decreased balance, decreased posture  Prognosis: fair  Clinical Assessment: Patient present with the above deficits, which are below baseline, and would benefit from continued skilled OT to address  Safety Interventions: patient left in chair, chair alarm in place, call light within reach, gait belt, nurse notified, and family/caregiver present    Plan  Frequency: 3-5 x/per week  Current Treatment Recommendations: strengthening, balance training, functional mobility training, transfer training, endurance training, patient/caregiver education, ADL/self-care training, IADL training, and safety education    Goals  Patient Goals:  To return home   Short Term Goals:  Time Frame: Upon discharge  Patient will complete lower body ADL at modified independent   Patient will complete toileting at modified independent   Patient will complete grooming at supervision   Patient will complete functional transfers at supervision   Patient will complete functional mobility at supervision   Patient will increase functional standing balance to 5 minutes supervision for improved ADL completion    Continue Goals 11/16  Therapy Session Time     Individual Group Co-treatment   Time In 1134     Time Out 1201     Minutes 27          Timed Code Treatment Minutes:   27 Minutes  Total Treatment Minutes:  27 Minutes       Electronically Signed By: Blaze Chavez, [address], [address] [facility]

## 2022-11-16 NOTE — PLAN OF CARE
Problem: Skin/Tissue Integrity - Adult  Goal: Skin integrity remains intact  Recent Flowsheet Documentation  Taken 11/15/2022 1551 by Darin Goldstein RN  Skin Integrity Remains Intact: Monitor for areas of redness and/or skin breakdown

## 2022-11-16 NOTE — CONSULTS
Office : [phone]     Fax :[phone]       Nephrology Consult Note      Patient's Name: Steph Palacio  11:13 AM  11/16/2022    Reason for Consult:  persistent hypomagnesemia       Requesting Physician:  Dr. James Pierce       Chief Complaint:    Chief Complaint   Patient presents with    Hypotension     Pt brought by  EMS from [place] [address]. Nursing home called for high ammonia 131, low hgb 5.9. pt BP 93/36 when EMS took it. Pt was given 200 ML of NS. Pt states has been feeling weak since Sunday. Pt hx liver failure            History of Present iIlness:    Steph Palacio is a 40 y.o. female with prior history of alcoholic liver cirrhosis who was admitted from [facility]. She now presents to the ER with complaint of generalized weakness for the past 2 to 3 days  She went to participate with physical therapy today but she was profoundly fatigued she realized that something was wrong  In  the ED her hemoglobin was 5.6  and she received 2 units of PRBC    Since admission she has been persistently low on magnesium. Has received iv magnesium multiple times. I/O last 3 completed shifts:   In: 466.3 [P.O.:120; Blood:346.3]  Out: -     Past Medical History:   Diagnosis Date    Alcoholic liver disease ([facility] [address]     Anemia     History of blood transfusion        Past Surgical History:   Procedure Laterality Date    COLONOSCOPY N/A 3/11/2021    COLONOSCOPY POLYPECTOMY SNARE/COLD BIOPSY performed by Burton Kitchen MD at [facility]    CT BONE MARROW BIOPSY  7/5/2022    CT BONE MARROW BIOPSY 7/5/2022 Gaston Ureña MD [facility] CT SCAN    KNEE ARTHROPLASTY      TUBAL LIGATION      ESSURE    UPPER GASTROINTESTINAL ENDOSCOPY N/A 01/30/2021    EGD DIAGNOSTIC ONLY performed by Hardie Severin, MD at [facility] UPPER GASTROINTESTINAL ENDOSCOPY N/A 03/10/2021    EGD BIOPSY performed by Keisha White MD at [address] [place] [address] 6/27/2021    EGD BAND LIGATION performed by Linda Kirk MD at [address] [place] [address] N/A 4/27/2022    EGD DIAGNOSTIC ONLY performed by Keyona Barrow MD at [address] [place] [address] N/A 7/1/2022    EGD ESOPHAGOGASTRODUODENOSCOPY ULTRASOUND performed by Megan Quinteros MD at [address] [place] [address] N/A 7/1/2022    EGD BAND LIGATION performed by Megan Quinteros MD at [address] [place] [address] N/A 10/26/2022    EGD DIAGNOSTIC ONLY performed by Ambika Pearl MD at [address] [place] [address] N/A 11/10/2022    EGD BAND LIGATION performed by Megan Quinteros MD at [address] [place] [address]       Family History   Problem Relation Age of Onset    Alcohol Abuse Father         reports that she has quit smoking. She has never used smokeless tobacco. She reports that she does not currently use alcohol. She reports that she does not use drugs.         Allergies:  Oxycodone    Current Medications:    magnesium sulfate 4000 mg in 100 mL IVPB premix, Once  0.9 % sodium chloride infusion, PRN  nadolol (CORGARD) tablet 20 mg, Nightly  Vitamin D (CHOLECALCIFEROL) tablet 5,000 Units, Daily  magic (miracle) mouthwash with nystatin, 4x daily  midodrine (PROAMATINE) tablet 5 mg, TID  sodium chloride flush 0.9 % injection 5-40 mL, 2 times per day  sodium chloride flush 0.9 % injection 5-40 mL, PRN  0.9 % sodium chloride infusion, PRN  [Held by provider] enoxaparin (LOVENOX) injection 40 mg, Daily  ondansetron (ZOFRAN-ODT) disintegrating tablet 4 mg, Q8H PRN   Or  ondansetron (ZOFRAN) injection 4 mg, Q6H PRN  polyethylene glycol (GLYCOLAX) packet 17 g, Daily PRN  oxyCODONE (ROXICODONE) immediate release tablet 5 mg, Q6H PRN  furosemide (LASIX) tablet 20 mg, Daily  spironolactone (ALDACTONE) tablet 189 mg, Daily  folic acid (FOLVITE) tablet 1 mg, Daily  zolpidem (AMBIEN) tablet 5 mg, Nightly PRN  lactulose (CHRONULAC) 10 GM/15ML solution 20 g, BID  rifAXIMin (XIFAXAN) tablet 550 mg, BID  pantoprazole (PROTONIX) tablet 40 mg, QAM AC  cefTRIAXone (ROCEPHIN) 1,000 mg in dextrose 5 % 50 mL IVPB mini-bag, Q24H  0.9 % sodium chloride infusion, PRN        Review of Systems:   14 point ROS obtained but were negative except mentioned in HPI      Physical exam:     Vitals:  /71   Pulse 65   Temp 98.5 °F (36.9 °C) (Oral)   Resp 16   Ht 5' 4\" (1.626 m)   Wt 117 lb 15.1 oz (53.5 kg)   SpO2 96%   BMI 20.25 kg/m²   Constitutional:  OAA X3 NAD  Skin: no rash, turgor wnl  Heent:  eomi, mmm  Neck: no bruits or jvd noted  Cardiovascular:  S1, S2 without m/r/g  Respiratory: CTA B without w/r/r  Abdomen:  +bs, soft, nt, nd  Ext: no  lower extremity edema  Psychiatric: mood and affect appropriate  Musculoskeletal:  Rom, muscular strength intact    Labs:  CBC:   Recent Labs     11/14/22  0508 11/15/22  0649 11/15/22  1707 11/16/22  0748   WBC 3.6* 3.3*  --  4.2   HGB 7.4* 6.6* 8.1* 8.1*   PLT 76* 73*  --  81*     BMP:    Recent Labs     11/14/22  0508 11/15/22  0649 11/16/22  0748    136 136  135*   K 3.8 4.3 4.2  4.2    100 100  99   CO2 29 28 27  24   BUN 16 14 13  13   CREATININE <0.5* <0.5* <0.5*  <0.5*   GLUCOSE 136* 136* 137*  130*     Ca/Mg/Phos:   Recent Labs     11/14/22  0507 11/14/22  0508 11/15/22  0649 11/15/22  0852 11/16/22  0748   CALCIUM  --  9.3 9.2  --  9.8  9.6   MG 0.90*  --   --  1.30* 1.40*  1.40*   PHOS  --  3.0  --   --   --      Hepatic:   Recent Labs     11/14/22  0508 11/15/22  0649 11/16/22  0748   AST 72* 68* 71*   ALT 30 27 28   BILITOT 16.3* 14.5* 16.2*   ALKPHOS 90 88 99     Troponin: No results for input(s): TROPONINI in the last 72 hours. BNP: No results for input(s): BNP in the last 72 hours.   Lipids: No results for input(s): CHOL, TRIG, HDL, LDLCALC, LABVLDL in the last 72 hours. ABGs: No results for input(s): PHART, PO2ART, UED0SKS in the last 72 hours. INR:   Recent Labs     11/14/22  0508 11/15/22  0649 11/16/22  0748   INR 2.49* 2.51* 2.33*     UA:No results for input(s): Tan Rad, GLUCOSEU, BILIRUBINUR, Islas Berth, BLOODU, PHUR, PROTEINU, UROBILINOGEN, NITRU, LEUKOCYTESUR, Garnette Erps in the last 72 hours. Urine Microscopic: No results for input(s): LABCAST, BACTERIA, COMU, HYALCAST, WBCUA, RBCUA, EPIU in the last 72 hours. Urine Culture: No results for input(s): LABURIN in the last 72 hours. Urine Chemistry: No results for input(s): Sharlotte Alt, PROTEINUR, NAUR in the last 72 hours. IMAGING:  CT ABDOMEN PELVIS WO CONTRAST Additional Contrast? None   Final Result   1. Very heterogenous appearance of the liver with sequela of portal   hypertension, varices, splenomegaly, and ascites. The amount of ascites and   general edema of the intra-fat are increased from the recent exam.  The   actual volume of ascites is still small. 2.  Cholelithiasis with thickened wall of the gallbladder. However   thickening could relate to the general edematous state and surrounding   ascites. Can consider gallbladder ultrasound if symptomatic. 3.  No bowel obstruction or pneumoperitoneum. There is no hydronephrosis   with a stable calculus at the right kidney. 4.  Mild compression deformity upper endplate of T8 without a significant   loss of vertebral body height. Features suggest this is chronic. XR CHEST PORTABLE   Final Result   Mild cardiomegaly      Hypoinflation with mild bibasilar atelectasis or early infiltrates which is   more apparent. Assessment/Plan :      1. Severe hypomagnesemia   Persistently low on mag and getting multiple iv infusions   Likely GI losses and poor nutritional  intake   Will check urine mag and urine creatinine     2. Anemia . Blood loss from varices. S/p banding   Hb stable now     3.  Alcoholic liver cirrhosis   GI on board     Encourage oral intake   Give iv magnesium         D/w primary team      Thank you for allowing us to participate in care of Cisco Silverio         Electronically signed by: Bakari Swartz MD, 11/16/2022, 11:13 AM      [facility] [address]  Office : [phone]  Fax :[phone]

## 2022-11-16 NOTE — PROGRESS NOTES
[facility] PROGRESS NOTE    11/16/2022 8:58 AM        Name: Octavio Monique .               Admitted: 11/8/2022  Primary Care Provider: Shane Landeros MD (Tel: [phone])        Subjective:    No bleeding or black stool no chest pain fever or chills    Reviewed interval ancillary notes    Current Medications  magnesium sulfate 4000 mg in 100 mL IVPB premix, Once  0.9 % sodium chloride infusion, PRN  nadolol (CORGARD) tablet 20 mg, Nightly  Vitamin D (CHOLECALCIFEROL) tablet 5,000 Units, Daily  magic (miracle) mouthwash with nystatin, 4x daily  midodrine (PROAMATINE) tablet 5 mg, TID  sodium chloride flush 0.9 % injection 5-40 mL, 2 times per day  sodium chloride flush 0.9 % injection 5-40 mL, PRN  0.9 % sodium chloride infusion, PRN  [Held by provider] enoxaparin (LOVENOX) injection 40 mg, Daily  ondansetron (ZOFRAN-ODT) disintegrating tablet 4 mg, Q8H PRN   Or  ondansetron (ZOFRAN) injection 4 mg, Q6H PRN  polyethylene glycol (GLYCOLAX) packet 17 g, Daily PRN  oxyCODONE (ROXICODONE) immediate release tablet 5 mg, Q6H PRN  furosemide (LASIX) tablet 20 mg, Daily  spironolactone (ALDACTONE) tablet 901 mg, Daily  folic acid (FOLVITE) tablet 1 mg, Daily  zolpidem (AMBIEN) tablet 5 mg, Nightly PRN  lactulose (CHRONULAC) 10 GM/15ML solution 20 g, BID  rifAXIMin (XIFAXAN) tablet 550 mg, BID  pantoprazole (PROTONIX) tablet 40 mg, QAM AC  cefTRIAXone (ROCEPHIN) 1,000 mg in dextrose 5 % 50 mL IVPB mini-bag, Q24H  0.9 % sodium chloride infusion, PRN      Objective:  /68   Pulse 63   Temp 98.5 °F (36.9 °C) (Oral)   Resp 16   Ht 5' 4\" (1.626 m)   Wt 117 lb 15.1 oz (53.5 kg)   SpO2 96%   BMI 20.25 kg/m²     Intake/Output Summary (Last 24 hours) at 11/16/2022 0858  Last data filed at 11/15/2022 1621  Gross per 24 hour   Intake 346.25 ml   Output --   Net 346.25 ml        Wt Readings from Last 3 Encounters:   11/10/22 117 lb 15.1 oz (53.5 kg)   10/29/22 107 lb 4.8 oz (48.7 kg)   10/17/22 105 lb (47.6 kg)       General appearance:  Appears comfortable. AAOx3  HEENT: atraumatic, Pupils equal, muscous membranes moist, no masses appreciated  Cardiovascular: Regular rate and rhythm no murmurs appreciated  Respiratory: CTAB no wheezing  Gastrointestinal: Abdomen soft, non-tender, BS+  EXT: no edema  Neurology: no gross focal deficts  Psychiatry: Appropriate affect. Not agitated  Skin: Warm, dry, no rashes appreciated    Labs and Tests:  CBC:   Recent Labs     11/14/22  0508 11/15/22  0649 11/15/22  1707 11/16/22  0748   WBC 3.6* 3.3*  --  4.2   HGB 7.4* 6.6* 8.1* 8.1*   PLT 76* 73*  --  81*       BMP:    Recent Labs     11/14/22  0508 11/15/22  0649 11/16/22  0748    136 136  135*   K 3.8 4.3 4.2  4.2    100 100  99   CO2 29 28 27  24   BUN 16 14 13  13   CREATININE <0.5* <0.5* <0.5*  <0.5*   GLUCOSE 136* 136* 137*  130*       Hepatic:   Recent Labs     11/14/22  0508 11/15/22  0649 11/16/22  0748   AST 72* 68* 71*   ALT 30 27 28   BILITOT 16.3* 14.5* 16.2*   ALKPHOS 90 88 99       CT ABDOMEN PELVIS WO CONTRAST Additional Contrast? None   Final Result   1. Very heterogenous appearance of the liver with sequela of portal   hypertension, varices, splenomegaly, and ascites. The amount of ascites and   general edema of the intra-fat are increased from the recent exam.  The   actual volume of ascites is still small. 2.  Cholelithiasis with thickened wall of the gallbladder. However   thickening could relate to the general edematous state and surrounding   ascites. Can consider gallbladder ultrasound if symptomatic. 3.  No bowel obstruction or pneumoperitoneum. There is no hydronephrosis   with a stable calculus at the right kidney. 4.  Mild compression deformity upper endplate of T8 without a significant   loss of vertebral body height. Features suggest this is chronic.          XR CHEST PORTABLE   Final Result Mild cardiomegaly      Hypoinflation with mild bibasilar atelectasis or early infiltrates which is   more apparent. Recent imaging reviewed    Problem List  Principal Problem:    Acute on chronic anemia  Active Problems:    Elevated LFTs    Ascites due to alcoholic cirrhosis (HCC)    Sinus tachycardia    Severe anemia    Alcoholic cirrhosis (HCC)    UTI (urinary tract infection)    Moderate malnutrition (HCC)    Esophageal varices (HCC)  Resolved Problems:    * No resolved hospital problems. *       Assessment & Plan:   Acute on chronic anemia secondary to blood loss anemia secondary to acute gi bleed with esophageal varix, s/p egd with banding  - s/p 3 units  -  monitor transfuse to keep? 7    Decompensated Alcoholic cirrhosis  - continue meds    UTI:   - iv atbx    Hypomagnesemia: give 4 grams iv today get nephro input       Diet: ADULT DIET; Easy to Chew  ADULT ORAL NUTRITION SUPPLEMENT; Breakfast, Lunch, Dinner; Clear Liquid Oral Supplement  Code:Full Code        Aaron Babinski, MD   11/16/2022 8:58 AM

## 2022-11-16 NOTE — PROGRESS NOTES
Assumed care of patient at this time from [facility]. Agree with assessment, pt with call light in reach.

## 2022-11-17 LAB
A/G RATIO: 1.1 (ref 1.1–2.2)
ACANTHOCYTES: ABNORMAL
ALBUMIN SERPL-MCNC: 2.9 G/DL (ref 3.4–5)
ALP BLD-CCNC: 85 U/L (ref 40–129)
ALT SERPL-CCNC: 27 U/L (ref 10–40)
ANION GAP SERPL CALCULATED.3IONS-SCNC: 9 MMOL/L (ref 3–16)
ANISOCYTOSIS: ABNORMAL
AST SERPL-CCNC: 74 U/L (ref 15–37)
BASOPHILS ABSOLUTE: 0 K/UL (ref 0–0.2)
BASOPHILS RELATIVE PERCENT: 0 %
BILIRUB SERPL-MCNC: 14.5 MG/DL (ref 0–1)
BUN BLDV-MCNC: 13 MG/DL (ref 7–20)
BURR CELLS: ABNORMAL
CALCIUM SERPL-MCNC: 10.1 MG/DL (ref 8.3–10.6)
CHLORIDE BLD-SCNC: 98 MMOL/L (ref 99–110)
CO2: 28 MMOL/L (ref 21–32)
CREAT SERPL-MCNC: 0.5 MG/DL (ref 0.6–1.1)
CREATININE URINE: 30.5 MG/DL (ref 28–259)
EOSINOPHILS ABSOLUTE: 0.2 K/UL (ref 0–0.6)
EOSINOPHILS RELATIVE PERCENT: 5 %
GFR SERPL CREATININE-BSD FRML MDRD: >60 ML/MIN/{1.73_M2}
GLUCOSE BLD-MCNC: 127 MG/DL (ref 70–99)
HCT VFR BLD CALC: 21.1 % (ref 36–48)
HEMATOLOGY PATH CONSULT: NO
HEMOGLOBIN: 7.6 G/DL (ref 12–16)
INR BLD: 2.65 (ref 0.87–1.14)
LYMPHOCYTES ABSOLUTE: 0.9 K/UL (ref 1–5.1)
LYMPHOCYTES RELATIVE PERCENT: 26 %
MAGNESIUM URINE: 32.8 MG/DL (ref 4.1–13.8)
MAGNESIUM: 1.4 MG/DL (ref 1.8–2.4)
MCH RBC QN AUTO: 33.9 PG (ref 26–34)
MCHC RBC AUTO-ENTMCNC: 35.9 G/DL (ref 31–36)
MCV RBC AUTO: 94.3 FL (ref 80–100)
MONOCYTES ABSOLUTE: 0.2 K/UL (ref 0–1.3)
MONOCYTES RELATIVE PERCENT: 5 %
NEUTROPHILS ABSOLUTE: 2.2 K/UL (ref 1.7–7.7)
NEUTROPHILS RELATIVE PERCENT: 64 %
PDW BLD-RTO: 26.7 % (ref 12.4–15.4)
PLATELET # BLD: 79 K/UL (ref 135–450)
PLATELET SLIDE REVIEW: ABNORMAL
PMV BLD AUTO: 8.6 FL (ref 5–10.5)
POIKILOCYTES: ABNORMAL
POTASSIUM SERPL-SCNC: 4.5 MMOL/L (ref 3.5–5.1)
PROTHROMBIN TIME: 28.4 SEC (ref 11.7–14.5)
RBC # BLD: 2.24 M/UL (ref 4–5.2)
SLIDE REVIEW: ABNORMAL
SODIUM BLD-SCNC: 135 MMOL/L (ref 136–145)
TARGET CELLS: ABNORMAL
TOTAL PROTEIN: 5.5 G/DL (ref 6.4–8.2)
WBC # BLD: 3.5 K/UL (ref 4–11)

## 2022-11-17 PROCEDURE — 2580000003 HC RX 258: Performed by: STUDENT IN AN ORGANIZED HEALTH CARE EDUCATION/TRAINING PROGRAM

## 2022-11-17 PROCEDURE — 85025 COMPLETE CBC W/AUTO DIFF WBC: CPT

## 2022-11-17 PROCEDURE — 99233 SBSQ HOSP IP/OBS HIGH 50: CPT | Performed by: INTERNAL MEDICINE

## 2022-11-17 PROCEDURE — 6360000002 HC RX W HCPCS: Performed by: STUDENT IN AN ORGANIZED HEALTH CARE EDUCATION/TRAINING PROGRAM

## 2022-11-17 PROCEDURE — 97535 SELF CARE MNGMENT TRAINING: CPT

## 2022-11-17 PROCEDURE — 36415 COLL VENOUS BLD VENIPUNCTURE: CPT

## 2022-11-17 PROCEDURE — 6370000000 HC RX 637 (ALT 250 FOR IP): Performed by: STUDENT IN AN ORGANIZED HEALTH CARE EDUCATION/TRAINING PROGRAM

## 2022-11-17 PROCEDURE — 83735 ASSAY OF MAGNESIUM: CPT

## 2022-11-17 PROCEDURE — 6360000002 HC RX W HCPCS: Performed by: INTERNAL MEDICINE

## 2022-11-17 PROCEDURE — 6370000000 HC RX 637 (ALT 250 FOR IP): Performed by: INTERNAL MEDICINE

## 2022-11-17 PROCEDURE — 80053 COMPREHEN METABOLIC PANEL: CPT

## 2022-11-17 PROCEDURE — 2580000003 HC RX 258: Performed by: INTERNAL MEDICINE

## 2022-11-17 PROCEDURE — 1200000000 HC SEMI PRIVATE

## 2022-11-17 PROCEDURE — 85610 PROTHROMBIN TIME: CPT

## 2022-11-17 PROCEDURE — 6370000000 HC RX 637 (ALT 250 FOR IP): Performed by: PHYSICIAN ASSISTANT

## 2022-11-17 RX ORDER — MAGNESIUM SULFATE IN WATER 40 MG/ML
4000 INJECTION, SOLUTION INTRAVENOUS ONCE
Status: COMPLETED | OUTPATIENT
Start: 2022-11-17 | End: 2022-11-17

## 2022-11-17 RX ORDER — LANOLIN ALCOHOL/MO/W.PET/CERES
400 CREAM (GRAM) TOPICAL 2 TIMES DAILY
Status: DISCONTINUED | OUTPATIENT
Start: 2022-11-17 | End: 2022-11-18 | Stop reason: HOSPADM

## 2022-11-17 RX ADMIN — FUROSEMIDE 20 MG: 20 TABLET ORAL at 07:54

## 2022-11-17 RX ADMIN — RIFAXIMIN 550 MG: 550 TABLET ORAL at 20:34

## 2022-11-17 RX ADMIN — OXYCODONE 5 MG: 5 TABLET ORAL at 14:03

## 2022-11-17 RX ADMIN — SPIRONOLACTONE 100 MG: 25 TABLET ORAL at 07:53

## 2022-11-17 RX ADMIN — OXYCODONE 5 MG: 5 TABLET ORAL at 20:52

## 2022-11-17 RX ADMIN — FOLIC ACID 1 MG: 1 TABLET ORAL at 07:53

## 2022-11-17 RX ADMIN — MAGNESIUM SULFATE HEPTAHYDRATE 4000 MG: 40 INJECTION, SOLUTION INTRAVENOUS at 09:55

## 2022-11-17 RX ADMIN — CEFTRIAXONE SODIUM 1000 MG: 1 INJECTION, POWDER, FOR SOLUTION INTRAMUSCULAR; INTRAVENOUS at 20:43

## 2022-11-17 RX ADMIN — DIPHENHYDRAMINE HYDROCHLORIDE 5 ML: 12.5 LIQUID ORAL at 17:01

## 2022-11-17 RX ADMIN — Medication 400 MG: at 09:52

## 2022-11-17 RX ADMIN — LACTULOSE 20 G: 20 SOLUTION ORAL at 07:53

## 2022-11-17 RX ADMIN — LACTULOSE 20 G: 20 SOLUTION ORAL at 20:35

## 2022-11-17 RX ADMIN — OXYCODONE 5 MG: 5 TABLET ORAL at 07:53

## 2022-11-17 RX ADMIN — DIPHENHYDRAMINE HYDROCHLORIDE 5 ML: 12.5 LIQUID ORAL at 14:03

## 2022-11-17 RX ADMIN — PANTOPRAZOLE SODIUM 40 MG: 40 TABLET, DELAYED RELEASE ORAL at 05:44

## 2022-11-17 RX ADMIN — Medication 400 MG: at 20:35

## 2022-11-17 RX ADMIN — MIDODRINE HYDROCHLORIDE 5 MG: 5 TABLET ORAL at 17:00

## 2022-11-17 RX ADMIN — DIPHENHYDRAMINE HYDROCHLORIDE 5 ML: 12.5 LIQUID ORAL at 07:55

## 2022-11-17 RX ADMIN — Medication 5000 UNITS: at 07:53

## 2022-11-17 RX ADMIN — Medication 10 ML: at 20:37

## 2022-11-17 RX ADMIN — MIDODRINE HYDROCHLORIDE 5 MG: 5 TABLET ORAL at 12:00

## 2022-11-17 RX ADMIN — Medication 10 ML: at 07:54

## 2022-11-17 RX ADMIN — RIFAXIMIN 550 MG: 550 TABLET ORAL at 07:53

## 2022-11-17 RX ADMIN — MIDODRINE HYDROCHLORIDE 5 MG: 5 TABLET ORAL at 07:54

## 2022-11-17 RX ADMIN — DIPHENHYDRAMINE HYDROCHLORIDE 5 ML: 12.5 LIQUID ORAL at 20:31

## 2022-11-17 ASSESSMENT — PAIN DESCRIPTION - LOCATION
LOCATION: GENERALIZED

## 2022-11-17 ASSESSMENT — PAIN - FUNCTIONAL ASSESSMENT: PAIN_FUNCTIONAL_ASSESSMENT: ACTIVITIES ARE NOT PREVENTED

## 2022-11-17 ASSESSMENT — PAIN DESCRIPTION - PAIN TYPE: TYPE: ACUTE PAIN

## 2022-11-17 ASSESSMENT — PAIN SCALES - GENERAL
PAINLEVEL_OUTOF10: 9
PAINLEVEL_OUTOF10: 8

## 2022-11-17 ASSESSMENT — PAIN DESCRIPTION - ORIENTATION: ORIENTATION: MID

## 2022-11-17 ASSESSMENT — PAIN DESCRIPTION - DESCRIPTORS: DESCRIPTORS: DISCOMFORT

## 2022-11-17 NOTE — PROGRESS NOTES
[facility] PROGRESS NOTE    11/17/2022 11:17 AM        Name: Nanette Melendez .               Admitted: 11/8/2022  Primary Care Provider: Dayana Nobles MD (Tel: [phone])        Subjective:    No nausea vomiting chest pain fever chills    Reviewed interval ancillary notes    Current Medications  magnesium sulfate 4000 mg in 100 mL IVPB premix, Once  magnesium oxide (MAG-OX) tablet 400 mg, BID  0.9 % sodium chloride infusion, PRN  nadolol (CORGARD) tablet 20 mg, Nightly  Vitamin D (CHOLECALCIFEROL) tablet 5,000 Units, Daily  magic (miracle) mouthwash with nystatin, 4x daily  midodrine (PROAMATINE) tablet 5 mg, TID  sodium chloride flush 0.9 % injection 5-40 mL, 2 times per day  sodium chloride flush 0.9 % injection 5-40 mL, PRN  0.9 % sodium chloride infusion, PRN  [Held by provider] enoxaparin (LOVENOX) injection 40 mg, Daily  ondansetron (ZOFRAN-ODT) disintegrating tablet 4 mg, Q8H PRN   Or  ondansetron (ZOFRAN) injection 4 mg, Q6H PRN  polyethylene glycol (GLYCOLAX) packet 17 g, Daily PRN  oxyCODONE (ROXICODONE) immediate release tablet 5 mg, Q6H PRN  furosemide (LASIX) tablet 20 mg, Daily  spironolactone (ALDACTONE) tablet 555 mg, Daily  folic acid (FOLVITE) tablet 1 mg, Daily  zolpidem (AMBIEN) tablet 5 mg, Nightly PRN  lactulose (CHRONULAC) 10 GM/15ML solution 20 g, BID  rifAXIMin (XIFAXAN) tablet 550 mg, BID  pantoprazole (PROTONIX) tablet 40 mg, QAM AC  cefTRIAXone (ROCEPHIN) 1,000 mg in dextrose 5 % 50 mL IVPB mini-bag, Q24H  0.9 % sodium chloride infusion, PRN      Objective:  /64   Pulse 59   Temp 98.7 °F (37.1 °C) (Oral)   Resp 16   Ht 5' 4\" (1.626 m)   Wt 117 lb 15.1 oz (53.5 kg)   SpO2 93%   BMI 20.25 kg/m²     Intake/Output Summary (Last 24 hours) at 11/17/2022 1117  Last data filed at 11/17/2022 0956  Gross per 24 hour   Intake 459.74 ml   Output --   Net 459.74 ml        Wt Readings from Last 3 Encounters:   11/10/22 117 lb 15.1 oz (53.5 kg)   10/29/22 107 lb 4.8 oz (48.7 kg)   10/17/22 105 lb (47.6 kg)       General appearance:  Appears comfortable. AAOx3  HEENT: atraumatic, Pupils equal, muscous membranes moist, no masses appreciated  Cardiovascular: Regular rate and rhythm no murmurs appreciated  Respiratory: CTAB no wheezing  Gastrointestinal: Abdomen soft, non-tender, BS+  EXT: no edema  Neurology: no gross focal deficts  Psychiatry: Appropriate affect. Not agitated  Skin: Warm, dry, no rashes appreciated    Labs and Tests:  CBC:   Recent Labs     11/15/22  0649 11/15/22  1707 11/16/22  0748 11/17/22  0732   WBC 3.3*  --  4.2 3.5*   HGB 6.6* 8.1* 8.1* 7.6*   PLT 73*  --  81* 79*       BMP:    Recent Labs     11/15/22  0649 11/16/22  0748 11/17/22  0732    136  135* 135*   K 4.3 4.2  4.2 4.5    100  99 98*   CO2 28 27  24 28   BUN 14 13  13 13   CREATININE <0.5* <0.5*  <0.5* 0.5*   GLUCOSE 136* 137*  130* 127*       Hepatic:   Recent Labs     11/15/22  0649 11/16/22  0748 11/17/22  0732   AST 68* 71* 74*   ALT 27 28 27   BILITOT 14.5* 16.2* 14.5*   ALKPHOS 88 99 85       CT ABDOMEN PELVIS WO CONTRAST Additional Contrast? None   Final Result   1. Very heterogenous appearance of the liver with sequela of portal   hypertension, varices, splenomegaly, and ascites. The amount of ascites and   general edema of the intra-fat are increased from the recent exam.  The   actual volume of ascites is still small. 2.  Cholelithiasis with thickened wall of the gallbladder. However   thickening could relate to the general edematous state and surrounding   ascites. Can consider gallbladder ultrasound if symptomatic. 3.  No bowel obstruction or pneumoperitoneum. There is no hydronephrosis   with a stable calculus at the right kidney. 4.  Mild compression deformity upper endplate of T8 without a significant   loss of vertebral body height. Features suggest this is chronic. XR CHEST PORTABLE   Final Result   Mild cardiomegaly      Hypoinflation with mild bibasilar atelectasis or early infiltrates which is   more apparent. Recent imaging reviewed    Problem List  Principal Problem:    Acute on chronic anemia  Active Problems:    Elevated LFTs    Ascites due to alcoholic cirrhosis (HCC)    Sinus tachycardia    Severe anemia    Alcoholic cirrhosis (HCC)    UTI (urinary tract infection)    Moderate malnutrition (HCC)    Esophageal varices (HCC)  Resolved Problems:    * No resolved hospital problems. *       Assessment & Plan:   Acute on chronic anemia secondary to blood loss anemia secondary to acute gi bleed with esophageal varix, s/p egd with banding  - s/p 3 units  -  monitor transfuse to keep? 7    Decompensated Alcoholic cirrhosis  - continue meds    UTI:   - iv atbx    Hypomagnesemia: giver another 4 grams iv mag started oral mag nephro work up pending      Diet: ADULT DIET; Easy to [address]; Breakfast, Lunch, Dinner; Clear Liquid Oral Supplement  Code:Full Code        Cheryl Paez MD   11/17/2022 11:17 AM

## 2022-11-17 NOTE — PROGRESS NOTES
Shift assessment completed. Routine vitals stable. Scheduled medications given. Patient is awake, alert and oriented. Respirations are easy and unlabored. Patient c/o generalized pain, PRN roxicodone administered upon request. Pt currently lying in bed, expresses no needs. Call light in reach. Pt independent in room, steady gait observed.

## 2022-11-17 NOTE — PLAN OF CARE
Problem: Discharge Planning  Goal: Discharge to home or other facility with appropriate resources  Outcome: Progressing     Problem: Pain  Goal: Verbalizes/displays adequate comfort level or baseline comfort level  Outcome: Progressing  Flowsheets  Taken 11/17/2022 0000  Verbalizes/displays adequate comfort level or baseline comfort level: Encourage patient to monitor pain and request assistance  Taken 11/16/2022 2015  Verbalizes/displays adequate comfort level or baseline comfort level: Encourage patient to monitor pain and request assistance     Problem: Safety - Adult  Goal: Free from fall injury  Outcome: Progressing     Problem: ABCDS Injury Assessment  Goal: Absence of physical injury  Outcome: Progressing     Problem: Skin/Tissue Integrity - Adult  Goal: Skin integrity remains intact  Outcome: Progressing     Problem: Hematologic - Adult  Goal: Maintains hematologic stability  Outcome: Progressing     Problem: Musculoskeletal - Adult  Goal: Return mobility to safest level of function  Outcome: Progressing     Problem: Skin/Tissue Integrity  Goal: Absence of new skin breakdown  Description: 1. Monitor for areas of redness and/or skin breakdown  2. Assess vascular access sites hourly  3. Every 4-6 hours minimum:  Change oxygen saturation probe site  4. Every 4-6 hours:  If on nasal continuous positive airway pressure, respiratory therapy assess nares and determine need for appliance change or resting period.   Outcome: Progressing     Problem: Nutrition Deficit:  Goal: Optimize nutritional status  Outcome: Progressing

## 2022-11-17 NOTE — PROGRESS NOTES
[facility]   Phone: [phone]    Occupational Therapy    [] Initial Evaluation            [x] Daily Treatment Note         [] Discharge Summary      Patient: Ricki Salazar   : 1975   MRN: [id number]   Date of Service:  2022    Admitting Diagnosis:  Acute on chronic anemia  Current Admission Summary: 40 y.o. female with history of alcoholic cirrhosis of the liver and chronically jaundiced  She lives she is currently staying at [address] [place] [address]  She was most recently hospitalized here 10/21-10/29 for severe anemia, hepatic encephalopathy and complicated UTI  She now presents to the ER with complaint of generalized weakness for the past 2 to 3 days  She went to participate with physical therapy today but she was profoundly fatigued she realized that something was wrong  She is now subsequently, she was sent from [address] [place] [address] where she is staying with abnormal labs  Here in the ED her hemoglobin was 5.6 therefore 2 units of PRBC was ordered  On interview, patient appears chronically ill but she is not encephalopathy and able to provide history  She complains of headache but has no other complaints or pain elsewhere on the body  Past Medical History:  has a past medical history of Alcoholic liver disease ([address] Anemia, and History of blood transfusion. Past Surgical History:  has a past surgical history that includes Upper gastrointestinal endoscopy (N/A, 2021); Upper gastrointestinal endoscopy (N/A, 03/10/2021); Tubal ligation; Colonoscopy (N/A, 3/11/2021); Upper gastrointestinal endoscopy (N/A, 2021); Upper gastrointestinal endoscopy (N/A, 2022); Knee Arthroplasty; Upper gastrointestinal endoscopy (N/A, 2022); Upper gastrointestinal endoscopy (N/A, 2022); CT BIOPSY BONE MARROW (2022); Upper gastrointestinal endoscopy (N/A, 10/26/2022); and Upper gastrointestinal endoscopy (N/A, 11/10/2022).     Discharge Recommendations: Luis Meraz scored a 19/24 on the AM-PAC ADL Inpatient form. Current research shows that an AM-PAC score of 17 or less is typically not associated with a discharge to the patient's home setting. Based on the patient's AM-PAC score and their current ADL deficits, it is recommended that the patient have 3-5 sessions per week of Occupational Therapy at d/c to increase the patient's independence. Please see assessment section for further patient specific details. If patient discharges prior to next session this note will serve as a discharge summary. Please see below for the latest assessment towards goals. DME Required For Discharge: DME to be determined at next level of care    Precautions/Restrictions: high fall risk, up as tolerated  Weight Bearing Restrictions: no restrictions  [] Right Upper Extremity  [] Left Upper Extremity [] Right Lower Extremity  [] Left Lower Extremity     Required Braces/Orthotics: no braces required   [] Right  [] Left  Positional Restrictions:no positional restrictions    Pre-Admission Information   Lives With: alone                     Type of Home:  duplex  Home Layout: one level  Home Access: level entry  Bathroom Layout: tub/shower unit  Bathroom Equipment:  none  Toilet Height: standard height  Home Equipment: no prior equipment  Transfer Assistance: Independent without use of device  Ambulation Assistance:Independent without use of device  ADL Assistance: independent with all ADL's  IADL Assistance: independent with homemaking tasks  Active :        [] Yes                 [x] No  Hand Dominance: [] Left                 [x] Right  Current Employment: unemployed, lost job in April, due to medical issues  Hobbies: spending time with daughters  Recent Falls: found down from last admission         Subjective  General: Patient semi-fowlers upon arrival, agreeable to OT session.  Pt reporting frustration with insurance and missing funds from her bank account, states she had just been crying. Pain: Patient does not rate upon questioning \"Hurts the same place it always does\"  Pain Interventions: RN notified of patient request for pain medication and repositioned  Pain meds not yet due     Activities of Daily Living  Basic Activities of Daily Living  Feeding: Independent  Upper Extremity Dressing: stand by assistance  Lower Extremity Dressing: stand by assistance  Dressing Comments: Pt donned sweatpants semi-fowlers in bed, zip-up sweatshirt and slip-on shoes in stance at EOB  General Comments: Patient with slow pacing and c/o fatigue with minimal exertion. Pt declined all other offered ADLs this date. Instrumental Activities of Daily Living  No IADL completed on this date. Functional Mobility  Bed Mobility  Supine to Sit: stand by assistance  Scooting: stand by assistance  Comments: Slow, guarded pacing  Transfers  Sit to stand transfer:stand by assistance  Stand to sit transfer: stand by assistance  Stand step transfer: contact guard assistance  Comments: No device    Functional Mobility:  Sitting Balance: supervision, stand by assistance. Standing Balance: contact guard assistance. Functional Mobility: .  contact guard assistance  Functional Mobility Activity: Read ambulation ~50 + 30 feet  Functional Mobility Device Use: no device  Functional Mobility Comment: Pt preferred to use IV pole for UB stability versus RW to ambulate in read this date.  Unsteady and slow gait, no LOB, c/o fatigue and LE weakness     Other Therapeutic Interventions    Functional Outcomes  -PAC Inpatient Daily Activity Raw Score: 19    Cognition  Overall Cognitive Status: Impaired  Arousal/Alterness: delayed responses to stimuli  Memory: decreased recall of recent events  Safety Judgement: decreased awareness of need for assistance  Problem Solving: decreased awareness of errors  Initiation: does not require cues  Sequencing: does not require cues  Orientation:    alert and oriented x 4  Command Following:   [facility]     Education  Barriers To Learning: emotional and physical  Patient Education: patient educated on goals, OT role and benefits, plan of care, discharge recommendations  Learning Assessment:  patient verbalizes understanding, would benefit from continued reinforcement, patient will require reinforcement due to cognitive deficits    Assessment  Activity Tolerance: Fair, poor activity tolerance with minimal exertion. Impairments Requiring Therapeutic Intervention: decreased functional mobility, decreased ADL status, decreased cognition, decreased endurance, decreased balance, decreased posture  Prognosis: fair  Clinical Assessment: Patient present with the above deficits, which are below baseline, and would benefit from continued skilled OT to address  Safety Interventions: patient left in chair, chair alarm in place, call light within reach, gait belt, and nurse notified    Plan  Frequency: 3-5 x/per week  Current Treatment Recommendations: strengthening, balance training, functional mobility training, transfer training, endurance training, patient/caregiver education, ADL/self-care training, IADL training, and safety education    Goals  Patient Goals:  To return home   Short Term Goals:  Time Frame: Upon discharge  Patient will complete lower body ADL at modified independent SBA 11/17  Patient will complete toileting at modified independent not addressed 11/17  Patient will complete grooming at supervision not addressed 11/17  Patient will complete functional transfers at supervision SBA 11/17  Patient will complete functional mobility at supervision [id number] 11/17  Patient will increase functional standing balance to 5 minutes supervision for improved ADL completion Continue 11/17    Continue Goals 11/17  Therapy Session Time     Individual Group Co-treatment   Time In 1306     Time Out 1329     Minutes 23          Timed Code Treatment Minutes:   23 Minutes  Total Treatment Minutes:  23 Minutes       Electronically Signed By: TYLER Navarro/[id number]    After reviewing treatment documentation, I am in agreement with this session.     DOMINICK Pacheco OTR/L [id number]

## 2022-11-17 NOTE — PROGRESS NOTES
Office : [phone]     Fax :[phone]       Nephrology progress  Note      Patient's Name: Jakob Ren  9:00 AM  11/17/2022    Reason for Consult:  persistent hypomagnesemia       Requesting Physician:  Dr. Palma James       Chief Complaint:    Chief Complaint   Patient presents with    Hypotension     Pt brought by  EMS from [place] [address]. Nursing home called for high ammonia 131, low hgb 5.9. pt BP 93/36 when EMS took it. Pt was given 200 ML of NS. Pt states has been feeling weak since Sunday. Pt hx liver failure            History of Present iIlness:    Jakob Ren is a 40 y.o. female with prior history of alcoholic liver cirrhosis who was admitted from [facility]. She now presents to the ER with complaint of generalized weakness for the past 2 to 3 days  She went to participate with physical therapy today but she was profoundly fatigued she realized that something was wrong  In  the ED her hemoglobin was 5.6  and she received 2 units of PRBC    Since admission she has been persistently low on magnesium. Has received iv magnesium multiple times. Interval hx     Low oral intake   Low mag       I/O last 3 completed shifts:   In: 219.7 [P.O.:120; IV Piggyback:99.7]  Out: -     Past Medical History:   Diagnosis Date    Alcoholic liver disease ([address]     Anemia     History of blood transfusion        Past Surgical History:   Procedure Laterality Date    COLONOSCOPY N/A 3/11/2021    COLONOSCOPY POLYPECTOMY SNARE/COLD BIOPSY performed by Real Wilson MD at [address] [facility]    CT BONE MARROW BIOPSY  7/5/2022    CT BONE MARROW BIOPSY 7/5/2022 Jadon Orozco MD [facility] CT SCAN    KNEE ARTHROPLASTY      TUBAL LIGATION      ESSURE    UPPER GASTROINTESTINAL ENDOSCOPY N/A 01/30/2021    EGD DIAGNOSTIC ONLY performed by Lakisha Cantu MD at [facility] N/A 03/10/2021    EGD BIOPSY performed by Najma Costa MD at [facility] 6/27/2021    EGD BAND LIGATION performed by Lokesh Yuen MD at [facility] N/A 4/27/2022    EGD DIAGNOSTIC ONLY performed by Xuan Gomez MD at [facility] N/A 7/1/2022    EGD ESOPHAGOGASTRODUODENOSCOPY ULTRASOUND performed by Diana Fishman MD at [facility]/A 7/1/2022    EGD BAND LIGATION performed by Diana Fishman MD at [facility] N/A 10/26/2022    EGD DIAGNOSTIC ONLY performed by Jacob Stone MD at [facility] N/A 11/10/2022    EGD BAND LIGATION performed by Diana Fishman MD at [address] [place] [address]       Family History   Problem Relation Age of Onset    Alcohol Abuse Father         reports that she has quit smoking. She has never used smokeless tobacco. She reports that she does not currently use alcohol. She reports that she does not use drugs.         Allergies:  Oxycodone    Current Medications:    0.9 % sodium chloride infusion, PRN  nadolol (CORGARD) tablet 20 mg, Nightly  Vitamin D (CHOLECALCIFEROL) tablet 5,000 Units, Daily  magic (miracle) mouthwash with nystatin, 4x daily  midodrine (PROAMATINE) tablet 5 mg, TID  sodium chloride flush 0.9 % injection 5-40 mL, 2 times per day  sodium chloride flush 0.9 % injection 5-40 mL, PRN  0.9 % sodium chloride infusion, PRN  [Held by provider] enoxaparin (LOVENOX) injection 40 mg, Daily  ondansetron (ZOFRAN-ODT) disintegrating tablet 4 mg, Q8H PRN   Or  ondansetron (ZOFRAN) injection 4 mg, Q6H PRN  polyethylene glycol (GLYCOLAX) packet 17 g, Daily PRN  oxyCODONE (ROXICODONE) immediate release tablet 5 mg, Q6H PRN  furosemide (LASIX) tablet 20 mg, Daily  spironolactone (ALDACTONE) tablet 465 mg, Daily  folic acid (FOLVITE) tablet 1 mg, Daily  zolpidem (AMBIEN) tablet 5 mg, Nightly PRN  lactulose (CHRONULAC) 10 GM/15ML solution 20 g, BID  rifAXIMin (XIFAXAN) tablet 550 mg, BID  pantoprazole (PROTONIX) tablet 40 mg, QAM AC  cefTRIAXone (ROCEPHIN) 1,000 mg in dextrose 5 % 50 mL IVPB mini-bag, Q24H  0.9 % sodium chloride infusion, PRN      Review of Systems:   14 point ROS obtained but were negative except mentioned in HPI      Physical exam:     Vitals:  /62   Pulse 64   Temp 98.8 °F (37.1 °C) (Oral)   Resp 16   Ht 5' 4\" (1.626 m)   Wt 117 lb 15.1 oz (53.5 kg)   SpO2 92%   BMI 20.25 kg/m²   Constitutional:  OAA X3 NAD  Skin: no rash, turgor wnl  Heent:  eomi, mmm  Neck: no bruits or jvd noted  Cardiovascular:  S1, S2 without m/r/g  Respiratory: CTA B without w/r/r  Abdomen:  +bs, soft, nt, nd  Ext: no  lower extremity edema  Psychiatric: mood and affect appropriate  Musculoskeletal:  Rom, muscular strength intact    Labs:  CBC:   Recent Labs     11/15/22  0649 11/15/22  1707 11/16/22  0748 11/17/22  0732   WBC 3.3*  --  4.2 3.5*   HGB 6.6* 8.1* 8.1* 7.6*   PLT 73*  --  81* 79*     BMP:    Recent Labs     11/15/22  0649 11/16/22  0748 11/17/22  0732    136  135* 135*   K 4.3 4.2  4.2 4.5    100  99 98*   CO2 28 27  24 28   BUN 14 13  13 13   CREATININE <0.5* <0.5*  <0.5* 0.5*   GLUCOSE 136* 137*  130* 127*     Ca/Mg/Phos:   Recent Labs     11/15/22  0649 11/15/22  0852 11/16/22  0748 11/17/22  0732   CALCIUM 9.2  --  9.8  9.6 10.1   MG  --  1.30* 1.40*  1.40* 1.40*     Hepatic:   Recent Labs     11/15/22  0649 11/16/22  0748 11/17/22  0732   AST 68* 71* 74*   ALT 27 28 27   BILITOT 14.5* 16.2* 14.5*   ALKPHOS 88 99 85     Troponin: No results for input(s): TROPONINI in the last 72 hours. BNP: No results for input(s): BNP in the last 72 hours.   Lipids: No results for input(s): CHOL, TRIG, HDL, LDLCALC, LABVLDL in the last 72 hours.  ABGs: No results for input(s): PHART, PO2ART, JZY3HHM in the last 72 hours. INR:   Recent Labs     11/15/22  0649 11/16/22  0748 11/17/22  0732   INR 2.51* 2.33* 2.65*     UA:No results for input(s): Sydell Southerly, GLUCOSEU, BILIRUBINUR, Millie Becca, BLOODU, PHUR, PROTEINU, UROBILINOGEN, NITRU, LEUKOCYTESUR, Rhesa Sauger in the last 72 hours. Urine Microscopic: No results for input(s): LABCAST, BACTERIA, COMU, HYALCAST, WBCUA, RBCUA, EPIU in the last 72 hours. Urine Culture: No results for input(s): LABURIN in the last 72 hours. Urine Chemistry:   Recent Labs     11/16/22  1605   LABCREA 30.5                IMAGING:  CT ABDOMEN PELVIS WO CONTRAST Additional Contrast? None   Final Result   1. Very heterogenous appearance of the liver with sequela of portal   hypertension, varices, splenomegaly, and ascites. The amount of ascites and   general edema of the intra-fat are increased from the recent exam.  The   actual volume of ascites is still small. 2.  Cholelithiasis with thickened wall of the gallbladder. However   thickening could relate to the general edematous state and surrounding   ascites. Can consider gallbladder ultrasound if symptomatic. 3.  No bowel obstruction or pneumoperitoneum. There is no hydronephrosis   with a stable calculus at the right kidney. 4.  Mild compression deformity upper endplate of T8 without a significant   loss of vertebral body height. Features suggest this is chronic. XR CHEST PORTABLE   Final Result   Mild cardiomegaly      Hypoinflation with mild bibasilar atelectasis or early infiltrates which is   more apparent. Assessment/Plan :      1. Severe hypomagnesemia   Persistently low on mag and getting multiple iv infusions   Likely GI losses and poor nutritional  intake    check urine mag and urine creatinine     2. Anemia . Blood loss from varices. S/p banding   Hb stable now     3.  Alcoholic liver cirrhosis GI on board     Encourage oral intake   Give iv magnesium today   Add pO mag also     Monitor electrolytes daily         D/w primary team      Thank you for allowing us to participate in care of [facility]         Electronically signed by: Tracy Pham MD, 11/17/2022, 9:00 AM      [facility] [address]  Office : [phone]  Fax :[phone]

## 2022-11-17 NOTE — PLAN OF CARE
Problem: Discharge Planning  Goal: Discharge to home or other facility with appropriate resources  11/17/2022 0959 by Shaunna Mathews RN  Outcome: Progressing  11/17/2022 0438 by Jessie Henoa  Outcome: Progressing     Problem: Pain  Goal: Verbalizes/displays adequate comfort level or baseline comfort level  11/17/2022 0959 by Shaunna Mathews RN  Outcome: Progressing  11/17/2022 0438 by Jessie Henao  Outcome: Progressing  Flowsheets  Taken 11/17/2022 0000  Verbalizes/displays adequate comfort level or baseline comfort level: Encourage patient to monitor pain and request assistance  Taken 11/16/2022 2015  Verbalizes/displays adequate comfort level or baseline comfort level: Encourage patient to monitor pain and request assistance     Problem: Safety - Adult  Goal: Free from fall injury  11/17/2022 0959 by Shaunna Mathews RN  Outcome: Progressing  11/17/2022 0438 by Jessie Henao  Outcome: Progressing     Problem: ABCDS Injury Assessment  Goal: Absence of physical injury  11/17/2022 0959 by Shaunna Mathews RN  Outcome: Progressing  11/17/2022 0438 by Jessie Henao  Outcome: Progressing     Problem: Skin/Tissue Integrity - Adult  Goal: Skin integrity remains intact  11/17/2022 0959 by Shaunna Mathews RN  Outcome: Progressing  11/17/2022 0438 by Jessie Henao  Outcome: Progressing     Problem: Hematologic - Adult  Goal: Maintains hematologic stability  11/17/2022 0959 by Shaunna Mathews RN  Outcome: Progressing  11/17/2022 0438 by Jessie Henao  Outcome: Progressing     Problem: Musculoskeletal - Adult  Goal: Return mobility to safest level of function  11/17/2022 0959 by Shaunna Mathews RN  Outcome: Progressing  11/17/2022 0438 by Jessie Henao  Outcome: Progressing     Problem: Skin/Tissue Integrity  Goal: Absence of new skin breakdown  Description: 1. Monitor for areas of redness and/or skin breakdown  2. Assess vascular access sites hourly  3.   Every 4-6 hours minimum:  Change oxygen saturation probe site  4. Every 4-6 hours:  If on nasal continuous positive airway pressure, respiratory therapy assess nares and determine need for appliance change or resting period.   11/17/2022 0959 by Roberto Narvaez RN  Outcome: Progressing  11/17/2022 0438 by Dewayne Notice  Outcome: Progressing     Problem: Nutrition Deficit:  Goal: Optimize nutritional status  11/17/2022 0959 by Roberto Narvaez RN  Outcome: Progressing  11/17/2022 0438 by Dewayne Notice  Outcome: Progressing

## 2022-11-17 NOTE — PROGRESS NOTES
Shift assessment completed. VSS. Schedule meds given. Pt resting in bed. Respirations are easy and unlabored. Pt denies pain throughout the night. No needs expressed at this time. Bed in position and call light within reach.

## 2022-11-18 VITALS
RESPIRATION RATE: 18 BRPM | DIASTOLIC BLOOD PRESSURE: 58 MMHG | HEART RATE: 68 BPM | OXYGEN SATURATION: 95 % | SYSTOLIC BLOOD PRESSURE: 96 MMHG | HEIGHT: 64 IN | BODY MASS INDEX: 20.14 KG/M2 | TEMPERATURE: 97.3 F | WEIGHT: 117.95 LBS

## 2022-11-18 LAB
A/G RATIO: 1.1 (ref 1.1–2.2)
ACANTHOCYTES: ABNORMAL
ALBUMIN SERPL-MCNC: 3.2 G/DL (ref 3.4–5)
ALP BLD-CCNC: 105 U/L (ref 40–129)
ALT SERPL-CCNC: 31 U/L (ref 10–40)
ANION GAP SERPL CALCULATED.3IONS-SCNC: 8 MMOL/L (ref 3–16)
ANISOCYTOSIS: ABNORMAL
AST SERPL-CCNC: 82 U/L (ref 15–37)
BANDED NEUTROPHILS RELATIVE PERCENT: 5 % (ref 0–7)
BASOPHILS ABSOLUTE: 0 K/UL (ref 0–0.2)
BASOPHILS RELATIVE PERCENT: 0 %
BILIRUB SERPL-MCNC: 15 MG/DL (ref 0–1)
BUN BLDV-MCNC: 13 MG/DL (ref 7–20)
BURR CELLS: ABNORMAL
CALCIUM SERPL-MCNC: 10.2 MG/DL (ref 8.3–10.6)
CHLORIDE BLD-SCNC: 98 MMOL/L (ref 99–110)
CO2: 29 MMOL/L (ref 21–32)
CREAT SERPL-MCNC: 0.6 MG/DL (ref 0.6–1.1)
EOSINOPHILS ABSOLUTE: 0.1 K/UL (ref 0–0.6)
EOSINOPHILS RELATIVE PERCENT: 2 %
GFR SERPL CREATININE-BSD FRML MDRD: >60 ML/MIN/{1.73_M2}
GLUCOSE BLD-MCNC: 132 MG/DL (ref 70–99)
HCT VFR BLD CALC: 22.3 % (ref 36–48)
HEMOGLOBIN: 8 G/DL (ref 12–16)
INR BLD: 2.4 (ref 0.87–1.14)
LYMPHOCYTES ABSOLUTE: 0.6 K/UL (ref 1–5.1)
LYMPHOCYTES RELATIVE PERCENT: 16 %
MAGNESIUM: 1.9 MG/DL (ref 1.8–2.4)
MCH RBC QN AUTO: 34 PG (ref 26–34)
MCHC RBC AUTO-ENTMCNC: 36 G/DL (ref 31–36)
MCV RBC AUTO: 94.3 FL (ref 80–100)
METAMYELOCYTES RELATIVE PERCENT: 1 %
MONOCYTES ABSOLUTE: 0.6 K/UL (ref 0–1.3)
MONOCYTES RELATIVE PERCENT: 17 %
MYELOCYTE PERCENT: 1 %
NEUTROPHILS ABSOLUTE: 2.3 K/UL (ref 1.7–7.7)
NEUTROPHILS RELATIVE PERCENT: 58 %
PDW BLD-RTO: 27.7 % (ref 12.4–15.4)
PLATELET # BLD: 76 K/UL (ref 135–450)
PLATELET SLIDE REVIEW: ABNORMAL
PMV BLD AUTO: 8.2 FL (ref 5–10.5)
POIKILOCYTES: ABNORMAL
POTASSIUM SERPL-SCNC: 3.9 MMOL/L (ref 3.5–5.1)
PROTHROMBIN TIME: 26.2 SEC (ref 11.7–14.5)
RBC # BLD: 2.36 M/UL (ref 4–5.2)
SCHISTOCYTES: ABNORMAL
SLIDE REVIEW: ABNORMAL
SODIUM BLD-SCNC: 135 MMOL/L (ref 136–145)
TOTAL PROTEIN: 6.1 G/DL (ref 6.4–8.2)
WBC # BLD: 3.6 K/UL (ref 4–11)

## 2022-11-18 PROCEDURE — 6360000002 HC RX W HCPCS: Performed by: INTERNAL MEDICINE

## 2022-11-18 PROCEDURE — 36415 COLL VENOUS BLD VENIPUNCTURE: CPT

## 2022-11-18 PROCEDURE — 83735 ASSAY OF MAGNESIUM: CPT

## 2022-11-18 PROCEDURE — 6370000000 HC RX 637 (ALT 250 FOR IP): Performed by: INTERNAL MEDICINE

## 2022-11-18 PROCEDURE — 85025 COMPLETE CBC W/AUTO DIFF WBC: CPT

## 2022-11-18 PROCEDURE — 6370000000 HC RX 637 (ALT 250 FOR IP): Performed by: PHYSICIAN ASSISTANT

## 2022-11-18 PROCEDURE — 99232 SBSQ HOSP IP/OBS MODERATE 35: CPT | Performed by: INTERNAL MEDICINE

## 2022-11-18 PROCEDURE — 6370000000 HC RX 637 (ALT 250 FOR IP): Performed by: STUDENT IN AN ORGANIZED HEALTH CARE EDUCATION/TRAINING PROGRAM

## 2022-11-18 PROCEDURE — 2580000003 HC RX 258: Performed by: INTERNAL MEDICINE

## 2022-11-18 PROCEDURE — 85610 PROTHROMBIN TIME: CPT

## 2022-11-18 PROCEDURE — 80053 COMPREHEN METABOLIC PANEL: CPT

## 2022-11-18 RX ORDER — NADOLOL 20 MG/1
20 TABLET ORAL NIGHTLY
Qty: 30 TABLET | Refills: 3 | Status: ON HOLD | OUTPATIENT
Start: 2022-11-18 | End: 2022-11-28 | Stop reason: HOSPADM

## 2022-11-18 RX ORDER — LANOLIN ALCOHOL/MO/W.PET/CERES
400 CREAM (GRAM) TOPICAL 2 TIMES DAILY
Qty: 30 TABLET | Refills: 1 | Status: ON HOLD | OUTPATIENT
Start: 2022-11-18 | End: 2022-11-28 | Stop reason: SDUPTHER

## 2022-11-18 RX ORDER — MIDODRINE HYDROCHLORIDE 5 MG/1
5 TABLET ORAL 3 TIMES DAILY
Qty: 90 TABLET | Refills: 3 | Status: ON HOLD | OUTPATIENT
Start: 2022-11-18 | End: 2022-11-28 | Stop reason: SDUPTHER

## 2022-11-18 RX ADMIN — MIDODRINE HYDROCHLORIDE 5 MG: 5 TABLET ORAL at 08:20

## 2022-11-18 RX ADMIN — LACTULOSE 20 G: 20 SOLUTION ORAL at 08:19

## 2022-11-18 RX ADMIN — SPIRONOLACTONE 100 MG: 25 TABLET ORAL at 08:19

## 2022-11-18 RX ADMIN — MIDODRINE HYDROCHLORIDE 5 MG: 5 TABLET ORAL at 12:39

## 2022-11-18 RX ADMIN — Medication 5000 UNITS: at 08:19

## 2022-11-18 RX ADMIN — PANTOPRAZOLE SODIUM 40 MG: 40 TABLET, DELAYED RELEASE ORAL at 05:35

## 2022-11-18 RX ADMIN — DIPHENHYDRAMINE HYDROCHLORIDE 5 ML: 12.5 LIQUID ORAL at 12:39

## 2022-11-18 RX ADMIN — RIFAXIMIN 550 MG: 550 TABLET ORAL at 08:20

## 2022-11-18 RX ADMIN — Medication 400 MG: at 08:19

## 2022-11-18 RX ADMIN — Medication 10 ML: at 08:20

## 2022-11-18 RX ADMIN — FOLIC ACID 1 MG: 1 TABLET ORAL at 08:20

## 2022-11-18 RX ADMIN — FUROSEMIDE 20 MG: 20 TABLET ORAL at 08:20

## 2022-11-18 RX ADMIN — DIPHENHYDRAMINE HYDROCHLORIDE 5 ML: 12.5 LIQUID ORAL at 08:19

## 2022-11-18 RX ADMIN — ENOXAPARIN SODIUM 40 MG: 100 INJECTION SUBCUTANEOUS at 08:19

## 2022-11-18 RX ADMIN — OXYCODONE 5 MG: 5 TABLET ORAL at 08:20

## 2022-11-18 RX ADMIN — ZOLPIDEM TARTRATE 5 MG: 5 TABLET ORAL at 00:14

## 2022-11-18 ASSESSMENT — PAIN SCALES - GENERAL
PAINLEVEL_OUTOF10: 9
PAINLEVEL_OUTOF10: 9

## 2022-11-18 ASSESSMENT — PAIN DESCRIPTION - ORIENTATION: ORIENTATION: MID

## 2022-11-18 ASSESSMENT — PAIN DESCRIPTION - LOCATION: LOCATION: GENERALIZED

## 2022-11-18 NOTE — PROGRESS NOTES
Office : [phone]     Fax :[phone]       Nephrology progress  Note      Patient's Name: Suzanne Quintana  10:59 AM  11/18/2022    Reason for Consult:  persistent hypomagnesemia       Requesting Physician:  Dr. Mely العلي       Chief Complaint:    Chief Complaint   Patient presents with    Hypotension     Pt brought by  EMS from [place] [address]. Nursing home called for high ammonia 131, low hgb 5.9. pt BP 93/36 when EMS took it. Pt was given 200 ML of NS. Pt states has been feeling weak since Sunday. Pt hx liver failure            History of Present iIlness:    Suzanne Qunitana is a 40 y.o. female with prior history of alcoholic liver cirrhosis who was admitted from [facility]. She now presents to the ER with complaint of generalized weakness for the past 2 to 3 days  She went to participate with physical therapy today but she was profoundly fatigued she realized that something was wrong  In  the ED her hemoglobin was 5.6  and she received 2 units of PRBC    Since admission she has been persistently low on magnesium. Has received iv magnesium multiple times. Interval hx     Low oral intake     Feeling better   Magnesium better         I/O last 3 completed shifts:   In: 565.7 [P.O.:480; I.V.:9.1; IV Piggyback:76.5]  Out: -     Past Medical History:   Diagnosis Date    Alcoholic liver disease ([address]     Anemia     History of blood transfusion        Past Surgical History:   Procedure Laterality Date    COLONOSCOPY N/A 3/11/2021    COLONOSCOPY POLYPECTOMY SNARE/COLD BIOPSY performed by Vanessa Dewitt MD at [address] [place] [address]    CT BONE MARROW BIOPSY  7/5/2022    CT BONE MARROW BIOPSY [facility]    KNEE ARTHROPLASTY      TUBAL LIGATION      ESSURE    UPPER GASTROINTESTINAL ENDOSCOPY N/A 01/30/2021    EGD DIAGNOSTIC ONLY performed by Omar Ortiz MD at [address] [place] [address] N/A 03/10/2021    EGD BIOPSY performed by Marlon Zaldivar MD at [address] [place] [address] 6/27/2021    EGD BAND LIGATION performed by Edna Alexander MD at [address] [place] [address] N/A 4/27/2022    EGD DIAGNOSTIC ONLY performed by Sven Arreguin MD at [address] [place] [address] N/A 7/1/2022    EGD ESOPHAGOGASTRODUODENOSCOPY ULTRASOUND performed by Stephanie Ramirez MD at [address] [place] [address] N/A 7/1/2022    EGD BAND LIGATION performed by Stephanie Ramirez MD at [address] [place] [address] N/A 10/26/2022    EGD DIAGNOSTIC ONLY performed by Winfield Curling, MD at [address] [place] [address] N/A 11/10/2022    EGD BAND LIGATION performed by Stephanie Ramirez MD at [address] [place] [address]       Family History   Problem Relation Age of Onset    Alcohol Abuse Father         reports that she has quit smoking. She has never used smokeless tobacco. She reports that she does not currently use alcohol. She reports that she does not use drugs.         Allergies:  Oxycodone    Current Medications:    magnesium oxide (MAG-OX) tablet 400 mg, BID  0.9 % sodium chloride infusion, PRN  nadolol (CORGARD) tablet 20 mg, Nightly  Vitamin D (CHOLECALCIFEROL) tablet 5,000 Units, Daily  magic (miracle) mouthwash with nystatin, 4x daily  midodrine (PROAMATINE) tablet 5 mg, TID  sodium chloride flush 0.9 % injection 5-40 mL, 2 times per day  sodium chloride flush 0.9 % injection 5-40 mL, PRN  0.9 % sodium chloride infusion, PRN  enoxaparin (LOVENOX) injection 40 mg, Daily  ondansetron (ZOFRAN-ODT) disintegrating tablet 4 mg, Q8H PRN   Or  ondansetron (ZOFRAN) injection 4 mg, Q6H PRN  polyethylene glycol (GLYCOLAX) packet 17 g, Daily PRN  oxyCODONE (ROXICODONE) immediate release tablet 5 mg, Q6H PRN  furosemide (LASIX) tablet 20 mg, Daily  spironolactone (ALDACTONE) tablet 315 mg, Daily  folic acid (FOLVITE) tablet 1 mg, Daily  zolpidem (AMBIEN) tablet 5 mg, Nightly PRN  lactulose (CHRONULAC) 10 GM/15ML solution 20 g, BID  rifAXIMin (XIFAXAN) tablet 550 mg, BID  pantoprazole (PROTONIX) tablet 40 mg, QAM AC  0.9 % sodium chloride infusion, PRN      Review of Systems:   14 point ROS obtained but were negative except mentioned in HPI      Physical exam:     Vitals:  /69   Pulse 69   Temp 98.7 °F (37.1 °C) (Oral)   Resp 18   Ht 5' 4\" (1.626 m)   Wt 117 lb 15.1 oz (53.5 kg)   SpO2 93%   BMI 20.25 kg/m²   Constitutional:  OAA X3 NAD  Skin: no rash, turgor wnl  Heent:  eomi, mmm  Neck: no bruits or jvd noted  Cardiovascular:  S1, S2 without m/r/g  Respiratory: CTA B without w/r/r  Abdomen:  +bs, soft, nt, nd  Ext: no  lower extremity edema  Psychiatric: mood and affect appropriate  Musculoskeletal:  Rom, muscular strength intact    Labs:  CBC:   Recent Labs     11/16/22  0748 11/17/22  0732 11/18/22  0807   WBC 4.2 3.5* 3.6*   HGB 8.1* 7.6* 8.0*   PLT 81* 79* 76*     BMP:    Recent Labs     11/16/22  0748 11/17/22  0732 11/18/22  0807     135* 135* 135*   K 4.2  4.2 4.5 3.9     99 98* 98*   CO2 27  24 28 29   BUN 13  13 13 13   CREATININE <0.5*  <0.5* 0.5* 0.6   GLUCOSE 137*  130* 127* 132*     Ca/Mg/Phos:   Recent Labs     11/16/22  0748 11/17/22  0732 11/18/22  0807   CALCIUM 9.8  9.6 10.1 10.2   MG 1.40*  1.40* 1.40* 1.90     Hepatic:   Recent Labs     11/16/22  0748 11/17/22  0732 11/18/22  0807   AST 71* 74* 82*   ALT 28 27 31   BILITOT 16.2* 14.5* 15.0*   ALKPHOS 99 85 105     Troponin: No results for input(s): TROPONINI in the last 72 hours. BNP: No results for input(s): BNP in the last 72 hours. Lipids: No results for input(s): CHOL, TRIG, HDL, LDLCALC, LABVLDL in the last 72 hours.   ABGs: No results for input(s): PHART, PO2ART, IOW3LWU in the last 72 hours. INR:   Recent Labs     11/16/22  0748 11/17/22  0732 11/18/22  0807   INR 2.33* 2.65* 2.40*     UA:No results for input(s): Feliz Mace, GLUCOSEU, BILIRUBINUR, [place], BLOODU, PHUR, PROTEINU, UROBILINOGEN, NITRU, LEUKOCYTESUR, [place] in the last 72 hours. Urine Microscopic: No results for input(s): LABCAST, BACTERIA, COMU, HYALCAST, WBCUA, RBCUA, EPIU in the last 72 hours. Urine Culture: No results for input(s): LABURIN in the last 72 hours. Urine Chemistry:   Recent Labs     11/16/22  1605   LABCREA 30.5                IMAGING:  CT ABDOMEN PELVIS WO CONTRAST Additional Contrast? None   Final Result   1. Very heterogenous appearance of the liver with sequela of portal   hypertension, varices, splenomegaly, and ascites. The amount of ascites and   general edema of the intra-fat are increased from the recent exam.  The   actual volume of ascites is still small. 2.  Cholelithiasis with thickened wall of the gallbladder. However   thickening could relate to the general edematous state and surrounding   ascites. Can consider gallbladder ultrasound if symptomatic. 3.  No bowel obstruction or pneumoperitoneum. There is no hydronephrosis   with a stable calculus at the right kidney. 4.  Mild compression deformity upper endplate of T8 without a significant   loss of vertebral body height. Features suggest this is chronic. XR CHEST PORTABLE   Final Result   Mild cardiomegaly      Hypoinflation with mild bibasilar atelectasis or early infiltrates which is   more apparent. Assessment/Plan :      1. Severe hypomagnesemia   Persistently low on mag and getting multiple iv infusions   Likely GI losses and poor nutritional  intake    check urine mag and urine creatinine     2. Anemia . Blood loss from varices. S/p banding   Hb stable now     3.  Alcoholic liver cirrhosis   GI on board     Encourage oral intake     Continue mag oxide 400 mg po bid         D/w primary team      Thank you for allowing us to participate in care of Jennifer Hdez         Electronically signed by: Kory Walls MD, 11/18/2022, 10:59 AM      [facility] [address]  Office : [phone]  Fax :[phone]

## 2022-11-18 NOTE — PLAN OF CARE
Problem: Discharge Planning  Goal: Discharge to home or other facility with appropriate resources  11/18/2022 1029 by Rohith Sue RN  Outcome: Progressing  11/18/2022 0421 by Shane Pino RN  Outcome: Progressing     Problem: Pain  Goal: Verbalizes/displays adequate comfort level or baseline comfort level  11/18/2022 1029 by Rohith Sue RN  Outcome: Progressing  11/18/2022 0421 by Shane Pino RN  Outcome: Progressing     Problem: Safety - Adult  Goal: Free from fall injury  11/18/2022 1029 by Rohith Sue RN  Outcome: Progressing  11/18/2022 0421 by Shane Pino RN  Outcome: Progressing     Problem: ABCDS Injury Assessment  Goal: Absence of physical injury  11/18/2022 1029 by Rohith Sue RN  Outcome: Progressing  11/18/2022 0421 by Shane Pino RN  Outcome: Progressing     Problem: Skin/Tissue Integrity - Adult  Goal: Skin integrity remains intact  11/18/2022 1029 by Rohith Sue RN  Outcome: Progressing  11/18/2022 0421 by Shane Pino RN  Outcome: Progressing     Problem: Hematologic - Adult  Goal: Maintains hematologic stability  11/18/2022 1029 by Rohith Sue RN  Outcome: Progressing  11/18/2022 0421 by Shane Pino RN  Outcome: Progressing     Problem: Musculoskeletal - Adult  Goal: Return mobility to safest level of function  11/18/2022 1029 by Rohith Sue RN  Outcome: Progressing  11/18/2022 0421 by Shane Pino RN  Outcome: Progressing     Problem: Skin/Tissue Integrity  Goal: Absence of new skin breakdown  Description: 1. Monitor for areas of redness and/or skin breakdown  2. Assess vascular access sites hourly  3. Every 4-6 hours minimum:  Change oxygen saturation probe site  4. Every 4-6 hours:  If on nasal continuous positive airway pressure, respiratory therapy assess nares and determine need for appliance change or resting period.   11/18/2022 1029 by Rohith Sue RN  Outcome: Progressing  11/18/2022 0421 by Shane Pino RN  Outcome: Progressing     Problem: Nutrition Deficit:  Goal: Optimize nutritional status  11/18/2022 1029 by Jennifer Scruggs RN  Outcome: Progressing  11/18/2022 0421 by Hakan Bocanegra RN  Outcome: Progressing

## 2022-11-18 NOTE — CARE COORDINATION
SW informed pt was ready for discharge to [facility]. Pt's POA will be picking up from the hospital and transporting patient to the facility. Called Nikole in admissions and informed POA will be here around 4pm today to pick pt up and take to SNF. Deanna Sales stated that she can pull pt's discharge paperwork from Epic so no need to fax. No HENS needed. All case management needs met at this time. [place]    [facility] [place]  [address]  Report = [phone]  Fax = [phone]    POA to transport to facility.     Electronically signed by ABY Zelaya, REAL on 11/18/2022 at 12:23 PM

## 2022-11-18 NOTE — PROGRESS NOTES
Shift assessment completed. Routine vitals obtained  Scheduled medications given. Patient is awake, alert and oriented. Respirations are easy and unlabored. Patient reporting pain that is generalized, PRN roxicodone administered upon request. No other needs expressed. Pt independent in room, bed alarm refusal sheet in chart. Call light in reach.

## 2022-11-18 NOTE — DISCHARGE INSTR - COC
Continuity of Care Form    Patient Name: Lady Ashby   :  1975  MRN:  [id number]    Admit date:  2022  Discharge date:  22    Code Status Order: Full Code   Advance Directives:   Advance Care Flowsheet Documentation       Date/Time Healthcare Directive Type of Healthcare Directive Copy in [address] Agent's Name Healthcare Agent's Phone Number    11/10/22 1353 No, patient does not have an advance directive for healthcare treatment -- -- -- -- --            Admitting Physician:  Kalia Ramos MD  PCP: Jayce Xiong MD    Discharging Nurse: [facility] Unit/Room#: 9UW-5674/1648-04  Discharging Unit Phone Number: [phone]    Emergency Contact:   Extended Emergency Contact Information  Primary Emergency Contact: [facility] Phone: [phone]  Mobile Phone: [phone]  Relation: Child  Secondary Emergency Contact: [employer]  [place] Phone: [phone]  Relation: Child  Preferred language: English    Past Surgical History:  Past Surgical History:   Procedure Laterality Date    COLONOSCOPY N/A 3/11/2021    COLONOSCOPY POLYPECTOMY SNARE/COLD BIOPSY performed by Kandis Araiza MD at [address] BIOPSY  2022    CT BONE MARROW BIOPSY 2022 Jane Sneed MD [facility] CT SCAN    KNEE ARTHROPLASTY      TUBAL LIGATION      ESSURE    UPPER GASTROINTESTINAL ENDOSCOPY N/A 2021    EGD DIAGNOSTIC ONLY performed by Alee Ramirez MD at [address] [place] [address] N/A 03/10/2021    EGD BIOPSY performed by Kandis Araiza MD at [address] [place] [address] 2021    EGD BAND LIGATION performed by Jersey Zhang MD at [address] [place] [address] N/A 2022    EGD DIAGNOSTIC ONLY performed by Gurpreet Duval MD at [address] [place] [address] N/A 2022    EGD ESOPHAGOGASTRODUODENOSCOPY ULTRASOUND performed by Matteo Chino MD at [address] [place] [address] ENDOSCOPY N/A 7/1/2022    EGD BAND LIGATION performed by Matteo Chino MD at [address] [place] [address] N/A 10/26/2022    EGD DIAGNOSTIC ONLY performed by Benjie Hi MD at [address] [place] [address] N/A 11/10/2022    EGD BAND LIGATION performed by Matteo Chino MD at [address] [place] [address]       Immunization History:   Immunization History   Administered Date(s) Administered    COVID-19, PFIZER GRAY top, DO NOT Dilute, (age 15 y+), IM, 30 mcg/0.3 mL 05/10/2022    COVID-19, PFIZER PURPLE top, DILUTE for use, (age 15 y+), 30mcg/0.3mL 04/01/2021, 04/22/2021    Influenza, FLUARIX, FLULAVAL, FLUZONE (age 10 mo+) AND AFLURIA, (age 1 y+), PF, 0.5mL 10/29/2022       Active Problems:  Patient Active Problem List   Diagnosis Code    GI bleed K92.2    Acute GI bleeding K92.2    Menorrhagia with regular cycle R10.0    Alcoholic liver disease ([facility] K70.9    Acute cholecystitis K81.0    Nausea and vomiting R11.2    Acute blood loss anemia D62    Esophageal varices (HCC) T79.49    Alcoholic hepatitis Y42.24    EZIO (acute kidney injury) ([facility] N17.9    High anion gap metabolic acidosis E79.18    Elevated LFTs R79.89    Ascites due to alcoholic cirrhosis ([facility] T16.18    Acute respiratory failure with hypoxia (HCC) J96.01    Sinus tachycardia R00.0    Severe anemia D64.9    Symptomatic anemia D64.9    Anemia D64.9    Cirrhosis of liver with ascites, unspecified hepatic cirrhosis type (HCC) K74.60, R18.8    Hepatic encephalopathy Z47.96    Alcoholic cirrhosis (HCC) Z35.67    UTI (urinary tract infection) N39.0    Moderate malnutrition (HCC) E44.0    Acute on chronic anemia D64.9       Isolation/Infection:   Isolation            No Isolation          Patient Infection Status       Infection Onset Added Last Indicated Last Indicated By Review Planned Expiration Resolved Resolved By    None active    Resolved    C-diff Rule Out 22 Clostridium difficile toxin/antigen (Ordered)   22             Nurse Assessment:  Last Vital Signs: /69   Pulse 69   Temp 98.7 °F (37.1 °C) (Oral)   Resp 18   Ht 5' 4\" (1.626 m)   Wt 117 lb 15.1 oz (53.5 kg)   SpO2 93%   BMI 20.25 kg/m²     Last documented pain score (0-10 scale): Pain Level: 9  Last Weight:   Wt Readings from Last 1 Encounters:   11/10/22 117 lb 15.1 oz (53.5 kg)     Mental Status:  oriented, alert, and intermittent confusion    IV Access:  - None    Nursing Mobility/ADLs:  Walking   Independent  Transfer  Independent  Bathing  Assisted  Dressing  Assisted  Toileting  Independent  Feeding  [address]  Independent  Med Delivery   whole    Wound Care Documentation and Therapy:        Elimination:  Continence: Bowel: Yes  Bladder: Yes  Urinary Catheter: None   Colostomy/Ileostomy/Ileal Conduit: No       Date of Last BM: 22    Intake/Output Summary (Last 24 hours) at 2022 1041  Last data filed at 2022 0823  Gross per 24 hour   Intake 385.65 ml   Output --   Net 385.65 ml     I/O last 3 completed shifts: In: 565.7 [P.O.:480; I.V.:9.1; IV Piggyback:76.5]  Out: -     Safety Concerns: At Risk for Falls    Impairments/Disabilities:      None    Nutrition Therapy:  Current Nutrition Therapy:   - Oral Diet:  General and easy to chew    Routes of Feeding: Oral  Liquids: No Restrictions  Daily Fluid Restriction: no  Last Modified Barium Swallow with Video (Video Swallowing Test): Unknown    Treatments at the Time of Hospital Discharge:   Respiratory Treatments: None  Oxygen Therapy:  is not on home oxygen therapy. Ventilator:    - No ventilator support    Rehab Therapies: Physical Therapy and Occupational Therapy  Weight Bearing Status/Restrictions: No weight bearing restrictions  Other Medical Equipment (for information only, NOT a DME order):     Other Treatments: N/A    Patient's personal belongings (please select all that are sent with patient):  Glasses, cell phone, shoes, shirt, pants    RN SIGNATURE:  Electronically signed by Clinton Litten, RN on 11/18/22 at 12:21 PM EST    CASE MANAGEMENT/SOCIAL WORK SECTION    Inpatient Status Date: 11/08/22    Readmission Risk Assessment Score:  Readmission Risk              Risk of Unplanned Readmission:  37           [place]ing to Facility/ Agency   [place]  [facility] [place]  [address] [place] [address]  Phone:  [phone]  Fax:  [phone]      / signature: Electronically signed by Soha Byrd MSW, LSW on 11/18/22 at 12:25 PM EST    PHYSICIAN SECTION    Prognosis: Fair    Condition at Discharge: Stable    Rehab Potential (if transferring to Rehab): Fair    Recommended Labs or Other Treatments After Discharge: Bi-weekly CBC    Physician Certification: I certify the above information and transfer of Ricki Salazar  is necessary for the continuing treatment of the diagnosis listed and that she requires [facility] for greater 30 days.      Update Admission H&P: No change in H&P    PHYSICIAN SIGNATURE:  Electronically signed by Jerod Sánchez MD on 11/18/22 at 10:41 AM EST

## 2022-11-18 NOTE — DISCHARGE SUMMARY
Hospital Medicine Discharge Summary    Patient: Nohelia Cintron     Gender: female  : 1975   Age: 40 y.o. MRN: [id number]    Admitting Physician: Brody Palomo MD  Discharge Physician: Natalie Lema MD     Code Status: Full Code     Admit Date: 2022   Discharge Date:   2022    Disposition:  Home  Time spent arranging discharge: 35 minutes    Discharge Diagnoses: Active Hospital Problems    Diagnosis Date Noted    Acute on chronic anemia [D64.9] 2022     Priority: Medium    Moderate malnutrition ([address] [E44.0] 10/28/2022     Priority: Medium    UTI (urinary tract infection) [N39.0] 10/25/2022     Priority: Medium    Alcoholic cirrhosis ([address] [S14.98] 10/24/2022     Priority: Medium    Severe anemia [D64.9] 2022     Priority: Medium    Elevated LFTs [R79.89] 2022     Priority: Medium    Ascites due to alcoholic cirrhosis ([address] [P37.02] 2022     Priority: Medium    Sinus tachycardia [R00.0] 2022     Priority: Medium    Esophageal varices ([address] [I85.00] 2021         Condition at Discharge:  Stable    Hospital Course: To hospital on with acute on chronic anemia secondary to blood loss anemia secondary to GI bleed with esophageal varices patient underwent EGD with banding required 3 units of hemoglobin hemoglobin remained stable thus far. Patient does have decompensated alcoholic cirrhosis refusing liver transplant continue home meds and follow-up with GI as outpatient patient had UTI treated with IV antibiotics patient hypomagnesemia required multiple rounds of IV magnesium has stabilized discharged on oral Mag-Ox we will check magnesium and BMP in 3 days at [facility]    Discharge Exam:    /69   Pulse 69   Temp 98.7 °F (37.1 °C) (Oral)   Resp 18   Ht 5' 4\" (1.626 m)   Wt 117 lb 15.1 oz (53.5 kg)   SpO2 93%   BMI 20.25 kg/m²   General appearance:  Appears comfortable.  AAOx3  HEENT: atraumatic, Pupils equal, muscous membranes moist, no masses appreciated  Cardiovascular: Regular rate and rhythm no murmurs appreciated  Respiratory: CTAB no wheezing  Gastrointestinal: Abdomen soft, non-tender, BS+  EXT: no edema  Neurology: no gross focal deficts  Psychiatry: Appropriate affect. Not agitated  Skin: Warm, dry, no rashes appreciated    Discharge Medications:   Current Discharge Medication List        START taking these medications    Details   magnesium oxide (MAG-OX) 400 (240 Mg) MG tablet Take 1 tablet by mouth 2 times daily  Qty: 30 tablet, Refills: 1      midodrine (PROAMATINE) 5 MG tablet Take 1 tablet by mouth in the morning, at noon, and at bedtime  Qty: 90 tablet, Refills: 3           Current Discharge Medication List        CONTINUE these medications which have CHANGED    Details   nadolol (CORGARD) 20 MG tablet Take 1 tablet by mouth at bedtime  Qty: 30 tablet, Refills: 3           Current Discharge Medication List        CONTINUE these medications which have NOT CHANGED    Details   zolpidem (AMBIEN) 5 MG tablet Take 5 mg by mouth nightly as needed for Sleep (every night at bedtmes as needed for insomnia). lactulose (CHRONULAC) 10 GM/15ML solution Take 30 mLs by mouth 2 times daily  Qty: 1 each, Refills: 0      folic acid (FOLVITE) 1 MG tablet Take 1 tablet by mouth daily  Qty: 30 tablet, Refills: 0      rifAXIMin (XIFAXAN) 550 MG tablet Take 1 tablet by mouth 2 times daily  Qty: 60 tablet, Refills: 1      furosemide (LASIX) 20 MG tablet Take 1 tablet by mouth in the morning.   Qty: 60 tablet, Refills: 3      ondansetron (ZOFRAN) 8 MG tablet Take 1 tablet by mouth      spironolactone (ALDACTONE) 100 MG tablet Take 1 tablet by mouth daily  Qty: 30 tablet, Refills: 1      pantoprazole (PROTONIX) 40 MG tablet Take 1 tablet by mouth every morning (before breakfast)  Qty: 30 tablet, Refills: 1      thiamine mononitrate (THIAMINE) 100 MG tablet Take 1 tablet by mouth daily  Qty: 30 tablet, Refills: 0           Current Discharge Medication List STOP taking these medications       thiamine 100 MG tablet Comments:   Reason for Stopping:               Labs: For convenience and continuity at follow-up the following most recent labs are provided:    Lab Results   Component Value Date/Time    WBC 3.6 11/18/2022 08:07 AM    HGB 8.0 11/18/2022 08:07 AM    HCT 22.3 11/18/2022 08:07 AM    MCV 94.3 11/18/2022 08:07 AM    PLT 76 11/18/2022 08:07 AM     11/18/2022 08:07 AM    K 3.9 11/18/2022 08:07 AM    K 4.2 11/16/2022 07:48 AM    CL 98 11/18/2022 08:07 AM    CO2 29 11/18/2022 08:07 AM    BUN 13 11/18/2022 08:07 AM    CREATININE 0.6 11/18/2022 08:07 AM    CALCIUM 10.2 11/18/2022 08:07 AM    PHOS 3.0 11/14/2022 05:08 AM    ALKPHOS 105 11/18/2022 08:07 AM    ALT 31 11/18/2022 08:07 AM    AST 82 11/18/2022 08:07 AM    BILITOT 15.0 11/18/2022 08:07 AM    BILIDIR 8.0 11/10/2022 04:37 AM    LABALBU 3.2 11/18/2022 08:07 AM     Lab Results   Component Value Date    INR 2.40 (H) 11/18/2022    INR 2.65 (H) 11/17/2022    INR 2.33 (H) 11/16/2022       Radiology:  CT ABDOMEN PELVIS WO CONTRAST Additional Contrast? None    Result Date: 11/8/2022  EXAMINATION: CT OF THE ABDOMEN AND PELVIS WITHOUT CONTRAST 11/8/2022 10:00 pm TECHNIQUE: CT of the abdomen and pelvis was performed without the administration of intravenous contrast. Multiplanar reformatted images are provided for review. Automated exposure control, iterative reconstruction, and/or weight based adjustment of the mA/kV was utilized to reduce the radiation dose to as low as reasonably achievable.  COMPARISON: 10/24/2022 HISTORY: ORDERING SYSTEM PROVIDED HISTORY: hypotension anemia liver failure TECHNOLOGIST PROVIDED HISTORY: Reason for exam:->hypotension anemia liver failure Additional Contrast?->None Decision Support Exception - unselect if not a suspected or confirmed emergency medical condition->Emergency Medical Condition (MA) Is the patient pregnant?->No Reason for Exam: hypotension anemia liver failure Relevant Medical/Surgical History: Hypotension (Pt brought by [facility] from [place]. Nursing home called for high ammonia 131, low hgb 5.9. pt BP 93/36 when EMS took it. Pt was given 200 ML of NS. Pt states has been feeling weak since Sunday. Pt hx liver failure ) FINDINGS: Lower Chest: The cardiac chambers are mildly enlarged. Dependent atelectatic changes and linear atelectasis in the lower lungs. There is no pleural effusion or pneumothorax. Organs: Lack of IV contrast reduces evaluation of the organs and vasculature. Very heterogenous appearance of the liver is again noted mineralized stones are present in the gallbladder and thickening of the gallbladder wall. However there is also ascites around the margin of the liver and gallbladder fossa, combined with general edema of the intra-abdominal fat. Splenomegaly is redemonstrated and appears stable. Small amount of ascites along the margin of the spleen. Limited evaluation of the pancreas with a general edema and may be atrophic. Engorged vessels and likely lymph nodes in the upper abdomen use the sensitivity of the pancreas. No obvious masses are seen at the adrenal fossa. No renal calculi or hydronephrosis the left kidney. Nonobstructing calculi and some mild scarring at the right kidney are stable. The ureters are not dilated. GI/Bowel: Stomach, small bowel, and colon are not dilated. Scattered ascites in the mesenteric folds, pericolonic gutters, and collecting in the pelvis. General edema of the intra-fat and mesentery. Engorged vessels and likely multiple lymph nodes scattered in the mesentery. There is no sign of pneumoperitoneum. The appendix is identified with small amounts of gas in the lumen and is not dilated. Mild fecal load within the colon from the cecum through the rectum without a large focal bolus. Pelvis: The urinary bladder is under distended reducing evaluation of the wall thickness. Uterus is still present.  Essure type tubal devices are noted. There is free fluid in the pelvis. Peritoneum/Retroperitoneum: No abdominal aortic aneurysm or high density hematoma. Multiple small lymph nodes down the retroperitoneum are again noted. Bones/Soft Tissues: Mild edema at the body wall. Recannulized vein at the umbilicus. Mild degenerative changes in the lumbar spine. There is a mild compression deformity of the upper endplate of T8 without a significant loss of vertebral body height. 1.  Very heterogenous appearance of the liver with sequela of portal hypertension, varices, splenomegaly, and ascites. The amount of ascites and general edema of the intra-fat are increased from the recent exam.  The actual volume of ascites is still small. 2.  Cholelithiasis with thickened wall of the gallbladder. However thickening could relate to the general edematous state and surrounding ascites. Can consider gallbladder ultrasound if symptomatic. 3.  No bowel obstruction or pneumoperitoneum. There is no hydronephrosis with a stable calculus at the right kidney. 4.  Mild compression deformity upper endplate of T8 without a significant loss of vertebral body height. Features suggest this is chronic. CT ABDOMEN PELVIS WO CONTRAST Additional Contrast? Oral    Result Date: 10/24/2022  EXAMINATION: CT OF THE ABDOMEN AND PELVIS WITHOUT CONTRAST 10/24/2022 2:00 pm TECHNIQUE: CT of the abdomen and pelvis was performed without the administration of intravenous contrast. Multiplanar reformatted images are provided for review. Automated exposure control, iterative reconstruction, and/or weight based adjustment of the mA/kV was utilized to reduce the radiation dose to as low as reasonably achievable. COMPARISON: 10/11/2022.  HISTORY: ORDERING SYSTEM PROVIDED HISTORY: Abdominal pain TECHNOLOGIST PROVIDED HISTORY: Reason for exam:->Abdominal pain Additional Contrast?->Oral Is the patient pregnant?->No Reason for Exam: Abdominal pain FINDINGS: Lower Chest: Linear atelectasis or scarring involves the lower lobes. There is no pleural effusion. Organs: The liver is heterogeneous and has a nodular contour. There are multiple stones in the gallbladder. The gallbladder again appears thickened. Recanalization of the umbilical vein and varices in the upper abdomen are again noted. The spleen is enlarged measuring up to 15 cm. The adrenal glands and pancreas are unremarkable. There is a new small amount of air in the right intrarenal collecting system. Stones at the lower pole the right kidney measure up to 5 mm. There is no ureteral stone or hydronephrosis. GI/Bowel: There is no bowel dilatation or bowel wall thickening. The stomach is unremarkable. The appendix appears normal. Pelvis: There is air in the bladder. Bladder is otherwise unremarkable. There is no abnormal adnexal mass. Peritoneum/Retroperitoneum: The abdominal aorta is normal in caliber. No enlarged lymph nodes are identified. No fat stranding, free fluid, free air or focal fluid collection is identified. Bones/Soft Tissues: No fracture or destructive bone lesion is identified. 1. New air in the bladder and right intrarenal collecting system suggesting a urinary tract infection with a gas-forming organism. Air in the bladder could also be iatrogenic. A fistula associated with bowel is considered less likely. 2. Hepatic cirrhosis and portal hypertension with splenomegaly, recanalization of the umbilical vein and varices in the upper abdomen. 3. Cholelithiasis and thickening of the wall of the gallbladder with no adjacent inflammatory changes. 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 Cholecystitis is considered less likely.      CT Head W/O Contrast    Result Date: 10/21/2022  EXAMINATION: CT OF THE HEAD WITHOUT CONTRAST  10/21/2022 6:06 pm TECHNIQUE: CT of the head was performed without the administration of intravenous contrast. Automated exposure control, iterative reconstruction, and/or weight based adjustment of the mA/kV was utilized to reduce the radiation dose to as low as reasonably achievable. COMPARISON: 10/11/2022. HISTORY: ORDERING SYSTEM PROVIDED HISTORY: altered mental status TECHNOLOGIST PROVIDED HISTORY: Reason for exam:->altered mental status Has a \"code stroke\" or \"stroke alert\" been called? ->No Decision Support Exception - unselect if not a suspected or confirmed emergency medical condition->Emergency Medical Condition (MA) Is the patient pregnant?->No Reason for Exam: ams FINDINGS: BRAIN/VENTRICLES: There is no acute intracranial hemorrhage, mass effect or midline shift. No abnormal extra-axial fluid collection. The gray-white differentiation is maintained without evidence of an acute infarct. There is no evidence of hydrocephalus. ORBITS: The visualized portion of the orbits demonstrate no acute abnormality. SINUSES: The visualized paranasal sinuses and mastoid air cells demonstrate no acute abnormality. SOFT TISSUES/SKULL:  No acute abnormality of the visualized skull or soft tissues. No acute intracranial findings. XR CHEST PORTABLE    Result Date: 11/8/2022  EXAMINATION: ONE XRAY VIEW OF THE CHEST 11/8/2022 9:10 pm COMPARISON: None. HISTORY: ORDERING SYSTEM PROVIDED HISTORY: hypotension TECHNOLOGIST PROVIDED HISTORY: Reason for exam:->hypotension Reason for Exam: hypotension FINDINGS: The heart is borderline enlarged and prominent. The pulmonary vessels are top-normal.  The lungs are hypoinflated with mild bibasilar interstitial linear densities which is more prominent. No effusion is seen. Mild cardiomegaly Hypoinflation with mild bibasilar atelectasis or early infiltrates which is more apparent.      XR CHEST PORTABLE    Result Date: 10/22/2022  EXAMINATION: ONE XRAY VIEW OF THE CHEST 10/22/2022 9:41 am COMPARISON: 10/21/2022 HISTORY: ORDERING SYSTEM PROVIDED HISTORY: follow up sob TECHNOLOGIST PROVIDED HISTORY: Reason for exam:->follow up sob Reason for Exam: follow up sob FINDINGS: The cardiac silhouette, mediastinal and hilar contours are normal.  The lungs are clear. There are no pleural effusions. No acute osseous abnormalities are identified. No acute cardiopulmonary disease. XR CHEST PORTABLE    Result Date: 10/21/2022  EXAMINATION: ONE XRAY VIEW OF THE CHEST 10/21/2022 6:45 pm COMPARISON: Chest 10/11/2022 HISTORY: ORDERING SYSTEM PROVIDED HISTORY: altered mental status FINDINGS: The cardiomediastinal and hilar silhouettes appear unremarkable. Low lung volumes result in vascular crowding underaeration particular parahilar regions and medial both lung bases. Possible abnormal pulmonary opacity medial lower left lung. The lungs appear otherwise clear. No pleural effusion evident. No pneumothorax is seen. No acute osseous abnormality is identified. 1. Technically suboptimal, expiratory phase respiration performed supine. 2. Cannot exclude pneumonia left lower lung. 3.  Follow-up full inspiration PA and lateral chest may be useful for better characterization of pulmonary findings once the patient's condition allows. US ABDOMEN LIMITED Specify organ? LIVER    Result Date: 10/22/2022  EXAMINATION: RIGHT UPPER QUADRANT ULTRASOUND 10/22/2022 11:29 am COMPARISON: CT abdomen pelvis dated 10/11/2022 HISTORY: ORDERING SYSTEM PROVIDED HISTORY: ultrasound eval ? enough ascities for paracentesis per d/w dr cavanaugh on call Interventional radiologits. TECHNOLOGIST PROVIDED HISTORY: Reason for exam:->ultrasound eval ? enough ascities for paracentesis per d/w dr cavanaugh on call Interventional radiologist Specify organ?->LIVER FINDINGS: 4 quadrant ultrasound evaluation of the peritoneal cavity demonstrates trace peritoneal fluid deep to the inferior edge of the liver. Percutaneous windows for sampling is extremely limited by bowel along the anterior abdominal wall as well as the liver parenchyma. Trace peritoneal fluid in the right upper quadrant, underlying the liver parenchyma. No safe percutaneous window for fluid sampling identified at this time.      EGD    Result Date: 11/10/2022  No dictation         Signed:    Natalie Lema MD   11/18/2022

## 2022-11-18 NOTE — PROGRESS NOTES
Physical Therapy  Micki Roth  PT treatment session attempted. The pt was on the phone with her bank and requested PT attempt to work with her at a later time. Pt does have a DC order. PT will follow-up with this pt as the schedule allows. Thanks.     Vickie Thompson, [address] [place] [address] DPT [id number]

## 2022-11-18 NOTE — PROGRESS NOTES
Night shift assessment completed. Routine vitals have been obtained. Scheduled medications given expect for beta blocker due to low pulse . Patient is awake, alert and oriented. Respirations are easy and unlabored no complaints of SOB. Skin has been assessed per writer and is in tact. IV site is c/d/I  Patient does not appear to be in distress. Call light within reach. Side table is in reach . PRN pain medication has been administered due to pain 8 put of 10 generalized . Non pharm interventions provided were repositioning, fluids p.o and conversation. All needs have been met at this time.

## 2022-11-18 NOTE — PLAN OF CARE
Problem: Discharge Planning  Goal: Discharge to home or other facility with appropriate resources  Outcome: Progressing     Problem: Pain  Goal: Verbalizes/displays adequate comfort level or baseline comfort level  Outcome: Progressing     Problem: Safety - Adult  Goal: Free from fall injury  Outcome: Progressing     Problem: ABCDS Injury Assessment  Goal: Absence of physical injury  Outcome: Progressing     Problem: Skin/Tissue Integrity - Adult  Goal: Skin integrity remains intact  Outcome: Progressing     Problem: Hematologic - Adult  Goal: Maintains hematologic stability  Outcome: Progressing     Problem: Musculoskeletal - Adult  Goal: Return mobility to safest level of function  Outcome: Progressing     Problem: Skin/Tissue Integrity  Goal: Absence of new skin breakdown  Description: 1. Monitor for areas of redness and/or skin breakdown  2. Assess vascular access sites hourly  3. Every 4-6 hours minimum:  Change oxygen saturation probe site  4. Every 4-6 hours:  If on nasal continuous positive airway pressure, respiratory therapy assess nares and determine need for appliance change or resting period.   Outcome: Progressing     Problem: Nutrition Deficit:  Goal: Optimize nutritional status  Outcome: Progressing

## 2022-11-18 NOTE — PROGRESS NOTES
This patient has had a discharge order placed. They are being [place] [facility] [place] up and transported by daughter. Discharge paperwork has been printed and faxed by [facility] completed by this RN. No further needs at this time. IV has been removed with no complications. Pt has all belongings present. 1625: Daughter taking pt down in wheelchair, no other needs expressed. Belongings on pt lap.

## 2022-11-18 NOTE — PROGRESS NOTES
Call made to [facility] to give report, no answer. Will reattempt.     [id number]: Attempted to call facility again, no answer.

## 2022-11-22 ENCOUNTER — APPOINTMENT (OUTPATIENT)
Dept: CT IMAGING | Age: 47
DRG: 280 | End: 2022-11-22
Payer: COMMERCIAL

## 2022-11-22 ENCOUNTER — APPOINTMENT (OUTPATIENT)
Dept: GENERAL RADIOLOGY | Age: 47
DRG: 280 | End: 2022-11-22
Payer: COMMERCIAL

## 2022-11-22 ENCOUNTER — HOSPITAL ENCOUNTER (INPATIENT)
Age: 47
LOS: 6 days | Discharge: HOSPICE | DRG: 280 | End: 2022-11-28
Attending: EMERGENCY MEDICINE | Admitting: INTERNAL MEDICINE
Payer: COMMERCIAL

## 2022-11-22 DIAGNOSIS — E72.20 HYPERAMMONEMIA (HCC): ICD-10-CM

## 2022-11-22 DIAGNOSIS — R27.8 ASTERIXIS: ICD-10-CM

## 2022-11-22 DIAGNOSIS — R10.84 GENERALIZED ABDOMINAL PAIN: ICD-10-CM

## 2022-11-22 DIAGNOSIS — R52 PAIN: ICD-10-CM

## 2022-11-22 DIAGNOSIS — D64.9 ANEMIA, UNSPECIFIED TYPE: Primary | ICD-10-CM

## 2022-11-22 DIAGNOSIS — F41.9 ANXIETY: ICD-10-CM

## 2022-11-22 PROBLEM — K72.90 DECOMPENSATED HEPATIC CIRRHOSIS (HCC): Status: ACTIVE | Noted: 2022-11-22

## 2022-11-22 PROBLEM — K74.60 DECOMPENSATED HEPATIC CIRRHOSIS (HCC): Status: ACTIVE | Noted: 2022-11-22

## 2022-11-22 LAB
A/G RATIO: 1.2 (ref 1.1–2.2)
ABO/RH: NORMAL
ACANTHOCYTES: ABNORMAL
ALBUMIN SERPL-MCNC: 3.5 G/DL (ref 3.4–5)
ALP BLD-CCNC: 112 U/L (ref 40–129)
ALT SERPL-CCNC: 32 U/L (ref 10–40)
AMMONIA: 121 UMOL/L (ref 11–51)
ANION GAP SERPL CALCULATED.3IONS-SCNC: 8 MMOL/L (ref 3–16)
ANTIBODY SCREEN: NORMAL
AST SERPL-CCNC: 79 U/L (ref 15–37)
BANDED NEUTROPHILS RELATIVE PERCENT: 1 % (ref 0–7)
BASOPHILS ABSOLUTE: 0 K/UL (ref 0–0.2)
BASOPHILS RELATIVE PERCENT: 0 %
BILIRUB SERPL-MCNC: 18.5 MG/DL (ref 0–1)
BUN BLDV-MCNC: 15 MG/DL (ref 7–20)
BURR CELLS: ABNORMAL
CALCIUM SERPL-MCNC: 9.4 MG/DL (ref 8.3–10.6)
CHLORIDE BLD-SCNC: 97 MMOL/L (ref 99–110)
CO2: 30 MMOL/L (ref 21–32)
CREAT SERPL-MCNC: 0.5 MG/DL (ref 0.6–1.1)
EOSINOPHILS ABSOLUTE: 0 K/UL (ref 0–0.6)
EOSINOPHILS RELATIVE PERCENT: 0 %
GFR SERPL CREATININE-BSD FRML MDRD: >60 ML/MIN/{1.73_M2}
GLUCOSE BLD-MCNC: 139 MG/DL (ref 70–99)
HCT VFR BLD CALC: 21.2 % (ref 36–48)
HEMOGLOBIN: 7.4 G/DL (ref 12–16)
INR BLD: 2.42 (ref 0.87–1.14)
LACTIC ACID, SEPSIS: 3.1 MMOL/L (ref 0.4–1.9)
LACTIC ACID, SEPSIS: 3.4 MMOL/L (ref 0.4–1.9)
LIPASE: 38 U/L (ref 13–60)
LYMPHOCYTES ABSOLUTE: 0.5 K/UL (ref 1–5.1)
LYMPHOCYTES RELATIVE PERCENT: 9 %
MCH RBC QN AUTO: 34.3 PG (ref 26–34)
MCHC RBC AUTO-ENTMCNC: 34.9 G/DL (ref 31–36)
MCV RBC AUTO: 98.3 FL (ref 80–100)
METAMYELOCYTES RELATIVE PERCENT: 2 %
MONOCYTES ABSOLUTE: 0.3 K/UL (ref 0–1.3)
MONOCYTES RELATIVE PERCENT: 6 %
MYELOCYTE PERCENT: 1 %
NEUTROPHILS ABSOLUTE: 4.7 K/UL (ref 1.7–7.7)
NEUTROPHILS RELATIVE PERCENT: 81 %
PDW BLD-RTO: 25.9 % (ref 12.4–15.4)
PLATELET # BLD: 78 K/UL (ref 135–450)
PLATELET SLIDE REVIEW: ABNORMAL
PMV BLD AUTO: 9.1 FL (ref 5–10.5)
POLYCHROMASIA: ABNORMAL
POTASSIUM REFLEX MAGNESIUM: 4.5 MMOL/L (ref 3.5–5.1)
PRO-BNP: 617 PG/ML (ref 0–124)
PROCALCITONIN: 0.29 NG/ML (ref 0–0.15)
PROTHROMBIN TIME: 26.4 SEC (ref 11.7–14.5)
RBC # BLD: 2.16 M/UL (ref 4–5.2)
SCHISTOCYTES: ABNORMAL
SLIDE REVIEW: ABNORMAL
SODIUM BLD-SCNC: 135 MMOL/L (ref 136–145)
TARGET CELLS: ABNORMAL
TOTAL PROTEIN: 6.5 G/DL (ref 6.4–8.2)
TROPONIN: <0.01 NG/ML
WBC # BLD: 5.5 K/UL (ref 4–11)

## 2022-11-22 PROCEDURE — C9113 INJ PANTOPRAZOLE SODIUM, VIA: HCPCS | Performed by: INTERNAL MEDICINE

## 2022-11-22 PROCEDURE — 85610 PROTHROMBIN TIME: CPT

## 2022-11-22 PROCEDURE — 87040 BLOOD CULTURE FOR BACTERIA: CPT

## 2022-11-22 PROCEDURE — 2580000003 HC RX 258: Performed by: INTERNAL MEDICINE

## 2022-11-22 PROCEDURE — 86900 BLOOD TYPING SEROLOGIC ABO: CPT

## 2022-11-22 PROCEDURE — 84145 PROCALCITONIN (PCT): CPT

## 2022-11-22 PROCEDURE — 96375 TX/PRO/DX INJ NEW DRUG ADDON: CPT

## 2022-11-22 PROCEDURE — 86923 COMPATIBILITY TEST ELECTRIC: CPT

## 2022-11-22 PROCEDURE — 6370000000 HC RX 637 (ALT 250 FOR IP)

## 2022-11-22 PROCEDURE — 83605 ASSAY OF LACTIC ACID: CPT

## 2022-11-22 PROCEDURE — 99285 EMERGENCY DEPT VISIT HI MDM: CPT

## 2022-11-22 PROCEDURE — 36415 COLL VENOUS BLD VENIPUNCTURE: CPT

## 2022-11-22 PROCEDURE — P9016 RBC LEUKOCYTES REDUCED: HCPCS

## 2022-11-22 PROCEDURE — 74177 CT ABD & PELVIS W/CONTRAST: CPT

## 2022-11-22 PROCEDURE — 85025 COMPLETE CBC W/AUTO DIFF WBC: CPT

## 2022-11-22 PROCEDURE — 86850 RBC ANTIBODY SCREEN: CPT

## 2022-11-22 PROCEDURE — 84484 ASSAY OF TROPONIN QUANT: CPT

## 2022-11-22 PROCEDURE — 86901 BLOOD TYPING SEROLOGIC RH(D): CPT

## 2022-11-22 PROCEDURE — 96374 THER/PROPH/DIAG INJ IV PUSH: CPT

## 2022-11-22 PROCEDURE — 71045 X-RAY EXAM CHEST 1 VIEW: CPT

## 2022-11-22 PROCEDURE — 6360000002 HC RX W HCPCS: Performed by: INTERNAL MEDICINE

## 2022-11-22 PROCEDURE — 6360000004 HC RX CONTRAST MEDICATION: Performed by: INTERNAL MEDICINE

## 2022-11-22 PROCEDURE — 93005 ELECTROCARDIOGRAM TRACING: CPT | Performed by: EMERGENCY MEDICINE

## 2022-11-22 PROCEDURE — 83690 ASSAY OF LIPASE: CPT

## 2022-11-22 PROCEDURE — 6370000000 HC RX 637 (ALT 250 FOR IP): Performed by: EMERGENCY MEDICINE

## 2022-11-22 PROCEDURE — 80053 COMPREHEN METABOLIC PANEL: CPT

## 2022-11-22 PROCEDURE — 70450 CT HEAD/BRAIN W/O DYE: CPT

## 2022-11-22 PROCEDURE — 6360000002 HC RX W HCPCS: Performed by: EMERGENCY MEDICINE

## 2022-11-22 PROCEDURE — 83880 ASSAY OF NATRIURETIC PEPTIDE: CPT

## 2022-11-22 PROCEDURE — 82140 ASSAY OF AMMONIA: CPT

## 2022-11-22 PROCEDURE — 1200000000 HC SEMI PRIVATE

## 2022-11-22 RX ORDER — LACTULOSE 10 G/15ML
10 SOLUTION ORAL
Status: ACTIVE | OUTPATIENT
Start: 2022-11-22 | End: 2022-11-22

## 2022-11-22 RX ORDER — PANTOPRAZOLE SODIUM 40 MG/10ML
40 INJECTION, POWDER, LYOPHILIZED, FOR SOLUTION INTRAVENOUS 2 TIMES DAILY
Status: DISCONTINUED | OUTPATIENT
Start: 2022-11-22 | End: 2022-11-28 | Stop reason: HOSPADM

## 2022-11-22 RX ORDER — LACTULOSE 10 G/15ML
30 SOLUTION ORAL ONCE
Status: COMPLETED | OUTPATIENT
Start: 2022-11-22 | End: 2022-11-22

## 2022-11-22 RX ORDER — LACTULOSE 10 G/15ML
SOLUTION ORAL
Status: COMPLETED
Start: 2022-11-22 | End: 2022-11-22

## 2022-11-22 RX ORDER — ONDANSETRON 2 MG/ML
4 INJECTION INTRAMUSCULAR; INTRAVENOUS
Status: DISCONTINUED | OUTPATIENT
Start: 2022-11-22 | End: 2022-11-23 | Stop reason: SDUPTHER

## 2022-11-22 RX ORDER — ACETAMINOPHEN 500 MG
500 TABLET ORAL EVERY 8 HOURS PRN
Status: DISCONTINUED | OUTPATIENT
Start: 2022-11-22 | End: 2022-11-28 | Stop reason: HOSPADM

## 2022-11-22 RX ORDER — MIDODRINE HYDROCHLORIDE 5 MG/1
5 TABLET ORAL 3 TIMES DAILY
Status: DISCONTINUED | OUTPATIENT
Start: 2022-11-23 | End: 2022-11-28 | Stop reason: HOSPADM

## 2022-11-22 RX ORDER — 0.9 % SODIUM CHLORIDE 0.9 %
1000 INTRAVENOUS SOLUTION INTRAVENOUS ONCE
Status: COMPLETED | OUTPATIENT
Start: 2022-11-22 | End: 2022-11-23

## 2022-11-22 RX ORDER — DICYCLOMINE HYDROCHLORIDE 10 MG/1
10 CAPSULE ORAL ONCE
Status: COMPLETED | OUTPATIENT
Start: 2022-11-22 | End: 2022-11-22

## 2022-11-22 RX ORDER — ACETAMINOPHEN 650 MG/1
650 SUPPOSITORY RECTAL EVERY 8 HOURS PRN
Status: DISCONTINUED | OUTPATIENT
Start: 2022-11-22 | End: 2022-11-28 | Stop reason: HOSPADM

## 2022-11-22 RX ORDER — ZOLPIDEM TARTRATE 5 MG/1
5 TABLET ORAL NIGHTLY PRN
Status: DISCONTINUED | OUTPATIENT
Start: 2022-11-22 | End: 2022-11-28 | Stop reason: HOSPADM

## 2022-11-22 RX ORDER — FENTANYL CITRATE 50 UG/ML
25 INJECTION, SOLUTION INTRAMUSCULAR; INTRAVENOUS
Status: COMPLETED | OUTPATIENT
Start: 2022-11-22 | End: 2022-11-23

## 2022-11-22 RX ADMIN — SODIUM CHLORIDE 1000 ML: 9 INJECTION, SOLUTION INTRAVENOUS at 23:32

## 2022-11-22 RX ADMIN — LACTULOSE 30 G: 20 SOLUTION ORAL at 20:48

## 2022-11-22 RX ADMIN — IOPAMIDOL 75 ML: 755 INJECTION, SOLUTION INTRAVENOUS at 22:39

## 2022-11-22 RX ADMIN — ONDANSETRON 4 MG: 2 INJECTION INTRAMUSCULAR; INTRAVENOUS at 20:51

## 2022-11-22 RX ADMIN — LACTULOSE 30 G: 10 SOLUTION ORAL at 20:48

## 2022-11-22 RX ADMIN — FENTANYL CITRATE 25 MCG: 0.05 INJECTION, SOLUTION INTRAMUSCULAR; INTRAVENOUS at 20:53

## 2022-11-22 RX ADMIN — DICYCLOMINE HYDROCHLORIDE 10 MG: 10 CAPSULE ORAL at 20:46

## 2022-11-22 RX ADMIN — PANTOPRAZOLE SODIUM 40 MG: 40 INJECTION, POWDER, FOR SOLUTION INTRAVENOUS at 23:30

## 2022-11-22 ASSESSMENT — ENCOUNTER SYMPTOMS
DIARRHEA: 0
ABDOMINAL DISTENTION: 0
NAUSEA: 1
CONSTIPATION: 0
BLOOD IN STOOL: 0
CHEST TIGHTNESS: 0
COLOR CHANGE: 1
ANAL BLEEDING: 0
SHORTNESS OF BREATH: 0
VOMITING: 0

## 2022-11-22 ASSESSMENT — PAIN DESCRIPTION - LOCATION
LOCATION: ABDOMEN
LOCATION: ABDOMEN

## 2022-11-22 ASSESSMENT — PAIN DESCRIPTION - ORIENTATION
ORIENTATION: MID
ORIENTATION: MID

## 2022-11-22 ASSESSMENT — PAIN SCALES - GENERAL
PAINLEVEL_OUTOF10: 9
PAINLEVEL_OUTOF10: 7
PAINLEVEL_OUTOF10: 7

## 2022-11-22 ASSESSMENT — PAIN DESCRIPTION - DESCRIPTORS
DESCRIPTORS: ACHING;SHARP
DESCRIPTORS: DULL

## 2022-11-22 ASSESSMENT — PAIN - FUNCTIONAL ASSESSMENT: PAIN_FUNCTIONAL_ASSESSMENT: 0-10

## 2022-11-22 ASSESSMENT — PAIN DESCRIPTION - PAIN TYPE: TYPE: ACUTE PAIN

## 2022-11-22 NOTE — ED PROVIDER NOTES
[address]        Pt Name: Tereza Diana  MRN: [id number]  Tom 1975  Date of evaluation: 11/22/2022  Provider: DARCY Mcmanus - CNP  PCP: Nicol Romero MD  Note Started: 6:46 PM EST 11/22/22           I have seen and evaluated this patient with my supervising physician Robi Arcos       Chief Complaint   Patient presents with    Abdominal Pain     Goes to [facility], seen for cirrhosis pt has abd distention and is jaundice       HISTORY OF PRESENT ILLNESS   (Location, Timing/Onset, Context/Setting, Quality, Duration, Modifying Factors, Severity, Associated Signs and Symptoms)  Note limiting factors. Chief Complaint: Abdominal pain    Tereza Diana is a 40 y.o. female who presents to the emergency department after being sent in by hematology at outpatient appointment today for worsening labs. Patient states she was discharged from the hospital last Friday for anemia. She states she had 3 blood transfusions during her stay. She notes that she had blood drawn yesterday but states she does not know the results of the blood work. Patient appears to be jaundice upon arrival to the ER and states that she has had increased weakness since yesterday. She states this is how she feels when she is anemic. She also endorses nausea but no vomiting. She denies any external bleeding noted. States that she feels feverish without fever. Denies any headache, fever, visual disturbances. No chest pain or pressure. No neck or back pain. No shortness of breath, cough, or congestion. No vomiting, diarrhea, constipation, or dysuria. No rash. Nursing Notes were all reviewed and agreed with or any disagreements were addressed in the HPI. REVIEW OF SYSTEMS    (2-9 systems for level 4, 10 or more for level 5)     Review of Systems   Constitutional:  Positive for activity change, appetite change and fatigue. Negative for chills and fever. Respiratory:  Negative for chest tightness and shortness of breath. Cardiovascular:  Negative for chest pain. Gastrointestinal:  Positive for nausea. Negative for abdominal distention, anal bleeding, blood in stool, constipation, diarrhea and vomiting. Bloating/swelling   Genitourinary:  Negative for dysuria, hematuria and vaginal bleeding. Skin:  Positive for color change. Neurological:  Positive for weakness. All other systems reviewed and are negative. Positives and Pertinent negatives as per HPI. Except as noted above in the ROS, all other systems were reviewed and negative.        PAST MEDICAL HISTORY     Past Medical History:   Diagnosis Date    Alcoholic liver disease ([facility] [address]     Anemia     History of blood transfusion          SURGICAL HISTORY     Past Surgical History:   Procedure Laterality Date    COLONOSCOPY N/A 3/11/2021    COLONOSCOPY POLYPECTOMY SNARE/COLD BIOPSY performed by Devon Doherty MD at [address] [place] [address]    CT BONE MARROW BIOPSY  7/5/2022    CT BONE MARROW BIOPSY 7/5/2022 Karin Manriquez MD [facility] CT SCAN    KNEE ARTHROPLASTY      TUBAL LIGATION      ESSURE    UPPER GASTROINTESTINAL ENDOSCOPY N/A 01/30/2021    EGD DIAGNOSTIC ONLY performed by Pierre Guevara MD at [address] [place] [address] N/A 03/10/2021    EGD BIOPSY performed by Devon Doherty MD at [address] [place] [address] 6/27/2021    EGD BAND LIGATION performed by Rebecca Cantu MD at [address] [place] [address] N/A 4/27/2022    EGD DIAGNOSTIC ONLY performed by Virgen Guzman MD at [address] [place] [address] N/A 7/1/2022    EGD ESOPHAGOGASTRODUODENOSCOPY ULTRASOUND performed by Pippa Laguna MD at [address] [place] [address] N/A 7/1/2022    EGD BAND LIGATION performed by Pippa Laguna MD at [address] [place] [address] 10/26/2022    EGD DIAGNOSTIC ONLY performed by Severo Pavon MD at [facility] 11/10/2022    EGD BAND LIGATION performed by Lupe Wright MD at [address] [facility]       Current Discharge Medication List        CONTINUE these medications which have NOT CHANGED    Details   pregabalin (LYRICA) 25 MG capsule Take 1 capsule by mouth in the morning and at bedtime. magnesium oxide (MAG-OX) 400 (240 Mg) MG tablet Take 1 tablet by mouth 2 times daily  Qty: 30 tablet, Refills: 1      midodrine (PROAMATINE) 5 MG tablet Take 1 tablet by mouth in the morning, at noon, and at bedtime  Qty: 90 tablet, Refills: 3      nadolol (CORGARD) 20 MG tablet Take 1 tablet by mouth at bedtime  Qty: 30 tablet, Refills: 3      zolpidem (AMBIEN) 5 MG tablet Take 5 mg by mouth nightly as needed for Sleep (every night at bedtmes as needed for insomnia). lactulose (CHRONULAC) 10 GM/15ML solution Take 30 mLs by mouth 2 times daily  Qty: 1 each, Refills: 0      folic acid (FOLVITE) 1 MG tablet Take 1 tablet by mouth daily  Qty: 30 tablet, Refills: 0      rifAXIMin (XIFAXAN) 550 MG tablet Take 1 tablet by mouth 2 times daily  Qty: 60 tablet, Refills: 1      furosemide (LASIX) 20 MG tablet Take 1 tablet by mouth in the morning.   Qty: 60 tablet, Refills: 3      ondansetron (ZOFRAN) 8 MG tablet Take 1 tablet by mouth      spironolactone (ALDACTONE) 100 MG tablet Take 1 tablet by mouth daily  Qty: 30 tablet, Refills: 1      pantoprazole (PROTONIX) 40 MG tablet Take 1 tablet by mouth every morning (before breakfast)  Qty: 30 tablet, Refills: 1      thiamine mononitrate (THIAMINE) 100 MG tablet Take 1 tablet by mouth daily  Qty: 30 tablet, Refills: 0               ALLERGIES     Oxycodone    FAMILYHISTORY       Family History   Problem Relation Age of Onset    Alcohol Abuse Father           SOCIAL HISTORY       Social History     Tobacco Use    Smoking status: Former    Smokeless tobacco: Never   Vaping Use Myelocyte Percent 1 (*)     Polychromasia Occasional (*)     Schistocytes Occasional (*)     Acanthocytes 2+ (*)     Abdoul Cells Occasional (*)     Target Cells Occasional (*)     All other components within normal limits   COMPREHENSIVE METABOLIC PANEL W/ REFLEX TO MG FOR LOW K - Abnormal; Notable for the following components:    Sodium 135 (*)     Chloride 97 (*)     Glucose 139 (*)     Creatinine 0.5 (*)     Total Bilirubin 18.5 (*)     AST 79 (*)     All other components within normal limits   PROTIME-INR - Abnormal; Notable for the following components:    Protime 26.4 (*)     INR 2.42 (*)     All other components within normal limits   LACTATE, SEPSIS - Abnormal; Notable for the following components:    Lactic Acid, Sepsis 3.1 (*)     All other components within normal limits   LACTATE, SEPSIS - Abnormal; Notable for the following components:    Lactic Acid, Sepsis 3.4 (*)     All other components within normal limits   BRAIN NATRIURETIC PEPTIDE - Abnormal; Notable for the following components:    Pro- (*)     All other components within normal limits   PROCALCITONIN - Abnormal; Notable for the following components:    Procalcitonin 0.29 (*)     All other components within normal limits   AMMONIA - Abnormal; Notable for the following components:    Ammonia 121 (*)     All other components within normal limits    Narrative:     CALL  [place]  ER tel. [phone],  Chemistry results called to and read back by Leticia Merlos, 11/22/2022 19:04,  by Nash Motley   CULTURE, BLOOD 1   CULTURE, BLOOD 2   CULTURE, BODY FLUID   GRAM STAIN   LIPASE   TROPONIN   URINALYSIS WITH REFLEX TO CULTURE   BLOOD OCCULT STOOL DIAGNOSTIC   HEMOGLOBIN AND HEMATOCRIT   URINE DRUG SCREEN   PREGNANCY, URINE   LACTIC ACID   CELL COUNT WITH DIFFERENTIAL, BODY FLUID   PROTEIN, BODY FLUID   LACTATE DEHYDROGENASE, BODY FLUID   CYTOLOGY, NON-GYN   AMYLASE, BODY FLUID   GLUCOSE, BODY FLUID   ALBUMIN, FLUID   TYPE AND SCREEN       When ordered only abnormal lab results are displayed. All other labs were within normal range or not returned as of this dictation. EKG: When ordered, EKG's are interpreted by the Emergency Department Physician in the absence of a cardiologist.  Please see their note for interpretation of EKG. RADIOLOGY:   Non-plain film images such as CT, Ultrasound and MRI are read by the radiologist. Plain radiographic images are visualized and preliminarily interpreted by the ED Provider with the below findings:        Interpretation per the Radiologist below, if available at the time of this note:    XR CHEST PORTABLE   Final Result   Borderline cardiomegaly which is unchanged. Hypoinflation with mild bibasilar atelectasis or scarring. Small nodular density projecting the right lung base which more apparent. This could represent a nipple shadow but is indeterminate. Recommend a PA   and lateral chest         CT ABDOMEN PELVIS W IV CONTRAST Additional Contrast? None   Final Result   Cirrhosis with portal hypertension. Moderate volume ascites. The spleen is enlarged, and there are wedge-shaped areas of hypoenhancement. These could reflect variation in contrast opacification, but splenic infarcts   should be considered as well. Gallbladder mural thickening is seen, with cholelithiasis. That is a   nonspecific finding in this case, a as the mural thickening may be secondary   to the anasarca, but acute cholecystitis remains in the differential.      Mild fluid and gaseous distention of small bowel, without a clear zone of   transition. Again this is nonspecific, but may reflect ileus in the setting   of gastroenteritis. CT HEAD WO CONTRAST   Final Result   No acute intracranial abnormality. US GALLBLADDER RUQ    (Results Pending)     No results found.         PROCEDURES   Unless otherwise noted below, none     Procedures    CRITICAL CARE TIME       CONSULTS:  IP CONSULT TO GI  IP CONSULT TO PALLIATIVE CARE  IP CONSULT TO INTERVENTIONAL RADIOLOGY      EMERGENCY DEPARTMENT COURSE and DIFFERENTIAL DIAGNOSIS/MDM:   Vitals:    Vitals:    11/22/22 2200 11/22/22 2300 11/23/22 0030 11/23/22 0031   BP: (!) 99/44 (!) 94/50 (!) 88/45 100/61   Pulse: 65 69 63    Resp: 15 18 16    Temp:   98.4 °F (36.9 °C)    TempSrc:   Oral    SpO2: 94%  97%    Weight:       Height:           Patient was given the following medications:  Medications   sodium chloride flush 0.9 % injection 10 mL (has no administration in time range)   sodium chloride flush 0.9 % injection 10 mL (has no administration in time range)   potassium chloride 10 mEq/100 mL IVPB (Peripheral Line) (has no administration in time range)   magnesium sulfate 2000 mg in 50 mL IVPB premix (has no administration in time range)   promethazine (PHENERGAN) tablet 12.5 mg (has no administration in time range)     Or   ondansetron (ZOFRAN) injection 4 mg (has no administration in time range)   acetaminophen (TYLENOL) tablet 500 mg (has no administration in time range)     Or   acetaminophen (TYLENOL) suppository 650 mg (has no administration in time range)   lactulose (CHRONULAC) 10 GM/15ML solution 10 g ( Oral Canceled Entry 11/22/22 2325)   0.9 % sodium chloride bolus (1,000 mLs IntraVENous New Bag 11/22/22 2332)   midodrine (PROAMATINE) tablet 5 mg (has no administration in time range)   rifAXIMin (XIFAXAN) tablet 550 mg (has no administration in time range)   zolpidem (AMBIEN) tablet 5 mg (has no administration in time range)   pantoprazole (PROTONIX) injection 40 mg (40 mg IntraVENous Given 11/22/22 2330)   oxyCODONE (ROXICODONE) immediate release tablet 5 mg (has no administration in time range)   oxyCODONE (ROXICODONE) immediate release tablet 2.5 mg (has no administration in time range)   sodium chloride flush 0.9 % injection 5-40 mL (has no administration in time range)   sodium chloride flush 0.9 % injection 5-40 mL (has no administration in time range)   0.9 % sodium chloride infusion (has no administration in time range)   lactulose (CHRONULAC) 10 GM/15ML solution 30 g (30 g Oral Given 11/22/22 2048)   dicyclomine (BENTYL) capsule 10 mg (10 mg Oral Given 11/22/22 2046)   fentaNYL (SUBLIMAZE) injection 25 mcg (25 mcg IntraVENous Given 11/23/22 0014)   iopamidol (ISOVUE-370) 76 % injection 75 mL (75 mLs IntraVENous Given 11/22/22 2239)         Is this patient to be included in the SEP-1 Core Measure due to severe sepsis or septic shock? No   Exclusion criteria - the patient is NOT to be included for SEP-1 Core Measure due to: Infection is not suspected    Briefly, this is a 43-year-old female with past medical history of anemia, alcoholic cirrhosis, hepatic encephalopathy, GI bleed, EZIO, esophageal varices, and Yamileth cystitis. Patient presents to the emergency care today with complaints of abdominal bloating and some confusion. She states she was just discharged from the hospital 1 week ago where she received 3 units of blood. She states she feels weak and as if her hemoglobin has dropped. Patient is jaundiced upon arrival to the emergency department. She states that she is nauseated but has not thrown up. Course notable for chronic anemia, not requiring transfusion at this time however patient does have asterixis with an ammonia level of 121, notably higher than her baseline. Concerning for hypotonic encephalopathy. She has no focal neurodeficits in the ER. Not septic or toxic appearing however she will be admitted and given lactulose. Recent CT of her abdomen and pelvis and her abdominal pain is more of a chronic issue, do not suspect surgical pathology this evening. Patient will be admitted to hospitalist services. FINAL IMPRESSION      1. Anemia, unspecified type    2. Asterixis    3.  Hyperammonemia ([facility] [address]          DISPOSITION/PLAN   DISPOSITION Admitted 11/22/2022 09:18:07 PM      PATIENT REFERRED TO:  No follow-up provider specified.     DISCHARGE MEDICATIONS:  Current Discharge Medication List          DISCONTINUED MEDICATIONS:  Current Discharge Medication List        STOP taking these medications       thiamine 100 MG tablet Comments:   Reason for Stopping:                      (Please note that portions of this note were completed with a voice recognition program.  Efforts were made to edit the dictations but occasionally words are mis-transcribed.)    DARCY Muniz CNP (electronically signed)            DARCY Muniz CNP  11/23/22 0107

## 2022-11-23 ENCOUNTER — APPOINTMENT (OUTPATIENT)
Dept: INTERVENTIONAL RADIOLOGY/VASCULAR | Age: 47
DRG: 280 | End: 2022-11-23
Payer: COMMERCIAL

## 2022-11-23 ENCOUNTER — APPOINTMENT (OUTPATIENT)
Dept: ULTRASOUND IMAGING | Age: 47
DRG: 280 | End: 2022-11-23
Payer: COMMERCIAL

## 2022-11-23 LAB
ALBUMIN FLUID: 0.5 G/DL
AMPHETAMINE SCREEN, URINE: ABNORMAL
AMYLASE FLUID: <3 U/L
APPEARANCE FLUID: CLEAR
BACTERIA: ABNORMAL /HPF
BARBITURATE SCREEN URINE: ABNORMAL
BENZODIAZEPINE SCREEN, URINE: ABNORMAL
BILIRUBIN URINE: ABNORMAL
BLOOD BANK DISPENSE STATUS: NORMAL
BLOOD BANK PRODUCT CODE: NORMAL
BLOOD, URINE: NEGATIVE
BPU ID: NORMAL
CANNABINOID SCREEN URINE: ABNORMAL
CELL COUNT FLUID TYPE: NORMAL
CLARITY: CLEAR
CLOT EVALUATION: NORMAL
COCAINE METABOLITE SCREEN URINE: ABNORMAL
COLOR FLUID: YELLOW
COLOR: ABNORMAL
DESCRIPTION BLOOD BANK: NORMAL
EKG ATRIAL RATE: 58 BPM
EKG DIAGNOSIS: NORMAL
EKG P AXIS: 40 DEGREES
EKG P-R INTERVAL: 134 MS
EKG Q-T INTERVAL: 472 MS
EKG QRS DURATION: 88 MS
EKG QTC CALCULATION (BAZETT): 463 MS
EKG R AXIS: 21 DEGREES
EKG T AXIS: 17 DEGREES
EKG VENTRICULAR RATE: 58 BPM
EPITHELIAL CELLS, UA: 1 /HPF (ref 0–5)
FENTANYL SCREEN, URINE: POSITIVE
FLUID TYPE: NORMAL
GLUCOSE URINE: NEGATIVE MG/DL
GLUCOSE, FLUID: 120 MG/DL
HCG(URINE) PREGNANCY TEST: NEGATIVE
HCT VFR BLD CALC: 18.4 % (ref 36–48)
HCT VFR BLD CALC: 21.3 % (ref 36–48)
HCT VFR BLD CALC: 21.5 % (ref 36–48)
HEMOGLOBIN: 6.5 G/DL (ref 12–16)
HEMOGLOBIN: 7.4 G/DL (ref 12–16)
HEMOGLOBIN: 7.4 G/DL (ref 12–16)
HYALINE CASTS: 2 /LPF (ref 0–8)
KETONES, URINE: NEGATIVE MG/DL
LACTIC ACID: 3.9 MMOL/L (ref 0.4–2)
LD, FLUID: 61 U/L
LEUKOCYTE ESTERASE, URINE: ABNORMAL
LYMPHOCYTES, BODY FLUID: 48 %
Lab: ABNORMAL
MACROPHAGE FLUID: 43 %
MCH RBC QN AUTO: 33.3 PG (ref 26–34)
MCHC RBC AUTO-ENTMCNC: 34.6 G/DL (ref 31–36)
MCV RBC AUTO: 96.2 FL (ref 80–100)
MESOTHELIAL FLUID: 4 %
METHADONE SCREEN, URINE: ABNORMAL
MICROSCOPIC EXAMINATION: YES
NEUTROPHIL, FLUID: 5 %
NITRITE, URINE: POSITIVE
NUCLEATED CELLS FLUID: 41 /CUMM
NUMBER OF CELLS COUNTED FLUID: 100
OCCULT BLOOD DIAGNOSTIC: NORMAL
OPIATE SCREEN URINE: ABNORMAL
OXYCODONE URINE: ABNORMAL
PDW BLD-RTO: 25.2 % (ref 12.4–15.4)
PH UA: 5.5
PH UA: 5.5 (ref 5–8)
PHENCYCLIDINE SCREEN URINE: ABNORMAL
PLATELET # BLD: 66 K/UL (ref 135–450)
PMV BLD AUTO: 8.8 FL (ref 5–10.5)
PROTEIN FLUID: 0.9 G/DL
PROTEIN UA: NEGATIVE MG/DL
RBC # BLD: 2.23 M/UL (ref 4–5.2)
RBC FLUID: 1400 /CUMM
RBC UA: 8 /HPF (ref 0–4)
SPECIFIC GRAVITY UA: >=1.03 (ref 1–1.03)
URINE REFLEX TO CULTURE: ABNORMAL
URINE TYPE: ABNORMAL
UROBILINOGEN, URINE: 1 E.U./DL
WBC # BLD: 4.8 K/UL (ref 4–11)
WBC UA: 0 /HPF (ref 0–5)
YEAST: PRESENT /HPF

## 2022-11-23 PROCEDURE — P9047 ALBUMIN (HUMAN), 25%, 50ML: HCPCS | Performed by: INTERNAL MEDICINE

## 2022-11-23 PROCEDURE — 83605 ASSAY OF LACTIC ACID: CPT

## 2022-11-23 PROCEDURE — 87070 CULTURE OTHR SPECIMN AEROBIC: CPT

## 2022-11-23 PROCEDURE — 81001 URINALYSIS AUTO W/SCOPE: CPT

## 2022-11-23 PROCEDURE — 82270 OCCULT BLOOD FECES: CPT

## 2022-11-23 PROCEDURE — 2580000003 HC RX 258: Performed by: INTERNAL MEDICINE

## 2022-11-23 PROCEDURE — 82150 ASSAY OF AMYLASE: CPT

## 2022-11-23 PROCEDURE — 2580000003 HC RX 258

## 2022-11-23 PROCEDURE — 1200000000 HC SEMI PRIVATE

## 2022-11-23 PROCEDURE — 85027 COMPLETE CBC AUTOMATED: CPT

## 2022-11-23 PROCEDURE — 36415 COLL VENOUS BLD VENIPUNCTURE: CPT

## 2022-11-23 PROCEDURE — 93010 ELECTROCARDIOGRAM REPORT: CPT | Performed by: INTERNAL MEDICINE

## 2022-11-23 PROCEDURE — 6360000002 HC RX W HCPCS: Performed by: INTERNAL MEDICINE

## 2022-11-23 PROCEDURE — 6370000000 HC RX 637 (ALT 250 FOR IP): Performed by: PHYSICIAN ASSISTANT

## 2022-11-23 PROCEDURE — 49083 ABD PARACENTESIS W/IMAGING: CPT

## 2022-11-23 PROCEDURE — 85018 HEMOGLOBIN: CPT

## 2022-11-23 PROCEDURE — 84703 CHORIONIC GONADOTROPIN ASSAY: CPT

## 2022-11-23 PROCEDURE — 6370000000 HC RX 637 (ALT 250 FOR IP): Performed by: INTERNAL MEDICINE

## 2022-11-23 PROCEDURE — 89051 BODY FLUID CELL COUNT: CPT

## 2022-11-23 PROCEDURE — 83615 LACTATE (LD) (LDH) ENZYME: CPT

## 2022-11-23 PROCEDURE — C1729 CATH, DRAINAGE: HCPCS

## 2022-11-23 PROCEDURE — 36430 TRANSFUSION BLD/BLD COMPNT: CPT

## 2022-11-23 PROCEDURE — 80307 DRUG TEST PRSMV CHEM ANLYZR: CPT

## 2022-11-23 PROCEDURE — 88112 CYTOPATH CELL ENHANCE TECH: CPT

## 2022-11-23 PROCEDURE — 76705 ECHO EXAM OF ABDOMEN: CPT

## 2022-11-23 PROCEDURE — 82945 GLUCOSE OTHER FLUID: CPT

## 2022-11-23 PROCEDURE — 87040 BLOOD CULTURE FOR BACTERIA: CPT

## 2022-11-23 PROCEDURE — 88305 TISSUE EXAM BY PATHOLOGIST: CPT

## 2022-11-23 PROCEDURE — 85014 HEMATOCRIT: CPT

## 2022-11-23 PROCEDURE — 82042 OTHER SOURCE ALBUMIN QUAN EA: CPT

## 2022-11-23 PROCEDURE — 6360000002 HC RX W HCPCS: Performed by: EMERGENCY MEDICINE

## 2022-11-23 PROCEDURE — C9113 INJ PANTOPRAZOLE SODIUM, VIA: HCPCS | Performed by: INTERNAL MEDICINE

## 2022-11-23 PROCEDURE — 2500000003 HC RX 250 WO HCPCS: Performed by: INTERNAL MEDICINE

## 2022-11-23 PROCEDURE — 87205 SMEAR GRAM STAIN: CPT

## 2022-11-23 PROCEDURE — 84157 ASSAY OF PROTEIN OTHER: CPT

## 2022-11-23 PROCEDURE — 0W9G3ZZ DRAINAGE OF PERITONEAL CAVITY, PERCUTANEOUS APPROACH: ICD-10-PCS | Performed by: RADIOLOGY

## 2022-11-23 RX ORDER — OXYCODONE HYDROCHLORIDE 5 MG/1
2.5 TABLET ORAL EVERY 4 HOURS PRN
Status: COMPLETED | OUTPATIENT
Start: 2022-11-23 | End: 2022-11-25

## 2022-11-23 RX ORDER — LIDOCAINE HYDROCHLORIDE 10 MG/ML
10 INJECTION, SOLUTION EPIDURAL; INFILTRATION; INTRACAUDAL; PERINEURAL ONCE
Status: COMPLETED | OUTPATIENT
Start: 2022-11-23 | End: 2022-11-23

## 2022-11-23 RX ORDER — ALBUMIN (HUMAN) 12.5 G/50ML
25 SOLUTION INTRAVENOUS ONCE
Status: COMPLETED | OUTPATIENT
Start: 2022-11-23 | End: 2022-11-23

## 2022-11-23 RX ORDER — SODIUM CHLORIDE 0.9 % (FLUSH) 0.9 %
5-40 SYRINGE (ML) INJECTION PRN
Status: DISCONTINUED | OUTPATIENT
Start: 2022-11-23 | End: 2022-11-24 | Stop reason: SDUPTHER

## 2022-11-23 RX ORDER — LACTULOSE 10 G/15ML
20 SOLUTION ORAL 3 TIMES DAILY
Status: DISCONTINUED | OUTPATIENT
Start: 2022-11-23 | End: 2022-11-28 | Stop reason: HOSPADM

## 2022-11-23 RX ORDER — SODIUM CHLORIDE 0.9 % (FLUSH) 0.9 %
10 SYRINGE (ML) INJECTION EVERY 12 HOURS SCHEDULED
Status: DISCONTINUED | OUTPATIENT
Start: 2022-11-23 | End: 2022-11-28 | Stop reason: HOSPADM

## 2022-11-23 RX ORDER — ONDANSETRON 2 MG/ML
4 INJECTION INTRAMUSCULAR; INTRAVENOUS EVERY 6 HOURS PRN
Status: DISCONTINUED | OUTPATIENT
Start: 2022-11-23 | End: 2022-11-28 | Stop reason: HOSPADM

## 2022-11-23 RX ORDER — SODIUM CHLORIDE 0.9 % (FLUSH) 0.9 %
5-40 SYRINGE (ML) INJECTION EVERY 12 HOURS SCHEDULED
Status: DISCONTINUED | OUTPATIENT
Start: 2022-11-23 | End: 2022-11-24 | Stop reason: SDUPTHER

## 2022-11-23 RX ORDER — POTASSIUM CHLORIDE 7.45 MG/ML
10 INJECTION INTRAVENOUS PRN
Status: DISCONTINUED | OUTPATIENT
Start: 2022-11-23 | End: 2022-11-28 | Stop reason: HOSPADM

## 2022-11-23 RX ORDER — SODIUM CHLORIDE 9 MG/ML
INJECTION, SOLUTION INTRAVENOUS
Status: COMPLETED
Start: 2022-11-23 | End: 2022-11-23

## 2022-11-23 RX ORDER — SODIUM CHLORIDE 9 MG/ML
INJECTION, SOLUTION INTRAVENOUS PRN
Status: DISCONTINUED | OUTPATIENT
Start: 2022-11-23 | End: 2022-11-28 | Stop reason: HOSPADM

## 2022-11-23 RX ORDER — PROMETHAZINE HYDROCHLORIDE 25 MG/1
12.5 TABLET ORAL EVERY 6 HOURS PRN
Status: DISCONTINUED | OUTPATIENT
Start: 2022-11-23 | End: 2022-11-28 | Stop reason: HOSPADM

## 2022-11-23 RX ORDER — MAGNESIUM SULFATE IN WATER 40 MG/ML
2000 INJECTION, SOLUTION INTRAVENOUS PRN
Status: DISCONTINUED | OUTPATIENT
Start: 2022-11-23 | End: 2022-11-28 | Stop reason: HOSPADM

## 2022-11-23 RX ORDER — OXYCODONE HYDROCHLORIDE 5 MG/1
5 TABLET ORAL EVERY 4 HOURS PRN
Status: COMPLETED | OUTPATIENT
Start: 2022-11-23 | End: 2022-11-24

## 2022-11-23 RX ORDER — FUROSEMIDE 10 MG/ML
20 INJECTION INTRAMUSCULAR; INTRAVENOUS SEE ADMIN INSTRUCTIONS
Status: DISCONTINUED | OUTPATIENT
Start: 2022-11-23 | End: 2022-11-28 | Stop reason: HOSPADM

## 2022-11-23 RX ORDER — SODIUM CHLORIDE 0.9 % (FLUSH) 0.9 %
10 SYRINGE (ML) INJECTION PRN
Status: DISCONTINUED | OUTPATIENT
Start: 2022-11-23 | End: 2022-11-28 | Stop reason: HOSPADM

## 2022-11-23 RX ORDER — PREGABALIN 25 MG/1
1 CAPSULE ORAL 2 TIMES DAILY
Status: ON HOLD | COMMUNITY
Start: 2022-11-22 | End: 2022-11-28 | Stop reason: HOSPADM

## 2022-11-23 RX ORDER — SODIUM CHLORIDE 9 MG/ML
INJECTION, SOLUTION INTRAVENOUS PRN
Status: DISCONTINUED | OUTPATIENT
Start: 2022-11-23 | End: 2022-11-23 | Stop reason: SDUPTHER

## 2022-11-23 RX ORDER — SODIUM CHLORIDE 9 MG/ML
INJECTION, SOLUTION INTRAVENOUS PRN
Status: DISCONTINUED | OUTPATIENT
Start: 2022-11-23 | End: 2022-11-23

## 2022-11-23 RX ADMIN — PANTOPRAZOLE SODIUM 40 MG: 40 INJECTION, POWDER, FOR SOLUTION INTRAVENOUS at 09:05

## 2022-11-23 RX ADMIN — Medication 10 ML: at 10:53

## 2022-11-23 RX ADMIN — ZOLPIDEM TARTRATE 5 MG: 5 TABLET ORAL at 01:18

## 2022-11-23 RX ADMIN — ZOLPIDEM TARTRATE 5 MG: 5 TABLET ORAL at 20:16

## 2022-11-23 RX ADMIN — SODIUM CHLORIDE, PRESERVATIVE FREE 10 ML: 5 INJECTION INTRAVENOUS at 20:17

## 2022-11-23 RX ADMIN — MIDODRINE HYDROCHLORIDE 5 MG: 5 TABLET ORAL at 09:05

## 2022-11-23 RX ADMIN — OXYCODONE 5 MG: 5 TABLET ORAL at 17:55

## 2022-11-23 RX ADMIN — LACTULOSE 20 G: 20 SOLUTION ORAL at 20:15

## 2022-11-23 RX ADMIN — LACTULOSE 20 G: 20 SOLUTION ORAL at 17:50

## 2022-11-23 RX ADMIN — OXYCODONE 5 MG: 5 TABLET ORAL at 09:04

## 2022-11-23 RX ADMIN — RIFAXIMIN 550 MG: 550 TABLET ORAL at 20:16

## 2022-11-23 RX ADMIN — MIDODRINE HYDROCHLORIDE 5 MG: 5 TABLET ORAL at 17:50

## 2022-11-23 RX ADMIN — Medication 10 ML: at 08:52

## 2022-11-23 RX ADMIN — LIDOCAINE HYDROCHLORIDE 10 ML: 10 INJECTION, SOLUTION EPIDURAL; INFILTRATION; INTRACAUDAL; PERINEURAL at 15:32

## 2022-11-23 RX ADMIN — MIDODRINE HYDROCHLORIDE 5 MG: 5 TABLET ORAL at 20:16

## 2022-11-23 RX ADMIN — SODIUM CHLORIDE: 9 INJECTION, SOLUTION INTRAVENOUS at 03:39

## 2022-11-23 RX ADMIN — RIFAXIMIN 550 MG: 550 TABLET ORAL at 09:05

## 2022-11-23 RX ADMIN — PANTOPRAZOLE SODIUM 40 MG: 40 INJECTION, POWDER, FOR SOLUTION INTRAVENOUS at 20:17

## 2022-11-23 RX ADMIN — Medication 10 ML: at 20:17

## 2022-11-23 RX ADMIN — SODIUM CHLORIDE: 9 INJECTION, SOLUTION INTRAVENOUS at 19:12

## 2022-11-23 RX ADMIN — LACTULOSE 20 G: 20 SOLUTION ORAL at 09:58

## 2022-11-23 RX ADMIN — Medication 10 ML: at 20:16

## 2022-11-23 RX ADMIN — ALBUMIN (HUMAN) 25 G: 0.25 INJECTION, SOLUTION INTRAVENOUS at 18:05

## 2022-11-23 RX ADMIN — CEFTRIAXONE SODIUM 1000 MG: 1 INJECTION, POWDER, FOR SOLUTION INTRAMUSCULAR; INTRAVENOUS at 19:14

## 2022-11-23 RX ADMIN — FENTANYL CITRATE 25 MCG: 0.05 INJECTION, SOLUTION INTRAMUSCULAR; INTRAVENOUS at 00:14

## 2022-11-23 ASSESSMENT — PAIN DESCRIPTION - FREQUENCY
FREQUENCY: CONTINUOUS

## 2022-11-23 ASSESSMENT — PAIN DESCRIPTION - DESCRIPTORS
DESCRIPTORS: DULL
DESCRIPTORS: DULL;SHOOTING
DESCRIPTORS: DULL

## 2022-11-23 ASSESSMENT — PAIN - FUNCTIONAL ASSESSMENT
PAIN_FUNCTIONAL_ASSESSMENT: ACTIVITIES ARE NOT PREVENTED

## 2022-11-23 ASSESSMENT — PAIN SCALES - GENERAL
PAINLEVEL_OUTOF10: 9
PAINLEVEL_OUTOF10: 0
PAINLEVEL_OUTOF10: 8
PAINLEVEL_OUTOF10: 5
PAINLEVEL_OUTOF10: 5
PAINLEVEL_OUTOF10: 8

## 2022-11-23 ASSESSMENT — PAIN DESCRIPTION - ORIENTATION
ORIENTATION: MID

## 2022-11-23 ASSESSMENT — PAIN DESCRIPTION - PAIN TYPE
TYPE: ACUTE PAIN

## 2022-11-23 ASSESSMENT — PAIN DESCRIPTION - LOCATION
LOCATION: ABDOMEN

## 2022-11-23 NOTE — ED PROVIDER NOTES
I independently saw performed a substantive portion of the visit (history, physical, and MDM) on Steph Palacio. All diagnostic, treatment, and disposition decisions were made by myself in conjunction with the advanced practice provider. I have participated in the medical decision making and directed the treatment plan and disposition of the patient. For further details of [facility] emergency department encounter, please see the advanced practice provider's documentation. Darrius Amanda MD, am the primary physician provider of record. CHIEF COMPLAINT  Chief Complaint   Patient presents with    Abdominal Pain     Goes to [facility], seen for cirrhosis pt has abd distention and is jaundice       Briefly, Steph Palacio is a 40 y.o. female  who presents to the ED complaining of feeling a little confused/disoriented periodically but it is not all the time. Described as a cognitive fog. She reports generalized abdominal pain and distention as well as a headache. No fevers. Still jaundiced and has known cirrhosis. No melena or bloody stool. Nausea but no vomiting. She is on lactulose. FOCUSED PHYSICAL EXAMINATION  BP (!) 100/49   Pulse 59   Temp 98.3 °F (36.8 °C) (Oral)   Resp 16   Ht 5' 4\" (1.626 m)   Wt 110 lb 1.6 oz (49.9 kg)   SpO2 97%   BMI 18.90 kg/m²    Focused physical examination notable for no acute distress, well-appearing, well-nourished, normal speech and mentation without obvious facial droop, no obvious rash. No obvious cranial nerve deficits on my initial exam. Scleral icterus, jaundice diffusely, abd soft, mild diffuse ttp, distention, dull to percussion, RRR, CTAB, +asterixis.     The 12 lead EKG was interpreted by me independent of a cardiologist as follows:  Rate: bradycardia with a rate of 58  Rhythm: sinus  Axis: normal  Intervals: normal WA, narrow QRS, normal QTc  ST segments: no ST elevations or depressions  T waves: no abnormal inversions  Non-specific T wave changes: not present  Prior EKG comparison: EKG dated 11/8/22 is not significantly different    MDM:  ED course was notable for chronic anemia not requiring a transfusion at this time however does have some asterixis with hyperammonemia and concern for hepatic encephalopathy developing with an ammonia at 121 notably higher than is her baseline. She has no focal neurodeficits and due to her auto anticoagulation from liver disease I did obtain a head CT which was nonacute. She is not septic or toxic appearing however will be admitted and being given lactulose. She had a recent CT of her abdomen and pelvis and her abdominal pain is more of a chronic issue so I do not suspect surgical pathology acutely and do not feel she needs to have a repeat CT performed today. She is afebrile. Her lactate elevation is likely attributable to her chronic liver disease, hyperbilirubinemia and potentially some dehydration. Her INR is attributable again to her liver disease. No leukocytosis. Is this patient to be included in the SEP-1 Core Measure? No   Exclusion criteria - the patient is NOT to be included for SEP-1 Core Measure due to: Infection is not suspected      During the patient's ED course, the patient was given:  Medications   ondansetron [facility]) injection 4 mg (4 mg IntraVENous Given 11/22/22 2051)   fentaNYL (SUBLIMAZE) injection 25 mcg (25 mcg IntraVENous Given 11/22/22 2053)   lactulose (CHRONULAC) 10 GM/15ML solution 30 g (30 g Oral Given 11/22/22 2048)   dicyclomine (BENTYL) capsule 10 mg (10 mg Oral Given 11/22/22 2046)        CLINICAL IMPRESSION  1. Anemia, unspecified type    2. Asterixis    3. Hyperammonemia ([address]        DISPOSITION  Shayna Vail was admitted in fair condition. The plan is to admit to the hospital at this time under the hospitalist service. Hospitalist accepted the patient and will take over the patient's care. This chart was created using Dragon dictation software.   Efforts were made by me to ensure accuracy, however some errors may be present due to limitations of this technology.               Fox Felipe MD  11/22/22 2059

## 2022-11-23 NOTE — PROGRESS NOTES
Urine and stool specimen collected and sent down to lab. Pt voiced 150 mL of clear, dark orange urine. Pt had a loose, small, brown BM with streaks of red, measured at 50 mL. Pt ambulated to bathroom and returned to bed. Pt tolerated well. Pt is currently resting in bed, semi-fowlers. Call light within reach. No further needs expressed.

## 2022-11-23 NOTE — PROGRESS NOTES
4 Eyes Skin Assessment     The patient is being assess for   Admission    I agree that 2 RN's have performed a thorough Head to Toe Skin Assessment on the patient. ALL assessment sites listed below have been assessed. Areas assessed for pressure by both nurses:   [x]   Head, Face, and Ears   [x]   Shoulders, Back, and Chest, Abdomen  [x]   Arms, Elbows, and Hands   [x]   Coccyx, Sacrum, and Ischium  [x]   Legs, Feet, and Heels        Skin Assessed Under all Medical Devices by both nurses:  N/a               All Mepilex Borders were peeled back and area peeked at by both nurses:  No: n/a  Please list where Mepilex Borders are located:  n/a             **SHARE this note so that the co-signing nurse is able to place an eSignature**    Co-signer eSignature: Electronically signed by Ishan Shetty RN on 11/23/22 at 1:16 AM EST    Does the Patient have Skin Breakdown related to pressure?    N/a             Maurisio Prevention initiated:  NA   Wound Care Orders initiated:  NA      [facility] nurse consulted for Pressure Injury (Stage 3,4, Unstageable, DTI, NWPT, Complex wounds)and New or Established Ostomies:  NA      Primary Nurse eSignature: Electronically signed by Jimmy Corona RN on 11/23/22 at 1:15 AM EST

## 2022-11-23 NOTE — PROGRESS NOTES
1800 ml of fluid removed  Spoke to patients RN and advised her the amount of fluid removed and that patient was returning to the floor.

## 2022-11-23 NOTE — PROGRESS NOTES
Shift assessment completed. Routine vitals obtained. Scheduled medications given. Patient is awake, alert and oriented. Patient does not appear to be in distress. Call light within reach. No further concerns expressed at this time.

## 2022-11-23 NOTE — H&P
Hospital Medicine History & Physical      PCP: Ekaterina Purvis MD    Date of Admission: 11/22/2022    Date of Service: Pt seen/examined on 11/22/2022    Pt seen/examined face to face on and admitted as inpatient with expected LOS greater than two midnights due to medical therapy    Chief Complaint:    Chief Complaint   Patient presents with    Abdominal Pain     Goes to [facility], seen for cirrhosis pt has abd distention and is jaundice        History Of Present Illness:      40 y.o. 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 Patient reports being on lactulose and compliant. Patient reported that she was discharged during hospitalization last Friday for anemia and had 3 blood transfusion.   Past Medical History:          Diagnosis Date    Alcoholic liver disease ([address]     Anemia     History of blood transfusion        Past Surgical History:          Procedure Laterality Date    COLONOSCOPY N/A 3/11/2021    COLONOSCOPY POLYPECTOMY SNARE/COLD BIOPSY performed by Stephen Villegas MD at [address] [place] [address]    CT BONE MARROW BIOPSY  7/5/2022    CT BONE MARROW BIOPSY 7/5/2022 Homero Little MD [facility] CT SCAN    KNEE ARTHROPLASTY      TUBAL LIGATION      ESSURE    UPPER GASTROINTESTINAL ENDOSCOPY N/A 01/30/2021    EGD DIAGNOSTIC ONLY performed by Janine Carrillo MD at [address] [place] [address] N/A 03/10/2021    EGD BIOPSY performed by Stephen Villegas MD at [address] [place] [address] 6/27/2021    EGD BAND LIGATION performed by Aparna Méndez MD at [address] [place] [address] 4/27/2022    EGD DIAGNOSTIC ONLY performed by Terra Colón MD at [address] [place] [address] N/A 7/1/2022    EGD ESOPHAGOGASTRODUODENOSCOPY ULTRASOUND performed by Lupe Wright MD at [address] [place] [address] 7/1/2022    EGD BAND LIGATION performed by Lupe Wright MD at [address] [place] [address] N/A 10/26/2022    EGD DIAGNOSTIC ONLY performed by Severo Pavon MD at [address] [place] [address] N/A 11/10/2022    EGD BAND LIGATION performed by Lupe Wright MD at [facility]       Medications Prior to Admission:      Prior to Admission medications    Medication Sig Start Date End Date Taking? Authorizing Provider   magnesium oxide (MAG-OX) 400 (240 Mg) MG tablet Take 1 tablet by mouth 2 times daily 11/18/22   Tony June MD   midodrine (PROAMATINE) 5 MG tablet Take 1 tablet by mouth in the morning, at noon, and at bedtime 11/18/22   Tony June MD   nadolol (CORGARD) 20 MG tablet Take 1 tablet by mouth at bedtime 11/18/22   Tony June MD   zolpidem (AMBIEN) 5 MG tablet Take 5 mg by mouth nightly as needed for Sleep (every night at bedtmes as needed for insomnia). Historical Provider, MD   lactulose (CHRONULAC) 10 GM/15ML solution Take 30 mLs by mouth 2 times daily 10/27/22   Libia Menendez MD   folic acid (FOLVITE) 1 MG tablet Take 1 tablet by mouth daily 10/27/22   Libia Menendez MD   rifAXIMin Hannah López) 550 MG tablet Take 1 tablet by mouth 2 times daily 10/18/22   DARCY Osorio - CNP   furosemide (LASIX) 20 MG tablet Take 1 tablet by mouth in the morning.  7/30/22   Freya Caldwell MD   ondansetron (ZOFRAN) 8 MG tablet Take 1 tablet by mouth 7/14/22   Historical Provider, MD   thiamine 100 MG tablet Take 1 tablet by mouth daily  Patient not taking: Reported on 8/25/2022 7/1/22 8/28/22  Ena Grady MD   spironolactone (ALDACTONE) 100 MG tablet Take 1 tablet by mouth daily 5/3/22   Che Avina MD   pantoprazole (PROTONIX) 40 MG tablet Take 1 tablet by mouth every morning (before breakfast) 5/4/22   Che Avina MD   thiamine mononitrate (THIAMINE) 100 MG tablet Take 1 tablet by mouth daily 5/3/22 6/2/22  Che Avina MD       Allergies:  Oxycodone    Social History:          TOBACCO:   reports that she has quit smoking. She has never used smokeless tobacco.  ETOH:   reports that she does not currently use alcohol. E-cigarette/Vaping       Questions Responses    E-cigarette/Vaping Use Never User    Start Date     Passive Exposure     Quit Date     Counseling Given     Comments               Family History:      Family History reviewed with patient, and does not pertain and non-contributory to the current illness        Problem Relation Age of Onset    Alcohol Abuse Father        REVIEW OF SYSTEMS:     Constitutional:  No Fever, No Chills, No Night Sweats  ENT/Mouth:  No Nasal Congestion,  No Hoarseness, No new mouth lesion  Eyes:  No Eye Pain, No Redness, No Discharge  Cardiovascular:  No Chest Pain, No Orthopnea, No Palpitations  Respiratory:  No Cough, No Sputum, No Dyspnea  Gastrointestinal: No Vomiting, No Diarrhea, + abdominal pain  Genitourinary: No Urinary Frequency, No Hematuria, No Urinary pain  Musculoskeletal:  No worsening Arthralgias, No worsening Myalgias  Skin:  No new Skin Lesions, No new skin rash  Neuro:  No new weakness, No new numbness. Psych:  No suicial ideation, No Violence ideation    PHYSICAL EXAM PERFORMED:    BP (!) 100/49   Pulse 59   Temp 98.3 °F (36.8 °C) (Oral)   Resp 16   Ht 5' 4\" (1.626 m)   Wt 110 lb 1.6 oz (49.9 kg)   SpO2 97%   BMI 18.90 kg/m²     General appearance:  mild acute distress, appears older than stated age  HEENT:   atraumatic, sclera anicteric, Conjunctivae clear. Neck: Supple,Trachea midline, no goiter  Respiratory:minimal accessory muscle usage, Normal respiratory effort.  Clear to auscultation, bilaterally without wheezing  Cardiovascular:  Regular rate and rhythm, capillary refill 2 seconds  Abdomen: Soft, non-tender, non-distended with normal bowel sounds. Musculoskeletal:  No clubbing, cyanosis. trace edema LE bilaterally. Skin: turgor normal.  No new rashes or lesions. Neurologic: Alert and oriented x4, no new focal sensory/motor deficits. Labs:     Recent Labs     11/22/22 1812   WBC 5.5   HGB 7.4*   HCT 21.2*   PLT 78*     Recent Labs     11/22/22 1812   *   K 4.5   CL 97*   CO2 30   BUN 15   CREATININE 0.5*   CALCIUM 9.4     Recent Labs     11/22/22 1812   AST 79*   ALT 32   BILITOT 18.5*   ALKPHOS 112     Recent Labs     11/22/22 1812   INR 2.42*     Recent Labs     11/22/22 1812   TROPONINI <0.01       Urinalysis:      Lab Results   Component Value Date/Time    NITRU POSITIVE 11/09/2022 01:40 AM    WBCUA 18 11/09/2022 01:40 AM    BACTERIA 4+ 11/09/2022 01:40 AM    RBCUA 2 11/09/2022 01:40 AM    BLOODU TRACE 11/09/2022 01:40 AM    SPECGRAV 1.015 11/09/2022 01:40 AM    GLUCOSEU Negative 11/09/2022 01:40 AM       Radiology:     EKG:  I have reviewed the EKG with the following interpretation:   Sinus bradycardia QTc 463  CT HEAD WO CONTRAST   Final Result   No acute intracranial abnormality.              ASSESSMENT AND PLAN:    Active Hospital Problems    Diagnosis Date Noted    Decompensated hepatic cirrhosis ([facility] [K72.90, K74.60] 11/22/2022     Priority: Medium     Decompensated cirrhosis:  Cirrhosis with intermittent encephalopathy and ascites  Lactulose, rifaximin ordered  GI consulted, appreciated  IR consulted for paracentesis  Fluid labs ordered for diagnosis    Normocytic anemia:  Recently at hospitalized for blood loss anemia  Reported patient did have history of variceal bleed with recent banding  Denied having any hematemesis or rectal bleeding  Trend H&H  Hemoccult pending    Liver failure:  Progressively worsening  GI consulted, much appreciated  Palliative care consulted, much appreciated    Bilirubinemia, with elevated INR PT  Secondary to liver failure  GI on board    Diet: NPO except meds ordered    DVT Prophylaxis: held risk of bleeding    Dispo:   Expected LOS greater than two [place], DO

## 2022-11-23 NOTE — PLAN OF CARE
Problem: Safety - Adult  Goal: Free from fall injury  11/23/2022 1131 by Neena Arciniega RN  Outcome: Progressing  11/23/2022 0112 by Iman Mckeon RN  Outcome: Progressing     Problem: Pain  Goal: Verbalizes/displays adequate comfort level or baseline comfort level  Outcome: Progressing  Flowsheets  Taken 11/23/2022 0246 by Iman Mckeon RN  Verbalizes/displays adequate comfort level or baseline comfort level: Encourage patient to monitor pain and request assistance  Taken 11/23/2022 0030 by Iman Mckeon RN  Verbalizes/displays adequate comfort level or baseline comfort level: Encourage patient to monitor pain and request assistance

## 2022-11-23 NOTE — CONSULTS
Gastroenterology Consult Note        Patient: Jd Salazar  : 1975  Acct#:      Date:  2022    Subjective:       History of Present Illness  Patient is a 40 y.o.  female admitted with Hyperammonemia ([facility] [E72.20]  Asterixis [R27.8]  Decompensated hepatic cirrhosis ([facility] [K72.90, K74.60]  Anemia, unspecified type [D64.9] who is seen in consult for cirrhosis. H/o alcohol cirrhosis followed by Dr. Gianni Cunningham. She last drank alcohol in 2022. Her cirrhosis is decompensated by ascites which has been managed per Renal due to EZIO. History of hepatic encephalopathy on xifaxan and lactulose. She did have bleeding esophageal varices which were banded in 2021. Last EGD was 11/10/22 with esophageal varic with cherry red spot banded and portal hypertensive gastropathy. She is on nadolol. 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 She requires frequent admissions for frequent blood transfusions. She was referred to  for liver transplant eval.  States she never did make an appointment because insurance will not cover any of it and she has no way of paying for it. Also has no one to stay with her after transplant/lack of social support. She came to the ED last night for mild confusion, weakness, and new left sided abdominal pain. Was having 4-5 BMs at home with lactulose and also on xifaxan. No N/V. No melena or hematochezia.      Past Medical History:   Diagnosis Date    Alcoholic liver disease ([facility]     Anemia     History of blood transfusion       Past Surgical History:   Procedure Laterality Date    COLONOSCOPY N/A 3/11/2021    COLONOSCOPY POLYPECTOMY SNARE/COLD BIOPSY performed by Brody Gould MD at [address]    CT BONE MARROW BIOPSY  2022    CT BONE MARROW BIOPSY 2022 Ellie Hull MD [facility] CT SCAN    KNEE ARTHROPLASTY      TUBAL LIGATION      ESSURE    UPPER GASTROINTESTINAL ENDOSCOPY N/A 2021    EGD DIAGNOSTIC ONLY performed by Myesha Kenyon MD at [address] [place] [address] N/A 03/10/2021    EGD BIOPSY performed by Bradford Granado MD at [address] [place] [address] 6/27/2021    EGD BAND LIGATION performed by Vivi Miles MD at [address] [place] [address] N/A 4/27/2022    EGD DIAGNOSTIC ONLY performed by Lesley Avendano MD at [address] [place] [address] N/A 7/1/2022    EGD ESOPHAGOGASTRODUODENOSCOPY ULTRASOUND performed by Heath Brumfield MD at [address] [place] [address] N/A 7/1/2022    EGD BAND LIGATION performed by Heath Brumfield MD at [address] [place] [address] N/A 10/26/2022    EGD DIAGNOSTIC ONLY performed by Pao Mo MD at [address] [place] [address] N/A 11/10/2022    EGD BAND LIGATION performed by Heath Brumfield MD at [address] [place] [address]      Past Endoscopic History  EGD 11/10/22 with Dr Bridgett Khan: 1 esophageal varix with a cherry red spot banded #2 portal hypertensive gastropathy    Colonoscopy 3/2021 with Dr Nelma Habermann for anemia     Findings:   Two small polyps, removed with cold snare. s/p 3 clips total. Patchy diverticulosis (right and left colon). Hemorrhoids. Plans:  Await pathology. Recall colonoscopy in 5 years. Admission Meds  No current facility-administered medications on file prior to encounter. Current Outpatient Medications on File Prior to Encounter   Medication Sig Dispense Refill    pregabalin (LYRICA) 25 MG capsule Take 1 capsule by mouth in the morning and at bedtime.       magnesium oxide (MAG-OX) 400 (240 Mg) MG tablet Take 1 tablet by mouth 2 times daily 30 tablet 1    midodrine (PROAMATINE) 5 MG tablet Take 1 tablet by mouth in the morning, at noon, and at bedtime 90 tablet 3    nadolol (CORGARD) 20 MG tablet Take 1 tablet by mouth at bedtime 30 tablet 3    zolpidem (AMBIEN) 5 MG tablet Take 5 mg by mouth nightly as needed for Sleep (every night at bedtmes as needed for insomnia). lactulose (CHRONULAC) 10 GM/15ML solution Take 30 mLs by mouth 2 times daily 1 each 0    folic acid (FOLVITE) 1 MG tablet Take 1 tablet by mouth daily 30 tablet 0    rifAXIMin (XIFAXAN) 550 MG tablet Take 1 tablet by mouth 2 times daily 60 tablet 1    furosemide (LASIX) 20 MG tablet Take 1 tablet by mouth in the morning. 60 tablet 3    ondansetron (ZOFRAN) 8 MG tablet Take 1 tablet by mouth      [DISCONTINUED] thiamine 100 MG tablet Take 1 tablet by mouth daily (Patient not taking: Reported on 8/25/2022) 30 tablet 3    spironolactone (ALDACTONE) 100 MG tablet Take 1 tablet by mouth daily 30 tablet 1    pantoprazole (PROTONIX) 40 MG tablet Take 1 tablet by mouth every morning (before breakfast) 30 tablet 1    thiamine mononitrate (THIAMINE) 100 MG tablet Take 1 tablet by mouth daily 30 tablet 0            Allergies  Allergies   Allergen Reactions    Oxycodone Nausea And Vomiting     Side effect, not an allergy        Social   Social History     Tobacco Use    Smoking status: Former    Smokeless tobacco: Never   Substance Use Topics    Alcohol use: Not Currently        Family History   Problem Relation Age of Onset    Alcohol Abuse Father            Review of Systems  Constitutional: negative for fevers, chills, sweats    Ears, nose, mouth, throat, and face: negative for nasal congestion and sore throat   Respiratory: negative for cough and shortness of breath   Cardiovascular: negative for chest pain and dyspnea   Gastrointestinal: see hpi   Genitourinary:negative for dysuria and frequency   Integument/breast: negative for pruritus and rash   Hematologic/lymphatic: negative for bleeding and easy bruising   Musculoskeletal:negative for arthralgias and myalgias   Neurological: negative for dizziness +  weakness         Physical Exam  Blood pressure 106/61, pulse 63, temperature 98.6 °F (37 °C), temperature source Oral, resp.  rate 16, nonspecific, but may reflect ileus in the setting   of gastroenteritis. Assessment:       Cirrhosis - due to alcohol abuse. No alcohol since April 2022. Has been referred to  liver transplant center but patient states never made an appointment as insurance does not cover any of the cost of transplant or her antirejection meds. Also states no social support. lft near baseline. Mild hepatic encephalopathy - pt with some mental fogginess. Oriented currently. Reports compliance with lactulose and xifaxan at home. Ascites - will check para to r/o SBP. 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 Did have esophageal varix with cherry red spot on EGD 11/10 which was banded. no gross GI bleeding. Left sided abdominal pain - need to r/o SBP. CT with possible splenic infarcts which is new. Recommendations:   - lactulose TID and titrate for goal of 3 BMs daily  - continue xifaxan  - paracentesis with fluid for cell count, cx, albumin  - PRBC PRN    Discussed with Dr. Mi Robles, PA-BRIDGET  [address] [place] [address]  I have personally performed a face to face diagnostic evaluation on this patient. I have interviewed and examined the patient and I agree with the findings and recommended plan of care. In summary, my findings and plan are the following: As above, young woman with alcoholic cirrhosis complicated by varices, ascites and encephalopathy admitted with left sided abd pain, confusion and weakness. CT shows ascites and splenic infarcts. On exam abd is less distended, soft, but tender in LUQ along palpable spleen. She has no asterixis now on exam, but had diarrhea with lactulose earlier. Will f/u on paracentesis fluid analysis, order IV Albumin now and empiric Rocephin pending results. Her MELD score is 29.     Lesley Avendano, [address] [place] [address]  11/23/2022

## 2022-11-23 NOTE — PROGRESS NOTES
Pt admitted to 5565 from ER, AOX4, VSS, oriented to room and call light. Independent after set up. Pt diarrhea x2 after lactulose, did urinate but urine sample contaminated, will try to obtain another. Pt denies needs at this time. Bed in lowest position, wheels locked and call light within reach.

## 2022-11-23 NOTE — PROGRESS NOTES
Hospitalist Progress Note      PCP: Gaby Martinez MD    Date of Admission: 11/22/2022    Chief Complaint:   [address] Course:    40 y.o. 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 Patient reports being on lactulose and compliant. Patient reported that she was discharged during hospitalization last Friday for anemia and had 3 blood transfusion      Subjective:   Feeling better  Slightly more lucid  Still complaining of contusion and right eye . Denies fevers or chills . Medications:  Reviewed    Infusion Medications    sodium chloride      sodium chloride       Scheduled Medications    sodium chloride flush  10 mL IntraVENous 2 times per day    sodium chloride flush  5-40 mL IntraVENous 2 times per day    furosemide  20 mg IntraVENous See Admin Instructions    midodrine  5 mg Oral TID    rifAXIMin  550 mg Oral BID    pantoprazole  40 mg IntraVENous BID     PRN Meds: sodium chloride flush, potassium chloride, magnesium sulfate, promethazine **OR** ondansetron, oxyCODONE, oxyCODONE, sodium chloride flush, sodium chloride, sodium chloride, acetaminophen **OR** acetaminophen, zolpidem      Intake/Output Summary (Last 24 hours) at 11/23/2022 0857  Last data filed at 11/23/2022 0810  Gross per 24 hour   Intake 540 ml   Output 200 ml   Net 340 ml       Physical Exam Performed:    /61   Pulse 63   Temp 98.6 °F (37 °C) (Oral)   Resp 16   Ht 5' 4\" (1.626 m)   Wt 114 lb 10.2 oz (52 kg)   SpO2 95%   BMI 19.68 kg/m²     General appearance: No apparent distress, appears stated age and cooperative.   HEENT: Pupils equal, round, and reactive to light. Conjunctivae/corneas clear. Neck: Supple, with full range of motion. No jugular venous distention. Trachea midline. Respiratory:  Normal respiratory effort. Clear to auscultation, bilaterally without Rales/Wheezes/Rhonchi. Cardiovascular: Regular rate and rhythm with normal S1/S2 without murmurs, rubs or gallops. Abdomen: Soft, non-tender, non-distended with normal bowel sounds. Musculoskeletal: No clubbing, cyanosis or edema bilaterally. Full range of motion without deformity. Skin: Skin color, texture, turgor normal.  No rashes or lesions. Neurologic:  Neurovascularly intact without any focal sensory/motor deficits. Cranial nerves: II-XII intact, grossly non-focal.  Psychiatric: Alert and oriented, thought content appropriate, normal insight  Capillary Refill: Brisk, 3 seconds, normal   Peripheral Pulses: +2 palpable, equal bilaterally       Labs:   Recent Labs     11/22/22 1812 11/23/22  0149 11/23/22  0614   WBC 5.5  --   --    HGB 7.4* 6.5* 7.4*   HCT 21.2* 18.4* 21.3*   PLT 78*  --   --      Recent Labs     11/22/22 1812   *   K 4.5   CL 97*   CO2 30   BUN 15   CREATININE 0.5*   CALCIUM 9.4     Recent Labs     11/22/22 1812   AST 79*   ALT 32   BILITOT 18.5*   ALKPHOS 112     Recent Labs     11/22/22 1812   INR 2.42*     Recent Labs     11/22/22 1812   TROPONINI <0.01       Urinalysis:      Lab Results   Component Value Date/Time    NITRU POSITIVE 11/23/2022 08:01 AM    WBCUA 18 11/09/2022 01:40 AM    BACTERIA 4+ 11/09/2022 01:40 AM    RBCUA 2 11/09/2022 01:40 AM    BLOODU Negative 11/23/2022 08:01 AM    SPECGRAV >=1.030 11/23/2022 08:01 AM    GLUCOSEU Negative 11/23/2022 08:01 AM       Radiology:  XR CHEST PORTABLE   Final Result   Borderline cardiomegaly which is unchanged. Hypoinflation with mild bibasilar atelectasis or scarring. Small nodular density projecting the right lung base which more apparent.    This could represent a nipple shadow but is indeterminate. Recommend a PA   and lateral chest         CT ABDOMEN PELVIS W IV CONTRAST Additional Contrast? None   Final Result   Cirrhosis with portal hypertension. Moderate volume ascites. The spleen is enlarged, and there are wedge-shaped areas of hypoenhancement. These could reflect variation in contrast opacification, but splenic infarcts   should be considered as well. Gallbladder mural thickening is seen, with cholelithiasis. That is a   nonspecific finding in this case, a as the mural thickening may be secondary   to the anasarca, but acute cholecystitis remains in the differential.      Mild fluid and gaseous distention of small bowel, without a clear zone of   transition. Again this is nonspecific, but may reflect ileus in the setting   of gastroenteritis. CT HEAD WO CONTRAST   Final Result   No acute intracranial abnormality. US GALLBLADDER RUQ    (Results Pending)       IP CONSULT TO GI  IP CONSULT TO PALLIATIVE CARE  IP CONSULT TO INTERVENTIONAL RADIOLOGY    Assessment/Plan:    Decompensated cirrhosis-with ascites  Due for paracentesis this afternoon         Send fluid for microscopy culture and sensitivity as well as cell counts. Lactulose/rifaximin   Keep n.p.o. till IR evaluates for paracentesis   Send ascitic fluid for MCS/cytology and fungal diseases as well as blood chemistry  ,   Continue fluid resuscitation      Normocytic anemia:  Hemoccult pending    3.    Liver failure: hyperbilirubinemia, NH3, coagulopathy      Progressively worsening  GI consulted, much appreciated  Palliative care consulted, much appreciated    Secondary to liver failure  GI on board     Diet: NPO except meds ordered     DVT Prophylaxis: held risk of bleeding     Dispo:   Expected LOS greater than two midnights  Appropriate for A1 Discharge Unit: [facility] MD TAY

## 2022-11-24 LAB
A/G RATIO: 1.2 (ref 1.1–2.2)
ALBUMIN SERPL-MCNC: 3 G/DL (ref 3.4–5)
ALP BLD-CCNC: 75 U/L (ref 40–129)
ALT SERPL-CCNC: 28 U/L (ref 10–40)
ANION GAP SERPL CALCULATED.3IONS-SCNC: 8 MMOL/L (ref 3–16)
AST SERPL-CCNC: 70 U/L (ref 15–37)
BASOPHILS ABSOLUTE: 0 K/UL (ref 0–0.2)
BASOPHILS RELATIVE PERCENT: 1 %
BILIRUB SERPL-MCNC: 15.9 MG/DL (ref 0–1)
BUN BLDV-MCNC: 14 MG/DL (ref 7–20)
CALCIUM SERPL-MCNC: 9.3 MG/DL (ref 8.3–10.6)
CHLORIDE BLD-SCNC: 100 MMOL/L (ref 99–110)
CO2: 28 MMOL/L (ref 21–32)
CREAT SERPL-MCNC: 0.6 MG/DL (ref 0.6–1.1)
EOSINOPHILS ABSOLUTE: 0.1 K/UL (ref 0–0.6)
EOSINOPHILS RELATIVE PERCENT: 2.6 %
GFR SERPL CREATININE-BSD FRML MDRD: >60 ML/MIN/{1.73_M2}
GLUCOSE BLD-MCNC: 112 MG/DL (ref 70–99)
HCT VFR BLD CALC: 22.2 % (ref 36–48)
HEMOGLOBIN: 7.7 G/DL (ref 12–16)
LACTIC ACID: 2.1 MMOL/L (ref 0.4–2)
LACTIC ACID: 2.3 MMOL/L (ref 0.4–2)
LYMPHOCYTES ABSOLUTE: 0.7 K/UL (ref 1–5.1)
LYMPHOCYTES RELATIVE PERCENT: 16.3 %
MCH RBC QN AUTO: 33.5 PG (ref 26–34)
MCHC RBC AUTO-ENTMCNC: 34.5 G/DL (ref 31–36)
MCV RBC AUTO: 97.1 FL (ref 80–100)
MONOCYTES ABSOLUTE: 0.4 K/UL (ref 0–1.3)
MONOCYTES RELATIVE PERCENT: 9.8 %
NEUTROPHILS ABSOLUTE: 2.8 K/UL (ref 1.7–7.7)
NEUTROPHILS RELATIVE PERCENT: 70.3 %
PDW BLD-RTO: 25.3 % (ref 12.4–15.4)
PLATELET # BLD: 68 K/UL (ref 135–450)
PMV BLD AUTO: 9.2 FL (ref 5–10.5)
POTASSIUM REFLEX MAGNESIUM: 4.1 MMOL/L (ref 3.5–5.1)
RBC # BLD: 2.29 M/UL (ref 4–5.2)
SODIUM BLD-SCNC: 136 MMOL/L (ref 136–145)
TOTAL PROTEIN: 5.6 G/DL (ref 6.4–8.2)
WBC # BLD: 4 K/UL (ref 4–11)

## 2022-11-24 PROCEDURE — 6370000000 HC RX 637 (ALT 250 FOR IP): Performed by: PHYSICIAN ASSISTANT

## 2022-11-24 PROCEDURE — C9113 INJ PANTOPRAZOLE SODIUM, VIA: HCPCS | Performed by: INTERNAL MEDICINE

## 2022-11-24 PROCEDURE — 6360000002 HC RX W HCPCS: Performed by: INTERNAL MEDICINE

## 2022-11-24 PROCEDURE — 1200000000 HC SEMI PRIVATE

## 2022-11-24 PROCEDURE — 83605 ASSAY OF LACTIC ACID: CPT

## 2022-11-24 PROCEDURE — 2580000003 HC RX 258: Performed by: INTERNAL MEDICINE

## 2022-11-24 PROCEDURE — 6370000000 HC RX 637 (ALT 250 FOR IP): Performed by: INTERNAL MEDICINE

## 2022-11-24 PROCEDURE — 85025 COMPLETE CBC W/AUTO DIFF WBC: CPT

## 2022-11-24 PROCEDURE — 80053 COMPREHEN METABOLIC PANEL: CPT

## 2022-11-24 PROCEDURE — 36415 COLL VENOUS BLD VENIPUNCTURE: CPT

## 2022-11-24 RX ADMIN — OXYCODONE 2.5 MG: 5 TABLET ORAL at 13:56

## 2022-11-24 RX ADMIN — RIFAXIMIN 550 MG: 550 TABLET ORAL at 20:10

## 2022-11-24 RX ADMIN — ZOLPIDEM TARTRATE 5 MG: 5 TABLET ORAL at 21:41

## 2022-11-24 RX ADMIN — LACTULOSE 20 G: 20 SOLUTION ORAL at 08:19

## 2022-11-24 RX ADMIN — MIDODRINE HYDROCHLORIDE 5 MG: 5 TABLET ORAL at 08:19

## 2022-11-24 RX ADMIN — Medication 10 ML: at 20:10

## 2022-11-24 RX ADMIN — LACTULOSE 20 G: 20 SOLUTION ORAL at 13:55

## 2022-11-24 RX ADMIN — PIPERACILLIN AND TAZOBACTAM 3375 MG: 3; .375 INJECTION, POWDER, FOR SOLUTION INTRAVENOUS at 13:59

## 2022-11-24 RX ADMIN — PANTOPRAZOLE SODIUM 40 MG: 40 INJECTION, POWDER, FOR SOLUTION INTRAVENOUS at 20:10

## 2022-11-24 RX ADMIN — MIDODRINE HYDROCHLORIDE 5 MG: 5 TABLET ORAL at 13:57

## 2022-11-24 RX ADMIN — PANTOPRAZOLE SODIUM 40 MG: 40 INJECTION, POWDER, FOR SOLUTION INTRAVENOUS at 08:19

## 2022-11-24 RX ADMIN — RIFAXIMIN 550 MG: 550 TABLET ORAL at 08:19

## 2022-11-24 RX ADMIN — SODIUM CHLORIDE, PRESERVATIVE FREE 10 ML: 5 INJECTION INTRAVENOUS at 08:19

## 2022-11-24 RX ADMIN — MIDODRINE HYDROCHLORIDE 5 MG: 5 TABLET ORAL at 20:10

## 2022-11-24 RX ADMIN — OXYCODONE 5 MG: 5 TABLET ORAL at 00:33

## 2022-11-24 RX ADMIN — LACTULOSE 20 G: 20 SOLUTION ORAL at 20:10

## 2022-11-24 RX ADMIN — Medication 10 ML: at 08:19

## 2022-11-24 RX ADMIN — PIPERACILLIN AND TAZOBACTAM 3375 MG: 3; .375 INJECTION, POWDER, FOR SOLUTION INTRAVENOUS at 21:42

## 2022-11-24 RX ADMIN — OXYCODONE 2.5 MG: 5 TABLET ORAL at 06:24

## 2022-11-24 ASSESSMENT — PAIN SCALES - GENERAL
PAINLEVEL_OUTOF10: 0
PAINLEVEL_OUTOF10: 9

## 2022-11-24 NOTE — PROGRESS NOTES
Pt c/o pain, 9/10. PRN oxycodone administered. BP 97/42. Scheduled midodrine administered. Other scheduled medications also administered. No further needs expressed.

## 2022-11-24 NOTE — PROGRESS NOTES
Hospitalist Progress Note      PCP: Hayden Chow MD    Date of Admission: 11/22/2022    Chief Complaint:   [address] Course:    40 y.o. 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 Patient reports being on lactulose and compliant. Patient reported that she was discharged during hospitalization last Friday for anemia and had 3 blood transfusion      Subjective:   She reports abdominal pain, seems diffuse and worsens with eating. She denies nausea or vomiting. She has diarrhea with being on lactulose. She denies history of hematemesis melena and rectal bleeding. She reports intermittent lightheadedness with standing. She denies shortness of breath chest pain palpitations.       Medications:  Reviewed    Infusion Medications    sodium chloride      sodium chloride 50 mL/hr at 11/23/22 1912     Scheduled Medications    sodium chloride flush  10 mL IntraVENous 2 times per day    [Held by provider] furosemide  20 mg IntraVENous See Admin Instructions    lactulose  20 g Oral TID    cefTRIAXone (ROCEPHIN) IV  1,000 mg IntraVENous Q24H    midodrine  5 mg Oral TID    rifAXIMin  550 mg Oral BID    pantoprazole  40 mg IntraVENous BID     PRN Meds: sodium chloride flush, potassium chloride, magnesium sulfate, promethazine **OR** ondansetron, oxyCODONE, sodium chloride, sodium chloride, acetaminophen **OR** acetaminophen, zolpidem    No intake or output data in the 24 hours ending 11/24/22 1142      Physical Exam Performed:    /67   Pulse 61   Temp 98 °F (36.7 °C) (Oral)   Resp 16   Ht 5' 4\" (1.626 m)   Wt 105 lb 14.4 oz (48 kg) SpO2 97%   BMI 18.18 kg/m²       GEN alert, in no distress  HEENT normocephalic, icteric sclera, EOMI, mucosa moist, no stridor  NECK supple, trachea midline  RESP on RA, in no distress, decreased BS at bases. CARDS RRR, S1, S2, no murmurs, no edema, radial pulse 2+, DP pulse 2+  ABD distended, +BS, soft diffuse tenderness  MSK no cyanosis, no clubbing  SKIN jaundiced, warm, dry  NEURO alert, oriented x 3, no facial asymmetry, no dysarthria, moving spontaneously  PSYCH normal mood        Labs:   Recent Labs     11/22/22 1812 11/23/22  0149 11/23/22  0614 11/23/22  1324 11/24/22  0528   WBC 5.5  --   --  4.8 4.0   HGB 7.4*   < > 7.4* 7.4* 7.7*   HCT 21.2*   < > 21.3* 21.5* 22.2*   PLT 78*  --   --  66* 68*    < > = values in this interval not displayed. Recent Labs     11/22/22 1812 11/24/22  0528   * 136   K 4.5 4.1   CL 97* 100   CO2 30 28   BUN 15 14   CREATININE 0.5* 0.6   CALCIUM 9.4 9.3       Recent Labs     11/22/22 1812 11/24/22  0528   AST 79* 70*   ALT 32 28   BILITOT 18.5* 15.9*   ALKPHOS 112 75       Recent Labs     11/22/22 1812   INR 2.42*       Recent Labs     11/22/22 1812   TROPONINI <0.01         Urinalysis:      Lab Results   Component Value Date/Time    NITRU POSITIVE 11/23/2022 08:01 AM    WBCUA 0 11/23/2022 08:01 AM    BACTERIA None Seen 11/23/2022 08:01 AM    RBCUA 8 11/23/2022 08:01 AM    BLOODU Negative 11/23/2022 08:01 AM    SPECGRAV >=1.030 11/23/2022 08:01 AM    GLUCOSEU Negative 11/23/2022 08:01 AM       Radiology:  US GALLBLADDER RUQ   Final Result   Cholelithiasis and associated gallbladder sludge with several large rounded   nonshadowing foci in keeping with tumefactive sludge. There is diffuse mild   wall thickening suggestive of chronic or acute cholecystitis. Moderate dilatation of the common duct with no obvious filling defect seen. Normal size liver and unremarkable pancreas. Mild-to-moderate ascites.          IR [employer]   Final Result   Successful paracentesis. XR CHEST PORTABLE   Final Result   Borderline cardiomegaly which is unchanged. Hypoinflation with mild bibasilar atelectasis or scarring. Small nodular density projecting the right lung base which more apparent. This could represent a nipple shadow but is indeterminate. Recommend a PA   and lateral chest         CT ABDOMEN PELVIS W IV CONTRAST Additional Contrast? None   Final Result   Cirrhosis with portal hypertension. Moderate volume ascites. The spleen is enlarged, and there are wedge-shaped areas of hypoenhancement. These could reflect variation in contrast opacification, but splenic infarcts   should be considered as well. Gallbladder mural thickening is seen, with cholelithiasis. That is a   nonspecific finding in this case, a as the mural thickening may be secondary   to the anasarca, but acute cholecystitis remains in the differential.      Mild fluid and gaseous distention of small bowel, without a clear zone of   transition. Again this is nonspecific, but may reflect ileus in the setting   of gastroenteritis. CT HEAD WO CONTRAST   Final Result   No acute intracranial abnormality. 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 Patient reports being on lactulose and compliant. Patient reported that she was discharged during hospitalization last Friday for anemia and had 3 blood transfusion.     Decompensated Cirrhosis  Esophageal Varices  - EGD on 11/10/22 showed esophageal varices with cherry red spot which was banded. Portal hypertensive gastropathy  Ascites  - s/p paracentesis 11/23 with removal of 1.8L. Fluid analysis showed nucl cell 41, PMN's 5. Findings not suggestive of SBP. Hepatic encephalopathy  - adm NH3 level 121.  - Continue lactulose 20 g TID and rifaximin BID. Coagulopathy    Acute vs Chronic Cholecystitis  Cholelithiasis with Gallbladder Sludge  ? Splenic Infarcts  - Abdominal Pain worsened with eating.  - US RUQ 11/23 showed cholelithiasis and associated gallbladder sludge with several large rounded non-shadowing foci in keeping with tumefactive sludge. There is diffuse mild wall thickening suggestive of chronic or acute cholecystitis. Moderate dilation of common duct with no obvious filling defect.  - CT A/P with IV contrast 11/22/22 showed cirrhosis with portal hypertension. Moderate volume ascites. The spleen is enlarged, and there are wedge-shaped areas of hypoenhancement, which could reflect variation in contrast opacification versus splenic infarcts. Gallbladder mural thickening is seen with cholelithiasis. Mild fluid and gaseous distention of small bowel without a clear transition zone. - Appreciate GI participation.  - Received IV ceftriaxone (11/23-). - Started IV zosyn (11/24-). Normocytic anemia  History of spur cell anemia  - adm HgB 7.4, MCV 98.3. Recent baseline HgB 7's-8's. - HgB downtrended to 6.5 on 11/23.  - Denies history of hematemesis, rectal bleeding, melena. - Fecal occult 11/23 negative.  - s/p 1 unit pRBC on 11/22/22. - Monitor.       Progressively worsening  GI consulted, much appreciated  Palliative care consulted, much appreciated       Diet: NPO except meds ordered     DVT Prophylaxis: held risk of bleeding     Dispo:   Expected LOS greater than two midnights  Appropriate for A1 Discharge Unit: Chikis Bo MD

## 2022-11-24 NOTE — PROGRESS NOTES
BILIARY SYSTEM:  The gallbladder is normal size with small echogenic foci in   the lumen which are mobile and shadowing posteriorly. There are moderate   rounded echogenic foci which are nonshadowing with diffuse mild wall   thickening measuring up to 5-6 mm. There is no obvious wall edema or   pericholecystic fluid and there is       No pain upon compression of the gallbladder. Common bile duct is within normal limits measuring 9 mm. RIGHT KIDNEY: The right kidney is grossly unremarkable without evidence of   hydronephrosis. PANCREAS:  Visualized portions of the pancreas are unremarkable. OTHER: There is moderate ascites along the right upper quadrant extending in   the upper abdomen. Impression   Cholelithiasis and associated gallbladder sludge with several large rounded   nonshadowing foci in keeping with tumefactive sludge. There is diffuse mild   wall thickening suggestive of chronic or acute cholecystitis. Moderate dilatation of the common duct with no obvious filling defect seen. Normal size liver and unremarkable pancreas. Mild-to-moderate ascites. ASSESSMENT :    Cirrhosis - due to alcohol abuse. No alcohol since April 2022. Has been referred to  liver transplant center but patient states never made an appointment as insurance does not cover any of the cost of transplant or her antirejection meds. Also states no social support. lft near baseline. Her MELD is 29. Mild hepatic encephalopathy - pt with some mental fogginess. Oriented currently. Reports compliance with lactulose and xifaxan at home. Ascites - non neutrocytic c/w portal HTN. No SBP. 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 Did have esophageal varix with cherry red spot on EGD 11/10 which was banded. no gross GI bleeding. Left sided abdominal pain - need to r/o SBP. CT with possible splenic infarcts which is new.  Repeat RUQ US shows GB sludge and mild wall thickening - could be due to underlying liver disease and likely not a cause of pain      PLAN  :  1) Stop rocephin  2) Continue Lactulose and Xifaxin  3) Diet as tolerated. 4) Zosyn for now.     [facility], [address] [place] [address]  11/24/2022

## 2022-11-25 LAB
A/G RATIO: 1.1 (ref 1.1–2.2)
ALBUMIN SERPL-MCNC: 2.8 G/DL (ref 3.4–5)
ALP BLD-CCNC: 75 U/L (ref 40–129)
ALT SERPL-CCNC: 27 U/L (ref 10–40)
AMMONIA: 38 UMOL/L (ref 11–51)
ANION GAP SERPL CALCULATED.3IONS-SCNC: 7 MMOL/L (ref 3–16)
AST SERPL-CCNC: 67 U/L (ref 15–37)
BASOPHILS ABSOLUTE: 0 K/UL (ref 0–0.2)
BASOPHILS RELATIVE PERCENT: 0.5 %
BILIRUB SERPL-MCNC: 14.7 MG/DL (ref 0–1)
BUN BLDV-MCNC: 12 MG/DL (ref 7–20)
CALCIUM SERPL-MCNC: 9.2 MG/DL (ref 8.3–10.6)
CHLORIDE BLD-SCNC: 100 MMOL/L (ref 99–110)
CO2: 27 MMOL/L (ref 21–32)
CREAT SERPL-MCNC: <0.5 MG/DL (ref 0.6–1.1)
EOSINOPHILS ABSOLUTE: 0.1 K/UL (ref 0–0.6)
EOSINOPHILS RELATIVE PERCENT: 1.9 %
GFR SERPL CREATININE-BSD FRML MDRD: >60 ML/MIN/{1.73_M2}
GLUCOSE BLD-MCNC: 120 MG/DL (ref 70–99)
HCT VFR BLD CALC: 20.2 % (ref 36–48)
HEMOGLOBIN: 7 G/DL (ref 12–16)
LACTIC ACID: 2.1 MMOL/L (ref 0.4–2)
LYMPHOCYTES ABSOLUTE: 0.5 K/UL (ref 1–5.1)
LYMPHOCYTES RELATIVE PERCENT: 13.6 %
MCH RBC QN AUTO: 34 PG (ref 26–34)
MCHC RBC AUTO-ENTMCNC: 34.7 G/DL (ref 31–36)
MCV RBC AUTO: 98.1 FL (ref 80–100)
MONOCYTES ABSOLUTE: 0.4 K/UL (ref 0–1.3)
MONOCYTES RELATIVE PERCENT: 10.4 %
NEUTROPHILS ABSOLUTE: 2.6 K/UL (ref 1.7–7.7)
NEUTROPHILS RELATIVE PERCENT: 73.6 %
PDW BLD-RTO: 24.9 % (ref 12.4–15.4)
PLATELET # BLD: 71 K/UL (ref 135–450)
PMV BLD AUTO: 8.9 FL (ref 5–10.5)
POTASSIUM REFLEX MAGNESIUM: 4.4 MMOL/L (ref 3.5–5.1)
RBC # BLD: 2.06 M/UL (ref 4–5.2)
SODIUM BLD-SCNC: 134 MMOL/L (ref 136–145)
TOTAL PROTEIN: 5.3 G/DL (ref 6.4–8.2)
WBC # BLD: 3.5 K/UL (ref 4–11)

## 2022-11-25 PROCEDURE — 1200000000 HC SEMI PRIVATE

## 2022-11-25 PROCEDURE — 99223 1ST HOSP IP/OBS HIGH 75: CPT | Performed by: SURGERY

## 2022-11-25 PROCEDURE — 6370000000 HC RX 637 (ALT 250 FOR IP): Performed by: INTERNAL MEDICINE

## 2022-11-25 PROCEDURE — 85025 COMPLETE CBC W/AUTO DIFF WBC: CPT

## 2022-11-25 PROCEDURE — 2580000003 HC RX 258: Performed by: INTERNAL MEDICINE

## 2022-11-25 PROCEDURE — C9113 INJ PANTOPRAZOLE SODIUM, VIA: HCPCS | Performed by: INTERNAL MEDICINE

## 2022-11-25 PROCEDURE — 83605 ASSAY OF LACTIC ACID: CPT

## 2022-11-25 PROCEDURE — 36415 COLL VENOUS BLD VENIPUNCTURE: CPT

## 2022-11-25 PROCEDURE — 6360000002 HC RX W HCPCS: Performed by: INTERNAL MEDICINE

## 2022-11-25 PROCEDURE — 80053 COMPREHEN METABOLIC PANEL: CPT

## 2022-11-25 PROCEDURE — 82140 ASSAY OF AMMONIA: CPT

## 2022-11-25 PROCEDURE — 6370000000 HC RX 637 (ALT 250 FOR IP): Performed by: PHYSICIAN ASSISTANT

## 2022-11-25 RX ORDER — SODIUM CHLORIDE 9 MG/ML
INJECTION, SOLUTION INTRAVENOUS CONTINUOUS
Status: DISCONTINUED | OUTPATIENT
Start: 2022-11-25 | End: 2022-11-28 | Stop reason: HOSPADM

## 2022-11-25 RX ORDER — TRAMADOL HYDROCHLORIDE 50 MG/1
50 TABLET ORAL EVERY 8 HOURS PRN
Status: DISCONTINUED | OUTPATIENT
Start: 2022-11-25 | End: 2022-11-28 | Stop reason: HOSPADM

## 2022-11-25 RX ORDER — OXYCODONE HYDROCHLORIDE 5 MG/1
5 TABLET ORAL EVERY 6 HOURS PRN
Status: COMPLETED | OUTPATIENT
Start: 2022-11-25 | End: 2022-11-25

## 2022-11-25 RX ADMIN — LACTULOSE 20 G: 20 SOLUTION ORAL at 20:47

## 2022-11-25 RX ADMIN — OXYCODONE 5 MG: 5 TABLET ORAL at 22:06

## 2022-11-25 RX ADMIN — ZOLPIDEM TARTRATE 5 MG: 5 TABLET ORAL at 22:06

## 2022-11-25 RX ADMIN — LACTULOSE 20 G: 20 SOLUTION ORAL at 07:35

## 2022-11-25 RX ADMIN — LACTULOSE 20 G: 20 SOLUTION ORAL at 13:23

## 2022-11-25 RX ADMIN — OXYCODONE 5 MG: 5 TABLET ORAL at 08:20

## 2022-11-25 RX ADMIN — OXYCODONE 2.5 MG: 5 TABLET ORAL at 04:17

## 2022-11-25 RX ADMIN — PIPERACILLIN AND TAZOBACTAM 3375 MG: 3; .375 INJECTION, POWDER, FOR SOLUTION INTRAVENOUS at 05:31

## 2022-11-25 RX ADMIN — PANTOPRAZOLE SODIUM 40 MG: 40 INJECTION, POWDER, FOR SOLUTION INTRAVENOUS at 22:06

## 2022-11-25 RX ADMIN — MIDODRINE HYDROCHLORIDE 5 MG: 5 TABLET ORAL at 22:06

## 2022-11-25 RX ADMIN — Medication 10 ML: at 07:30

## 2022-11-25 RX ADMIN — MIDODRINE HYDROCHLORIDE 5 MG: 5 TABLET ORAL at 07:34

## 2022-11-25 RX ADMIN — SODIUM CHLORIDE, PRESERVATIVE FREE 10 ML: 5 INJECTION INTRAVENOUS at 13:22

## 2022-11-25 RX ADMIN — PANTOPRAZOLE SODIUM 40 MG: 40 INJECTION, POWDER, FOR SOLUTION INTRAVENOUS at 07:30

## 2022-11-25 RX ADMIN — OXYCODONE 5 MG: 5 TABLET ORAL at 15:35

## 2022-11-25 RX ADMIN — RIFAXIMIN 550 MG: 550 TABLET ORAL at 22:07

## 2022-11-25 RX ADMIN — PIPERACILLIN AND TAZOBACTAM 3375 MG: 3; .375 INJECTION, POWDER, FOR SOLUTION INTRAVENOUS at 13:23

## 2022-11-25 RX ADMIN — RIFAXIMIN 550 MG: 550 TABLET ORAL at 07:34

## 2022-11-25 RX ADMIN — PIPERACILLIN AND TAZOBACTAM 3375 MG: 3; .375 INJECTION, POWDER, FOR SOLUTION INTRAVENOUS at 22:08

## 2022-11-25 RX ADMIN — Medication 10 ML: at 22:06

## 2022-11-25 RX ADMIN — MIDODRINE HYDROCHLORIDE 5 MG: 5 TABLET ORAL at 15:35

## 2022-11-25 ASSESSMENT — ENCOUNTER SYMPTOMS
EYE DISCHARGE: 0
APNEA: 0
BACK PAIN: 0
COLOR CHANGE: 1
ABDOMINAL DISTENTION: 1
EYE ITCHING: 0
NAUSEA: 1
ABDOMINAL PAIN: 1

## 2022-11-25 ASSESSMENT — PAIN SCALES - GENERAL
PAINLEVEL_OUTOF10: 7
PAINLEVEL_OUTOF10: 9
PAINLEVEL_OUTOF10: 8
PAINLEVEL_OUTOF10: 8
PAINLEVEL_OUTOF10: 9
PAINLEVEL_OUTOF10: 9
PAINLEVEL_OUTOF10: 0
PAINLEVEL_OUTOF10: 7
PAINLEVEL_OUTOF10: 9
PAINLEVEL_OUTOF10: 0

## 2022-11-25 ASSESSMENT — PAIN DESCRIPTION - LOCATION
LOCATION: ABDOMEN

## 2022-11-25 ASSESSMENT — PAIN DESCRIPTION - DESCRIPTORS
DESCRIPTORS: CRAMPING

## 2022-11-25 ASSESSMENT — PAIN DESCRIPTION - ORIENTATION: ORIENTATION: MID

## 2022-11-25 ASSESSMENT — PAIN DESCRIPTION - PAIN TYPE
TYPE: ACUTE PAIN

## 2022-11-25 NOTE — PROGRESS NOTES
Gastroenterology Progress Note            Sanju Rashid is a 40 y.o. female patient. 1. Anemia, unspecified type    2. Asterixis    3. Hyperammonemia (HCC)        SUBJECTIVE:  Still has abd pain -- worse after eating. Physical    VITALS:  /70   Pulse 67   Temp 98.5 °F (36.9 °C) (Oral)   Resp 16   Ht 5' 4\" (1.626 m)   Wt 105 lb 14.4 oz (48 kg)   SpO2 95%   BMI 18.18 kg/m²   TEMPERATURE:  Current - Temp: 98.5 °F (36.9 °C); Max - Temp  Av.5 °F (36.9 °C)  Min: 98.3 °F (36.8 °C)  Max: 98.8 °F (37.1 °C)    Abdomen soft, mild distention, diffusely tender, but worse over spleen tip, Bowel sounds normal     Data      Recent Labs     22  1324 22  0528 22  0607   WBC 4.8 4.0 3.5*   HGB 7.4* 7.7* 7.0*   HCT 21.5* 22.2* 20.2*   MCV 96.2 97.1 98.1   PLT 66* 68* 71*     Recent Labs     22  1812 22  0528 22  0607   * 136 134*   K 4.5 4.1 4.4   CL 97* 100 100   CO2 30 28 27   BUN 15 14 12   CREATININE 0.5* 0.6 <0.5*     Recent Labs     22  1812 22  0528 22  0607   AST 79* 70* 67*   ALT 32 28 27   BILITOT 18.5* 15.9* 14.7*   ALKPHOS 112 75 75     Recent Labs     22   LIPASE 38.0           ASSESSMENT :     Cirrhosis - due to alcohol abuse. No alcohol since 2022. Has been referred to  liver transplant center but patient states never made an appointment as insurance does not cover any of the cost of transplant or her antirejection meds. Also states no social support. lft near baseline. Her MELD is 29. Mild hepatic encephalopathy - pt with some mental fogginess. Oriented currently. Reports compliance with lactulose and xifaxan at home. Ascites - non neutrocytic c/w portal HTN. No SBP. 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 Did have esophageal varix with cherry red spot on EGD 11/10 which was banded. no gross GI bleeding.       Left sided abdominal pain - paracentesis did r/o SBP. CT with possible splenic infarcts which is new. Repeat RUQ US shows GB sludge and mild wall thickening - could be due to underlying liver disease and likely not a cause of pain. Gen surg consult agrees no surgical indication. I am concerned about vascular cause (e.g. splenic or mesenteric vein thrombosis) so will repeat CT with triphasic contrast.        PLAN  :  1) Abd/Pelvic CT with triphasic contrast (liver) protocol. 2) Continue Lactulose and Xifaxin  3) Diet as tolerated.     4) Zosyn for now    Gurpreet Duval, [address] [place] [address]  11/25/2022

## 2022-11-25 NOTE — CARE COORDINATION
Discharge Planning Assessment  Discharge Planning Assessment  RN/SW discharge planner met with patient/ (and family member) to discuss reason for admission, current living situation, and potential needs at the time of discharge    Demographics/Insurance verified Yes/No Yes    Current type of dwelling: SNF    Patient from ECF/SW confirmed with: Patient    Living arrangements: [facility]    Level of function/Support: moderate    PCP: Facility Physician    Last Visit to PCP:    DME: None    Active with any community resources/agencies/skilled home care: None    Medication compliance issues: Patient states that she does not receive her medications in a timely manner and sometimes not at all. Financial issues that could impact healthcare: Has [facility]        Tentative discharge plan:  Patient was skilled at [facility] and states that she would prefer to go somewhere else for skilled services. CM provided pt list of choices for SNF facilities covered by her insurance. Discussed with patient and/or family that on the day of discharge home tentative time of discharge will be between 10 AM and noon.     Transportation at the time of discharge: TBD  Electronically signed by Bandar Licea RN on 11/25/2022 at 3:16 PM

## 2022-11-25 NOTE — PROGRESS NOTES
Hospitalist Progress Note      PCP: Ashkan Roberson MD    Date of Admission: 11/22/2022    Chief Complaint:   [address] Course:    40 y.o. 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 Patient reports being on lactulose and compliant. Patient reported that she was discharged during hospitalization last Friday for anemia and had 3 blood transfusion      Subjective:   She continues to experience abdominal pain that worsens with eating. She denies nausea or vomiting. She has diarrhea with being on lactulose. She is trying to eat and drink as tolerated. She denies lightheadedness, shortness of breath, chest pain.       Medications:  Reviewed    Infusion Medications    sodium chloride      sodium chloride      sodium chloride 50 mL/hr at 11/23/22 1912     Scheduled Medications    piperacillin-tazobactam  3,375 mg IntraVENous Q8H    sodium chloride flush  10 mL IntraVENous 2 times per day    [Held by provider] furosemide  20 mg IntraVENous See Admin Instructions    lactulose  20 g Oral TID    midodrine  5 mg Oral TID    rifAXIMin  550 mg Oral BID    pantoprazole  40 mg IntraVENous BID     PRN Meds: oxyCODONE, traMADol, sodium chloride flush, potassium chloride, magnesium sulfate, promethazine **OR** ondansetron, sodium chloride, sodium chloride, acetaminophen **OR** acetaminophen, zolpidem    No intake or output data in the 24 hours ending 11/25/22 1527      Physical Exam Performed:    /70   Pulse 67   Temp 98.5 °F (36.9 °C) (Oral)   Resp 16   Ht 5' 4\" (1.626 m)   Wt 105 lb 14.4 oz (48 kg)   SpO2 95%   BMI 18.18 kg/m² GEN alert, in no distress  HEENT normocephalic, icteric sclera, EOMI, mucosa moist, no stridor  NECK supple, trachea midline  RESP on RA, in no distress, decreased BS at bases. CARDS RRR, S1, S2, no murmurs, no edema, radial pulse 2+, DP pulse 2+  ABD distended, +BS, soft diffuse tenderness  MSK no cyanosis, no clubbing  SKIN jaundiced, warm, dry  NEURO alert, oriented x 3, no facial asymmetry, no dysarthria, moving spontaneously  PSYCH normal mood        Labs:   Recent Labs     11/23/22  1324 11/24/22  0528 11/25/22  0607   WBC 4.8 4.0 3.5*   HGB 7.4* 7.7* 7.0*   HCT 21.5* 22.2* 20.2*   PLT 66* 68* 71*       Recent Labs     11/22/22  1812 11/24/22  0528 11/25/22  0607   * 136 134*   K 4.5 4.1 4.4   CL 97* 100 100   CO2 30 28 27   BUN 15 14 12   CREATININE 0.5* 0.6 <0.5*   CALCIUM 9.4 9.3 9.2       Recent Labs     11/22/22  1812 11/24/22  0528 11/25/22  0607   AST 79* 70* 67*   ALT 32 28 27   BILITOT 18.5* 15.9* 14.7*   ALKPHOS 112 75 75       Recent Labs     11/22/22 1812   INR 2.42*       Recent Labs     11/22/22 1812   TROPONINI <0.01         Urinalysis:      Lab Results   Component Value Date/Time    NITRU POSITIVE 11/23/2022 08:01 AM    WBCUA 0 11/23/2022 08:01 AM    BACTERIA None Seen 11/23/2022 08:01 AM    RBCUA 8 11/23/2022 08:01 AM    BLOODU Negative 11/23/2022 08:01 AM    SPECGRAV >=1.030 11/23/2022 08:01 AM    GLUCOSEU Negative 11/23/2022 08:01 AM       Radiology:  US GALLBLADDER RUQ   Final Result   Cholelithiasis and associated gallbladder sludge with several large rounded   nonshadowing foci in keeping with tumefactive sludge. There is diffuse mild   wall thickening suggestive of chronic or acute cholecystitis. Moderate dilatation of the common duct with no obvious filling defect seen. Normal size liver and unremarkable pancreas. Mild-to-moderate ascites. IR US GUIDED PARACENTESIS   Final Result   Successful paracentesis.          XR CHEST PORTABLE   Final Result Borderline cardiomegaly which is unchanged. Hypoinflation with mild bibasilar atelectasis or scarring. Small nodular density projecting the right lung base which more apparent. This could represent a nipple shadow but is indeterminate. Recommend a PA   and lateral chest         CT ABDOMEN PELVIS W IV CONTRAST Additional Contrast? None   Final Result   Cirrhosis with portal hypertension. Moderate volume ascites. The spleen is enlarged, and there are wedge-shaped areas of hypoenhancement. These could reflect variation in contrast opacification, but splenic infarcts   should be considered as well. Gallbladder mural thickening is seen, with cholelithiasis. That is a   nonspecific finding in this case, a as the mural thickening may be secondary   to the anasarca, but acute cholecystitis remains in the differential.      Mild fluid and gaseous distention of small bowel, without a clear zone of   transition. Again this is nonspecific, but may reflect ileus in the setting   of gastroenteritis. CT HEAD WO CONTRAST   Final Result   No acute intracranial abnormality. CT ABDOMEN W WO CONTRAST Additional Contrast? 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 Patient reports being on lactulose and compliant.   Patient reported that she was discharged during hospitalization last Friday for anemia and had 3 blood transfusion. Decompensated Cirrhosis  Esophageal Varices  - EGD on 11/10/22 showed esophageal varices with cherry red spot which was banded. Portal hypertensive gastropathy  Ascites  - s/p paracentesis 11/23 with removal of 1.8L. Fluid analysis showed nucl cell 41, PMN's 5. Findings not suggestive of SBP. Hepatic encephalopathy  - adm NH3 level 121.  - Continue lactulose 20 g TID and rifaximin BID. Coagulopathy    Acute vs Chronic Cholecystitis  Cholelithiasis with Gallbladder Sludge  ? Splenic Infarcts  - Abdominal Pain worsened with eating.  - US RUQ 11/23 showed cholelithiasis and associated gallbladder sludge with several large rounded non-shadowing foci in keeping with tumefactive sludge. There is diffuse mild wall thickening suggestive of chronic or acute cholecystitis. Moderate dilation of common duct with no obvious filling defect.  - CT A/P with IV contrast 11/22/22 showed cirrhosis with portal hypertension. Moderate volume ascites. The spleen is enlarged, and there are wedge-shaped areas of hypoenhancement, which could reflect variation in contrast opacification versus splenic infarcts. Gallbladder mural thickening is seen with cholelithiasis. Mild fluid and gaseous distention of small bowel without a clear transition zone. - Appreciate GI participation.  - Received IV ceftriaxone (11/23-). - Started IV zosyn (11/24-). - General surgery consult. Normocytic anemia  History of spur cell anemia  - adm HgB 7.4, MCV 98.3. Recent baseline HgB 7's-8's. - HgB downtrended to 6.5 on 11/23.  - Denies history of hematemesis, rectal bleeding, melena. - Fecal occult 11/23 negative.  - s/p 1 unit pRBC on 11/22/22. - Monitor.       Progressively worsening  GI consulted, much appreciated  Palliative care consulted, much appreciated       Diet: NPO except meds ordered     DVT Prophylaxis: held risk of bleeding     Dispo:   Expected LOS greater than two midnights  Appropriate for A1 Discharge Unit: No      Ralf Ruiz MD

## 2022-11-25 NOTE — PROGRESS NOTES
Pt requesting pain medication. Medication administered per order. Pt tolerated well. Pt up to brm with SBA. Assisted pt back to bed. Pt denies needs at this time. All needs within reach. Call light in hand.  Electronically signed by Cornel Moody RN on 11/25/2022 at 8:25 AM

## 2022-11-25 NOTE — PROGRESS NOTES
Head to toe assessment complete. Vital signs obtained. Pt resting in bed at this time. AM medication administered. Pt tolerated well. Pt c/o pain 9/10 to abd with intermittent cramping. Requesting medication. Order has . Message sent to MD.  Pt denies further needs at this time.

## 2022-11-25 NOTE — CONSULTS
Leah Martini     Chief complaint-weakness, dizziness, increased abdominal distention and abdominal pain    HPI: 81-year-old female with cirrhosis secondary to EtOH who presented to the emergency room 3 days ago with complaints of periumbilical and left mid abdominal pain. She was discharged home from the hospital 6 days ago after being treated for anemia. Other symptoms include weakness, dizziness and increased abdominal distention. She is followed by the GI team and has a meld score of 29. Other symptoms include nausea. No history of vomiting, fevers, chills or change in bowel habits.     Past Medical History:   Diagnosis Date    Alcoholic liver disease ([facility] [address]     Anemia     History of blood transfusion        Past Surgical History:   Procedure Laterality Date    COLONOSCOPY N/A 3/11/2021    COLONOSCOPY POLYPECTOMY SNARE/COLD BIOPSY performed by Stephen Villegas MD at [address] [place] [address]    CT BONE MARROW BIOPSY  7/5/2022    CT BONE MARROW BIOPSY 7/5/2022 Homero Little MD [facility] CT SCAN    KNEE ARTHROPLASTY      TUBAL LIGATION      ESSURE    UPPER GASTROINTESTINAL ENDOSCOPY N/A 01/30/2021    EGD DIAGNOSTIC ONLY performed by Janine Carrillo MD at [address] [place] [address] N/A 03/10/2021    EGD BIOPSY performed by Stephen Villegas MD at [address] [place] [address] 6/27/2021    EGD BAND LIGATION performed by Aparna Méndez MD at [address] [place] [address] N/A 4/27/2022    EGD DIAGNOSTIC ONLY performed by Stacy Vargas MD at [address] [place] [address] N/A 7/1/2022    EGD ESOPHAGOGASTRODUODENOSCOPY ULTRASOUND performed by Jose Weir MD at [address] [place] [address] N/A 7/1/2022    EGD BAND LIGATION performed by Jose Weir MD at [address] [place] [address] N/A 10/26/2022    EGD DIAGNOSTIC ONLY performed by Gus Monique MD at [address] [place] [address] GASTROINTESTINAL ENDOSCOPY N/A 11/10/2022    EGD BAND LIGATION performed by Balta Kennedy MD at [address] [place],  [address] History     Socioeconomic History    Marital status: Single     Spouse name: Not on file    Number of children: 2    Years of education: 14    Highest education level: Not on file   Occupational History    Not on file   Tobacco Use    Smoking status: Former    Smokeless tobacco: Never   Vaping Use    Vaping Use: Never used   Substance and Sexual Activity    Alcohol use: Not Currently    Drug use: Never    Sexual activity: Not Currently     Partners: Male   Other Topics Concern    Not on file   Social History Narrative    Not on file     Social Determinants of Health     Financial Resource Strain: Not on file   Food Insecurity: Not on file   Transportation Needs: Not on file   Physical Activity: Not on file   Stress: Not on file   Social Connections: Not on file   Intimate Partner Violence: Not on file   Housing Stability: Not on file       Allergies: Allergies   Allergen Reactions    Oxycodone Nausea And Vomiting     Side effect, not an allergy         Prior to Admission medications    Medication Sig Start Date End Date Taking? Authorizing Provider   pregabalin (LYRICA) 25 MG capsule Take 1 capsule by mouth in the morning and at bedtime. 11/22/22  Yes Historical Provider, MD   magnesium oxide (MAG-OX) 400 (240 Mg) MG tablet Take 1 tablet by mouth 2 times daily 11/18/22   Carol Lind MD   midodrine (PROAMATINE) 5 MG tablet Take 1 tablet by mouth in the morning, at noon, and at bedtime 11/18/22   Carol Lind MD   nadolol (CORGARD) 20 MG tablet Take 1 tablet by mouth at bedtime 11/18/22   Carol Lind MD   zolpidem (AMBIEN) 5 MG tablet Take 5 mg by mouth nightly as needed for Sleep (every night at bedtmes as needed for insomnia).     Historical Provider, MD   lactulose (CHRONULAC) 10 GM/15ML solution Take 30 mLs by mouth 2 times daily 10/27/22   Lorene Cole MD   folic acid (FOLVITE) 1 MG tablet Take 1 tablet by mouth daily 10/27/22   Celsa Nugent MD   rifAXIMin Espino Less) 550 MG tablet Take 1 tablet by mouth 2 times daily 10/18/22   DARCY Lowery - CNP   furosemide (LASIX) 20 MG tablet Take 1 tablet by mouth in the morning. 7/30/22   Freya Caldwell MD   ondansetron (ZOFRAN) 8 MG tablet Take 1 tablet by mouth 7/14/22   Historical Provider, MD   thiamine 100 MG tablet Take 1 tablet by mouth daily  Patient not taking: Reported on 8/25/2022 7/1/22 8/28/22  Lonnie Sin MD   spironolactone (ALDACTONE) 100 MG tablet Take 1 tablet by mouth daily 5/3/22   Nahid Toledo MD   pantoprazole (PROTONIX) 40 MG tablet Take 1 tablet by mouth every morning (before breakfast) 5/4/22   Nahid Toledo MD   thiamine mononitrate (THIAMINE) 100 MG tablet Take 1 tablet by mouth daily 5/3/22 6/2/22  Nahid Toledo MD       Principal Problem:    Decompensated hepatic cirrhosis ([facility] [address]  Resolved Problems:    * No resolved hospital problems. *      Blood pressure 103/70, pulse 67, temperature 98.5 °F (36.9 °C), temperature source Oral, resp. rate 16, height 5' 4\" (1.626 m), weight 105 lb 14.4 oz (48 kg), SpO2 95 %, not currently breastfeeding. Review of Systems   Constitutional:  Positive for activity change, appetite change and fatigue. Negative for chills and fever. HENT:  Negative for congestion and dental problem. Eyes:  Negative for discharge and itching. Respiratory:  Negative for apnea. Cardiovascular:  Negative for chest pain and palpitations. Gastrointestinal:  Positive for abdominal distention, abdominal pain and nausea. Endocrine: Negative for cold intolerance and heat intolerance. Genitourinary:  Negative for difficulty urinating and dyspareunia. Musculoskeletal:  Negative for arthralgias and back pain. Skin:  Positive for color change. Negative for pallor. Allergic/Immunologic: Negative for environmental allergies and food allergies.    Neurological: Positive for dizziness and weakness. Negative for facial asymmetry. Hematological:  Negative for adenopathy. Does not bruise/bleed easily. Psychiatric/Behavioral:  Negative for agitation and behavioral problems. Physical Exam  Constitutional:       Appearance: She is well-developed. HENT:      Head: Normocephalic and atraumatic. Right Ear: External ear normal.      Left Ear: External ear normal.   Eyes:      General:         Right eye: No discharge. Left eye: No discharge. Comments: Scleral icterus   Cardiovascular:      Rate and Rhythm: Normal rate and regular rhythm. Pulmonary:      Effort: Pulmonary effort is normal.      Breath sounds: Normal breath sounds. Abdominal:      General: There is distension. Palpations: Abdomen is soft. Comments: Mild periumbilical tenderness   Musculoskeletal:         General: Normal range of motion. Cervical back: Normal range of motion and neck supple. Skin:     General: Skin is warm and dry. Neurological:      Mental Status: She is alert and oriented to person, place, and time. Psychiatric:         Behavior: Behavior normal.     Bilirubin-14.7  BMI-2.8  Hemoglobin-7.0 g/dL  Platelets-71    Assessment:  71-year-old female with cirrhosis/portal hypertension/ascites secondary to EtOH with a meld score of 29 who is currently admitted with complaints of weakness, dizziness, abdominal distention and abdominal pain. The abdominal pain is located in the periumbilical and left mid abdominal region. On physical examination, there is mild periumbilical abdominal tenderness. Pertinent labs are listed above. CAT scan of the abdomen and pelvis shows cirrhosis with ascites. There is some hypoenhancement of the spleen either indicating timing of IV contrast or splenic infarcts. Suspect the former. Both CAT scan and ultrasound showed gallstones. Physical examination findings do not suggest a diagnosis of acute calculus cholecystitis. Case discussed with Dr. Nicol Tello [facility]).     Plan:  No plans for laparoscopic cholecystectomy or further work-up of the gallbladder  Continue symptomatic management  Will follow as needed  Please call if questions    Jalil Espino MD  11/25/2022

## 2022-11-26 ENCOUNTER — APPOINTMENT (OUTPATIENT)
Dept: CT IMAGING | Age: 47
DRG: 280 | End: 2022-11-26
Payer: COMMERCIAL

## 2022-11-26 LAB
A/G RATIO: 1.1 (ref 1.1–2.2)
ABO/RH: NORMAL
ALBUMIN SERPL-MCNC: 2.7 G/DL (ref 3.4–5)
ALP BLD-CCNC: 82 U/L (ref 40–129)
ALT SERPL-CCNC: 24 U/L (ref 10–40)
ANION GAP SERPL CALCULATED.3IONS-SCNC: 13 MMOL/L (ref 3–16)
ANTIBODY SCREEN: NORMAL
AST SERPL-CCNC: 62 U/L (ref 15–37)
BASOPHILS ABSOLUTE: 0 K/UL (ref 0–0.2)
BASOPHILS RELATIVE PERCENT: 0.8 %
BILIRUB SERPL-MCNC: 13.9 MG/DL (ref 0–1)
BLOOD BANK DISPENSE STATUS: NORMAL
BLOOD BANK PRODUCT CODE: NORMAL
BODY FLUID CULTURE, STERILE: NORMAL
BPU ID: NORMAL
BUN BLDV-MCNC: 13 MG/DL (ref 7–20)
CALCIUM SERPL-MCNC: 9 MG/DL (ref 8.3–10.6)
CHLORIDE BLD-SCNC: 103 MMOL/L (ref 99–110)
CO2: 22 MMOL/L (ref 21–32)
CREAT SERPL-MCNC: <0.5 MG/DL (ref 0.6–1.1)
CULTURE, BLOOD 2: NORMAL
DESCRIPTION BLOOD BANK: NORMAL
EOSINOPHILS ABSOLUTE: 0.1 K/UL (ref 0–0.6)
EOSINOPHILS RELATIVE PERCENT: 2.1 %
GFR SERPL CREATININE-BSD FRML MDRD: >60 ML/MIN/{1.73_M2}
GLUCOSE BLD-MCNC: 147 MG/DL (ref 70–99)
GRAM STAIN RESULT: NORMAL
HCT VFR BLD CALC: 19.1 % (ref 36–48)
HCT VFR BLD CALC: 19.8 % (ref 36–48)
HCT VFR BLD CALC: 24.4 % (ref 36–48)
HEMOGLOBIN: 6.7 G/DL (ref 12–16)
HEMOGLOBIN: 6.7 G/DL (ref 12–16)
HEMOGLOBIN: 8.2 G/DL (ref 12–16)
LYMPHOCYTES ABSOLUTE: 0.4 K/UL (ref 1–5.1)
LYMPHOCYTES RELATIVE PERCENT: 13.5 %
MCH RBC QN AUTO: 34 PG (ref 26–34)
MCHC RBC AUTO-ENTMCNC: 33.9 G/DL (ref 31–36)
MCV RBC AUTO: 100.2 FL (ref 80–100)
MONOCYTES ABSOLUTE: 0.4 K/UL (ref 0–1.3)
MONOCYTES RELATIVE PERCENT: 10.8 %
NEUTROPHILS ABSOLUTE: 2.4 K/UL (ref 1.7–7.7)
NEUTROPHILS RELATIVE PERCENT: 72.8 %
PDW BLD-RTO: 24.9 % (ref 12.4–15.4)
PLATELET # BLD: 74 K/UL (ref 135–450)
PMV BLD AUTO: 9.2 FL (ref 5–10.5)
POTASSIUM SERPL-SCNC: 4.2 MMOL/L (ref 3.5–5.1)
RBC # BLD: 1.98 M/UL (ref 4–5.2)
SODIUM BLD-SCNC: 138 MMOL/L (ref 136–145)
TOTAL PROTEIN: 5.2 G/DL (ref 6.4–8.2)
WBC # BLD: 3.3 K/UL (ref 4–11)

## 2022-11-26 PROCEDURE — 74174 CTA ABD&PLVS W/CONTRAST: CPT

## 2022-11-26 PROCEDURE — 86901 BLOOD TYPING SEROLOGIC RH(D): CPT

## 2022-11-26 PROCEDURE — 85025 COMPLETE CBC W/AUTO DIFF WBC: CPT

## 2022-11-26 PROCEDURE — 80053 COMPREHEN METABOLIC PANEL: CPT

## 2022-11-26 PROCEDURE — 6360000004 HC RX CONTRAST MEDICATION: Performed by: STUDENT IN AN ORGANIZED HEALTH CARE EDUCATION/TRAINING PROGRAM

## 2022-11-26 PROCEDURE — 85018 HEMOGLOBIN: CPT

## 2022-11-26 PROCEDURE — 1200000000 HC SEMI PRIVATE

## 2022-11-26 PROCEDURE — 6370000000 HC RX 637 (ALT 250 FOR IP): Performed by: INTERNAL MEDICINE

## 2022-11-26 PROCEDURE — 2580000003 HC RX 258: Performed by: INTERNAL MEDICINE

## 2022-11-26 PROCEDURE — 6370000000 HC RX 637 (ALT 250 FOR IP): Performed by: PHYSICIAN ASSISTANT

## 2022-11-26 PROCEDURE — 36430 TRANSFUSION BLD/BLD COMPNT: CPT

## 2022-11-26 PROCEDURE — P9016 RBC LEUKOCYTES REDUCED: HCPCS

## 2022-11-26 PROCEDURE — 86923 COMPATIBILITY TEST ELECTRIC: CPT

## 2022-11-26 PROCEDURE — 86850 RBC ANTIBODY SCREEN: CPT

## 2022-11-26 PROCEDURE — 6360000002 HC RX W HCPCS: Performed by: INTERNAL MEDICINE

## 2022-11-26 PROCEDURE — C9113 INJ PANTOPRAZOLE SODIUM, VIA: HCPCS | Performed by: INTERNAL MEDICINE

## 2022-11-26 PROCEDURE — 85014 HEMATOCRIT: CPT

## 2022-11-26 PROCEDURE — 36415 COLL VENOUS BLD VENIPUNCTURE: CPT

## 2022-11-26 PROCEDURE — 86900 BLOOD TYPING SEROLOGIC ABO: CPT

## 2022-11-26 RX ORDER — SODIUM CHLORIDE 9 MG/ML
INJECTION, SOLUTION INTRAVENOUS PRN
Status: DISCONTINUED | OUTPATIENT
Start: 2022-11-26 | End: 2022-11-28 | Stop reason: HOSPADM

## 2022-11-26 RX ORDER — OXYCODONE HYDROCHLORIDE 5 MG/1
5 TABLET ORAL ONCE
Status: COMPLETED | OUTPATIENT
Start: 2022-11-26 | End: 2022-11-26

## 2022-11-26 RX ADMIN — TRAMADOL HYDROCHLORIDE 50 MG: 50 TABLET ORAL at 17:42

## 2022-11-26 RX ADMIN — IOPAMIDOL 75 ML: 755 INJECTION, SOLUTION INTRAVENOUS at 13:39

## 2022-11-26 RX ADMIN — SODIUM CHLORIDE, PRESERVATIVE FREE 10 ML: 5 INJECTION INTRAVENOUS at 21:50

## 2022-11-26 RX ADMIN — MIDODRINE HYDROCHLORIDE 5 MG: 5 TABLET ORAL at 15:44

## 2022-11-26 RX ADMIN — PANTOPRAZOLE SODIUM 40 MG: 40 INJECTION, POWDER, FOR SOLUTION INTRAVENOUS at 21:48

## 2022-11-26 RX ADMIN — SODIUM CHLORIDE: 9 INJECTION, SOLUTION INTRAVENOUS at 14:06

## 2022-11-26 RX ADMIN — ZOLPIDEM TARTRATE 5 MG: 5 TABLET ORAL at 21:48

## 2022-11-26 RX ADMIN — PIPERACILLIN AND TAZOBACTAM 3375 MG: 3; .375 INJECTION, POWDER, FOR SOLUTION INTRAVENOUS at 14:08

## 2022-11-26 RX ADMIN — OXYCODONE 5 MG: 5 TABLET ORAL at 05:55

## 2022-11-26 RX ADMIN — Medication 10 ML: at 21:48

## 2022-11-26 RX ADMIN — RIFAXIMIN 550 MG: 550 TABLET ORAL at 08:46

## 2022-11-26 RX ADMIN — PANTOPRAZOLE SODIUM 40 MG: 40 INJECTION, POWDER, FOR SOLUTION INTRAVENOUS at 08:47

## 2022-11-26 RX ADMIN — MIDODRINE HYDROCHLORIDE 5 MG: 5 TABLET ORAL at 08:46

## 2022-11-26 RX ADMIN — MIDODRINE HYDROCHLORIDE 5 MG: 5 TABLET ORAL at 21:47

## 2022-11-26 RX ADMIN — PIPERACILLIN AND TAZOBACTAM 3375 MG: 3; .375 INJECTION, POWDER, FOR SOLUTION INTRAVENOUS at 05:54

## 2022-11-26 RX ADMIN — LACTULOSE 20 G: 20 SOLUTION ORAL at 08:50

## 2022-11-26 RX ADMIN — Medication 10 ML: at 08:47

## 2022-11-26 RX ADMIN — TRAMADOL HYDROCHLORIDE 50 MG: 50 TABLET ORAL at 08:55

## 2022-11-26 ASSESSMENT — PAIN SCALES - GENERAL
PAINLEVEL_OUTOF10: 7
PAINLEVEL_OUTOF10: 7
PAINLEVEL_OUTOF10: 9
PAINLEVEL_OUTOF10: 8
PAINLEVEL_OUTOF10: 9
PAINLEVEL_OUTOF10: 8
PAINLEVEL_OUTOF10: 8

## 2022-11-26 ASSESSMENT — PAIN DESCRIPTION - ORIENTATION
ORIENTATION: MID
ORIENTATION: MID

## 2022-11-26 ASSESSMENT — PAIN DESCRIPTION - LOCATION
LOCATION: ABDOMEN

## 2022-11-26 ASSESSMENT — PAIN DESCRIPTION - DESCRIPTORS: DESCRIPTORS: CRAMPING

## 2022-11-26 NOTE — PLAN OF CARE
Problem: Safety - Adult  Goal: Free from fall injury  11/26/2022 0859 by Dionte Kimball RN  Outcome: Progressing  11/26/2022 0053 by Mary Lozano RN  Outcome: Progressing     Problem: Pain  Goal: Verbalizes/displays adequate comfort level or baseline comfort level  11/26/2022 0859 by Dionte Kimball RN  Outcome: Progressing  11/26/2022 0053 by Mary Lozano RN  Outcome: Progressing     Problem: ABCDS Injury Assessment  Goal: Absence of physical injury  Outcome: Progressing

## 2022-11-26 NOTE — PROGRESS NOTES
Pt observed first 15 minutes of blood transfusion. Vitals obtained and WNL. No s/s of reaction have been observed. Pt remains lying in bed with eyes closed, expresses no further needs. Call light in reach      [phone]: Blood transfusion complete. Vitals obtained. No s/s of reaction observed at this time.

## 2022-11-26 NOTE — PROGRESS NOTES
Head to toe assessment complete. Vital signs obtained. Pt resting in bed at this time. AM medication administered. Pt tolerated well. Pt up to brm with 1 person assist.  Assisted pt back to bed. Pt requesting pain medication. Medication administered per order. Pt tolerated well. Denies further needs at this time. Call light in hand.  Electronically signed by Erickson Fitzpatrick RN on 11/26/2022 at 8:59 AM

## 2022-11-26 NOTE — PROGRESS NOTES
Gastroenterology Progress Note            Marilynn Ortez is a 40 y.o. female patient. 1. Anemia, unspecified type    2. Asterixis    3. Hyperammonemia (HCC)        SUBJECTIVE:  Still has pain in abd - worse after eating. States it did improve after paracentesis. Awaiting CT. Physical    VITALS:  BP (!) 109/59   Pulse 69   Temp 98.3 °F (36.8 °C) (Oral)   Resp 16   Ht 5' 4\" (1.626 m)   Wt 114 lb 13.8 oz (52.1 kg)   SpO2 94%   BMI 19.72 kg/m²   TEMPERATURE:  Current - Temp: 98.3 °F (36.8 °C); Max - Temp  Av.5 °F (36.9 °C)  Min: 98.3 °F (36.8 °C)  Max: 98.7 °F (37.1 °C)    Abdomen soft, ND, NT, no HSM, Bowel sounds normal     Data      Recent Labs     22  1324 22  0528 22  0607   WBC 4.8 4.0 3.5*   HGB 7.4* 7.7* 7.0*   HCT 21.5* 22.2* 20.2*   MCV 96.2 97.1 98.1   PLT 66* 68* 71*     Recent Labs     22  0528 22  0607    134*   K 4.1 4.4    100   CO2 28 27   BUN 14 12   CREATININE 0.6 <0.5*     Recent Labs     22  0528 22  0607   AST 70* 67*   ALT 28 27   BILITOT 15.9* 14.7*   ALKPHOS 75 75     No results for input(s): LIPASE, AMYLASE in the last 72 hours. ASSESSMENT :     Cirrhosis - due to alcohol abuse. No alcohol since 2022. Has been referred to  liver transplant center but patient states never made an appointment as insurance does not cover any of the cost of transplant or her antirejection meds. Also states no social support. lft near baseline. Her MELD is 29. No labs today - ordered. Mild hepatic encephalopathy - pt with some mental fogginess. Oriented currently. Reports compliance with lactulose and xifaxan at home. Ascites - non neutrocytic c/w portal HTN. No SBP. 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 Did have esophageal varix with cherry red spot on EGD 11/10 which was banded. no gross GI bleeding. Left sided abdominal pain - paracentesis did r/o SBP.  CT with possible splenic infarcts which is new. Repeat RUQ US shows GB sludge and mild wall thickening - could be due to underlying liver disease and likely not a cause of pain. Gen surg consult agrees no surgical indication. I am concerned about vascular cause (e.g. splenic or mesenteric vein thrombosis) so will repeat CT with triphasic contrast.        PLAN  :  1) Abd/Pelvic CT with triphasic contrast (liver) protocol. 2) Continue Lactulose and Xifaxin  3) Diet as tolerated.     4) Zosyn for now    Trang Carlin, [address] [place] [address]  11/26/2022

## 2022-11-27 LAB
A/G RATIO: 1.2 (ref 1.1–2.2)
ACANTHOCYTES: ABNORMAL
ALBUMIN SERPL-MCNC: 2.7 G/DL (ref 3.4–5)
ALP BLD-CCNC: 79 U/L (ref 40–129)
ALT SERPL-CCNC: 23 U/L (ref 10–40)
ANION GAP SERPL CALCULATED.3IONS-SCNC: 10 MMOL/L (ref 3–16)
ANISOCYTOSIS: ABNORMAL
AST SERPL-CCNC: 62 U/L (ref 15–37)
BASOPHILS ABSOLUTE: 0 K/UL (ref 0–0.2)
BASOPHILS RELATIVE PERCENT: 1 %
BILIRUB SERPL-MCNC: 14.3 MG/DL (ref 0–1)
BLOOD CULTURE, ROUTINE: NORMAL
BUN BLDV-MCNC: 12 MG/DL (ref 7–20)
CALCIUM SERPL-MCNC: 8.9 MG/DL (ref 8.3–10.6)
CHLORIDE BLD-SCNC: 101 MMOL/L (ref 99–110)
CO2: 23 MMOL/L (ref 21–32)
CREAT SERPL-MCNC: <0.5 MG/DL (ref 0.6–1.1)
EOSINOPHILS ABSOLUTE: 0.1 K/UL (ref 0–0.6)
EOSINOPHILS RELATIVE PERCENT: 4 %
GFR SERPL CREATININE-BSD FRML MDRD: >60 ML/MIN/{1.73_M2}
GLUCOSE BLD-MCNC: 119 MG/DL (ref 70–99)
HCT VFR BLD CALC: 22.6 % (ref 36–48)
HEMATOLOGY PATH CONSULT: NO
HEMOGLOBIN: 7.8 G/DL (ref 12–16)
LYMPHOCYTES ABSOLUTE: 0.7 K/UL (ref 1–5.1)
LYMPHOCYTES RELATIVE PERCENT: 19 %
MCH RBC QN AUTO: 33.2 PG (ref 26–34)
MCHC RBC AUTO-ENTMCNC: 34.7 G/DL (ref 31–36)
MCV RBC AUTO: 95.5 FL (ref 80–100)
MONOCYTES ABSOLUTE: 0.2 K/UL (ref 0–1.3)
MONOCYTES RELATIVE PERCENT: 5 %
NEUTROPHILS ABSOLUTE: 2.6 K/UL (ref 1.7–7.7)
NEUTROPHILS RELATIVE PERCENT: 71 %
PDW BLD-RTO: 26.8 % (ref 12.4–15.4)
PLATELET # BLD: 72 K/UL (ref 135–450)
PLATELET SLIDE REVIEW: ABNORMAL
PMV BLD AUTO: 8.1 FL (ref 5–10.5)
POIKILOCYTES: ABNORMAL
POTASSIUM SERPL-SCNC: 4.1 MMOL/L (ref 3.5–5.1)
RBC # BLD: 2.36 M/UL (ref 4–5.2)
SCHISTOCYTES: ABNORMAL
SLIDE REVIEW: ABNORMAL
SODIUM BLD-SCNC: 134 MMOL/L (ref 136–145)
TARGET CELLS: ABNORMAL
TOTAL PROTEIN: 5 G/DL (ref 6.4–8.2)
WBC # BLD: 3.7 K/UL (ref 4–11)

## 2022-11-27 PROCEDURE — 36415 COLL VENOUS BLD VENIPUNCTURE: CPT

## 2022-11-27 PROCEDURE — 6370000000 HC RX 637 (ALT 250 FOR IP): Performed by: INTERNAL MEDICINE

## 2022-11-27 PROCEDURE — 6370000000 HC RX 637 (ALT 250 FOR IP): Performed by: PHYSICIAN ASSISTANT

## 2022-11-27 PROCEDURE — 80053 COMPREHEN METABOLIC PANEL: CPT

## 2022-11-27 PROCEDURE — 1200000000 HC SEMI PRIVATE

## 2022-11-27 PROCEDURE — 6360000002 HC RX W HCPCS: Performed by: INTERNAL MEDICINE

## 2022-11-27 PROCEDURE — C9113 INJ PANTOPRAZOLE SODIUM, VIA: HCPCS | Performed by: INTERNAL MEDICINE

## 2022-11-27 PROCEDURE — 2580000003 HC RX 258: Performed by: INTERNAL MEDICINE

## 2022-11-27 PROCEDURE — 85025 COMPLETE CBC W/AUTO DIFF WBC: CPT

## 2022-11-27 RX ADMIN — LACTULOSE 20 G: 20 SOLUTION ORAL at 09:03

## 2022-11-27 RX ADMIN — RIFAXIMIN 550 MG: 550 TABLET ORAL at 09:03

## 2022-11-27 RX ADMIN — RIFAXIMIN 550 MG: 550 TABLET ORAL at 22:32

## 2022-11-27 RX ADMIN — RIFAXIMIN 550 MG: 550 TABLET ORAL at 01:45

## 2022-11-27 RX ADMIN — TRAMADOL HYDROCHLORIDE 50 MG: 50 TABLET ORAL at 15:11

## 2022-11-27 RX ADMIN — PANTOPRAZOLE SODIUM 40 MG: 40 INJECTION, POWDER, FOR SOLUTION INTRAVENOUS at 22:33

## 2022-11-27 RX ADMIN — PANTOPRAZOLE SODIUM 40 MG: 40 INJECTION, POWDER, FOR SOLUTION INTRAVENOUS at 09:03

## 2022-11-27 RX ADMIN — LACTULOSE 20 G: 20 SOLUTION ORAL at 15:12

## 2022-11-27 RX ADMIN — MIDODRINE HYDROCHLORIDE 5 MG: 5 TABLET ORAL at 15:12

## 2022-11-27 RX ADMIN — TRAMADOL HYDROCHLORIDE 50 MG: 50 TABLET ORAL at 01:45

## 2022-11-27 RX ADMIN — PIPERACILLIN AND TAZOBACTAM 3375 MG: 3; .375 INJECTION, POWDER, FOR SOLUTION INTRAVENOUS at 22:30

## 2022-11-27 RX ADMIN — MIDODRINE HYDROCHLORIDE 5 MG: 5 TABLET ORAL at 22:33

## 2022-11-27 RX ADMIN — Medication 10 ML: at 22:32

## 2022-11-27 RX ADMIN — Medication 10 ML: at 09:02

## 2022-11-27 RX ADMIN — MIDODRINE HYDROCHLORIDE 5 MG: 5 TABLET ORAL at 09:03

## 2022-11-27 RX ADMIN — ZOLPIDEM TARTRATE 5 MG: 5 TABLET ORAL at 22:33

## 2022-11-27 RX ADMIN — PIPERACILLIN AND TAZOBACTAM 3375 MG: 3; .375 INJECTION, POWDER, FOR SOLUTION INTRAVENOUS at 15:14

## 2022-11-27 ASSESSMENT — PAIN - FUNCTIONAL ASSESSMENT: PAIN_FUNCTIONAL_ASSESSMENT: ACTIVITIES ARE NOT PREVENTED

## 2022-11-27 ASSESSMENT — PAIN DESCRIPTION - DESCRIPTORS
DESCRIPTORS: ACHING
DESCRIPTORS: ACHING

## 2022-11-27 ASSESSMENT — PAIN DESCRIPTION - FREQUENCY: FREQUENCY: CONTINUOUS

## 2022-11-27 ASSESSMENT — PAIN SCALES - GENERAL
PAINLEVEL_OUTOF10: 9
PAINLEVEL_OUTOF10: 4
PAINLEVEL_OUTOF10: 7
PAINLEVEL_OUTOF10: 7
PAINLEVEL_OUTOF10: 8
PAINLEVEL_OUTOF10: 9

## 2022-11-27 ASSESSMENT — PAIN DESCRIPTION - ORIENTATION: ORIENTATION: MID

## 2022-11-27 ASSESSMENT — PAIN DESCRIPTION - LOCATION
LOCATION: ABDOMEN
LOCATION: HEAD;ABDOMEN
LOCATION: ABDOMEN

## 2022-11-27 ASSESSMENT — PAIN DESCRIPTION - PAIN TYPE: TYPE: ACUTE PAIN

## 2022-11-27 ASSESSMENT — PAIN DESCRIPTION - ONSET: ONSET: ON-GOING

## 2022-11-27 NOTE — PROGRESS NOTES
Bleeding noted from paracentesis site. Old dressing saturated and was removed by the patient. New dressing of gauze and transparent film placed. Bleeding controlled. Arik Kruse MD notified via perfect serve.

## 2022-11-27 NOTE — PROGRESS NOTES
Gastroenterology Progress Note            Nanette Melendez is a 40 y.o. female patient. Principal Problem:    Decompensated hepatic cirrhosis ([address]  Resolved Problems:    * No resolved hospital problems. *      SUBJECTIVE:  Pain controlled with po meds. Decided to proceed with Hospice    ROS:    Gastrointestinal ROS: positive for - abdominal pain and gas/bloating  negative for - constipation, hematemesis, melena, or nausea/vomiting. Cardiovascular ROS: negative  Respiratory ROS: negative    Physical    VITALS:  /71   Pulse 62   Temp 98.3 °F (36.8 °C) (Oral)   Resp 16   Ht 5' 4\" (1.626 m)   Wt 114 lb 13.8 oz (52.1 kg)   SpO2 92%   BMI 19.72 kg/m²   TEMPERATURE:  Current - Temp: 98.3 °F (36.8 °C); Max - Temp  Av.5 °F (36.9 °C)  Min: 98.3 °F (36.8 °C)  Max: 98.8 °F (37.1 °C)    NAD  RRR, Nl s1s2  Lungs CTA Bilaterally, normal effort  Abdomen soft, with midl distension, still some tenderness in upper abdomen, no HSM, Bowel sounds normal.    Data    Data Review:    Recent Labs     22  0607 22  1133 22  1254 22  1739 22  0728   WBC 3.5* 3.3*  --   --  3.7*   HGB 7.0* 6.7* 6.7* 8.2* 7.8*   HCT 20.2* 19.8* 19.1* 24.4* 22.6*   MCV 98.1 100.2*  --   --  95.5   PLT 71* 74*  --   --  72*     Recent Labs     22  0607 22  1133 22  0728   * 138 134*   K 4.4 4.2 4.1    103 101   CO2 27 22 23   BUN 12 13 12   CREATININE <0.5* <0.5* <0.5*     Recent Labs     22  0607 22  1133 22  0728   AST 67* 62* 62*   ALT 27 24 23   BILITOT 14.7* 13.9* 14.3*   ALKPHOS 75 82 79     No results for input(s): LIPASE, AMYLASE in the last 72 hours. No results for input(s): PROTIME, INR in the last 72 hours. No results for input(s): PTT in the last 72 hours. Radiology Review:  Abd/pelvic CTA 2022:    FINDINGS:   Lower chest: Bibasilar parenchymal bands are present, likely atelectasis   and/or parenchymal scarring. Liver:  The liver demonstrates cirrhotic morphology with nodular contours. The liver parenchyma enhances heterogeneously on arterial phase images with   patchy areas relatively increased attenuation. A discrete hyperenhancing   focus is noted in segment 3 measuring approximately 4 mm (series 3, image 43)   with no definitive washout on delayed images. The portal vein is patent. The hepatic neck veins are not well visualized on venous and delayed images. A recanalized umbilical vein is evident as well as gastroesophageal varices. Biliary system/Gallbladder: No intrahepatic or extrahepatic biliary ductal   dilation. The gallbladder is distended with multiple gallstones present. Gallbladder wall thickening is present, likely reactive in nature given the   presence of cirrhosis. Spleen: The spleen is enlarged. Pancreas: No evidence of pancreatic lesions or pancreatic ductal dilation. Adrenals: No suspicious adrenal nodules are present. Kidneys/Bladder: No evidence of hydronephrosis, nephrolithiasis, or   suspicious mass lesion. The bladder is decompressed. Pelvic organs: The uterus is present. No suspicious adnexal masses are   evident. GI tract: The stomach is normal in appearance. No evidence of obstruction. The appendix is not well visualized. Peritoneum: Small volume but diffuse ascites is present with the largest   collection of free abdominal fluid in the pelvis as well as surrounding the   liver. Lymph nodes: No evidence of suspicious lymphadenopathy. Vasculature: The abdominal aorta is major branches opacify normally. No   evidence of filling defects or dissection. Soft Tissues/Bones: Diffuse body wall edema is present. No acute osseous   abnormalities or suspicious lesions are present. Impression   Liver cirrhosis with patent hepatic artery and portal vein vasculature. The   hepatic veins are not well visualized in this examination. Sequelae from portal hypertension with gastroesophageal varices and a   recanalized umbilical vein. 4 mm hyperenhancing focus in segment 3, with no definite washout, suspicious   for hepatocellular carcinoma in the setting of liver cirrhosis. Small volume ascites. Cholelithiasis. ASSESSMENT :     Cirrhosis - due to alcohol abuse. No alcohol since April 2022. Has been referred to  liver transplant center but patient states never made an appointment as insurance does not cover any of the cost of transplant or her antirejection meds. Also states no social support. lft near baseline. Her MELD is 29. No labs today - ordered. Mild hepatic encephalopathy - pt with some mental fogginess. Oriented currently. Reports compliance with lactulose and xifaxan at home. Ascites - non neutrocytic c/w portal HTN. No SBP. 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 Did have esophageal varix with cherry red spot on EGD 11/10 which was banded. no gross GI bleeding. Left sided abdominal pain - paracentesis did r/o SBP. CT with possible splenic infarcts which is new. Repeat RUQ US shows GB sludge and mild wall thickening - could be due to underlying liver disease and likely not a cause of pain. Gen surg consult agrees no surgical indication. I am concerned about vascular cause (e.g. splenic or mesenteric vein thrombosis) so will repeat CT with triphasic contrast.        PLAN  :  1) Agree with Hospice plans -- her prognosis is <6 months. 2) Continue Lactulose and Xifaxin  3) Diet as tolerated. 4) Would d/c Linneasyn    Will follow from a distance. Please call with questions. She should f/u with Dr. Gianni Cunningham.  Decide on palliative Pleurex catheter at that time.     Monisha Phillips, [address] [place] [address]  11/27/2022

## 2022-11-27 NOTE — PROGRESS NOTES
Hospitalist Progress Note      PCP: Christie Carrion MD    Date of Admission: 11/22/2022    Chief Complaint:   [address] Course:    40 y.o. 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 Patient reports being on lactulose and compliant. Patient reported that she was discharged during hospitalization last Friday for anemia and had 3 blood transfusion      Subjective:       She continues to experience diffuse abdominal pain, worse over left upper abdomen, then epigastric and right upper quadrant. She tries to eat but the pain worsen with eating. She has not vomited. She denies lightheadedness, shortness of breath, chest pain.       Medications:  Reviewed    Infusion Medications    sodium chloride      sodium chloride Stopped (11/26/22 1959)    sodium chloride      sodium chloride Stopped (11/24/22 1920)     Scheduled Medications    piperacillin-tazobactam  3,375 mg IntraVENous Q8H    sodium chloride flush  10 mL IntraVENous 2 times per day    [Held by provider] furosemide  20 mg IntraVENous See Admin Instructions    lactulose  20 g Oral TID    midodrine  5 mg Oral TID    rifAXIMin  550 mg Oral BID    pantoprazole  40 mg IntraVENous BID     PRN Meds: sodium chloride, traMADol, sodium chloride flush, potassium chloride, magnesium sulfate, promethazine **OR** ondansetron, sodium chloride, sodium chloride, acetaminophen **OR** acetaminophen, zolpidem      Intake/Output Summary (Last 24 hours) at 11/27/2022 0224  Last data filed at 11/26/2022 1639  Gross per 24 hour   Intake 1699.48 ml   Output --   Net 1699.48 ml         Physical Exam Performed:    /61   Pulse 57   Temp 98.8 °F (37.1 °C) (Oral)   Resp 16   Ht 5' 4\" (1.626 m)   Wt 114 lb 13.8 oz (52.1 kg)   SpO2 92%   BMI 19.72 kg/m²       GEN alert, in no distress  HEENT normocephalic, icteric sclera, EOMI, mucosa moist, no stridor  NECK supple, trachea midline  RESP on RA, in no distress, decreased BS at bases. CARDS RRR, S1, S2, no murmurs, no edema, radial pulse 2+, DP pulse 2+  ABD distended, +BS, soft, tenderness over left upper abdomen> epigastric and right upper quadrant>other regions. MSK no cyanosis, no clubbing  SKIN jaundiced, warm, dry  NEURO alert, oriented x 3, no facial asymmetry, no dysarthria, moving spontaneously  PSYCH normal mood        Labs:   Recent Labs     11/24/22  0528 11/25/22  0607 11/26/22  1133 11/26/22  1254 11/26/22  1739   WBC 4.0 3.5* 3.3*  --   --    HGB 7.7* 7.0* 6.7* 6.7* 8.2*   HCT 22.2* 20.2* 19.8* 19.1* 24.4*   PLT 68* 71* 74*  --   --        Recent Labs     11/24/22  0528 11/25/22  0607 11/26/22  1133    134* 138   K 4.1 4.4 4.2    100 103   CO2 28 27 22   BUN 14 12 13   CREATININE 0.6 <0.5* <0.5*   CALCIUM 9.3 9.2 9.0       Recent Labs     11/24/22  0528 11/25/22  0607 11/26/22  1133   AST 70* 67* 62*   ALT 28 27 24   BILITOT 15.9* 14.7* 13.9*   ALKPHOS 75 75 82       No results for input(s): INR in the last 72 hours. No results for input(s): Regina An in the last 72 hours. Urinalysis:      Lab Results   Component Value Date/Time    NITRU POSITIVE 11/23/2022 08:01 AM    WBCUA 0 11/23/2022 08:01 AM    BACTERIA None Seen 11/23/2022 08:01 AM    RBCUA 8 11/23/2022 08:01 AM    BLOODU Negative 11/23/2022 08:01 AM    SPECGRAV >=1.030 11/23/2022 08:01 AM    GLUCOSEU Negative 11/23/2022 08:01 AM       Radiology:  CTA ABDOMEN PELVIS W CONTRAST   Final Result   Liver cirrhosis with patent hepatic artery and portal vein vasculature. The   hepatic veins are not well visualized in this examination.       Sequelae from portal hypertension with gastroesophageal varices and a   recanalized umbilical vein. 4 mm hyperenhancing focus in segment 3, with no definite washout, suspicious   for hepatocellular carcinoma in the setting of liver cirrhosis. Small volume ascites. Cholelithiasis. US GALLBLADDER RUQ   Final Result   Cholelithiasis and associated gallbladder sludge with several large rounded   nonshadowing foci in keeping with tumefactive sludge. There is diffuse mild   wall thickening suggestive of chronic or acute cholecystitis. Moderate dilatation of the common duct with no obvious filling defect seen. Normal size liver and unremarkable pancreas. Mild-to-moderate ascites. IR US GUIDED PARACENTESIS   Final Result   Successful paracentesis. XR CHEST PORTABLE   Final Result   Borderline cardiomegaly which is unchanged. Hypoinflation with mild bibasilar atelectasis or scarring. Small nodular density projecting the right lung base which more apparent. This could represent a nipple shadow but is indeterminate. Recommend a PA   and lateral chest         CT ABDOMEN PELVIS W IV CONTRAST Additional Contrast? None   Final Result   Cirrhosis with portal hypertension. Moderate volume ascites. The spleen is enlarged, and there are wedge-shaped areas of hypoenhancement. These could reflect variation in contrast opacification, but splenic infarcts   should be considered as well. Gallbladder mural thickening is seen, with cholelithiasis. That is a   nonspecific finding in this case, a as the mural thickening may be secondary   to the anasarca, but acute cholecystitis remains in the differential.      Mild fluid and gaseous distention of small bowel, without a clear zone of   transition. Again this is nonspecific, but may reflect ileus in the setting   of gastroenteritis. CT HEAD WO CONTRAST   Final Result   No acute intracranial abnormality.              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 Patient reports being on lactulose and compliant. Patient reported that she was discharged during hospitalization last Friday for anemia and had 3 blood transfusion. Decompensated Cirrhosis  Possible Hepatocellular Carcinoma  Esophageal Varices  - EGD on 11/10/22 showed esophageal varices with cherry red spot which was banded. Portal hypertensive gastropathy  Ascites  - s/p paracentesis 11/23 with removal of 1.8L. Fluid analysis showed nucl cell 41, PMN's 5. Findings not suggestive of SBP. Hepatic encephalopathy  - adm NH3 level 121.  - Continue lactulose 20 g TID and rifaximin BID. Coagulopathy     ? Acute vs Chronic Cholecystitis  Cholelithiasis with Gallbladder Sludge  ? Splenic Infarcts  - Abdominal Pain worsened with eating.  - US RUQ 11/23 showed cholelithiasis and associated gallbladder sludge with several large rounded non-shadowing foci in keeping with tumefactive sludge. There is diffuse mild wall thickening suggestive of chronic or acute cholecystitis. Moderate dilation of common duct with no obvious filling defect.  - CT A/P with IV contrast 11/22/22 showed cirrhosis with portal hypertension. Moderate volume ascites. The spleen is enlarged, and there are wedge-shaped areas of hypoenhancement, which could reflect variation in contrast opacification versus splenic infarcts. Gallbladder mural thickening is seen with cholelithiasis.   Mild fluid and gaseous distention of small bowel without a clear transition zone. - CTA A/P with contrast (triphasic) 11/26 showed liver cirrhosis with patent hepatic artery and portal vein vasculature. The hepatic veins are not well visualized in this exam.  Sequelae from portal hypertension with gastroesophageal varices and a recanalized umbilical vein. A 4 mm hyperenhancing focus in segment 3, with no definite washout, suspicious for hepatocellular carcinoma in the setting of liver cirrhosis. - Appreciate GI participation.  - Appreciate general surgery participation.  - Received IV ceftriaxone (11/23-). - Started IV zosyn (11/24-). Normocytic anemia  History of spur cell anemia  - adm HgB 7.4, MCV 98.3. Recent baseline HgB 7's-8's. - HgB downtrended to 6.5 on 11/23.  - Denies history of hematemesis, rectal bleeding, melena. - Fecal occult 11/23 negative.  - s/p 1 unit pRBC on 11/22/22. - Monitor.         Progressively worsening  GI consulted, much appreciated  Palliative care consulted, much appreciated      Diet: NPO except meds ordered     DVT Prophylaxis: held risk of bleeding     Dispo:   Expected LOS greater than two midnights  Appropriate for A1 Discharge Unit: Chikis Gutierrez MD

## 2022-11-27 NOTE — PROGRESS NOTES
Met with patient and her daughter at bedside. Patient is alert and oriented but forgetful today. Daughter states that this is a very good day, patient has a lot of confusion and agitation at baseline despite treatment. Discussed hospice plan of care and philosophy, DME, medications, care team, support team, [facility] short stay and respite care, bereavement support. Daughter and patient expressed understanding and agreement to all. Also discussed blood transfusions and patient and daughter expressed understanding that we would not continue blood transfusions. Patient would like to have a Pleurx placed, and bedside nurse Elvi messaged doctor to ask about placement. Gave packet to review. Plan is for [facility] nurse to follow up with patient and family on Pleurx and discharge planning and patient will go home with [facility] services on discharge. Thank you for allowing [facility] to be a part of the [facility] family's care    JEB Cole, [facility]  Cell [phone]  Main [phone]  [email or web address]

## 2022-11-27 NOTE — PROGRESS NOTES
Spoke with Kelli Conde in case management/social work. Client wants to go home to sisters house with hospice today. Kelli Conde stated he would come and talk to the patient. Patient made aware and is agreeable.

## 2022-11-27 NOTE — CARE COORDINATION
SW informed from RN that patient and family have chosen to have patient go home with hospice service. Hospitalist is in agreement with this plan. SW met with patient and provided a list of hospice agencies. Patient seemed a bit confused. SW got pt's daughter, Shanice Sandhu, on the phone. Dtr stated the plan is to discharge home with her to her house at. ..    [address] [place] [address] 3    Daughter chose [facility] as the agency. SW called and made the referral to [facility] who will try to meet with patient, speak with dtr and try to work out discharge plan for home today if possible.     Electronically signed by ABY Hartman, REAL on 11/27/2022 at 11:44 AM

## 2022-11-27 NOTE — CARE COORDINATION
SW informed from [facility] RN that patient and family are in agreement with hospice services. Patient will need a pleurex catheter placed. A message has been sent to the hospitalist for this. [facility] will work on DME and possible discharge tomorrow to dtr's home w/hospice services. Discharge Plan:  Home to dtr's house w/[facility] hospice. Most likely tomorrow.     Electronically signed by ABY Rangel, REAL on 11/27/2022 at 4:42 PM

## 2022-11-28 VITALS
HEART RATE: 84 BPM | TEMPERATURE: 98.8 F | SYSTOLIC BLOOD PRESSURE: 109 MMHG | WEIGHT: 112.21 LBS | OXYGEN SATURATION: 94 % | RESPIRATION RATE: 16 BRPM | BODY MASS INDEX: 19.16 KG/M2 | HEIGHT: 64 IN | DIASTOLIC BLOOD PRESSURE: 62 MMHG

## 2022-11-28 LAB
A/G RATIO: 1 (ref 1.1–2.2)
ACANTHOCYTES: ABNORMAL
ALBUMIN SERPL-MCNC: 2.7 G/DL (ref 3.4–5)
ALP BLD-CCNC: 69 U/L (ref 40–129)
ALT SERPL-CCNC: 24 U/L (ref 10–40)
ANION GAP SERPL CALCULATED.3IONS-SCNC: 8 MMOL/L (ref 3–16)
AST SERPL-CCNC: 66 U/L (ref 15–37)
BANDED NEUTROPHILS RELATIVE PERCENT: 2 % (ref 0–7)
BASOPHILS ABSOLUTE: 0 K/UL (ref 0–0.2)
BASOPHILS RELATIVE PERCENT: 0 %
BILIRUB SERPL-MCNC: 15.1 MG/DL (ref 0–1)
BUN BLDV-MCNC: 14 MG/DL (ref 7–20)
BURR CELLS: ABNORMAL
CALCIUM SERPL-MCNC: 9.1 MG/DL (ref 8.3–10.6)
CHLORIDE BLD-SCNC: 102 MMOL/L (ref 99–110)
CO2: 25 MMOL/L (ref 21–32)
CREAT SERPL-MCNC: <0.5 MG/DL (ref 0.6–1.1)
EOSINOPHILS ABSOLUTE: 0.1 K/UL (ref 0–0.6)
EOSINOPHILS RELATIVE PERCENT: 2 %
GFR SERPL CREATININE-BSD FRML MDRD: >60 ML/MIN/{1.73_M2}
GLUCOSE BLD-MCNC: 113 MG/DL (ref 70–99)
HCT VFR BLD CALC: 21.3 % (ref 36–48)
HEMOGLOBIN: 7.5 G/DL (ref 12–16)
HYPOCHROMIA: ABNORMAL
LYMPHOCYTES ABSOLUTE: 0.8 K/UL (ref 1–5.1)
LYMPHOCYTES RELATIVE PERCENT: 23 %
MACROCYTES: ABNORMAL
MCH RBC QN AUTO: 33.9 PG (ref 26–34)
MCHC RBC AUTO-ENTMCNC: 35.3 G/DL (ref 31–36)
MCV RBC AUTO: 96 FL (ref 80–100)
MONOCYTES ABSOLUTE: 0.1 K/UL (ref 0–1.3)
MONOCYTES RELATIVE PERCENT: 3 %
NEUTROPHILS ABSOLUTE: 2.6 K/UL (ref 1.7–7.7)
NEUTROPHILS RELATIVE PERCENT: 70 %
OVALOCYTES: ABNORMAL
PDW BLD-RTO: 26.5 % (ref 12.4–15.4)
PLATELET # BLD: 73 K/UL (ref 135–450)
PLATELET SLIDE REVIEW: ABNORMAL
PMV BLD AUTO: 8.8 FL (ref 5–10.5)
POIKILOCYTES: ABNORMAL
POLYCHROMASIA: ABNORMAL
POTASSIUM SERPL-SCNC: 4.2 MMOL/L (ref 3.5–5.1)
RBC # BLD: 2.22 M/UL (ref 4–5.2)
SLIDE REVIEW: ABNORMAL
SODIUM BLD-SCNC: 135 MMOL/L (ref 136–145)
TARGET CELLS: ABNORMAL
TEAR DROP CELLS: ABNORMAL
TOTAL PROTEIN: 5.3 G/DL (ref 6.4–8.2)
WBC # BLD: 3.6 K/UL (ref 4–11)

## 2022-11-28 PROCEDURE — 6370000000 HC RX 637 (ALT 250 FOR IP): Performed by: INTERNAL MEDICINE

## 2022-11-28 PROCEDURE — C9113 INJ PANTOPRAZOLE SODIUM, VIA: HCPCS | Performed by: INTERNAL MEDICINE

## 2022-11-28 PROCEDURE — 80053 COMPREHEN METABOLIC PANEL: CPT

## 2022-11-28 PROCEDURE — 85025 COMPLETE CBC W/AUTO DIFF WBC: CPT

## 2022-11-28 PROCEDURE — 6370000000 HC RX 637 (ALT 250 FOR IP): Performed by: PHYSICIAN ASSISTANT

## 2022-11-28 PROCEDURE — 2580000003 HC RX 258: Performed by: INTERNAL MEDICINE

## 2022-11-28 PROCEDURE — 36415 COLL VENOUS BLD VENIPUNCTURE: CPT

## 2022-11-28 PROCEDURE — 6360000002 HC RX W HCPCS: Performed by: INTERNAL MEDICINE

## 2022-11-28 RX ORDER — BISACODYL 10 MG
10 SUPPOSITORY, RECTAL RECTAL DAILY PRN
Qty: 30 SUPPOSITORY | Refills: 0 | Status: SHIPPED | OUTPATIENT
Start: 2022-11-28 | End: 2022-12-28

## 2022-11-28 RX ORDER — FOLIC ACID 1 MG/1
1 TABLET ORAL DAILY
Qty: 30 TABLET | Refills: 0 | Status: SHIPPED | OUTPATIENT
Start: 2022-11-28

## 2022-11-28 RX ORDER — THIAMINE MONONITRATE (VIT B1) 100 MG
100 TABLET ORAL DAILY
Qty: 30 TABLET | Refills: 0 | Status: SHIPPED | OUTPATIENT
Start: 2022-11-28 | End: 2022-12-28

## 2022-11-28 RX ORDER — SPIRONOLACTONE 50 MG/1
50 TABLET, FILM COATED ORAL EVERY OTHER DAY
Qty: 30 TABLET | Refills: 3 | Status: SHIPPED | OUTPATIENT
Start: 2022-11-28

## 2022-11-28 RX ORDER — LANOLIN ALCOHOL/MO/W.PET/CERES
400 CREAM (GRAM) TOPICAL 2 TIMES DAILY
Qty: 30 TABLET | Refills: 2 | Status: SHIPPED | OUTPATIENT
Start: 2022-11-28

## 2022-11-28 RX ORDER — LORAZEPAM 0.5 MG/1
0.5 TABLET ORAL EVERY 4 HOURS PRN
Qty: 30 TABLET | Refills: 0 | Status: SHIPPED | OUTPATIENT
Start: 2022-11-28 | End: 2022-12-08

## 2022-11-28 RX ORDER — LACTULOSE 10 G/15ML
20 SOLUTION ORAL 3 TIMES DAILY
Qty: 2700 ML | Refills: 3 | Status: SHIPPED | OUTPATIENT
Start: 2022-11-28 | End: 2022-12-28

## 2022-11-28 RX ORDER — ONDANSETRON 4 MG/1
4 TABLET, FILM COATED ORAL 3 TIMES DAILY PRN
Qty: 30 TABLET | Refills: 0 | Status: SHIPPED | OUTPATIENT
Start: 2022-11-28

## 2022-11-28 RX ORDER — MIDODRINE HYDROCHLORIDE 5 MG/1
5 TABLET ORAL 3 TIMES DAILY
Qty: 90 TABLET | Refills: 3 | Status: SHIPPED | OUTPATIENT
Start: 2022-11-28

## 2022-11-28 RX ORDER — MORPHINE SULFATE 100 MG/5ML
5 SOLUTION ORAL EVERY 4 HOURS PRN
Qty: 30 ML | Refills: 0 | Status: SHIPPED | OUTPATIENT
Start: 2022-11-28 | End: 2022-12-18

## 2022-11-28 RX ORDER — PANTOPRAZOLE SODIUM 40 MG/1
40 TABLET, DELAYED RELEASE ORAL
Qty: 30 TABLET | Refills: 1 | Status: SHIPPED | OUTPATIENT
Start: 2022-11-28

## 2022-11-28 RX ORDER — TRAMADOL HYDROCHLORIDE 50 MG/1
50 TABLET ORAL EVERY 8 HOURS PRN
Qty: 20 TABLET | Refills: 0 | Status: SHIPPED | OUTPATIENT
Start: 2022-11-28 | End: 2022-12-05

## 2022-11-28 RX ORDER — FUROSEMIDE 20 MG/1
20 TABLET ORAL EVERY OTHER DAY
Qty: 30 TABLET | Refills: 3 | Status: SHIPPED | OUTPATIENT
Start: 2022-11-28 | End: 2022-12-28

## 2022-11-28 RX ADMIN — LACTULOSE 20 G: 20 SOLUTION ORAL at 12:44

## 2022-11-28 RX ADMIN — RIFAXIMIN 550 MG: 550 TABLET ORAL at 09:08

## 2022-11-28 RX ADMIN — TRAMADOL HYDROCHLORIDE 50 MG: 50 TABLET ORAL at 06:11

## 2022-11-28 RX ADMIN — Medication 10 ML: at 09:12

## 2022-11-28 RX ADMIN — PIPERACILLIN AND TAZOBACTAM 3375 MG: 3; .375 INJECTION, POWDER, FOR SOLUTION INTRAVENOUS at 06:01

## 2022-11-28 RX ADMIN — PIPERACILLIN AND TAZOBACTAM 3375 MG: 3; .375 INJECTION, POWDER, FOR SOLUTION INTRAVENOUS at 14:29

## 2022-11-28 RX ADMIN — PANTOPRAZOLE SODIUM 40 MG: 40 INJECTION, POWDER, FOR SOLUTION INTRAVENOUS at 09:08

## 2022-11-28 RX ADMIN — MIDODRINE HYDROCHLORIDE 5 MG: 5 TABLET ORAL at 09:10

## 2022-11-28 RX ADMIN — LACTULOSE 20 G: 20 SOLUTION ORAL at 09:07

## 2022-11-28 ASSESSMENT — PAIN DESCRIPTION - ONSET: ONSET: ON-GOING

## 2022-11-28 ASSESSMENT — PAIN SCALES - GENERAL
PAINLEVEL_OUTOF10: 9

## 2022-11-28 ASSESSMENT — PAIN DESCRIPTION - DESCRIPTORS: DESCRIPTORS: ACHING

## 2022-11-28 ASSESSMENT — ENCOUNTER SYMPTOMS
NAUSEA: 1
SHORTNESS OF BREATH: 1
COLOR CHANGE: 1
ABDOMINAL DISTENTION: 1
TROUBLE SWALLOWING: 1

## 2022-11-28 ASSESSMENT — PAIN DESCRIPTION - ORIENTATION: ORIENTATION: MID

## 2022-11-28 ASSESSMENT — PAIN DESCRIPTION - LOCATION
LOCATION: ABDOMEN
LOCATION: ABDOMEN

## 2022-11-28 ASSESSMENT — PAIN DESCRIPTION - FREQUENCY: FREQUENCY: CONTINUOUS

## 2022-11-28 ASSESSMENT — PAIN DESCRIPTION - PAIN TYPE: TYPE: ACUTE PAIN

## 2022-11-28 ASSESSMENT — PAIN - FUNCTIONAL ASSESSMENT: PAIN_FUNCTIONAL_ASSESSMENT: ACTIVITIES ARE NOT PREVENTED

## 2022-11-28 NOTE — PROGRESS NOTES
RN discharge summary from [address] to home. This patient has had a discharge order placed. They are returning home and being picked up in the lobby. Discharge paperwork has been printed, highlighted, and gone over with the patient by this RN. Patient understands teaching and has no further questions at this time. IV has been removed with no complications. Telemetry has been removed. Pt has all belongings present.   Daughter taking patient home with hospice services

## 2022-11-28 NOTE — CONSULTS
PALLIATIVE MEDICINE CONSULTATION     Patient name:Marce Poon   BEM:[id number]    :1975  Room/Bed:N-5565/5565-01   LOS: 6 days         Date of consult:2022    Consult Information  Palliative Medicine Consult performed by: DARCY Goldberg CNP, CNP    Inpatient consult to Palliative Care  Consult performed by: DARCY Goldberg CNP  Consult ordered by: Pipo Bergeron DO             ASSESSMENT/RECOMMENDATIONS     40 y.o. female with AMS and jaundice with ETOH cirrhosis      Symptom Management:  AMS- pt confused at times suspected related to hepatic encephalopathy  Jaundice- pt with end stage liver failure   Goals of Care- Pt and family discussed GOC with primary team on chart review plan for Home with [facility]. Talked to care team and pt and family no additional questions or concerns DC planned for today. [facility]    Patient/Family Goals of Care :    Pt and family discussed GOC with primary team on chart review plan for Home with [facility]. Talked to care team and pt and family no additional questions or concerns DC planned for today. [facility]    Disposition/Discharge Plan:   pending    Advance Directives:  Surrogate Decision Maker: Ronda-child  Code status:  DNR-CC    Case discussed with: patient, floor RN  Thank you for allowing us to participate in the care of this patient. HISTORY     CC: AMS  HPI: The patient is a 40 y.o. female with cirrhosis secondary to EtOH who presented to the emergency room with complaints of periumbilical and left mid abdominal pain. Other symptoms include weakness, dizziness and increased abdominal distention. She is followed by the GI team and has a meld score of 29. Other symptoms include nausea. She is followed by Palliative care at home prior to admission    Palliative Medicine SymptomScreening/ROS:    Review of Systems   Constitutional:  Positive for appetite change and fatigue. HENT:  Positive for trouble swallowing.     Respiratory:  Positive for shortness of breath. Gastrointestinal:  Positive for abdominal distention and nausea. Skin:  Positive for color change and pallor. Neurological:  Positive for weakness. Psychiatric/Behavioral:  Positive for confusion. All other systems reviewed and are negative. Patient unable to complete full ROS due to current cognitive status. Information that is obtained from nursing and chart. Palliative Performance Scale:     [x] 60%  Amb reduced; Sig dz. Can't do hobbies/housework; Intake normal or reduced, Occasional assist; LOC full/confusion   [] 50%  Mainly sit/lie; Extensive disease. Mainly assist, Intake normal or reduced; Occasional assist; LOC full/confusion   [] 40%  Mainly in bed; Extensive disease; Mainly assist; Intake normal or reduced; Occasional assist; LOC full/confusion   [] 30%  Bed bound, Extensive disease; Total care; Intake reduced; LOC full/confusion   [] 20%  Bed bound; Extensive disease; Total care; Intake minimal; Drowsy/coma   [] 10%  Bed bound; Extensive disease; Total care; Mouth care only; Drowsy/coma   []  0%   Death       Home med list and hospital medications reviewed in chart as of 11/28/2022     EXAM     Vitals:    11/28/22 1139   BP: 109/62   Pulse: 84   Resp: 16   Temp: 98.8 °F (37.1 °C)   SpO2: 94%       Physical Exam  Constitutional:       Appearance: She is ill-appearing. HENT:      Head: Normocephalic and atraumatic. Nose: No congestion. Mouth/Throat:      Mouth: Mucous membranes are dry. Eyes:      General: Scleral icterus present. Pupils: Pupils are equal, round, and reactive to light. Cardiovascular:      Rate and Rhythm: Normal rate. Heart sounds: No murmur heard. No friction rub. No gallop. Pulmonary:      Effort: No respiratory distress. Abdominal:      Palpations: Abdomen is soft. Musculoskeletal:      Cervical back: Normal range of motion. Right lower leg: Edema present. Left lower leg: Edema present. Skin:     General: Skin is warm and dry. Coloration: Skin is jaundiced and pale. Findings: Bruising present. Neurological:      General: No focal deficit present. Mental Status: She is alert. She is disoriented.    Psychiatric:         Mood and Affect: Mood normal.         Behavior: Behavior normal.              Current labs in the epic chart reviewed as of 11/28/2022   Review of previous notes, admits, labs, radiology and testing relevant to this consult done in this chart today 11/28/2022      Total time: 50 minutes  >50% of time spent counseling patient at bedside or POA/family member if applicable , reviewing information and discussing care, coordinating with care team  Signed By: Electronically signed by DARCY Kay CNP on 11/28/2022 at 1:28 PM  Palliative Medicine   [phone]    November 28, 2022

## 2022-11-28 NOTE — PLAN OF CARE
Problem: Safety - Adult  Goal: Free from fall injury  Outcome: Progressing     Problem: Pain  Goal: Verbalizes/displays adequate comfort level or baseline comfort level  Outcome: Progressing  Flowsheets (Taken 11/28/2022 7241)  Verbalizes/displays adequate comfort level or baseline comfort level: Encourage patient to monitor pain and request assistance     Problem: ABCDS Injury Assessment  Goal: Absence of physical injury  Outcome: Progressing

## 2022-11-28 NOTE — PROGRESS NOTES
Patient has agreed to hospice. Dr. Richar Ventura to follow as attending. Discharge medications prescribed.

## 2022-11-28 NOTE — PROGRESS NOTES
Hospitalist Progress Note      PCP: Dayana Nobles MD    Date of Admission: 11/22/2022    Chief Complaint:   [address] Course:    40 y.o. 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 Patient reports being on lactulose and compliant. Patient reported that she was discharged during hospitalization last Friday for anemia and had 3 blood transfusion      Subjective:   She continues to experience abdominal pain, left upper> epigastric and right upper quadrant. The pain is worsened with eating. She tries to eat as much as she is able. She denies nausea or vomiting. She has diarrhea with taking lactulose 3-4 Bms while on TID frequent. She denies history of hematemesis melena and rectal bleeding. She reports intermittent lightheadedness with standing. She denies shortness of breath chest pain palpitations.       Medications:  Reviewed    Infusion Medications    sodium chloride      sodium chloride Stopped (11/26/22 1959)    sodium chloride      sodium chloride Stopped (11/24/22 1920)     Scheduled Medications    piperacillin-tazobactam  3,375 mg IntraVENous Q8H    sodium chloride flush  10 mL IntraVENous 2 times per day    [Held by provider] furosemide  20 mg IntraVENous See Admin Instructions    lactulose  20 g Oral TID    midodrine  5 mg Oral TID    rifAXIMin  550 mg Oral BID    pantoprazole  40 mg IntraVENous BID     PRN Meds: sodium chloride, traMADol, sodium chloride flush, potassium chloride, magnesium sulfate, promethazine **OR** ondansetron, sodium chloride, sodium chloride, acetaminophen **OR** acetaminophen, zolpidem      Intake/Output Summary (Last 24 hours) at 11/27/2022 2618  Last data filed at 11/27/2022 1230  Gross per 24 hour   Intake 220 ml   Output --   Net 220 ml         Physical Exam Performed:    BP (!) 110/59   Pulse 69   Temp 98.4 °F (36.9 °C) (Oral)   Resp 16   Ht 5' 4\" (1.626 m)   Wt 114 lb 13.8 oz (52.1 kg)   SpO2 95%   BMI 19.72 kg/m²       GEN alert, in no distress  HEENT normocephalic, icteric sclera, EOMI, mucosa moist, no stridor  NECK supple, trachea midline  RESP on RA, in no distress, decreased BS at bases. CARDS RRR, S1, S2, no murmurs, no edema, radial pulse 2+, DP pulse 2+  ABD distended, +BS, soft, LUQ> epigastric and RUQ  MSK no cyanosis, no clubbing  SKIN jaundiced, warm, dry  NEURO alert, oriented x 3, no facial asymmetry, no dysarthria, no asterixis, moving spontaneously  PSYCH normal mood        Labs:   Recent Labs     11/25/22  0607 11/26/22  1133 11/26/22  1254 11/26/22  1739 11/27/22  0728   WBC 3.5* 3.3*  --   --  3.7*   HGB 7.0* 6.7* 6.7* 8.2* 7.8*   HCT 20.2* 19.8* 19.1* 24.4* 22.6*   PLT 71* 74*  --   --  72*       Recent Labs     11/25/22  0607 11/26/22  1133 11/27/22  0728   * 138 134*   K 4.4 4.2 4.1    103 101   CO2 27 22 23   BUN 12 13 12   CREATININE <0.5* <0.5* <0.5*   CALCIUM 9.2 9.0 8.9       Recent Labs     11/25/22  0607 11/26/22  1133 11/27/22  0728   AST 67* 62* 62*   ALT 27 24 23   BILITOT 14.7* 13.9* 14.3*   ALKPHOS 75 82 79       No results for input(s): INR in the last 72 hours. No results for input(s): Mandycompa Villanueva in the last 72 hours.       Urinalysis:      Lab Results   Component Value Date/Time    NITRU POSITIVE 11/23/2022 08:01 AM    WBCUA 0 11/23/2022 08:01 AM    BACTERIA None Seen 11/23/2022 08:01 AM    RBCUA 8 11/23/2022 08:01 AM    BLOODU Negative 11/23/2022 08:01 AM    SPECGRAV >=1.030 11/23/2022 08:01 AM    GLUCOSEU Negative 11/23/2022 08:01 AM       Radiology:  CTA ABDOMEN PELVIS W CONTRAST   Final Result   Liver cirrhosis with patent hepatic artery and portal vein vasculature. The   hepatic veins are not well visualized in this examination. Sequelae from portal hypertension with gastroesophageal varices and a   recanalized umbilical vein. 4 mm hyperenhancing focus in segment 3, with no definite washout, suspicious   for hepatocellular carcinoma in the setting of liver cirrhosis. Small volume ascites. Cholelithiasis. US GALLBLADDER RUQ   Final Result   Cholelithiasis and associated gallbladder sludge with several large rounded   nonshadowing foci in keeping with tumefactive sludge. There is diffuse mild   wall thickening suggestive of chronic or acute cholecystitis. Moderate dilatation of the common duct with no obvious filling defect seen. Normal size liver and unremarkable pancreas. Mild-to-moderate ascites. IR US GUIDED PARACENTESIS   Final Result   Successful paracentesis. XR CHEST PORTABLE   Final Result   Borderline cardiomegaly which is unchanged. Hypoinflation with mild bibasilar atelectasis or scarring. Small nodular density projecting the right lung base which more apparent. This could represent a nipple shadow but is indeterminate. Recommend a PA   and lateral chest         CT ABDOMEN PELVIS W IV CONTRAST Additional Contrast? None   Final Result   Cirrhosis with portal hypertension. Moderate volume ascites. The spleen is enlarged, and there are wedge-shaped areas of hypoenhancement. These could reflect variation in contrast opacification, but splenic infarcts   should be considered as well. Gallbladder mural thickening is seen, with cholelithiasis. That is a   nonspecific finding in this case, a as the mural thickening may be secondary   to the anasarca, but acute cholecystitis remains in the differential.      Mild fluid and gaseous distention of small bowel, without a clear zone of   transition.   Again this is nonspecific, but may reflect ileus in the setting   of gastroenteritis. CT HEAD WO CONTRAST   Final Result   No acute intracranial abnormality. 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 Patient reports being on lactulose and compliant. Patient reported that she was discharged during hospitalization last Friday for anemia and had 3 blood transfusion. Decompensated Cirrhosis  Possible Hepatocellular Carcinoma  Esophageal Varices  - EGD on 11/10/22 showed esophageal varices with cherry red spot which was banded. Portal hypertensive gastropathy  Ascites  - s/p paracentesis 11/23 with removal of 1.8L. Fluid analysis showed nucl cell 41, PMN's 5. Findings not suggestive of SBP. Hepatic encephalopathy  - adm NH3 level 121.  - Continue lactulose 20 g TID and rifaximin BID. Coagulopathy    ? Acute vs Chronic Cholecystitis  Cholelithiasis with Gallbladder Sludge  ? Splenic Infarcts  - Abdominal Pain worsened with eating.  - US RUQ 11/23 showed cholelithiasis and associated gallbladder sludge with several large rounded non-shadowing foci in keeping with tumefactive sludge. There is diffuse mild wall thickening suggestive of chronic or acute cholecystitis. Moderate dilation of common duct with no obvious filling defect.  - CT A/P with IV contrast 11/22/22 showed cirrhosis with portal hypertension. Moderate volume ascites.   The spleen is enlarged, and there are wedge-shaped areas of hypoenhancement, which could reflect variation in contrast opacification versus splenic infarcts. Gallbladder mural thickening is seen with cholelithiasis. Mild fluid and gaseous distention of small bowel without a clear transition zone. - CTA A/P with contrast (triphasic) 11/26 showed liver cirrhosis with patent hepatic artery and portal vein vasculature. The hepatic veins are not well visualized in this exam.  Sequelae from portal hypertension with gastroesophageal varices and a recanalized umbilical vein. A 4 mm hyperenhancing focus in segment 3, with no definite washout, suspicious for hepatocellular carcinoma in the setting of liver cirrhosis. - Appreciate GI participation.  - Appreciate general surgery participation.  - Received IV ceftriaxone (11/23-). - Started IV zosyn (11/24-). Normocytic anemia  History of spur cell anemia  - adm HgB 7.4, MCV 98.3. Recent baseline HgB 7's-8's. - HgB downtrended to 6.5 on 11/23.  - Denies history of hematemesis, rectal bleeding, melena. - Fecal occult 11/23 negative.  - s/p 1 unit pRBC on 11/22/22. - Monitor.       Progressively worsening  GI consulted, much appreciated  Palliative care consulted, much appreciated       Diet: NPO except meds ordered     DVT Prophylaxis: held risk of bleeding     Dispo:   Expected LOS greater than two midnights  Appropriate for A1 Discharge Unit: No      Bailey Michelle MD

## 2022-11-28 NOTE — PROGRESS NOTES
CLINICAL PHARMACY NOTE: MEDS TO BEDS    Total # of Prescriptions Filled: 9   The following medications were delivered to the patient:  Morphine  Ondansetron  Tramadol  Lorazepam  Folic acid   Thiamine  Lactulose  Spironolactone  Furosemide       Additional Documentation:  Pt signed paige bowens

## 2022-12-09 PROBLEM — N39.0 UTI (URINARY TRACT INFECTION): Status: RESOLVED | Noted: 2022-10-25 | Resolved: 2022-12-09

## 2022-12-18 NOTE — ED NOTES
Pt report given to GARETH Baptiste, states no questions or concerns at this time. Pt transported to floor via wheelchair by floor RN with lactated ringers and normal saline still infusing at this time.       [address], RN  05/16/21 0839 18-Dec-2022 03:09

## 2023-10-22 NOTE — PROGRESS NOTES
Pt returned to room. Resting in bed, bed alarm engaged. Video monitoring in place for fall precautions. Pt's mentation greatly improved in comparison to yesterday. Oriented x4, but does have some general fogginess r/t her condition given her recent AMS. Answering questions appropriately, ataxia seems resolved as well. Semi-slow movements and some delayed responses still present. 0730 lactulose held d/t frequent, persistent diarrhea (3 bowel movements already since 0700). VSS, RA, meds administered per MAR. The patient is Watcher - Medium risk of patient condition declining or worsening    Shift Goals  Clinical Goals: Stable blood pressure, VS; pain management  Patient Goals: rest, pain control, breastfeeding    Progress made toward(s) clinical / shift goals:  Patient reported adequate pain relief with use of scheduled pain medication.    Patient is not progressing towards the following goals:      Problem: Altered Physiologic Condition  Goal: Patient physiologically stable as evidenced by normal lochia, palpable uterine involution and vitals within normal limits  Outcome: Not Progressing  Note: Blood pressures elevated.  MD notified.  Orders received.     Problem: Knowledge Deficit - Standard  Goal: Patient and family/care givers will demonstrate understanding of plan of care, disease process/condition, diagnostic tests and medications  Outcome: Not Progressing  Note: Patient asked to speak with MD several times this shift.  MD notified and came to speak with patient.

## 2024-08-16 NOTE — PLAN OF CARE
"Assessment: Emiliano Bateman presents with functional mobility impairments which indicate the need for skilled intervention. Tolerating session today without incident. Pt demonstrates impaired safety awareness and impulsivity throughout session, requiring cueing and education on safety. Pt left sitting in recliner chair with alarm engaged and electronic sitter present. Recommending SNF at discharge. Will continue to follow and progress as tolerated.     Plan/Recommendations:   If medically appropriate, Moderate Intensity Therapy recommended post-acute care. This is recommended as therapy feels the patient would require 3-4 days per week and wouldn't tolerate \"3 hour daily\" rehab intensity. SNF would be the preferred choice. If the patient does not agree to SNF, arrange HH or OP depending on home bound status. If patient is medically complex, consider LTACH. Pt requires no DME at discharge.     Pt desires Skilled Rehab placement at discharge. Pt cooperative; agreeable to therapeutic recommendations and plan of care.     " Problem: Discharge Planning  Goal: Discharge to home or other facility with appropriate resources  Outcome: Progressing     Problem: Pain  Goal: Verbalizes/displays adequate comfort level or baseline comfort level  Outcome: Progressing  Note: Pt complaining of 10/10 abdominal pain and morphine not helping     Problem: Safety - Adult  Goal: Free from fall injury  Outcome: Progressing     Problem: ABCDS Injury Assessment  Goal: Absence of physical injury  Outcome: Progressing  Note: Pt's abdomen distended more than yesterday

## 2024-11-05 NOTE — PROGRESS NOTES
PT/OT consulted yesterday per pt and daughter's request. Pt does exhibit some delayed movements and slight unsteadiness when ambulating with RN. PT/OT notified of pt's d/c order - will see. Case management notified. 11-Nov-2024

## (undated) DEVICE — MOUTHPIECE ENDOSCP L CTRL OPN AND SIDE PORTS DISP

## (undated) DEVICE — MULTIPLE BAND LIGATOR: Brand: SPEEDBAND SUPERVIEW SUPER 7

## (undated) DEVICE — SOLUTION IV IRRIG WATER 500ML POUR BRL ST 2F7113

## (undated) DEVICE — VALVE SUCTION AIR H2O SET ORCA POD + DISP

## (undated) DEVICE — GOWN AURORA NONREINF LG: Brand: MEDLINE INDUSTRIES, INC.

## (undated) DEVICE — TRAP SPEC RETRV CLR PLAS POLYP IN LN SUCT QUIK CTCH

## (undated) DEVICE — SET VLV 3 PC AWS DISPOSABLE GRDIAN SCOPEVALET

## (undated) DEVICE — Device: Brand: BALLOON3

## (undated) DEVICE — PROCEDURE KIT ENDOSCP CUST

## (undated) DEVICE — AIR/WATER CLEANING ADAPTER FOR OLYMPUS® GI ENDOSCOPE: Brand: BULLDOG®

## (undated) DEVICE — ENDOSCOPIC KIT 6X3/16 FT COLON W/ 1.1 OZ 2 GWN W/O BRSH

## (undated) DEVICE — SNARE ENDOSCP L240CM SHTH DIA24MM LOOP W10MM POLYP RND REINF

## (undated) DEVICE — WORKING LENGTH 155CM, WORKING CHANNEL 2.8MM: Brand: RESOLUTION 360 CLIP

## (undated) DEVICE — BW-412T DISP COMBO CLEANING BRUSH: Brand: SINGLE USE COMBINATION CLEANING BRUSH

## (undated) DEVICE — LIGATOR ENDOSCP DIA8.6-11.5MM MULT DISP SPDBND LIGATOR SUP

## (undated) DEVICE — FORCEPS BX L240CM WRK CHN 2.8MM STD CAP W/ NDL MIC MESH